# Patient Record
Sex: MALE | Race: WHITE | NOT HISPANIC OR LATINO | Employment: OTHER | URBAN - NONMETROPOLITAN AREA
[De-identification: names, ages, dates, MRNs, and addresses within clinical notes are randomized per-mention and may not be internally consistent; named-entity substitution may affect disease eponyms.]

---

## 2017-01-04 ENCOUNTER — APPOINTMENT (INPATIENT)
Dept: RADIOLOGY | Facility: HOSPITAL | Age: 72
DRG: 291 | End: 2017-01-04
Payer: COMMERCIAL

## 2017-01-04 PROCEDURE — 71010 HB CHEST X-RAY 1 VIEW FRONTAL (PORTABLE): CPT

## 2017-02-28 ENCOUNTER — TRANSCRIBE ORDERS (OUTPATIENT)
Dept: ADMINISTRATIVE | Facility: HOSPITAL | Age: 72
End: 2017-02-28

## 2017-02-28 ENCOUNTER — APPOINTMENT (OUTPATIENT)
Dept: LAB | Facility: HOSPITAL | Age: 72
End: 2017-02-28
Payer: COMMERCIAL

## 2017-02-28 ENCOUNTER — ALLSCRIPTS OFFICE VISIT (OUTPATIENT)
Dept: OTHER | Facility: OTHER | Age: 72
End: 2017-02-28

## 2017-02-28 DIAGNOSIS — D64.9 ANEMIA, UNSPECIFIED: ICD-10-CM

## 2017-02-28 DIAGNOSIS — N40.0 BENIGN PROSTATIC HYPERPLASIA, PRESENCE OF LOWER URINARY TRACT SYMPTOMS UNSPECIFIED, UNSPECIFIED MORPHOLOGY: ICD-10-CM

## 2017-02-28 DIAGNOSIS — D51.0 PERNICIOUS ANEMIA: ICD-10-CM

## 2017-02-28 DIAGNOSIS — E78.5 HYPERLIPIDEMIA, UNSPECIFIED HYPERLIPIDEMIA TYPE: ICD-10-CM

## 2017-02-28 DIAGNOSIS — E11.9 TYPE 2 DIABETES MELLITUS WITHOUT COMPLICATION, UNSPECIFIED LONG TERM INSULIN USE STATUS: ICD-10-CM

## 2017-02-28 DIAGNOSIS — E55.9 VITAMIN D DEFICIENCY: ICD-10-CM

## 2017-02-28 DIAGNOSIS — E03.9 UNSPECIFIED HYPOTHYROIDISM: ICD-10-CM

## 2017-02-28 DIAGNOSIS — E11.9 TYPE 2 DIABETES MELLITUS WITHOUT COMPLICATION, UNSPECIFIED LONG TERM INSULIN USE STATUS: Primary | ICD-10-CM

## 2017-02-28 LAB
25(OH)D3 SERPL-MCNC: 25.7 NG/ML (ref 30–100)
ALBUMIN SERPL BCP-MCNC: 3.5 G/DL (ref 3.5–5)
ALP SERPL-CCNC: 165 U/L (ref 46–116)
ALT SERPL W P-5'-P-CCNC: 46 U/L (ref 12–78)
ANION GAP SERPL CALCULATED.3IONS-SCNC: 7 MMOL/L (ref 4–13)
AST SERPL W P-5'-P-CCNC: 21 U/L (ref 5–45)
BASOPHILS # BLD AUTO: 0.02 THOUSANDS/ΜL (ref 0–0.1)
BASOPHILS NFR BLD AUTO: 0 % (ref 0–1)
BILIRUB SERPL-MCNC: 0.4 MG/DL (ref 0.2–1)
BUN SERPL-MCNC: 29 MG/DL (ref 5–25)
CALCIUM SERPL-MCNC: 9.2 MG/DL (ref 8.3–10.1)
CHLORIDE SERPL-SCNC: 103 MMOL/L (ref 100–108)
CHOLEST SERPL-MCNC: 128 MG/DL (ref 50–200)
CO2 SERPL-SCNC: 34 MMOL/L (ref 21–32)
CREAT SERPL-MCNC: 1.76 MG/DL (ref 0.6–1.3)
EOSINOPHIL # BLD AUTO: 0.33 THOUSAND/ΜL (ref 0–0.61)
EOSINOPHIL NFR BLD AUTO: 4 % (ref 0–6)
ERYTHROCYTE [DISTWIDTH] IN BLOOD BY AUTOMATED COUNT: 14.2 % (ref 11.6–15.1)
EST. AVERAGE GLUCOSE BLD GHB EST-MCNC: 249 MG/DL
GFR SERPL CREATININE-BSD FRML MDRD: 38.1 ML/MIN/1.73SQ M
GLUCOSE SERPL-MCNC: 325 MG/DL (ref 65–140)
HBA1C MFR BLD: 10.3 % (ref 4.2–6.3)
HCT VFR BLD AUTO: 43 % (ref 36.5–49.3)
HDLC SERPL-MCNC: 49 MG/DL (ref 40–60)
HGB BLD-MCNC: 13.7 G/DL (ref 12–17)
LDLC SERPL CALC-MCNC: 55 MG/DL (ref 0–100)
LYMPHOCYTES # BLD AUTO: 1.99 THOUSANDS/ΜL (ref 0.6–4.47)
LYMPHOCYTES NFR BLD AUTO: 26 % (ref 14–44)
MCH RBC QN AUTO: 28.4 PG (ref 26.8–34.3)
MCHC RBC AUTO-ENTMCNC: 31.9 G/DL (ref 31.4–37.4)
MCV RBC AUTO: 89 FL (ref 82–98)
MONOCYTES # BLD AUTO: 0.46 THOUSAND/ΜL (ref 0.17–1.22)
MONOCYTES NFR BLD AUTO: 6 % (ref 4–12)
NEUTROPHILS # BLD AUTO: 4.89 THOUSANDS/ΜL (ref 1.85–7.62)
NEUTS SEG NFR BLD AUTO: 64 % (ref 43–75)
PLATELET # BLD AUTO: 194 THOUSANDS/UL (ref 149–390)
PMV BLD AUTO: 11.2 FL (ref 8.9–12.7)
POTASSIUM SERPL-SCNC: 4.1 MMOL/L (ref 3.5–5.3)
PROT SERPL-MCNC: 7.3 G/DL (ref 6.4–8.2)
PSA SERPL-MCNC: 0.2 NG/ML (ref 0–4)
RBC # BLD AUTO: 4.82 MILLION/UL (ref 3.88–5.62)
SODIUM SERPL-SCNC: 144 MMOL/L (ref 136–145)
T4 FREE SERPL-MCNC: 0.87 NG/DL (ref 0.76–1.46)
TRIGL SERPL-MCNC: 120 MG/DL
TSH SERPL DL<=0.05 MIU/L-ACNC: 2.41 UIU/ML (ref 0.36–3.74)
VIT B12 SERPL-MCNC: 606 PG/ML (ref 100–900)
WBC # BLD AUTO: 7.69 THOUSAND/UL (ref 4.31–10.16)

## 2017-02-28 PROCEDURE — 80061 LIPID PANEL: CPT

## 2017-02-28 PROCEDURE — 85025 COMPLETE CBC W/AUTO DIFF WBC: CPT

## 2017-02-28 PROCEDURE — 82306 VITAMIN D 25 HYDROXY: CPT

## 2017-02-28 PROCEDURE — 84439 ASSAY OF FREE THYROXINE: CPT

## 2017-02-28 PROCEDURE — 80053 COMPREHEN METABOLIC PANEL: CPT

## 2017-02-28 PROCEDURE — 84443 ASSAY THYROID STIM HORMONE: CPT

## 2017-02-28 PROCEDURE — 83036 HEMOGLOBIN GLYCOSYLATED A1C: CPT

## 2017-02-28 PROCEDURE — G0103 PSA SCREENING: HCPCS

## 2017-02-28 PROCEDURE — 36415 COLL VENOUS BLD VENIPUNCTURE: CPT

## 2017-02-28 PROCEDURE — 82607 VITAMIN B-12: CPT

## 2017-05-11 ENCOUNTER — HOSPITAL ENCOUNTER (EMERGENCY)
Facility: HOSPITAL | Age: 72
Discharge: HOME | End: 2017-05-11
Attending: EMERGENCY MEDICINE | Admitting: EMERGENCY MEDICINE
Payer: COMMERCIAL

## 2017-05-11 VITALS
TEMPERATURE: 97 F | DIASTOLIC BLOOD PRESSURE: 60 MMHG | OXYGEN SATURATION: 95 % | BODY MASS INDEX: 50.21 KG/M2 | WEIGHT: 311.07 LBS | SYSTOLIC BLOOD PRESSURE: 118 MMHG | RESPIRATION RATE: 18 BRPM | HEART RATE: 69 BPM

## 2017-05-11 DIAGNOSIS — E11.65 DIABETES MELLITUS WITH HYPERGLYCEMIA (HCC): Primary | ICD-10-CM

## 2017-05-11 DIAGNOSIS — N39.0 ACUTE URINARY TRACT INFECTION: ICD-10-CM

## 2017-05-11 DIAGNOSIS — N18.30 CHRONIC KIDNEY DISEASE, STAGE III (MODERATE) (HCC): ICD-10-CM

## 2017-05-11 LAB
ACETONE SERPL-MCNC: NEGATIVE MG/DL
ALBUMIN SERPL BCP-MCNC: 3.7 G/DL (ref 3.5–5)
ALP SERPL-CCNC: 183 U/L (ref 46–116)
ALT SERPL W P-5'-P-CCNC: 28 U/L (ref 12–78)
ANION GAP SERPL CALCULATED.3IONS-SCNC: 8 MMOL/L (ref 4–13)
ANION GAP SERPL CALCULATED.3IONS-SCNC: 9 MMOL/L (ref 4–13)
AST SERPL W P-5'-P-CCNC: 17 U/L (ref 5–45)
BACTERIA UR QL AUTO: ABNORMAL /HPF
BASOPHILS # BLD AUTO: 0.03 THOUSANDS/ΜL (ref 0–0.1)
BASOPHILS NFR BLD AUTO: 0 % (ref 0–1)
BILIRUB SERPL-MCNC: 0.8 MG/DL (ref 0.2–1)
BILIRUB UR QL STRIP: NEGATIVE
BUN SERPL-MCNC: 32 MG/DL (ref 5–25)
BUN SERPL-MCNC: 32 MG/DL (ref 5–25)
CALCIUM SERPL-MCNC: 9 MG/DL (ref 8.3–10.1)
CALCIUM SERPL-MCNC: 9.4 MG/DL (ref 8.3–10.1)
CHLORIDE SERPL-SCNC: 97 MMOL/L (ref 100–108)
CHLORIDE SERPL-SCNC: 99 MMOL/L (ref 100–108)
CLARITY UR: ABNORMAL
CO2 SERPL-SCNC: 29 MMOL/L (ref 21–32)
CO2 SERPL-SCNC: 32 MMOL/L (ref 21–32)
COLOR UR: YELLOW
CREAT SERPL-MCNC: 1.52 MG/DL (ref 0.6–1.3)
CREAT SERPL-MCNC: 1.57 MG/DL (ref 0.6–1.3)
EOSINOPHIL # BLD AUTO: 0.24 THOUSAND/ΜL (ref 0–0.61)
EOSINOPHIL NFR BLD AUTO: 3 % (ref 0–6)
ERYTHROCYTE [DISTWIDTH] IN BLOOD BY AUTOMATED COUNT: 14.4 % (ref 11.6–15.1)
GFR SERPL CREATININE-BSD FRML MDRD: 43.4 ML/MIN/1.73SQ M
GFR SERPL CREATININE-BSD FRML MDRD: 45.1 ML/MIN/1.73SQ M
GLUCOSE SERPL-MCNC: 408 MG/DL (ref 65–140)
GLUCOSE SERPL-MCNC: 486 MG/DL (ref 65–140)
GLUCOSE SERPL-MCNC: 495 MG/DL (ref 65–140)
GLUCOSE UR STRIP-MCNC: ABNORMAL MG/DL
HCT VFR BLD AUTO: 42.5 % (ref 36.5–49.3)
HGB BLD-MCNC: 13.9 G/DL (ref 12–17)
HGB UR QL STRIP.AUTO: ABNORMAL
KETONES UR STRIP-MCNC: NEGATIVE MG/DL
LEUKOCYTE ESTERASE UR QL STRIP: ABNORMAL
LIPASE SERPL-CCNC: 515 U/L (ref 73–393)
LYMPHOCYTES # BLD AUTO: 1.88 THOUSANDS/ΜL (ref 0.6–4.47)
LYMPHOCYTES NFR BLD AUTO: 25 % (ref 14–44)
MCH RBC QN AUTO: 28.8 PG (ref 26.8–34.3)
MCHC RBC AUTO-ENTMCNC: 32.7 G/DL (ref 31.4–37.4)
MCV RBC AUTO: 88 FL (ref 82–98)
MONOCYTES # BLD AUTO: 0.47 THOUSAND/ΜL (ref 0.17–1.22)
MONOCYTES NFR BLD AUTO: 6 % (ref 4–12)
NEUTROPHILS # BLD AUTO: 5 THOUSANDS/ΜL (ref 1.85–7.62)
NEUTS SEG NFR BLD AUTO: 66 % (ref 43–75)
NITRITE UR QL STRIP: NEGATIVE
NON-SQ EPI CELLS URNS QL MICRO: ABNORMAL /HPF
PH UR STRIP.AUTO: 5.5 [PH] (ref 4.5–8)
PLATELET # BLD AUTO: 198 THOUSANDS/UL (ref 149–390)
PMV BLD AUTO: 11.3 FL (ref 8.9–12.7)
POTASSIUM SERPL-SCNC: 4.1 MMOL/L (ref 3.5–5.3)
POTASSIUM SERPL-SCNC: 4.4 MMOL/L (ref 3.5–5.3)
PROT SERPL-MCNC: 8 G/DL (ref 6.4–8.2)
PROT UR STRIP-MCNC: NEGATIVE MG/DL
RBC # BLD AUTO: 4.83 MILLION/UL (ref 3.88–5.62)
RBC #/AREA URNS AUTO: ABNORMAL /HPF
SODIUM SERPL-SCNC: 137 MMOL/L (ref 136–145)
SODIUM SERPL-SCNC: 137 MMOL/L (ref 136–145)
SP GR UR STRIP.AUTO: 1.01 (ref 1–1.03)
UROBILINOGEN UR QL STRIP.AUTO: 0.2 E.U./DL
WBC # BLD AUTO: 7.62 THOUSAND/UL (ref 4.31–10.16)
WBC #/AREA URNS AUTO: ABNORMAL /HPF

## 2017-05-11 PROCEDURE — 96360 HYDRATION IV INFUSION INIT: CPT

## 2017-05-11 PROCEDURE — 80053 COMPREHEN METABOLIC PANEL: CPT

## 2017-05-11 PROCEDURE — 80048 BASIC METABOLIC PNL TOTAL CA: CPT | Performed by: EMERGENCY MEDICINE

## 2017-05-11 PROCEDURE — 96372 THER/PROPH/DIAG INJ SC/IM: CPT

## 2017-05-11 PROCEDURE — 87147 CULTURE TYPE IMMUNOLOGIC: CPT | Performed by: EMERGENCY MEDICINE

## 2017-05-11 PROCEDURE — 82948 REAGENT STRIP/BLOOD GLUCOSE: CPT

## 2017-05-11 PROCEDURE — 36415 COLL VENOUS BLD VENIPUNCTURE: CPT

## 2017-05-11 PROCEDURE — 99283 EMERGENCY DEPT VISIT LOW MDM: CPT

## 2017-05-11 PROCEDURE — 87086 URINE CULTURE/COLONY COUNT: CPT | Performed by: EMERGENCY MEDICINE

## 2017-05-11 PROCEDURE — 85025 COMPLETE CBC W/AUTO DIFF WBC: CPT

## 2017-05-11 PROCEDURE — 81001 URINALYSIS AUTO W/SCOPE: CPT | Performed by: EMERGENCY MEDICINE

## 2017-05-11 PROCEDURE — 83690 ASSAY OF LIPASE: CPT

## 2017-05-11 PROCEDURE — 82009 KETONE BODYS QUAL: CPT | Performed by: EMERGENCY MEDICINE

## 2017-05-11 RX ORDER — CEPHALEXIN 250 MG/1
500 CAPSULE ORAL ONCE
Status: COMPLETED | OUTPATIENT
Start: 2017-05-11 | End: 2017-05-11

## 2017-05-11 RX ORDER — CEPHALEXIN 500 MG/1
500 CAPSULE ORAL 2 TIMES DAILY
Qty: 10 CAPSULE | Refills: 0 | Status: SHIPPED | OUTPATIENT
Start: 2017-05-11 | End: 2017-05-16

## 2017-05-11 RX ADMIN — SODIUM CHLORIDE 500 ML: 0.9 INJECTION, SOLUTION INTRAVENOUS at 12:33

## 2017-05-11 RX ADMIN — CEPHALEXIN 500 MG: 250 CAPSULE ORAL at 13:59

## 2017-05-11 RX ADMIN — INSULIN HUMAN 10 UNITS: 100 INJECTION, SOLUTION PARENTERAL at 12:33

## 2017-05-12 LAB — BACTERIA UR CULT: NORMAL

## 2017-05-24 ENCOUNTER — TRANSCRIBE ORDERS (OUTPATIENT)
Dept: ADMINISTRATIVE | Facility: HOSPITAL | Age: 72
End: 2017-05-24

## 2017-05-24 ENCOUNTER — APPOINTMENT (OUTPATIENT)
Dept: LAB | Facility: HOSPITAL | Age: 72
End: 2017-05-24
Payer: COMMERCIAL

## 2017-05-24 DIAGNOSIS — E13.8 DIABETES MELLITUS OF OTHER TYPE WITH COMPLICATION: ICD-10-CM

## 2017-05-24 DIAGNOSIS — E13.8 DIABETES MELLITUS OF OTHER TYPE WITH COMPLICATION: Primary | ICD-10-CM

## 2017-05-24 DIAGNOSIS — N40.0 BENIGN PROSTATIC HYPERPLASIA, PRESENCE OF LOWER URINARY TRACT SYMPTOMS UNSPECIFIED, UNSPECIFIED MORPHOLOGY: ICD-10-CM

## 2017-05-24 DIAGNOSIS — D64.9 ANEMIA, UNSPECIFIED: ICD-10-CM

## 2017-05-24 DIAGNOSIS — N39.0 URINARY TRACT INFECTION, SITE NOT SPECIFIED: ICD-10-CM

## 2017-05-24 DIAGNOSIS — E78.00 PURE HYPERCHOLESTEROLEMIA: ICD-10-CM

## 2017-05-24 LAB
ALBUMIN SERPL BCP-MCNC: 3.6 G/DL (ref 3.5–5)
ALP SERPL-CCNC: 168 U/L (ref 46–116)
ALT SERPL W P-5'-P-CCNC: 29 U/L (ref 12–78)
ANION GAP SERPL CALCULATED.3IONS-SCNC: 9 MMOL/L (ref 4–13)
AST SERPL W P-5'-P-CCNC: 19 U/L (ref 5–45)
BACTERIA UR QL AUTO: ABNORMAL /HPF
BASOPHILS # BLD AUTO: 0.03 THOUSANDS/ΜL (ref 0–0.1)
BASOPHILS NFR BLD AUTO: 0 % (ref 0–1)
BILIRUB SERPL-MCNC: 0.5 MG/DL (ref 0.2–1)
BILIRUB UR QL STRIP: NEGATIVE
BUN SERPL-MCNC: 22 MG/DL (ref 5–25)
CALCIUM SERPL-MCNC: 9 MG/DL (ref 8.3–10.1)
CHLORIDE SERPL-SCNC: 101 MMOL/L (ref 100–108)
CHOLEST SERPL-MCNC: 161 MG/DL (ref 50–200)
CLARITY UR: ABNORMAL
CO2 SERPL-SCNC: 29 MMOL/L (ref 21–32)
COLOR UR: YELLOW
CREAT SERPL-MCNC: 1.37 MG/DL (ref 0.6–1.3)
CREAT UR-MCNC: 153 MG/DL
EOSINOPHIL # BLD AUTO: 0.34 THOUSAND/ΜL (ref 0–0.61)
EOSINOPHIL NFR BLD AUTO: 5 % (ref 0–6)
ERYTHROCYTE [DISTWIDTH] IN BLOOD BY AUTOMATED COUNT: 14.2 % (ref 11.6–15.1)
EST. AVERAGE GLUCOSE BLD GHB EST-MCNC: 321 MG/DL
GFR SERPL CREATININE-BSD FRML MDRD: 50.8 ML/MIN/1.73SQ M
GLUCOSE P FAST SERPL-MCNC: 279 MG/DL (ref 65–99)
GLUCOSE UR STRIP-MCNC: ABNORMAL MG/DL
HBA1C MFR BLD: 12.8 % (ref 4.2–6.3)
HCT VFR BLD AUTO: 42.1 % (ref 36.5–49.3)
HDLC SERPL-MCNC: 45 MG/DL (ref 40–60)
HGB BLD-MCNC: 13.9 G/DL (ref 12–17)
HGB UR QL STRIP.AUTO: ABNORMAL
KETONES UR STRIP-MCNC: NEGATIVE MG/DL
LDLC SERPL CALC-MCNC: 77 MG/DL (ref 0–100)
LEUKOCYTE ESTERASE UR QL STRIP: ABNORMAL
LYMPHOCYTES # BLD AUTO: 2.41 THOUSANDS/ΜL (ref 0.6–4.47)
LYMPHOCYTES NFR BLD AUTO: 32 % (ref 14–44)
MCH RBC QN AUTO: 29.1 PG (ref 26.8–34.3)
MCHC RBC AUTO-ENTMCNC: 33 G/DL (ref 31.4–37.4)
MCV RBC AUTO: 88 FL (ref 82–98)
MICROALBUMIN UR-MCNC: 155 MG/L (ref 0–20)
MICROALBUMIN/CREAT 24H UR: 101 MG/G CREATININE (ref 0–30)
MONOCYTES # BLD AUTO: 0.55 THOUSAND/ΜL (ref 0.17–1.22)
MONOCYTES NFR BLD AUTO: 7 % (ref 4–12)
NEUTROPHILS # BLD AUTO: 4.31 THOUSANDS/ΜL (ref 1.85–7.62)
NEUTS SEG NFR BLD AUTO: 56 % (ref 43–75)
NITRITE UR QL STRIP: NEGATIVE
NON-SQ EPI CELLS URNS QL MICRO: ABNORMAL /HPF
PH UR STRIP.AUTO: 6 [PH] (ref 4.5–8)
PLATELET # BLD AUTO: 194 THOUSANDS/UL (ref 149–390)
PMV BLD AUTO: 11.5 FL (ref 8.9–12.7)
POTASSIUM SERPL-SCNC: 3.8 MMOL/L (ref 3.5–5.3)
PROT SERPL-MCNC: 7 G/DL (ref 6.4–8.2)
PROT UR STRIP-MCNC: ABNORMAL MG/DL
PSA SERPL-MCNC: 0.3 NG/ML (ref 0–4)
RBC # BLD AUTO: 4.77 MILLION/UL (ref 3.88–5.62)
RBC #/AREA URNS AUTO: ABNORMAL /HPF
SODIUM SERPL-SCNC: 139 MMOL/L (ref 136–145)
SP GR UR STRIP.AUTO: 1.02 (ref 1–1.03)
TRIGL SERPL-MCNC: 195 MG/DL
UROBILINOGEN UR QL STRIP.AUTO: 0.2 E.U./DL
WBC # BLD AUTO: 7.64 THOUSAND/UL (ref 4.31–10.16)
WBC #/AREA URNS AUTO: ABNORMAL /HPF

## 2017-05-24 PROCEDURE — 36415 COLL VENOUS BLD VENIPUNCTURE: CPT

## 2017-05-24 PROCEDURE — 82570 ASSAY OF URINE CREATININE: CPT | Performed by: DENTIST

## 2017-05-24 PROCEDURE — G0103 PSA SCREENING: HCPCS

## 2017-05-24 PROCEDURE — 80053 COMPREHEN METABOLIC PANEL: CPT

## 2017-05-24 PROCEDURE — 85025 COMPLETE CBC W/AUTO DIFF WBC: CPT

## 2017-05-24 PROCEDURE — 81001 URINALYSIS AUTO W/SCOPE: CPT | Performed by: DENTIST

## 2017-05-24 PROCEDURE — 80061 LIPID PANEL: CPT

## 2017-05-24 PROCEDURE — 82043 UR ALBUMIN QUANTITATIVE: CPT | Performed by: DENTIST

## 2017-05-24 PROCEDURE — 83036 HEMOGLOBIN GLYCOSYLATED A1C: CPT

## 2017-07-25 ENCOUNTER — HOSPITAL ENCOUNTER (EMERGENCY)
Facility: HOSPITAL | Age: 72
Discharge: HOME | End: 2017-07-25
Attending: EMERGENCY MEDICINE | Admitting: EMERGENCY MEDICINE
Payer: COMMERCIAL

## 2017-07-25 ENCOUNTER — APPOINTMENT (EMERGENCY)
Dept: RADIOLOGY | Facility: HOSPITAL | Age: 72
End: 2017-07-25
Payer: COMMERCIAL

## 2017-07-25 VITALS
DIASTOLIC BLOOD PRESSURE: 69 MMHG | OXYGEN SATURATION: 93 % | RESPIRATION RATE: 20 BRPM | TEMPERATURE: 98.4 F | BODY MASS INDEX: 51.2 KG/M2 | HEART RATE: 83 BPM | SYSTOLIC BLOOD PRESSURE: 138 MMHG | WEIGHT: 315 LBS

## 2017-07-25 DIAGNOSIS — I87.2 VENOUS STASIS DERMATITIS: ICD-10-CM

## 2017-07-25 DIAGNOSIS — L03.116 CELLULITIS OF LEFT LEG WITHOUT FOOT: Primary | ICD-10-CM

## 2017-07-25 LAB
ANION GAP SERPL CALCULATED.3IONS-SCNC: 7 MMOL/L (ref 4–13)
ATRIAL RATE: 84 BPM
BASOPHILS # BLD AUTO: 0.02 THOUSANDS/ΜL (ref 0–0.1)
BASOPHILS NFR BLD AUTO: 0 % (ref 0–1)
BUN SERPL-MCNC: 31 MG/DL (ref 5–25)
CALCIUM SERPL-MCNC: 9 MG/DL (ref 8.3–10.1)
CHLORIDE SERPL-SCNC: 98 MMOL/L (ref 100–108)
CO2 SERPL-SCNC: 31 MMOL/L (ref 21–32)
CREAT SERPL-MCNC: 1.69 MG/DL (ref 0.6–1.3)
EOSINOPHIL # BLD AUTO: 0.29 THOUSAND/ΜL (ref 0–0.61)
EOSINOPHIL NFR BLD AUTO: 4 % (ref 0–6)
ERYTHROCYTE [DISTWIDTH] IN BLOOD BY AUTOMATED COUNT: 13.7 % (ref 11.6–15.1)
GFR SERPL CREATININE-BSD FRML MDRD: 39 ML/MIN/1.73SQ M
GLUCOSE SERPL-MCNC: 489 MG/DL (ref 65–140)
HCT VFR BLD AUTO: 39.4 % (ref 36.5–49.3)
HGB BLD-MCNC: 13.2 G/DL (ref 12–17)
INR PPP: 1.02 (ref 0.86–1.16)
LACTATE SERPL-SCNC: 2 MMOL/L (ref 0.5–2)
LYMPHOCYTES # BLD AUTO: 2.3 THOUSANDS/ΜL (ref 0.6–4.47)
LYMPHOCYTES NFR BLD AUTO: 29 % (ref 14–44)
MAGNESIUM SERPL-MCNC: 1.9 MG/DL (ref 1.6–2.6)
MCH RBC QN AUTO: 29.1 PG (ref 26.8–34.3)
MCHC RBC AUTO-ENTMCNC: 33.5 G/DL (ref 31.4–37.4)
MCV RBC AUTO: 87 FL (ref 82–98)
MONOCYTES # BLD AUTO: 0.56 THOUSAND/ΜL (ref 0.17–1.22)
MONOCYTES NFR BLD AUTO: 7 % (ref 4–12)
NEUTROPHILS # BLD AUTO: 4.78 THOUSANDS/ΜL (ref 1.85–7.62)
NEUTS SEG NFR BLD AUTO: 60 % (ref 43–75)
NT-PROBNP SERPL-MCNC: 146 PG/ML
P AXIS: 49 DEGREES
PLATELET # BLD AUTO: 220 THOUSANDS/UL (ref 149–390)
PMV BLD AUTO: 11 FL (ref 8.9–12.7)
POTASSIUM SERPL-SCNC: 3.9 MMOL/L (ref 3.5–5.3)
PR INTERVAL: 208 MS
PROTHROMBIN TIME: 13.3 SECONDS (ref 12.1–14.4)
QRS AXIS: -86 DEGREES
QRSD INTERVAL: 156 MS
QT INTERVAL: 416 MS
QTC INTERVAL: 491 MS
RBC # BLD AUTO: 4.54 MILLION/UL (ref 3.88–5.62)
SODIUM SERPL-SCNC: 136 MMOL/L (ref 136–145)
T WAVE AXIS: 35 DEGREES
VENTRICULAR RATE: 84 BPM
WBC # BLD AUTO: 7.95 THOUSAND/UL (ref 4.31–10.16)

## 2017-07-25 PROCEDURE — 99284 EMERGENCY DEPT VISIT MOD MDM: CPT

## 2017-07-25 PROCEDURE — 80048 BASIC METABOLIC PNL TOTAL CA: CPT | Performed by: EMERGENCY MEDICINE

## 2017-07-25 PROCEDURE — 93005 ELECTROCARDIOGRAM TRACING: CPT | Performed by: EMERGENCY MEDICINE

## 2017-07-25 PROCEDURE — 85025 COMPLETE CBC W/AUTO DIFF WBC: CPT | Performed by: EMERGENCY MEDICINE

## 2017-07-25 PROCEDURE — 71020 HB CHEST X-RAY 2VW FRONTAL&LATL: CPT

## 2017-07-25 PROCEDURE — 85610 PROTHROMBIN TIME: CPT | Performed by: EMERGENCY MEDICINE

## 2017-07-25 PROCEDURE — 83735 ASSAY OF MAGNESIUM: CPT | Performed by: EMERGENCY MEDICINE

## 2017-07-25 PROCEDURE — 83880 ASSAY OF NATRIURETIC PEPTIDE: CPT | Performed by: EMERGENCY MEDICINE

## 2017-07-25 PROCEDURE — 73590 X-RAY EXAM OF LOWER LEG: CPT

## 2017-07-25 PROCEDURE — 83605 ASSAY OF LACTIC ACID: CPT | Performed by: EMERGENCY MEDICINE

## 2017-07-25 PROCEDURE — 36415 COLL VENOUS BLD VENIPUNCTURE: CPT | Performed by: EMERGENCY MEDICINE

## 2017-07-25 RX ORDER — CEPHALEXIN 250 MG/1
500 CAPSULE ORAL ONCE
Status: COMPLETED | OUTPATIENT
Start: 2017-07-25 | End: 2017-07-25

## 2017-07-25 RX ORDER — CEPHALEXIN 500 MG/1
500 CAPSULE ORAL 4 TIMES DAILY
Qty: 40 CAPSULE | Refills: 0 | Status: SHIPPED | OUTPATIENT
Start: 2017-07-25 | End: 2017-08-04

## 2017-07-25 RX ADMIN — CEPHALEXIN 500 MG: 250 CAPSULE ORAL at 05:18

## 2017-12-04 ENCOUNTER — ALLSCRIPTS OFFICE VISIT (OUTPATIENT)
Dept: FAMILY MEDICINE CLINIC | Facility: CLINIC | Age: 72
End: 2017-12-04
Payer: COMMERCIAL

## 2017-12-04 DIAGNOSIS — I50.32 CHRONIC DIASTOLIC HEART FAILURE (HCC): ICD-10-CM

## 2017-12-04 DIAGNOSIS — E11.9 TYPE 2 DIABETES MELLITUS WITHOUT COMPLICATIONS (HCC): ICD-10-CM

## 2017-12-04 PROCEDURE — 83036 HEMOGLOBIN GLYCOSYLATED A1C: CPT | Performed by: FAMILY MEDICINE

## 2017-12-04 PROCEDURE — 99213 OFFICE O/P EST LOW 20 MIN: CPT | Performed by: FAMILY MEDICINE

## 2017-12-27 NOTE — PROGRESS NOTES
Assessment   1  Type 2 diabetes mellitus (250 00) (E11 9)   2  Former smoker (V15 82) (Y47 081)   3  No alcohol use   4  No illicit drug use   5  Chronic diastolic congestive heart failure (428 32,428 0) (I50 32)   6  Obesity, morbid, BMI 50 or higher (278 01) (E66 01)   7  Neuropathy, peripheral (356 9) (G62 9)   8  CHF (congestive heart failure) (428 0) (I50 9)    Plan   CHF (congestive heart failure)    · *1 -  [facility] (CARDIOLOGY ) Co-Management  *  Status: Active     Requested for: 29AGG5228  Care Summary provided  : Yes   · Modify Home Oxygen; Status:Complete;   Done: 59WRA4101  Chronic diastolic congestive heart failure    · (1) TSH; Status:Active; Requested for:10Isz4429;    · * XR CHEST PA & LATERAL; Status:Active; Requested for:05Zah9150;   Type 2 diabetes mellitus    · (1) COMPREHENSIVE METABOLIC PANEL; Status:Active; Requested for:09Haj4012;    · (1) HEMOGLOBIN A1C; Status:Active; Requested for:20Ijo8300;    · (1) LIPID PANEL FASTING W DIRECT LDL REFLEX; Status:Active; Requested    for:02Qus5027;    · (1) MICROALBUMIN CREATININE RATIO, RANDOM URINE; Status:Active; Requested    for:13Kxd3856;    · ECG 12-LEAD; Status:Hold For - Scheduling; Requested for:15Ibr2296;    · *1 -  [facility] Co-Management  *  Status: Hold For - Scheduling  Requested    for: 65WFJ3344  Care Summary provided  : Yes   · Romina Schaeffer (OPTHAMOLOGY ) Co-Management  *  Status: Hold For -    Scheduling  Requested for: 63EWV5771  Care Summary provided  : Yes    Discussion/Summary   Discussion Summary:    Type 2 dm- comlete labs, check AIC  bring glucose readings to next office visit, keep appt with endocriniogy  eye exam and foot exam  Follow up in 10 days  records from prev pcp of chf- follow up with cardiology          Counseling Documentation With Imm: The patient was counseled regarding instructions for management,-- importance of compliance with treatment   total time of encounter was 20 minutes-- and-- 10 minutes was spent counseling  Medication SE Review and Pt Understands Tx: Possible side effects of new medications were reviewed with the patient/guardian today  The treatment plan was reviewed with the patient/guardian  The patient/guardian understands and agrees with the treatment plan      Chief Complaint   Chief Complaint Free Text Note Form: pt is here to establish care, He states he has water blisters on his legs  History of Present Illness   HPI: 74year old here today to establish care  Pt has known type 2 dm, CHF, obstructive sleep apnea, he is on multiple medications  He is new to the area, old PCP [place]  He has bilateral leg blisters, due ot fluid  Wearing george stocking, has had previous wound care without change  Has appt with podiatry in January  Review of Systems   Complete-Male:      Constitutional: No fever or chills, feels well, no tiredness, no recent weight gain or weight loss  Eyes: No complaints of eye pain, no red eyes, no discharge from eyes, no itchy eyes  ENT: no complaints of earache, no hearing loss, no nosebleeds, no nasal discharge, no sore throat, no hoarseness  Cardiovascular: No complaints of slow heart rate, no fast heart rate, no chest pain, no palpitations, no leg claudication, no lower extremity  Respiratory: No complaints of shortness of breath, no wheezing, no cough, no SOB on exertion, no orthopnea or PND  Gastrointestinal: No complaints of abdominal pain, no constipation, no nausea or vomiting, no diarrhea or bloody stools  Genitourinary: No complaints of dysuria, no incontinence, no hesitancy, no nocturia, no genital lesion, no testicular pain  Musculoskeletal: limb swelling  Integumentary: dry skin  Neurological: No compliants of headache, no confusion, no convulsions, no numbness or tingling, no dizziness or fainting, no limb weakness, no difficulty walking        Psychiatric: Is not suicidal, no sleep disturbances, no anxiety or depression, no change in personality, no emotional problems  Endocrine: No complaints of proptosis, no hot flashes, no muscle weakness, no erectile dysfunction, no deepening of the voice, no feelings of weakness  Hematologic/Lymphatic: No complaints of swollen glands, no swollen glands in the neck, does not bleed easily, no easy bruising  Active Problems   1  Acute on chronic diastolic congestive heart failure (428 33,428 0) (I50 33)   2  Benign essential hypertension (401 1) (I10)   3  CHF (congestive heart failure) (428 0) (I50 9)   4  Chronic diastolic congestive heart failure (428 32,428 0) (I50 32)   5  Chronic kidney disease, stage 3 (585 3) (N18 3)   6  Hypercholesterolemia (272 0) (E78 00)   7  Obstructive sleep apnea (327 23) (G47 33)   8  Shortness of breath (786 05) (R06 02)    Past Medical History   1  History of Cellulitis of left lower extremity (682 6) (L03 116)   2  History of cataract (V12 49) (Z86 69)   3  History of glaucoma (V12 49) (Z86 69)   4  History of stroke (V12 54) (Z86 73)  Active Problems And Past Medical History Reviewed: The active problems and past medical history were reviewed and updated today  Surgical History   1  History of Appendectomy   2  History of Neuroplasty Median Nerve At Carpal Tunnel  Surgical History Reviewed: The surgical history was reviewed and updated today  Family History   Family History Reviewed: The family history was reviewed and updated today  Social History    · Denied: History of Alcohol use   · Current every day smoker (305 1) (F17 200)   ·   Social History Reviewed: The social history was reviewed and updated today  The social history was reviewed and is unchanged  Current Meds    1  Aspirin 81 MG TABS; TAKE 1 TABLET DAILY; Therapy: 29BCA9516 to (Evaluate:72Ggl1521) Recorded   2  Carvedilol 12 5 MG Oral Tablet; TAKE 1 TABLET TWICE DAILY;      Therapy: 82Gdi7530 to (Evaluate:70See8470) Recorded   3  Finasteride 5 MG Oral Tablet; TAKE 1 TABLET DAILY; Therapy: (QEMKFTKF:64IRZ9818) to Recorded   4  Fluticasone Propionate 50 MCG/ACT Nasal Suspension; USE 1 SPRAY IN EACH     NOSTRIL ONCE DAILY; Therapy: 23SWF4639 to (Evaluate:22Jan2016) Recorded   5  Furosemide 40 MG Oral Tablet; TAKE 1 TABLET DAILY AS DIRECTED; Therapy: (XDTTDBPF:81AGF9794) to Recorded   6  Gabapentin 600 MG Oral Tablet; TAKE 1 TABLET TWICE DAILY; Therapy: (PPNCOTMY:87SRR4921) to Recorded   7  HumaLOG 100 UNIT/ML Subcutaneous Solution; inject 35 units bid with meals; Therapy: 58NTJ4593 to Recorded   8  Lyrica 50 MG Oral Capsule; One tab tid; Therapy: (HXIEGKVE:82ACE4999) to Recorded   9  Omeprazole 40 MG Oral Capsule Delayed Release; take 1 capsule daily; Therapy: 46Tvr7294 to (Evaluate:62Aak3511) Recorded   10  Pravastatin Sodium 40 MG Oral Tablet; TAKE 1 TABLET DAILY; Therapy: 53Gzr1982 to (Evaluate:16Apr2016) Recorded   11  Tamsulosin HCl - 0 4 MG Oral Capsule; one tab at bedtime; Therapy: (WKREHIMS:44YJK6998) to Recorded   12  Toujeo SoloStar 300 UNIT/ML Subcutaneous Solution Pen-injector; 80units bid with      meals; Therapy: (AWOQNDVR:37OXE2854) to Recorded    Allergies   1  No Known Drug Allergies    Vitals   Vital Signs    Recorded: 38HFU0046 10:54AM   Temperature 97 6 F   Heart Rate 105   Respiration 16   Systolic 280   Diastolic 76   Height 5 ft 6 in   Weight 312 lb    BMI Calculated 50 36   BSA Calculated 2 42   O2 Saturation 94     Physical Exam        Constitutional      General appearance: Abnormal   morbidly obese  Pulmonary      Respiratory effort: No increased work of breathing or signs of respiratory distress  Auscultation of lungs: Clear to auscultation, equal breath sounds bilaterally, no wheezes, no rales, no rhonci  Cardiovascular      Auscultation of heart: Normal rate and rhythm, normal S1 and S2, without murmurs         Abdomen Abdomen: Abnormal   The abdomen was obese  Lymphatic      Palpation of lymph nodes in neck: No lymphadenopathy  Musculoskeletal      Gait and station: Normal        Inspection/palpation of joints, bones, and muscles: Abnormal  -- (lower ext emy in color, dry, +pedal pulses)      Psychiatric      Orientation to person, place and time: Normal        Mood and affect: Normal           Future Appointments      Date/Time Provider Specialty Site   03/05/2018 10:30 AM Hussain Patricia [address] [place] [address]     Signatures    Electronically signed by : HEATH Coello;  Dec  4 2017  1:07PM EST                       (Author)     Electronically signed by : Seble Feliciano DO; Dec 26 2017  7:38PM EST

## 2018-01-14 VITALS
BODY MASS INDEX: 50.62 KG/M2 | WEIGHT: 315 LBS | HEART RATE: 73 BPM | DIASTOLIC BLOOD PRESSURE: 68 MMHG | HEIGHT: 66 IN | SYSTOLIC BLOOD PRESSURE: 149 MMHG

## 2018-01-22 VITALS
HEART RATE: 105 BPM | HEIGHT: 66 IN | TEMPERATURE: 97.6 F | SYSTOLIC BLOOD PRESSURE: 142 MMHG | OXYGEN SATURATION: 94 % | DIASTOLIC BLOOD PRESSURE: 76 MMHG | RESPIRATION RATE: 16 BRPM | BODY MASS INDEX: 50.14 KG/M2 | WEIGHT: 312 LBS

## 2018-03-05 RX ORDER — GABAPENTIN 600 MG/1
600 TABLET ORAL 3 TIMES DAILY
Status: ON HOLD | COMMUNITY
End: 2019-03-04 | Stop reason: SDUPTHER

## 2018-03-05 RX ORDER — PEN NEEDLE, DIABETIC 32GX 5/32"
NEEDLE, DISPOSABLE MISCELLANEOUS
Refills: 0 | COMMUNITY
Start: 2017-12-31

## 2018-03-05 RX ORDER — OMEPRAZOLE 40 MG/1
1 CAPSULE, DELAYED RELEASE ORAL DAILY
COMMUNITY
Start: 2015-04-16 | End: 2019-02-28 | Stop reason: ALTCHOICE

## 2018-04-25 ENCOUNTER — TRANSCRIBE ORDERS (OUTPATIENT)
Dept: ADMINISTRATIVE | Facility: HOSPITAL | Age: 73
End: 2018-04-25

## 2018-04-25 ENCOUNTER — APPOINTMENT (OUTPATIENT)
Dept: LAB | Facility: HOSPITAL | Age: 73
End: 2018-04-25
Payer: COMMERCIAL

## 2018-04-25 DIAGNOSIS — E55.9 VITAMIN D DEFICIENCY DISEASE: ICD-10-CM

## 2018-04-25 DIAGNOSIS — D64.9 ANEMIA, UNSPECIFIED TYPE: ICD-10-CM

## 2018-04-25 DIAGNOSIS — R53.83 FATIGUE, UNSPECIFIED TYPE: ICD-10-CM

## 2018-04-25 DIAGNOSIS — E78.00 PURE HYPERCHOLESTEROLEMIA: ICD-10-CM

## 2018-04-25 DIAGNOSIS — E11.9 DIABETES MELLITUS WITHOUT COMPLICATION (HCC): Primary | ICD-10-CM

## 2018-04-25 DIAGNOSIS — E11.9 DIABETES MELLITUS WITHOUT COMPLICATION (HCC): ICD-10-CM

## 2018-04-25 DIAGNOSIS — E03.9 ACQUIRED HYPOTHYROIDISM: ICD-10-CM

## 2018-04-25 DIAGNOSIS — N39.0 URINARY TRACT INFECTION WITHOUT HEMATURIA, SITE UNSPECIFIED: ICD-10-CM

## 2018-04-25 DIAGNOSIS — N40.0 BENIGN PROSTATIC HYPERPLASIA, UNSPECIFIED WHETHER LOWER URINARY TRACT SYMPTOMS PRESENT: ICD-10-CM

## 2018-04-25 LAB
25(OH)D3 SERPL-MCNC: 19.4 NG/ML (ref 30–100)
ALBUMIN SERPL BCP-MCNC: 3.6 G/DL (ref 3.5–5)
ALP SERPL-CCNC: 136 U/L (ref 46–116)
ALT SERPL W P-5'-P-CCNC: 30 U/L (ref 12–78)
ANION GAP SERPL CALCULATED.3IONS-SCNC: 8 MMOL/L (ref 4–13)
AST SERPL W P-5'-P-CCNC: 17 U/L (ref 5–45)
BACTERIA UR QL AUTO: ABNORMAL /HPF
BASOPHILS # BLD AUTO: 0.04 THOUSANDS/ΜL (ref 0–0.1)
BASOPHILS NFR BLD AUTO: 1 % (ref 0–1)
BILIRUB SERPL-MCNC: 0.4 MG/DL (ref 0.2–1)
BILIRUB UR QL STRIP: NEGATIVE
BUN SERPL-MCNC: 28 MG/DL (ref 5–25)
CALCIUM SERPL-MCNC: 9.2 MG/DL (ref 8.3–10.1)
CHLORIDE SERPL-SCNC: 103 MMOL/L (ref 100–108)
CHOLEST SERPL-MCNC: 137 MG/DL (ref 50–200)
CLARITY UR: ABNORMAL
CO2 SERPL-SCNC: 31 MMOL/L (ref 21–32)
COLOR UR: YELLOW
CREAT SERPL-MCNC: 1.33 MG/DL (ref 0.6–1.3)
CREAT UR-MCNC: 112 MG/DL
EOSINOPHIL # BLD AUTO: 0.35 THOUSAND/ΜL (ref 0–0.61)
EOSINOPHIL NFR BLD AUTO: 5 % (ref 0–6)
ERYTHROCYTE [DISTWIDTH] IN BLOOD BY AUTOMATED COUNT: 14.2 % (ref 11.6–15.1)
EST. AVERAGE GLUCOSE BLD GHB EST-MCNC: 194 MG/DL
GFR SERPL CREATININE-BSD FRML MDRD: 52 ML/MIN/1.73SQ M
GLUCOSE P FAST SERPL-MCNC: 164 MG/DL (ref 65–99)
GLUCOSE UR STRIP-MCNC: NEGATIVE MG/DL
HBA1C MFR BLD: 8.4 % (ref 4.2–6.3)
HCT VFR BLD AUTO: 42.5 % (ref 36.5–49.3)
HDLC SERPL-MCNC: 45 MG/DL (ref 40–60)
HGB BLD-MCNC: 13.9 G/DL (ref 12–17)
HGB UR QL STRIP.AUTO: ABNORMAL
KETONES UR STRIP-MCNC: NEGATIVE MG/DL
LDLC SERPL CALC-MCNC: 62 MG/DL (ref 0–100)
LEUKOCYTE ESTERASE UR QL STRIP: ABNORMAL
LYMPHOCYTES # BLD AUTO: 1.7 THOUSANDS/ΜL (ref 0.6–4.47)
LYMPHOCYTES NFR BLD AUTO: 22 % (ref 14–44)
MCH RBC QN AUTO: 28.1 PG (ref 26.8–34.3)
MCHC RBC AUTO-ENTMCNC: 32.7 G/DL (ref 31.4–37.4)
MCV RBC AUTO: 86 FL (ref 82–98)
MICROALBUMIN UR-MCNC: 32.7 MG/L (ref 0–20)
MICROALBUMIN/CREAT 24H UR: 29 MG/G CREATININE (ref 0–30)
MONOCYTES # BLD AUTO: 0.46 THOUSAND/ΜL (ref 0.17–1.22)
MONOCYTES NFR BLD AUTO: 6 % (ref 4–12)
NEUTROPHILS # BLD AUTO: 5.19 THOUSANDS/ΜL (ref 1.85–7.62)
NEUTS SEG NFR BLD AUTO: 66 % (ref 43–75)
NITRITE UR QL STRIP: POSITIVE
NON-SQ EPI CELLS URNS QL MICRO: ABNORMAL /HPF
NONHDLC SERPL-MCNC: 92 MG/DL
PH UR STRIP.AUTO: 6 [PH] (ref 4.5–8)
PLATELET # BLD AUTO: 259 THOUSANDS/UL (ref 149–390)
PMV BLD AUTO: 10.3 FL (ref 8.9–12.7)
POTASSIUM SERPL-SCNC: 4.3 MMOL/L (ref 3.5–5.3)
PROT SERPL-MCNC: 7.4 G/DL (ref 6.4–8.2)
PROT UR STRIP-MCNC: NEGATIVE MG/DL
PSA SERPL-MCNC: 0.4 NG/ML (ref 0–4)
RBC # BLD AUTO: 4.94 MILLION/UL (ref 3.88–5.62)
RBC #/AREA URNS AUTO: ABNORMAL /HPF
SODIUM SERPL-SCNC: 142 MMOL/L (ref 136–145)
SP GR UR STRIP.AUTO: 1.02 (ref 1–1.03)
T4 FREE SERPL-MCNC: 1 NG/DL (ref 0.76–1.46)
TRIGL SERPL-MCNC: 149 MG/DL
TSH SERPL DL<=0.05 MIU/L-ACNC: 2.09 UIU/ML (ref 0.36–3.74)
UROBILINOGEN UR QL STRIP.AUTO: 0.2 E.U./DL
VIT B12 SERPL-MCNC: 591 PG/ML (ref 100–900)
WBC # BLD AUTO: 7.74 THOUSAND/UL (ref 4.31–10.16)
WBC #/AREA URNS AUTO: ABNORMAL /HPF

## 2018-04-25 PROCEDURE — 85025 COMPLETE CBC W/AUTO DIFF WBC: CPT

## 2018-04-25 PROCEDURE — 36415 COLL VENOUS BLD VENIPUNCTURE: CPT

## 2018-04-25 PROCEDURE — 80053 COMPREHEN METABOLIC PANEL: CPT

## 2018-04-25 PROCEDURE — 83036 HEMOGLOBIN GLYCOSYLATED A1C: CPT

## 2018-04-25 PROCEDURE — 81001 URINALYSIS AUTO W/SCOPE: CPT | Performed by: FAMILY MEDICINE

## 2018-04-25 PROCEDURE — 82043 UR ALBUMIN QUANTITATIVE: CPT | Performed by: FAMILY MEDICINE

## 2018-04-25 PROCEDURE — 84443 ASSAY THYROID STIM HORMONE: CPT

## 2018-04-25 PROCEDURE — 82607 VITAMIN B-12: CPT

## 2018-04-25 PROCEDURE — 82306 VITAMIN D 25 HYDROXY: CPT

## 2018-04-25 PROCEDURE — G0103 PSA SCREENING: HCPCS

## 2018-04-25 PROCEDURE — 80061 LIPID PANEL: CPT

## 2018-04-25 PROCEDURE — 82570 ASSAY OF URINE CREATININE: CPT | Performed by: FAMILY MEDICINE

## 2018-04-25 PROCEDURE — 84439 ASSAY OF FREE THYROXINE: CPT

## 2018-10-03 ENCOUNTER — HOSPITAL ENCOUNTER (INPATIENT)
Facility: HOSPITAL | Age: 73
LOS: 3 days | Discharge: HOME HEALTH | DRG: 682 | End: 2018-10-06
Attending: EMERGENCY MEDICINE | Admitting: INTERNAL MEDICINE
Payer: COMMERCIAL

## 2018-10-03 ENCOUNTER — APPOINTMENT (EMERGENCY)
Dept: RADIOLOGY | Facility: HOSPITAL | Age: 73
DRG: 682 | End: 2018-10-03
Payer: COMMERCIAL

## 2018-10-03 DIAGNOSIS — N17.9 ACUTE KIDNEY INJURY (HCC): ICD-10-CM

## 2018-10-03 DIAGNOSIS — E87.2 LACTIC ACIDOSIS: ICD-10-CM

## 2018-10-03 DIAGNOSIS — N39.0 UTI (URINARY TRACT INFECTION): ICD-10-CM

## 2018-10-03 DIAGNOSIS — E11.65 UNCONTROLLED TYPE 2 DIABETES MELLITUS WITH HYPERGLYCEMIA, WITH LONG-TERM CURRENT USE OF INSULIN (HCC): ICD-10-CM

## 2018-10-03 DIAGNOSIS — E66.01 OBESITY, CLASS III, BMI 40-49.9 (MORBID OBESITY) (HCC): ICD-10-CM

## 2018-10-03 DIAGNOSIS — Z79.4 UNCONTROLLED TYPE 2 DIABETES MELLITUS WITH HYPERGLYCEMIA, WITH LONG-TERM CURRENT USE OF INSULIN (HCC): ICD-10-CM

## 2018-10-03 DIAGNOSIS — N39.0 COMPLICATED UTI (URINARY TRACT INFECTION): ICD-10-CM

## 2018-10-03 DIAGNOSIS — R73.9 HYPERGLYCEMIA: Primary | ICD-10-CM

## 2018-10-03 LAB
ACETONE SERPL-MCNC: NEGATIVE MG/DL
ALBUMIN SERPL BCP-MCNC: 3.1 G/DL (ref 3.5–5)
ALP SERPL-CCNC: 221 U/L (ref 46–116)
ALT SERPL W P-5'-P-CCNC: 32 U/L (ref 12–78)
ANION GAP SERPL CALCULATED.3IONS-SCNC: 9 MMOL/L (ref 4–13)
ANION GAP SERPL CALCULATED.3IONS-SCNC: 9 MMOL/L (ref 4–13)
AST SERPL W P-5'-P-CCNC: 18 U/L (ref 5–45)
ATRIAL RATE: 84 BPM
BACTERIA UR QL AUTO: ABNORMAL /HPF
BASE EX.OXY STD BLDV CALC-SCNC: 71.1 % (ref 60–80)
BASE EXCESS BLDV CALC-SCNC: 0 MMOL/L
BILIRUB SERPL-MCNC: 0.5 MG/DL (ref 0.2–1)
BILIRUB UR QL STRIP: NEGATIVE
BUN SERPL-MCNC: 33 MG/DL (ref 5–25)
BUN SERPL-MCNC: 39 MG/DL (ref 5–25)
CALCIUM SERPL-MCNC: 8.3 MG/DL (ref 8.3–10.1)
CALCIUM SERPL-MCNC: 8.8 MG/DL (ref 8.3–10.1)
CHLORIDE SERPL-SCNC: 88 MMOL/L (ref 100–108)
CHLORIDE SERPL-SCNC: 97 MMOL/L (ref 100–108)
CLARITY UR: ABNORMAL
CO2 SERPL-SCNC: 26 MMOL/L (ref 21–32)
CO2 SERPL-SCNC: 26 MMOL/L (ref 21–32)
COLOR UR: YELLOW
CREAT SERPL-MCNC: 1.75 MG/DL (ref 0.6–1.3)
CREAT SERPL-MCNC: 2.19 MG/DL (ref 0.6–1.3)
ERYTHROCYTE [DISTWIDTH] IN BLOOD BY AUTOMATED COUNT: 13.4 % (ref 11.6–15.1)
GFR SERPL CREATININE-BSD FRML MDRD: 28 ML/MIN/1.73SQ M
GFR SERPL CREATININE-BSD FRML MDRD: 37 ML/MIN/1.73SQ M
GLUCOSE SERPL-MCNC: 300 MG/DL (ref 65–140)
GLUCOSE SERPL-MCNC: 391 MG/DL (ref 65–140)
GLUCOSE SERPL-MCNC: 461 MG/DL (ref 65–140)
GLUCOSE SERPL-MCNC: 541 MG/DL (ref 65–140)
GLUCOSE SERPL-MCNC: 865 MG/DL (ref 65–140)
GLUCOSE SERPL-MCNC: >500 MG/DL (ref 65–140)
GLUCOSE SERPL-MCNC: >500 MG/DL (ref 65–140)
GLUCOSE UR STRIP-MCNC: ABNORMAL MG/DL
HCO3 BLDV-SCNC: 25.9 MMOL/L (ref 24–30)
HCT VFR BLD AUTO: 40 % (ref 36.5–49.3)
HGB BLD-MCNC: 13 G/DL (ref 12–17)
HGB UR QL STRIP.AUTO: ABNORMAL
KETONES UR STRIP-MCNC: NEGATIVE MG/DL
LACTATE SERPL-SCNC: 3.6 MMOL/L (ref 0.5–2)
LEUKOCYTE ESTERASE UR QL STRIP: ABNORMAL
LIPASE SERPL-CCNC: 213 U/L (ref 73–393)
MAGNESIUM SERPL-MCNC: 2 MG/DL (ref 1.6–2.6)
MAGNESIUM SERPL-MCNC: 2.1 MG/DL (ref 1.6–2.6)
MCH RBC QN AUTO: 29.2 PG (ref 26.8–34.3)
MCHC RBC AUTO-ENTMCNC: 32.5 G/DL (ref 31.4–37.4)
MCV RBC AUTO: 90 FL (ref 82–98)
NITRITE UR QL STRIP: POSITIVE
NON-SQ EPI CELLS URNS QL MICRO: ABNORMAL /HPF
O2 CT BLDV-SCNC: 13.9 ML/DL
P AXIS: 57 DEGREES
PCO2 BLDV: 47 MM HG (ref 42–50)
PH BLDV: 7.36 [PH] (ref 7.3–7.4)
PH UR STRIP.AUTO: 6 [PH] (ref 4.5–8)
PHOSPHATE SERPL-MCNC: 2.6 MG/DL (ref 2.3–4.1)
PHOSPHATE SERPL-MCNC: 3.4 MG/DL (ref 2.3–4.1)
PLATELET # BLD AUTO: 204 THOUSANDS/UL (ref 149–390)
PLATELET # BLD AUTO: 217 THOUSANDS/UL (ref 149–390)
PMV BLD AUTO: 10.9 FL (ref 8.9–12.7)
PMV BLD AUTO: 11.1 FL (ref 8.9–12.7)
PO2 BLDV: 38.8 MM HG (ref 35–45)
POTASSIUM SERPL-SCNC: 3.6 MMOL/L (ref 3.5–5.3)
POTASSIUM SERPL-SCNC: 4.2 MMOL/L (ref 3.5–5.3)
PR INTERVAL: 220 MS
PROT SERPL-MCNC: 7.6 G/DL (ref 6.4–8.2)
PROT UR STRIP-MCNC: NEGATIVE MG/DL
QRS AXIS: -83 DEGREES
QRSD INTERVAL: 162 MS
QT INTERVAL: 426 MS
QTC INTERVAL: 503 MS
RBC # BLD AUTO: 4.45 MILLION/UL (ref 3.88–5.62)
RBC #/AREA URNS AUTO: ABNORMAL /HPF
SODIUM SERPL-SCNC: 123 MMOL/L (ref 136–145)
SODIUM SERPL-SCNC: 132 MMOL/L (ref 136–145)
SP GR UR STRIP.AUTO: <=1.005 (ref 1–1.03)
T WAVE AXIS: 29 DEGREES
TROPONIN I SERPL-MCNC: <0.02 NG/ML
UROBILINOGEN UR QL STRIP.AUTO: 0.2 E.U./DL
VENTRICULAR RATE: 84 BPM
WBC # BLD AUTO: 7.23 THOUSAND/UL (ref 4.31–10.16)
WBC #/AREA URNS AUTO: ABNORMAL /HPF

## 2018-10-03 PROCEDURE — 83690 ASSAY OF LIPASE: CPT | Performed by: PHYSICIAN ASSISTANT

## 2018-10-03 PROCEDURE — 84484 ASSAY OF TROPONIN QUANT: CPT | Performed by: PHYSICIAN ASSISTANT

## 2018-10-03 PROCEDURE — 82948 REAGENT STRIP/BLOOD GLUCOSE: CPT

## 2018-10-03 PROCEDURE — 83605 ASSAY OF LACTIC ACID: CPT | Performed by: PHYSICIAN ASSISTANT

## 2018-10-03 PROCEDURE — 87077 CULTURE AEROBIC IDENTIFY: CPT | Performed by: PHYSICIAN ASSISTANT

## 2018-10-03 PROCEDURE — 96374 THER/PROPH/DIAG INJ IV PUSH: CPT

## 2018-10-03 PROCEDURE — 36415 COLL VENOUS BLD VENIPUNCTURE: CPT | Performed by: PHYSICIAN ASSISTANT

## 2018-10-03 PROCEDURE — 71045 X-RAY EXAM CHEST 1 VIEW: CPT

## 2018-10-03 PROCEDURE — 82805 BLOOD GASES W/O2 SATURATION: CPT | Performed by: PHYSICIAN ASSISTANT

## 2018-10-03 PROCEDURE — 84100 ASSAY OF PHOSPHORUS: CPT | Performed by: PHYSICIAN ASSISTANT

## 2018-10-03 PROCEDURE — 85027 COMPLETE CBC AUTOMATED: CPT | Performed by: PHYSICIAN ASSISTANT

## 2018-10-03 PROCEDURE — 96365 THER/PROPH/DIAG IV INF INIT: CPT

## 2018-10-03 PROCEDURE — 80053 COMPREHEN METABOLIC PANEL: CPT | Performed by: PHYSICIAN ASSISTANT

## 2018-10-03 PROCEDURE — 83735 ASSAY OF MAGNESIUM: CPT | Performed by: PHYSICIAN ASSISTANT

## 2018-10-03 PROCEDURE — 96361 HYDRATE IV INFUSION ADD-ON: CPT

## 2018-10-03 PROCEDURE — 80048 BASIC METABOLIC PNL TOTAL CA: CPT | Performed by: PHYSICIAN ASSISTANT

## 2018-10-03 PROCEDURE — 87040 BLOOD CULTURE FOR BACTERIA: CPT | Performed by: PHYSICIAN ASSISTANT

## 2018-10-03 PROCEDURE — 99285 EMERGENCY DEPT VISIT HI MDM: CPT

## 2018-10-03 PROCEDURE — 99222 1ST HOSP IP/OBS MODERATE 55: CPT | Performed by: PHYSICIAN ASSISTANT

## 2018-10-03 PROCEDURE — 82009 KETONE BODYS QUAL: CPT | Performed by: PHYSICIAN ASSISTANT

## 2018-10-03 PROCEDURE — 81001 URINALYSIS AUTO W/SCOPE: CPT | Performed by: PHYSICIAN ASSISTANT

## 2018-10-03 PROCEDURE — 93005 ELECTROCARDIOGRAM TRACING: CPT

## 2018-10-03 PROCEDURE — 93010 ELECTROCARDIOGRAM REPORT: CPT | Performed by: INTERNAL MEDICINE

## 2018-10-03 PROCEDURE — 85049 AUTOMATED PLATELET COUNT: CPT | Performed by: PHYSICIAN ASSISTANT

## 2018-10-03 RX ORDER — DEXTROSE AND SODIUM CHLORIDE 5; .9 G/100ML; G/100ML
250 INJECTION, SOLUTION INTRAVENOUS CONTINUOUS
Status: DISCONTINUED | OUTPATIENT
Start: 2018-10-03 | End: 2018-10-04

## 2018-10-03 RX ORDER — GABAPENTIN 300 MG/1
600 CAPSULE ORAL 2 TIMES DAILY
Status: DISCONTINUED | OUTPATIENT
Start: 2018-10-03 | End: 2018-10-06 | Stop reason: HOSPADM

## 2018-10-03 RX ORDER — PREGABALIN 50 MG/1
50 CAPSULE ORAL 3 TIMES DAILY
Status: DISCONTINUED | OUTPATIENT
Start: 2018-10-03 | End: 2018-10-06 | Stop reason: HOSPADM

## 2018-10-03 RX ORDER — SODIUM CHLORIDE 9 MG/ML
500 INJECTION, SOLUTION INTRAVENOUS CONTINUOUS
Status: DISPENSED | OUTPATIENT
Start: 2018-10-03 | End: 2018-10-03

## 2018-10-03 RX ORDER — CARVEDILOL 12.5 MG/1
12.5 TABLET ORAL 2 TIMES DAILY
Status: DISCONTINUED | OUTPATIENT
Start: 2018-10-03 | End: 2018-10-06 | Stop reason: HOSPADM

## 2018-10-03 RX ORDER — PRAVASTATIN SODIUM 40 MG
40 TABLET ORAL DAILY
Status: DISCONTINUED | OUTPATIENT
Start: 2018-10-04 | End: 2018-10-06 | Stop reason: HOSPADM

## 2018-10-03 RX ORDER — LEVOFLOXACIN 5 MG/ML
500 INJECTION, SOLUTION INTRAVENOUS EVERY 24 HOURS
Status: DISCONTINUED | OUTPATIENT
Start: 2018-10-04 | End: 2018-10-04

## 2018-10-03 RX ORDER — SODIUM CHLORIDE 9 MG/ML
2000 INJECTION, SOLUTION INTRAVENOUS CONTINUOUS
Status: ACTIVE | OUTPATIENT
Start: 2018-10-03 | End: 2018-10-03

## 2018-10-03 RX ORDER — OMEPRAZOLE 20 MG/1
40 CAPSULE, DELAYED RELEASE ORAL
Status: DISCONTINUED | OUTPATIENT
Start: 2018-10-04 | End: 2018-10-06 | Stop reason: HOSPADM

## 2018-10-03 RX ORDER — SODIUM CHLORIDE 9 MG/ML
250 INJECTION, SOLUTION INTRAVENOUS CONTINUOUS
Status: DISCONTINUED | OUTPATIENT
Start: 2018-10-03 | End: 2018-10-04

## 2018-10-03 RX ORDER — LEVOFLOXACIN 5 MG/ML
750 INJECTION, SOLUTION INTRAVENOUS ONCE
Status: COMPLETED | OUTPATIENT
Start: 2018-10-03 | End: 2018-10-03

## 2018-10-03 RX ORDER — HEPARIN SODIUM 5000 [USP'U]/ML
5000 INJECTION, SOLUTION INTRAVENOUS; SUBCUTANEOUS EVERY 8 HOURS SCHEDULED
Status: DISCONTINUED | OUTPATIENT
Start: 2018-10-03 | End: 2018-10-04

## 2018-10-03 RX ORDER — TAMSULOSIN HYDROCHLORIDE 0.4 MG/1
0.4 CAPSULE ORAL
Status: DISCONTINUED | OUTPATIENT
Start: 2018-10-03 | End: 2018-10-06 | Stop reason: HOSPADM

## 2018-10-03 RX ORDER — FUROSEMIDE 40 MG/1
40 TABLET ORAL DAILY
Status: DISCONTINUED | OUTPATIENT
Start: 2018-10-04 | End: 2018-10-04

## 2018-10-03 RX ORDER — ASPIRIN 81 MG/1
81 TABLET, CHEWABLE ORAL DAILY
Status: DISCONTINUED | OUTPATIENT
Start: 2018-10-04 | End: 2018-10-06 | Stop reason: HOSPADM

## 2018-10-03 RX ORDER — INSULIN GLARGINE 100 [IU]/ML
110 INJECTION, SOLUTION SUBCUTANEOUS 2 TIMES DAILY
COMMUNITY
End: 2018-10-06 | Stop reason: HOSPADM

## 2018-10-03 RX ORDER — 0.9 % SODIUM CHLORIDE 0.9 %
3 VIAL (ML) INJECTION AS NEEDED
Status: DISCONTINUED | OUTPATIENT
Start: 2018-10-03 | End: 2018-10-03

## 2018-10-03 RX ORDER — FINASTERIDE 5 MG/1
5 TABLET, FILM COATED ORAL DAILY
Status: DISCONTINUED | OUTPATIENT
Start: 2018-10-04 | End: 2018-10-06 | Stop reason: HOSPADM

## 2018-10-03 RX ADMIN — SODIUM CHLORIDE 2000 ML/HR: 0.9 INJECTION, SOLUTION INTRAVENOUS at 18:23

## 2018-10-03 RX ADMIN — Medication 12.8 UNITS/HR: at 19:30

## 2018-10-03 RX ADMIN — LEVOFLOXACIN 750 MG: 5 INJECTION, SOLUTION INTRAVENOUS at 19:49

## 2018-10-03 RX ADMIN — CARVEDILOL 12.5 MG: 12.5 TABLET, FILM COATED ORAL at 22:23

## 2018-10-03 RX ADMIN — GABAPENTIN 600 MG: 300 CAPSULE ORAL at 22:23

## 2018-10-03 RX ADMIN — PREGABALIN 50 MG: 50 CAPSULE ORAL at 22:23

## 2018-10-03 RX ADMIN — TAMSULOSIN HYDROCHLORIDE 0.4 MG: 0.4 CAPSULE ORAL at 22:23

## 2018-10-03 RX ADMIN — SODIUM CHLORIDE 500 ML/HR: 0.9 INJECTION, SOLUTION INTRAVENOUS at 19:50

## 2018-10-03 RX ADMIN — INSULIN HUMAN 10 UNITS: 100 INJECTION, SOLUTION PARENTERAL at 20:17

## 2018-10-03 RX ADMIN — HEPARIN SODIUM 5000 UNITS: 5000 INJECTION, SOLUTION INTRAVENOUS; SUBCUTANEOUS at 22:23

## 2018-10-03 RX ADMIN — SODIUM CHLORIDE 500 ML/HR: 0.9 INJECTION, SOLUTION INTRAVENOUS at 19:10

## 2018-10-03 NOTE — ED PROVIDER NOTES
History  Chief Complaint   Patient presents with    Hyperglycemia - no symptoms     Pt was sent by his PCP for hyperglycemia  Patient presents to the emergency department today for evaluation of elevated blood sugars  He was sent in by his primary care provider  Primary care provider states he is unable to get his blood sugars controlled  Patient states this has been ongoing for a few months  In the office today is sugar was greater than 600  According the primary care states he takes 105 units of Lantus twice a day however patient states he also takes NovoLog  Patient states he does not check his blood sugars regularly  Patient admits to urinary frequency however denies any other symptoms  He specifically denies headache, change in vision, nausea, vomiting, abdominal pain  Prior to Admission Medications   Prescriptions Last Dose Informant Patient Reported? Taking?    BD PEN NEEDLE JUNIOR U/F 32G X 4 MM MISC   Yes No   CARVEDILOL PO 10/3/2018 at Unknown time  Yes Yes   Sig: Take 12 5 mg by mouth 2 (two) times a day     Pregabalin (LYRICA PO) 10/2/2018 at Unknown time  Yes Yes   Sig: Take 50 mg by mouth 3 (three) times a day     aspirin 81 MG tablet 10/3/2018 at Unknown time  Yes Yes   Sig: Take 81 mg by mouth daily   finasteride (PROSCAR) 5 mg tablet 10/3/2018 at Unknown time  Yes Yes   Sig: Take 5 mg by mouth daily   furosemide (LASIX) 40 mg tablet   No No   Sig: Take 1 tablet by mouth daily for 30 days   gabapentin (NEURONTIN) 600 MG tablet 10/3/2018 at Unknown time  Yes Yes   Sig: Take 1 tablet by mouth 2 (two) times a day   insulin glargine (LANTUS) 100 units/mL subcutaneous injection 10/2/2018 at Unknown time  Yes Yes   Sig: Inject 110 Units under the skin 2 (two) times a day   insulin lispro (HumaLOG) 100 units/mL injection 10/2/2018 at Unknown time  Yes Yes   Sig: Inject 45 Units under the skin 2 (two) times a day with meals     omeprazole (PriLOSEC) 40 MG capsule 10/3/2018 at Unknown time  Yes Yes   Sig: Take 1 capsule by mouth daily   pravastatin (PRAVACHOL) 40 mg tablet 10/2/2018 at Unknown time  Yes Yes   Sig: Take 40 mg by mouth daily   tamsulosin (FLOMAX) 0 4 mg 10/2/2018 at Unknown time  Yes Yes   Sig: Take 0 4 mg by mouth daily at bedtime      Facility-Administered Medications: None       Past Medical History:   Diagnosis Date    Cataract     [facility]est[facility])     chronic diastolic CHF    Coronary artery disease     Diabetes mellitus ([place] [id number] )     Glaucoma     Hyperlipidemia     Hypertension     Neuropathy     Obesity     Renal disorder     chronic kidney disease stage 3     Sleep apnea     Stroke ([place] [address] )     TIA (transient ischemic attack)     Uncontrolled type 2 diabetes mellitus with hyperglycemia, with long-term current use of insulin ([place] [address] ) 10/4/2018       Past Surgical History:   Procedure Laterality Date    APPENDECTOMY      CARPAL TUNNEL RELEASE      EYE SURGERY      cataracts       Family History   Problem Relation Age of Onset    No Known Problems Mother     No Known Problems Father      I have reviewed and agree with the history as documented  Social History   Substance Use Topics    Smoking status: Former Smoker     Types: Cigars    Smokeless tobacco: Former User    Alcohol use No        Review of Systems   Constitutional: Negative  HENT: Negative  Eyes: Negative  Respiratory: Negative  Cardiovascular: Negative  Gastrointestinal: Negative  Endocrine: Negative  Genitourinary: Positive for frequency  Negative for decreased urine volume, difficulty urinating, discharge, dysuria, enuresis, flank pain, genital sores, hematuria, penile pain, penile swelling, scrotal swelling, testicular pain and urgency  Musculoskeletal: Negative  Skin: Negative  Allergic/Immunologic: Negative  Neurological: Negative  Hematological: Negative  Psychiatric/Behavioral: Negative      All other systems reviewed and are negative  Physical Exam  Physical Exam   Constitutional: He is oriented to person, place, and time  He appears well-developed and well-nourished  HENT:   Head: Normocephalic  Right Ear: External ear normal    Left Ear: External ear normal    Nose: Nose normal    Mouth/Throat: Oropharynx is clear and moist    Eyes: Pupils are equal, round, and reactive to light  EOM are normal    Neck: Normal range of motion  Cardiovascular: Normal rate, regular rhythm, normal heart sounds and intact distal pulses  Exam reveals no gallop and no friction rub  No murmur heard  Pulmonary/Chest: Effort normal and breath sounds normal  No respiratory distress  He has no wheezes  He has no rales  He exhibits no tenderness  Abdominal: Soft  Bowel sounds are normal  He exhibits no distension and no mass  There is no tenderness  There is no rebound and no guarding  No hernia  obese   Musculoskeletal: Normal range of motion  Neurological: He is alert and oriented to person, place, and time  Skin: Skin is warm  Capillary refill takes less than 2 seconds         Vital Signs  ED Triage Vitals [10/03/18 1804]   Temperature Pulse Respirations Blood Pressure SpO2   98 2 °F (36 8 °C) 93 17 139/72 94 %      Temp Source Heart Rate Source Patient Position - Orthostatic VS BP Location FiO2 (%)   Temporal Monitor Lying Left arm --      Pain Score       No Pain           Vitals:    10/04/18 0717 10/04/18 1550 10/04/18 2339 10/05/18 0715   BP: 133/74 155/80 135/77 153/78   Pulse: 77 71 69 73   Patient Position - Orthostatic VS: Sitting Sitting Lying Sitting       Visual Acuity      ED Medications  Medications   sodium chloride 0 9 % infusion (0 mL/hr Intravenous Stopped 10/3/18 1922)     Followed by   sodium chloride 0 9 % infusion (0 mL/hr Intravenous Stopped 10/3/18 2336)   aspirin chewable tablet 81 mg (81 mg Oral Given 10/5/18 0825)   carvedilol (COREG) tablet 12 5 mg (12 5 mg Oral Given 10/5/18 0825)   finasteride (PROSCAR) tablet 5 mg (5 mg Oral Given 10/5/18 0826)   gabapentin (NEURONTIN) capsule 600 mg (600 mg Oral Given 10/5/18 0826)   omeprazole (PriLOSEC) delayed release capsule 40 mg (40 mg Oral Given 10/5/18 0554)   pravastatin (PRAVACHOL) tablet 40 mg (40 mg Oral Given 10/5/18 0826)   pregabalin (LYRICA) capsule 50 mg (50 mg Oral Given 10/5/18 0826)   tamsulosin (FLOMAX) capsule 0 4 mg (0 4 mg Oral Given 10/4/18 2144)   enoxaparin (LOVENOX) subcutaneous injection 40 mg (40 mg Subcutaneous Given 10/5/18 0824)   cefTRIAXone (ROCEPHIN) IVPB (premix) 1,000 mg (1,000 mg Intravenous New Bag 10/5/18 0824)   insulin lispro (HumaLOG) 100 units/mL subcutaneous injection 2-12 Units (2 Units Subcutaneous Given 10/5/18 0826)   insulin lispro (HumaLOG) 100 units/mL subcutaneous injection 2-12 Units (12 Units Subcutaneous Given 10/4/18 2149)   metFORMIN (GLUCOPHAGE) tablet 500 mg (500 mg Oral Given 10/5/18 0825)   insulin glargine (LANTUS) subcutaneous injection 60 Units 0 6 mL (60 Units Subcutaneous Given 10/5/18 0825)   insulin lispro (HumaLOG) 100 units/mL subcutaneous injection 35 Units (35 Units Subcutaneous Given 10/5/18 0827)   levofloxacin (LEVAQUIN) IVPB (premix) 750 mg (0 mg Intravenous Stopped 10/3/18 2100)   insulin regular (HumuLIN R,NovoLIN R) injection 10 Units (10 Units Intravenous Given 10/3/18 2017)   potassium phosphate 21 mmol in sodium chloride 0 9 % 250 mL infusion (21 mmol Intravenous New Bag 10/4/18 1052)   insulin regular (HumuLIN R,NovoLIN R) injection 10 Units (10 Units Intravenous Given 10/4/18 1149)   lactated ringers bolus 1,000 mL (0 mL Intravenous Stopped 10/4/18 1930)       Diagnostic Studies  Results Reviewed     Procedure Component Value Units Date/Time    CBC and differential [[id number]]  (Abnormal) Collected:  10/05/18 0549    Lab Status:  Final result Specimen:  Blood from Arm, Right Updated:  10/05/18 0612     WBC 6 39 Thousand/uL      RBC 4 39 Million/uL      Hemoglobin 12 7 g/dL      Hematocrit 39 6 % MCV 90 fL      MCH 28 9 pg      MCHC 32 1 g/dL      RDW 13 5 %      MPV 10 5 fL      Platelets 017 Thousands/uL      nRBC 0 /100 WBCs      Neutrophils Relative 56 %      Immat GRANS % 1 %      Lymphocytes Relative 29 %      Monocytes Relative 6 %      Eosinophils Relative 7 (H) %      Basophils Relative 1 %      Neutrophils Absolute 3 62 Thousands/µL      Immature Grans Absolute 0 04 Thousand/uL      Lymphocytes Absolute 1 84 Thousands/µL      Monocytes Absolute 0 41 Thousand/µL      Eosinophils Absolute 0 45 Thousand/µL      Basophils Absolute 0 03 Thousands/µL     Blood culture - 1st Set [[id number]] Collected:  10/03/18 1828    Lab Status:  Preliminary result Specimen:  Blood from Arm, Left Updated:  10/05/18 0001     Blood Culture No Growth at 24 hrs  Blood culture - 2nd set [[id number]] Collected:  10/03/18 1828    Lab Status:  Preliminary result Specimen:  Blood from Arm, Left Updated:  10/05/18 0001     Blood Culture No Growth at 24 hrs  Basic metabolic panel [[id number]]  (Abnormal) Collected:  10/04/18 0600    Lab Status:  Final result Specimen:  Blood from Arm, Right Updated:  10/04/18 0631     Sodium 136 mmol/L      Potassium 3 1 (L) mmol/L      Chloride 105 mmol/L      CO2 25 mmol/L      ANION GAP 6 mmol/L      BUN 25 mg/dL      Creatinine 1 27 mg/dL      Glucose 231 (H) mg/dL      Calcium 8 0 (L) mg/dL      eGFR 55 ml/min/1 73sq m     Narrative:         National Kidney Disease Education Program recommendations are as follows:  GFR calculation is accurate only with a steady state creatinine  Chronic Kidney disease less than 60 ml/min/1 73 sq  meters  Kidney failure less than 15 ml/min/1 73 sq  meters      Magnesium [[id number]]  (Normal) Collected:  10/04/18 0600    Lab Status:  Final result Specimen:  Blood from Arm, Right Updated:  10/04/18 0629     Magnesium 1 9 mg/dL     Phosphorus [[id number]]  (Abnormal) Collected:  10/04/18 0600    Lab Status:  Final result Specimen:  Blood from Arm, Right Updated: 10/04/18 0629     Phosphorus 2 1 (L) mg/dL     CBC and differential [[id number]]  (Abnormal) Collected:  10/04/18 0600    Lab Status:  Final result Specimen:  Blood from Arm, Right Updated:  10/04/18 0617     WBC 6 58 Thousand/uL      RBC 3 82 (L) Million/uL      Hemoglobin 11 2 (L) g/dL      Hematocrit 33 4 (L) %      MCV 87 fL      MCH 29 3 pg      MCHC 33 5 g/dL      RDW 13 2 %      MPV 10 6 fL      Platelets 014 Thousands/uL      nRBC 0 /100 WBCs      Neutrophils Relative 58 %      Immat GRANS % 1 %      Lymphocytes Relative 27 %      Monocytes Relative 8 %      Eosinophils Relative 6 %      Basophils Relative 0 %      Neutrophils Absolute 3 83 Thousands/µL      Immature Grans Absolute 0 03 Thousand/uL      Lymphocytes Absolute 1 80 Thousands/µL      Monocytes Absolute 0 50 Thousand/µL      Eosinophils Absolute 0 40 Thousand/µL      Basophils Absolute 0 02 Thousands/µL     Basic metabolic panel [[id number]]  (Abnormal) Collected:  10/04/18 0405    Lab Status:  Final result Specimen:  Blood from Arm, Right Updated:  10/04/18 0425     Sodium 135 (L) mmol/L      Potassium 3 2 (L) mmol/L      Chloride 103 mmol/L      CO2 28 mmol/L      ANION GAP 4 mmol/L      BUN 27 (H) mg/dL      Creatinine 1 30 mg/dL      Glucose 265 (H) mg/dL      Calcium 7 9 (L) mg/dL      eGFR 53 ml/min/1 73sq m     Narrative:         National Kidney Disease Education Program recommendations are as follows:  GFR calculation is accurate only with a steady state creatinine  Chronic Kidney disease less than 60 ml/min/1 73 sq  meters  Kidney failure less than 15 ml/min/1 73 sq  meters      Magnesium [[id number]]  (Normal) Collected:  10/04/18 0405    Lab Status:  Final result Specimen:  Blood from Arm, Right Updated:  10/04/18 0425     Magnesium 1 8 mg/dL     Phosphorus [[id number]]  (Normal) Collected:  10/04/18 0405    Lab Status:  Final result Specimen:  Blood from Arm, Right Updated:  10/04/18 0425     Phosphorus 2 3 mg/dL     Basic metabolic panel [[id number]]  (Abnormal) Collected:  10/04/18 0205    Lab Status:  Final result Specimen:  Blood from Arm, Left Updated:  10/04/18 0225     Sodium 137 mmol/L      Potassium 3 3 (L) mmol/L      Chloride 102 mmol/L      CO2 26 mmol/L      ANION GAP 9 mmol/L      BUN 28 (H) mg/dL      Creatinine 1 30 mg/dL      Glucose 278 (H) mg/dL      Calcium 7 8 (L) mg/dL      eGFR 53 ml/min/1 73sq m     Narrative:         National Kidney Disease Education Program recommendations are as follows:  GFR calculation is accurate only with a steady state creatinine  Chronic Kidney disease less than 60 ml/min/1 73 sq  meters  Kidney failure less than 15 ml/min/1 73 sq  meters      Magnesium [[id number]]  (Normal) Collected:  10/04/18 0205    Lab Status:  Final result Specimen:  Blood from Arm, Left Updated:  10/04/18 0225     Magnesium 1 8 mg/dL     Phosphorus [[id number]]  (Normal) Collected:  10/04/18 0205    Lab Status:  Final result Specimen:  Blood from Arm, Left Updated:  10/04/18 0225     Phosphorus 2 3 mg/dL     Protime-INR [[id number]]  (Normal) Collected:  10/04/18 0205    Lab Status:  Final result Specimen:  Blood from Arm, Left Updated:  10/04/18 0222     Protime 13 2 seconds      INR 1 05    APTT [[id number]]  (Normal) Collected:  10/04/18 0205    Lab Status:  Final result Specimen:  Blood from Arm, Left Updated:  10/04/18 0222     PTT 28 seconds     Basic metabolic panel [[id number]]  (Abnormal) Collected:  10/03/18 2129    Lab Status:  Final result Specimen:  Blood from Arm, Left Updated:  10/03/18 2152     Sodium 132 (L) mmol/L      Potassium 3 6 mmol/L      Chloride 97 (L) mmol/L      CO2 26 mmol/L      ANION GAP 9 mmol/L      BUN 33 (H) mg/dL      Creatinine 1 75 (H) mg/dL      Glucose 541 (HH) mg/dL      Calcium 8 3 mg/dL      eGFR 37 ml/min/1 73sq m     Narrative:         National Kidney Disease Education Program recommendations are as follows:  GFR calculation is accurate only with a steady state creatinine  Chronic Kidney disease less than 60 ml/min/1 73 sq  meters  Kidney failure less than 15 ml/min/1 73 sq  meters      Magnesium [[id number]]  (Normal) Collected:  10/03/18 2129    Lab Status:  Final result Specimen:  Blood from Arm, Left Updated:  10/03/18 2150     Magnesium 2 0 mg/dL     Phosphorus [[id number]]  (Normal) Collected:  10/03/18 2129    Lab Status:  Final result Specimen:  Blood from Arm, Left Updated:  10/03/18 2150     Phosphorus 2 6 mg/dL     Platelet count [[id number]]  (Normal) Collected:  10/03/18 2129    Lab Status:  Final result Specimen:  Blood from Arm, Left Updated:  10/03/18 2136     Platelets 675 Thousands/uL      MPV 10 9 fL     Fingerstick Glucose (POCT) [[id number]]  (Abnormal) Collected:  10/03/18 2036    Lab Status:  Final result Updated:  10/03/18 2037     POC Glucose >500 (HH) mg/dl     Urine Microscopic [[id number]]  (Abnormal) Collected:  10/03/18 1910    Lab Status:  Final result Specimen:  Urine from Urine, Clean Catch Updated:  10/03/18 1921     RBC, UA 1-2 (A) /hpf      WBC, UA 4-10 (A) /hpf      Epithelial Cells Moderate (A) /hpf      Bacteria, UA Innumerable (A) /hpf     UA w Reflex to Microscopic w Reflex to Culture [[id number]]  (Abnormal) Collected:  10/03/18 1910    Lab Status:  Final result Specimen:  Urine from Urine, Clean Catch Updated:  10/03/18 1917     Color, UA Yellow     Clarity, UA Slightly Cloudy     Specific Gravity, UA <=1 005     pH, UA 6 0     Leukocytes, UA Small (A)     Nitrite, UA Positive (A)     Protein, UA Negative mg/dl      Glucose, UA >=1000 (1%) (A) mg/dl      Ketones, UA Negative mg/dl      Urobilinogen, UA 0 2 E U /dl      Bilirubin, UA Negative     Blood, UA Trace-Intact (A)    Comprehensive metabolic panel [[id number]]  (Abnormal) Collected:  10/03/18 1828    Lab Status:  Final result Specimen:  Blood from Arm, Left Updated:  10/03/18 1903     Sodium 123 (L) mmol/L      Potassium 4 2 mmol/L      Chloride 88 (L) mmol/L      CO2 26 mmol/L      ANION GAP 9 mmol/L      BUN 39 (H) mg/dL Creatinine 2 19 (H) mg/dL      Glucose 865 (HH) mg/dL      Calcium 8 8 mg/dL      AST 18 U/L      ALT 32 U/L      Alkaline Phosphatase 221 (H) U/L      Total Protein 7 6 g/dL      Albumin 3 1 (L) g/dL      Total Bilirubin 0 50 mg/dL      eGFR 28 ml/min/1 73sq m     Narrative:         National Kidney Disease Education Program recommendations are as follows:  GFR calculation is accurate only with a steady state creatinine  Chronic Kidney disease less than 60 ml/min/1 73 sq  meters  Kidney failure less than 15 ml/min/1 73 sq  meters  Lactic acid, plasma [[id number]]  (Abnormal) Collected:  10/03/18 1828    Lab Status:  Final result Specimen:  Blood from Arm, Left Updated:  10/03/18 1902     LACTIC ACID 3 6 (HH) mmol/L     Narrative:         Result may be elevated if tourniquet was used during collection      Troponin I [[id number]]  (Normal) Collected:  10/03/18 1828    Lab Status:  Final result Specimen:  Blood from Arm, Left Updated:  10/03/18 1902     Troponin I <0 02 ng/mL     Lipase [[id number]]  (Normal) Collected:  10/03/18 1828    Lab Status:  Final result Specimen:  Blood from Arm, Left Updated:  10/03/18 1901     Lipase 213 u/L     Magnesium [[id number]]  (Normal) Collected:  10/03/18 1828    Lab Status:  Final result Specimen:  Blood from Arm, Left Updated:  10/03/18 1901     Magnesium 2 1 mg/dL     Phosphorus [[id number]]  (Normal) Collected:  10/03/18 1828    Lab Status:  Final result Specimen:  Blood from Arm, Left Updated:  10/03/18 1901     Phosphorus 3 4 mg/dL     Acetone [[id number]]  (Normal) Collected:  10/03/18 1828    Lab Status:  Final result Specimen:  Blood from Arm, Left Updated:  10/03/18 1859     Acetone, Bld Negative    Blood gas, venous [[id number]] Collected:  10/03/18 1828    Lab Status:  Final result Specimen:  Blood from Arm, Left Updated:  10/03/18 1841     pH, Sam 7 359     pCO2, Sam 47 0 mm Hg      pO2, Sam 38 8 mm Hg      HCO3, Sam 25 9 mmol/L      Base Excess, Sam 0 0 mmol/L      O2 Content, Sam 13 9 ml/dL      O2 HGB, VENOUS 71 1 %     CBC [[id number]]  (Normal) Collected:  10/03/18 1828    Lab Status:  Final result Specimen:  Blood from Arm, Left Updated:  10/03/18 1840     WBC 7 23 Thousand/uL      RBC 4 45 Million/uL      Hemoglobin 13 0 g/dL      Hematocrit 40 0 %      MCV 90 fL      MCH 29 2 pg      MCHC 32 5 g/dL      RDW 13 4 %      Platelets 595 Thousands/uL      MPV 11 1 fL     Fingerstick Glucose (POCT) [[id number]]  (Abnormal) Collected:  10/03/18 1803    Lab Status:  Final result Updated:  10/03/18 1805     POC Glucose >500 (HH) mg/dl                  XR chest 1 view portable   Final Result by Mark Anglin MD (10/04 8252)      No acute cardiopulmonary disease              Workstation performed: [id number]                    Procedures  Procedures       Phone Contacts  ED Phone Contact    ED Course  ED Course as of Oct 05 0923   Wed Oct 03, 2018   1818 Blood Pressure: 139/72   1818 Temperature: 98 2 °F (36 8 °C)   1818 Pulse: 93   1818 Respirations: 17   1818 SpO2: 94 %   1846 WBC: 7 23   1846 Hemoglobin: 13 0   1846 Platelets: 873   3781 pH, Sam: 7 359   1846 pCO2, Sam: 47 0   1846 pO2, Sam: 38 8   1846 O2 HGB, VENOUS: 71 1   1901 Acetone, Bld: Negative   1946 SLIM paged          HEART Risk Score      Most Recent Value   History  0 Filed at: 10/03/2018 1849   ECG  0 Filed at: 10/03/2018 1849   Age  2 Filed at: 10/03/2018 1849   Risk Factors  2 Filed at: 10/03/2018 1849   Troponin  0 Filed at: 10/03/2018 1849   Heart Score Risk Calculator   History  0 Filed at: 10/03/2018 1849   ECG  0 Filed at: 10/03/2018 1849   Age  2 Filed at: 10/03/2018 1849   Risk Factors  2 Filed at: 10/03/2018 1849   Troponin  0 Filed at: 10/03/2018 1849   HEART Score  4 Filed at: 10/03/2018 1849   HEART Score  4 Filed at: 10/03/2018 1849                            [facility]  CritCare Time    Disposition  Final diagnoses:   Hyperglycemia   Lactic acidosis   Acute kidney injury ([address] )   UTI (urinary tract infection) Time reflects when diagnosis was documented in both MDM as applicable and the Disposition within this note     Time User Action Codes Description Comment    10/3/2018  7:05 PM Lovfamilia Reina Add [R73 9] Hyperglycemia     10/3/2018  7:05 PM Tung Guard D Add [E87 2] Lactic acidosis     10/3/2018  7:06 PM Tung Guard D Add [N17 9] Acute kidney injury ([address] )     10/3/2018  7:29 PM Tung Guard D Add [N39 0] UTI (urinary tract infection)       ED Disposition     ED Disposition Condition Comment    Admit  Case was discussed with Demi Kapoor PA-C and the patient's admission status was agreed to be Admission Status: inpatient status to the service of Dr Betzaida Delgado   Follow-up Information    None         Current Discharge Medication List      CONTINUE these medications which have NOT CHANGED    Details   aspirin 81 MG tablet Take 81 mg by mouth daily      CARVEDILOL PO Take 12 5 mg by mouth 2 (two) times a day        finasteride (PROSCAR) 5 mg tablet Take 5 mg by mouth daily      gabapentin (NEURONTIN) 600 MG tablet Take 1 tablet by mouth 2 (two) times a day      insulin glargine (LANTUS) 100 units/mL subcutaneous injection Inject 110 Units under the skin 2 (two) times a day      insulin lispro (HumaLOG) 100 units/mL injection Inject 45 Units under the skin 2 (two) times a day with meals        omeprazole (PriLOSEC) 40 MG capsule Take 1 capsule by mouth daily      pravastatin (PRAVACHOL) 40 mg tablet Take 40 mg by mouth daily      Pregabalin (LYRICA PO) Take 50 mg by mouth 3 (three) times a day        tamsulosin (FLOMAX) 0 4 mg Take 0 4 mg by mouth daily at bedtime      BD PEN NEEDLE JUNIOR U/F 32G X 4 MM MISC Refills: 0      furosemide (LASIX) 40 mg tablet Take 1 tablet by mouth daily for 30 days  Qty: 30 tablet, Refills: 0           No discharge procedures on file      ED Provider  Electronically Signed by           Rodolfo Altamirano PA-C  10/05/18 7945

## 2018-10-03 NOTE — ED PROCEDURE NOTE
Procedure  ECG 12 Lead Documentation  Date/Time: 10/3/2018 6:47 PM  Performed by: Jolie Coe by: Lea Marlow     Indications / Diagnosis:  Elevated blood glucose  ECG reviewed by me, the ED Provider: yes    Patient location:  ED  Previous ECG:     Previous ECG:  Compared to current    Comparison ECG info:  7/2017    Similarity:  No change    Comparison to cardiac monitor: Yes    Interpretation:     Interpretation: non-specific    Rate:     ECG rate:  84    ECG rate assessment: normal    Rhythm:     Rhythm: sinus rhythm    Ectopy:     Ectopy: none    QRS:     QRS axis:  Left  Conduction:     Conduction: abnormal      Abnormal conduction: complete RBBB    ST segments:     ST segments:  Normal  T waves:     T waves: normal                       Kurt Cobos PA-C  10/03/18 1848

## 2018-10-04 PROBLEM — E87.6 HYPOKALEMIA: Status: ACTIVE | Noted: 2018-10-04

## 2018-10-04 PROBLEM — E83.39 HYPOPHOSPHATEMIA: Status: ACTIVE | Noted: 2018-10-04

## 2018-10-04 PROBLEM — E86.0 DEHYDRATION WITH HYPONATREMIA: Status: ACTIVE | Noted: 2018-10-04

## 2018-10-04 PROBLEM — E87.1 DEHYDRATION WITH HYPONATREMIA: Status: ACTIVE | Noted: 2018-10-04

## 2018-10-04 PROBLEM — E87.0 HYPEROSMOLAR SYNDROME: Status: ACTIVE | Noted: 2018-10-04

## 2018-10-04 PROBLEM — Z79.4 UNCONTROLLED TYPE 2 DIABETES MELLITUS WITH HYPERGLYCEMIA, WITH LONG-TERM CURRENT USE OF INSULIN (HCC): Status: ACTIVE | Noted: 2018-10-04

## 2018-10-04 PROBLEM — E11.65 UNCONTROLLED TYPE 2 DIABETES MELLITUS WITH HYPERGLYCEMIA, WITH LONG-TERM CURRENT USE OF INSULIN (HCC): Status: ACTIVE | Noted: 2018-10-04

## 2018-10-04 PROBLEM — N17.9 AKI (ACUTE KIDNEY INJURY) (HCC): Status: ACTIVE | Noted: 2018-10-04

## 2018-10-04 LAB
ANION GAP SERPL CALCULATED.3IONS-SCNC: 4 MMOL/L (ref 4–13)
ANION GAP SERPL CALCULATED.3IONS-SCNC: 5 MMOL/L (ref 4–13)
ANION GAP SERPL CALCULATED.3IONS-SCNC: 6 MMOL/L (ref 4–13)
ANION GAP SERPL CALCULATED.3IONS-SCNC: 9 MMOL/L (ref 4–13)
ANION GAP SERPL CALCULATED.3IONS-SCNC: 9 MMOL/L (ref 4–13)
APTT PPP: 28 SECONDS (ref 24–36)
ATRIAL RATE: 79 BPM
BASOPHILS # BLD AUTO: 0.02 THOUSANDS/ΜL (ref 0–0.1)
BASOPHILS NFR BLD AUTO: 0 % (ref 0–1)
BUN SERPL-MCNC: 22 MG/DL (ref 5–25)
BUN SERPL-MCNC: 23 MG/DL (ref 5–25)
BUN SERPL-MCNC: 25 MG/DL (ref 5–25)
BUN SERPL-MCNC: 27 MG/DL (ref 5–25)
BUN SERPL-MCNC: 28 MG/DL (ref 5–25)
CALCIUM SERPL-MCNC: 7.8 MG/DL (ref 8.3–10.1)
CALCIUM SERPL-MCNC: 7.9 MG/DL (ref 8.3–10.1)
CALCIUM SERPL-MCNC: 8 MG/DL (ref 8.3–10.1)
CALCIUM SERPL-MCNC: 8.4 MG/DL (ref 8.3–10.1)
CALCIUM SERPL-MCNC: 8.5 MG/DL (ref 8.3–10.1)
CHLORIDE SERPL-SCNC: 102 MMOL/L (ref 100–108)
CHLORIDE SERPL-SCNC: 103 MMOL/L (ref 100–108)
CHLORIDE SERPL-SCNC: 105 MMOL/L (ref 100–108)
CHLORIDE SERPL-SCNC: 106 MMOL/L (ref 100–108)
CHLORIDE SERPL-SCNC: 106 MMOL/L (ref 100–108)
CO2 SERPL-SCNC: 24 MMOL/L (ref 21–32)
CO2 SERPL-SCNC: 25 MMOL/L (ref 21–32)
CO2 SERPL-SCNC: 26 MMOL/L (ref 21–32)
CO2 SERPL-SCNC: 28 MMOL/L (ref 21–32)
CO2 SERPL-SCNC: 28 MMOL/L (ref 21–32)
CREAT SERPL-MCNC: 1.27 MG/DL (ref 0.6–1.3)
CREAT SERPL-MCNC: 1.3 MG/DL (ref 0.6–1.3)
CREAT SERPL-MCNC: 1.3 MG/DL (ref 0.6–1.3)
CREAT SERPL-MCNC: 1.33 MG/DL (ref 0.6–1.3)
CREAT SERPL-MCNC: 1.44 MG/DL (ref 0.6–1.3)
EOSINOPHIL # BLD AUTO: 0.4 THOUSAND/ΜL (ref 0–0.61)
EOSINOPHIL NFR BLD AUTO: 6 % (ref 0–6)
ERYTHROCYTE [DISTWIDTH] IN BLOOD BY AUTOMATED COUNT: 13.2 % (ref 11.6–15.1)
EST. AVERAGE GLUCOSE BLD GHB EST-MCNC: 381 MG/DL
GFR SERPL CREATININE-BSD FRML MDRD: 47 ML/MIN/1.73SQ M
GFR SERPL CREATININE-BSD FRML MDRD: 52 ML/MIN/1.73SQ M
GFR SERPL CREATININE-BSD FRML MDRD: 53 ML/MIN/1.73SQ M
GFR SERPL CREATININE-BSD FRML MDRD: 53 ML/MIN/1.73SQ M
GFR SERPL CREATININE-BSD FRML MDRD: 55 ML/MIN/1.73SQ M
GLUCOSE SERPL-MCNC: 206 MG/DL (ref 65–140)
GLUCOSE SERPL-MCNC: 209 MG/DL (ref 65–140)
GLUCOSE SERPL-MCNC: 231 MG/DL (ref 65–140)
GLUCOSE SERPL-MCNC: 232 MG/DL (ref 65–140)
GLUCOSE SERPL-MCNC: 253 MG/DL (ref 65–140)
GLUCOSE SERPL-MCNC: 254 MG/DL (ref 65–140)
GLUCOSE SERPL-MCNC: 257 MG/DL (ref 65–140)
GLUCOSE SERPL-MCNC: 260 MG/DL (ref 65–140)
GLUCOSE SERPL-MCNC: 265 MG/DL (ref 65–140)
GLUCOSE SERPL-MCNC: 278 MG/DL (ref 65–140)
GLUCOSE SERPL-MCNC: 285 MG/DL (ref 65–140)
GLUCOSE SERPL-MCNC: 295 MG/DL (ref 65–140)
GLUCOSE SERPL-MCNC: 300 MG/DL (ref 65–140)
GLUCOSE SERPL-MCNC: 300 MG/DL (ref 65–140)
GLUCOSE SERPL-MCNC: 324 MG/DL (ref 65–140)
GLUCOSE SERPL-MCNC: 337 MG/DL (ref 65–140)
GLUCOSE SERPL-MCNC: 420 MG/DL (ref 65–140)
HBA1C MFR BLD: 14.9 % (ref 4.2–6.3)
HCT VFR BLD AUTO: 33.4 % (ref 36.5–49.3)
HGB BLD-MCNC: 11.2 G/DL (ref 12–17)
IMM GRANULOCYTES # BLD AUTO: 0.03 THOUSAND/UL (ref 0–0.2)
IMM GRANULOCYTES NFR BLD AUTO: 1 % (ref 0–2)
INR PPP: 1.05 (ref 0.86–1.17)
LACTATE SERPL-SCNC: 1.7 MMOL/L (ref 0.5–2)
LYMPHOCYTES # BLD AUTO: 1.8 THOUSANDS/ΜL (ref 0.6–4.47)
LYMPHOCYTES NFR BLD AUTO: 27 % (ref 14–44)
MAGNESIUM SERPL-MCNC: 1.8 MG/DL (ref 1.6–2.6)
MAGNESIUM SERPL-MCNC: 1.8 MG/DL (ref 1.6–2.6)
MAGNESIUM SERPL-MCNC: 1.9 MG/DL (ref 1.6–2.6)
MCH RBC QN AUTO: 29.3 PG (ref 26.8–34.3)
MCHC RBC AUTO-ENTMCNC: 33.5 G/DL (ref 31.4–37.4)
MCV RBC AUTO: 87 FL (ref 82–98)
MONOCYTES # BLD AUTO: 0.5 THOUSAND/ΜL (ref 0.17–1.22)
MONOCYTES NFR BLD AUTO: 8 % (ref 4–12)
NEUTROPHILS # BLD AUTO: 3.83 THOUSANDS/ΜL (ref 1.85–7.62)
NEUTS SEG NFR BLD AUTO: 58 % (ref 43–75)
NRBC BLD AUTO-RTO: 0 /100 WBCS
P AXIS: 36 DEGREES
PHOSPHATE SERPL-MCNC: 2.1 MG/DL (ref 2.3–4.1)
PHOSPHATE SERPL-MCNC: 2.3 MG/DL (ref 2.3–4.1)
PHOSPHATE SERPL-MCNC: 2.3 MG/DL (ref 2.3–4.1)
PLATELET # BLD AUTO: 181 THOUSANDS/UL (ref 149–390)
PMV BLD AUTO: 10.6 FL (ref 8.9–12.7)
POTASSIUM SERPL-SCNC: 3.1 MMOL/L (ref 3.5–5.3)
POTASSIUM SERPL-SCNC: 3.2 MMOL/L (ref 3.5–5.3)
POTASSIUM SERPL-SCNC: 3.3 MMOL/L (ref 3.5–5.3)
POTASSIUM SERPL-SCNC: 3.9 MMOL/L (ref 3.5–5.3)
POTASSIUM SERPL-SCNC: 4.2 MMOL/L (ref 3.5–5.3)
PR INTERVAL: 210 MS
PROTHROMBIN TIME: 13.2 SECONDS (ref 11.8–14.2)
QRS AXIS: -78 DEGREES
QRSD INTERVAL: 154 MS
QT INTERVAL: 432 MS
QTC INTERVAL: 495 MS
RBC # BLD AUTO: 3.82 MILLION/UL (ref 3.88–5.62)
SODIUM SERPL-SCNC: 135 MMOL/L (ref 136–145)
SODIUM SERPL-SCNC: 136 MMOL/L (ref 136–145)
SODIUM SERPL-SCNC: 137 MMOL/L (ref 136–145)
SODIUM SERPL-SCNC: 139 MMOL/L (ref 136–145)
SODIUM SERPL-SCNC: 139 MMOL/L (ref 136–145)
T WAVE AXIS: 25 DEGREES
TROPONIN I SERPL-MCNC: <0.02 NG/ML
TROPONIN I SERPL-MCNC: <0.02 NG/ML
TSH SERPL DL<=0.05 MIU/L-ACNC: 1.37 UIU/ML (ref 0.36–3.74)
VENTRICULAR RATE: 79 BPM
WBC # BLD AUTO: 6.58 THOUSAND/UL (ref 4.31–10.16)

## 2018-10-04 PROCEDURE — 85025 COMPLETE CBC W/AUTO DIFF WBC: CPT | Performed by: PHYSICIAN ASSISTANT

## 2018-10-04 PROCEDURE — 93010 ELECTROCARDIOGRAM REPORT: CPT | Performed by: INTERNAL MEDICINE

## 2018-10-04 PROCEDURE — 83036 HEMOGLOBIN GLYCOSYLATED A1C: CPT | Performed by: INTERNAL MEDICINE

## 2018-10-04 PROCEDURE — 99233 SBSQ HOSP IP/OBS HIGH 50: CPT | Performed by: INTERNAL MEDICINE

## 2018-10-04 PROCEDURE — 84100 ASSAY OF PHOSPHORUS: CPT | Performed by: PHYSICIAN ASSISTANT

## 2018-10-04 PROCEDURE — 82948 REAGENT STRIP/BLOOD GLUCOSE: CPT

## 2018-10-04 PROCEDURE — 85730 THROMBOPLASTIN TIME PARTIAL: CPT | Performed by: PHYSICIAN ASSISTANT

## 2018-10-04 PROCEDURE — 93005 ELECTROCARDIOGRAM TRACING: CPT

## 2018-10-04 PROCEDURE — 83605 ASSAY OF LACTIC ACID: CPT | Performed by: INTERNAL MEDICINE

## 2018-10-04 PROCEDURE — 80048 BASIC METABOLIC PNL TOTAL CA: CPT | Performed by: PHYSICIAN ASSISTANT

## 2018-10-04 PROCEDURE — 85610 PROTHROMBIN TIME: CPT | Performed by: PHYSICIAN ASSISTANT

## 2018-10-04 PROCEDURE — 84484 ASSAY OF TROPONIN QUANT: CPT | Performed by: INTERNAL MEDICINE

## 2018-10-04 PROCEDURE — 83735 ASSAY OF MAGNESIUM: CPT | Performed by: PHYSICIAN ASSISTANT

## 2018-10-04 PROCEDURE — 80048 BASIC METABOLIC PNL TOTAL CA: CPT | Performed by: INTERNAL MEDICINE

## 2018-10-04 PROCEDURE — 84443 ASSAY THYROID STIM HORMONE: CPT | Performed by: INTERNAL MEDICINE

## 2018-10-04 RX ORDER — INSULIN GLARGINE 100 [IU]/ML
60 INJECTION, SOLUTION SUBCUTANEOUS EVERY 12 HOURS SCHEDULED
Status: DISCONTINUED | OUTPATIENT
Start: 2018-10-04 | End: 2018-10-05

## 2018-10-04 RX ORDER — POTASSIUM CHLORIDE AND SODIUM CHLORIDE 900; 300 MG/100ML; MG/100ML
125 INJECTION, SOLUTION INTRAVENOUS CONTINUOUS
Status: DISCONTINUED | OUTPATIENT
Start: 2018-10-04 | End: 2018-10-05

## 2018-10-04 RX ORDER — INSULIN GLARGINE 100 [IU]/ML
50 INJECTION, SOLUTION SUBCUTANEOUS EVERY 12 HOURS SCHEDULED
Status: DISCONTINUED | OUTPATIENT
Start: 2018-10-04 | End: 2018-10-04

## 2018-10-04 RX ADMIN — SODIUM CHLORIDE, SODIUM LACTATE, POTASSIUM CHLORIDE, AND CALCIUM CHLORIDE 1000 ML: .6; .31; .03; .02 INJECTION, SOLUTION INTRAVENOUS at 17:28

## 2018-10-04 RX ADMIN — CARVEDILOL 12.5 MG: 12.5 TABLET, FILM COATED ORAL at 17:28

## 2018-10-04 RX ADMIN — INSULIN LISPRO 30 UNITS: 100 INJECTION, SOLUTION INTRAVENOUS; SUBCUTANEOUS at 16:03

## 2018-10-04 RX ADMIN — INSULIN LISPRO 12 UNITS: 100 INJECTION, SOLUTION INTRAVENOUS; SUBCUTANEOUS at 21:49

## 2018-10-04 RX ADMIN — DEXTROSE AND SODIUM CHLORIDE 250 ML/HR: 5; .9 INJECTION, SOLUTION INTRAVENOUS at 07:44

## 2018-10-04 RX ADMIN — CARVEDILOL 12.5 MG: 12.5 TABLET, FILM COATED ORAL at 09:22

## 2018-10-04 RX ADMIN — INSULIN HUMAN 10 UNITS: 100 INJECTION, SOLUTION PARENTERAL at 11:49

## 2018-10-04 RX ADMIN — INSULIN LISPRO 30 UNITS: 100 INJECTION, SOLUTION INTRAVENOUS; SUBCUTANEOUS at 09:21

## 2018-10-04 RX ADMIN — TAMSULOSIN HYDROCHLORIDE 0.4 MG: 0.4 CAPSULE ORAL at 21:44

## 2018-10-04 RX ADMIN — POTASSIUM CHLORIDE AND SODIUM CHLORIDE 125 ML/HR: 900; 300 INJECTION, SOLUTION INTRAVENOUS at 09:24

## 2018-10-04 RX ADMIN — CEFTRIAXONE 1000 MG: 1 INJECTION, SOLUTION INTRAVENOUS at 10:03

## 2018-10-04 RX ADMIN — PREGABALIN 50 MG: 50 CAPSULE ORAL at 16:02

## 2018-10-04 RX ADMIN — INSULIN GLARGINE 50 UNITS: 100 INJECTION, SOLUTION SUBCUTANEOUS at 09:20

## 2018-10-04 RX ADMIN — GABAPENTIN 600 MG: 300 CAPSULE ORAL at 09:22

## 2018-10-04 RX ADMIN — SODIUM CHLORIDE 250 ML/HR: 0.9 INJECTION, SOLUTION INTRAVENOUS at 00:09

## 2018-10-04 RX ADMIN — INSULIN LISPRO 30 UNITS: 100 INJECTION, SOLUTION INTRAVENOUS; SUBCUTANEOUS at 11:50

## 2018-10-04 RX ADMIN — FINASTERIDE 5 MG: 5 TABLET, FILM COATED ORAL at 09:22

## 2018-10-04 RX ADMIN — ENOXAPARIN SODIUM 40 MG: 40 INJECTION, SOLUTION INTRAVENOUS; SUBCUTANEOUS at 09:22

## 2018-10-04 RX ADMIN — INSULIN LISPRO 8 UNITS: 100 INJECTION, SOLUTION INTRAVENOUS; SUBCUTANEOUS at 11:51

## 2018-10-04 RX ADMIN — INSULIN LISPRO 4 UNITS: 100 INJECTION, SOLUTION INTRAVENOUS; SUBCUTANEOUS at 16:03

## 2018-10-04 RX ADMIN — PREGABALIN 50 MG: 50 CAPSULE ORAL at 09:22

## 2018-10-04 RX ADMIN — HEPARIN SODIUM 5000 UNITS: 5000 INJECTION, SOLUTION INTRAVENOUS; SUBCUTANEOUS at 05:04

## 2018-10-04 RX ADMIN — ASPIRIN 81 MG 81 MG: 81 TABLET ORAL at 09:22

## 2018-10-04 RX ADMIN — GABAPENTIN 600 MG: 300 CAPSULE ORAL at 17:28

## 2018-10-04 RX ADMIN — POTASSIUM PHOSPHATE, MONOBASIC AND POTASSIUM PHOSPHATE, DIBASIC 21 MMOL: 224; 236 INJECTION, SOLUTION INTRAVENOUS at 10:52

## 2018-10-04 RX ADMIN — SODIUM CHLORIDE 250 ML/HR: 0.9 INJECTION, SOLUTION INTRAVENOUS at 05:05

## 2018-10-04 RX ADMIN — INSULIN GLARGINE 60 UNITS: 100 INJECTION, SOLUTION SUBCUTANEOUS at 21:52

## 2018-10-04 RX ADMIN — PRAVASTATIN SODIUM 40 MG: 40 TABLET ORAL at 09:22

## 2018-10-04 RX ADMIN — PREGABALIN 50 MG: 50 CAPSULE ORAL at 21:44

## 2018-10-04 NOTE — ASSESSMENT & PLAN NOTE
Lab Results   Component Value Date    HGBA1C 8 4 (H) 04/25/2018       Recent Labs      10/03/18   1803  10/03/18   2036   POCGLU  >500*  >500*       Blood Sugar Average: Last 72 hrs:  (P) 0       - patient has been having chronically elevated blood glucose levels  - upon entry into emergency department blood glucose level noted to be greater than 800 mg/dL  - was given 2750 mL normal saline and started on insulin infusion  - patient be admitted to medical-surgical floor for continuation of care  - ordered insulin 10 units IV to be given in addition to insulin infusion  - monitor BMP q 2 hours, and point of care blood glucose hourly  - monitor and correct electrolytes after blood glucose is less than 250 mg/dL  - sodium level is falsely decreased

## 2018-10-04 NOTE — ASSESSMENT & PLAN NOTE
- resume home antihypertensives  - monitor blood pressure as per protocol  - maintain systolic blood pressure less than 140 mm of mercury

## 2018-10-04 NOTE — SOCIAL WORK
Met with pt to discuss role as  in helping pt to develop discharge plan and to help pt carry out their plan  Pt lives in a house with his wife  Pt has 10 [address] with a railing  Pt has first floor set up   Pt does have a walker , cane and CPAP at home  Pt uses the Walker to ambulate  Pt is independent with ADL's   Pt does the cooking and cleaning  Pt does not drive but his wife drives him to appts  Pt had [facility] care in the past  Pt's PCP is Indira Thomas in [place]  [place], [facility]  Pt uses the [address] in [place]  Pt with no Case Management needs will continue to follow

## 2018-10-04 NOTE — CASE MANAGEMENT
Initial Clinical Review  Admission: Date/Time/Statement: 10/3/18 @ 1953   Orders Placed This Encounter   Procedures    Inpatient Admission (expected length of stay for this patient is greater than two midnights)     Standing Status:   Standing     Number of Occurrences:   1     Order Specific Question:   Admitting Physician     Answer:   Tejal Venegas     Order Specific Question:   Level of Care     Answer:   Med Surg [16]     Order Specific Question:   Estimated length of stay     Answer:   More than 2 Midnights     Order Specific Question:   Certification     Answer:   I certify that inpatient services are medically necessary for this patient for a duration of greater than two midnights  See H&P and MD Progress Notes for additional information about the patient's course of treatment  ED: Date/Time/Mode of Arrival:   ED Arrival Information     Expected Arrival Acuity Means of Arrival Escorted By Service Admission Type    - 10/3/2018 17:42 Emergent Walk-[facility] Emergency    Arrival Complaint    high blood sugar      Chief Complaint:   Chief Complaint   Patient presents with    Hyperglycemia - no symptoms     Pt was sent by his PCP for hyperglycemia  History of Illness:   Patient presents to the emergency department today for evaluation of elevated blood sugars  He was sent in by his primary care provider  Primary care provider states he is unable to get his blood sugars controlled  In the office today is sugar was greater than 600  According the primary care E takes 105 units of Lantus twice a day however patient states he also takes NovoLog  Patient admits to urinary frequency however denies any other symptoms    ED Vital Signs:   ED Triage Vitals [10/03/18 1804]   Temperature Pulse Respirations Blood Pressure SpO2   98 2 °F (36 8 °C) 93 17 139/72 94 %      Temp Source Heart Rate Source Patient Position - Orthostatic VS BP Location FiO2 (%)   Temporal Monitor Lying Left arm -- Pain Score       No Pain        Wt Readings from Last 1 Encounters:   10/04/18 132 kg (290 lb 5 5 oz)   Vital Signs (abnormal):   T 96 9  Abnormal Labs/Diagnostic Test Results:    CL 88 BUN 39 GLUC 865 ALK PHOS 221 ALB 3 1 GFR 28 LACTIC ACID 3 6   U/A+LEUK, NITRITE, GLUC, BLOOD, BACTERIA  CXR=No acute cardiopulmonary disease  EKG=  Ventricular Rate BPM 84    Atrial Rate BPM 84    LA Interval ms 220    QRSD Interval ms 162    QT Interval ms 426    QTC Interval ms 503    P Axis degrees 57    QRS Axis degrees -83    T Wave Axis degrees 29      Sinus rhythm with 1st degree A-V block  Left axis deviation  Right bundle branch block      ED Treatment:   Medication Administration from 10/03/2018 1742 to 10/03/2018 2103       Date/Time Order Dose Route Action Action by Comments     10/03/2018 1922 sodium chloride 0 9 % infusion 0 mL/hr Intravenous Stopped Rylee Donice Roger, RN      10/03/2018 1823 sodium chloride 0 9 % infusion 2,000 mL/hr Intravenous New Bag Rylee Donice Roger, RN      10/03/2018 1950 sodium chloride 0 9 % infusion 500 mL/hr Intravenous New Bag Rylee Donice Roger, RN      10/03/2018 1948 sodium chloride 0 9 % infusion 0 mL/hr Intravenous Stopped Rylee Donice Roger, RN      10/03/2018 1910 sodium chloride 0 9 % infusion 500 mL/hr Intravenous New Bag Rylee Donice Roger, RN      10/03/2018 1950 sodium chloride 0 9 % infusion 250 mL/hr Intravenous Not Given Britton Form, RN Wrong entry of fluids       10/03/2018 1839 dextrose 5 % and sodium chloride 0 9 % infusion 0 mL/hr Intravenous Hold Rylee Donice Roger, RN      10/03/2018 1930 insulin regular (HumuLIN R,NovoLIN R) 1 Units/mL in sodium chloride 0 9 % 100 mL infusion 12 8 Units/hr Intravenous [id number] Erin Bustamante RN      10/03/2018 2100 levofloxacin (LEVAQUIN) IVPB (premix) 750 mg 0 mg Intravenous Stopped Tremayneeen JulyTHEODORE      10/03/2018 1949 levofloxacin (LEVAQUIN) IVPB (premix) 750 mg 750 mg Intravenous New Bag Rylee Donice Roger, RN      10/03/2018 2003 insulin regular (HumuLIN R,NovoLIN R) injection 10 Units   Subcutaneous Canceled Entry Rylee Mikel Burke, RN      10/03/2018 2017 insulin regular (HumuLIN R,NovoLIN R) injection 10 Units 10 Units Intravenous Given Matias Winter RN       Past Medical/Surgical History: Active Ambulatory Problems     Diagnosis Date Noted    Dyspnea on exertion 12/27/2016    Type 2 diabetes mellitus with chronic kidney disease, with long-term current use of insulin ([facility] ) 12/27/2016    Chronic kidney disease, stage III (moderate) ([facility]) 83/83/2821    Diastolic congestive heart failure ([facility] ) 12/27/2016    Obstructive sleep apnea syndrome 12/27/2016    Obesity, Class III, BMI 40-49 9 (morbid obesity) ([facility] ) 12/27/2016    Essential hypertension 12/27/2016    Type 2 diabetes mellitus with hyperglycemia, with long-term current use of insulin ([facility] ) 10/03/2018     Resolved Ambulatory Problems     Diagnosis Date Noted    Acute on chronic renal failure ([facility] ) 12/27/2016     Past Medical History:   Diagnosis Date    Cataract     CHF (congestive heart failure) ([facility])     Coronary artery disease     Diabetes mellitus ([address] )     Glaucoma     Hyperlipidemia     Hypertension     Neuropathy     Obesity     Renal disorder     Sleep apnea     Stroke ([facility] )     TIA (transient ischemic attack)     Uncontrolled type 2 diabetes mellitus with hyperglycemia, with long-term current use of insulin ([facility] ) 10/4/2018   Admitting Diagnosis: Lactic acidosis [E87 2]  UTI (urinary tract infection) [N39 0]  High blood sugar [R73 9]  Hyperglycemia [R73 9]  Acute kidney injury ([facility] ) [N17 9]  Age/Sex: 74 y o  male  Assessment/Plan:   73 YO MALE TO ER FROM PCP OFFICE FOR PERSISTENTLY ELEVATED BLOOD SUGARS  PRESENTS WITH BLOOD SUGAR>800  PRESENTS WITH C/O POLYURIA, U/A+  ADMITTED TO INPATIENT STATUS FOR HYPERGLYCEMIA WITH LACTIC ACIDOSIS & UTI, STARTED ON IVF, IVABT & IV INSULIN GTT     Admission Orders:  MED SURG  ACCUCHECKS WITH COVERAGE SCALE  VENODYNES  Scheduled Meds:   Current Facility-Administered Medications:  aspirin 81 mg Oral Daily Marcial Shows, PA-C    carvedilol 12 5 mg Oral BID Marcial Lyons PA-C    cefTRIAXone 1,000 mg Intravenous Q24H Rosanna Dove MD Last Rate: 1,000 mg (10/04/18 1003)   enoxaparin 40 mg Subcutaneous Q24H [facility] Rosanna Dove MD    finasteride 5 mg Oral Daily Marcial Lyons PA-C    gabapentin 600 mg Oral BID Marcial Lyons, PA-STANISLAV    insulin glargine 50 Units Subcutaneous Q12H [facility] Rosanna Dove MD    insulin lispro 2-12 Units Subcutaneous TID AC Rosanna Dove MD    insulin lispro 2-12 Units Subcutaneous HS Rosanna Dove MD    insulin lispro 30 Units Subcutaneous TID With Meals Rosanna Dove MD    omeprazole 40 mg Oral Early Morning Marcial Lyons PA-C    potassium phosphate 21 mmol Intravenous Once Rosanna Dove MD    pravastatin 40 mg Oral Daily INOCENCIA Nolasco-STANISLAV    pregabalin 50 mg Oral TID Marcial Lyons, PA-STANISLAV    tamsulosin 0 4 mg Oral HS Marcial Lyons, PA-C    Continuous Infusions:  IV INSULIN GTT   sodium chloride 0 9 % with KCl 40 mEq/L 125 mL/hr Last Rate: 125 mL/hr (10/04/18 0924)   PRN Meds: Thank you,  [facility]  Utilization Review Department  Phone: [phone]; Fax [phone]  ATTENTION: Please call with any questions or concerns to [phone]  and carefully follow the prompts so that you are directed to the right person  Send all requests for admission clinical reviews, approved or denied determinations and any other requests to fax [phone]   All voicemails are confidential

## 2018-10-04 NOTE — PLAN OF CARE
Problem: Potential for Falls  Goal: Patient will remain free of falls  INTERVENTIONS:  - Assess patient frequently for physical needs  -  Identify cognitive and physical deficits and behaviors that affect risk of falls    -  [place] fall precautions as indicated by assessment   - Educate patient/family on patient safety including physical limitations  - Instruct patient to call for assistance with activity based on assessment  - Modify environment to reduce risk of injury  - Consider OT/PT consult to assist with strengthening/mobility   Outcome: Progressing      Problem: Prexisting or High Potential for Compromised Skin Integrity  Goal: Skin integrity is maintained or improved  INTERVENTIONS:  - Identify patients at risk for skin breakdown  - Assess and monitor skin integrity  - Assess and monitor nutrition and hydration status  - Monitor labs (i e  albumin)  - Assess for incontinence   - Turn and reposition patient  - Assist with mobility/ambulation  - Relieve pressure over bony prominences  - Avoid friction and shearing  - Provide appropriate hygiene as needed including keeping skin clean and dry  - Evaluate need for skin moisturizer/barrier cream  - Collaborate with interdisciplinary team (i e  Nutrition, Rehabilitation, etc )   - Patient/family teaching   Outcome: Progressing      Problem: PAIN - ADULT  Goal: Verbalizes/displays adequate comfort level or baseline comfort level  Interventions:  - Encourage patient to monitor pain and request assistance  - Assess pain using appropriate pain scale  - Administer analgesics based on type and severity of pain and evaluate response  - Implement non-pharmacological measures as appropriate and evaluate response  - Consider cultural and social influences on pain and pain management  - Notify physician/advanced practitioner if interventions unsuccessful or patient reports new pain  Outcome: Progressing      Problem: INFECTION - ADULT  Goal: Absence or prevention of progression during hospitalization  INTERVENTIONS:  - Assess and monitor for signs and symptoms of infection  - Monitor lab/diagnostic results  - Monitor all insertion sites, i e  indwelling lines, tubes, and drains  - Monitor endotracheal (as able) and nasal secretions for changes in amount and color  - [place] appropriate cooling/warming therapies per order  - Administer medications as ordered  - Instruct and encourage patient and family to use good hand hygiene technique  - Identify and instruct in appropriate isolation precautions for identified infection/condition  Outcome: Progressing      Problem: SAFETY ADULT  Goal: Patient will remain free of falls  INTERVENTIONS:  - Assess patient frequently for physical needs  -  Identify cognitive and physical deficits and behaviors that affect risk of falls    -  [place] fall precautions as indicated by assessment   - Educate patient/family on patient safety including physical limitations  - Instruct patient to call for assistance with activity based on assessment  - Modify environment to reduce risk of injury  - Consider OT/PT consult to assist with strengthening/mobility   Outcome: Progressing    Goal: Maintain or return to baseline ADL function  INTERVENTIONS:  -  Assess patient's ability to carry out ADLs; assess patient's baseline for ADL function and identify physical deficits which impact ability to perform ADLs (bathing, care of mouth/teeth, toileting, grooming, dressing, etc )  - Assess/evaluate cause of self-care deficits   - Assess range of motion  - Assess patient's mobility; develop plan if impaired  - Assess patient's need for assistive devices and provide as appropriate  - Encourage maximum independence but intervene and supervise when necessary  ¯ Involve family in performance of ADLs  ¯ Assess for home care needs following discharge   ¯ Request OT consult to assist with ADL evaluation and planning for discharge  ¯ Provide patient education as appropriate  Outcome: Progressing    Goal: Maintain or return mobility status to optimal level  INTERVENTIONS:  - Assess patient's baseline mobility status (ambulation, transfers, stairs, etc )    - Identify cognitive and physical deficits and behaviors that affect mobility  - Identify mobility aids required to assist with transfers and/or ambulation (gait belt, sit-to-stand, lift, walker, cane, etc )  - [place] fall precautions as indicated by assessment  - Record patient progress and toleration of activity level on Mobility SBAR; progress patient to next Phase/Stage  - Instruct patient to call for assistance with activity based on assessment  - Request Rehabilitation consult to assist with strengthening/weightbearing, etc   Outcome: Progressing      Problem: DISCHARGE PLANNING  Goal: Discharge to home or other facility with appropriate resources  INTERVENTIONS:  - Identify barriers to discharge w/patient and caregiver  - Arrange for needed discharge resources and transportation as appropriate  - Identify discharge learning needs (meds, wound care, etc )  - Arrange for interpretive services to assist at discharge as needed  - Refer to Case Management Department for coordinating discharge planning if the patient needs post-hospital services based on physician/advanced practitioner order or complex needs related to functional status, cognitive ability, or social support system  Outcome: Progressing      Problem: Knowledge Deficit  Goal: Patient/family/caregiver demonstrates understanding of disease process, treatment plan, medications, and discharge instructions  Complete learning assessment and assess knowledge base    Interventions:  - Provide teaching at level of understanding  - Provide teaching via preferred learning methods  Outcome: Progressing      Problem: METABOLIC, FLUID AND ELECTROLYTES - ADULT  Goal: Electrolytes maintained within normal limits  INTERVENTIONS:  - Monitor labs and assess patient for signs and symptoms of electrolyte imbalances  - Administer electrolyte replacement as ordered  - Monitor response to electrolyte replacements, including repeat lab results as appropriate  - Instruct patient on fluid and nutrition as appropriate  Outcome: Progressing    Goal: Fluid balance maintained  INTERVENTIONS:  - Monitor labs and assess for signs and symptoms of volume excess or deficit  - Monitor I/O and WT  - Instruct patient on fluid and nutrition as appropriate  Outcome: Progressing    Goal: Glucose maintained within target range  INTERVENTIONS:  - Monitor Blood Glucose as ordered  - Assess for signs and symptoms of hyperglycemia and hypoglycemia  - Administer ordered medications to maintain glucose within target range  - Assess nutritional intake and initiate nutrition service referral as needed  Outcome: Progressing

## 2018-10-04 NOTE — PROGRESS NOTES
[facility] Internal Medicine Progress Note  Patient: Monique Sharp 74 y o  male   MRN: [id number]  PCP: Messi Og MD  Unit/Bed#: 642-28 Encounter: [id number]  Date Of Visit: 10/04/18    Assessment:    Principal Problem:    Hyperosmolar syndrome  Active Problems:    Chronic kidney disease, stage III (moderate) ([facility])    Obstructive sleep apnea syndrome    Obesity, Class III, BMI 40-49 9 (morbid obesity) ([facility] )    Essential hypertension    Complicated UTI (urinary tract infection)    Uncontrolled type 2 diabetes mellitus with hyperglycemia, with long-term current use of insulin ([facility])    KIRT (acute kidney injury) ([facility] )    Dehydration with hyponatremia    Hypokalemia    Hypophosphatemia      Plan:    · Uncontrolled type 2 diabetes mellitus:  Suspect medication noncompliance  Resume basal insulin b i d  & premeal Humalog t i d , and optimize accordingly  Consider adding metformin to his insulin regimen if renal function back to baseline  · Hyperglycemic hyperosmolar syndrome:  POA and due to the above, resolving  Acute MI excluded with serially negative troponin, no overt source of infection, remains afebrile with normal WBC count  Repeat UA and urine culture ordered  · Possible complicated UTI:  On IV Rocephin  · Acute kidney injury:  POA, resolving, background stage 3 chronic kidney disease, serum creatinine usually around 1 3  · Hyponatremia:  POA, due to hypovolemia and concomitant hyperglycemic crisis, resolving with fluid resuscitation  · Hypokalemia/hypophosphatemia:  Supplemented  · Hypertension:  Continue Coreg  · Morbid obesity:  I advised him to lose weight    -I anticipate home discharge in 24-48 hours if stable  -evaluation by PT/OT      VTE Pharmacologic Prophylaxis:   Pharmacologic: Enoxaparin (Lovenox)  Mechanical VTE Prophylaxis in Place: Yes    Patient Centered Rounds: I have performed bedside rounds with nursing staff today      Current Length of Stay: 1 day(s)    Current Patient Status: Inpatient     Code Status: Level 1 - Full Code      Subjective:   Patient seen and examined this evening  He was sitting at bedside having dinner  He feels much improved and has no complaints at present time  He specifically denies fever, malaise, dysuria, nausea, vomiting, diarrhea, abdominal pain, chest pain, dyspnea, palpitations or dizziness  Objective:     Vitals:   Temp (24hrs), Av 5 °F (36 4 °C), Min:96 9 °F (36 1 °C), Max:97 8 °F (36 6 °C)    HR:  [71-87] 71  Resp:  [13-21] 18  BP: (118-172)/(53-80) 155/80  SpO2:  [92 %-96 %] 95 %  Body mass index is 46 86 kg/m²  Input and Output Summary (last 24 hours): Intake/Output Summary (Last 24 hours) at 10/04/18 1838  Last data filed at 10/04/18 1826   Gross per 24 hour   Intake             4820 ml   Output                0 ml   Net             4820 ml       Physical Exam:   General:  Pleasant, morbidly obese, in no overt distress  HEENT: oral mucosa dry, not pale, not jaundiced  Chest: clear lungs, no wheeze  Heart: S1 S2 audible, regular  Abd: soft, nontender, bowel sounds present  Skin: chronic venous stasis dermatitis both legs  Psych: appropriately oriented in all spheres, good insight  Neuro: Awake, alert, moves all extremities equally, essentially nonfocal      Additional Data:     Labs:      Results from last 7 days  Lab Units 10/04/18  0600   WBC Thousand/uL 6 58   HEMOGLOBIN g/dL 11 2*   HEMATOCRIT % 33 4*   PLATELETS Thousands/uL 181   NEUTROS PCT % 58   LYMPHS PCT % 27   MONOS PCT % 8   EOS PCT % 6       Results from last 7 days  Lab Units 10/04/18  1449  10/03/18  1828   SODIUM mmol/L 139  < > 123*   POTASSIUM mmol/L 3 9  < > 4 2   CHLORIDE mmol/L 106  < > 88*   CO2 mmol/L 24  < > 26   BUN mg/dL 23  < > 39*   CREATININE mg/dL 1 44*  < > 2 19*   CALCIUM mg/dL 8 4  < > 8 8   ALK PHOS U/L  --   --  221*   ALT U/L  --   --  32   AST U/L  --   --  18   < > = values in this interval not displayed      Results from last 7 days  Lab Units 10/04/18  0205   INR  1 05     Invalid input(s): MORRO    * I Have Reviewed All Lab Data Listed Above  * Additional Pertinent Lab Tests Reviewed: [facility] Admission Reviewed    Imaging:    Imaging Reports Reviewed Today Include: CXR      Recent Cultures (last 7 days):           Last 24 Hours Medication List:     Current Facility-Administered Medications:  aspirin 81 mg Oral Daily Beth Solis PA-C    carvedilol 12 5 mg Oral BID Beth Solis PA-C    cefTRIAXone 1,000 mg Intravenous Q24H Sean Rosario MD Last Rate: 1,000 mg (10/04/18 1003)   enoxaparin 40 mg Subcutaneous Q24H [address] Sean Rosario MD    finasteride 5 mg Oral Daily Beth Solis PA-C    gabapentin 600 mg Oral BID Beth Solis PA-C    insulin glargine 60 Units Subcutaneous Q12H [address] Sean Rosario MD    insulin lispro 2-12 Units Subcutaneous TID AC Sean Rosario MD    insulin lispro 2-12 Units Subcutaneous HS MD Niharika Rainey ON 10/5/2018] insulin lispro 35 Units Subcutaneous TID With Meals Sean Rosario MD    lactated ringers 1,000 mL Intravenous Once Sean Rosario MD Last Rate: 1,000 mL (10/04/18 1728)   [START ON 10/5/2018] metFORMIN 500 mg Oral BID With Meals Sean Rosario MD    omeprazole 40 mg Oral Early Morning Beth Solis PA-C    pravastatin 40 mg Oral Daily Nasim Augustin PA-C    pregabalin 50 mg Oral TID Beth Solis PA-C    sodium chloride 0 9 % with KCl 40 mEq/L 125 mL/hr Intravenous Continuous Sean Rosario MD Last Rate: 125 mL/hr (10/04/18 0924)   tamsulosin 0 4 mg Oral HS Beth Solis PA-C         Today, Patient Was Seen By: Sean Rosario MD    ** Please Note: Dragon 360 Dictation voice to text software may have been used in the creation of this document   **

## 2018-10-04 NOTE — PLAN OF CARE
Problem: DISCHARGE PLANNING - CARE MANAGEMENT  Goal: Discharge to post-acute care or home with appropriate resources  INTERVENTIONS:  - Conduct assessment to determine patient/family and health care team treatment goals, and need for post-acute services based on payer coverage, community resources, and patient preferences, and barriers to discharge  - Address psychosocial, clinical, and financial barriers to discharge as identified in assessment in conjunction with the patient/family and health care team  - Arrange appropriate level of post-acute services according to patient's   needs and preference and payer coverage in collaboration with the physician and health care team  - Communicate with and update the patient/family, physician, and health care team regarding progress on the discharge plan  - Arrange appropriate transportation to post-acute venues  Pt's wife will give the pt a ride home     Outcome: Progressing

## 2018-10-04 NOTE — PLAN OF CARE
DISCHARGE PLANNING     Discharge to home or other facility with appropriate resources Progressing        INFECTION - ADULT     Absence or prevention of progression during hospitalization Progressing        Knowledge Deficit     Patient/family/caregiver demonstrates understanding of disease process, treatment plan, medications, and discharge instructions Progressing        METABOLIC, FLUID AND ELECTROLYTES - ADULT     Electrolytes maintained within normal limits Progressing     Fluid balance maintained Progressing     Glucose maintained within target range Progressing        PAIN - ADULT     Verbalizes/displays adequate comfort level or baseline comfort level Progressing        Potential for Falls     Patient will remain free of falls Progressing        Prexisting or High Potential for Compromised Skin Integrity     Skin integrity is maintained or improved Progressing        SAFETY ADULT     Maintain or return to baseline ADL function Progressing     Maintain or return mobility status to optimal level Progressing     Patient will remain free of falls Progressing

## 2018-10-04 NOTE — H&P
H&P- Emmy Keene 1945, 74 y o  male MRN: [id number]    Unit/Bed#: 200-18 Encounter: [id number]    Primary Care Provider: Alona Harrington MD   Date and time admitted to hospital: 10/3/2018  5:58 PM        * Type 2 diabetes mellitus with hyperglycemia, with long-term current use of insulin [facility])   Assessment & Plan    Lab Results   Component Value Date    HGBA1C 8 4 (H) 04/25/2018       Recent Labs      10/03/18   1803  10/03/18   2036   POCGLU  >500*  >500*       Blood Sugar Average: Last 72 hrs:  (P) 0       - patient has been having chronically elevated blood glucose levels  - upon entry into emergency department blood glucose level noted to be greater than 800 mg/dL  - was given 2750 mL normal saline and started on insulin infusion  - patient be admitted to medical-surgical floor for continuation of care  - ordered insulin 10 units IV to be given in addition to insulin infusion  - monitor BMP q 2 hours, and point of care blood glucose hourly  - monitor and correct electrolytes after blood glucose is less than 250 mg/dL  - sodium level is falsely decreased       UTI (urinary tract infection)   Assessment & Plan    - patient is nitrate positive on UA  - started on Levaquin IV piggyback Q 24 hours     Essential hypertension   Assessment & Plan    - resume home antihypertensives  - monitor blood pressure as per protocol  - maintain systolic blood pressure less than 140 mm of mercury     Chronic kidney disease, stage III (moderate) (HCC)   Assessment & Plan    - monitor I&O closely  - monitor and trend renal indices         History and Physical - [facility] Internal Medicine    Patient Information: Emmy Keene 74 y o  male MRN: [id number]  Unit/Bed#: 174-12 Encounter: [id number]  Admitting Physician: Abimbola Holman PA-C  PCP: Alona Harrington MD  Date of Admission:  10/03/18    Assessment/Plan:    Hospital Problem List:     Principal Problem:    Type 2 diabetes mellitus with hyperglycemia, with long-term current use of insulin (HCC)  Active Problems:    Chronic kidney disease, stage III (moderate) ([facility])    Essential hypertension    UTI (urinary tract infection)          VTE Prophylaxis: Heparin  / sequential compression device   Code Status:  Full  POLST: There is no POLST form on file for this patient (pre-hospital)    Anticipated Length of Stay:  Patient will be admitted on an Inpatient basis with an anticipated length of stay of  > 2 midnights  Justification for Hospital Stay:  Hyperglycemia    Total Time for Visit, including Counseling / Coordination of Care: 30 minutes  Greater than 50% of this total time spent on direct patient counseling and coordination of care  Chief Complaint:   Hyperglycemia    History of Present Illness:    Lemuel Tobin is a 74 y o  male with a past medical history of morbid obesity, type 2 insulin-dependent diabetes mellitus, essential hypertension, chronic crit kidney disease stage 3, pulse others as listed below who presented to the emergency department for evaluation treatment of elevated blood glucose  Patient was sent to the emergency department by his primary care provider after being seen in the office today with a blood glucose that was greater than 600 mg/dL  Patient does admit to polyuria, but denies polyphasia, polydipsia  Upon entry into the emergency department laboratory and radiologic studies were obtained, his blood glucose was noted to be 165 mg/dL, anion gap 9, lactic acid 3 6  Patient was given 2750 mL normal saline in the emergency department, and was also started on insulin infusion  This service was contacted to continue inpatient care, regular insulin 10 units IV push ordered at this time  Patient admitted to medical-surgical floor for continuation of care  Review of Systems:    Review of Systems   Constitutional: Positive for fatigue  HENT: Negative  Eyes: Negative  Respiratory: Negative  Cardiovascular: Negative  Gastrointestinal: Negative  Endocrine: Positive for polyuria  Negative for polydipsia and polyphagia  Genitourinary: Positive for frequency  Musculoskeletal: Negative  Skin: Negative  Allergic/Immunologic: Negative  Neurological: Negative  Hematological: Negative  Psychiatric/Behavioral: Negative  Past Medical and Surgical History:     Past Medical History:   Diagnosis Date    Cataract     CHF (congestive heart failure) (HCC)     chronic diastolic CHF    Diabetes mellitus ([address] )     Glaucoma     Hyperlipidemia     Hypertension     Obesity     Renal disorder     chronic kidney disease stage 3     Sleep apnea     Stroke [facility])        Past Surgical History:   Procedure Laterality Date    APPENDECTOMY      CARPAL TUNNEL RELEASE      EYE SURGERY      cataracts       Meds/Allergies:    Prior to Admission medications    Medication Sig Start Date End Date Taking?  Authorizing Provider   aspirin 81 MG tablet Take 81 mg by mouth daily    Historical Provider, MD   BD PEN NEEDLE JUNIOR U/F 32G X 4 MM MISC  12/31/17   Historical Provider, MD   CARVEDILOL PO Take 12 5 mg by mouth 2 (two) times a day      Historical Provider, MD   finasteride (PROSCAR) 5 mg tablet Take 5 mg by mouth daily    Historical Provider, MD   furosemide (LASIX) 40 mg tablet Take 1 tablet by mouth daily for 30 days 1/4/17 7/25/17  Stefanie العلي MD   gabapentin (NEURONTIN) 600 MG tablet Take 1 tablet by mouth 2 (two) times a day    Historical Provider, MD   Insulin Glargine (TOUJEO SOLOSTAR) 300 UNIT/ML SOPN Inject 100 Units under the skin 2 (two) times a day with meals Pharmacy listed [facility], pt med list reports Lantus     Historical Provider, MD   insulin lispro (HumaLOG) 100 units/mL injection Inject 35 Units under the skin 2 (two) times a day with meals      Historical Provider, MD   omeprazole (PriLOSEC) 40 MG capsule Take 1 capsule by mouth daily 4/16/15   Historical Provider, MD   pravastatin (PRAVACHOL) 40 mg tablet Take 40 mg by mouth daily    Historical Provider, MD   Pregabalin (LYRICA PO) Take 50 mg by mouth 3 (three) times a day      Historical Provider, MD   tamsulosin (FLOMAX) 0 4 mg Take 0 4 mg by mouth daily at bedtime    Historical Provider, MD     I have reviewed home medications with patient personally  Allergies: No Known Allergies    Social History:     Marital Status: /Civil Union   Occupation:  Retired  Patient Pre-hospital Living Situation:  Home  Patient Pre-hospital Level of Mobility:  Full  Patient Pre-hospital Diet Restrictions:  ADA diet  Substance Use History:   History   Alcohol Use No     History   Smoking Status    Former Smoker    Types: Cigars   Smokeless Tobacco    Never Used     History   Drug Use No       Family History:    non-contributory    Physical Exam:     Vitals:   Blood Pressure: 135/63 (10/03/18 2030)  Pulse: 86 (10/03/18 2030)  Temperature: 98 2 °F (36 8 °C) (10/03/18 1804)  Temp Source: Temporal (10/03/18 1804)  Respirations: 16 (10/03/18 2030)  Height: 5' 6" (167 6 cm) (10/03/18 1804)  Weight - Scale: 128 kg (283 lb 1 1 oz) (10/03/18 1804)  SpO2: 94 % (10/03/18 2030)    Physical Exam   Constitutional: He is oriented to person, place, and time  He appears well-developed and well-nourished  No distress  HENT:   Head: Normocephalic and atraumatic  Mouth/Throat: No oropharyngeal exudate  Eyes: Pupils are equal, round, and reactive to light  Conjunctivae and EOM are normal  No scleral icterus  Neck: Normal range of motion  Neck supple  No JVD present  No tracheal deviation present  No thyromegaly present  Cardiovascular: Normal rate, regular rhythm and normal heart sounds  Pulmonary/Chest: Effort normal and breath sounds normal  No respiratory distress  Abdominal: Soft  Bowel sounds are normal  He exhibits no distension and no mass  There is no tenderness  Musculoskeletal: Normal range of motion  He exhibits no edema, tenderness or deformity  Neurological: He is alert and oriented to person, place, and time  No cranial nerve deficit  Skin: Skin is warm and dry  He is not diaphoretic  Psychiatric: He has a normal mood and affect  His behavior is normal  Judgment and thought content normal            Additional Data:     Lab Results: I have personally reviewed pertinent reports  Results from last 7 days  Lab Units 10/03/18  1828   WBC Thousand/uL 7 23   HEMOGLOBIN g/dL 13 0   HEMATOCRIT % 40 0   PLATELETS Thousands/uL 217       Results from last 7 days  Lab Units 10/03/18  1828   SODIUM mmol/L 123*   POTASSIUM mmol/L 4 2   CHLORIDE mmol/L 88*   CO2 mmol/L 26   BUN mg/dL 39*   CREATININE mg/dL 2 19*   CALCIUM mg/dL 8 8   ALK PHOS U/L 221*   ALT U/L 32   AST U/L 18           Imaging: I have personally reviewed pertinent reports  No results found  EKG, Pathology, and Other Studies Reviewed on Admission:   · EKG:  Sinus rhythm    Allscripts / Epic Records Reviewed: Yes     ** Please Note: This note has been constructed using a voice recognition system   **

## 2018-10-05 LAB
ANION GAP SERPL CALCULATED.3IONS-SCNC: 8 MMOL/L (ref 4–13)
BACTERIA UR QL AUTO: ABNORMAL /HPF
BASOPHILS # BLD AUTO: 0.03 THOUSANDS/ΜL (ref 0–0.1)
BASOPHILS NFR BLD AUTO: 1 % (ref 0–1)
BILIRUB UR QL STRIP: NEGATIVE
BUN SERPL-MCNC: 16 MG/DL (ref 5–25)
CALCIUM SERPL-MCNC: 8.4 MG/DL (ref 8.3–10.1)
CHLORIDE SERPL-SCNC: 109 MMOL/L (ref 100–108)
CHOLEST SERPL-MCNC: 197 MG/DL (ref 50–200)
CLARITY UR: ABNORMAL
CO2 SERPL-SCNC: 24 MMOL/L (ref 21–32)
COLOR UR: YELLOW
CREAT SERPL-MCNC: 1.31 MG/DL (ref 0.6–1.3)
EOSINOPHIL # BLD AUTO: 0.45 THOUSAND/ΜL (ref 0–0.61)
EOSINOPHIL NFR BLD AUTO: 7 % (ref 0–6)
ERYTHROCYTE [DISTWIDTH] IN BLOOD BY AUTOMATED COUNT: 13.5 % (ref 11.6–15.1)
GFR SERPL CREATININE-BSD FRML MDRD: 53 ML/MIN/1.73SQ M
GLUCOSE SERPL-MCNC: 172 MG/DL (ref 65–140)
GLUCOSE SERPL-MCNC: 221 MG/DL (ref 65–140)
GLUCOSE SERPL-MCNC: 233 MG/DL (ref 65–140)
GLUCOSE SERPL-MCNC: 304 MG/DL (ref 65–140)
GLUCOSE SERPL-MCNC: 317 MG/DL (ref 65–140)
GLUCOSE UR STRIP-MCNC: ABNORMAL MG/DL
HCT VFR BLD AUTO: 39.6 % (ref 36.5–49.3)
HDLC SERPL-MCNC: 41 MG/DL (ref 40–60)
HGB BLD-MCNC: 12.7 G/DL (ref 12–17)
HGB UR QL STRIP.AUTO: ABNORMAL
IMM GRANULOCYTES # BLD AUTO: 0.04 THOUSAND/UL (ref 0–0.2)
IMM GRANULOCYTES NFR BLD AUTO: 1 % (ref 0–2)
KETONES UR STRIP-MCNC: NEGATIVE MG/DL
LACTATE SERPL-SCNC: 1.5 MMOL/L (ref 0.5–2)
LDLC SERPL CALC-MCNC: 109 MG/DL (ref 0–100)
LEUKOCYTE ESTERASE UR QL STRIP: ABNORMAL
LYMPHOCYTES # BLD AUTO: 1.84 THOUSANDS/ΜL (ref 0.6–4.47)
LYMPHOCYTES NFR BLD AUTO: 29 % (ref 14–44)
MAGNESIUM SERPL-MCNC: 1.9 MG/DL (ref 1.6–2.6)
MCH RBC QN AUTO: 28.9 PG (ref 26.8–34.3)
MCHC RBC AUTO-ENTMCNC: 32.1 G/DL (ref 31.4–37.4)
MCV RBC AUTO: 90 FL (ref 82–98)
MONOCYTES # BLD AUTO: 0.41 THOUSAND/ΜL (ref 0.17–1.22)
MONOCYTES NFR BLD AUTO: 6 % (ref 4–12)
NEUTROPHILS # BLD AUTO: 3.62 THOUSANDS/ΜL (ref 1.85–7.62)
NEUTS SEG NFR BLD AUTO: 56 % (ref 43–75)
NITRITE UR QL STRIP: NEGATIVE
NON-SQ EPI CELLS URNS QL MICRO: ABNORMAL /HPF
NONHDLC SERPL-MCNC: 156 MG/DL
NRBC BLD AUTO-RTO: 0 /100 WBCS
NT-PROBNP SERPL-MCNC: 684 PG/ML
PH UR STRIP.AUTO: 5.5 [PH] (ref 4.5–8)
PHOSPHATE SERPL-MCNC: 2.3 MG/DL (ref 2.3–4.1)
PLATELET # BLD AUTO: 223 THOUSANDS/UL (ref 149–390)
PMV BLD AUTO: 10.5 FL (ref 8.9–12.7)
POTASSIUM SERPL-SCNC: 3.6 MMOL/L (ref 3.5–5.3)
PROT UR STRIP-MCNC: NEGATIVE MG/DL
RBC # BLD AUTO: 4.39 MILLION/UL (ref 3.88–5.62)
RBC #/AREA URNS AUTO: ABNORMAL /HPF
SODIUM SERPL-SCNC: 141 MMOL/L (ref 136–145)
SP GR UR STRIP.AUTO: 1.01 (ref 1–1.03)
TRIGL SERPL-MCNC: 236 MG/DL
UROBILINOGEN UR QL STRIP.AUTO: 0.2 E.U./DL
WBC # BLD AUTO: 6.39 THOUSAND/UL (ref 4.31–10.16)
WBC #/AREA URNS AUTO: ABNORMAL /HPF

## 2018-10-05 PROCEDURE — 84100 ASSAY OF PHOSPHORUS: CPT | Performed by: INTERNAL MEDICINE

## 2018-10-05 PROCEDURE — 85025 COMPLETE CBC W/AUTO DIFF WBC: CPT | Performed by: PHYSICIAN ASSISTANT

## 2018-10-05 PROCEDURE — 97167 OT EVAL HIGH COMPLEX 60 MIN: CPT

## 2018-10-05 PROCEDURE — 97116 GAIT TRAINING THERAPY: CPT | Performed by: PHYSICAL THERAPIST

## 2018-10-05 PROCEDURE — 83735 ASSAY OF MAGNESIUM: CPT | Performed by: INTERNAL MEDICINE

## 2018-10-05 PROCEDURE — G8988 SELF CARE GOAL STATUS: HCPCS

## 2018-10-05 PROCEDURE — 83605 ASSAY OF LACTIC ACID: CPT | Performed by: INTERNAL MEDICINE

## 2018-10-05 PROCEDURE — 97163 PT EVAL HIGH COMPLEX 45 MIN: CPT | Performed by: PHYSICAL THERAPIST

## 2018-10-05 PROCEDURE — 97535 SELF CARE MNGMENT TRAINING: CPT

## 2018-10-05 PROCEDURE — 80048 BASIC METABOLIC PNL TOTAL CA: CPT | Performed by: INTERNAL MEDICINE

## 2018-10-05 PROCEDURE — 99232 SBSQ HOSP IP/OBS MODERATE 35: CPT | Performed by: INTERNAL MEDICINE

## 2018-10-05 PROCEDURE — G8978 MOBILITY CURRENT STATUS: HCPCS | Performed by: PHYSICAL THERAPIST

## 2018-10-05 PROCEDURE — 80061 LIPID PANEL: CPT | Performed by: INTERNAL MEDICINE

## 2018-10-05 PROCEDURE — 83880 ASSAY OF NATRIURETIC PEPTIDE: CPT | Performed by: INTERNAL MEDICINE

## 2018-10-05 PROCEDURE — G8979 MOBILITY GOAL STATUS: HCPCS | Performed by: PHYSICAL THERAPIST

## 2018-10-05 PROCEDURE — 82948 REAGENT STRIP/BLOOD GLUCOSE: CPT

## 2018-10-05 PROCEDURE — 81001 URINALYSIS AUTO W/SCOPE: CPT | Performed by: INTERNAL MEDICINE

## 2018-10-05 PROCEDURE — 87086 URINE CULTURE/COLONY COUNT: CPT | Performed by: INTERNAL MEDICINE

## 2018-10-05 PROCEDURE — G8987 SELF CARE CURRENT STATUS: HCPCS

## 2018-10-05 RX ORDER — LISINOPRIL 5 MG/1
5 TABLET ORAL DAILY
Status: DISCONTINUED | OUTPATIENT
Start: 2018-10-05 | End: 2018-10-06 | Stop reason: HOSPADM

## 2018-10-05 RX ORDER — ACETAMINOPHEN 325 MG/1
650 TABLET ORAL EVERY 6 HOURS PRN
Status: DISCONTINUED | OUTPATIENT
Start: 2018-10-05 | End: 2018-10-06 | Stop reason: HOSPADM

## 2018-10-05 RX ORDER — INSULIN GLARGINE 100 [IU]/ML
70 INJECTION, SOLUTION SUBCUTANEOUS EVERY 12 HOURS SCHEDULED
Status: DISCONTINUED | OUTPATIENT
Start: 2018-10-05 | End: 2018-10-06

## 2018-10-05 RX ADMIN — OMEPRAZOLE 40 MG: 20 CAPSULE, DELAYED RELEASE ORAL at 05:54

## 2018-10-05 RX ADMIN — FINASTERIDE 5 MG: 5 TABLET, FILM COATED ORAL at 08:26

## 2018-10-05 RX ADMIN — INSULIN LISPRO 35 UNITS: 100 INJECTION, SOLUTION INTRAVENOUS; SUBCUTANEOUS at 12:07

## 2018-10-05 RX ADMIN — CEFTRIAXONE 1000 MG: 1 INJECTION, SOLUTION INTRAVENOUS at 08:24

## 2018-10-05 RX ADMIN — LISINOPRIL 5 MG: 5 TABLET ORAL at 16:47

## 2018-10-05 RX ADMIN — METFORMIN HYDROCHLORIDE 500 MG: 500 TABLET, FILM COATED ORAL at 08:25

## 2018-10-05 RX ADMIN — INSULIN GLARGINE 70 UNITS: 100 INJECTION, SOLUTION SUBCUTANEOUS at 21:59

## 2018-10-05 RX ADMIN — INSULIN LISPRO 8 UNITS: 100 INJECTION, SOLUTION INTRAVENOUS; SUBCUTANEOUS at 12:07

## 2018-10-05 RX ADMIN — PREGABALIN 50 MG: 50 CAPSULE ORAL at 16:47

## 2018-10-05 RX ADMIN — PREGABALIN 50 MG: 50 CAPSULE ORAL at 22:00

## 2018-10-05 RX ADMIN — PREGABALIN 50 MG: 50 CAPSULE ORAL at 08:26

## 2018-10-05 RX ADMIN — INSULIN LISPRO 4 UNITS: 100 INJECTION, SOLUTION INTRAVENOUS; SUBCUTANEOUS at 22:00

## 2018-10-05 RX ADMIN — TAMSULOSIN HYDROCHLORIDE 0.4 MG: 0.4 CAPSULE ORAL at 22:00

## 2018-10-05 RX ADMIN — INSULIN LISPRO 2 UNITS: 100 INJECTION, SOLUTION INTRAVENOUS; SUBCUTANEOUS at 16:46

## 2018-10-05 RX ADMIN — CARVEDILOL 12.5 MG: 12.5 TABLET, FILM COATED ORAL at 08:25

## 2018-10-05 RX ADMIN — GABAPENTIN 600 MG: 300 CAPSULE ORAL at 19:18

## 2018-10-05 RX ADMIN — ENOXAPARIN SODIUM 40 MG: 40 INJECTION, SOLUTION INTRAVENOUS; SUBCUTANEOUS at 08:24

## 2018-10-05 RX ADMIN — ASPIRIN 81 MG 81 MG: 81 TABLET ORAL at 08:25

## 2018-10-05 RX ADMIN — CARVEDILOL 12.5 MG: 12.5 TABLET, FILM COATED ORAL at 19:18

## 2018-10-05 RX ADMIN — PRAVASTATIN SODIUM 40 MG: 40 TABLET ORAL at 08:26

## 2018-10-05 RX ADMIN — GABAPENTIN 600 MG: 300 CAPSULE ORAL at 08:26

## 2018-10-05 RX ADMIN — INSULIN LISPRO 2 UNITS: 100 INJECTION, SOLUTION INTRAVENOUS; SUBCUTANEOUS at 08:26

## 2018-10-05 RX ADMIN — METFORMIN HYDROCHLORIDE 500 MG: 500 TABLET, FILM COATED ORAL at 16:47

## 2018-10-05 RX ADMIN — INSULIN LISPRO 35 UNITS: 100 INJECTION, SOLUTION INTRAVENOUS; SUBCUTANEOUS at 08:27

## 2018-10-05 RX ADMIN — INSULIN GLARGINE 60 UNITS: 100 INJECTION, SOLUTION SUBCUTANEOUS at 08:25

## 2018-10-05 NOTE — MALNUTRITION/BMI
This medical record reflects one or more clinical indicators suggestive of malnutrition and/or morbid obesity  Malnutrition Findings:              BMI Findings:  BMI Classifications: Morbid Obesity 45-49 9     Body mass index is 46 9 kg/m²  See Nutrition note dated 10/05/2018 for additional details  Completed nutrition assessment is viewable in the nutrition documentation

## 2018-10-05 NOTE — PHYSICAL THERAPY NOTE
PT Evaluation        10/05/18 0837   Note Type   Note type Eval/Treat   Pain Assessment   Pain Assessment No/denies pain   Pain Score No Pain   Home Living   Type of Home House   Home Layout Two level; Able to live on main level with bedroom/bathroom; Performs ADLs on one level;1/2 bath on main level;Stairs to enter with rails  (10 [address]YLE with HR, son lives on 2nd floor)   Bathroom Shower/Tub Tub/shower unit  (tub/shower is on 2nd floor, pt sponge bathes)   Bathroom Toilet Standard   Bathroom Accessibility Accessible   Home Equipment Walker;Cane   Prior Function   Level of [place] Independent with ADLs and functional mobility  ((I) ambulation with RW)   Lives With Spouse   Receives Help From Family   ADL Assistance Needs assistance  (pt sponge bathes due to only having 1/2 bath, spouse assists)   IADLs (spouse performs IADL's)   Comments spouse drives   Restrictions/Precautions   Other Precautions Chair Alarm;Telemetry; Fall Risk;Bed Alarm   General   Family/Caregiver Present No   Cognition   Arousal/Participation Alert   Orientation Level Oriented X4   Following Commands Follows all commands and directions without difficulty   RLE Assessment   RLE Assessment WFL  (4 to 4+/5)   LLE Assessment   LLE Assessment WFL  (4 to 4+/5)   Coordination   Sensation WFL  (intact to light touch)   Light Touch   RLE Light Touch Grossly intact   LLE Light Touch Grossly intact   Bed Mobility   Additional Comments pt in bathroom when PT entered room  Returned to sitting in chair at bedside with alarm on and bell in reach  Pt on room air with SpO2 at rest 98% HR 82 and after ambulation 94-95% HR 87   Transfers   Sit to Stand 5  Supervision   Additional items Verbal cues  (with RW)   Stand to Sit 5  Supervision   Additional items Verbal cues  (with RW)   Stand pivot 5  Supervision   Additional items Verbal cues  (with RW)   Additional Comments Pt requiring verbal cues for hand placement for safety during transfers   Pt requiring increased time to complete task and required 2-3 standing rest breaks during ambulation due to fatigue and bilatera LE pain which pt notes is chronic from a previous accident in 95 while he was at work  Pt with forward flexed posture during ambulation but no LOB lightheadedness or dizziness  No LOB with direction changes and pt able to manage RW independently  Ambulation/Elevation   Gait pattern Forward Flexion; Short stride; Excessively slow  (decreased gait speed)   Gait Assistance 5  Supervision   Assistive Device Rolling walker   Distance 280ft with RW (S) requiring 2-3 standing rest breaks due to LE pain and fatigue but no drop in SpO2 below 94% on room air  No LOB with directional changes or straight plane ambulation on level surface  Balance   Static Sitting Good   Dynamic Sitting Good   Static Standing Fair +  (with RW)   Dynamic Standing Fair  (with RW)   Ambulatory Fair  (with RW)   Endurance Deficit   Endurance Deficit Yes   Endurance Deficit Description limited ambulation tolerance requiring standing rest break due to fatigue   Activity Tolerance   Activity Tolerance Patient limited by fatigue;Patient limited by pain; Patient tolerated treatment well   Assessment   Prognosis Good   Problem List Decreased strength;Decreased endurance; Impaired balance;Decreased mobility;Pain   Assessment Pt is a 74year old male presenting to [facility] with hyperglycemia with blood sugars greater than 600 and polyuria  Pt with complex PMH including HTN hyperlipidemia DM stroke CHF gluacoma neuropathy and carpal tunnel contributing to current condition  Pt presents with decreased LE strength decreased balance endurance and functional mobility performing all transfers and ambulation at a (S) level with RW  Pt with decreased endurance requiring 2-3 rest breaks in 280ft due to fatigue and chronic LE pain  Pt with no drop in spO2 below 94% with ambulation   Pt will be safe to return home on discharge but would benefit from continued activity in PT to improve strength balance pain endurance safety mobility transfers ambulation and stair negotiation to maximize current LOF and ensure safe return home  Goals   Patient Goals To return home   LTG Expiration Date 10/19/18   Long Term Goal #1 Increase bilateral LE strength by 1/2 muscle grade to improve bed mobility and transfers to (I)   Long Term Goal #2 Improve standing and ambulatory balance to fair+ to improve ambulation to 350ft with RW modified independent and to improve stair negotiation to 11 steps with HR (S)   Plan   Treatment/Interventions Functional transfer training;LE strengthening/ROM; Elevations; Therapeutic exercise; Endurance training;Patient/family training;Bed mobility;Gait training   PT Frequency (3-5x/wk)   Recommendation   Recommendation Home PT   PT - OK to Discharge Yes  (when medically stable for discharge)   No SCD's  Pt seated in chair at bedside with call bell in reach and chair alarm on

## 2018-10-05 NOTE — PROGRESS NOTES
[facility] Internal Medicine Progress Note  Patient: Lucio Davenport 74 y o  male   MRN: [id number]  PCP: Sania Madden MD  Unit/Bed#: 772-60 Encounter: [id number]  Date Of Visit: 10/05/18    Assessment:    Principal Problem:    Hyperosmolar syndrome  Active Problems:    Chronic kidney disease, stage III (moderate) ([facility])    Obstructive sleep apnea syndrome    Obesity, Class III, BMI 40-49 9 (morbid obesity) ([facility] )    Essential hypertension    Complicated UTI (urinary tract infection)    Uncontrolled type 2 diabetes mellitus with hyperglycemia, with long-term current use of insulin ([facility])    KIRT (acute kidney injury) ([facility] )    Dehydration with hyponatremia    Hypokalemia    Hypophosphatemia      Plan:    · Uncontrolled type 2 diabetes mellitus:  A1c 14 9,? medication noncompliance  Optimize further overnight lantus b i d  & premeal Humalog t i d  & add metformin  Patient reports he was recently seen by an endocrinologist at [place] via his PCP  Give baby aspirin and low-dose ACE inhibitor for CAD prophylaxis and renal protection respectively  · Hyperglycemic hyperosmolar syndrome:  resolved  · Possible complicated UTI:  await urine cx result, cont IV Rocephin  · Gram +ve cocci bacteremia: 1/2 +ve bld cx 10/3/18, suspect skin contaminant  · Acute kidney injury:  Resolved, renal fxn back to its baseline     Background stage 3 chronic kidney disease, serum creatinine usually around 1 3  · Hyponatremia: resolved with fluid resuscitation  · Hypokalemia/hypophosphatemia:  Supplemented  · Hypertension:  Continue Coreg, add lisinopril  · Morbid obesity:  I advised him to lose weight    -discharge tomorrow with home PT on a 5 day PO antibiotic course( UTI )if he remains afebrile and hemodynamically stable with improved glycemic control      VTE Pharmacologic Prophylaxis:   Pharmacologic: Enoxaparin (Lovenox)  Mechanical VTE Prophylaxis in Place: Yes    Patient Centered Rounds: I have performed bedside rounds with nursing staff today  Current Length of Stay: 2 day(s)    Current Patient Status: Inpatient     Code Status: Level 1 - Full Code      Subjective:   Patient seen and examined this afternoon  Wife in attendance at bedside  Patient has no complaints today and wishes to be discharged home  Fingerstick before lunch was 304  Objective:     Vitals:   Temp (24hrs), Av 6 °F (36 4 °C), Min:97 °F (36 1 °C), Max:97 9 °F (36 6 °C)    HR:  [69-73] 73  Resp:  [18] 18  BP: (135-155)/(77-80) 153/78  SpO2:  [94 %-100 %] 94 %  Body mass index is 46 9 kg/m²  Input and Output Summary (last 24 hours): Intake/Output Summary (Last 24 hours) at 10/05/18 1539  Last data filed at 10/05/18 0854   Gross per 24 hour   Intake             1830 ml   Output              500 ml   Net             1330 ml       Physical Exam:   General:  Morbidly obese, in no overt distress  HEENT: oral mucosa moist, not pale, not jaundiced  Chest: clear lungs, no wheeze  Heart: S1 S2 audible, regular  Abd: soft, nontender, bowel sounds present  Psych: appropriately oriented in all spheres  Neuro: Awake, alert, essentially nonfocal      Additional Data:     Labs:      Results from last 7 days  Lab Units 10/05/18  0549   WBC Thousand/uL 6 39   HEMOGLOBIN g/dL 12 7   HEMATOCRIT % 39 6   PLATELETS Thousands/uL 223   NEUTROS PCT % 56   LYMPHS PCT % 29   MONOS PCT % 6   EOS PCT % 7*       Results from last 7 days  Lab Units 10/05/18  1034  10/03/18  1828   SODIUM mmol/L 141  < > 123*   POTASSIUM mmol/L 3 6  < > 4 2   CHLORIDE mmol/L 109*  < > 88*   CO2 mmol/L 24  < > 26   BUN mg/dL 16  < > 39*   CREATININE mg/dL 1 31*  < > 2 19*   CALCIUM mg/dL 8 4  < > 8 8   ALK PHOS U/L  --   --  221*   ALT U/L  --   --  32   AST U/L  --   --  18   < > = values in this interval not displayed  Results from last 7 days  Lab Units 10/04/18  0205   INR  1 05     Invalid input(s): TROIP    * I Have Reviewed All Lab Data Listed Above    * Additional Pertinent Lab Tests Reviewed: All Labs Within Last 24 Hours Reviewed      Recent Cultures (last 7 days):       Results from last 7 days  Lab Units 10/03/18  1828   BLOOD CULTURE  No Growth at 24 hrs  GRAM STAIN RESULT  Gram positive cocci in clusters       Last 24 Hours Medication List:     Current Facility-Administered Medications:  acetaminophen 650 mg Oral Q6H PRN Teofilo Rodriguez MD    aspirin 81 mg Oral Daily Larance Roles, PA-C    carvedilol 12 5 mg Oral BID Larance Roles, PA-C    cefTRIAXone 1,000 mg Intravenous Q24H Teofilo Rodriguez MD Last Rate: Stopped (10/05/18 0854)   enoxaparin 40 mg Subcutaneous Q24H [address] Teofilo Rodriguez MD    finasteride 5 mg Oral Daily Larance Roles, PA-STANISLAV    gabapentin 600 mg Oral BID Larance Roles, PA-STANISLAV    insulin glargine 70 Units Subcutaneous Q12H [address] Teofilo Rodriguez MD    insulin lispro 2-12 Units Subcutaneous TID AC Teofilo Rodriguez MD    insulin lispro 2-12 Units Subcutaneous HS Teofilo Rodriguez MD    insulin lispro 45 Units Subcutaneous TID With Meals Teofilo Rodriguez MD    lisinopril 5 mg Oral Daily Teofilo Rodriguez MD    metFORMIN 500 mg Oral BID With Meals Teofilo Rodriguez MD    omeprazole 40 mg Oral Early Morning Larance Roles, PA-STANISLAV    pravastatin 40 mg Oral Daily Larance Roles, PA-C    pregabalin 50 mg Oral TID Larance Roles, PA-C    tamsulosin 0 4 mg Oral HS Larance Roles, PA-C         Today, Patient Was Seen By: Teofilo Rodriguez MD    ** Please Note: Dragon 360 Dictation voice to text software may have been used in the creation of this document   **

## 2018-10-05 NOTE — OCCUPATIONAL THERAPY NOTE
Occupational Therapy Evaluation     Patient Name: Emmy Keene  TCPKN'O Date: 10/5/2018  Problem List  Patient Active Problem List   Diagnosis    Dyspnea on exertion    Type 2 diabetes mellitus with chronic kidney disease, with long-term current use of insulin ([address] )    Chronic kidney disease, stage III (moderate) ([facility])    Diastolic congestive heart failure ([facility])    Obstructive sleep apnea syndrome    Obesity, Class III, BMI 40-49 9 (morbid obesity) ([address] )    Essential hypertension    Type 2 diabetes mellitus with hyperglycemia, with long-term current use of insulin ([facility])    Complicated UTI (urinary tract infection)    Hyperosmolar syndrome    Uncontrolled type 2 diabetes mellitus with hyperglycemia, with long-term current use of insulin ([address] )    [facility]) [facility]    Dehydration with hyponatremia    Hypokalemia    Hypophosphatemia     Past Medical History  Past Medical History:   Diagnosis Date    Cataract     CHF (congestive heart failure) ([facility])     chronic diastolic CHF    Coronary artery disease     Diabetes mellitus ([address] )     Glaucoma     Hyperlipidemia     Hypertension     Neuropathy     Obesity     Renal disorder     chronic kidney disease stage 3     Sleep apnea     Stroke [facility]     TIA (transient ischemic attack)     Uncontrolled type 2 diabetes mellitus with hyperglycemia, with long-term current use of insulin ([address] ) 10/4/2018     Past Surgical History  Past Surgical History:   Procedure Laterality Date    APPENDECTOMY      CARPAL TUNNEL RELEASE      EYE SURGERY      cataracts           10/05/18 0811   Note Type   Note type Eval/Treat   Restrictions/Precautions   Weight Bearing Precautions Per Order No   Other Precautions Chair Alarm;Telemetry;Multiple lines; Fall Risk   Pain Assessment   Pain Assessment No/denies pain   Home Living   Type of [address] Two level;Performs ADLs on one level; Able to live on main level with bedroom/bathroom;Stairs to enter with rails;1/2 bath on main level  (10 KYLE c HR)   Bathroom Shower/Tub Tub/shower unit   Bathroom Toilet Standard   Bathroom Accessibility Accessible   Home Equipment Walker;Grab bars   Additional Comments pt utilizes RW at baseline for functional mobility   Prior Function   Level of [place] Needs assistance with ADLs and functional mobility; Needs assistance with IADLs   Lives With Spouse   Receives Help From Family   ADL Assistance Needs assistance   IADLs Needs assistance   Falls in the last 6 months 0   Comments pt's wife drives   Psychosocial   Psychosocial (WDL) WDL   Subjective   Subjective "I can do these at home with my regular underwear"   ADL   Where Assessed Edge of bed  (and bathroom)   Grooming Assistance 5  Supervision/Setup   Grooming Deficit Supervision/safety; Increased time to complete;Wash/dry hands   LB Dressing Assistance 3  Moderate Assistance   LB Dressing Deficit Don/doff R sock; Don/doff L sock; Thread RLE into underwear; Thread LLE into underwear;Pull up over hips   Toileting Assistance  4  Minimal Assistance   Toileting Deficit Clothing management up;Clothing management down;Perineal hygiene; Increased time to complete   Additional Comments pt requires increased (A) during LB ADL tasks with verbal cues at times    Bed Mobility   Additional Comments pt in bathroom at start of sessin and in chair at end of session   Transfers   Sit to Stand 5  Supervision   Additional items Verbal cues  (RW)   Stand to Sit 5  Supervision   Additional items Verbal cues  (RW)   Additional Comments pt requires increased time for transfers    Functional Mobility   Functional Mobility 5  Supervision   Additional Comments pt performed functional mobility ~280ft with use of RW; pt progresses at slow pace    Additional items Rolling walker   Balance   Static Sitting Good   Dynamic Sitting Good   Static Standing Fair +   Dynamic Standing Fair   Ambulatory Fair   RUE Assessment   RUE Assessment WFL   LUE Assessment   LUE Assessment WFL   Hand Function   Gross Motor Coordination Functional   Fine Motor Coordination Functional   Sensation   Light Touch No apparent deficits   Sharp/Dull No apparent deficits   Cognition   Overall Cognitive Status WFL   Arousal/Participation Alert   Attention Within functional limits   Orientation Level Oriented X4   Memory Within functional limits   Following Commands Follows all commands and directions without difficulty   Assessment   Limitation Decreased ADL status; Decreased UE strength;Decreased Safe judgement during ADL;Decreased endurance;Decreased self-care trans;Decreased high-level ADLs   Assessment Pt is a 74 y o  male seen for OT evaluation s/p admit to [facility] on 10/3/2018 w/ Hyperosmolar syndrome  Comorbidities affecting pt's functional performance at time of assessment include: obesity  Personal factors affecting pt at time of IE include:steps to enter environment, difficulty performing ADLS, difficulty performing IADLS , decreased initiation and engagement , health management  and environment  Prior to admission, pt was (A) with ADLs/IADLs and use of RW during functional mobility  Upon evaluation: Pt requires (S)-mod (A) with transfers, LB ADLs, with use of RW during functional mobility 2* the following deficits impacting occupational performance: weakness, decreased strength, decreased balance, decreased tolerance, impaired initiation and increased pain  Pt to benefit from continued skilled OT tx while in the hospital to address deficits as defined above and maximize level of functional independence w ADL's and functional mobility  Occupational Performance areas to address include: grooming, bathing/shower, toilet hygiene, dressing, functional mobility, community mobility and clothing management  From OT standpoint, recommendation at time of d/c would be home health vs  STR pending progress      Goals   Patient Goals to go home    Short Term Goal  pt will perform UE strengthening exercises while seated EOB or in chair in order to maximize (I) with ADL performance   Long Term Goal #1 pt will demonstrate toilet transfers and toilet hygeine at (I) level    Long Term Goal #2 pt will increase independence with functional mobility to mod (I) level with RW and increased endurance    Long Term Goal pt will demonstrate UB bathing and grooming tasks while seated EOB or in chair at min (A) level with decreased time to complete   Plan   Treatment Interventions ADL retraining;Functional transfer training;UE strengthening/ROM; Endurance training;Patient/family training; Activityengagement   Goal Expiration Date 10/19/18   OT Frequency 3-5x/wk   Recommendation   OT Discharge Recommendation Other (Comment)  (STR vs  home health)   Barthel Index   Feeding 10   Bathing 0   Grooming Score 0   Dressing Score 5   Bladder Score 10   Bowels Score 10   Toilet Use Score 5   Transfers (Bed/Chair) Score 10   Mobility (Level Surface) Score 10   Stairs Score 0   Barthel Index Score 60     Pt will benefit from continued OT services in order to maximize (I) c ADL performance, FM c RW, and improve overall endurance/strength required to complete functional tasks in preparation for d/c  Pt left seated in chair at end of session; all needs within reach; all lines intact; scds connected and turned on

## 2018-10-05 NOTE — PLAN OF CARE
Problem: OCCUPATIONAL THERAPY ADULT  Goal: Performs self-care activities at highest level of function for planned discharge setting  See evaluation for individualized goals  Treatment Interventions: ADL retraining, Functional transfer training, UE strengthening/ROM, Endurance training, Patient/family training, Activityengagement          See flowsheet documentation for full assessment, interventions and recommendations  Limitation: Decreased ADL status, Decreased UE strength, Decreased Safe judgement during ADL, Decreased endurance, Decreased self-care trans, Decreased high-level ADLs     Assessment: Pt is a 74 y o  male seen for OT evaluation s/p admit to [facility] on 10/3/2018 w/ Hyperosmolar syndrome  Comorbidities affecting pt's functional performance at time of assessment include: obesity  Personal factors affecting pt at time of IE include:steps to enter environment, difficulty performing ADLS, difficulty performing IADLS , decreased initiation and engagement , health management  and environment  Prior to admission, pt was (A) with ADLs/IADLs and use of RW during functional mobility  Upon evaluation: Pt requires (S)-mod (A) with transfers, LB ADLs, with use of RW during functional mobility 2* the following deficits impacting occupational performance: weakness, decreased strength, decreased balance, decreased tolerance, impaired initiation and increased pain  Pt to benefit from continued skilled OT tx while in the hospital to address deficits as defined above and maximize level of functional independence w ADL's and functional mobility  Occupational Performance areas to address include: grooming, bathing/shower, toilet hygiene, dressing, functional mobility, community mobility and clothing management  From OT standpoint, recommendation at time of d/c would be home health vs  STR pending progress        OT Discharge Recommendation: Other (Comment) (STR vs  home health)

## 2018-10-05 NOTE — PLAN OF CARE
Problem: PHYSICAL THERAPY ADULT  Goal: Performs mobility at highest level of function for planned discharge setting  See evaluation for individualized goals  Outcome: Progressing  Prognosis: Good  Problem List: Decreased strength, Decreased endurance, Impaired balance, Decreased mobility, Pain  Assessment: Pt is a 74year old male [facility] with hyperglycemia with blood sugars greater than 600 and polyuria  Pt with complex PMH including HTN hyperlipidemia DM stroke CHF gluacoma neuropathy and carpal tunnel contributing to current condition  Pt presents with decreased LE strength decreased balance endurance and functional mobility performing all transfers and ambulation at a (S) level with RW  Pt with decreased endurance requiring 2-3 rest breaks in 280ft due to fatigue and chronic LE pain  Pt with no drop in spO2 below 94% with ambulation  Pt will be safe to return home on discharge but would benefit from continued activity in PT to improve strength balance pain endurance safety mobility transfers ambulation and stair negotiation to maximize current LOF and ensure safe return home  Recommendation: Home PT     PT - OK to Discharge: Yes (when medically stable for discharge)    See flowsheet documentation for full assessment

## 2018-10-06 VITALS
DIASTOLIC BLOOD PRESSURE: 62 MMHG | RESPIRATION RATE: 18 BRPM | TEMPERATURE: 97.8 F | HEART RATE: 64 BPM | HEIGHT: 66 IN | WEIGHT: 292.99 LBS | BODY MASS INDEX: 47.09 KG/M2 | SYSTOLIC BLOOD PRESSURE: 134 MMHG | OXYGEN SATURATION: 96 %

## 2018-10-06 PROBLEM — N17.9 AKI (ACUTE KIDNEY INJURY) (HCC): Status: RESOLVED | Noted: 2018-10-04 | Resolved: 2018-10-06

## 2018-10-06 PROBLEM — E83.39 HYPOPHOSPHATEMIA: Status: RESOLVED | Noted: 2018-10-04 | Resolved: 2018-10-06

## 2018-10-06 PROBLEM — E87.0 HYPEROSMOLAR SYNDROME: Status: RESOLVED | Noted: 2018-10-04 | Resolved: 2018-10-06

## 2018-10-06 PROBLEM — E87.6 HYPOKALEMIA: Status: RESOLVED | Noted: 2018-10-04 | Resolved: 2018-10-06

## 2018-10-06 LAB
ANION GAP SERPL CALCULATED.3IONS-SCNC: 6 MMOL/L (ref 4–13)
BACTERIA UR CULT: NORMAL
BASOPHILS # BLD AUTO: 0.03 THOUSANDS/ΜL (ref 0–0.1)
BASOPHILS NFR BLD AUTO: 1 % (ref 0–1)
BUN SERPL-MCNC: 12 MG/DL (ref 5–25)
CALCIUM SERPL-MCNC: 8.7 MG/DL (ref 8.3–10.1)
CHLORIDE SERPL-SCNC: 110 MMOL/L (ref 100–108)
CO2 SERPL-SCNC: 26 MMOL/L (ref 21–32)
CREAT SERPL-MCNC: 1.16 MG/DL (ref 0.6–1.3)
EOSINOPHIL # BLD AUTO: 0.37 THOUSAND/ΜL (ref 0–0.61)
EOSINOPHIL NFR BLD AUTO: 6 % (ref 0–6)
ERYTHROCYTE [DISTWIDTH] IN BLOOD BY AUTOMATED COUNT: 13.8 % (ref 11.6–15.1)
GFR SERPL CREATININE-BSD FRML MDRD: 61 ML/MIN/1.73SQ M
GLUCOSE SERPL-MCNC: 204 MG/DL (ref 65–140)
GLUCOSE SERPL-MCNC: 239 MG/DL (ref 65–140)
GLUCOSE SERPL-MCNC: 252 MG/DL (ref 65–140)
HCT VFR BLD AUTO: 35.9 % (ref 36.5–49.3)
HGB BLD-MCNC: 11.7 G/DL (ref 12–17)
IMM GRANULOCYTES # BLD AUTO: 0.04 THOUSAND/UL (ref 0–0.2)
IMM GRANULOCYTES NFR BLD AUTO: 1 % (ref 0–2)
LYMPHOCYTES # BLD AUTO: 1.8 THOUSANDS/ΜL (ref 0.6–4.47)
LYMPHOCYTES NFR BLD AUTO: 30 % (ref 14–44)
MCH RBC QN AUTO: 29.6 PG (ref 26.8–34.3)
MCHC RBC AUTO-ENTMCNC: 32.6 G/DL (ref 31.4–37.4)
MCV RBC AUTO: 91 FL (ref 82–98)
MONOCYTES # BLD AUTO: 0.35 THOUSAND/ΜL (ref 0.17–1.22)
MONOCYTES NFR BLD AUTO: 6 % (ref 4–12)
NEUTROPHILS # BLD AUTO: 3.49 THOUSANDS/ΜL (ref 1.85–7.62)
NEUTS SEG NFR BLD AUTO: 56 % (ref 43–75)
NRBC BLD AUTO-RTO: 0 /100 WBCS
PLATELET # BLD AUTO: 201 THOUSANDS/UL (ref 149–390)
PMV BLD AUTO: 10.6 FL (ref 8.9–12.7)
POTASSIUM SERPL-SCNC: 3.7 MMOL/L (ref 3.5–5.3)
RBC # BLD AUTO: 3.95 MILLION/UL (ref 3.88–5.62)
SODIUM SERPL-SCNC: 142 MMOL/L (ref 136–145)
WBC # BLD AUTO: 6.08 THOUSAND/UL (ref 4.31–10.16)

## 2018-10-06 PROCEDURE — 85025 COMPLETE CBC W/AUTO DIFF WBC: CPT | Performed by: PHYSICIAN ASSISTANT

## 2018-10-06 PROCEDURE — 99239 HOSP IP/OBS DSCHRG MGMT >30: CPT | Performed by: INTERNAL MEDICINE

## 2018-10-06 PROCEDURE — 80048 BASIC METABOLIC PNL TOTAL CA: CPT | Performed by: INTERNAL MEDICINE

## 2018-10-06 PROCEDURE — 82948 REAGENT STRIP/BLOOD GLUCOSE: CPT

## 2018-10-06 RX ORDER — CEPHALEXIN 250 MG/1
500 CAPSULE ORAL EVERY 12 HOURS SCHEDULED
Status: DISCONTINUED | OUTPATIENT
Start: 2018-10-06 | End: 2018-10-06 | Stop reason: HOSPADM

## 2018-10-06 RX ORDER — CEPHALEXIN 500 MG/1
500 CAPSULE ORAL EVERY 12 HOURS SCHEDULED
Qty: 9 CAPSULE | Refills: 0 | Status: SHIPPED | OUTPATIENT
Start: 2018-10-06 | End: 2018-10-11

## 2018-10-06 RX ORDER — INSULIN GLARGINE 100 [IU]/ML
80 INJECTION, SOLUTION SUBCUTANEOUS EVERY 12 HOURS SCHEDULED
Status: DISCONTINUED | OUTPATIENT
Start: 2018-10-06 | End: 2018-10-06 | Stop reason: HOSPADM

## 2018-10-06 RX ORDER — INSULIN GLARGINE 100 [IU]/ML
80 INJECTION, SOLUTION SUBCUTANEOUS EVERY 12 HOURS SCHEDULED
Qty: 10 ML | Refills: 0 | Status: SHIPPED | OUTPATIENT
Start: 2018-10-06 | End: 2019-03-04 | Stop reason: HOSPADM

## 2018-10-06 RX ORDER — ATORVASTATIN CALCIUM 20 MG/1
20 TABLET, FILM COATED ORAL DAILY
Status: ON HOLD | COMMUNITY
End: 2019-03-04 | Stop reason: SDUPTHER

## 2018-10-06 RX ORDER — LISINOPRIL 5 MG/1
5 TABLET ORAL DAILY
Qty: 30 TABLET | Refills: 0 | Status: SHIPPED | OUTPATIENT
Start: 2018-10-07 | End: 2021-03-12 | Stop reason: HOSPADM

## 2018-10-06 RX ADMIN — ENOXAPARIN SODIUM 40 MG: 40 INJECTION, SOLUTION INTRAVENOUS; SUBCUTANEOUS at 08:20

## 2018-10-06 RX ADMIN — ASPIRIN 81 MG 81 MG: 81 TABLET ORAL at 08:20

## 2018-10-06 RX ADMIN — INSULIN LISPRO 4 UNITS: 100 INJECTION, SOLUTION INTRAVENOUS; SUBCUTANEOUS at 11:38

## 2018-10-06 RX ADMIN — CARVEDILOL 12.5 MG: 12.5 TABLET, FILM COATED ORAL at 08:20

## 2018-10-06 RX ADMIN — LISINOPRIL 5 MG: 5 TABLET ORAL at 08:20

## 2018-10-06 RX ADMIN — FINASTERIDE 5 MG: 5 TABLET, FILM COATED ORAL at 08:20

## 2018-10-06 RX ADMIN — INSULIN GLARGINE 70 UNITS: 100 INJECTION, SOLUTION SUBCUTANEOUS at 08:21

## 2018-10-06 RX ADMIN — OMEPRAZOLE 40 MG: 20 CAPSULE, DELAYED RELEASE ORAL at 06:02

## 2018-10-06 RX ADMIN — PREGABALIN 50 MG: 50 CAPSULE ORAL at 08:21

## 2018-10-06 RX ADMIN — PRAVASTATIN SODIUM 40 MG: 40 TABLET ORAL at 08:20

## 2018-10-06 RX ADMIN — METFORMIN HYDROCHLORIDE 500 MG: 500 TABLET, FILM COATED ORAL at 08:20

## 2018-10-06 RX ADMIN — CEPHALEXIN 500 MG: 250 CAPSULE ORAL at 11:38

## 2018-10-06 RX ADMIN — GABAPENTIN 600 MG: 300 CAPSULE ORAL at 08:20

## 2018-10-06 RX ADMIN — INSULIN LISPRO 4 UNITS: 100 INJECTION, SOLUTION INTRAVENOUS; SUBCUTANEOUS at 08:22

## 2018-10-06 NOTE — DISCHARGE INSTRUCTIONS
Chronic Kidney Disease, Ambulatory Care   GENERAL INFORMATION:   Chronic kidney disease  is the gradual and permanent loss of kidney function  Normally, the kidneys turn fluid, chemicals, and waste from your blood into urine  When you have chronic kidney disease (CKD), your kidneys do not function properly  CKD may worsen over time and lead to kidney failure  Common symptoms include the following:   · Changes in how often you need to urinate    · Swelling in your arms, legs, or feet    · Shortness of breath    · Fatigue or weakness    · Bad or bitter taste in your mouth    · Nausea, vomiting, or loss of appetite  Seek immediate care for the following symptoms:   · Heart is beating faster than normal for you    · Confused and drowsiness    · Seizure    · Sudden chest pain or shortness of breath  Treatment for chronic kidney disease:  Medicines may be given to decrease blood pressure and get rid of extra fluid  You may also receive medicine to manage health conditions that may occur with CKD  Dialysis is a treatment to remove chemicals and waste from your blood when your kidneys can no longer do this  Surgery may be needed to create an arteriovenous fistula (AVF) in your arm or insert a catheter into your abdomen so that you can receive dialysis  A kidney transplant may be done if your CKD becomes severe  Manage chronic kidney disease:   · Maintain a healthy weight  Ask your healthcare provider how much you should weigh  Ask him to help you create a weight loss plan if you are overweight  · Exercise 30 to 60 minutes a day, 4 to 7 times a week, or as directed  Ask about the best exercise plan for you  Regular exercise can help you manage CKD, high blood pressure, and diabetes  · Follow your healthcare provider's advice about what to eat and drink  He may tell you to eat food low in sodium (salt), potassium, phosphorus, or protein  You may need to see a dietitian if you need help planning meals      · Limit alcohol  Ask how much alcohol is safe for you to drink  A drink of alcohol is 12 ounces of beer, 5 ounces of wine, or 1½ ounces of liquor  · Do not smoke  Smoking harms your kidneys  If you smoke, it is never too late to quit  Ask for information if you need help quitting  · Ask your healthcare provider if you need vaccines  Infections such as pneumonia, influenza, and hepatitis can be more harmful or more likely to occur when you have CKD  Vaccines reduce your risk of infection with these viruses  Follow up with your healthcare provider as directed:  Write down your questions so you remember to ask them during your visits  CARE AGREEMENT:   You have the right to help plan your care  Learn about your health condition and how it may be treated  Discuss treatment options with your caregivers to decide what care you want to receive  You always have the right to refuse treatment  The above information is an  only  It is not intended as medical advice for individual conditions or treatments  Talk to your doctor, nurse or pharmacist before following any medical regimen to see if it is safe and effective for you  © 2014 [address] is for End User's use only and may not be sold, redistributed or otherwise used for commercial purposes  All illustrations and images included in CareNotes® are the copyrighted property of [employer] , [employer]  or Dick Busby  Diabetes in the Older Adult   WHAT YOU NEED TO KNOW:   Older adults with diabetes are at risk for heart disease, stroke, kidney disease, blindness, and nerve damage   You may also be at risk for any of the following:  · Poor nutrition or low blood sugar levels    · Confusion or problems with memory, attention, or learning new things    · Trouble controlling urination or frequent urinary tract infections    · Trouble with coordination or balance    · Falls and injuries    · Pain    · Depression    · Open sores on your legs or feet  DISCHARGE INSTRUCTIONS:   Call 911 for any of the following:   · You have any of the following signs of a stroke:      ¨ Numbness or drooping on one side of your face     ¨ Weakness in an arm or leg    ¨ Confusion or difficulty speaking    ¨ Dizziness, a severe headache, or vision loss    · You have any of the following signs of a heart attack:      ¨ Squeezing, pressure, or pain in your chest that lasts longer than 5 minutes or returns    ¨ Discomfort or pain in your back, neck, jaw, stomach, or arm     ¨ Trouble breathing    ¨ Nausea or vomiting    ¨ Lightheadedness or a sudden cold sweat, especially with chest pain or trouble breathing  Seek care immediately if:   · You have severe abdominal pain, or the pain spreads to your back  You may also be vomiting  · You have trouble staying awake or focusing  · You are shaking or sweating  · You have blurred or double vision  · Your breath has a fruity, sweet smell  · Your breathing is deep and labored, or rapid and shallow  · Your heartbeat is fast and weak  · You fall and get hurt  Contact your healthcare provider if:   · You are vomiting or have diarrhea  · You have an upset stomach and cannot eat the foods on your meal plan  · You feel weak or more tired than usual      · You feel dizzy, have headaches, or are easily irritated  · Your skin is red, warm, dry, or swollen  · You have a wound that does not heal      · You have numbness in your arms or legs  · You have trouble coping with your illness, or you feel anxious or depressed  · You have problems with your memory  · You have changes in your vision  · You have questions or concerns about your condition or care  Medicines  may be given to decrease the amount of sugar in your blood  You may also need medicine to lower your blood pressure or cholesterol, or medicine to prevent blood clots     Manage the ABCs and prevent problems caused by diabetes:   · Check your blood sugar levels as directed  Your healthcare provider will tell you when and how often to check during the day  Your healthcare provider will also tell you what your blood sugar levels should be before and after a meal  You may need to check for ketones in your urine or blood if your level is higher than directed  Write down your results and show them to your healthcare provider  Your provider may use the results to make changes to your medicine, food, or exercise schedules  Ask your healthcare provider for more information about how to treat a high or low blood sugar level  · Follow your meal plan as directed  A dietitian will help you make a meal plan to keep your blood sugar level steady and make sure you get enough nutrition  Do not skip meals  Your blood sugar level may drop too low if you have taken diabetes medicine and do not eat  Ask your healthcare provider about programs in your community that can deliver the meals to your home  · Try to be active for 30 to 60 minutes most days of the week  Exercise can help keep your blood sugar level steady, decrease your risk of heart disease, and help you lose weight  It can also help improve your balance and decrease your risk for falls  Work with your healthcare provider to create an exercise plan  Ask a family member or friend to exercise with you  Start slow and exercise for 5 to 10 minutes at a time  Examples of activities include walking or swimming  Include muscle strengthening activities 2 to 3 days each week  Include balance training 2 to 3 times each week  Activities that help increase balance include yoga and carie chi      · Maintain a healthy weight  Ask your healthcare provider how much you should weigh  A healthy weight can help you control your diabetes and prevent heart disease  Ask your provider to help you create a weight loss plan if you are overweight   Together you can set manageable weight loss goals     · Do not smoke  Ask your healthcare provider for information if you currently smoke and need help to quit  Do not use e-cigarettes or smokeless tobacco in place of cigarettes or to help you quit  They still contain nicotine  · Manage stress  Stress may increase your blood sugar level  Deep breathing, muscle relaxation, and music may help you relax  Ask your healthcare provider for more information about these practices  Other ways to manage your diabetes:   · Check your feet every day for sores  Look at your whole foot, including the bottom, and between and under your toes  Check for wounds, corns, and calluses  Use a mirror to see the bottom of your feet  The skin on your feet may be shiny, tight, dry, or darker than normal  Your feet may also be cold and pale  Feel your feet by running your hands along the tops, bottoms, sides, and between your toes  Redness, swelling, and warmth are signs of blood flow problems that can lead to a foot ulcer  Do not try to remove corns or calluses yourself  · Wear medical alert identification  Wear medical alert jewelry or carry a card that says you have diabetes  Ask your healthcare provider where to get these items  · Ask about vaccines  You have a higher risk for serious illness if you get the flu, pneumonia, or hepatitis  Ask your healthcare provider if you should get a flu, pneumonia, shingles, or hepatitis B vaccine, and when to get the vaccine  · Keep all appointments  You may need to return to have your A1c checked every 3 months  You will need to return at least once each year to have your feet checked  You will need an eye exam once a year to check for retinopathy  You will also need urine tests every year to check for kidney problems  You may need tests to monitor for heart disease  Write down your questions so you remember to ask them during your visits  · Get help from family and friends    You may need help checking your blood sugar level, giving insulin injections, or preparing your meals  Ask your family and friends to help you with these tasks  Talk to your healthcare provider if you do not have someone at home to help you  A healthcare provider can come to your home to help you with these tasks  Follow up with your healthcare provider as directed: You may need to return to have your A1c checked every 3 months  You will need to return at least once each year to have your feet checked  You will need an eye exam once a year to check for retinopathy  You will also need urine tests every year to check for kidney problems  You may need tests to monitor for heart disease  Write down your questions so you remember to ask them during your visits  © 2017 [address]  Information is for End User's use only and may not be sold, redistributed or otherwise used for commercial purposes  All illustrations and images included in CareNotes® are the copyrighted property of [employer]  or Dick Busby  The above information is an  only  It is not intended as medical advice for individual conditions or treatments  Talk to your doctor, nurse or pharmacist before following any medical regimen to see if it is safe and effective for you  Urinary Tract Infection in Men   WHAT YOU NEED TO KNOW:   A urinary tract infection (UTI) is caused by bacteria that get inside your urinary tract  Most bacteria that enter your urinary tract come out when you urinate  If the bacteria stay in your urinary tract, you may get an infection  Your urinary tract includes your kidneys, ureters, bladder, and urethra  Urine is made in your kidneys, and it flows from the ureters to the bladder  Urine leaves the bladder through the urethra  A UTI is more common in your lower urinary tract, which includes your bladder and urethra          DISCHARGE INSTRUCTIONS:   Seek care immediately if:   · You are urinating very little or not at all  · You have a high fever with shaking chills  · You have side or back pain that gets worse  Contact your healthcare provider if:   · You have a mild fever  · You do not feel better after 2 days of taking antibiotics  · You are vomiting  · You have new symptoms, such as blood or pus in your urine  · You have questions or concerns about your condition or care  Medicines:   · Antibiotics  help fight a bacterial infection  · Medicines  may be given to decrease pain and burning when you urinate  They will also help decrease the feeling that you need to urinate often  These medicines will make your urine orange or red  · Take your medicine as directed  Contact your healthcare provider if you think your medicine is not helping or if you have side effects  Tell him or her if you are allergic to any medicine  Keep a list of the medicines, vitamins, and herbs you take  Include the amounts, and when and why you take them  Bring the list or the pill bottles to follow-up visits  Carry your medicine list with you in case of an emergency  Prevent another UTI:   · Empty your bladder often  Urinate and empty your bladder as soon as you feel the need  Do not hold your urine for long periods of time  · Drink liquids as directed  Ask how much liquid to drink each day and which liquids are best for you  You may need to drink more liquids than usual to help flush out the bacteria  Do not drink alcohol, caffeine, or citrus juices  These can irritate your bladder and increase your symptoms  Your healthcare provider may recommend cranberry juice to help prevent a UTI  · Urinate after you have sex  This can help flush out bacteria passed during sex  · Do pelvic muscle exercises often  Pelvic muscle exercises may help you start and stop urinating  Strong pelvic muscles may help you empty your bladder easier   Squeeze these muscles tightly for 5 seconds like you are trying to hold back urine  Then relax for 5 seconds  Gradually work up to squeezing for 10 seconds  Do 3 sets of 15 repetitions a day, or as directed  Follow up with your healthcare provider as directed:  Write down your questions so you remember to ask them during your visits  © 2017 [address] Information is for End User's use only and may not be sold, redistributed or otherwise used for commercial purposes  All illustrations and images included in CareNotes® are the copyrighted property of [employer] , [employer]  or Dick Busby  The above information is an  only  It is not intended as medical advice for individual conditions or treatments  Talk to your doctor, nurse or pharmacist before following any medical regimen to see if it is safe and effective for you

## 2018-10-06 NOTE — PROGRESS NOTES
Patient allowed me to get blood work but, refused to allow me to stick him with an IV needle  Patient stated, "I was told I am going home today " Education reinforced about IV abx that is due later in the day  Patient still refused

## 2018-10-06 NOTE — NURSING NOTE
AVS reviewed with pt  Questions encouraged and answered  Pt verbalizes understanding of same  Discharged in no acute distress

## 2018-10-06 NOTE — PROGRESS NOTES
Unable to obtain IV access  Patient stated that he does not want to be stuck anymore today    No IV  meds due until the AM

## 2018-10-06 NOTE — DISCHARGE SUMMARY
Discharge Summary - [address] [facility]    Patient Information: Melisa Li 74 y o  male MRN: [id number]  Unit/Bed#: 468-46 Encounter: [id number]    Discharging Physician / Practitioner: Bree Bee MD  PCP: Rasheeda Springer MD  Admission Date: 10/3/2018  Discharge Date: 10/06/18    Reason for Admission: hyperglycemia    Discharge Diagnoses:     Principal Problem (Resolved): Hyperosmolar syndrome  Active Problems:    Chronic kidney disease, stage III (moderate) ([facility])    Obstructive sleep apnea syndrome    Obesity, Class III, BMI 40-49 9 (morbid obesity) ([facility] )    Essential hypertension    Complicated UTI (urinary tract infection)    Uncontrolled type 2 diabetes mellitus with hyperglycemia, with long-term current use of insulin ([facility])    Dehydration with hyponatremia  Resolved Problems:    KIRT (acute kidney injury) ([facility] )    Hypokalemia    Hypophosphatemia      Consultations During Hospital Stay:  · none    Procedures Performed:     · none      Hospital Course:   Kindly refer to the admitting H+P as well as physician progress notes & consult notes for details  Melisa Li is a 74 y o  male patient who was admitted to telemetry on 10/3/2018 due to hyperglycemic hyperosmolar syndrome after being sent by his PCP to the ED due to hyperglycemia at the office per 600  He ruled out for acute MI with serially negative troponin  He received IV Rocephin to treat a complicated urinary tract infection  1/2 blood culture bottles from admission grew gram-positive cocci in clusters, deemed to be the result of skin contamination  Urine culture currently show no growth  He has been afebrile and hemodynamically stable since hospitalization  He was aggressively fluid resuscitated with IV crystalloid    He transiently required insulin IV infusion which has been transitioned to subcutaneous Lantus b i d  and pre meal Humalog t i d   Of note, he reports taking Humalog 45 units b i d  and Lantus 110 units b i d  prior to this admission, his compliance to this regimen being suspect at best   His a1c was 14 9 with a normal TSH  He is currently on Lantus 80 units b i d  And pre meal Humalog 50 units t i d , which he will be discharged home on, to be further titrated by the patient's PCP  His fingerstick before lunch today was 204  He feels much improved and is eager to be discharged home  His electrolyte deficits were replenished while acute kidney injury and hyponatremia resolved with fluids  Low-dose lisinopril and metformin were instituted for renal protection and optimal glycemic control respectively  Lifestyle modification was emphasized to the patient  His wife reports he eats indiscretely and does not adhere to a diabetic diet  He was subsequently discharged to home in stable condition today, to follow up with his PCP as well as referral made to Nephrology for outpatient CKD management  Condition at Discharge: good     Discharge Day Visit / Exam:     Subjective:  Patient seen and examined this afternoon  Wife present at bedside  Patient has no complaints whatsoever  Vitals: Blood Pressure: 134/62 (10/06/18 0734)  Pulse: 64 (10/06/18 0734)  Temperature: 97 8 °F (36 6 °C) (10/06/18 0734)  Temp Source: Tympanic (10/06/18 0734)  Respirations: 18 (10/06/18 0734)  Height: 5' 6" (167 6 cm) (10/03/18 2120)  Weight - Scale: 133 kg (292 lb 15 9 oz) (10/06/18 0535)  SpO2: 96 % (10/06/18 0734)  Exam:   General: oral mucosa moist, in no overt distress  HEENT; not pale, not jaundiced  Chest: clear lungs, no wheeze  Heart: S1 S2 audible, regular  Abd: soft, nontender, bowel sounds present  Psych: appropriately oriented in all spheres  Neuro: Awake, alert, essentially nonfocal      Discharge instructions/Information to patient and family:   See after visit summary for information provided to patient and family        Provisions for Follow-Up Care:  See after visit summary for information related to follow-up care and any pertinent home health orders  Disposition:     Home with [facility]NA Services (Reminder: Complete face to face encounter)       Discharge Statement:  I spent 35 minutes discharging the patient  This time was spent on the day of discharge  I had direct contact with the patient on the day of discharge  Greater than 50% of the total time was spent examining patient, answering all patient questions, arranging and discussing plan of care with patient as well as directly providing post-discharge instructions  Additional time then spent on discharge activities  Discharge Medications:  See after visit summary for reconciled discharge medications provided to patient and family  ** Please Note: Dragon 360 Dictation voice to text software may have been used in the creation of this document   **

## 2018-10-07 LAB
BACTERIA BLD CULT: ABNORMAL
GRAM STN SPEC: ABNORMAL

## 2018-10-07 NOTE — SOCIAL WORK
Cm spoke with the patient and they are for d/c to home today  Cm is taking into account that the patient has preferences while planning the d/c needs  Cm plan was discussed with the patient and the patient is agreeable to the plan the patient does understand the importance of follow up care and taking the medications as prescribed  The patient is aware of any symptoms that he should look for when d/c  The patient [facility] and he was agreeable to this  The imm was signed and he is aware of his right to appeal  The patient will need follow up appointments and they will be set up on Monday  He does not have a pcp that is [facility] I will call and [facility] appointments for him

## 2018-10-09 LAB — BACTERIA BLD CULT: NORMAL

## 2019-02-27 ENCOUNTER — HOSPITAL ENCOUNTER (INPATIENT)
Facility: HOSPITAL | Age: 74
LOS: 4 days | Discharge: NURSING, REHAB OR LONG-TERM CARE | DRG: 638 | End: 2019-03-04
Attending: EMERGENCY MEDICINE | Admitting: INTERNAL MEDICINE
Payer: COMMERCIAL

## 2019-02-27 DIAGNOSIS — I10 ESSENTIAL HYPERTENSION: ICD-10-CM

## 2019-02-27 DIAGNOSIS — E66.01 MORBID OBESITY WITH BMI OF 45.0-49.9, ADULT (HCC): ICD-10-CM

## 2019-02-27 DIAGNOSIS — N30.00 ACUTE CYSTITIS WITHOUT HEMATURIA: ICD-10-CM

## 2019-02-27 DIAGNOSIS — M79.604 ACUTE PAIN OF RIGHT LOWER EXTREMITY: ICD-10-CM

## 2019-02-27 DIAGNOSIS — E16.0 HYPOGLYCEMIA DUE TO INSULIN: Primary | ICD-10-CM

## 2019-02-27 DIAGNOSIS — T38.3X5A HYPOGLYCEMIA DUE TO INSULIN: Primary | ICD-10-CM

## 2019-02-27 DIAGNOSIS — R20.2 PARESTHESIA OF LEFT UPPER EXTREMITY: ICD-10-CM

## 2019-02-27 DIAGNOSIS — G47.33 OBSTRUCTIVE SLEEP APNEA SYNDROME: ICD-10-CM

## 2019-02-27 DIAGNOSIS — Z79.4 TYPE 2 DIABETES MELLITUS WITH HYPERGLYCEMIA, WITH LONG-TERM CURRENT USE OF INSULIN (HCC): ICD-10-CM

## 2019-02-27 DIAGNOSIS — E11.65 TYPE 2 DIABETES MELLITUS WITH HYPERGLYCEMIA, WITH LONG-TERM CURRENT USE OF INSULIN (HCC): ICD-10-CM

## 2019-02-27 DIAGNOSIS — M48.02 NEURAL FORAMINAL STENOSIS OF CERVICAL SPINE: ICD-10-CM

## 2019-02-27 LAB — GLUCOSE SERPL-MCNC: 54 MG/DL (ref 65–140)

## 2019-02-27 PROCEDURE — 96374 THER/PROPH/DIAG INJ IV PUSH: CPT

## 2019-02-27 PROCEDURE — 99285 EMERGENCY DEPT VISIT HI MDM: CPT

## 2019-02-27 PROCEDURE — 82948 REAGENT STRIP/BLOOD GLUCOSE: CPT

## 2019-02-27 RX ORDER — DEXTROSE 10 % IN WATER 10 %
250 INTRAVENOUS SOLUTION INTRAVENOUS ONCE
Status: COMPLETED | OUTPATIENT
Start: 2019-02-28 | End: 2019-02-27

## 2019-02-27 RX ORDER — DEXTROSE MONOHYDRATE 25 G/50ML
INJECTION, SOLUTION INTRAVENOUS
Status: COMPLETED
Start: 2019-02-27 | End: 2019-02-27

## 2019-02-27 RX ADMIN — DEXTROSE MONOHYDRATE 25 ML: 500 INJECTION PARENTERAL at 23:58

## 2019-02-28 ENCOUNTER — APPOINTMENT (EMERGENCY)
Dept: RADIOLOGY | Facility: HOSPITAL | Age: 74
DRG: 638 | End: 2019-02-28
Payer: COMMERCIAL

## 2019-02-28 PROBLEM — F11.20 OPIATE DEPENDENCE, CONTINUOUS (HCC): Status: ACTIVE | Noted: 2019-02-28

## 2019-02-28 PROBLEM — T38.3X5A HYPOGLYCEMIA DUE TO INSULIN: Status: ACTIVE | Noted: 2019-02-28

## 2019-02-28 PROBLEM — R82.81 PYURIA: Status: ACTIVE | Noted: 2019-02-28

## 2019-02-28 PROBLEM — E16.0 HYPOGLYCEMIA DUE TO INSULIN: Status: ACTIVE | Noted: 2019-02-28

## 2019-02-28 LAB
ALBUMIN SERPL BCP-MCNC: 3.2 G/DL (ref 3.5–5)
ALP SERPL-CCNC: 129 U/L (ref 46–116)
ALT SERPL W P-5'-P-CCNC: 21 U/L (ref 12–78)
ANION GAP SERPL CALCULATED.3IONS-SCNC: 6 MMOL/L (ref 4–13)
ANION GAP SERPL CALCULATED.3IONS-SCNC: 7 MMOL/L (ref 4–13)
AST SERPL W P-5'-P-CCNC: 19 U/L (ref 5–45)
ATRIAL RATE: 90 BPM
BACTERIA UR QL AUTO: ABNORMAL /HPF
BASOPHILS # BLD AUTO: 0.02 THOUSANDS/ΜL (ref 0–0.1)
BASOPHILS NFR BLD AUTO: 0 % (ref 0–1)
BILIRUB SERPL-MCNC: 0.3 MG/DL (ref 0.2–1)
BILIRUB UR QL STRIP: NEGATIVE
BUN SERPL-MCNC: 22 MG/DL (ref 5–25)
BUN SERPL-MCNC: 24 MG/DL (ref 5–25)
CALCIUM SERPL-MCNC: 8.8 MG/DL (ref 8.3–10.1)
CALCIUM SERPL-MCNC: 9 MG/DL (ref 8.3–10.1)
CHLORIDE SERPL-SCNC: 103 MMOL/L (ref 100–108)
CHLORIDE SERPL-SCNC: 103 MMOL/L (ref 100–108)
CLARITY UR: CLEAR
CO2 SERPL-SCNC: 27 MMOL/L (ref 21–32)
CO2 SERPL-SCNC: 31 MMOL/L (ref 21–32)
COLOR UR: YELLOW
CREAT SERPL-MCNC: 1.24 MG/DL (ref 0.6–1.3)
CREAT SERPL-MCNC: 1.31 MG/DL (ref 0.6–1.3)
EOSINOPHIL # BLD AUTO: 0.31 THOUSAND/ΜL (ref 0–0.61)
EOSINOPHIL NFR BLD AUTO: 3 % (ref 0–6)
ERYTHROCYTE [DISTWIDTH] IN BLOOD BY AUTOMATED COUNT: 14.5 % (ref 11.6–15.1)
ERYTHROCYTE [DISTWIDTH] IN BLOOD BY AUTOMATED COUNT: 14.5 % (ref 11.6–15.1)
EST. AVERAGE GLUCOSE BLD GHB EST-MCNC: 117 MG/DL
GFR SERPL CREATININE-BSD FRML MDRD: 53 ML/MIN/1.73SQ M
GFR SERPL CREATININE-BSD FRML MDRD: 57 ML/MIN/1.73SQ M
GLUCOSE SERPL-MCNC: 108 MG/DL (ref 65–140)
GLUCOSE SERPL-MCNC: 143 MG/DL (ref 65–140)
GLUCOSE SERPL-MCNC: 153 MG/DL (ref 65–140)
GLUCOSE SERPL-MCNC: 176 MG/DL (ref 65–140)
GLUCOSE SERPL-MCNC: 176 MG/DL (ref 65–140)
GLUCOSE SERPL-MCNC: 41 MG/DL (ref 65–140)
GLUCOSE SERPL-MCNC: 51 MG/DL (ref 65–140)
GLUCOSE SERPL-MCNC: 77 MG/DL (ref 65–140)
GLUCOSE SERPL-MCNC: 91 MG/DL (ref 65–140)
GLUCOSE SERPL-MCNC: 94 MG/DL (ref 65–140)
GLUCOSE UR STRIP-MCNC: NEGATIVE MG/DL
HBA1C MFR BLD: 5.7 % (ref 4.2–6.3)
HCT VFR BLD AUTO: 39.5 % (ref 36.5–49.3)
HCT VFR BLD AUTO: 40.5 % (ref 36.5–49.3)
HGB BLD-MCNC: 12.3 G/DL (ref 12–17)
HGB BLD-MCNC: 12.6 G/DL (ref 12–17)
HGB UR QL STRIP.AUTO: NEGATIVE
IMM GRANULOCYTES # BLD AUTO: 0.04 THOUSAND/UL (ref 0–0.2)
IMM GRANULOCYTES NFR BLD AUTO: 0 % (ref 0–2)
KETONES UR STRIP-MCNC: NEGATIVE MG/DL
LEUKOCYTE ESTERASE UR QL STRIP: NEGATIVE
LYMPHOCYTES # BLD AUTO: 1.4 THOUSANDS/ΜL (ref 0.6–4.47)
LYMPHOCYTES NFR BLD AUTO: 15 % (ref 14–44)
MAGNESIUM SERPL-MCNC: 1.6 MG/DL (ref 1.6–2.6)
MAGNESIUM SERPL-MCNC: 1.9 MG/DL (ref 1.6–2.6)
MCH RBC QN AUTO: 27.6 PG (ref 26.8–34.3)
MCH RBC QN AUTO: 27.8 PG (ref 26.8–34.3)
MCHC RBC AUTO-ENTMCNC: 31.1 G/DL (ref 31.4–37.4)
MCHC RBC AUTO-ENTMCNC: 31.1 G/DL (ref 31.4–37.4)
MCV RBC AUTO: 89 FL (ref 82–98)
MCV RBC AUTO: 89 FL (ref 82–98)
MONOCYTES # BLD AUTO: 0.66 THOUSAND/ΜL (ref 0.17–1.22)
MONOCYTES NFR BLD AUTO: 7 % (ref 4–12)
NEUTROPHILS # BLD AUTO: 6.69 THOUSANDS/ΜL (ref 1.85–7.62)
NEUTS SEG NFR BLD AUTO: 75 % (ref 43–75)
NITRITE UR QL STRIP: POSITIVE
NON-SQ EPI CELLS URNS QL MICRO: ABNORMAL /HPF
NRBC BLD AUTO-RTO: 0 /100 WBCS
P AXIS: 84 DEGREES
PH UR STRIP.AUTO: 5.5 [PH] (ref 4.5–8)
PHOSPHATE SERPL-MCNC: 2.4 MG/DL (ref 2.3–4.1)
PLATELET # BLD AUTO: 259 THOUSANDS/UL (ref 149–390)
PLATELET # BLD AUTO: 259 THOUSANDS/UL (ref 149–390)
PMV BLD AUTO: 10.6 FL (ref 8.9–12.7)
PMV BLD AUTO: 9.9 FL (ref 8.9–12.7)
POTASSIUM SERPL-SCNC: 3.7 MMOL/L (ref 3.5–5.3)
POTASSIUM SERPL-SCNC: 3.8 MMOL/L (ref 3.5–5.3)
PR INTERVAL: 224 MS
PROT SERPL-MCNC: 7.4 G/DL (ref 6.4–8.2)
PROT UR STRIP-MCNC: NEGATIVE MG/DL
QRS AXIS: -82 DEGREES
QRSD INTERVAL: 152 MS
QT INTERVAL: 408 MS
QTC INTERVAL: 499 MS
RBC # BLD AUTO: 4.42 MILLION/UL (ref 3.88–5.62)
RBC # BLD AUTO: 4.56 MILLION/UL (ref 3.88–5.62)
RBC #/AREA URNS AUTO: ABNORMAL /HPF
SODIUM SERPL-SCNC: 137 MMOL/L (ref 136–145)
SODIUM SERPL-SCNC: 140 MMOL/L (ref 136–145)
SP GR UR STRIP.AUTO: 1.01 (ref 1–1.03)
T WAVE AXIS: 74 DEGREES
TROPONIN I SERPL-MCNC: <0.02 NG/ML
UROBILINOGEN UR QL STRIP.AUTO: 0.2 E.U./DL
VENTRICULAR RATE: 90 BPM
WBC # BLD AUTO: 9.12 THOUSAND/UL (ref 4.31–10.16)
WBC # BLD AUTO: 9.47 THOUSAND/UL (ref 4.31–10.16)
WBC #/AREA URNS AUTO: ABNORMAL /HPF

## 2019-02-28 PROCEDURE — 85025 COMPLETE CBC W/AUTO DIFF WBC: CPT | Performed by: EMERGENCY MEDICINE

## 2019-02-28 PROCEDURE — 96376 TX/PRO/DX INJ SAME DRUG ADON: CPT

## 2019-02-28 PROCEDURE — 93005 ELECTROCARDIOGRAM TRACING: CPT

## 2019-02-28 PROCEDURE — 84100 ASSAY OF PHOSPHORUS: CPT | Performed by: NURSE PRACTITIONER

## 2019-02-28 PROCEDURE — 80048 BASIC METABOLIC PNL TOTAL CA: CPT | Performed by: NURSE PRACTITIONER

## 2019-02-28 PROCEDURE — 96361 HYDRATE IV INFUSION ADD-ON: CPT

## 2019-02-28 PROCEDURE — 82948 REAGENT STRIP/BLOOD GLUCOSE: CPT

## 2019-02-28 PROCEDURE — 83735 ASSAY OF MAGNESIUM: CPT | Performed by: EMERGENCY MEDICINE

## 2019-02-28 PROCEDURE — 99223 1ST HOSP IP/OBS HIGH 75: CPT | Performed by: INTERNAL MEDICINE

## 2019-02-28 PROCEDURE — 81001 URINALYSIS AUTO W/SCOPE: CPT | Performed by: NURSE PRACTITIONER

## 2019-02-28 PROCEDURE — 93010 ELECTROCARDIOGRAM REPORT: CPT | Performed by: INTERNAL MEDICINE

## 2019-02-28 PROCEDURE — 85027 COMPLETE CBC AUTOMATED: CPT | Performed by: NURSE PRACTITIONER

## 2019-02-28 PROCEDURE — 36415 COLL VENOUS BLD VENIPUNCTURE: CPT | Performed by: EMERGENCY MEDICINE

## 2019-02-28 PROCEDURE — 84484 ASSAY OF TROPONIN QUANT: CPT | Performed by: EMERGENCY MEDICINE

## 2019-02-28 PROCEDURE — 83735 ASSAY OF MAGNESIUM: CPT | Performed by: NURSE PRACTITIONER

## 2019-02-28 PROCEDURE — 71045 X-RAY EXAM CHEST 1 VIEW: CPT

## 2019-02-28 PROCEDURE — 83036 HEMOGLOBIN GLYCOSYLATED A1C: CPT | Performed by: NURSE PRACTITIONER

## 2019-02-28 PROCEDURE — 80053 COMPREHEN METABOLIC PANEL: CPT | Performed by: EMERGENCY MEDICINE

## 2019-02-28 RX ORDER — CARVEDILOL 3.12 MG/1
6.25 TABLET ORAL 2 TIMES DAILY
Status: DISCONTINUED | OUTPATIENT
Start: 2019-02-28 | End: 2019-03-04 | Stop reason: HOSPADM

## 2019-02-28 RX ORDER — ATORVASTATIN CALCIUM 10 MG/1
20 TABLET, FILM COATED ORAL DAILY
Status: DISCONTINUED | OUTPATIENT
Start: 2019-02-28 | End: 2019-03-04 | Stop reason: HOSPADM

## 2019-02-28 RX ORDER — DEXTROSE MONOHYDRATE 25 G/50ML
25 INJECTION, SOLUTION INTRAVENOUS ONCE
Status: COMPLETED | OUTPATIENT
Start: 2019-02-28 | End: 2019-02-28

## 2019-02-28 RX ORDER — OXYCODONE HCL 10 MG/1
10 TABLET, FILM COATED, EXTENDED RELEASE ORAL EVERY 12 HOURS SCHEDULED
Status: DISCONTINUED | OUTPATIENT
Start: 2019-02-28 | End: 2019-03-04 | Stop reason: HOSPADM

## 2019-02-28 RX ORDER — DEXTROSE MONOHYDRATE 100 MG/ML
100 INJECTION, SOLUTION INTRAVENOUS CONTINUOUS
Status: DISCONTINUED | OUTPATIENT
Start: 2019-02-28 | End: 2019-02-28

## 2019-02-28 RX ORDER — NYSTATIN 100000 [USP'U]/G
POWDER TOPICAL 2 TIMES DAILY
Status: DISCONTINUED | OUTPATIENT
Start: 2019-02-28 | End: 2019-03-04 | Stop reason: HOSPADM

## 2019-02-28 RX ORDER — DEXTROSE MONOHYDRATE 25 G/50ML
25 INJECTION, SOLUTION INTRAVENOUS AS NEEDED
Status: DISCONTINUED | OUTPATIENT
Start: 2019-02-28 | End: 2019-03-04 | Stop reason: HOSPADM

## 2019-02-28 RX ORDER — LISINOPRIL 5 MG/1
5 TABLET ORAL DAILY
Status: DISCONTINUED | OUTPATIENT
Start: 2019-02-28 | End: 2019-03-04 | Stop reason: HOSPADM

## 2019-02-28 RX ORDER — OXYCODONE HCL 10 MG/1
10 TABLET, FILM COATED, EXTENDED RELEASE ORAL EVERY 12 HOURS SCHEDULED
COMMUNITY
End: 2019-03-04 | Stop reason: HOSPADM

## 2019-02-28 RX ORDER — DEXTROSE AND SODIUM CHLORIDE 5; .9 G/100ML; G/100ML
75 INJECTION, SOLUTION INTRAVENOUS CONTINUOUS
Status: DISCONTINUED | OUTPATIENT
Start: 2019-02-28 | End: 2019-03-01

## 2019-02-28 RX ORDER — TAMSULOSIN HYDROCHLORIDE 0.4 MG/1
0.4 CAPSULE ORAL EVERY EVENING
Status: DISCONTINUED | OUTPATIENT
Start: 2019-02-28 | End: 2019-03-04 | Stop reason: HOSPADM

## 2019-02-28 RX ORDER — GABAPENTIN 300 MG/1
600 CAPSULE ORAL 3 TIMES DAILY
Status: DISCONTINUED | OUTPATIENT
Start: 2019-02-28 | End: 2019-03-04 | Stop reason: HOSPADM

## 2019-02-28 RX ORDER — FUROSEMIDE 40 MG/1
40 TABLET ORAL
COMMUNITY
Start: 2018-01-15 | End: 2019-03-04 | Stop reason: HOSPADM

## 2019-02-28 RX ORDER — DEXTROSE MONOHYDRATE 25 G/50ML
INJECTION, SOLUTION INTRAVENOUS
Status: DISPENSED
Start: 2019-02-28 | End: 2019-02-28

## 2019-02-28 RX ORDER — HEPARIN SODIUM 5000 [USP'U]/ML
5000 INJECTION, SOLUTION INTRAVENOUS; SUBCUTANEOUS EVERY 8 HOURS SCHEDULED
Status: DISCONTINUED | OUTPATIENT
Start: 2019-02-28 | End: 2019-02-28

## 2019-02-28 RX ORDER — ASPIRIN 81 MG/1
81 TABLET, CHEWABLE ORAL DAILY
Status: DISCONTINUED | OUTPATIENT
Start: 2019-02-28 | End: 2019-03-04 | Stop reason: HOSPADM

## 2019-02-28 RX ORDER — FUROSEMIDE 40 MG/1
40 TABLET ORAL ONCE
Status: COMPLETED | OUTPATIENT
Start: 2019-02-28 | End: 2019-02-28

## 2019-02-28 RX ORDER — HEPARIN SODIUM 5000 [USP'U]/ML
7500 INJECTION, SOLUTION INTRAVENOUS; SUBCUTANEOUS EVERY 8 HOURS SCHEDULED
Status: DISCONTINUED | OUTPATIENT
Start: 2019-02-28 | End: 2019-03-04 | Stop reason: HOSPADM

## 2019-02-28 RX ADMIN — HEPARIN SODIUM 7500 UNITS: 5000 INJECTION INTRAVENOUS; SUBCUTANEOUS at 21:25

## 2019-02-28 RX ADMIN — LISINOPRIL 5 MG: 5 TABLET ORAL at 09:22

## 2019-02-28 RX ADMIN — DEXTROSE AND SODIUM CHLORIDE 75 ML/HR: 5; .9 INJECTION, SOLUTION INTRAVENOUS at 12:03

## 2019-02-28 RX ADMIN — DEXTROSE 100 ML/HR: 10 SOLUTION INTRAVENOUS at 01:45

## 2019-02-28 RX ADMIN — GABAPENTIN 600 MG: 300 CAPSULE ORAL at 21:23

## 2019-02-28 RX ADMIN — ASPIRIN 81 MG 81 MG: 81 TABLET ORAL at 09:22

## 2019-02-28 RX ADMIN — CARVEDILOL 6.25 MG: 3.12 TABLET, FILM COATED ORAL at 17:21

## 2019-02-28 RX ADMIN — GABAPENTIN 600 MG: 300 CAPSULE ORAL at 09:22

## 2019-02-28 RX ADMIN — NYSTATIN: 100000 POWDER TOPICAL at 17:20

## 2019-02-28 RX ADMIN — FUROSEMIDE 40 MG: 40 TABLET ORAL at 05:15

## 2019-02-28 RX ADMIN — HEPARIN SODIUM 5000 UNITS: 5000 INJECTION INTRAVENOUS; SUBCUTANEOUS at 05:15

## 2019-02-28 RX ADMIN — GABAPENTIN 600 MG: 300 CAPSULE ORAL at 16:05

## 2019-02-28 RX ADMIN — OXYCODONE HYDROCHLORIDE 10 MG: 10 TABLET, FILM COATED, EXTENDED RELEASE ORAL at 21:22

## 2019-02-28 RX ADMIN — HEPARIN SODIUM 5000 UNITS: 5000 INJECTION INTRAVENOUS; SUBCUTANEOUS at 14:20

## 2019-02-28 RX ADMIN — ATORVASTATIN CALCIUM 20 MG: 10 TABLET, FILM COATED ORAL at 09:22

## 2019-02-28 RX ADMIN — DEXTROSE MONOHYDRATE 25 ML: 500 INJECTION PARENTERAL at 00:40

## 2019-02-28 RX ADMIN — TAMSULOSIN HYDROCHLORIDE 0.4 MG: 0.4 CAPSULE ORAL at 17:21

## 2019-02-28 RX ADMIN — NYSTATIN: 100000 POWDER TOPICAL at 09:22

## 2019-02-28 RX ADMIN — DEXTROSE MONOHYDRATE 25 ML: 25 INJECTION, SOLUTION INTRAVENOUS at 01:42

## 2019-02-28 RX ADMIN — OXYCODONE HYDROCHLORIDE 10 MG: 10 TABLET, FILM COATED, EXTENDED RELEASE ORAL at 09:22

## 2019-02-28 NOTE — ASSESSMENT & PLAN NOTE
BG 41 - Ems activated-  was given D10   Holding prior to admission Lantus 80 units q 12 hours  Accu-Cheks Q2 hours  Insulin sliding scale algorithm 1 for now  D 10 normal saline at 100 mL an hour   Admit to med surge  Hypoglycemic protocol in place

## 2019-02-28 NOTE — SOCIAL WORK
Met with pt to discuss role as  in helping pt to develop discharge plan and to help pt carry out their plan  Pt lives in a house with his wife  Pt has 10 [address] railing  Pt has everything set up on the first floor  Pt has a walker , cane and C Pap at home   Pt is independent with ADL"S  Pt uses the walker to ambulate  Pt does the cooking and cleaning  Pt does not drive , his wife drives   Pt has never had home care or any services in the home  Pt's PCP is Dr Manuela Ogden  [facility]

## 2019-02-28 NOTE — PLAN OF CARE
Problem: PAIN - ADULT  Goal: Verbalizes/displays adequate comfort level or baseline comfort level  Description  Interventions:  - Encourage patient to monitor pain and request assistance  - Assess pain using appropriate pain scale; 0-10 pain scale  - Administer analgesics based on type and severity of pain and evaluate response  - Implement non-pharmacological measures as appropriate and evaluate response  - Consider cultural and social influences on pain and pain management  - Notify physician/advanced practitioner if interventions unsuccessful or patient reports new pain   Outcome: Progressing     Problem: INFECTION - ADULT  Goal: Absence or prevention of progression during hospitalization  Description  INTERVENTIONS:  - Assess and monitor for signs and symptoms of infection  - Monitor lab/diagnostic results  - Monitor all insertion sites, i e  indwelling lines, tubes, and drains  - [place] appropriate cooling/warming therapies per order  - Administer medications as ordered  - Instruct and encourage patient and family to use good hand hygiene technique   Outcome: Progressing     Problem: SAFETY ADULT  Goal: Patient will remain free of falls  Description  INTERVENTIONS:  - Assess patient frequently for physical needs  -  Identify cognitive and physical deficits and behaviors that affect risk of falls  Ambulates with walker  -  [place] fall precautions as indicated by assessment   High fall risk   - Educate patient/family on patient safety including physical limitations  - Instruct patient to call for assistance with activity based on assessment  - Modify environment to reduce risk of injury  - Consider OT/PT consult to assist with strengthening/mobility   Outcome: Progressing  Goal: Maintain or return to baseline ADL function  Description  INTERVENTIONS:  -  Assess patient's ability to carry out ADLs; assess patient's baseline for ADL function and identify physical deficits which impact ability to perform ADLs  Needs assistance with ADLs  - Assess/evaluate cause of self-care deficits, body habitus, ambulates with walker  - Assess range of motion  - Assess patient's mobility; Contact guard assist with walker  - Assess patient's need for assistive devices and provide as appropriate; walker  - Encourage maximum independence but intervene and supervise when necessary  ¯ Involve family in performance of ADLs  ¯ Assess for home care needs following discharge   ¯ Request OT consult to assist with ADL evaluation and planning for discharge  ¯ Provide patient education as appropriate   Outcome: Progressing  Goal: Maintain or return mobility status to optimal level  Description  INTERVENTIONS:  - Assess patient's baseline mobility status Baseline ambulates with walker  - Identify cognitive and physical deficits and behaviors that affect mobility, body habitus and uses walker for mobility  - Identify mobility aids required to assist with transfers and/or ambulation  Walker and contact guard  - [place] fall precautions as indicated by assessment   High fall risk   - Record patient progress and toleration of activity level on Mobility SBAR; progress patient to next Phase/Stage  - Instruct patient to call for assistance with activity based on assessment  - Request Rehabilitation consult to assist with strengthening/weightbearing, etc    Outcome: Progressing     Problem: DISCHARGE PLANNING  Goal: Discharge to home or other facility with appropriate resources  Description  INTERVENTIONS:  - Identify barriers to discharge w/patient and caregiver  - Arrange for needed discharge resources and transportation as appropriate  - Identify discharge learning needs (meds, wound care, etc )  - Refer to Case Management Department for coordinating discharge planning if the patient needs post-hospital services based on physician/advanced practitioner order or complex needs related to functional status, cognitive ability, or social support system Outcome: Progressing     Problem: Knowledge Deficit  Goal: Patient/family/caregiver demonstrates understanding of disease process, treatment plan, medications, and discharge instructions  Description  Complete learning assessment and assess knowledge base  Interventions:  - Provide teaching at level of understanding  - Provide teaching via preferred learning methods  Outcome: Progressing     Problem: METABOLIC, FLUID AND ELECTROLYTES - ADULT  Goal: Electrolytes maintained within normal limits  Description  INTERVENTIONS:  - Monitor labs and assess patient for signs and symptoms of electrolyte imbalances  - Administer electrolyte replacement as ordered  - Monitor response to electrolyte replacements, including repeat lab results as appropriate  - Instruct patient on fluid and nutrition as appropriate  Outcome: Progressing  Goal: Fluid balance maintained  Description  INTERVENTIONS:  - Monitor labs and assess for signs and symptoms of volume excess or deficit  - Monitor I/O and WT  - Instruct patient on fluid and nutrition as appropriate  Outcome: Progressing  Goal: Glucose maintained within target range  Description  INTERVENTIONS:  - Monitor Blood Glucose as ordered  AC/HS and PRN  - Assess for signs and symptoms of hyperglycemia and hypoglycemia  - Administer ordered medications to maintain glucose within target range  - Assess nutritional intake and initiate nutrition service referral as needed  - Follow hypoglycemia protocol   Outcome: Progressing     Problem: Potential for Falls  Goal: Patient will remain free of falls  Description  INTERVENTIONS:  - Assess patient frequently for physical needs  -  Identify cognitive and physical deficits and behaviors that affect risk of falls  Ambulates with walker  -  [place] fall precautions as indicated by assessment   High fall risk   - Educate patient/family on patient safety including physical limitations  - Instruct patient to call for assistance with activity based on assessment  - Modify environment to reduce risk of injury  - Consider OT/PT consult to assist with strengthening/mobility   Outcome: Progressing

## 2019-02-28 NOTE — H&P
H&P- Mariaelena Mahmood 1945, 74 y o  male MRN: [id number]    Unit/Bed#: 414-01 Encounter: [id number]    Primary Care Provider: Zaria Woodard MD   Date and time admitted to hospital: 2/27/2019 11:39 PM        Type 2 diabetes mellitus with chronic kidney disease, with long-term current use of insulin [facility])  Assessment & Plan  Lab Results   Component Value Date    HGBA1C 14 9 (H) 10/04/2018       Recent Labs     02/27/19  2351 02/28/19  0014 02/28/19  0139 02/28/19  0346   POCGLU 54* 77 41* 94       Blood Sugar Average: Last 72 hrs:  (P) 66 5     Collect new A1c  Holding all prior to admission diabetic medications-see plan above for hypoglycemia    * Hypoglycemia due to insulin  Assessment & Plan  BG 41 - Ems activated-  was given D10   Holding prior to admission Lantus 80 units q 12 hours  Accu-Cheks Q2 hours  Insulin sliding scale algorithm 1 for now  D 10 normal saline at 100 mL an hour   Admit to med surge  Hypoglycemic protocol in place      Essential hypertension  Assessment & Plan  Blood pressure was slightly elevated in the emergency room  Monitor per protocol  Continue prior to admission medication  Admit to med surge    Diastolic congestive heart failure (HCC)  Assessment & Plan  Accurate I&Os  Daily weights  Cardiac diabetic diet  Admit to med surge  Continue prior to admission medications    Chronic kidney disease, stage III (moderate) (HCC)  Assessment & Plan  Current creatinine 1 31 baseline creatinine around 1 16  Trend BMP  Cautious use of nephrotoxic agents  Will continue prior to admission Lasix      Obesity, Class III, BMI 40-49 9 (morbid obesity) (HCC)  Assessment & Plan  Recommend weight loss a nutrition consult on outpatient basis                VTE Prophylaxis: Heparin  / sequential compression device   Code Status:  Full  POLST: POLST is not applicable to this patient    Anticipated Length of Stay:  Patient will be admitted on an Observation basis with an anticipated length of stay of  less than 2 midnights  Justification for Hospital Stay:  Hypoglycemia    Total Time for Visit, including Counseling / Coordination of Care: 45 minutes  Greater than 50% of this total time spent on direct patient counseling and coordination of care  Chief Complaint:   Offers no current complaints     History of Present Illness:    Grace Rodriguez is a 74 y o  male who presented to the emergency room for evaluation via EMS  Chart review indicates the patient's wife called EMS due to AMS  Low on the scene patient's blood sugar was 41 patient is a bolus of dextrose 10  Patient was brought to the emergency room was given an amp of dextrose  Patient and patient's wife isn't sure what caused the hypoglycemia event  Images and labs were collected by emergency room doctor-see below  Patient was started on and dextrose 10,  100 mL an hour and was admitted to [facility] with telemetry  Review of Systems:    Review of Systems   Constitutional: Positive for fatigue  Neurological: Positive for syncope and weakness  All other systems reviewed and are negative  Past Medical and Surgical History:     Past Medical History:   Diagnosis Date    Cataract     CHF (congestive heart failure) (HCC)     chronic diastolic CHF    Coronary artery disease     Diabetes mellitus ([facility] )     Glaucoma     Hyperlipidemia     Hypertension     Neuropathy     Obesity     Renal disorder     chronic kidney disease stage 3     Sleep apnea     Stroke ([facility] )     TIA (transient ischemic attack)     Uncontrolled type 2 diabetes mellitus with hyperglycemia, with long-term current use of insulin ([facility] ) 10/4/2018       Past Surgical History:   Procedure Laterality Date    APPENDECTOMY      CARPAL TUNNEL RELEASE      EYE SURGERY      cataracts       Meds/Allergies:    Prior to Admission medications    Medication Sig Start Date End Date Taking?  Authorizing Provider   aspirin 81 MG tablet Take 81 mg by mouth daily   Yes Historical Provider, MD   atorvastatin (LIPITOR) 20 mg tablet Take 20 mg by mouth daily   Yes Historical Provider, MD   BD PEN NEEDLE JUNIOR U/F 32G X 4 MM MISC  12/31/17  Yes Historical Provider, MD   Cholecalciferol (VITAMIN D-3) 5000 units TABS Take by mouth   Yes Historical Provider, MD   finasteride (PROSCAR) 5 mg tablet Take 5 mg by mouth daily   Yes Historical Provider, MD   furosemide (LASIX) 40 mg tablet Take 40 mg by mouth 1/15/18  Yes Historical Provider, MD   gabapentin (NEURONTIN) 600 MG tablet Take 600 mg by mouth 3 (three) times a day     Yes Historical Provider, MD   insulin glargine (LANTUS) 100 units/mL subcutaneous injection Inject 80 Units under the skin every 12 (twelve) hours 10/6/18  Yes David Angle MD   insulin lispro (HumaLOG) 100 units/mL injection Inject 50 Units under the skin 3 (three) times a day before meals 10/6/18  Yes David Angel MD   lisinopril (ZESTRIL) 5 mg tablet Take 1 tablet (5 mg total) by mouth daily 10/7/18  Yes David Angel MD   oxyCODONE (OxyCONTIN) 10 mg 12 hr tablet Take 10 mg by mouth every 12 (twelve) hours   Yes Historical Provider, MD   tamsulosin (FLOMAX) 0 4 mg Take 0 4 mg by mouth every evening     Yes Historical Provider, MD   CARVEDILOL PO Take 12 5 mg by mouth 2 (two) times a day      Historical Provider, MD   metFORMIN (GLUCOPHAGE) 500 mg tablet Take 1 tablet (500 mg total) by mouth 2 (two) times a day with meals  Patient not taking: Reported on 2/28/2019 10/6/18 2/28/19  David Angel MD   omeprazole (PriLOSEC) 40 MG capsule Take 1 capsule by mouth daily 4/16/15 2/28/19  Historical Provider, MD     I have reviewed home medications using allscripts      Allergies: No Known Allergies    Social History:     Marital Status: /Civil Union   Occupation:  Retired  Patient Pre-hospital Living Situation:  Independent with wife  Patient Pre-hospital Level of Mobility:  Independent  Patient Pre-hospital Diet Restrictions:  Diabetic/ cardiac  Substance Use History:   Social History     Substance and Sexual Activity   Alcohol Use No     Social History     Tobacco Use   Smoking Status Former Smoker    Types: Cigars   Smokeless Tobacco Former User     Social History     Substance and Sexual Activity   Drug Use No       Family History:    Family History   Problem Relation Age of Onset    No Known Problems Mother     No Known Problems Father        Physical Exam:     Vitals:   Blood Pressure: 159/80 (02/28/19 0300)  Pulse: 90 (02/28/19 0300)  Temperature: (!) 97 3 °F (36 3 °C) (02/27/19 2341)  Temp Source: Temporal (02/27/19 2341)  Respirations: 20 (02/28/19 0300)  Weight - Scale: 135 kg (298 lb) (02/27/19 2341)  SpO2: 92 % (02/28/19 0300)    Physical Exam   Constitutional: He is oriented to person, place, and time  He appears well-developed and well-nourished  No distress  HENT:   Head: Normocephalic and atraumatic  Eyes: Pupils are equal, round, and reactive to light  EOM are normal  Right eye exhibits no discharge  Left eye exhibits no discharge  No scleral icterus  Neck: Normal range of motion  Neck supple  No JVD present  No tracheal deviation present  No thyromegaly present  Cardiovascular: Normal rate, regular rhythm, normal heart sounds and intact distal pulses  Exam reveals no gallop and no friction rub  No murmur heard  Pulmonary/Chest: Effort normal and breath sounds normal  No stridor  No respiratory distress  He has no wheezes  Abdominal: Soft  Bowel sounds are normal  He exhibits no distension  There is no tenderness  There is no guarding  Musculoskeletal: He exhibits no edema, tenderness or deformity  Neurological: He is alert and oriented to person, place, and time  No cranial nerve deficit  Skin: Skin is warm and dry  Capillary refill takes more than 3 seconds  He is not diaphoretic  Psychiatric: He has a normal mood and affect   His behavior is normal  Judgment and thought content normal            Additional Data:     Lab Results: I have personally reviewed pertinent reports  Results from last 7 days   Lab Units 02/28/19  0034   WBC Thousand/uL 9 12   HEMOGLOBIN g/dL 12 3   HEMATOCRIT % 39 5   PLATELETS Thousands/uL 259   NEUTROS PCT % 75   LYMPHS PCT % 15   MONOS PCT % 7   EOS PCT % 3     Results from last 7 days   Lab Units 02/28/19  0034   POTASSIUM mmol/L 3 7   CHLORIDE mmol/L 103   CO2 mmol/L 31   BUN mg/dL 24   CREATININE mg/dL 1 31*   CALCIUM mg/dL 8 8   ALK PHOS U/L 129*   ALT U/L 21   AST U/L 19           Imaging: I have personally reviewed pertinent reports  Xr Chest 1 View Portable    Result Date: 2/28/2019  Narrative: CHEST INDICATION:   ams  COMPARISON:  10/3/2018 EXAM PERFORMED/VIEWS:  XR CHEST PORTABLE FINDINGS: Cardiomediastinal silhouette appears unremarkable  The lungs are clear  No pneumothorax or pleural effusion  Osseous structures appear within normal limits for patient age  Impression: No acute cardiopulmonary disease  Findings concur with the preliminary report by the referring clinician already in PACS and/or our electronic record EPIC  Workstation performed: [id number]       Allscripts / Epic Records Reviewed: Yes     ** Please Note: This note has been constructed using a voice recognition system   **

## 2019-02-28 NOTE — UTILIZATION REVIEW
Initial Clinical Review    Admission: Date/Time/Statement: 2/28/19 @ 1040   Start   Ordered   02/28/19 1041  Inpatient Admission Once     Transfer Service: General Medicine       Question Answer Comment   Admitting Physician Lazarus Bottcher    Level of Care Med Surg    Estimated length of stay More than 2 Midnights    Certification I certify that inpatient services are medically necessary for this patient for a duration of greater than two midnights  See H&P and MD Progress Notes for additional information about the patient's course of treatment  Start Status    02/28/19 1041 Completed Details       02/28/19 1040     Patient was observation on 2/28/2019 0234 and changed to inpatient on 2/28/2019  1041 due to the complexity of care and will be inpatient greater than 2mn  ED: Date/Time/Mode of Arrival:   ED Arrival Information     Expected Arrival Acuity Means of Arrival Escorted By Service Admission Type    - 2/27/2019 23:34 Urgent Ambulance [address] [facility] Ambulance General Medicine Urgent    Arrival Complaint    Low Blood Sugar        Chief Complaint:   Chief Complaint   Patient presents with    Hypoglycemia - Symptomatic     Patient was brought in by EMS after family called for AMS  Patient was found to have a BG of 41  Patient was given D10 and is nw A & O with a BG of 155  History of Illness: 74 yr old male comes to ed after wife called ems due to ams-- bs at 39 when came to ed    ED Vital Signs:   ED Triage Vitals [02/27/19 2341]   Temperature Pulse Respirations Blood Pressure SpO2   (!) 97 3 °F (36 3 °C) 93 20 161/71 93 %      Temp Source Heart Rate Source Patient Position - Orthostatic VS BP Location FiO2 (%)   Temporal Monitor Lying Left arm --      Pain Score       No Pain        Wt Readings from Last 1 Encounters:   02/28/19 131 kg (288 lb 12 8 oz)     Vital Signs (abnormal):   Pertinent Labs/Diagnostic Test Results:   ED Treatment:   Medication Administration from 02/27/2019 2334 to 02/28/2019 0689       Date/Time Order Dose Route Action Action by Comments     02/27/2019 2349 dextrose infusion 10 % bolus 0 mL Intravenous Given to EMS Dominik Becerril RN      02/28/2019 0040 dextrose 50 % IV solution 25 mL 25 mL Intravenous Given Kira Atkinson RN This dose was given at 2328 hours per last documentation  02/27/2019 2358 dextrose 50 % IV solution 25 mL 25 mL Intravenous Given Kira Atkinson RN Verbal orders from Dr Neal Livingston to give 25mL     02/28/2019 0142 dextrose 50 % IV solution 25 mL 25 mL Intravenous Given Benito Schaefer RN BG- 41     02/28/2019 0145 dextrose infusion 10 % 100 mL/hr Intravenous New Bag Benito Schaefer RN         Past Medical/Surgical History:    Active Ambulatory Problems     Diagnosis Date Noted    Dyspnea on exertion 12/27/2016    Type 2 diabetes mellitus with chronic kidney disease, with long-term current use of insulin ([facility] ) 12/27/2016    Chronic kidney disease, stage III (moderate) ([facility]) 36/07/0991    Diastolic congestive [facility] failure [facility] ) 12/27/2016    Obstructive sleep apnea syndrome 12/27/2016    Morbid obesity with BMI of 45 0-49 9, adult ([facility] ) 12/27/2016    Essential hypertension 12/27/2016    Type 2 diabetes mellitus with hyperglycemia, with long-term current use of insulin ([facility] ) 54/19/3097    Complicated UTI (urinary tract infection) 10/03/2018    Uncontrolled type 2 diabetes mellitus with hyperglycemia, with long-term current use of insulin ([facility] ) 10/04/2018    Dehydration with hyponatremia 10/04/2018     Resolved Ambulatory Problems     Diagnosis Date Noted    Acute on chronic renal failure ([facility] ) 12/27/2016    Hyperosmolar syndrome 10/04/2018    KIRT (acute kidney injury) ([facility] ) 10/04/2018    Hypokalemia 10/04/2018    Hypophosphatemia 10/04/2018     Past Medical History:   Diagnosis Date    Cataract     [facility]estive [facility] failure) ([facility])     Coronary artery disease     Diabetes mellitus ([facility]     Glaucoma     Hyperlipidemia     Hypertension  Neuropathy     Obesity     Renal disorder     Sleep apnea     [facility] )     TIA (transient ischemic attack)     Uncontrolled type 2 diabetes mellitus with hyperglycemia, with long-term current use of insulin ([facility] ) 10/4/2018     Admitting Diagnosis: Hypoglycemia [E16 2]  Hypoglycemia due to insulin [E16 0, T38 3X5A]  Age/Sex: 74 y o  male     Assessment/Plan: hypoglycemia 2nd to insult bg- 41- d10 at 100cc/hr    accuchecks q2  insulin scale algorithm   ckd- cr 1 31--base is 1 16--cautious us e of nephrotoxic agents    Admission Orders:  Scheduled Meds:   Current Facility-Administered Medications:  aspirin 81 mg Oral Daily Devorah Y Swank, CRNP    atorvastatin 20 mg Oral Daily Devorah Y Swank, CRNP    carvedilol 6 25 mg Oral BID Guillermo Mon,     dextrose 5 % and sodium chloride 0 9 % 75 mL/hr Intravenous Continuous Guillermo Mon DO Last Rate: 75 mL/hr (02/28/19 1203)   dextrose 25 mL Intravenous PRN Guillermo Mon DO    gabapentin 600 mg Oral TID Devorah Y Swank, CRNP    heparin (porcine) 7,500 Units Subcutaneous [facility] Guillermo Mon DO    lisinopril 5 mg Oral Daily Devorah Y Swank, CRNP    nystatin  Topical BID Guillermo Mon DO    oxyCODONE 10 mg Oral Q12H [facility] Henrietta Fernández, CRNP    tamsulosin 0 4 mg Oral QPM Devorah Y Swank, CRNP      Continuous Infusions:   dextrose 5 % and sodium chloride 0 9 % 75 mL/hr Last Rate: 75 mL/hr (02/28/19 1203)     PRN Meds: dextrose    Andrew cont diet    bs 153-143  Urine + nitrite  Mod bacteria

## 2019-02-28 NOTE — PLAN OF CARE
Problem: PAIN - ADULT  Goal: Verbalizes/displays adequate comfort level or baseline comfort level  Description  Interventions:  - Encourage patient to monitor pain and request assistance  - Assess pain using appropriate pain scale  - Administer analgesics based on type and severity of pain and evaluate response  - Implement non-pharmacological measures as appropriate and evaluate response  - Consider cultural and social influences on pain and pain management  - Notify physician/advanced practitioner if interventions unsuccessful or patient reports new pain  Outcome: Progressing     Problem: INFECTION - ADULT  Goal: Absence or prevention of progression during hospitalization  Description  INTERVENTIONS:  - Assess and monitor for signs and symptoms of infection  - Monitor lab/diagnostic results  - Monitor all insertion sites, i e  indwelling lines, tubes, and drains  - [place] appropriate cooling/warming therapies per order  - Administer medications as ordered  - Instruct and encourage patient and family to use good hand hygiene technique  - Identify and instruct in appropriate isolation precautions for identified infection/condition   Outcome: Progressing     Problem: SAFETY ADULT  Goal: Patient will remain free of falls  Description  INTERVENTIONS:  - Assess patient frequently for physical needs  -  Identify cognitive and physical deficits and behaviors that affect risk of falls    -  [place] fall precautions as indicated by assessment   - Educate patient/family on patient safety including physical limitations  - Instruct patient to call for assistance with activity based on assessment  - Modify environment to reduce risk of injury  - Consider OT/PT consult to assist with strengthening/mobility  Outcome: Progressing  Goal: Maintain or return to baseline ADL function  Description  INTERVENTIONS:  -  Assess patient's ability to carry out ADLs; assess patient's baseline for ADL function and identify physical deficits which impact ability to perform ADLs (bathing, care of mouth/teeth, toileting, grooming, dressing, etc )  - Assess/evaluate cause of self-care deficits   - Assess range of motion  - Assess patient's mobility; develop plan if impaired  - Assess patient's need for assistive devices and provide as appropriate  - Encourage maximum independence but intervene and supervise when necessary  ¯ Involve family in performance of ADLs  ¯ Assess for home care needs following discharge   ¯ Request OT consult to assist with ADL evaluation and planning for discharge  ¯ Provide patient education as appropriate  Outcome: Progressing  Goal: Maintain or return mobility status to optimal level  Description  INTERVENTIONS:  - Assess patient's baseline mobility status (ambulation, transfers, stairs, etc )    - Identify cognitive and physical deficits and behaviors that affect mobility  - Identify mobility aids required to assist with transfers and/or ambulation (gait belt, sit-to-stand, lift, walker, cane, etc )  - [place] fall precautions as indicated by assessment  - Record patient progress and toleration of activity level on Mobility SBAR; progress patient to next Phase/Stage  - Instruct patient to call for assistance with activity based on assessment  - Request Rehabilitation consult to assist with strengthening/weightbearing, etc   Outcome: Progressing     Problem: DISCHARGE PLANNING  Goal: Discharge to home or other facility with appropriate resources  Description  INTERVENTIONS:  - Identify barriers to discharge w/patient and caregiver  - Arrange for needed discharge resources and transportation as appropriate  - Identify discharge learning needs (meds, wound care, etc )  - Arrange for interpretive services to assist at discharge as needed  - Refer to Case Management Department for coordinating discharge planning if the patient needs post-hospital services based on physician/advanced practitioner order or complex needs related to functional status, cognitive ability, or social support system  Outcome: Progressing     Problem: Knowledge Deficit  Goal: Patient/family/caregiver demonstrates understanding of disease process, treatment plan, medications, and discharge instructions  Description  Complete learning assessment and assess knowledge base    Interventions:  - Provide teaching at level of understanding  - Provide teaching via preferred learning methods  Outcome: Progressing     Problem: METABOLIC, FLUID AND ELECTROLYTES - ADULT  Goal: Electrolytes maintained within normal limits  Description  INTERVENTIONS:  - Monitor labs and assess patient for signs and symptoms of electrolyte imbalances  - Administer electrolyte replacement as ordered  - Monitor response to electrolyte replacements, including repeat lab results as appropriate  - Instruct patient on fluid and nutrition as appropriate  Outcome: Progressing  Goal: Fluid balance maintained  Description  INTERVENTIONS:  - Monitor labs and assess for signs and symptoms of volume excess or deficit  - Monitor I/O and WT  - Instruct patient on fluid and nutrition as appropriate  Outcome: Progressing  Goal: Glucose maintained within target range  Description  INTERVENTIONS:  - Monitor Blood Glucose as ordered  - Assess for signs and symptoms of hyperglycemia and hypoglycemia  - Administer ordered medications to maintain glucose within target range  - Assess nutritional intake and initiate nutrition service referral as needed  Outcome: Progressing

## 2019-02-28 NOTE — ASSESSMENT & PLAN NOTE
Current creatinine 1 31 baseline creatinine around 1 16  Trend BMP  Cautious use of nephrotoxic agents  Will continue prior to admission Lasix

## 2019-02-28 NOTE — ED PROCEDURE NOTE
PROCEDURE  ECG 12 Lead Documentation  Date/Time: 2/28/2019 12:24 AM  Performed by: Armand Wilson MD  Authorized by: Armand Wilson MD     Indications / Diagnosis:  Sob  ECG reviewed by me, the ED Provider: yes    Patient location:  ED  Previous ECG:     Comparison to cardiac monitor: Yes    Interpretation:     Interpretation: abnormal    Rate:     ECG rate:  90    ECG rate assessment: normal    Rhythm:     Rhythm: sinus rhythm    Ectopy:     Ectopy: none    QRS:     QRS axis:  Left    QRS intervals:   Wide  Conduction:     Conduction: abnormal      Abnormal conduction: bifascicular block    ST segments:     ST segments:  Normal  T waves:     T waves: normal           Armand Wilson MD  02/28/19 2491

## 2019-02-28 NOTE — ASSESSMENT & PLAN NOTE
Lab Results   Component Value Date    HGBA1C 14 9 (H) 10/04/2018       Recent Labs     02/27/19  2351 02/28/19  0014 02/28/19  0139 02/28/19  0346   POCGLU 54* 77 41* 94       Blood Sugar Average: Last 72 hrs:  (P) 66 5     Collect new A1c  Holding all prior to admission diabetic medications-see plan above for hypoglycemia

## 2019-02-28 NOTE — ASSESSMENT & PLAN NOTE
Blood pressure was slightly elevated in the emergency room  Monitor per protocol  Continue prior to admission medication  Admit to med surge

## 2019-02-28 NOTE — ASSESSMENT & PLAN NOTE
Accurate I&Os  Daily weights  Cardiac diabetic diet  Admit to med surge  Continue prior to admission medications

## 2019-03-01 ENCOUNTER — APPOINTMENT (INPATIENT)
Dept: RADIOLOGY | Facility: HOSPITAL | Age: 74
DRG: 638 | End: 2019-03-01
Payer: COMMERCIAL

## 2019-03-01 ENCOUNTER — APPOINTMENT (INPATIENT)
Dept: ULTRASOUND IMAGING | Facility: HOSPITAL | Age: 74
DRG: 638 | End: 2019-03-01
Payer: COMMERCIAL

## 2019-03-01 ENCOUNTER — APPOINTMENT (INPATIENT)
Dept: MRI IMAGING | Facility: HOSPITAL | Age: 74
DRG: 638 | End: 2019-03-01
Payer: COMMERCIAL

## 2019-03-01 ENCOUNTER — APPOINTMENT (INPATIENT)
Dept: NON INVASIVE DIAGNOSTICS | Facility: HOSPITAL | Age: 74
DRG: 638 | End: 2019-03-01
Payer: COMMERCIAL

## 2019-03-01 ENCOUNTER — APPOINTMENT (INPATIENT)
Dept: CT IMAGING | Facility: HOSPITAL | Age: 74
DRG: 638 | End: 2019-03-01
Payer: COMMERCIAL

## 2019-03-01 PROBLEM — R20.2 PARESTHESIA OF LEFT UPPER EXTREMITY: Status: ACTIVE | Noted: 2019-03-01

## 2019-03-01 LAB
ALBUMIN SERPL BCP-MCNC: 2.9 G/DL (ref 3.5–5)
ALP SERPL-CCNC: 121 U/L (ref 46–116)
ALT SERPL W P-5'-P-CCNC: 23 U/L (ref 12–78)
ANION GAP SERPL CALCULATED.3IONS-SCNC: 6 MMOL/L (ref 4–13)
AST SERPL W P-5'-P-CCNC: 16 U/L (ref 5–45)
BASOPHILS # BLD AUTO: 0.04 THOUSANDS/ΜL (ref 0–0.1)
BASOPHILS NFR BLD AUTO: 1 % (ref 0–1)
BILIRUB SERPL-MCNC: 0.3 MG/DL (ref 0.2–1)
BUN SERPL-MCNC: 19 MG/DL (ref 5–25)
CALCIUM SERPL-MCNC: 8.7 MG/DL (ref 8.3–10.1)
CHLORIDE SERPL-SCNC: 107 MMOL/L (ref 100–108)
CO2 SERPL-SCNC: 29 MMOL/L (ref 21–32)
CREAT SERPL-MCNC: 1.22 MG/DL (ref 0.6–1.3)
EOSINOPHIL # BLD AUTO: 0.36 THOUSAND/ΜL (ref 0–0.61)
EOSINOPHIL NFR BLD AUTO: 4 % (ref 0–6)
ERYTHROCYTE [DISTWIDTH] IN BLOOD BY AUTOMATED COUNT: 14.4 % (ref 11.6–15.1)
EST. AVERAGE GLUCOSE BLD GHB EST-MCNC: 128 MG/DL
GFR SERPL CREATININE-BSD FRML MDRD: 58 ML/MIN/1.73SQ M
GLUCOSE SERPL-MCNC: 126 MG/DL (ref 65–140)
GLUCOSE SERPL-MCNC: 131 MG/DL (ref 65–140)
GLUCOSE SERPL-MCNC: 132 MG/DL (ref 65–140)
GLUCOSE SERPL-MCNC: 135 MG/DL (ref 65–140)
GLUCOSE SERPL-MCNC: 142 MG/DL (ref 65–140)
GLUCOSE SERPL-MCNC: 173 MG/DL (ref 65–140)
HBA1C MFR BLD: 6.1 % (ref 4.2–6.3)
HCT VFR BLD AUTO: 39.1 % (ref 36.5–49.3)
HGB BLD-MCNC: 12.2 G/DL (ref 12–17)
IMM GRANULOCYTES # BLD AUTO: 0.04 THOUSAND/UL (ref 0–0.2)
IMM GRANULOCYTES NFR BLD AUTO: 1 % (ref 0–2)
LACTATE SERPL-SCNC: 1.2 MMOL/L (ref 0.5–2)
LYMPHOCYTES # BLD AUTO: 2.21 THOUSANDS/ΜL (ref 0.6–4.47)
LYMPHOCYTES NFR BLD AUTO: 27 % (ref 14–44)
MAGNESIUM SERPL-MCNC: 1.6 MG/DL (ref 1.6–2.6)
MCH RBC QN AUTO: 27.7 PG (ref 26.8–34.3)
MCHC RBC AUTO-ENTMCNC: 31.2 G/DL (ref 31.4–37.4)
MCV RBC AUTO: 89 FL (ref 82–98)
MONOCYTES # BLD AUTO: 0.62 THOUSAND/ΜL (ref 0.17–1.22)
MONOCYTES NFR BLD AUTO: 8 % (ref 4–12)
NEUTROPHILS # BLD AUTO: 5.05 THOUSANDS/ΜL (ref 1.85–7.62)
NEUTS SEG NFR BLD AUTO: 59 % (ref 43–75)
NRBC BLD AUTO-RTO: 0 /100 WBCS
PHOSPHATE SERPL-MCNC: 2.6 MG/DL (ref 2.3–4.1)
PLATELET # BLD AUTO: 252 THOUSANDS/UL (ref 149–390)
PMV BLD AUTO: 9.7 FL (ref 8.9–12.7)
POTASSIUM SERPL-SCNC: 3.4 MMOL/L (ref 3.5–5.3)
PROCALCITONIN SERPL-MCNC: 0.06 NG/ML
PROT SERPL-MCNC: 7 G/DL (ref 6.4–8.2)
RBC # BLD AUTO: 4.4 MILLION/UL (ref 3.88–5.62)
SODIUM SERPL-SCNC: 142 MMOL/L (ref 136–145)
TSH SERPL DL<=0.05 MIU/L-ACNC: 2.47 UIU/ML (ref 0.36–3.74)
WBC # BLD AUTO: 8.32 THOUSAND/UL (ref 4.31–10.16)

## 2019-03-01 PROCEDURE — 85025 COMPLETE CBC W/AUTO DIFF WBC: CPT | Performed by: INTERNAL MEDICINE

## 2019-03-01 PROCEDURE — 93306 TTE W/DOPPLER COMPLETE: CPT | Performed by: INTERNAL MEDICINE

## 2019-03-01 PROCEDURE — 82948 REAGENT STRIP/BLOOD GLUCOSE: CPT

## 2019-03-01 PROCEDURE — 93880 EXTRACRANIAL BILAT STUDY: CPT

## 2019-03-01 PROCEDURE — 72040 X-RAY EXAM NECK SPINE 2-3 VW: CPT

## 2019-03-01 PROCEDURE — 93306 TTE W/DOPPLER COMPLETE: CPT

## 2019-03-01 PROCEDURE — 87077 CULTURE AEROBIC IDENTIFY: CPT | Performed by: INTERNAL MEDICINE

## 2019-03-01 PROCEDURE — 99232 SBSQ HOSP IP/OBS MODERATE 35: CPT | Performed by: INTERNAL MEDICINE

## 2019-03-01 PROCEDURE — 84145 PROCALCITONIN (PCT): CPT | Performed by: INTERNAL MEDICINE

## 2019-03-01 PROCEDURE — 83605 ASSAY OF LACTIC ACID: CPT | Performed by: INTERNAL MEDICINE

## 2019-03-01 PROCEDURE — 97535 SELF CARE MNGMENT TRAINING: CPT

## 2019-03-01 PROCEDURE — 80053 COMPREHEN METABOLIC PANEL: CPT | Performed by: INTERNAL MEDICINE

## 2019-03-01 PROCEDURE — G8988 SELF CARE GOAL STATUS: HCPCS

## 2019-03-01 PROCEDURE — 97163 PT EVAL HIGH COMPLEX 45 MIN: CPT | Performed by: PHYSICAL THERAPIST

## 2019-03-01 PROCEDURE — 72125 CT NECK SPINE W/O DYE: CPT

## 2019-03-01 PROCEDURE — 97116 GAIT TRAINING THERAPY: CPT | Performed by: PHYSICAL THERAPIST

## 2019-03-01 PROCEDURE — G8987 SELF CARE CURRENT STATUS: HCPCS

## 2019-03-01 PROCEDURE — 70450 CT HEAD/BRAIN W/O DYE: CPT

## 2019-03-01 PROCEDURE — G8979 MOBILITY GOAL STATUS: HCPCS | Performed by: PHYSICAL THERAPIST

## 2019-03-01 PROCEDURE — 84443 ASSAY THYROID STIM HORMONE: CPT | Performed by: STUDENT IN AN ORGANIZED HEALTH CARE EDUCATION/TRAINING PROGRAM

## 2019-03-01 PROCEDURE — 83735 ASSAY OF MAGNESIUM: CPT | Performed by: INTERNAL MEDICINE

## 2019-03-01 PROCEDURE — 87186 SC STD MICRODIL/AGAR DIL: CPT | Performed by: INTERNAL MEDICINE

## 2019-03-01 PROCEDURE — 84100 ASSAY OF PHOSPHORUS: CPT | Performed by: INTERNAL MEDICINE

## 2019-03-01 PROCEDURE — G8978 MOBILITY CURRENT STATUS: HCPCS | Performed by: PHYSICAL THERAPIST

## 2019-03-01 PROCEDURE — 83036 HEMOGLOBIN GLYCOSYLATED A1C: CPT | Performed by: INTERNAL MEDICINE

## 2019-03-01 PROCEDURE — 97167 OT EVAL HIGH COMPLEX 60 MIN: CPT

## 2019-03-01 PROCEDURE — 99232 SBSQ HOSP IP/OBS MODERATE 35: CPT | Performed by: ORTHOPAEDIC SURGERY

## 2019-03-01 PROCEDURE — 87086 URINE CULTURE/COLONY COUNT: CPT | Performed by: INTERNAL MEDICINE

## 2019-03-01 PROCEDURE — 73030 X-RAY EXAM OF SHOULDER: CPT

## 2019-03-01 RX ORDER — MAGNESIUM SULFATE HEPTAHYDRATE 40 MG/ML
2 INJECTION, SOLUTION INTRAVENOUS ONCE
Status: COMPLETED | OUTPATIENT
Start: 2019-03-01 | End: 2019-03-01

## 2019-03-01 RX ORDER — LORAZEPAM 2 MG/ML
0.5 INJECTION INTRAMUSCULAR ONCE
Status: COMPLETED | OUTPATIENT
Start: 2019-03-01 | End: 2019-03-01

## 2019-03-01 RX ORDER — POTASSIUM CHLORIDE 20 MEQ/1
40 TABLET, EXTENDED RELEASE ORAL 2 TIMES DAILY WITH MEALS
Status: COMPLETED | OUTPATIENT
Start: 2019-03-01 | End: 2019-03-01

## 2019-03-01 RX ADMIN — ASPIRIN 81 MG 81 MG: 81 TABLET ORAL at 08:23

## 2019-03-01 RX ADMIN — MAGNESIUM SULFATE HEPTAHYDRATE 2 G: 40 INJECTION, SOLUTION INTRAVENOUS at 10:25

## 2019-03-01 RX ADMIN — POTASSIUM CHLORIDE 40 MEQ: 1500 TABLET, EXTENDED RELEASE ORAL at 10:25

## 2019-03-01 RX ADMIN — LISINOPRIL 5 MG: 5 TABLET ORAL at 08:23

## 2019-03-01 RX ADMIN — OXYCODONE HYDROCHLORIDE 10 MG: 10 TABLET, FILM COATED, EXTENDED RELEASE ORAL at 08:23

## 2019-03-01 RX ADMIN — NYSTATIN 1 APPLICATION: 100000 POWDER TOPICAL at 20:02

## 2019-03-01 RX ADMIN — NYSTATIN: 100000 POWDER TOPICAL at 09:18

## 2019-03-01 RX ADMIN — TAMSULOSIN HYDROCHLORIDE 0.4 MG: 0.4 CAPSULE ORAL at 17:10

## 2019-03-01 RX ADMIN — GABAPENTIN 600 MG: 300 CAPSULE ORAL at 20:02

## 2019-03-01 RX ADMIN — OXYCODONE HYDROCHLORIDE 10 MG: 10 TABLET, FILM COATED, EXTENDED RELEASE ORAL at 20:02

## 2019-03-01 RX ADMIN — POTASSIUM CHLORIDE 40 MEQ: 1500 TABLET, EXTENDED RELEASE ORAL at 15:53

## 2019-03-01 RX ADMIN — HEPARIN SODIUM 7500 UNITS: 5000 INJECTION INTRAVENOUS; SUBCUTANEOUS at 05:05

## 2019-03-01 RX ADMIN — GABAPENTIN 600 MG: 300 CAPSULE ORAL at 15:53

## 2019-03-01 RX ADMIN — HEPARIN SODIUM 7500 UNITS: 5000 INJECTION INTRAVENOUS; SUBCUTANEOUS at 13:43

## 2019-03-01 RX ADMIN — CARVEDILOL 6.25 MG: 3.12 TABLET, FILM COATED ORAL at 17:10

## 2019-03-01 RX ADMIN — HEPARIN SODIUM 7500 UNITS: 5000 INJECTION INTRAVENOUS; SUBCUTANEOUS at 21:31

## 2019-03-01 RX ADMIN — GABAPENTIN 600 MG: 300 CAPSULE ORAL at 08:22

## 2019-03-01 RX ADMIN — LORAZEPAM 0.5 MG: 2 INJECTION, SOLUTION INTRAMUSCULAR; INTRAVENOUS at 11:22

## 2019-03-01 RX ADMIN — ATORVASTATIN CALCIUM 20 MG: 10 TABLET, FILM COATED ORAL at 08:22

## 2019-03-01 RX ADMIN — DEXTROSE AND SODIUM CHLORIDE 75 ML/HR: 5; .9 INJECTION, SOLUTION INTRAVENOUS at 01:05

## 2019-03-01 RX ADMIN — CARVEDILOL 6.25 MG: 3.12 TABLET, FILM COATED ORAL at 08:22

## 2019-03-01 NOTE — ASSESSMENT & PLAN NOTE
Patient c/o of left hand and forearm numbness and tingling sensation  - DDx: carpal tunnel syndrome, diabetic neuropathy, hypothyroidism, stroke?  - ordered TSH

## 2019-03-01 NOTE — PHYSICAL THERAPY NOTE
Physical Therapy Evaluation    Patient Name: Won Warren    Today's Date: 3/1/2019     Problem List  Patient Active Problem List   Diagnosis    Dyspnea on exertion    Type 2 diabetes mellitus with chronic kidney disease, with long-term [facility]    Chronic [facility] disease, stage III (moderate) ([facility]    Diastolic congest[facility] failure [facility]    Obstructive sleep apnea syndrome    Morbid obesity with BMI of 45 0-49 9, adult ([facility] )    Essential hypertension    Type 2 diabetes mellitus with hyperglycemia, with long-term current use of insulin ([facility])    Complicated UTI (urinary tract infection)    Uncontrolled type 2 diabetes mellitus with hyperglycemia, with long-term current use of insulin ([facility])    Dehydration with hyponatremia    Hypoglycemia due to insulin    Pyuria    Opiate dependence, continuous ([facility])    Paresthesia of left upper extremity        Past Medical History  Past Medical History:   Diagnosis Date    Cataract     CHF (congestive heart failure) ([facility])     chronic diastolic CHF    Coronary artery disease     Diabetes mellitus ([facility]     Glaucoma     Hyperlipidemia     Hypertension     Neuropathy     Obesity     Renal disorder     chronic kidney disease stage 3     Sleep apnea     Stroke [facility]     TIA (transient ischemic attack)     Uncontrolled type 2 diabetes mellitus with hyperglycemia, with long-term current use of insulin ([facility] ) 10/4/2018        Past Surgical History  Past Surgical History:   Procedure Laterality Date    APPENDECTOMY      CARPAL TUNNEL RELEASE      EYE SURGERY      cataracts           03/01/19 1342   Note Type   Note type Eval/Treat   Pain Assessment   Pain Score 9   Pain Type Chronic pain   Pain Location Leg   Pain Orientation Bilateral   Home Living   Type of Home House   Home Layout Two level;Bed/bath upstairs; Able to live on main level with bedroom/bathroom;1/2 bath on main level;Stairs to enter with rails  ([address] with HR, 1/2 1st floor)   Bathroom Shower/Tub   (shower is upstairs but pt stays on 1st)   [address] bars around toilet   Bathroom Accessibility Accessible   Home Equipment Walker;Grab bars; Reacher;Long-handled shoehorn   Prior Function   Level of [place] Needs assistance with ADLs and functional mobility  ((I) ambulation with RW)   Lives With Spouse;Son  (son lives upstairs and other son lives next door)   [place] [address] Help From Family   ADL Assistance Needs assistance  (spouse assists with lower body dressing, sponge bathing)   IADLs Needs assistance  (spouse or son's perform IADL's)   Comments spouse drives   Restrictions/Precautions   Weight Bearing Precautions Per Order No   Other Precautions Chair Alarm;Telemetry; Fall Risk;Pain   General   Family/Caregiver Present No   Cognition   Arousal/Participation Alert   Orientation Level Oriented X4   Following Commands Follows all commands and directions without difficulty   RLE Assessment   RLE Assessment WFL  (4 to 4+/5 throughout)   LLE Assessment   LLE Assessment WFL  (4 to 4+/5)   Coordination   Sensation X   Light Touch   RLE Light Touch Impaired   RLE Light Touch Comments decreased to light touch R foot   LLE Light Touch Grossly intact   Bed Mobility   Additional Comments pt seated in chair when PT entered  Pt returned to sitting in w/c for transport to CT scan   Transfers   Sit to Stand 5  Supervision   Additional items Armrests; Verbal cues; Increased time required  (with RW)   Stand to Sit 5  Supervision   Additional items Increased time required;Verbal cues  (with RW)   Stand pivot 5  Supervision   Additional items Verbal cues; Increased time required  (with RW)   Toilet transfer   (see OT note for toileting)   Additional Comments pt with fair to good stability during sit to stand transfers with RW   Pt with decreased gait speed and forward flexed posture with increased leg pain limiting ambulation  Pt requiring one standing rest break after 50ft ambulation due to fatigue and pain  Ambulation/Elevation   Gait pattern Forward Flexion;Decreased foot clearance; Short stride; Excessively slow   Gait Assistance 5  Supervision   Assistive Device Rolling walker   Distance 100ft with standing rest break after 50ft (S)  Pt limited by increased LE pain to 9/10 but no LOB   Balance   Static Sitting Good   Dynamic Sitting Good   Static Standing Fair +  (with RW)   Dynamic Standing Fair  (with RW)   Ambulatory Fair  (with RW)   Endurance Deficit   Endurance Deficit Yes   Endurance Deficit Description limited ambulation and activity tolerance due to pain and fatigue   Activity Tolerance   Activity Tolerance Patient limited by fatigue;Patient limited by pain   Assessment   Prognosis Good   Problem List Decreased strength;Decreased endurance; Impaired balance;Decreased mobility;Pain; Impaired sensation   Assessment Pt is a 74year old male with complex PMH as listed above contributing to current condition presenting to [facility] due to hypoglycemia due to insulin and now with increased UE paresthesia and numbness  Pt seen for high complexity PT evaluation to assess current mobility level and to assist with discharge planning  Pt presents with increased LE pain decreased LE strength balance endurance and mobility  Pt performing all transfers and ambulation at a (S) level with use of RW with fair to good safety awareness  Ambulation limited to 100ft with rest break after 50ft due to pain  Pt with forward flexed posture and decreased gait speed increasing risk for falls  Pt would benefit from continued activity in PT to improve pain strength balance endurance mobility transfers ambulation and stair negotiation with return to (I) LOF  Anticipate pt will be safe to return home with home care services     Goals   Patient Goals To return home   STG Expiration Date 03/08/19   Short Term Goal #1 Improve bilateral LE strength to 5/5 to allow pt to perform all bed mobility and transfers with RW independently   Short Term Goal #2 Improve standing and ambulatory balance to fair+ with RW to improve ambulation to 250ft with RW modified Independent no LOB and to improve stair negotiation to 11 steps with HR (S) no LOB   Plan   Treatment/Interventions Functional transfer training;LE strengthening/ROM; Therapeutic exercise; Endurance training;Patient/family training;Bed mobility;Gait training;Elevations   PT Frequency   (3-5x/wk)   Recommendation   Recommendation Home PT   PT - OK to Discharge Yes  (when medically stable for discharge)

## 2019-03-01 NOTE — PROGRESS NOTES
Progress Note - Vivi Goff 1945, 74 y o  male MRN: [id number]    Unit/Bed#: 414-01 Encounter: [id number]    Primary Care Provider: Alona Harrington MD   Date and time admitted to hospital: 2/27/2019 11:39 PM        * Hypoglycemia due to insulin  Assessment & Plan  BG 41 on admission - EMS was activated and patient was given D10   - Holding prior to admission Lantus 80 units q 12 hours  - Accu-Cheks Q2 hours  - D 10 normal saline at 100 mL an hour, transitioned to D5 NS @ 75 cc/hr, fluid discontinued this morning (3/1)  - Admit to med surge  - Hypoglycemic protocol in place            Type 2 diabetes mellitus with chronic kidney disease, with long-term current use of insulin [facility])  Assessment & Plan  Lab Results   Component Value Date    HGBA1C 5 7 02/28/2019       Recent Labs     02/28/19  1604 02/28/19  2119 03/01/19  0205 03/01/19  0507   POCGLU 176* 176* 131 126       Blood Sugar Average: Last 72 hrs:  (P) 116 [id number]     Repeat HbA1c of 5 7 on 2/28/19  Holding all prior to admission diabetic medications-see plan above for hypoglycemia    Diastolic congestive heart failure (HCC)  Assessment & Plan  - Accurate I&Os  - Daily weights  - Cardiac diabetic diet  - Admit to med surge  - Continue home medication Carvedilol 6 25 mg PO BID    Chronic kidney disease, stage III (moderate) ([facility])  Assessment & Plan  Creatinine 1 31 on admission   Baseline creatinine around 1 16  - Trend BMP  - Cautious use of nephrotoxic agents  - Continue home medication Lasix 40 mg PO daily  - Cr trending down, 1 22 today      Morbid obesity with BMI of 45 0-49 9, adult ([facility])  Assessment & Plan  Recommend weight loss a nutrition consult on outpatient basis    Essential hypertension  Assessment & Plan  Blood pressure was slightly elevated in the emergency room  Monitor per protocol  Continue prior to admission medication      Paresthesia of left upper extremity  Assessment & Plan  Patient c/o of left hand and forearm numbness and tingling sensation  - DDx: carpal tunnel syndrome, diabetic neuropathy, hypothyroidism, stroke?  - ordered TSH     Pyuria  Assessment & Plan  UA revealed positive nitrite and moderate bacteria on urine microscopy  Patient denied any symptoms of dysuria or increased urinary frequency  - Follow up urine culture    Opiate dependence, continuous ([address] )  Assessment & Plan  Patient uses oxycodone 10 mg Q12H  - follow up with outpatient pain management         Subjective:   Patient seen and examined at bedside  Patient has no acute event overnight  Patient denied any abdominal pain, N/V, dizziness  Patient reports left hand numbness and tingling sensation  D5 fluid was discontinued this morning  Patient remains afebrile and hemodynamically stable  Objective:     Vitals: Blood pressure 153/72, pulse 85, temperature 97 9 °F (36 6 °C), temperature source Temporal, resp  rate 18, height 5' 6" (1 676 m), weight 128 kg (282 lb 11 2 oz), SpO2 92 %  ,Body mass index is 45 63 kg/m²  Wt Readings from Last 3 Encounters:   03/01/19 128 kg (282 lb 11 2 oz)   10/06/18 133 kg (292 lb 15 9 oz)   12/04/17 (!) 142 kg (312 lb)       Intake/Output Summary (Last 24 hours) at 3/1/2019 1052  Last data filed at 3/1/2019 0105  Gross per 24 hour   Intake 1217 5 ml   Output --   Net 1217 5 ml       Physical Exam:     Physical Exam   Constitutional: He is oriented to person, place, and time  No distress  HENT:   Head: Normocephalic  Eyes: Pupils are equal, round, and reactive to light  Right eye exhibits no discharge  Left eye exhibits no discharge  Neck: Normal range of motion  Cardiovascular: Normal rate, regular rhythm and normal heart sounds  No murmur heard  Pulmonary/Chest: Effort normal and breath sounds normal  No respiratory distress  He has no wheezes  He exhibits no tenderness  Abdominal: Soft  Bowel sounds are normal  He exhibits no distension  There is no tenderness     Obese abdomen   Musculoskeletal: Normal range of motion  He exhibits tenderness  Venous stasis on bilateral lower extremities  Mild tenderness on palpation of right lower leg  Sensation intact in bilateral lower extremities  Neurological: He is alert and oriented to person, place, and time  Skin: Skin is warm  He is not diaphoretic  Psychiatric: He has a normal mood and affect         Recent Results (from the past 24 hour(s))   Fingerstick Glucose (POCT)    Collection Time: 02/28/19 11:35 AM   Result Value Ref Range    POC Glucose 153 (H) 65 - 140 mg/dl   Fingerstick Glucose (POCT)    Collection Time: 02/28/19  4:04 PM   Result Value Ref Range    POC Glucose 176 (H) 65 - 140 mg/dl   Fingerstick Glucose (POCT)    Collection Time: 02/28/19  9:19 PM   Result Value Ref Range    POC Glucose 176 (H) 65 - 140 mg/dl   Fingerstick Glucose (POCT)    Collection Time: 03/01/19  2:05 AM   Result Value Ref Range    POC Glucose 131 65 - 140 mg/dl   Fingerstick Glucose (POCT)    Collection Time: 03/01/19  5:07 AM   Result Value Ref Range    POC Glucose 126 65 - 140 mg/dl   CBC and differential    Collection Time: 03/01/19  5:23 AM   Result Value Ref Range    WBC 8 32 4 31 - 10 16 Thousand/uL    RBC 4 40 3 88 - 5 62 Million/uL    Hemoglobin 12 2 12 0 - 17 0 g/dL    Hematocrit 39 1 36 5 - 49 3 %    MCV 89 82 - 98 fL    MCH 27 7 26 8 - 34 3 pg    MCHC 31 2 (L) 31 4 - 37 4 g/dL    RDW 14 4 11 6 - 15 1 %    MPV 9 7 8 9 - 12 7 fL    Platelets 859 990 - 368 Thousands/uL    nRBC 0 /100 WBCs    Neutrophils Relative 59 43 - 75 %    Immat GRANS % 1 0 - 2 %    Lymphocytes Relative 27 14 - 44 %    Monocytes Relative 8 4 - 12 %    Eosinophils Relative 4 0 - 6 %    Basophils Relative 1 0 - 1 %    Neutrophils Absolute 5 05 1 85 - 7 62 Thousands/µL    Immature Grans Absolute 0 04 0 00 - 0 20 Thousand/uL    Lymphocytes Absolute 2 21 0 60 - 4 47 Thousands/µL    Monocytes Absolute 0 62 0 17 - 1 22 Thousand/µL    Eosinophils Absolute 0 36 0 00 - 0 61 Thousand/µL    Basophils Absolute 0 04 0 00 - 0 10 Thousands/µL   Comprehensive metabolic panel    Collection Time: 03/01/19  5:23 AM   Result Value Ref Range    Sodium 142 136 - 145 mmol/L    Potassium 3 4 (L) 3 5 - 5 3 mmol/L    Chloride 107 100 - 108 mmol/L    CO2 29 21 - 32 mmol/L    ANION GAP 6 4 - 13 mmol/L    BUN 19 5 - 25 mg/dL    Creatinine 1 22 0 60 - 1 30 mg/dL    Glucose 135 65 - 140 mg/dL    Calcium 8 7 8 3 - 10 1 mg/dL    AST 16 5 - 45 U/L    ALT 23 12 - 78 U/L    Alkaline Phosphatase 121 (H) 46 - 116 U/L    Total Protein 7 0 6 4 - 8 2 g/dL    Albumin 2 9 (L) 3 5 - 5 0 g/dL    Total Bilirubin 0 30 0 20 - 1 00 mg/dL    eGFR 58 ml/min/1 73sq m   Magnesium    Collection Time: 03/01/19  5:23 AM   Result Value Ref Range    Magnesium 1 6 1 6 - 2 6 mg/dL   Phosphorus    Collection Time: 03/01/19  5:23 AM   Result Value Ref Range    Phosphorus 2 6 2 3 - 4 1 mg/dL   Lactic acid, plasma    Collection Time: 03/01/19  5:23 AM   Result Value Ref Range    LACTIC ACID 1 2 0 5 - 2 0 mmol/L   Procalcitonin    Collection Time: 03/01/19  5:30 AM   Result Value Ref Range    Procalcitonin 0 06 <=0 25 ng/ml       Current Facility-Administered Medications   Medication Dose Route Frequency Provider Last Rate Last Dose    aspirin chewable tablet 81 mg  81 mg Oral Daily HEATH Ford   81 mg at 03/01/19 4899    atorvastatin (LIPITOR) tablet 20 mg  20 mg Oral Daily HEATH Fish   20 mg at 03/01/19 4421    carvedilol (COREG) tablet 6 25 mg  6 25 mg Oral BID Jayy Bentley DO   6 25 mg at 03/01/19 1663    dextrose 50 % IV solution 25 mL  25 mL Intravenous PRN Jayy Bentley DO        gabapentin (NEURONTIN) capsule 600 mg  600 mg Oral TID HEATH [place]   600 mg at 03/01/19 9598    heparin (porcine) subcutaneous injection 7,500 Units  7,500 Units Subcutaneous [facility] Jayy Bentley DO   7,500 Units at 03/01/19 0505    lisinopril (ZESTRIL) tablet 5 mg  5 mg Oral Daily HEATH [place]   5 mg at 03/01/19 7395  LORazepam (ATIVAN) 2 mg/mL injection 0 5 mg  0 5 mg Intravenous Once Rob Rosa, DO        magnesium sulfate 2 g/50 mL IVPB (premix) 2 g  2 g Intravenous Once Rob Rosa, DO 25 mL/hr at 03/01/19 1025 2 g at 03/01/19 1025    nystatin (MYCOSTATIN) powder   Topical BID Rob Rosa, DO        oxyCODONE (OxyCONTIN) 12 hr tablet 10 mg  10 mg Oral Q12H [facility] Devorah Rivas, AMANDANP   10 mg at 03/01/19 5203    pneumococcal 13-valent conjugate vaccine (PREVNAR-13) IM injection 0 5 mL  0 5 mL Intramuscular Prior to discharge Robmorales Thompsonn, DO        potassium chloride (K-DUR,KLOR-CON) CR tablet 40 mEq  40 mEq Oral BID With Meals Rob Rosa, DO   40 mEq at 03/01/19 1025    tamsulosin (FLOMAX) capsule 0 4 mg  0 4 mg Oral QPM Devorah Rivas CRNP   0 4 mg at 02/28/19 1721       Invasive Devices     Peripheral Intravenous Line            Peripheral IV 02/27/19 Right Forearm 1 day                Lab, Imaging and other studies: I have personally reviewed pertinent reports      VTE Pharmacologic Prophylaxis: Heparin  VTE Mechanical Prophylaxis: sequential compression device    Jeff Pennington MD

## 2019-03-01 NOTE — ASSESSMENT & PLAN NOTE
UA revealed positive nitrite and moderate bacteria on urine microscopy  Patient denied any symptoms of dysuria or increased urinary frequency    - Follow up urine culture

## 2019-03-01 NOTE — ASSESSMENT & PLAN NOTE
Blood pressure was slightly elevated in the emergency room  Monitor per protocol  Continue prior to admission medication

## 2019-03-01 NOTE — MALNUTRITION/BMI
This medical record reflects morbid obesity  Malnutrition Findings:              BMI Findings:  BMI Classifications: Morbid Obesity 45-49 9     Body mass index is 46 61 kg/m²  See Nutrition note dated 2/28/2019 for additional details  Completed nutrition assessment is viewable in the nutrition documentation

## 2019-03-01 NOTE — PLAN OF CARE
Problem: PHYSICAL THERAPY ADULT  Goal: Performs mobility at highest level of function for planned discharge setting  See evaluation for individualized goals  Outcome: Progressing  Note:   Prognosis: Good  Problem List: Decreased strength, Decreased endurance, Impaired balance, Decreased mobility, Pain, Impaired sensation  Assessment: Pt is a 74year old male with complex PMH as listed above contributing to current condition presenting to [facility] due to hypoglycemia due to insulin and now with increased UE paresthesia and numbness  Pt seen for high complexity PT evaluation to assess current mobility level and to assist with discharge planning  Pt presents with increased LE pain decreased LE strength balance endurance and mobility  Pt performing all transfers and ambulation at a (S) level with use of RW with fair to good safety awareness  Ambulation limited to 100ft with rest break after 50ft due to pain  Pt with forward flexed posture and decreased gait speed increasing risk for falls  Pt would benefit from continued activity in PT to improve pain strength balance endurance mobility transfers ambulation and stair negotiation with return to (I) LOF  Anticipate pt will be safe to return home with home care services  Recommendation: Home PT     PT - OK to Discharge: Yes(when medically stable for discharge)    See flowsheet documentation for full assessment

## 2019-03-01 NOTE — PLAN OF CARE
Problem: OCCUPATIONAL THERAPY ADULT  Goal: Performs self-care activities at highest level of function for planned discharge setting  See evaluation for individualized goals  Description  Treatment Interventions: ADL retraining, Functional transfer training, UE strengthening/ROM, Endurance training, Patient/family training, Activityengagement, Equipment evaluation/education          See flowsheet documentation for full assessment, interventions and recommendations  Note:   Limitation: Decreased ADL status, Decreased UE strength, Decreased Safe judgement during ADL, Decreased endurance, Decreased UE ROM, Decreased self-care trans, Decreased high-level ADLs     Assessment: Pt is a 74 y o  male seen for OT evaluation s/p admit to [facility] on 2/27/2019 w/ Hypoglycemia due to insulin  Comorbidities affecting pt's functional performance at time of assessment include: DM, HTN, obesity, CHF and CVA, TIA, neuropathy, renal disorder  Personal factors affecting pt at time of IE include:steps to enter environment, difficulty performing ADLS, difficulty performing IADLS , limited insight into deficits, compliance, decreased initiation and engagement  and health management   Prior to admission, pt was (A) with UB/LB bathing and grooming tasks with use of RW during functional mobility  Upon evaluation: Pt requires (S)-max (A) with use of RW during functional mobility 2* the following deficits impacting occupational performance: weakness, decreased strength, decreased tolerance, impaired problem solving, decreased safety awareness and increased pain  Pt to benefit from continued skilled OT tx while in the hospital to address deficits as defined above and maximize level of functional independence w ADL's and functional mobility  Occupational Performance areas to address include: grooming, bathing/shower, toilet hygiene, dressing, functional mobility, community mobility and clothing management   From OT standpoint, recommendation at time of d/c would be short term rehab if agreeable; home health with family (A)         OT Discharge Recommendation: Short Term Rehab

## 2019-03-01 NOTE — ASSESSMENT & PLAN NOTE
Lab Results   Component Value Date    HGBA1C 5 7 02/28/2019       Recent Labs     02/28/19  1604 02/28/19  2119 03/01/19  0205 03/01/19  0507   POCGLU 176* 176* 131 126       Blood Sugar Average: Last 72 hrs:  (P) 116 [id number]     Repeat HbA1c of 5 7 on 2/28/19  Holding all prior to admission diabetic medications-see plan above for hypoglycemia

## 2019-03-01 NOTE — ASSESSMENT & PLAN NOTE
- Accurate I&Os  - Daily weights  - Cardiac diabetic diet  - Admit to med surge  - Continue home medication Carvedilol 6 25 mg PO BID

## 2019-03-01 NOTE — ASSESSMENT & PLAN NOTE
Creatinine 1 31 on admission   Baseline creatinine around 1 16  - Trend BMP  - Cautious use of nephrotoxic agents  - Continue home medication Lasix 40 mg PO daily  - Cr trending down, 1 22 today

## 2019-03-01 NOTE — PHYSICAL THERAPY NOTE
PHYSICAL THERAPY TREATMENT NOTE    Time In: 13:41    Time Out: 13:51  Total Time:10 min  MRN: [id number]    S: Pt states he feels much better than when he came to the hospital     O: Therapeutic Activities:     Sit > stand with supervision with RW; stand > sit with supervision with RW  Ambulation with RW with supervision 100ft with standing rest break x 1minute; ambulates with forward flexed posture and decreased foot clearance  A: Pt with limited ambulation tolerance due to increased LE pain that is chronic and due to fatigue and weakness  Pt with forward flexed posture and decreased gait speed increasing risk for falls  No LOB or instability during ambulation despite altered pattern  Pt returned to sitting in w/c for transport for testing  Pt most likely will be safe to return home with home health care services  P: See initial evaluation for full POC  Patient seated in w/c for transport to testing      Keith Quach, PT, DPT

## 2019-03-01 NOTE — PROGRESS NOTES
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

## 2019-03-01 NOTE — NURSING NOTE
Patient has pre-hospital IV, clean, dry,intact and infusing, past 24hrs  Patient refusing to have site changed  Educated patient on policy of removing the iv and why it is important  Patient still refusing at this time  Will continue to monitor

## 2019-03-01 NOTE — OCCUPATIONAL THERAPY NOTE
Occupational Therapy Evaluation     Patient Name: Suzanne Santoyo  Today's Date: 3/1/2019  Problem List  Patient Active Problem List   Diagnosis    Dyspnea on exertion    Type 2 diabetes mellitus with chronic kidney disease, with long-term [facility]    [facility], stage III (moderate) [facility]    Di[facility]    Obstructive sleep apnea syndrome    Morbid obesity with BMI of 45 0-49 9, adult ([facility] )    Essential hypertension    Type 2 diabetes mellitus with hyperglycemia, with long-term current use of insulin ([facility])    Complicated UTI (urinary tract infection)    Uncontrolled type 2 diabetes mellitus with hyperglycemia, with long-term current use of insulin ([facility])    Dehydration with hyponatremia    Hypoglycemia due to insulin    Pyuria    Opiate dependence, continuous ([facility])    Paresthesia of left upper extremity     Past Medical History  Past Medical History:   Diagnosis Date    Cataract     CHF (congestive heart failure) ([facility])     chronic diastolic CHF    Coronary artery disease     Diabetes mellitus [facility]     Glaucoma     Hyperlipidemia     Hypertension     Neuropathy     Obesity     Renal disorder     chronic kidney disease stage 3     Sleep apnea     [facility]roke [facility]     TIA (transient ischemic attack)     Uncontrolled type 2 diabetes mellitus with hyperglycemia, with long-term current use of insulin ([facility] ) 10/4/2018     Past Surgical History  Past Surgical History:   Procedure Laterality Date    APPENDECTOMY      CARPAL TUNNEL RELEASE      EYE SURGERY      cataracts             03/01/19 1326   Note Type   Note type Eval/Treat   Restrictions/Precautions   Weight Bearing Precautions Per Order No   Other Precautions Chair Alarm;Telemetry; Fall Risk;Pain   Pain Assessment   Pain Assessment 0-10   Pain Score 9   Pain Type Chronic pain   Pain Location Leg   Home Living   Type of Home House   Home Layout Multi-level;Performs ADLs on one level;Able to live on main level with bedroom/bathroom;Stairs to enter with rails;1/2 bath on main level  (10 KYLE c HR)   Bathroom Shower/Tub None   Bathroom Toilet Standard   Bathroom Equipment Grab bars around toilet   [address] Walker;Grab bars; Reacher;Long-handled shoehorn   Additional Comments pt reports use of RW at baseline during functional mobility; pt reports spouse and him sponge bath and are not able to reach full bath upstairs   Prior Function   Level of [place] Needs assistance with ADLs and functional mobility   Lives With Spouse; Family   Receives Help From Family   ADL Assistance Needs assistance   IADLs Needs assistance   Falls in the last 6 months 1 to 4   Psychosocial   Psychosocial (WDL) WDL   Subjective   Subjective "I can't do any lower body stuff at home"   ADL   Where Assessed Other (Comment)  (bathroom)   Grooming Assistance 4  Minimal Assistance   Grooming Deficit Wash/dry hands   LB Dressing Assistance 2  Maximal Assistance   LB Dressing Deficit Pull up over hips; Thread LLE into underwear; Thread RLE into underwear;Don/doff L sock; Don/doff R sock   Toileting Assistance  3  Moderate Assistance   Toileting Deficit Clothing management down;Perineal hygiene; Increased time to complete;Supervison/safety;Verbal cueing   Additional Comments pt with significant difficulties donning/doffing pants and socks; difficulties with toileting with use of urinal this date   Bed Mobility   Additional Comments pt seated in chair at start of session and in w/c at end of session for testing   Transfers   Sit to Stand 5  Supervision   Additional items Verbal cues  (RW)   Stand to Sit 5  Supervision   Additional items Verbal cues  (RW)   Stand pivot 5  Supervision   Additional items Verbal cues  (RW)   Toilet transfer 5  Supervision   Additional items Standard toilet  (RW)   Additional Comments performs functional transfers with RW and (S) level with increased time required to complete   Functional Mobility   Functional Mobility 5  Supervision   Additional Comments pt performed functional mobilty with use of RW; pt limtied by R LE pain 9/10 during functional mobility   Additional items Rolling walker   Balance   Static Sitting Good   Dynamic Sitting Good   Static Standing Fair +   Dynamic Standing Fair -   Ambulatory Fair -   Activity Tolerance   Activity Tolerance Patient limited by fatigue;Patient limited by pain   RUE Assessment   RUE Assessment WFL   LUE Assessment   LUE Assessment X  (3+/5 grossly; WFL AROM)   Hand Function   Gross Motor Coordination Functional   Fine Motor Coordination Functional   Sensation   Light Touch No apparent deficits   Sharp/Dull No apparent deficits   Cognition   Overall Cognitive Status WFL   Arousal/Participation Alert   Attention Within functional limits   Orientation Level Oriented X4   Memory Within functional limits   Following Commands Follows all commands and directions without difficulty   Assessment   Limitation Decreased ADL status; Decreased UE strength;Decreased Safe judgement during ADL;Decreased endurance;Decreased UE ROM; Decreased self-care trans;Decreased high-level ADLs   Assessment Pt is a [address] y o  male seen for OT evaluation s/p admit to [facility] on 2/27/2019 w/ Hypoglycemia due to insulin  Comorbidities affecting pt's functional performance at time of assessment include: DM, HTN, obesity, CHF and CVA, TIA, neuropathy, renal disorder  Personal factors affecting pt at time of IE include:steps to enter environment, difficulty performing ADLS, difficulty performing IADLS , limited insight into deficits, compliance, decreased initiation and engagement  and health management   Prior to admission, pt was (A) with UB/LB bathing and grooming tasks with use of RW during functional mobility   Upon evaluation: Pt requires (S)-max (A) with use of RW during functional mobility 2* the following deficits impacting occupational performance: weakness, decreased strength, decreased tolerance, impaired problem solving, decreased safety awareness and increased pain  Pt to benefit from continued skilled OT tx while in the hospital to address deficits as defined above and maximize level of functional independence w ADL's and functional mobility  Occupational Performance areas to address include: grooming, bathing/shower, toilet hygiene, dressing, functional mobility, community mobility and clothing management  From OT standpoint, recommendation at time of d/c would be short term rehab if agreeable; home health with family (A)  Goals   Patient Goals to go home    Short Term Goal  pt will perform UE strengthening exercises to maximize (I) with ADL performance   Long Term Goal #1 pt will increase independence with LB dressing to min (A) with use of AE   Long Term Goal #2 pt will increase independence with functional mobility with RW to mod (I) level with increased endurance and decreased pain   Long Term Goal pt will increase independence with toilet transfers and tasks to (I) level    Plan   Treatment Interventions ADL retraining;Functional transfer training;UE strengthening/ROM; Endurance training;Patient/family training; Activityengagement;Equipment evaluation/education   Goal Expiration Date 03/15/19   OT Frequency 3-5x/wk   Recommendation   OT Discharge Recommendation Short Term Rehab   Barthel Index   Feeding 10   Bathing 0   Grooming Score 0   Dressing Score 5   Bladder Score 5   Bowels Score 5   Toilet Use Score 5   Transfers (Bed/Chair) Score 10   Mobility (Level Surface) Score 10   Stairs Score 0   Barthel Index Score 50     Pt will benefit from continued OT services in order to maximize (I) c ADL performance, FM c RW, and improve overall endurance/strength required to complete functional tasks in preparation for d/c

## 2019-03-01 NOTE — ASSESSMENT & PLAN NOTE
BG 41 on admission - EMS was activated and patient was given D10   - Holding prior to admission Lantus 80 units q 12 hours  - Accu-Cheks Q2 hours  - D 10 normal saline at 100 mL an hour, transitioned to D5 NS @ 75 cc/hr, fluid discontinued this morning (3/1)  - Admit to med surge  - Hypoglycemic protocol in place

## 2019-03-01 NOTE — CONSULTS
Consultation - Orthopedics   Bianca Hunt 74 y o  male MRN: [id number]  Unit/Bed#: 373-76 Encounter: [id number]        History of Present Illness   Physician Requesting Consult: Erick Loza DO  Reason for Consult / Principal Problem:  Left upper extremity pins and needles  HPI: Bianca Hunt is a 74y o  year old male who presents with left upper extremity pins and needles numbness 1 month  Affects all the fingers of the left hand  No history of trauma  Symptoms gradually getting worse  No history of any weakness in the limb  Patient had no treatment for this    Consults    Review of Systems    Historical Information   Past Medical History:   Diagnosis Date    Cataract     [facility]congestive [facility] failure) [facility])     chronic diastolic CHF    Coronary artery disease     [facility]     Glaucoma     Hyperlipidemia     Hypertension     Neuropathy     Obesity     Renal disorder     chronic kidney disease stage 3     Sleep apnea     [facility]     TIA (transient ischemic attack)     Uncontrolled type 2 diabetes mellitus with hyperglycemia, with long-term current use of insulin ([facility] ) 10/4/2018     Past Surgical History:   Procedure Laterality Date    APPENDECTOMY      CARPAL TUNNEL RELEASE      EYE SURGERY      cataracts     Social History   Social History     Substance and Sexual Activity   Alcohol Use Never    Frequency: Never    Drinks per session: Patient refused    Binge frequency: Never     Social History     Substance and Sexual Activity   Drug Use No     Social History     Tobacco Use   Smoking Status Former Smoker   Smokeless Tobacco Former User     Family History: non-contributory    Meds/Allergies   all current active meds have been reviewed  No Known Allergies    Objective   Vitals: Blood pressure 153/72, pulse 85, temperature 97 9 °F (36 6 °C), temperature source Temporal, resp  rate 18, height 5' 6" (1 676 m), weight 128 kg (282 lb 11 2 oz), SpO2 92 %  ,Body mass index is 45 63 kg/m²  Intake/Output Summary (Last 24 hours) at 3/1/2019 1407  Last data filed at 3/1/2019 0823  Gross per 24 hour   Intake 1637 5 ml   Output --   Net 1637 5 ml     I/O last 24 hours: In: 1877 5 [P O :900; I V :977 5]  Out: 375 [Urine:375]    Invasive Devices     Peripheral Intravenous Line            Peripheral IV 02/27/19 Right Forearm 1 day                Physical Exam     Patient awake alert oriented GCS 15      Ortho Exam  Cervical spine mild tenderness in the left paraspinal muscles  Left shoulder no motor deficits  Left elbow no motor deficits  Left hand moderate  strength with intact sensation Tinel sign negative    Lab Results: I have personally reviewed pertinent lab results  Imaging Studies: I have personally reviewed pertinent reports  EKG, Pathology, and Other Studies: I have personally reviewed pertinent reports  VTE Prophylaxis: Sequential compression device (Venodyne)     Code Status: Level 1 - Full Code  Advance Directive and Living Will:      Power of :    POLST:      Assessment/Plan     Assessment:  Left upper extremity pins and needles most probably from cervical problems  Plan:  Discussed treatment with the patient  Will await the results of cervical spine x-rays and CT scan  If any Pathology is  detected on CT scan patient will need referral to spine team, neurology and possibly pain management      Counseling / Coordination of Care  Total floor / unit time spent today 30 minutes  Greater than 50% of total time was spent with the patient and / or family counseling and / or coordination of care   A description of the counseling / coordination of care:  Patient and the medical team

## 2019-03-02 ENCOUNTER — APPOINTMENT (INPATIENT)
Dept: CT IMAGING | Facility: HOSPITAL | Age: 74
DRG: 638 | End: 2019-03-02
Payer: COMMERCIAL

## 2019-03-02 PROBLEM — M48.02 NEURAL FORAMINAL STENOSIS OF CERVICAL SPINE: Status: ACTIVE | Noted: 2019-03-02

## 2019-03-02 PROBLEM — M79.604 ACUTE PAIN OF RIGHT LOWER EXTREMITY: Status: ACTIVE | Noted: 2019-03-02

## 2019-03-02 LAB
ALBUMIN SERPL BCP-MCNC: 3 G/DL (ref 3.5–5)
ALP SERPL-CCNC: 136 U/L (ref 46–116)
ALT SERPL W P-5'-P-CCNC: 23 U/L (ref 12–78)
ANION GAP SERPL CALCULATED.3IONS-SCNC: 10 MMOL/L (ref 4–13)
AST SERPL W P-5'-P-CCNC: 15 U/L (ref 5–45)
BASOPHILS # BLD AUTO: 0.04 THOUSANDS/ΜL (ref 0–0.1)
BASOPHILS NFR BLD AUTO: 0 % (ref 0–1)
BILIRUB SERPL-MCNC: 0.4 MG/DL (ref 0.2–1)
BUN SERPL-MCNC: 21 MG/DL (ref 5–25)
CALCIUM SERPL-MCNC: 9.2 MG/DL (ref 8.3–10.1)
CHLORIDE SERPL-SCNC: 105 MMOL/L (ref 100–108)
CO2 SERPL-SCNC: 26 MMOL/L (ref 21–32)
CREAT SERPL-MCNC: 1.24 MG/DL (ref 0.6–1.3)
EOSINOPHIL # BLD AUTO: 0.3 THOUSAND/ΜL (ref 0–0.61)
EOSINOPHIL NFR BLD AUTO: 3 % (ref 0–6)
ERYTHROCYTE [DISTWIDTH] IN BLOOD BY AUTOMATED COUNT: 14.3 % (ref 11.6–15.1)
GFR SERPL CREATININE-BSD FRML MDRD: 57 ML/MIN/1.73SQ M
GLUCOSE SERPL-MCNC: 129 MG/DL (ref 65–140)
GLUCOSE SERPL-MCNC: 148 MG/DL (ref 65–140)
GLUCOSE SERPL-MCNC: 151 MG/DL (ref 65–140)
GLUCOSE SERPL-MCNC: 155 MG/DL (ref 65–140)
GLUCOSE SERPL-MCNC: 164 MG/DL (ref 65–140)
GLUCOSE SERPL-MCNC: 176 MG/DL (ref 65–140)
GLUCOSE SERPL-MCNC: 198 MG/DL (ref 65–140)
HCT VFR BLD AUTO: 38.7 % (ref 36.5–49.3)
HGB BLD-MCNC: 12.3 G/DL (ref 12–17)
IMM GRANULOCYTES # BLD AUTO: 0.06 THOUSAND/UL (ref 0–0.2)
IMM GRANULOCYTES NFR BLD AUTO: 1 % (ref 0–2)
LYMPHOCYTES # BLD AUTO: 2.42 THOUSANDS/ΜL (ref 0.6–4.47)
LYMPHOCYTES NFR BLD AUTO: 24 % (ref 14–44)
MAGNESIUM SERPL-MCNC: 1.7 MG/DL (ref 1.6–2.6)
MCH RBC QN AUTO: 27.9 PG (ref 26.8–34.3)
MCHC RBC AUTO-ENTMCNC: 31.8 G/DL (ref 31.4–37.4)
MCV RBC AUTO: 88 FL (ref 82–98)
MONOCYTES # BLD AUTO: 0.85 THOUSAND/ΜL (ref 0.17–1.22)
MONOCYTES NFR BLD AUTO: 8 % (ref 4–12)
NEUTROPHILS # BLD AUTO: 6.58 THOUSANDS/ΜL (ref 1.85–7.62)
NEUTS SEG NFR BLD AUTO: 64 % (ref 43–75)
NRBC BLD AUTO-RTO: 0 /100 WBCS
PHOSPHATE SERPL-MCNC: 2.3 MG/DL (ref 2.3–4.1)
PLATELET # BLD AUTO: 263 THOUSANDS/UL (ref 149–390)
PMV BLD AUTO: 10 FL (ref 8.9–12.7)
POTASSIUM SERPL-SCNC: 4.4 MMOL/L (ref 3.5–5.3)
PROCALCITONIN SERPL-MCNC: 0.05 NG/ML
PROT SERPL-MCNC: 7.3 G/DL (ref 6.4–8.2)
RBC # BLD AUTO: 4.41 MILLION/UL (ref 3.88–5.62)
SODIUM SERPL-SCNC: 141 MMOL/L (ref 136–145)
WBC # BLD AUTO: 10.25 THOUSAND/UL (ref 4.31–10.16)

## 2019-03-02 PROCEDURE — 84145 PROCALCITONIN (PCT): CPT | Performed by: INTERNAL MEDICINE

## 2019-03-02 PROCEDURE — 99232 SBSQ HOSP IP/OBS MODERATE 35: CPT | Performed by: INTERNAL MEDICINE

## 2019-03-02 PROCEDURE — 85025 COMPLETE CBC W/AUTO DIFF WBC: CPT | Performed by: STUDENT IN AN ORGANIZED HEALTH CARE EDUCATION/TRAINING PROGRAM

## 2019-03-02 PROCEDURE — 83735 ASSAY OF MAGNESIUM: CPT | Performed by: STUDENT IN AN ORGANIZED HEALTH CARE EDUCATION/TRAINING PROGRAM

## 2019-03-02 PROCEDURE — 84100 ASSAY OF PHOSPHORUS: CPT | Performed by: STUDENT IN AN ORGANIZED HEALTH CARE EDUCATION/TRAINING PROGRAM

## 2019-03-02 PROCEDURE — 73700 CT LOWER EXTREMITY W/O DYE: CPT

## 2019-03-02 PROCEDURE — 93880 EXTRACRANIAL BILAT STUDY: CPT | Performed by: SURGERY

## 2019-03-02 PROCEDURE — 80053 COMPREHEN METABOLIC PANEL: CPT | Performed by: INTERNAL MEDICINE

## 2019-03-02 PROCEDURE — 82948 REAGENT STRIP/BLOOD GLUCOSE: CPT

## 2019-03-02 RX ORDER — OXYCODONE HYDROCHLORIDE 10 MG/1
10 TABLET ORAL ONCE
Status: COMPLETED | OUTPATIENT
Start: 2019-03-02 | End: 2019-03-02

## 2019-03-02 RX ORDER — OXYCODONE HYDROCHLORIDE 5 MG/1
5 TABLET ORAL EVERY 4 HOURS PRN
Status: DISCONTINUED | OUTPATIENT
Start: 2019-03-02 | End: 2019-03-02

## 2019-03-02 RX ORDER — ONDANSETRON 2 MG/ML
4 INJECTION INTRAMUSCULAR; INTRAVENOUS EVERY 4 HOURS PRN
Status: DISCONTINUED | OUTPATIENT
Start: 2019-03-02 | End: 2019-03-04 | Stop reason: HOSPADM

## 2019-03-02 RX ORDER — OXYCODONE HYDROCHLORIDE 10 MG/1
10 TABLET ORAL EVERY 4 HOURS PRN
Status: DISCONTINUED | OUTPATIENT
Start: 2019-03-02 | End: 2019-03-04 | Stop reason: HOSPADM

## 2019-03-02 RX ORDER — ACETAMINOPHEN 325 MG/1
650 TABLET ORAL EVERY 6 HOURS PRN
Status: DISCONTINUED | OUTPATIENT
Start: 2019-03-02 | End: 2019-03-04 | Stop reason: HOSPADM

## 2019-03-02 RX ORDER — HYDROMORPHONE HCL/PF 1 MG/ML
0.5 SYRINGE (ML) INJECTION ONCE
Status: COMPLETED | OUTPATIENT
Start: 2019-03-02 | End: 2019-03-02

## 2019-03-02 RX ORDER — HYDROMORPHONE HCL/PF 1 MG/ML
0.5 SYRINGE (ML) INJECTION EVERY 4 HOURS PRN
Status: DISCONTINUED | OUTPATIENT
Start: 2019-03-02 | End: 2019-03-03

## 2019-03-02 RX ADMIN — ATORVASTATIN CALCIUM 20 MG: 10 TABLET, FILM COATED ORAL at 08:46

## 2019-03-02 RX ADMIN — ASPIRIN 81 MG 81 MG: 81 TABLET ORAL at 08:46

## 2019-03-02 RX ADMIN — NYSTATIN: 100000 POWDER TOPICAL at 08:49

## 2019-03-02 RX ADMIN — OXYCODONE HYDROCHLORIDE 10 MG: 10 TABLET ORAL at 06:50

## 2019-03-02 RX ADMIN — GABAPENTIN 600 MG: 300 CAPSULE ORAL at 15:34

## 2019-03-02 RX ADMIN — LISINOPRIL 5 MG: 5 TABLET ORAL at 08:46

## 2019-03-02 RX ADMIN — OXYCODONE HYDROCHLORIDE 10 MG: 10 TABLET ORAL at 00:29

## 2019-03-02 RX ADMIN — HYDROMORPHONE HYDROCHLORIDE 0.5 MG: 1 INJECTION, SOLUTION INTRAMUSCULAR; INTRAVENOUS; SUBCUTANEOUS at 23:43

## 2019-03-02 RX ADMIN — MAGNESIUM OXIDE TAB 400 MG (241.3 MG ELEMENTAL MG) 400 MG: 400 (241.3 MG) TAB at 11:44

## 2019-03-02 RX ADMIN — HEPARIN SODIUM 7500 UNITS: 5000 INJECTION INTRAVENOUS; SUBCUTANEOUS at 21:56

## 2019-03-02 RX ADMIN — HEPARIN SODIUM 7500 UNITS: 5000 INJECTION INTRAVENOUS; SUBCUTANEOUS at 05:07

## 2019-03-02 RX ADMIN — CARVEDILOL 6.25 MG: 3.12 TABLET, FILM COATED ORAL at 18:00

## 2019-03-02 RX ADMIN — GABAPENTIN 600 MG: 300 CAPSULE ORAL at 08:46

## 2019-03-02 RX ADMIN — MAGNESIUM OXIDE TAB 400 MG (241.3 MG ELEMENTAL MG) 400 MG: 400 (241.3 MG) TAB at 18:00

## 2019-03-02 RX ADMIN — GABAPENTIN 600 MG: 300 CAPSULE ORAL at 21:56

## 2019-03-02 RX ADMIN — NYSTATIN: 100000 POWDER TOPICAL at 21:41

## 2019-03-02 RX ADMIN — HYDROMORPHONE HYDROCHLORIDE 0.5 MG: 1 INJECTION, SOLUTION INTRAMUSCULAR; INTRAVENOUS; SUBCUTANEOUS at 11:44

## 2019-03-02 RX ADMIN — CARVEDILOL 6.25 MG: 3.12 TABLET, FILM COATED ORAL at 08:46

## 2019-03-02 RX ADMIN — TAMSULOSIN HYDROCHLORIDE 0.4 MG: 0.4 CAPSULE ORAL at 18:00

## 2019-03-02 RX ADMIN — HEPARIN SODIUM 7500 UNITS: 5000 INJECTION INTRAVENOUS; SUBCUTANEOUS at 13:48

## 2019-03-02 RX ADMIN — OXYCODONE HYDROCHLORIDE 10 MG: 10 TABLET, FILM COATED, EXTENDED RELEASE ORAL at 11:05

## 2019-03-02 NOTE — ASSESSMENT & PLAN NOTE
Patient c/o left forearm and hand paresthesia  - CT cervical spine w/o contrast on 3/1/19 revealed moderate multilevel spondylotic degenerative changes of the cervical spine with moderate to severe bilateral neural foraminal narrowing at C5/C6   - Recommend outpatient follow up with spine specialist and pain management

## 2019-03-02 NOTE — ASSESSMENT & PLAN NOTE
Lab Results   Component Value Date    HGBA1C 6 1 03/01/2019       Recent Labs     03/01/19  1558 03/01/19  2201 03/02/19  0432 03/02/19  0742   POCGLU 142* 173* 129 151*       Blood Sugar Average: Last 72 hrs:  (P) 125 375     Repeat HbA1c of 5 7 on 2/28/19  Holding all prior to admission diabetic medications-see plan above for hypoglycemia

## 2019-03-02 NOTE — ASSESSMENT & PLAN NOTE
Patient c/o of left hand and forearm numbness and tingling sensation  - DDx: carpal tunnel syndrome, diabetic neuropathy, hypothyroidism, stroke?  - TSH WNL  - stroke work up negative  - Orthopedics consult appreciated  Patient left arm paresthesia due to DJD of cervical spine with multilevel foraminal stenosis at C5/C6  Recommend outpatient follow up with pain management and spine

## 2019-03-02 NOTE — ASSESSMENT & PLAN NOTE
Creatinine 1 31 on admission   Baseline creatinine around 1 16  - Trend BMP  - Cautious use of nephrotoxic agents  - Continue home medication Lasix 40 mg PO daily  - Cr trending down, 1 24 today

## 2019-03-02 NOTE — ASSESSMENT & PLAN NOTE
BG 41 on admission - EMS was activated and patient was given D10   - Holding prior to admission Lantus 80 units q 12 hours  - Accu-Cheks Q2 hours  - D 10 normal saline at 100 mL an hour, transitioned to D5 NS @ 75 cc/hr, fluid discontinued this morning (3/1)  - Admit to med surge  - Hypoglycemic protocol in place  - Patient Glucose range continue to be in mid 100s without any insulin  Will continue monitoring glucose level

## 2019-03-02 NOTE — PROGRESS NOTES
Progress Note - Jone Brink 1945, 74 y o  male MRN: [id number]    Unit/Bed#: 414-01 Encounter: [id number]    Primary Care Provider: Shola Valdivia MD   Date and time admitted to hospital: 2/27/2019 11:39 PM        * Hypoglycemia due to insulin  Assessment & Plan  BG 41 on admission - EMS was activated and patient was given D10   - Holding prior to admission Lantus 80 units q 12 hours  - Accu-Cheks Q2 hours  - D 10 normal saline at 100 mL an hour, transitioned to D5 NS @ 75 cc/hr, fluid discontinued this morning (3/1)  - Admit to med surge  - Hypoglycemic protocol in place  - Patient Glucose range continue to be in mid 100s without any insulin  Will continue monitoring glucose level  Type 2 diabetes mellitus with chronic kidney disease, with long-term current use of insulin [facility])  Assessment & Plan  Lab Results   Component Value Date    HGBA1C 6 1 03/01/2019       Recent Labs     03/01/19  1558 03/01/19  2201 03/02/19  0432 03/02/19  0742   POCGLU 142* 173* 129 151*       Blood Sugar Average: Last 72 hrs:  (P) 125 375     Repeat HbA1c of 5 7 on 2/28/19  Holding all prior to admission diabetic medications-see plan above for hypoglycemia    Diastolic congestive heart failure (HCC)  Assessment & Plan  - Accurate I&Os  - Daily weights  - Cardiac diabetic diet  - Admit to med surge  - Continue home medication Carvedilol 6 25 mg PO BID    Chronic kidney disease, stage III (moderate) ([facility])  Assessment & Plan  Creatinine 1 31 on admission   Baseline creatinine around 1 16  - Trend BMP  - Cautious use of nephrotoxic agents  - Continue home medication Lasix 40 mg PO daily  - Cr trending down, 1 24 today      Morbid obesity with BMI of 45 0-49 9, adult ([facility])  Assessment & Plan  Recommend weight loss a nutrition consult on outpatient basis    Essential hypertension  Assessment & Plan  Blood pressure was slightly elevated in the emergency room  Monitor per protocol  Continue prior to admission medication      Paresthesia of left upper extremity  Assessment & Plan  Patient c/o of left hand and forearm numbness and tingling sensation  - DDx: carpal tunnel syndrome, diabetic neuropathy, hypothyroidism, stroke?  - TSH WNL  - stroke work up negative  - Orthopedics consult appreciated  Patient left arm paresthesia due to DJD of cervical spine with multilevel foraminal stenosis at C5/C6  Recommend outpatient follow up with pain management and spine  Pyuria  Assessment & Plan  UA revealed positive nitrite and moderate bacteria on urine microscopy  Patient denied any symptoms of dysuria or increased urinary frequency  - Follow up urine culture    Opiate dependence, continuous ([address] )  Assessment & Plan  Patient uses oxycodone 10 mg Q12H  - follow up with outpatient pain management     Neural foraminal stenosis of cervical spine  Assessment & Plan  Patient c/o left forearm and hand paresthesia  - CT cervical spine w/o contrast on 3/1/19 revealed moderate multilevel spondylotic degenerative changes of the cervical spine with moderate to severe bilateral neural foraminal narrowing at C5/C6   - Recommend outpatient follow up with spine specialist and pain management  Subjective:   Patient seen and examined at bedside  Patient states that he has pain in his right lower leg and he can barely stand and walk around today  He denied any dizziness, SOB, chest pain, fever, chills  Patient remains afebrile and hemodynamically stable  Objective:     Vitals: Blood pressure (!) 177/71, pulse 94, temperature 98 6 °F (37 °C), temperature source Temporal, resp  rate 19, height 5' 6" (1 676 m), weight 127 kg (279 lb 8 7 oz), SpO2 91 %  ,Body mass index is 45 12 kg/m²    Wt Readings from Last 3 Encounters:   03/02/19 127 kg (279 lb 8 7 oz)   03/01/19 128 kg (282 lb 3 oz)   10/06/18 133 kg (292 lb 15 9 oz)       Intake/Output Summary (Last 24 hours) at 3/2/2019 1132  Last data filed at 3/2/2019 1126  Gross per 24 hour Intake 640 ml   Output 500 ml   Net 140 ml       Physical Exam:     Physical Exam   Constitutional: He is oriented to person, place, and time  No distress  HENT:   Head: Normocephalic  Eyes: Pupils are equal, round, and reactive to light  Right eye exhibits no discharge  Left eye exhibits no discharge  Neck: Normal range of motion  Cardiovascular: Normal rate, regular rhythm and normal heart sounds  No murmur heard  Pulmonary/Chest: Effort normal and breath sounds normal  No respiratory distress  Abdominal: Soft  Bowel sounds are normal  There is no tenderness  Obese abdomen  Musculoskeletal: Normal range of motion  He exhibits tenderness  Neurological: He is alert and oriented to person, place, and time  Skin: Skin is warm  Venous stasis on bilateral lower extremities  Mild tenderness on palpation of right lower leg  Sensation intact in b/l LE  Psychiatric: He has a normal mood and affect         Recent Results (from the past 24 hour(s))   Fingerstick Glucose (POCT)    Collection Time: 03/01/19  1:02 PM   Result Value Ref Range    POC Glucose 132 65 - 140 mg/dl   TSH, 3rd generation with Free T4 reflex    Collection Time: 03/01/19  1:39 PM   Result Value Ref Range    TSH 3RD GENERATON 2 470 0 358 - 3 740 uIU/mL   Fingerstick Glucose (POCT)    Collection Time: 03/01/19  3:58 PM   Result Value Ref Range    POC Glucose 142 (H) 65 - 140 mg/dl   Fingerstick Glucose (POCT)    Collection Time: 03/01/19 10:01 PM   Result Value Ref Range    POC Glucose 173 (H) 65 - 140 mg/dl   Fingerstick Glucose (POCT)    Collection Time: 03/02/19  4:32 AM   Result Value Ref Range    POC Glucose 129 65 - 140 mg/dl   Magnesium    Collection Time: 03/02/19  4:45 AM   Result Value Ref Range    Magnesium 1 7 1 6 - 2 6 mg/dL   Phosphorus    Collection Time: 03/02/19  4:45 AM   Result Value Ref Range    Phosphorus 2 3 2 3 - 4 1 mg/dL   CBC and differential    Collection Time: 03/02/19  4:45 AM   Result Value Ref Range WBC 10 25 (H) 4 31 - 10 16 Thousand/uL    RBC 4 41 3 88 - 5 62 Million/uL    Hemoglobin 12 3 12 0 - 17 0 g/dL    Hematocrit 38 7 36 5 - 49 3 %    MCV 88 82 - 98 fL    MCH 27 9 26 8 - 34 3 pg    MCHC 31 8 31 4 - 37 4 g/dL    RDW 14 3 11 6 - 15 1 %    MPV 10 0 8 9 - 12 7 fL    Platelets 832 008 - 599 Thousands/uL    nRBC 0 /100 WBCs    Neutrophils Relative 64 43 - 75 %    Immat GRANS % 1 0 - 2 %    Lymphocytes Relative 24 14 - 44 %    Monocytes Relative 8 4 - 12 %    Eosinophils Relative 3 0 - 6 %    Basophils Relative 0 0 - 1 %    Neutrophils Absolute 6 58 1 85 - 7 62 Thousands/µL    Immature Grans Absolute 0 06 0 00 - 0 20 Thousand/uL    Lymphocytes Absolute 2 42 0 60 - 4 47 Thousands/µL    Monocytes Absolute 0 85 0 17 - 1 22 Thousand/µL    Eosinophils Absolute 0 30 0 00 - 0 61 Thousand/µL    Basophils Absolute 0 04 0 00 - 0 10 Thousands/µL   Comprehensive metabolic panel    Collection Time: 03/02/19  4:45 AM   Result Value Ref Range    Sodium 141 136 - 145 mmol/L    Potassium 4 4 3 5 - 5 3 mmol/L    Chloride 105 100 - 108 mmol/L    CO2 26 21 - 32 mmol/L    ANION GAP 10 4 - 13 mmol/L    BUN 21 5 - 25 mg/dL    Creatinine 1 24 0 60 - 1 30 mg/dL    Glucose 148 (H) 65 - 140 mg/dL    Calcium 9 2 8 3 - 10 1 mg/dL    AST 15 5 - 45 U/L    ALT 23 12 - 78 U/L    Alkaline Phosphatase 136 (H) 46 - 116 U/L    Total Protein 7 3 6 4 - 8 2 g/dL    Albumin 3 0 (L) 3 5 - 5 0 g/dL    Total Bilirubin 0 40 0 20 - 1 00 mg/dL    eGFR 57 ml/min/1 73sq m   Fingerstick Glucose (POCT)    Collection Time: 03/02/19  7:42 AM   Result Value Ref Range    POC Glucose 151 (H) 65 - 140 mg/dl       Current Facility-Administered Medications   Medication Dose Route Frequency Provider Last Rate Last Dose    acetaminophen (TYLENOL) tablet 650 mg  650 mg Oral Q6H PRN Roge Ray DO        aspirin chewable tablet 81 mg  81 mg Oral Daily HEATH Fish   81 mg at 03/02/19 0846    atorvastatin (LIPITOR) tablet 20 mg  20 mg Oral Daily AMANDA ChowdaryNP   20 mg at 03/02/19 0846    carvedilol (COREG) tablet 6 25 mg  6 25 mg Oral BID Vandanaice Barley, DO   6 25 mg at 03/02/19 0846    dextrose 50 % IV solution 25 mL  25 mL Intravenous PRN Polly Barley, DO        gabapentin (NEURONTIN) capsule 600 mg  600 mg Oral TID Yessenia Contreras CRNP   600 mg at 03/02/19 0846    heparin (porcine) subcutaneous injection 7,500 Units  7,500 Units Subcutaneous [facility] Polly Barley, DO   7,500 Units at 03/02/19 0507    HYDROmorphone (DILAUDID) injection 0 5 mg  0 5 mg Intravenous Q4H PRN Polly Barley, DO        HYDROmorphone (DILAUDID) injection 0 5 mg  0 5 mg Intravenous Once Vandanaice Barley, DO        lisinopril (ZESTRIL) tablet 5 mg  5 mg Oral Daily Devorah Y Evelyn, CRNP   5 mg at 03/02/19 0846    magnesium oxide (MAG-OX) tablet 400 mg  400 mg Oral BID Polly Barley, DO        nystatin (MYCOSTATIN) powder   Topical BID Polly Barley, DO        [facility]ron [facility] injection 4 mg  4 mg Intravenous Q4H PRN Polly Barley, DO        oxyCODONE (OxyCONTIN) 12 hr tablet 10 mg  10 mg Oral Q12H [address] AMANDA ChowdaryNP   10 mg at 03/02/19 1105    oxyCODONE (ROXICODONE) immediate release tablet 10 mg  10 mg Oral Q4H PRN Polly Christensen, DO        pneumococcal 13-valent conjugate vaccine (PREVNAR-13) IM injection 0 5 mL  0 5 mL Intramuscular Prior to discharge Polly Barley, DO        tamsulosin [facility]) capsule 0 4 mg  0 4 mg Oral QPM Devorah Y Evelyn, CRNP   0 4 mg at 03/01/19 1710       Invasive Devices     Peripheral Intravenous Line            Peripheral IV 02/27/19 Right Forearm 2 days                Lab, Imaging and other studies: I have personally reviewed pertinent reports      VTE Pharmacologic Prophylaxis: Heparin 7500 Unit SC Q8H  VTE Mechanical Prophylaxis: sequential compression device    Jeff Avila MD

## 2019-03-03 ENCOUNTER — APPOINTMENT (INPATIENT)
Dept: ULTRASOUND IMAGING | Facility: HOSPITAL | Age: 74
DRG: 638 | End: 2019-03-03
Payer: COMMERCIAL

## 2019-03-03 PROBLEM — N30.00 ACUTE CYSTITIS: Status: ACTIVE | Noted: 2019-02-28

## 2019-03-03 LAB
ALBUMIN SERPL BCP-MCNC: 2.8 G/DL (ref 3.5–5)
ALP SERPL-CCNC: 121 U/L (ref 46–116)
ALT SERPL W P-5'-P-CCNC: 21 U/L (ref 12–78)
ANION GAP SERPL CALCULATED.3IONS-SCNC: 7 MMOL/L (ref 4–13)
AST SERPL W P-5'-P-CCNC: 14 U/L (ref 5–45)
BACTERIA UR CULT: ABNORMAL
BASOPHILS # BLD AUTO: 0.03 THOUSANDS/ΜL (ref 0–0.1)
BASOPHILS NFR BLD AUTO: 0 % (ref 0–1)
BILIRUB SERPL-MCNC: 0.8 MG/DL (ref 0.2–1)
BUN SERPL-MCNC: 17 MG/DL (ref 5–25)
CALCIUM SERPL-MCNC: 9 MG/DL (ref 8.3–10.1)
CHLORIDE SERPL-SCNC: 101 MMOL/L (ref 100–108)
CO2 SERPL-SCNC: 29 MMOL/L (ref 21–32)
CREAT SERPL-MCNC: 1.3 MG/DL (ref 0.6–1.3)
EOSINOPHIL # BLD AUTO: 0.14 THOUSAND/ΜL (ref 0–0.61)
EOSINOPHIL NFR BLD AUTO: 1 % (ref 0–6)
ERYTHROCYTE [DISTWIDTH] IN BLOOD BY AUTOMATED COUNT: 14.4 % (ref 11.6–15.1)
GFR SERPL CREATININE-BSD FRML MDRD: 53 ML/MIN/1.73SQ M
GLUCOSE SERPL-MCNC: 168 MG/DL (ref 65–140)
GLUCOSE SERPL-MCNC: 180 MG/DL (ref 65–140)
GLUCOSE SERPL-MCNC: 183 MG/DL (ref 65–140)
GLUCOSE SERPL-MCNC: 225 MG/DL (ref 65–140)
GLUCOSE SERPL-MCNC: 227 MG/DL (ref 65–140)
HCT VFR BLD AUTO: 36.7 % (ref 36.5–49.3)
HGB BLD-MCNC: 11.9 G/DL (ref 12–17)
IMM GRANULOCYTES # BLD AUTO: 0.06 THOUSAND/UL (ref 0–0.2)
IMM GRANULOCYTES NFR BLD AUTO: 1 % (ref 0–2)
LYMPHOCYTES # BLD AUTO: 2.01 THOUSANDS/ΜL (ref 0.6–4.47)
LYMPHOCYTES NFR BLD AUTO: 19 % (ref 14–44)
MAGNESIUM SERPL-MCNC: 1.6 MG/DL (ref 1.6–2.6)
MCH RBC QN AUTO: 28.3 PG (ref 26.8–34.3)
MCHC RBC AUTO-ENTMCNC: 32.4 G/DL (ref 31.4–37.4)
MCV RBC AUTO: 87 FL (ref 82–98)
MONOCYTES # BLD AUTO: 1.07 THOUSAND/ΜL (ref 0.17–1.22)
MONOCYTES NFR BLD AUTO: 10 % (ref 4–12)
NEUTROPHILS # BLD AUTO: 7.07 THOUSANDS/ΜL (ref 1.85–7.62)
NEUTS SEG NFR BLD AUTO: 69 % (ref 43–75)
NRBC BLD AUTO-RTO: 0 /100 WBCS
PHOSPHATE SERPL-MCNC: 2.5 MG/DL (ref 2.3–4.1)
PLATELET # BLD AUTO: 238 THOUSANDS/UL (ref 149–390)
PMV BLD AUTO: 10.1 FL (ref 8.9–12.7)
POTASSIUM SERPL-SCNC: 4.2 MMOL/L (ref 3.5–5.3)
PROT SERPL-MCNC: 7.2 G/DL (ref 6.4–8.2)
RBC # BLD AUTO: 4.2 MILLION/UL (ref 3.88–5.62)
SODIUM SERPL-SCNC: 137 MMOL/L (ref 136–145)
WBC # BLD AUTO: 10.38 THOUSAND/UL (ref 4.31–10.16)

## 2019-03-03 PROCEDURE — 93970 EXTREMITY STUDY: CPT

## 2019-03-03 PROCEDURE — 99232 SBSQ HOSP IP/OBS MODERATE 35: CPT | Performed by: INTERNAL MEDICINE

## 2019-03-03 PROCEDURE — 80053 COMPREHEN METABOLIC PANEL: CPT | Performed by: STUDENT IN AN ORGANIZED HEALTH CARE EDUCATION/TRAINING PROGRAM

## 2019-03-03 PROCEDURE — 84100 ASSAY OF PHOSPHORUS: CPT | Performed by: STUDENT IN AN ORGANIZED HEALTH CARE EDUCATION/TRAINING PROGRAM

## 2019-03-03 PROCEDURE — 82948 REAGENT STRIP/BLOOD GLUCOSE: CPT

## 2019-03-03 PROCEDURE — 93970 EXTREMITY STUDY: CPT | Performed by: SURGERY

## 2019-03-03 PROCEDURE — 85025 COMPLETE CBC W/AUTO DIFF WBC: CPT | Performed by: STUDENT IN AN ORGANIZED HEALTH CARE EDUCATION/TRAINING PROGRAM

## 2019-03-03 PROCEDURE — 83735 ASSAY OF MAGNESIUM: CPT | Performed by: STUDENT IN AN ORGANIZED HEALTH CARE EDUCATION/TRAINING PROGRAM

## 2019-03-03 RX ORDER — HYDROMORPHONE HCL/PF 1 MG/ML
1 SYRINGE (ML) INJECTION
Status: DISCONTINUED | OUTPATIENT
Start: 2019-03-03 | End: 2019-03-04 | Stop reason: HOSPADM

## 2019-03-03 RX ORDER — INSULIN GLARGINE 100 [IU]/ML
10 INJECTION, SOLUTION SUBCUTANEOUS EVERY MORNING
Status: DISCONTINUED | OUTPATIENT
Start: 2019-03-03 | End: 2019-03-04

## 2019-03-03 RX ORDER — FENTANYL CITRATE 50 UG/ML
25 INJECTION, SOLUTION INTRAMUSCULAR; INTRAVENOUS ONCE
Status: COMPLETED | OUTPATIENT
Start: 2019-03-03 | End: 2019-03-03

## 2019-03-03 RX ORDER — CEFTRIAXONE 1 G/50ML
1000 INJECTION, SOLUTION INTRAVENOUS EVERY 24 HOURS
Status: DISCONTINUED | OUTPATIENT
Start: 2019-03-03 | End: 2019-03-04 | Stop reason: HOSPADM

## 2019-03-03 RX ORDER — MAGNESIUM SULFATE HEPTAHYDRATE 40 MG/ML
2 INJECTION, SOLUTION INTRAVENOUS ONCE
Status: COMPLETED | OUTPATIENT
Start: 2019-03-03 | End: 2019-03-03

## 2019-03-03 RX ADMIN — OXYCODONE HYDROCHLORIDE 10 MG: 10 TABLET, FILM COATED, EXTENDED RELEASE ORAL at 21:27

## 2019-03-03 RX ADMIN — GABAPENTIN 600 MG: 300 CAPSULE ORAL at 21:26

## 2019-03-03 RX ADMIN — GABAPENTIN 600 MG: 300 CAPSULE ORAL at 08:16

## 2019-03-03 RX ADMIN — MAGNESIUM SULFATE HEPTAHYDRATE 2 G: 40 INJECTION, SOLUTION INTRAVENOUS at 08:17

## 2019-03-03 RX ADMIN — INSULIN GLARGINE 10 UNITS: 100 INJECTION, SOLUTION SUBCUTANEOUS at 08:17

## 2019-03-03 RX ADMIN — LISINOPRIL 5 MG: 5 TABLET ORAL at 08:16

## 2019-03-03 RX ADMIN — HEPARIN SODIUM 7500 UNITS: 5000 INJECTION INTRAVENOUS; SUBCUTANEOUS at 15:07

## 2019-03-03 RX ADMIN — GABAPENTIN 600 MG: 300 CAPSULE ORAL at 15:05

## 2019-03-03 RX ADMIN — NYSTATIN: 100000 POWDER TOPICAL at 21:27

## 2019-03-03 RX ADMIN — HYDROMORPHONE HYDROCHLORIDE 0.5 MG: 1 INJECTION, SOLUTION INTRAMUSCULAR; INTRAVENOUS; SUBCUTANEOUS at 06:08

## 2019-03-03 RX ADMIN — OXYCODONE HYDROCHLORIDE 10 MG: 10 TABLET, FILM COATED, EXTENDED RELEASE ORAL at 08:15

## 2019-03-03 RX ADMIN — FENTANYL CITRATE 25 MCG: 50 INJECTION INTRAMUSCULAR; INTRAVENOUS at 00:51

## 2019-03-03 RX ADMIN — ASPIRIN 81 MG 81 MG: 81 TABLET ORAL at 08:16

## 2019-03-03 RX ADMIN — HEPARIN SODIUM 7500 UNITS: 5000 INJECTION INTRAVENOUS; SUBCUTANEOUS at 21:26

## 2019-03-03 RX ADMIN — HYDROMORPHONE HYDROCHLORIDE 1 MG: 1 INJECTION, SOLUTION INTRAMUSCULAR; INTRAVENOUS; SUBCUTANEOUS at 18:04

## 2019-03-03 RX ADMIN — NYSTATIN: 100000 POWDER TOPICAL at 08:21

## 2019-03-03 RX ADMIN — HYDROMORPHONE HYDROCHLORIDE 1 MG: 1 INJECTION, SOLUTION INTRAMUSCULAR; INTRAVENOUS; SUBCUTANEOUS at 10:32

## 2019-03-03 RX ADMIN — CEFTRIAXONE 1000 MG: 1 INJECTION, SOLUTION INTRAVENOUS at 10:26

## 2019-03-03 RX ADMIN — CARVEDILOL 6.25 MG: 3.12 TABLET, FILM COATED ORAL at 08:16

## 2019-03-03 RX ADMIN — INSULIN LISPRO 2 UNITS: 100 INJECTION, SOLUTION INTRAVENOUS; SUBCUTANEOUS at 21:26

## 2019-03-03 RX ADMIN — ATORVASTATIN CALCIUM 20 MG: 10 TABLET, FILM COATED ORAL at 08:16

## 2019-03-03 RX ADMIN — HEPARIN SODIUM 7500 UNITS: 5000 INJECTION INTRAVENOUS; SUBCUTANEOUS at 05:05

## 2019-03-03 RX ADMIN — TAMSULOSIN HYDROCHLORIDE 0.4 MG: 0.4 CAPSULE ORAL at 18:03

## 2019-03-03 RX ADMIN — OXYCODONE HYDROCHLORIDE 10 MG: 10 TABLET ORAL at 15:06

## 2019-03-03 RX ADMIN — CARVEDILOL 6.25 MG: 3.12 TABLET, FILM COATED ORAL at 18:03

## 2019-03-03 NOTE — NURSING NOTE
Pt  Given 25 mcg of fentanyl IV for a one time dose at this time per KELSEY Jeong for 10/10 pain in bilateral legs more specifically the R leg  Previously given PRN dilaudid 0 5mg IV at 2343 did not relieve the patient's pain  Will continue to monitor

## 2019-03-03 NOTE — ASSESSMENT & PLAN NOTE
Creatinine 1 31 on admission   Baseline creatinine around 1 16  - Trend BMP  - Cautious use of nephrotoxic agents  - Continue home medication Lasix 40 mg PO daily

## 2019-03-03 NOTE — ASSESSMENT & PLAN NOTE
PT/OT evaluation  - CT right tibia and fibula on 3/2/10 revealed no acute or healing fracture of right lower extremity  Small to moderate suprapatellar joint effusion   Diffuse sot tissue edema   - Patient states that he gets pain medication from his PCP and he has a follow up appointment with Dr Nohemi Joyner in April    - Patient to be follow up with pain management and request short term rehab upon discharge

## 2019-03-03 NOTE — ASSESSMENT & PLAN NOTE
Blood pressure was slightly elevated in the emergency room  Monitor per protocol  Continue prior to admission medication  BP persistently in range of 150-170/70-80 mmHg

## 2019-03-03 NOTE — ASSESSMENT & PLAN NOTE
UA revealed positive nitrite and moderate bacteria on urine microscopy  Patient denied any symptoms of dysuria or increased urinary frequency    - urine culture positive for Citrobacter freundii   - 3/3/19: started Ceftriaxone 1g Q24H x 3 days

## 2019-03-03 NOTE — PROGRESS NOTES
Patient noted to be more lethargic than earlier  Glucose checked, 176mg/dl  Patients oxygen saturation noted at 95% on 2L oxygen via nasal cannula  Marcelino Curry PA-C notified of patients status  Per verbal order, oxycontin 10mg being held  Will continue to monitor

## 2019-03-03 NOTE — PROGRESS NOTES
Progress Note - Dk Melgoza 1945, 74 y o  male MRN: [id number]    Unit/Bed#: 414-01 Encounter: [id number]    Primary Care Provider: Pradip Roman MD   Date and time admitted to hospital: 2/27/2019 11:39 PM        * Hypoglycemia due to insulin  Assessment & Plan  BG 41 on admission - EMS was activated and patient was given D10   - Holding prior to admission Lantus 80 units q 12 hours  - Accu-Cheks Q2 hours  - D 10 normal saline at 100 mL an hour, transitioned to D5 NS @ 75 cc/hr, fluid discontinued this morning (3/1)  - Admit to med surge  - Hypoglycemic protocol in place  - Patient Glucose range continue to be in mid 100s without any insulin  Will continue monitoring glucose level  - Patient's glucose increased to 180-200 on 3/2/19  Lantus 10 unit SC every morning started on 3/3/19  Type 2 diabetes mellitus with hypoglycemia without coma, with long-term current use of insulin [facility])  Assessment & Plan  Lab Results   Component Value Date    HGBA1C 6 1 03/01/2019       Recent Labs     03/02/19  1534 03/02/19  1751 03/02/19  2136 03/03/19  0511   POCGLU 155* 198* 176* 168*       Blood Sugar Average: Last 72 hrs:  (P) 140 65     Repeat HbA1c of 5 7 on 2/28/19, repeated 6 1 on 3/1  Restarted Lantus 10 unit every morning SC on 3/3  Continue Accucheck    Chronic diastolic CHF (congestive heart failure) ([facility])  Assessment & Plan  - Accurate I&Os  - Daily weights  - Cardiac diabetic diet  - Admit to med surge  - Continue home medication Carvedilol 6 25 mg PO BID    Chronic kidney disease, stage III (moderate) ([facility])  Assessment & Plan  Creatinine 1 31 on admission   Baseline creatinine around 1 16  - Trend BMP  - Cautious use of nephrotoxic agents  - Continue home medication Lasix 40 mg PO daily        Morbid obesity with BMI of 45 0-49 9, adult ([facility])  Assessment & Plan  Recommend weight loss a nutrition consult on outpatient basis    Essential hypertension  Assessment & Plan  Blood pressure was slightly elevated in the emergency room  Monitor per protocol  Continue prior to admission medication  BP persistently in range of 150-170/70-80 mmHg      Paresthesia of left upper extremity  Assessment & Plan  Patient c/o of left hand and forearm numbness and tingling sensation  - DDx: carpal tunnel syndrome, diabetic neuropathy, hypothyroidism, stroke?  - TSH WNL  - stroke work up negative  - Orthopedics consult appreciated  Patient left arm paresthesia due to DJD of cervical spine with multilevel foraminal stenosis at C5/C6  Recommend outpatient follow up with pain management and spine  Acute cystitis  Assessment & Plan  UA revealed positive nitrite and moderate bacteria on urine microscopy  Patient denied any symptoms of dysuria or increased urinary frequency  - urine culture positive for Citrobacter freundii   - 3/3/19: started Ceftriaxone 1g Q24H x 3 days    Opiate dependence, continuous (HCC)  Assessment & Plan  Patient uses oxycodone 10 mg Q12H  - follow up with outpatient pain management     Neural foraminal stenosis of cervical spine  Assessment & Plan  Patient c/o left forearm and hand paresthesia  - CT cervical spine w/o contrast on 3/1/19 revealed moderate multilevel spondylotic degenerative changes of the cervical spine with moderate to severe bilateral neural foraminal narrowing at C5/C6   - Recommend outpatient follow up with spine specialist and pain management  Acute pain of right lower extremity  Assessment & Plan  PT/OT evaluation  - CT right tibia and fibula on 3/2/10 revealed no acute or healing fracture of right lower extremity  Small to moderate suprapatellar joint effusion  Diffuse sot tissue edema   - Patient states that he gets pain medication from his PCP and he has a follow up appointment with Dr Edda Law in April    - Patient to be follow up with pain management and request short term rehab upon discharge      Subjective:   Patient seen and examined at bedside   Patient reports moderate pain in right lower extremities  Patient received fentanyl IV 25 mcg last night due to severe leg pain  Patient denied N/V, dizziness, abdominal pain, dysuria, chest pain  Patient remains afebrile and hemodynamically stable  Objective:     Vitals: Blood pressure 170/79, pulse 86, temperature 100 °F (37 8 °C), temperature source Temporal, resp  rate 18, height 5' 6" (1 676 m), weight 128 kg (281 lb 8 4 oz), SpO2 93 %  ,Body mass index is 45 44 kg/m²  Wt Readings from Last 3 Encounters:   03/03/19 128 kg (281 lb 8 4 oz)   03/01/19 128 kg (282 lb 3 oz)   10/06/18 133 kg (292 lb 15 9 oz)       Intake/Output Summary (Last 24 hours) at 3/3/2019 0955  Last data filed at 3/3/2019 8890  Gross per 24 hour   Intake 720 ml   Output 600 ml   Net 120 ml       Physical Exam:     Physical Exam   Constitutional: He is oriented to person, place, and time  No distress  HENT:   Head: Normocephalic  Eyes: Pupils are equal, round, and reactive to light  Right eye exhibits no discharge  Left eye exhibits no discharge  Neck: Normal range of motion  Cardiovascular: Normal rate, regular rhythm and normal heart sounds  No murmur heard  Pulmonary/Chest: Effort normal and breath sounds normal  No respiratory distress  Abdominal: Soft  Bowel sounds are normal  There is no tenderness  Obese abdomen   Musculoskeletal: He exhibits tenderness  Tenderness upon light palpation of right mid-shin and ankle  Tenderness upon flexion of right foot  2+ distal pulses bilaterally  Neurological: He is alert and oriented to person, place, and time  Skin: Skin is warm  He is not diaphoretic  Venous stasis in bilateral lower extremities  Psychiatric: He has a normal mood and affect         Recent Results (from the past 24 hour(s))   Fingerstick Glucose (POCT)    Collection Time: 03/02/19 11:48 AM   Result Value Ref Range    POC Glucose 164 (H) 65 - 140 mg/dl   Fingerstick Glucose (POCT)    Collection Time: 03/02/19 3:34 PM   Result Value Ref Range    POC Glucose 155 (H) 65 - 140 mg/dl   Fingerstick Glucose (POCT)    Collection Time: 03/02/19  5:51 PM   Result Value Ref Range    POC Glucose 198 (H) 65 - 140 mg/dl   Fingerstick Glucose (POCT)    Collection Time: 03/02/19  9:36 PM   Result Value Ref Range    POC Glucose 176 (H) 65 - 140 mg/dl   Fingerstick Glucose (POCT)    Collection Time: 03/03/19  5:11 AM   Result Value Ref Range    POC Glucose 168 (H) 65 - 140 mg/dl   CBC and differential    Collection Time: 03/03/19  5:59 AM   Result Value Ref Range    WBC 10 38 (H) 4 31 - 10 16 Thousand/uL    RBC 4 20 3 88 - 5 62 Million/uL    Hemoglobin 11 9 (L) 12 0 - 17 0 g/dL    Hematocrit 36 7 36 5 - 49 3 %    MCV 87 82 - 98 fL    MCH 28 3 26 8 - 34 3 pg    MCHC 32 4 31 4 - 37 4 g/dL    RDW 14 4 11 6 - 15 1 %    MPV 10 1 8 9 - 12 7 fL    Platelets 569 374 - 254 Thousands/uL    nRBC 0 /100 WBCs    Neutrophils Relative 69 43 - 75 %    Immat GRANS % 1 0 - 2 %    Lymphocytes Relative 19 14 - 44 %    Monocytes Relative 10 4 - 12 %    Eosinophils Relative 1 0 - 6 %    Basophils Relative 0 0 - 1 %    Neutrophils Absolute 7 07 1 85 - 7 62 Thousands/µL    Immature Grans Absolute 0 06 0 00 - 0 20 Thousand/uL    Lymphocytes Absolute 2 01 0 60 - 4 47 Thousands/µL    Monocytes Absolute 1 07 0 17 - 1 22 Thousand/µL    Eosinophils Absolute 0 14 0 00 - 0 61 Thousand/µL    Basophils Absolute 0 03 0 00 - 0 10 Thousands/µL   Comprehensive metabolic panel    Collection Time: 03/03/19  5:59 AM   Result Value Ref Range    Sodium 137 136 - 145 mmol/L    Potassium 4 2 3 5 - 5 3 mmol/L    Chloride 101 100 - 108 mmol/L    CO2 29 21 - 32 mmol/L    ANION GAP 7 4 - 13 mmol/L    BUN 17 5 - 25 mg/dL    Creatinine 1 30 0 60 - 1 30 mg/dL    Glucose 183 (H) 65 - 140 mg/dL    Calcium 9 0 8 3 - 10 1 mg/dL    AST 14 5 - 45 U/L    ALT 21 12 - 78 U/L    Alkaline Phosphatase 121 (H) 46 - 116 U/L    Total Protein 7 2 6 4 - 8 2 g/dL    Albumin 2 8 (L) 3 5 - 5 0 g/dL    Total Bilirubin 0 80 0 20 - 1 00 mg/dL    eGFR 53 ml/min/1 73sq m   Magnesium    Collection Time: 03/03/19  5:59 AM   Result Value Ref Range    Magnesium 1 6 1 6 - 2 6 mg/dL   Phosphorus    Collection Time: 03/03/19  5:59 AM   Result Value Ref Range    Phosphorus 2 5 2 3 - 4 1 mg/dL       Current Facility-Administered Medications   Medication Dose Route Frequency Provider Last Rate Last Dose    acetaminophen (TYLENOL) tablet 650 mg  650 mg Oral Q6H PRN Rudy Peek, DO        aspirin chewable tablet 81 mg  81 mg Oral Daily Devorah Y Swank, CRNP   81 mg at 03/03/19 0816    atorvastatin (LIPITOR) tablet 20 mg  20 mg Oral Daily Devorah Y Swank, CRNP   20 mg at 03/03/19 0816    carvedilol (COREG) tablet 6 25 mg  6 25 mg Oral BID Rudy Peek, DO   6 25 mg at 03/03/19 0816    cefTRIAXone (ROCEPHIN) IVPB (premix) 1,000 mg  1,000 mg Intravenous Q24H Rudy Peek, DO        dextrose 50 % IV solution 25 mL  25 mL Intravenous PRN Rudy Peek, DO        gabapentin (NEURONTIN) capsule 600 mg  600 mg Oral TID Dali Rowland CRNP   600 mg at 03/03/19 0816    heparin (porcine) subcutaneous injection 7,500 Units  7,500 Units Subcutaneous [facility] Rudy Peek, DO   7,500 Units at 03/03/19 0505    HYDROmorphone (DILAUDID) injection 1 mg  1 mg Intravenous Q3H PRN Rudy Peek, DO        insulin glargine (LANTUS) subcutaneous injection 10 Units 0 1 mL  10 Units Subcutaneous QAM Rudy Peek, DO   10 Units at 03/03/19 0817    lisinopril (ZESTRIL) tablet 5 mg  5 mg Oral Daily Saundra Y Swank, CRNP   5 mg at 03/03/19 0816    magnesium sulfate 2 g/50 mL IVPB (premix) 2 g  2 g Intravenous Once Rudy Peek, DO   2 g at 03/03/19 0636    nystatin (MYCOSTATIN) powder   Topical BID Rudy Peek, DO        ondansetron [facility]) injection 4 mg  4 mg Intravenous Q4H PRN Rudy Peek, DO        oxyCODONE (OxyCONTIN) 12 hr tablet 10 mg  10 mg Oral Q12H [address] HEATH Fish   10 mg at 03/03/19 0815    oxyCODONE (ROXICODONE) immediate release tablet 10 mg  10 mg Oral Q4H PRN Boom Schroeder DO        pneumococcal 13-valent conjugate vaccine (PREVNAR-13) IM injection 0 5 mL  0 5 mL Intramuscular Prior to discharge Boom Schroeder DO        tamsulosin [facility]) capsule 0 4 mg  0 4 mg Oral QPM Devorahjose Rivas, CRNP   0 4 mg at 03/02/19 1800       Invasive Devices     Peripheral Intravenous Line            Peripheral IV 03/02/19 Left Antecubital less than 1 day                Lab, Imaging and other studies: I have personally reviewed pertinent reports      VTE Pharmacologic Prophylaxis: Heparin 7500 Unit SC Q8H  VTE Mechanical Prophylaxis: sequential compression device    Jeff Wilson MD

## 2019-03-03 NOTE — ASSESSMENT & PLAN NOTE
Lab Results   Component Value Date    HGBA1C 6 1 03/01/2019       Recent Labs     03/02/19  1534 03/02/19  1751 03/02/19  2136 03/03/19  0511   POCGLU 155* 198* 176* 168*       Blood Sugar Average: Last 72 hrs:  (P) 140 65     Repeat HbA1c of 5 7 on 2/28/19, repeated 6 1 on 3/1  Restarted Lantus 10 unit every morning SC on 3/3  Continue Accucheck

## 2019-03-03 NOTE — SOCIAL WORK
At pt request referral was made to the 5th floor  CM will follow up in am regarding ?bed/and if pt is accepted  Will also need authorization with insurance for SNF/STR

## 2019-03-03 NOTE — PROGRESS NOTES
Patients oxygen saturation 88% on room air while sleeping  Patient placed on 2L oxygen via nasal cannula per respiratory  Oxygen saturation rechecked, 95%  Will continue to monitor

## 2019-03-03 NOTE — ASSESSMENT & PLAN NOTE
BG 41 on admission - EMS was activated and patient was given D10   - Holding prior to admission Lantus 80 units q 12 hours  - Accu-Cheks Q2 hours  - D 10 normal saline at 100 mL an hour, transitioned to D5 NS @ 75 cc/hr, fluid discontinued this morning (3/1)  - Admit to med surge  - Hypoglycemic protocol in place  - Patient Glucose range continue to be in mid 100s without any insulin  Will continue monitoring glucose level  - Patient's glucose increased to 180-200 on 3/2/19  Lantus 10 unit SC every morning started on 3/3/19

## 2019-03-04 ENCOUNTER — HOSPITAL ENCOUNTER (INPATIENT)
Facility: HOSPITAL | Age: 74
LOS: 21 days | Discharge: HOME | DRG: 948 | End: 2019-03-25
Attending: INTERNAL MEDICINE | Admitting: INTERNAL MEDICINE
Payer: COMMERCIAL

## 2019-03-04 VITALS
HEART RATE: 81 BPM | TEMPERATURE: 97.6 F | RESPIRATION RATE: 18 BRPM | DIASTOLIC BLOOD PRESSURE: 65 MMHG | WEIGHT: 280.65 LBS | HEIGHT: 66 IN | OXYGEN SATURATION: 93 % | BODY MASS INDEX: 45.1 KG/M2 | SYSTOLIC BLOOD PRESSURE: 131 MMHG

## 2019-03-04 DIAGNOSIS — N39.0 COMPLICATED UTI (URINARY TRACT INFECTION): ICD-10-CM

## 2019-03-04 DIAGNOSIS — I50.32 CHRONIC DIASTOLIC CHF (CONGESTIVE HEART FAILURE) (HCC): Primary | ICD-10-CM

## 2019-03-04 DIAGNOSIS — N30.00 ACUTE CYSTITIS WITHOUT HEMATURIA: ICD-10-CM

## 2019-03-04 DIAGNOSIS — N18.30 CHRONIC KIDNEY DISEASE, STAGE III (MODERATE) (HCC): ICD-10-CM

## 2019-03-04 LAB
ALBUMIN SERPL BCP-MCNC: 2.7 G/DL (ref 3.5–5)
ALP SERPL-CCNC: 126 U/L (ref 46–116)
ALT SERPL W P-5'-P-CCNC: 19 U/L (ref 12–78)
ANION GAP SERPL CALCULATED.3IONS-SCNC: 7 MMOL/L (ref 4–13)
AST SERPL W P-5'-P-CCNC: 19 U/L (ref 5–45)
BASOPHILS # BLD AUTO: 0.03 THOUSANDS/ΜL (ref 0–0.1)
BASOPHILS NFR BLD AUTO: 0 % (ref 0–1)
BILIRUB SERPL-MCNC: 0.5 MG/DL (ref 0.2–1)
BUN SERPL-MCNC: 26 MG/DL (ref 5–25)
CALCIUM SERPL-MCNC: 8.9 MG/DL (ref 8.3–10.1)
CHLORIDE SERPL-SCNC: 103 MMOL/L (ref 100–108)
CO2 SERPL-SCNC: 28 MMOL/L (ref 21–32)
CREAT SERPL-MCNC: 1.4 MG/DL (ref 0.6–1.3)
EOSINOPHIL # BLD AUTO: 0.16 THOUSAND/ΜL (ref 0–0.61)
EOSINOPHIL NFR BLD AUTO: 1 % (ref 0–6)
ERYTHROCYTE [DISTWIDTH] IN BLOOD BY AUTOMATED COUNT: 14.2 % (ref 11.6–15.1)
GFR SERPL CREATININE-BSD FRML MDRD: 49 ML/MIN/1.73SQ M
GLUCOSE SERPL-MCNC: 183 MG/DL (ref 65–140)
GLUCOSE SERPL-MCNC: 184 MG/DL (ref 65–140)
GLUCOSE SERPL-MCNC: 189 MG/DL (ref 65–140)
GLUCOSE SERPL-MCNC: 201 MG/DL (ref 65–140)
GLUCOSE SERPL-MCNC: 267 MG/DL (ref 65–140)
HCT VFR BLD AUTO: 35.8 % (ref 36.5–49.3)
HGB BLD-MCNC: 11.3 G/DL (ref 12–17)
IMM GRANULOCYTES # BLD AUTO: 0.07 THOUSAND/UL (ref 0–0.2)
IMM GRANULOCYTES NFR BLD AUTO: 1 % (ref 0–2)
LYMPHOCYTES # BLD AUTO: 2.04 THOUSANDS/ΜL (ref 0.6–4.47)
LYMPHOCYTES NFR BLD AUTO: 18 % (ref 14–44)
MAGNESIUM SERPL-MCNC: 2 MG/DL (ref 1.6–2.6)
MCH RBC QN AUTO: 27.8 PG (ref 26.8–34.3)
MCHC RBC AUTO-ENTMCNC: 31.6 G/DL (ref 31.4–37.4)
MCV RBC AUTO: 88 FL (ref 82–98)
MONOCYTES # BLD AUTO: 1.15 THOUSAND/ΜL (ref 0.17–1.22)
MONOCYTES NFR BLD AUTO: 10 % (ref 4–12)
NEUTROPHILS # BLD AUTO: 7.95 THOUSANDS/ΜL (ref 1.85–7.62)
NEUTS SEG NFR BLD AUTO: 70 % (ref 43–75)
NRBC BLD AUTO-RTO: 0 /100 WBCS
PHOSPHATE SERPL-MCNC: 3 MG/DL (ref 2.3–4.1)
PLATELET # BLD AUTO: 229 THOUSANDS/UL (ref 149–390)
PMV BLD AUTO: 10.3 FL (ref 8.9–12.7)
POTASSIUM SERPL-SCNC: 4.1 MMOL/L (ref 3.5–5.3)
PROT SERPL-MCNC: 7 G/DL (ref 6.4–8.2)
RBC # BLD AUTO: 4.06 MILLION/UL (ref 3.88–5.62)
SODIUM SERPL-SCNC: 138 MMOL/L (ref 136–145)
WBC # BLD AUTO: 11.4 THOUSAND/UL (ref 4.31–10.16)

## 2019-03-04 PROCEDURE — 82948 REAGENT STRIP/BLOOD GLUCOSE: CPT

## 2019-03-04 PROCEDURE — 99239 HOSP IP/OBS DSCHRG MGMT >30: CPT | Performed by: INTERNAL MEDICINE

## 2019-03-04 PROCEDURE — 83735 ASSAY OF MAGNESIUM: CPT | Performed by: STUDENT IN AN ORGANIZED HEALTH CARE EDUCATION/TRAINING PROGRAM

## 2019-03-04 PROCEDURE — 80053 COMPREHEN METABOLIC PANEL: CPT | Performed by: STUDENT IN AN ORGANIZED HEALTH CARE EDUCATION/TRAINING PROGRAM

## 2019-03-04 PROCEDURE — 97535 SELF CARE MNGMENT TRAINING: CPT

## 2019-03-04 PROCEDURE — 85025 COMPLETE CBC W/AUTO DIFF WBC: CPT | Performed by: STUDENT IN AN ORGANIZED HEALTH CARE EDUCATION/TRAINING PROGRAM

## 2019-03-04 PROCEDURE — 97530 THERAPEUTIC ACTIVITIES: CPT

## 2019-03-04 PROCEDURE — 84100 ASSAY OF PHOSPHORUS: CPT | Performed by: STUDENT IN AN ORGANIZED HEALTH CARE EDUCATION/TRAINING PROGRAM

## 2019-03-04 PROCEDURE — 97163 PT EVAL HIGH COMPLEX 45 MIN: CPT

## 2019-03-04 RX ORDER — GABAPENTIN 600 MG/1
600 TABLET ORAL 2 TIMES DAILY
Refills: 0
Start: 2019-03-04 | End: 2022-04-13 | Stop reason: HOSPADM

## 2019-03-04 RX ORDER — ACETAMINOPHEN 325 MG/1
650 TABLET ORAL EVERY 6 HOURS PRN
Refills: 0
Start: 2019-03-04 | End: 2020-02-05 | Stop reason: ALTCHOICE

## 2019-03-04 RX ORDER — CIPROFLOXACIN 500 MG/1
500 TABLET, FILM COATED ORAL EVERY 12 HOURS SCHEDULED
Refills: 0
Start: 2019-03-05 | End: 2019-03-10

## 2019-03-04 RX ORDER — OXYCODONE HYDROCHLORIDE 10 MG/1
10 TABLET ORAL EVERY 4 HOURS PRN
Qty: 10 TABLET | Refills: 0 | Status: SHIPPED | OUTPATIENT
Start: 2019-03-04 | End: 2022-04-13 | Stop reason: HOSPADM

## 2019-03-04 RX ORDER — HEPARIN SODIUM 5000 [USP'U]/ML
5000 INJECTION, SOLUTION INTRAVENOUS; SUBCUTANEOUS EVERY 8 HOURS SCHEDULED
Qty: 1 ML | Refills: 0
Start: 2019-03-04 | End: 2020-02-07 | Stop reason: HOSPADM

## 2019-03-04 RX ORDER — CARVEDILOL 12.5 MG/1
12.5 TABLET ORAL 2 TIMES DAILY
Start: 2019-03-04

## 2019-03-04 RX ORDER — INSULIN GLARGINE 100 [IU]/ML
15 INJECTION, SOLUTION SUBCUTANEOUS EVERY MORNING
Refills: 0
Start: 2019-03-05 | End: 2020-02-07 | Stop reason: HOSPADM

## 2019-03-04 RX ORDER — NYSTATIN 100000 [USP'U]/G
POWDER TOPICAL 2 TIMES DAILY
Qty: 15 G | Refills: 0
Start: 2019-03-04 | End: 2022-04-13 | Stop reason: HOSPADM

## 2019-03-04 RX ORDER — INSULIN GLARGINE 100 [IU]/ML
15 INJECTION, SOLUTION SUBCUTANEOUS EVERY MORNING
Status: DISCONTINUED | OUTPATIENT
Start: 2019-03-04 | End: 2019-03-04 | Stop reason: HOSPADM

## 2019-03-04 RX ORDER — ATORVASTATIN CALCIUM 40 MG/1
40 TABLET, FILM COATED ORAL
Start: 2019-03-05

## 2019-03-04 RX ADMIN — LISINOPRIL 5 MG: 5 TABLET ORAL at 09:54

## 2019-03-04 RX ADMIN — NYSTATIN: 100000 POWDER TOPICAL at 09:58

## 2019-03-04 RX ADMIN — INSULIN LISPRO 3 UNITS: 100 INJECTION, SOLUTION INTRAVENOUS; SUBCUTANEOUS at 08:00

## 2019-03-04 RX ADMIN — INSULIN GLARGINE 15 UNITS: 100 INJECTION, SOLUTION SUBCUTANEOUS at 09:54

## 2019-03-04 RX ADMIN — ASPIRIN 81 MG 81 MG: 81 TABLET ORAL at 09:54

## 2019-03-04 RX ADMIN — CARVEDILOL 6.25 MG: 3.12 TABLET, FILM COATED ORAL at 09:53

## 2019-03-04 RX ADMIN — ACETAMINOPHEN 650 MG: 325 TABLET, FILM COATED ORAL at 02:20

## 2019-03-04 RX ADMIN — INSULIN LISPRO 3 UNITS: 100 INJECTION, SOLUTION INTRAVENOUS; SUBCUTANEOUS at 11:25

## 2019-03-04 RX ADMIN — ATORVASTATIN CALCIUM 20 MG: 10 TABLET, FILM COATED ORAL at 09:54

## 2019-03-04 RX ADMIN — INSULIN LISPRO 3 UNITS: 100 INJECTION, SOLUTION INTRAVENOUS; SUBCUTANEOUS at 11:24

## 2019-03-04 RX ADMIN — HYDROMORPHONE HYDROCHLORIDE 1 MG: 1 INJECTION, SOLUTION INTRAMUSCULAR; INTRAVENOUS; SUBCUTANEOUS at 01:27

## 2019-03-04 RX ADMIN — CEFTRIAXONE 1000 MG: 1 INJECTION, SOLUTION INTRAVENOUS at 10:03

## 2019-03-04 RX ADMIN — OXYCODONE HYDROCHLORIDE 10 MG: 10 TABLET, FILM COATED, EXTENDED RELEASE ORAL at 09:56

## 2019-03-04 RX ADMIN — INSULIN LISPRO 1 UNITS: 100 INJECTION, SOLUTION INTRAVENOUS; SUBCUTANEOUS at 07:54

## 2019-03-04 RX ADMIN — GABAPENTIN 600 MG: 300 CAPSULE ORAL at 09:55

## 2019-03-04 RX ADMIN — HEPARIN SODIUM 7500 UNITS: 5000 INJECTION INTRAVENOUS; SUBCUTANEOUS at 05:57

## 2019-03-04 NOTE — NURSING NOTE
KELSEY Jeong notified that patient's HS FSBS was 227  Pt  Currently has no HS coverage ordered at this time  KELSEY deng

## 2019-03-04 NOTE — SOCIAL WORK
Pending reference number for pt to go to the 5th floor on dc is [id number]  Cm sent over clinical to [facility], awaiting a call back

## 2019-03-04 NOTE — ASSESSMENT & PLAN NOTE
UA revealed positive nitrite and moderate bacteria on urine microscopy  Patient denied any symptoms of dysuria or increased urinary frequency  - urine culture positive for Citrobacter freundii   - 3/3/19: started Ceftriaxone 1g Q24H x 3 days    Patient will be discharged with Ciprofloxacin 500mg PO Q12H x 5 days

## 2019-03-04 NOTE — PROGRESS NOTES
Patient has a temperature of 100 1 at this time  KELSEY Jeong notified and PRN order of tylenol 650mg given at this time  Will continue to monitor

## 2019-03-04 NOTE — OCCUPATIONAL THERAPY NOTE
OccupationalTherapy Progress Note     Patient Name: Vivi Goff  Today's Date: 3/4/2019  Problem List  Patient Active Problem List   Diagnosis    Dyspnea on exertion    Type 2 diabetes mellitus with hypoglycemia without coma, with long-term current use of insulin ([facility])    Chronic kidney disease, stage III (moderate) ([facility])    Chronic diastolic CHF (congestive heart failure) ([facility])    Obstructive sleep apnea syndrome    Morbid obesity with BMI of 45 0-49 9, adult ([facility] [address]    Essential hypertension    Type 2 diabetes mellitus with hyperglycemia, with long-term current use of insulin ([facility])    Complicated UTI (urinary tract infection)    Uncontrolled type 2 diabetes mellitus with hyperglycemia, with long-term current use of insulin ([facility])    Dehydration with hyponatremia    Hypoglycemia due to insulin    Acute cystitis    Opiate dependence, continuous ([facility])    Paresthesia of left upper extremity    Neural foraminal stenosis of cervical spine    Acute pain of right lower extremity               03/04/19 0919   Restrictions/Precautions   Weight Bearing Precautions Per Order No   Other Precautions Fall Risk;Pain   Pain Assessment   Pain Assessment 0-10   Pain Score 7   Pain Type Chronic pain   Pain Location Leg   Pain Orientation Bilateral   Hospital Pain Intervention(s) Distraction   ADL   Where Assessed Edge of bed   Grooming Assistance 5  Supervision/Setup   Grooming Deficit Setup; Wash/dry hands; Wash/dry face; Teeth care   UB Bathing Assistance 5  Supervision/Setup   UB Bathing Deficit Setup   UB Bathing Comments assist with back   LB Bathing Assistance 2  Maximal Assistance   LB Bathing Deficit Setup;Verbal cueing;Right lower leg including foot; Left lower leg including foot   LB Bathing Comments tender to touch/senstive LEs   UB Dressing Assistance 4  Minimal Assistance   UB Dressing Deficit Fasteners   LB Dressing Assistance 2  Maximal Assistance   LB Dressing Deficit Don/doff R sock; Don/doff L sock LB Dressing Comments pt declined lexus care/unable to stand  nursing aware   Transfers   Sit to Stand Unable to assess   Additional items   (pt declined  states too much pain)   Cognition   Overall Cognitive Status WFL   Arousal/Participation Alert   Attention Within functional limits   Orientation Level Oriented X4   Memory Within functional limits   Following Commands Follows all commands and directions without difficulty   Activity Tolerance   Activity Tolerance Patient limited by pain   Assessment   Assessment Pt presents at EOB is in agreement to completed ADL tx  Pt was set up at EOB  Bathes UE except back and LE to knees  Pt required max A below knee bathing with increased sensitivity to touch of legs/shins  Max A for LE dressing/socks  Pt was unable to stand to perform lexus hygiene needs or brief change due to increased pain of LEs  Nursing aware of same  Tolerated tx fair, though limited by mild decreased activity tolerance and pain  Recommend continued OT intervention to meet POC goals maximal (I) level  Recommend STR upon dc  Spoke with OTR in regards to treatment session and progress thus far in POC, agreed with disposition  Plan   Goal Expiration Date 03/15/19   Treatment Day 1   Recommendation   OT Discharge Recommendation Short Term Rehab   Pt will benefit from continued OT services in order to maximize (I) c ADL performance, FM c RW, and improve overall endurance/strength required to complete functional tasks in preparation for d/c  Pt left at EOB with all needs within reach

## 2019-03-04 NOTE — NURSING NOTE
Patient discharged to 5th Floor Acute Rehab Unit at [place] [id number] in stable condition  All discharge paperwork including Summary of Care and printed prescriptions sent with ERINN Nuñez who were transporting patient to the 5th floor via bed    Report called in to Gabi Harris by Curry Lorenzo RN

## 2019-03-04 NOTE — ASSESSMENT & PLAN NOTE
PT/OT evaluation  - CT right tibia and fibula on 3/2/19 revealed no acute or healing fracture of right lower extremity  Small to moderate suprapatellar joint effusion  Diffuse sot tissue edema  - Venous Doppler of bilateral lower extremities on 3/3/19 revealed no acute or chronic DVT or superficial thrombophlebitis  - Patient had injury on his right lower leg many years ago where an oil can fell on his leg  This pain can be secondary to reflex sympathetic dystrophy vs  Diabetic neuropathy    - Patient states that he gets pain medication from his PCP and he has a follow up appointment with Dr Lencho French in April

## 2019-03-04 NOTE — DISCHARGE SUMMARY
Discharge- Vivi Goff 1945, 74 y o  male MRN: [id number]    Unit/Bed#: 414-01 Encounter: [id number]    Primary Care Provider: Alona Harrington MD   Date and time admitted to hospital: 2/27/2019 11:39 PM        * Hypoglycemia due to insulin  Assessment & Plan  BG 41 on admission - EMS was activated and patient was given D10   - Holding prior to admission Lantus 80 units q 12 hours  - Accu-Cheks Q2 hours  - D 10 normal saline at 100 mL an hour, transitioned to D5 NS @ 75 cc/hr, fluid discontinued this morning (3/1)  - Admit to med surge  - Hypoglycemic protocol in place  - Patient Glucose range continue to be in mid 100s without any insulin  Will continue monitoring glucose level  - Patient's glucose increased to 180-200 on 3/2/19  Lantus 10 unit SC every morning started on 3/3/19  Patient will be discharged with Lantus 15 unit and Humalog 3 Units premeals  Patient will also be referred for outpatient endocrinology follow up for Type II DM control  Type 2 diabetes mellitus with hypoglycemia without coma, with long-term current use of insulin [facility])  Assessment & Plan  Lab Results   Component Value Date    HGBA1C 6 1 03/01/2019       Recent Labs     03/03/19  1810 03/03/19  2052 03/04/19  0600 03/04/19  0750   POCGLU 225* 227* 183* 184*       Blood Sugar Average: Last 72 hrs:  (P) 863 [id number]     Repeat HbA1c of 5 7 on 2/28/19, repeated 6 1 on 3/1  Restarted Lantus 10 unit every morning SC on 3/3  Continue Accucheck    Patient will be discharged with Lantus 15 unit and Humalog 3 Unit premeals  Patient will follow up outpatient Endocrinologist for further Type II DM management      Chronic diastolic CHF (congestive heart failure) ([facility])  Assessment & Plan  - Accurate I&Os  - Daily weights  - Cardiac diabetic diet  - Admit to med surge  - Continue home medication Carvedilol 6 25 mg PO BID    Chronic kidney disease, stage III (moderate) (HCC)  Assessment & Plan  Creatinine 1 31 on admission  Baseline creatinine around 1 16  - Trend BMP  - Cautious use of nephrotoxic agents  - Continue home medication Lasix 40 mg PO daily        Morbid obesity with BMI of 45 0-49 9, adult ([facility])  Assessment & Plan  Recommend weight loss a nutrition consult on outpatient basis    Essential hypertension  Assessment & Plan  Blood pressure was slightly elevated in the emergency room  Monitor per protocol  Continue prior to admission medication        Paresthesia of left upper extremity  Assessment & Plan  Patient c/o of left hand and forearm numbness and tingling sensation  - DDx: carpal tunnel syndrome, diabetic neuropathy, hypothyroidism, stroke?  - TSH WNL  - stroke work up negative  - Orthopedics consult appreciated  Patient left arm paresthesia due to DJD of cervical spine with multilevel foraminal stenosis at C5/C6  Recommend outpatient follow up with pain management and spine  Acute cystitis  Assessment & Plan  UA revealed positive nitrite and moderate bacteria on urine microscopy  Patient denied any symptoms of dysuria or increased urinary frequency  - urine culture positive for Citrobacter freundii   - 3/3/19: started Ceftriaxone 1g Q24H x 3 days    Patient will be discharged with Ciprofloxacin 500mg PO Q12H x 5 days  Opiate dependence, continuous ([address] )  Assessment & Plan  Patient uses oxycodone 10 mg Q12H  - follow up with outpatient pain management     Neural foraminal stenosis of cervical spine  Assessment & Plan  Patient c/o left forearm and hand paresthesia  - CT cervical spine w/o contrast on 3/1/19 revealed moderate multilevel spondylotic degenerative changes of the cervical spine with moderate to severe bilateral neural foraminal narrowing at C5/C6   - Recommend outpatient follow up with spine specialist and pain management      Acute pain of right lower extremity  Assessment & Plan  PT/OT evaluation  - CT right tibia and fibula on 3/2/19 revealed no acute or healing fracture of right lower extremity  Small to moderate suprapatellar joint effusion  Diffuse sot tissue edema  - Venous Doppler of bilateral lower extremities on 3/3/19 revealed no acute or chronic DVT or superficial thrombophlebitis  - Patient had injury on his right lower leg many years ago where an oil can fell on his leg  This pain can be secondary to reflex sympathetic dystrophy vs  Diabetic neuropathy    - Patient states that he gets pain medication from his PCP and he has a follow up appointment with Dr Mai Sauer in April  Obstructive sleep apnea syndrome  Assessment & Plan  Patient uses CPAP at night for obstructive sleep apnea  BRIEF OVERVIEW  Admitting Provider: Erick Loza DO  Discharge Provider: Erick Loza DO    Discharge To:  [address]  Facility: Nursing home    Outpatient Follow-Up: 1  PCP Chase Turpin MD 2  Orthopedic Surgery Jessica Cesar MD 3  Neurosurgery Adrianna Vargas MD 4  Endocrinology Anh Sanches MD  Things to address at first follow up visit: 1  Uncontrolled type II DM insulin regimens 2  Chronic right lower extremity pain 3  Left forearm paresthesia secondary to multilevel C5/C6 foraminal stenosis 4   Acute cystitis   Labs and results pending at discharge: none    Admission Date: 2/27/2019     Discharge Date: 3/4/19    Primary Discharge Diagnosis  Principal Problem:    Hypoglycemia due to insulin  Active Problems:    Type 2 diabetes mellitus with hypoglycemia without coma, with long-term current use of insulin ([facility])    Chronic diastolic CHF (congestive heart failure) ([facility])    Chronic kidney disease, stage III (moderate) ([facility])    Morbid obesity with BMI of 45 0-49 9, adult ([facility])    Essential hypertension    Paresthesia of left upper extremity    Acute cystitis    Opiate dependence, continuous ([facility])    Neural foraminal stenosis of cervical spine    Acute pain of right lower extremity    Obstructive sleep apnea syndrome  Resolved Problems:    * No resolved hospital problems  *      Secondary Discharge Diagnoses  none  Consulting Providers   Orthopedics Sidney Berman MD        [address] [place]    Procedures Performed/Pertinent Test results  VAS lower limb venous duplex bilateral on 3/3/19:  Impression:  RIGHT LOWER LIMB:  No gross evidence of acute or chronic deep vein thrombosis  No gross evidence of superficial thrombophlebitis noted  Doppler evaluation shows a normal response to augmentation maneuvers  Popliteal, posterior tibial and anterior tibial arterial Doppler waveforms are  triphasic  Limited visualization of calf veins  LEFT LOWER LIMB:  No gross evidence of acute or chronic deep vein thrombosis  No gross evidence of superficial thrombophlebitis noted  Doppler evaluation shows a normal response to augmentation maneuvers  Popliteal, posterior tibial and anterior tibial arterial Doppler waveforms are  triphasic  Limited visualization of calf veins  Technically difficult/limited study due to body habitus  CT right tibia fibula w/o contrast on 3/2/19:  IMPRESSION:   - No acute or healing fracture of the right lower extremity    - Small to moderate suprapatellar joint effusion    - Diffuse soft tissue edema  X-ray cervical spine on 3/1/19:  Impression: Stable degenerative changes  - No evidence of fracture  - Straightening of the normal cervical lordotic curvature  - Disc space narrowing diffusely throughout the cervical spine with associated uncinate process hypertrophy and facet hypertrophic changes  - The prevertebral soft tissues are within normal limits        Xray left shoulder on 3/1/19: IMPRESSION:  Moderate degenerative changes of the shoulder and acromioclavicular joint      No acute osseous abnormality  VAS Carotid complete study on 3/1/19:  Impression  RIGHT:  There is no evidence of significant disease throughout the extracranial carotid  arteries    Vertebral artery flow is antegrade  There is no significant subclavian artery disease  LEFT:  There is no evidence of significant disease throughout the extracranial carotid  arteries  Vertebral artery flow is antegrade  There is no significant subclavian artery disease  Compared with an exam of 1-, there is no significant change  Tech note: technically difficult due to the depths of the ICAs  CT spine cervical w/o contrast on 3/1/19: IMPRESSION:     No cervical spine fracture or traumatic malalignment      Moderate multilevel spondylotic degenerative changes of the cervical spine as described above with moderate to severe bilateral neural foraminal narrowing at C5/C6  No evidence of critical spinal canal stenosis  If symptoms persist, an MRI of the   cervical spine could be performed for further evaluation  CT head w/o contrast on 3/1/19: No acute intracranial abnormality      Chest X-ray on 2/28/19: No acute cardiopulmonary disease    Results Reviewed     Procedure Component Value Units Date/Time    UA w Reflex to Microscopic w Reflex to Culture [[id number]]  (Abnormal) Collected:  02/28/19 0700    Lab Status:  Final result Specimen:  Urine, Clean Catch Updated:  02/28/19 0745     Color, UA Yellow     Clarity, UA Clear     Specific Gravity, UA 1 010     pH, UA 5 5     Leukocytes, UA Negative     Nitrite, UA Positive     Protein, UA Negative mg/dl      Glucose, UA Negative mg/dl      Ketones, UA Negative mg/dl      Urobilinogen, UA 0 2 E U /dl      Bilirubin, UA Negative     Blood, UA Negative    Fingerstick Glucose (POCT) [[id number]]  (Normal) Collected:  02/28/19 0346    Lab Status:  Final result Updated:  02/28/19 0347     POC Glucose 94 mg/dl     Fingerstick Glucose (POCT) [[id number]]  (Abnormal) Collected:  02/28/19 0139    Lab Status:  Final result Updated:  02/28/19 0140     POC Glucose 41 mg/dl     Troponin I [[id number]]  (Normal) Collected:  02/28/19 0034    Lab Status:  Final result Specimen:  Blood from Line, Venous Updated:  02/28/19 0112     Troponin I <0 02 ng/mL     Comprehensive metabolic panel [[id number]]  (Abnormal) Collected:  02/28/19 0034    Lab Status:  Final result Specimen:  Blood from Line, Venous Updated:  02/28/19 0110     Sodium 140 mmol/L      Potassium 3 7 mmol/L      Chloride 103 mmol/L      CO2 31 mmol/L      ANION GAP 6 mmol/L      BUN 24 mg/dL      Creatinine 1 31 mg/dL      Glucose 51 mg/dL      Calcium 8 8 mg/dL      AST 19 U/L      ALT 21 U/L      Alkaline Phosphatase 129 U/L      Total Protein 7 4 g/dL      Albumin 3 2 g/dL      Total Bilirubin 0 30 mg/dL      eGFR 53 ml/min/1 73sq m     Narrative:       National Kidney Disease Education Program recommendations are as follows:  GFR calculation is accurate only with a steady state creatinine  Chronic Kidney disease less than 60 ml/min/1 73 sq  meters  Kidney failure less than 15 ml/min/1 73 sq  meters      Magnesium [[id number]]  (Normal) Collected:  02/28/19 0034    Lab Status:  Final result Specimen:  Blood from Line, Venous Updated:  02/28/19 0110     Magnesium 1 6 mg/dL     CBC and differential [[id number]]  (Abnormal) Collected:  02/28/19 0034    Lab Status:  Final result Specimen:  Blood from Line, Venous Updated:  02/28/19 0056     WBC 9 12 Thousand/uL      RBC 4 42 Million/uL      Hemoglobin 12 3 g/dL      Hematocrit 39 5 %      MCV 89 fL      MCH 27 8 pg      MCHC 31 1 g/dL      RDW 14 5 %      MPV 9 9 fL      Platelets 969 Thousands/uL      nRBC 0 /100 WBCs      Neutrophils Relative 75 %      Immat GRANS % 0 %      Lymphocytes Relative 15 %      Monocytes Relative 7 %      Eosinophils Relative 3 %      Basophils Relative 0 %      Neutrophils Absolute 6 69 Thousands/µL      Immature Grans Absolute 0 04 Thousand/uL      Lymphocytes Absolute 1 40 Thousands/µL      Monocytes Absolute 0 66 Thousand/µL      Eosinophils Absolute 0 31 Thousand/µL      Basophils Absolute 0 02 Thousands/µL     Fingerstick Glucose (POCT) [[id number]]  (Normal) Collected: 02/28/19 0014    Lab Status:  Final result Updated:  02/28/19 0016     POC Glucose 77 mg/dl     Fingerstick Glucose (POCT) [[id number]]  (Abnormal) Collected:  02/27/19 2351    Lab Status:  Final result Updated:  02/27/19 2353     POC Glucose 54 mg/dl               HPI    Lakeisha Arriaza is a 74 y o  male who presented to the emergency room for evaluation via EMS  Chart review indicates the patient's wife called EMS due to AMS  Low on the scene patient's blood sugar was 41 patient is a bolus of dextrose 10  Patient was brought to the emergency room was given an amp of dextrose  Patient and patient's wife isn't sure what caused the hypoglycemia event  Images and labs were collected by emergency room doctor-see below  Patient was started on and dextrose 10,  100 mL an hour and was admitted to [facility] with telemetry  Hospital Course    Patient was admitted to [address] [facility] on 2/28/19 secondary to hypoglycemia with blood sugar of 41 on admission  Patient was given D10 normal saline at 100 ml an hour  Insulin was held completely upon initial hospitalization  Hypoglycemia protocol was in place  On second day of hospitalization patient glucose range improved to low 100s  Fluid was transitioned to D5 Normal saline  D5 NS was discontinued on second day  Patient's glucose level slowly increased to high 100s range and Lantus 10 unit was started on 3/3/19 with insulin sliding scale coverage  Patient will be discharged with Lantus 15 unit basal coverage with Humalog 3 units premeals  Patient is advised to schedule appointment with Endocrinology within 1 week of discharge to further manage his insulin regimens  Patient became febrile on day 4 of hospitalization  Urine culture revealed positive for Citrobacter freundii  Patient was started on Ceftriaxone 1 g Q24H IVPB on 3/3/19  Patient's fever resolved and he remained afebrile on day of discharge  He will be discharged with Ciprofloxacin 500 mg PO Q12H x 5 days   Patient is advised to follow up with PCP to make sure resolution of acute cystitis  Patient has increasing right lower extremity pain during hospitalization  He had mechanical injury to his right lower extremity many years ago where an oil can fell on his right leg  Imaging including X-ray of right tibia and fibula did not reveal any current or hx of fracture  Doppler of bilateral LE showed no thrombus or superficial thrombophlebitis  This can be secondary to reflex sympathetic dystrophy vs  Diabetic neuropathy  Patient will follow up with outpatient orthopedics and neurosurgery for further management of chronic right lower leg pain  Patient also has left forearm paresthesia  CT cervical spine revealed multilevel foraminal stenosis at C5/C6  Patient will follow up with outpatient pain management and neurosurgery  Patient remains hemodynamically stable throughout hospitalization  Patient is stable for discharge  Please refer to above for A&P in detail  Physical Exam at Discharge    Physical Exam   Constitutional: No distress  HENT:   Head: Normocephalic  Eyes: Pupils are equal, round, and reactive to light  Right eye exhibits no discharge  Left eye exhibits no discharge  Neck: Normal range of motion  Cardiovascular: Normal rate, regular rhythm and normal heart sounds  No murmur heard  Pulmonary/Chest: Effort normal  No respiratory distress  He exhibits no tenderness  Mild expiratory wheezing in lower lung fields  Abdominal: Soft  Bowel sounds are normal  There is no tenderness  Obese abdomen  Musculoskeletal: Normal range of motion  He exhibits tenderness  Tenderness upon palpation of right mid-shin  Flexion of right foot elicit pain  2+ distal pedal pulses  Venous stasis dermatitis of bilateral midshin  Neurological: He is alert  Skin: Skin is warm  He is not diaphoretic  Psychiatric: He has a normal mood and affect         Medications       STOP taking these medications finasteride 5 mg tablet   Commonly known as:  PROSCAR     furosemide 40 mg tablet   Commonly known as:  LASIX     oxyCODONE 10 mg 12 hr tablet   Commonly known as:  OxyCONTIN   Replaced by:  oxyCODONE 10 MG Tabs          TAKE these medications      Morning Afternoon Evening Bedtime As Needed    acetaminophen 325 mg tablet   Commonly known as:  TYLENOL   Take 2 tablets (650 mg total) by mouth every 6 (six) hours as needed for mild pain, headaches or fever   Refills:  0   Doctor's comments:  Do not exceed a total of 3 grams of tylenol/acetaminophen in a 24-hour period  aspirin 81 MG tablet   Take 81 mg by mouth daily   Refills:  0     3/5/19         atorvastatin 40 mg tablet   Commonly known as:  LIPITOR   Start taking on:  3/5/2019   Take 1 tablet (40 mg total) by mouth daily after dinner   Refills:  0   What changed:     0 medication strength   0 how much to take   0 when to take this       3/5/19       carvedilol 12 5 mg tablet   Commonly known as:  COREG   Take 1 tablet (12 5 mg total) by mouth 2 (two) times a day   Refills:  0   Doctor's comments:  Hold for HR<60 or SBP<110   What changed:  medication strength        3/4/19 6pm       ciprofloxacin 500 mg tablet   Commonly known as:  CIPRO   Start taking on:  3/5/2019   Take 1 tablet (500 mg total) by mouth every 12 (twelve) hours for 5 days   Refills:  0  3/5/19         gabapentin 600 MG tablet   Commonly known as:  NEURONTIN   Take 1 tablet (600 mg total) by mouth 2 (two) times a day   Refills:  0   What changed:  when to take this        3/4/19 6pm       glucagon 1 MG injection   Commonly known as:  GLUCAGON EMERGENCY   Inject 1 mg under the skin once as needed (PRN blood glucose less than 70 if unconscious or uncorrectable by oral means) for up to 1 dose   Refills:  0           heparin (porcine) 5,000 units/mL   Inject 1 mL (5,000 Units total) under the skin every 8 (eight) hours   Refills:  0   Doctor's comments:   For DVT Prophylaxis       3/4/19 2pm         insulin glargine 100 units/mL subcutaneous injection   Commonly known as:  LANTUS   Start taking on:  3/5/2019   Inject 15 Units under the skin every morning   Refills:  0   What changed:     0 how much to take   0 when to take this     3/5/19         insulin lispro 100 units/mL injection   Commonly known as:  HumaLOG   Inject 3 Units under the skin 3 (three) times a day with meals   Refills:  0   What changed:     0 how much to take   0 when to take this             lisinopril 5 mg tablet   Commonly known as:  ZESTRIL   Take 1 tablet (5 mg total) by mouth daily   Refills:  0     3/5/19         nystatin powder   Commonly known as:  MYCOSTATIN   Apply topically 2 (two) times a day   Refills:  0        3/4/19 6pm       oxyCODONE 10 MG Tabs   Commonly known as:  ROXICODONE   Take 1 tablet (10 mg total) by mouth every 4 (four) hours as needed for moderate pain or severe painMax Daily Amount: 60 mg   Refills:  0   Replaces:  oxyCODONE 10 mg 12 hr tablet           tamsulosin 0 4 mg   Commonly known as:  FLOMAX   Take 0 4 mg by mouth every evening   Refills:  0       3/4/19 5pm       Cholecalciferol 2000 units Tabs   Take 1 tablet (2,000 Units total) by mouth daily   Refills:  0   What changed:     0 medication strength   0 how much to take   0 when to take this     3/5/19              OTHER medications on file      Morning Afternoon Evening Bedtime As Needed    BD PEN NEEDLE JUNIOR U/F 32G X 4 MM Misc   Generic drug:  Insulin Pen Needle   Refills:  0               Allergies  No Known Allergies    Diet restrictions: none  Activity restrictions: none  Code Status: Level 1 - Full Code  Advance Directive and Living Will: <no information>  Power of :    POLST:      Discharge Condition: stable      Discharge  Statement   I spent 30 minutes discharging the patient  This time was spent on the day of discharge  I had direct contact with the patient on the day of discharge   Additional documentation is required if more than 30 minutes were spent on discharge

## 2019-03-04 NOTE — SOCIAL WORK
Received a call from Timberon at [facility]([phone] [phone]) who stated pt was approved to go to the 5th floor today  Approved starting today and update due on 3/8  Updates go to Mikie Gutierrez at [phone] and fax is [phone]  Pt is being dc'd today to the 5th floor

## 2019-03-04 NOTE — PLAN OF CARE
Problem: OCCUPATIONAL THERAPY ADULT  Goal: Performs self-care activities at highest level of function for planned discharge setting  See evaluation for individualized goals  Description  Treatment Interventions: ADL retraining, Functional transfer training, UE strengthening/ROM, Endurance training, Patient/family training, Activityengagement, Equipment evaluation/education          See flowsheet documentation for full assessment, interventions and recommendations  3/4/2019 1136 by CECY Fuchs  Note:   Limitation: Decreased ADL status, Decreased UE strength, Decreased Safe judgement during ADL, Decreased endurance, Decreased UE ROM, Decreased self-care trans, Decreased high-level ADLs     Assessment: Pt presents at [facility] is in agreement to completed ADL tx  Pt was set up at EOB  Bathes UE except back and LE to knees  Pt required max A below knee bathing with increased sensitivity to touch of legs/shins  Max A for LE dressing/socks  Pt was unable to stand to perform lexus hygiene needs or brief change due to increased pain of LEs  Nursing aware of same  Tolerated tx fair, though limited by mild decreased activity tolerance and pain  Recommend continued OT intervention to meet POC goals maximal (I) level  Recommend STR upon dc  Spoke with OTR in regards to treatment session and progress thus far in POC, agreed with disposition        OT Discharge Recommendation: Short Term Rehab       3/4/2019 1136 by CECY Fuchs  Outcome: Progressing  3/4/2019 1136 by CECY Fuchs  Reactivated

## 2019-03-04 NOTE — ASSESSMENT & PLAN NOTE
Lab Results   Component Value Date    HGBA1C 6 1 03/01/2019       Recent Labs     03/03/19  1810 03/03/19  2052 03/04/19  0600 03/04/19  0750   POCGLU 225* 227* 183* 184*       Blood Sugar Average: Last 72 hrs:  (P) 490 [id number]     Repeat HbA1c of 5 7 on 2/28/19, repeated 6 1 on 3/1  Restarted Lantus 10 unit every morning SC on 3/3  Continue Accucheck    Patient will be discharged with Lantus 15 unit and Humalog 3 Unit premeals  Patient will follow up outpatient Endocrinologist for further Type II DM management

## 2019-03-04 NOTE — ASSESSMENT & PLAN NOTE
BG 41 on admission - EMS was activated and patient was given D10   - Holding prior to admission Lantus 80 units q 12 hours  - Accu-Cheks Q2 hours  - D 10 normal saline at 100 mL an hour, transitioned to D5 NS @ 75 cc/hr, fluid discontinued this morning (3/1)  - Admit to med surge  - Hypoglycemic protocol in place  - Patient Glucose range continue to be in mid 100s without any insulin  Will continue monitoring glucose level  - Patient's glucose increased to 180-200 on 3/2/19  Lantus 10 unit SC every morning started on 3/3/19  Patient will be discharged with Lantus 15 unit and Humalog 3 Units premeals  Patient will also be referred for outpatient endocrinology follow up for Type II DM control

## 2019-03-05 LAB
GLUCOSE SERPL-MCNC: 209 MG/DL (ref 65–140)
GLUCOSE SERPL-MCNC: 217 MG/DL (ref 65–140)

## 2019-03-05 PROCEDURE — 97535 SELF CARE MNGMENT TRAINING: CPT

## 2019-03-05 PROCEDURE — 97167 OT EVAL HIGH COMPLEX 60 MIN: CPT

## 2019-03-05 PROCEDURE — 97530 THERAPEUTIC ACTIVITIES: CPT

## 2019-03-05 PROCEDURE — 97116 GAIT TRAINING THERAPY: CPT

## 2019-03-05 PROCEDURE — 82948 REAGENT STRIP/BLOOD GLUCOSE: CPT

## 2019-03-05 PROCEDURE — 97110 THERAPEUTIC EXERCISES: CPT

## 2019-03-05 NOTE — UTILIZATION REVIEW
Notification of Discharge  This is a Notification of Discharge from our facility [address]  Please be advised that this patient has been discharge from our facility  Below you will find the admission and discharge date and time including the patients disposition  PRESENTATION DATE: 2/27/2019 11:39 PM  IP ADMISSION DATE: 2/28/19 1040  DISCHARGE DATE: 3/4/2019  1:24 PM  DISPOSITION: Released to SNF/TCU/SNU [facility] Utilization Review Department  Phone: [phone]; Fax [phone]  [email or web address]  ATTENTION: Please call with any questions or concerns to [phone]  and carefully listen to the prompts so that you are directed to the right person  Send all requests for admission clinical reviews, approved or denied determinations and any other requests to fax [phone]   All voicemails are confidential

## 2019-03-05 NOTE — ED PROVIDER NOTES
History  Chief Complaint   Patient presents with    Hypoglycemia - Symptomatic     Patient was brought in by EMS after family called for AMS  Patient was found to have a BG of 41  Patient was given D10 and is nw A & O with a BG of 155  Patient: Vivi Goff  74 y o /male  YOB: 1945  MRN: [id number]  PCP: Alona Harrington MD  Date of evaluation: 2/27/2019    (N B   Camila File may have been used in the preparation of this document  Occasional wrong word or "sound-alike" substitutions may have occurred due to the inherent limitations of voice recognition software  Interpretation should be guided by context )    Just prior to arrival, the patient had been feeling out of it, weak, and tired  His wife noted that his mental status was altered  They tried to give him something to eat, but he was even unable to take anything by mouth  His wife then called 911  This did remind them of prior hypoglycemic episodes  History provided by:  Patient and spouse  Hypoglycemia - Symptomatic   Initial blood sugar:  41  Blood sugar after intervention:  155  Severity:  Severe  Onset quality:  Unable to specify  Timing:  Unable to specify  Progression:  Improving  Diabetic status:  Controlled with insulin  Current diabetic therapy:  See home med list   Time since last antidiabetic medication: Took scheduled nighttime doses  Context: not decreased oral intake, not diet changes, not exercise, not ingestion, not new diabetes diagnosis, not recent illness and not treatment noncompliance    Relieved by:  Nothing  Ineffective treatments: Tried to eat some Jell-O, but he was too weak and out of it to to this  Associated symptoms: altered mental status, decreased responsiveness, sweats and weakness    Associated symptoms: no shortness of breath, no speech difficulty and no vomiting        Prior to Admission Medications   Prescriptions Last Dose Informant Patient Reported? Taking?    BD PEN NEEDLE JUNIOR U/F 32G X 4 MM MISC   Yes Yes   CARVEDILOL PO Not Taking at Unknown time  Yes No   Sig: Take 12 5 mg by mouth 2 (two) times a day     Cholecalciferol (VITAMIN D-3) 5000 units TABS 2/26/2019 at 2200  Yes Yes   Sig: Take by mouth   aspirin 81 MG tablet 2/27/2019 at 1000  Yes Yes   Sig: Take 81 mg by mouth daily   atorvastatin (LIPITOR) 20 mg tablet 2/27/2019 at 1000  Yes Yes   Sig: Take 20 mg by mouth daily   finasteride (PROSCAR) 5 mg tablet   Yes Yes   Sig: Take 5 mg by mouth daily   furosemide (LASIX) 40 mg tablet   Yes Yes   Sig: Take 40 mg by mouth   gabapentin (NEURONTIN) 600 MG tablet 2/27/2019 at 2200  Yes Yes   Sig: Take 600 mg by mouth 3 (three) times a day     insulin glargine (LANTUS) 100 units/mL subcutaneous injection 2/27/2019 at 2200  No Yes   Sig: Inject 80 Units under the skin every 12 (twelve) hours   insulin lispro (HumaLOG) 100 units/mL injection 2/27/2019 at 1700  No Yes   Sig: Inject 50 Units under the skin 3 (three) times a day before meals   lisinopril (ZESTRIL) 5 mg tablet 2/27/2019 at 1000  No Yes   Sig: Take 1 tablet (5 mg total) by mouth daily   oxyCODONE (OxyCONTIN) 10 mg 12 hr tablet 2/27/2019 at 2200  Yes Yes   Sig: Take 10 mg by mouth every 12 (twelve) hours   tamsulosin (FLOMAX) 0 4 mg   Yes Yes   Sig: Take 0 4 mg by mouth every evening        Facility-Administered Medications: None       Past Medical History:   Diagnosis Date    Cataract     CHF (congestive heart failure) (HCC)     chronic diastolic CHF    Coronary artery disease     Diabetes mellitus ([facility]     Glaucoma     Hyperlipidemia     Hypertension     Neuropathy     Obesity     Renal disorder     chronic kidney disease stage 3     Sleep apnea     Stroke ([facility]     TIA (transient ischemic attack)     Uncontrolled type 2 diabetes mellitus with hyperglycemia, with long-term current use of insulin ([facility] 10/4/2018       Past Surgical History:   Procedure Laterality Date    APPENDECTOMY      CARPAL TUNNEL RELEASE      EYE SURGERY      cataracts       Family History   Problem Relation Age of Onset    No Known Problems Mother     No Known Problems Father      I have reviewed and agree with the history as documented  Social History     Tobacco Use    Smoking status: Former Smoker    Smokeless tobacco: Former User   Substance Use Topics    Alcohol use: Never     Frequency: Never     Drinks per session: Patient refused     Binge frequency: Never    Drug use: No        Review of Systems   Constitutional: Positive for decreased responsiveness and diaphoresis  Negative for chills and fever  HENT: Negative  Negative for trouble swallowing and voice change  Eyes: Negative for photophobia and visual disturbance  Respiratory: Negative  Negative for cough and shortness of breath  Cardiovascular: Negative  Negative for chest pain and palpitations  Gastrointestinal: Negative  Negative for abdominal pain and vomiting  Endocrine: Negative  Negative for polydipsia and polyuria  Genitourinary: Negative  Negative for difficulty urinating and urgency  Musculoskeletal: Negative  Negative for back pain and neck pain  Skin: Negative  Negative for rash and wound  Allergic/Immunologic: Negative for immunocompromised state  Neurological: Positive for weakness  Negative for speech difficulty  Hematological: Negative  Negative for adenopathy  Does not bruise/bleed easily  All other systems reviewed and are negative  Physical Exam  Physical Exam   Constitutional: He is oriented to person, place, and time  He appears well-developed and well-nourished  HENT:   Mouth/Throat: Oropharynx is clear and moist and mucous membranes are normal    Voice normal   Eyes: Pupils are equal, round, and reactive to light  EOM are normal    Cardiovascular: Normal rate and regular rhythm  Pulmonary/Chest: Effort normal    Abdominal: Soft   Bowel sounds are normal    Neurological: He is alert and oriented to person, place, and time  GCS eye subscore is 4  GCS verbal subscore is 5  GCS motor subscore is 6  Skin: Skin is warm and dry  Psychiatric: He has a normal mood and affect  His speech is normal and behavior is normal    Nursing note and vitals reviewed        Vital Signs  ED Triage Vitals [02/27/19 2341]   Temperature Pulse Respirations Blood Pressure SpO2   (!) 97 3 °F (36 3 °C) 93 20 161/71 93 %      Temp Source Heart Rate Source Patient Position - Orthostatic VS BP Location FiO2 (%)   Temporal Monitor Lying Left arm --      Pain Score       No Pain           Vitals:    03/03/19 0710 03/03/19 1518 03/03/19 2318 03/04/19 0705   BP: 170/79 141/69 134/62 131/65   Pulse: 86 80 83 81   Patient Position - Orthostatic VS: Lying Lying Lying Lying       Visual Acuity  Visual Acuity      Most Recent Value   L Pupil Size (mm)  3   R Pupil Size (mm)  3          ED Medications  Medications   dextrose infusion 10 % bolus (0 mL Intravenous Given to EMS 2/27/19 2349)   dextrose 50 % IV solution 25 mL (25 mL Intravenous Given 2/28/19 0040)   dextrose 50 % IV solution 25 mL ( Intravenous Canceled Entry 2/28/19 0719)   furosemide (LASIX) tablet 40 mg (40 mg Oral Given 2/28/19 0515)   potassium chloride (K-DUR,KLOR-CON) CR tablet 40 mEq (40 mEq Oral Given 3/1/19 1553)   magnesium sulfate 2 g/50 mL IVPB (premix) 2 g (2 g Intravenous New Bag 3/1/19 1025)   LORazepam (ATIVAN) 2 mg/mL injection 0 5 mg (0 5 mg Intravenous Given 3/1/19 1122)   oxyCODONE (ROXICODONE) immediate release tablet 10 mg (10 mg Oral Given 3/2/19 0029)   oxyCODONE (ROXICODONE) immediate release tablet 10 mg (10 mg Oral Given 3/2/19 0650)   magnesium oxide (MAG-OX) tablet 400 mg (400 mg Oral Given 3/2/19 1800)   HYDROmorphone (DILAUDID) injection 0 5 mg (0 5 mg Intravenous Given 3/2/19 1144)   fentanyl citrate (PF) 100 MCG/2ML 25 mcg (25 mcg Intravenous Given 3/3/19 0051)   magnesium sulfate 2 g/50 mL IVPB (premix) 2 g (2 g Intravenous New Bag 3/3/19 0362) Diagnostic Studies  Results Reviewed     Procedure Component Value Units Date/Time    UA w Reflex to Microscopic w Reflex to Culture [[id number]]  (Abnormal) Collected:  02/28/19 0733    Lab Status:  Final result Specimen:  Urine, Clean Catch Updated:  02/28/19 0745     Color, UA Yellow     Clarity, UA Clear     Specific Gravity, UA 1 010     pH, UA 5 5     Leukocytes, UA Negative     Nitrite, UA Positive     Protein, UA Negative mg/dl      Glucose, UA Negative mg/dl      Ketones, UA Negative mg/dl      Urobilinogen, UA 0 2 E U /dl      Bilirubin, UA Negative     Blood, UA Negative    Fingerstick Glucose (POCT) [[id number]]  (Normal) Collected:  02/28/19 0346    Lab Status:  Final result Updated:  02/28/19 0347     POC Glucose 94 mg/dl     Fingerstick Glucose (POCT) [[id number]]  (Abnormal) Collected:  02/28/19 0139    Lab Status:  Final result Updated:  02/28/19 0140     POC Glucose 41 mg/dl     Troponin I [[id number]]  (Normal) Collected:  02/28/19 0034    Lab Status:  Final result Specimen:  Blood from Line, Venous Updated:  02/28/19 0112     Troponin I <0 02 ng/mL     Comprehensive metabolic panel [[id number]]  (Abnormal) Collected:  02/28/19 0034    Lab Status:  Final result Specimen:  Blood from Line, Venous Updated:  02/28/19 0110     Sodium 140 mmol/L      Potassium 3 7 mmol/L      Chloride 103 mmol/L      CO2 31 mmol/L      ANION GAP 6 mmol/L      BUN 24 mg/dL      Creatinine 1 31 mg/dL      Glucose 51 mg/dL      Calcium 8 8 mg/dL      AST 19 U/L      ALT 21 U/L      Alkaline Phosphatase 129 U/L      Total Protein 7 4 g/dL      Albumin 3 2 g/dL      Total Bilirubin 0 30 mg/dL      eGFR 53 ml/min/1 73sq m     Narrative:       National Kidney Disease Education Program recommendations are as follows:  GFR calculation is accurate only with a steady state creatinine  Chronic Kidney disease less than 60 ml/min/1 73 sq  meters  Kidney failure less than 15 ml/min/1 73 sq  meters      Magnesium [[id number]]  (Normal) Collected:  02/28/19 0034    Lab Status:  Final result Specimen:  Blood from Line, Venous Updated:  02/28/19 0110     Magnesium 1 6 mg/dL     CBC and differential [[id number]]  (Abnormal) Collected:  02/28/19 0034    Lab Status:  Final result Specimen:  Blood from Line, Venous Updated:  02/28/19 0056     WBC 9 12 Thousand/uL      RBC 4 42 Million/uL      Hemoglobin 12 3 g/dL      Hematocrit 39 5 %      MCV 89 fL      MCH 27 8 pg      MCHC 31 1 g/dL      RDW 14 5 %      MPV 9 9 fL      Platelets 136 Thousands/uL      nRBC 0 /100 WBCs      Neutrophils Relative 75 %      Immat GRANS % 0 %      Lymphocytes Relative 15 %      Monocytes Relative 7 %      Eosinophils Relative 3 %      Basophils Relative 0 %      Neutrophils Absolute 6 69 Thousands/µL      Immature Grans Absolute 0 04 Thousand/uL      Lymphocytes Absolute 1 40 Thousands/µL      Monocytes Absolute 0 66 Thousand/µL      Eosinophils Absolute 0 31 Thousand/µL      Basophils Absolute 0 02 Thousands/µL     Fingerstick Glucose (POCT) [[id number]]  (Normal) Collected:  02/28/19 0014    Lab Status:  Final result Updated:  02/28/19 0016     POC Glucose 77 mg/dl     Fingerstick Glucose (POCT) [[id number]]  (Abnormal) Collected:  02/27/19 2351    Lab Status:  Final result Updated:  02/27/19 2353     POC Glucose 54 mg/dl                  VAS lower limb venous duplex study, complete bilateral   Final Result by Amie Benítez DO (03/03 2112)      CT tibia fibula right wo contrast   Final Result by José Miguel Arias MD (03/02 1207)      No acute or healing fracture of the right lower extremity  Small to moderate suprapatellar joint effusion  Diffuse soft tissue edema  Workstation performed: [id number]         XR spine cervical 2 or 3 vw injury   Final Result by Cong Holm DO (03/02 0539)   Stable degenerative changes        Workstation performed: [id number]         XR shoulder 2+ vw left   Final Result by Cong Holm DO (03/02 0539)   Moderate degenerative changes of the shoulder and acromioclavicular joint  No acute osseous abnormality  Workstation performed: [id number]         VAS carotid complete study   Final Result by Angelique Guzman DO (03/02 1114)      CT spine cervical wo contrast   Final Result by Marty Abbott MD (03/01 3293)      No cervical spine fracture or traumatic malalignment  Moderate multilevel spondylotic degenerative changes of the cervical spine as described above with moderate to severe bilateral neural foraminal narrowing at C5/C6  No evidence of critical spinal canal stenosis  If symptoms persist, an MRI of the    cervical spine could be performed for further evaluation                Workstation performed: [id number]         CT head wo contrast   Final Result by Reyna Corea DO (03/01 1307)      No acute intracranial abnormality  Workstation performed: [id number]         XR chest 1 view portable   ED Interpretation by Curt Donaldson MD (02/28 0123)   No acute process      Final Result by Alexandra Abrams MD (02/28 0320)      No acute cardiopulmonary disease  Findings concur with the preliminary report by the referring clinician already in PACS and/or our electronic record EPIC        Workstation performed: [id number]                    Procedures  CriticalCare Time  Performed by: Curt Donaldson MD  Authorized by: Curt Donaldson MD     Critical care provider statement:     Critical care time (minutes):  35    Critical care was necessary to treat or prevent imminent or life-threatening deterioration of the following conditions:  Endocrine crisis    Critical care was time spent personally by me on the following activities:  Examination of patient, evaluation of patient's response to treatment, development of treatment plan with patient or surrogate, obtaining history from patient or surrogate, ordering and review of radiographic studies, ordering and review of laboratory studies and re-evaluation of patient's condition           Phone Contacts  ED Phone Contact    ED Course  ED Course as of Mar 05 0738   Thu Feb 28, 2019   0136 Patient states he feels well at present     He ate a lunch  We are rechecking his blood sugar  MDM  Number of Diagnoses or Management Options  Hypoglycemia due to insulin:      Amount and/or Complexity of Data Reviewed  Tests in the radiology section of CPT®: ordered and reviewed  Tests in the medicine section of CPT®: ordered and reviewed  Obtain history from someone other than the patient: yes  Independent visualization of images, tracings, or specimens: yes    Risk of Complications, Morbidity, and/or Mortality  Presenting problems: high  General comments: Patient did take long-acting insulin before coming in  Despite doses of intravenous dextrose and despite eating here in the ED, his blood sugar repeatedly dropped  He there for required admission with continuous dextrose infusion      Patient Progress  Patient progress: improved      Disposition  Final diagnoses:   Hypoglycemia due to insulin     Time reflects when diagnosis was documented in both MDM as applicable and the Disposition within this note     Time User Action Codes Description Comment    2/28/2019  2:01 AM Marie Kim Add [E16 0,  T38 3X5A] Hypoglycemia due to insulin     2/28/2019  2:22 PM Alysha Tristan Add [E66 01,  Z68 42] Morbid obesity with BMI of 45 0-49 9, adult ([facility] )     3/1/2019 10:28 AM Vaughn Montesinos Dryer Add [R20 2] Paresthesia of left upper extremity     3/4/2019  9:34 AM Vaughn Montesinos Dryer Add [I10] Essential hypertension     3/4/2019  9:34 AM Vaughn Montesinoser Add [N30 00] Acute cystitis without hematuria     3/4/2019  9:34 AM Alysha Tristan Add [M99 81] Neural foraminal stenosis of cervical spine     3/4/2019  9:34 AM Vaughn Montesinos Dryer Add [I63 396] Acute pain of right lower extremity     3/4/2019  9:34 AM Vaughn Montesinoser Add [E11 65, Z79 4] Type 2 diabetes mellitus with hyperglycemia, with long-term current use of insulin ([facility] [address] )     3/4/2019  9:38 AM NedrowJessica Add [G47 33] Obstructive sleep apnea syndrome       ED Disposition     ED Disposition Condition Date/Time Comment    Admit Stable Thu Feb 28, 2019  2:01 AM Case was discussed with HEATH Sheridan, for SLIM  and the patient's admission status was agreed to be Admission Status: observation status to the service of Dr Thomas Jones           Follow-up Information     Follow up With Specialties Details Why Contact Info    Alan Vincent MD [facility] Medicine Call in 1 week(s)  [facility] [address]  [address]       North Quach MD Orthopedic Surgery Call in 1 week(s)  [address] [place] [address]      Kenneth Caicedo MD Neurosurgery Call in 1 week(s) for your left upper extremity numbness/paresthesias [place] [address]  [phone]      Brina Recinos MD Endocrinology Call in 1 week(s)  [address] [place] [address]  [phone]            Discharge Medication List as of 3/4/2019  2:23 PM      START taking these medications    Details   acetaminophen (TYLENOL) 325 mg tablet Take 2 tablets (650 mg total) by mouth every 6 (six) hours as needed for mild pain, headaches or fever, Starting Mon 3/4/2019, No Print      ciprofloxacin (CIPRO) 500 mg tablet Take 1 tablet (500 mg total) by mouth every 12 (twelve) hours for 5 days, Starting Tue 3/5/2019, Until Sun 3/10/2019, No Print      glucagon (GLUCAGON EMERGENCY) 1 MG injection Inject 1 mg under the skin once as needed (PRN blood glucose less than 70 if unconscious or uncorrectable by oral means) for up to 1 dose, Starting Mon 3/4/2019, No Print      Heparin Sodium, Porcine, (HEPARIN, PORCINE,) 5,000 units/mL Inject 1 mL (5,000 Units total) under the skin every 8 (eight) hours, Starting Mon 3/4/2019, No Print      nystatin (MYCOSTATIN) powder Apply topically 2 (two) times a day, Starting Mon 3/4/2019, No Print      oxyCODONE (ROXICODONE) 10 MG TABS Take 1 tablet (10 mg total) by mouth every 4 (four) hours as needed for moderate pain or severe painMax Daily Amount: 60 mg, Starting Mon 3/4/2019, Print         CONTINUE these medications which have CHANGED    Details   atorvastatin (LIPITOR) 40 mg tablet Take 1 tablet (40 mg total) by mouth daily after dinner, Starting Tue 3/5/2019, No Print      carvedilol (COREG) 12 5 mg tablet Take 1 tablet (12 5 mg total) by mouth 2 (two) times a day, Starting Mon 3/4/2019, No Print      cholecalciferol 2000 units TABS Take 1 tablet (2,000 Units total) by mouth daily, Starting Mon 3/4/2019, No Print      gabapentin (NEURONTIN) 600 MG tablet Take 1 tablet (600 mg total) by mouth 2 (two) times a day, Starting Mon 3/4/2019, No Print      insulin glargine (LANTUS) 100 units/mL subcutaneous injection Inject 15 Units under the skin every morning, Starting Tue 3/5/2019, No Print      insulin lispro (HumaLOG) 100 units/mL injection Inject 3 Units under the skin 3 (three) times a day with meals, Starting Mon 3/4/2019, No Print         CONTINUE these medications which have NOT CHANGED    Details   aspirin 81 MG tablet Take 81 mg by mouth daily, Until Discontinued, Historical Med      BD PEN NEEDLE JUNIOR U/F 32G X 4 MM MISC Starting Sun 12/31/2017, Historical Med      lisinopril (ZESTRIL) 5 mg tablet Take 1 tablet (5 mg total) by mouth daily, Starting Sun 10/7/2018, Print      tamsulosin (FLOMAX) 0 4 mg Take 0 4 mg by mouth every evening  , Historical Med         STOP taking these medications       finasteride (PROSCAR) 5 mg tablet Comments:   Reason for Stopping:         furosemide (LASIX) 40 mg tablet Comments:   Reason for Stopping:         oxyCODONE (OxyCONTIN) 10 mg 12 hr tablet Comments:   Reason for Stopping:             Outpatient Discharge Orders   Cpap New DME     Ambulatory referral to Neurology   Standing Status: Future Standing Exp  Date: 09/01/19      Ambulatory referral to Endocrinology   Standing Status: Future Standing Exp  Date: 09/04/19      Ambulatory referral to Orthopedic Surgery   Standing Status: Future Standing Exp  Date: 09/04/19      Ambulatory referral to Neurosurgery   Standing Status: Future Standing Exp  Date: 09/04/19      Discharge Diet     Medications to avoid: phosphate or magnesium based laxatives, NSAIDs     Maintain SBP between 120-140 mm/Hg     Call Nursing Home MD for decreased oral intake     Daily Weights     Strict Intake and Output     Follow up bloodwork     Discharge Condition:  Improving     Patient Aware of Diagnosis: Yes     Free of Communicable Disease:    Yes     Activity:  Per Rehab Recommendations     Future Lab Orders at [facility]       ED Provider  Electronically Signed by           Nas Brady MD  03/05/19 7164

## 2019-03-06 LAB
ALBUMIN SERPL BCP-MCNC: 2.5 G/DL (ref 3.5–5)
ALP SERPL-CCNC: 149 U/L (ref 46–116)
ALT SERPL W P-5'-P-CCNC: 24 U/L (ref 12–78)
ANION GAP SERPL CALCULATED.3IONS-SCNC: 7 MMOL/L (ref 4–13)
AST SERPL W P-5'-P-CCNC: 15 U/L (ref 5–45)
BASOPHILS # BLD AUTO: 0.03 THOUSANDS/ΜL (ref 0–0.1)
BASOPHILS NFR BLD AUTO: 0 % (ref 0–1)
BILIRUB SERPL-MCNC: 0.4 MG/DL (ref 0.2–1)
BUN SERPL-MCNC: 35 MG/DL (ref 5–25)
CALCIUM SERPL-MCNC: 8.8 MG/DL (ref 8.3–10.1)
CHLORIDE SERPL-SCNC: 102 MMOL/L (ref 100–108)
CO2 SERPL-SCNC: 28 MMOL/L (ref 21–32)
CREAT SERPL-MCNC: 1.49 MG/DL (ref 0.6–1.3)
EOSINOPHIL # BLD AUTO: 0.34 THOUSAND/ΜL (ref 0–0.61)
EOSINOPHIL NFR BLD AUTO: 4 % (ref 0–6)
ERYTHROCYTE [DISTWIDTH] IN BLOOD BY AUTOMATED COUNT: 14.1 % (ref 11.6–15.1)
GFR SERPL CREATININE-BSD FRML MDRD: 45 ML/MIN/1.73SQ M
GLUCOSE SERPL-MCNC: 188 MG/DL (ref 65–140)
GLUCOSE SERPL-MCNC: 195 MG/DL (ref 65–140)
GLUCOSE SERPL-MCNC: 203 MG/DL (ref 65–140)
HCT VFR BLD AUTO: 35.5 % (ref 36.5–49.3)
HGB BLD-MCNC: 10.9 G/DL (ref 12–17)
IMM GRANULOCYTES # BLD AUTO: 0.06 THOUSAND/UL (ref 0–0.2)
IMM GRANULOCYTES NFR BLD AUTO: 1 % (ref 0–2)
LYMPHOCYTES # BLD AUTO: 2.1 THOUSANDS/ΜL (ref 0.6–4.47)
LYMPHOCYTES NFR BLD AUTO: 23 % (ref 14–44)
MAGNESIUM SERPL-MCNC: 2.1 MG/DL (ref 1.6–2.6)
MCH RBC QN AUTO: 27.5 PG (ref 26.8–34.3)
MCHC RBC AUTO-ENTMCNC: 30.7 G/DL (ref 31.4–37.4)
MCV RBC AUTO: 89 FL (ref 82–98)
MONOCYTES # BLD AUTO: 0.69 THOUSAND/ΜL (ref 0.17–1.22)
MONOCYTES NFR BLD AUTO: 8 % (ref 4–12)
NEUTROPHILS # BLD AUTO: 5.78 THOUSANDS/ΜL (ref 1.85–7.62)
NEUTS SEG NFR BLD AUTO: 64 % (ref 43–75)
NRBC BLD AUTO-RTO: 0 /100 WBCS
PHOSPHATE SERPL-MCNC: 3.4 MG/DL (ref 2.3–4.1)
PLATELET # BLD AUTO: 250 THOUSANDS/UL (ref 149–390)
PMV BLD AUTO: 10.5 FL (ref 8.9–12.7)
POTASSIUM SERPL-SCNC: 4.2 MMOL/L (ref 3.5–5.3)
PROCALCITONIN SERPL-MCNC: 0.09 NG/ML
PROT SERPL-MCNC: 7 G/DL (ref 6.4–8.2)
RBC # BLD AUTO: 3.97 MILLION/UL (ref 3.88–5.62)
SODIUM SERPL-SCNC: 137 MMOL/L (ref 136–145)
WBC # BLD AUTO: 9 THOUSAND/UL (ref 4.31–10.16)

## 2019-03-06 PROCEDURE — 97110 THERAPEUTIC EXERCISES: CPT

## 2019-03-06 PROCEDURE — 82948 REAGENT STRIP/BLOOD GLUCOSE: CPT

## 2019-03-06 PROCEDURE — 97530 THERAPEUTIC ACTIVITIES: CPT

## 2019-03-06 PROCEDURE — 83735 ASSAY OF MAGNESIUM: CPT | Performed by: INTERNAL MEDICINE

## 2019-03-06 PROCEDURE — 85025 COMPLETE CBC W/AUTO DIFF WBC: CPT | Performed by: INTERNAL MEDICINE

## 2019-03-06 PROCEDURE — 84100 ASSAY OF PHOSPHORUS: CPT | Performed by: INTERNAL MEDICINE

## 2019-03-06 PROCEDURE — 80053 COMPREHEN METABOLIC PANEL: CPT | Performed by: INTERNAL MEDICINE

## 2019-03-06 PROCEDURE — 84145 PROCALCITONIN (PCT): CPT | Performed by: INTERNAL MEDICINE

## 2019-03-07 LAB
GLUCOSE SERPL-MCNC: 170 MG/DL (ref 65–140)
GLUCOSE SERPL-MCNC: 214 MG/DL (ref 65–140)

## 2019-03-07 PROCEDURE — 97116 GAIT TRAINING THERAPY: CPT

## 2019-03-07 PROCEDURE — 82948 REAGENT STRIP/BLOOD GLUCOSE: CPT

## 2019-03-07 PROCEDURE — 97110 THERAPEUTIC EXERCISES: CPT

## 2019-03-07 PROCEDURE — 97530 THERAPEUTIC ACTIVITIES: CPT

## 2019-03-08 LAB — GLUCOSE SERPL-MCNC: 216 MG/DL (ref 65–140)

## 2019-03-08 PROCEDURE — 97110 THERAPEUTIC EXERCISES: CPT

## 2019-03-08 PROCEDURE — 97116 GAIT TRAINING THERAPY: CPT

## 2019-03-08 PROCEDURE — 97530 THERAPEUTIC ACTIVITIES: CPT

## 2019-03-08 PROCEDURE — 82948 REAGENT STRIP/BLOOD GLUCOSE: CPT

## 2019-03-09 LAB
GLUCOSE SERPL-MCNC: 188 MG/DL (ref 65–140)
GLUCOSE SERPL-MCNC: 237 MG/DL (ref 65–140)
GLUCOSE SERPL-MCNC: 306 MG/DL (ref 65–140)

## 2019-03-09 PROCEDURE — 82948 REAGENT STRIP/BLOOD GLUCOSE: CPT

## 2019-03-09 PROCEDURE — 97535 SELF CARE MNGMENT TRAINING: CPT

## 2019-03-09 PROCEDURE — 97110 THERAPEUTIC EXERCISES: CPT

## 2019-03-10 LAB
GLUCOSE SERPL-MCNC: 232 MG/DL (ref 65–140)
GLUCOSE SERPL-MCNC: 282 MG/DL (ref 65–140)

## 2019-03-10 PROCEDURE — 82948 REAGENT STRIP/BLOOD GLUCOSE: CPT

## 2019-03-11 LAB
ALBUMIN SERPL BCP-MCNC: 2.7 G/DL (ref 3.5–5)
ALP SERPL-CCNC: 152 U/L (ref 46–116)
ALT SERPL W P-5'-P-CCNC: 24 U/L (ref 12–78)
ANION GAP SERPL CALCULATED.3IONS-SCNC: 9 MMOL/L (ref 4–13)
AST SERPL W P-5'-P-CCNC: 16 U/L (ref 5–45)
BASOPHILS # BLD AUTO: 0.03 THOUSANDS/ΜL (ref 0–0.1)
BASOPHILS NFR BLD AUTO: 0 % (ref 0–1)
BILIRUB SERPL-MCNC: 0.3 MG/DL (ref 0.2–1)
BUN SERPL-MCNC: 20 MG/DL (ref 5–25)
CALCIUM SERPL-MCNC: 9.3 MG/DL (ref 8.3–10.1)
CHLORIDE SERPL-SCNC: 102 MMOL/L (ref 100–108)
CO2 SERPL-SCNC: 27 MMOL/L (ref 21–32)
CREAT SERPL-MCNC: 1.3 MG/DL (ref 0.6–1.3)
EOSINOPHIL # BLD AUTO: 0.23 THOUSAND/ΜL (ref 0–0.61)
EOSINOPHIL NFR BLD AUTO: 2 % (ref 0–6)
ERYTHROCYTE [DISTWIDTH] IN BLOOD BY AUTOMATED COUNT: 13.6 % (ref 11.6–15.1)
GFR SERPL CREATININE-BSD FRML MDRD: 53 ML/MIN/1.73SQ M
GLUCOSE SERPL-MCNC: 235 MG/DL (ref 65–140)
GLUCOSE SERPL-MCNC: 279 MG/DL (ref 65–140)
HCT VFR BLD AUTO: 38.3 % (ref 36.5–49.3)
HGB BLD-MCNC: 12 G/DL (ref 12–17)
IMM GRANULOCYTES # BLD AUTO: 0.04 THOUSAND/UL (ref 0–0.2)
IMM GRANULOCYTES NFR BLD AUTO: 0 % (ref 0–2)
LYMPHOCYTES # BLD AUTO: 2.03 THOUSANDS/ΜL (ref 0.6–4.47)
LYMPHOCYTES NFR BLD AUTO: 20 % (ref 14–44)
MAGNESIUM SERPL-MCNC: 1.7 MG/DL (ref 1.6–2.6)
MCH RBC QN AUTO: 27.4 PG (ref 26.8–34.3)
MCHC RBC AUTO-ENTMCNC: 31.3 G/DL (ref 31.4–37.4)
MCV RBC AUTO: 87 FL (ref 82–98)
MONOCYTES # BLD AUTO: 0.53 THOUSAND/ΜL (ref 0.17–1.22)
MONOCYTES NFR BLD AUTO: 5 % (ref 4–12)
NEUTROPHILS # BLD AUTO: 7.1 THOUSANDS/ΜL (ref 1.85–7.62)
NEUTS SEG NFR BLD AUTO: 73 % (ref 43–75)
NRBC BLD AUTO-RTO: 0 /100 WBCS
PHOSPHATE SERPL-MCNC: 3.4 MG/DL (ref 2.3–4.1)
PLATELET # BLD AUTO: 319 THOUSANDS/UL (ref 149–390)
PMV BLD AUTO: 9.9 FL (ref 8.9–12.7)
POTASSIUM SERPL-SCNC: 4.2 MMOL/L (ref 3.5–5.3)
PROCALCITONIN SERPL-MCNC: 0.05 NG/ML
PROT SERPL-MCNC: 7.1 G/DL (ref 6.4–8.2)
RBC # BLD AUTO: 4.38 MILLION/UL (ref 3.88–5.62)
SODIUM SERPL-SCNC: 138 MMOL/L (ref 136–145)
WBC # BLD AUTO: 9.96 THOUSAND/UL (ref 4.31–10.16)

## 2019-03-11 PROCEDURE — 84145 PROCALCITONIN (PCT): CPT | Performed by: INTERNAL MEDICINE

## 2019-03-11 PROCEDURE — 80053 COMPREHEN METABOLIC PANEL: CPT | Performed by: INTERNAL MEDICINE

## 2019-03-11 PROCEDURE — 83735 ASSAY OF MAGNESIUM: CPT | Performed by: INTERNAL MEDICINE

## 2019-03-11 PROCEDURE — 97110 THERAPEUTIC EXERCISES: CPT

## 2019-03-11 PROCEDURE — 97116 GAIT TRAINING THERAPY: CPT

## 2019-03-11 PROCEDURE — 84100 ASSAY OF PHOSPHORUS: CPT | Performed by: INTERNAL MEDICINE

## 2019-03-11 PROCEDURE — 97535 SELF CARE MNGMENT TRAINING: CPT

## 2019-03-11 PROCEDURE — 85025 COMPLETE CBC W/AUTO DIFF WBC: CPT | Performed by: INTERNAL MEDICINE

## 2019-03-11 PROCEDURE — 82948 REAGENT STRIP/BLOOD GLUCOSE: CPT

## 2019-03-11 PROCEDURE — 97530 THERAPEUTIC ACTIVITIES: CPT

## 2019-03-12 PROCEDURE — 97116 GAIT TRAINING THERAPY: CPT

## 2019-03-12 PROCEDURE — 97542 WHEELCHAIR MNGMENT TRAINING: CPT

## 2019-03-12 PROCEDURE — 97530 THERAPEUTIC ACTIVITIES: CPT

## 2019-03-12 PROCEDURE — 97110 THERAPEUTIC EXERCISES: CPT

## 2019-03-13 PROCEDURE — 97110 THERAPEUTIC EXERCISES: CPT

## 2019-03-13 PROCEDURE — 97530 THERAPEUTIC ACTIVITIES: CPT

## 2019-03-13 PROCEDURE — 97116 GAIT TRAINING THERAPY: CPT

## 2019-03-13 NOTE — WOUND OSTOMY CARE
Progress Note - Wound   Elio Ricketts 74 y o  male MRN: [id number]  Unit/Bed#: -01 Encounter: [id number]      Assessment:   Moisture associated skin damage with fungal rash bilateral groins and midline abdominal fold  Patient stated discomfort "left worse then right"    Plan:   1  Discontinue stomahesive powder and antifungal cream   2   New order:  Treatment 3 times per day  Wash abdominal folds and groins with pH balanced skin cleanser and water  PAT DRY  Apply Nystatin powder to skin folds of abdomen and groins  Gently rub powder into skin  Discontinue 7 days  3   Wicking fabric to skin folds to manage moisture  Samantha Coughlin RN   CWOCN  3/13/2019 11:04

## 2019-03-14 PROCEDURE — 97110 THERAPEUTIC EXERCISES: CPT

## 2019-03-14 PROCEDURE — 97530 THERAPEUTIC ACTIVITIES: CPT

## 2019-03-14 PROCEDURE — 97116 GAIT TRAINING THERAPY: CPT

## 2019-03-15 PROCEDURE — 97110 THERAPEUTIC EXERCISES: CPT

## 2019-03-15 PROCEDURE — 97535 SELF CARE MNGMENT TRAINING: CPT

## 2019-03-15 PROCEDURE — 97530 THERAPEUTIC ACTIVITIES: CPT

## 2019-03-15 PROCEDURE — 97116 GAIT TRAINING THERAPY: CPT

## 2019-03-16 LAB
GLUCOSE SERPL-MCNC: 314 MG/DL (ref 65–140)
GLUCOSE SERPL-MCNC: 343 MG/DL (ref 65–140)
GLUCOSE SERPL-MCNC: 352 MG/DL (ref 65–140)

## 2019-03-16 PROCEDURE — 97110 THERAPEUTIC EXERCISES: CPT

## 2019-03-16 PROCEDURE — 82948 REAGENT STRIP/BLOOD GLUCOSE: CPT

## 2019-03-16 PROCEDURE — 97535 SELF CARE MNGMENT TRAINING: CPT

## 2019-03-17 LAB — GLUCOSE SERPL-MCNC: 220 MG/DL (ref 65–140)

## 2019-03-17 PROCEDURE — 82948 REAGENT STRIP/BLOOD GLUCOSE: CPT

## 2019-03-18 LAB
GLUCOSE SERPL-MCNC: 255 MG/DL (ref 65–140)
GLUCOSE SERPL-MCNC: 266 MG/DL (ref 65–140)
GLUCOSE SERPL-MCNC: 277 MG/DL (ref 65–140)

## 2019-03-18 PROCEDURE — 97535 SELF CARE MNGMENT TRAINING: CPT

## 2019-03-18 PROCEDURE — 97530 THERAPEUTIC ACTIVITIES: CPT

## 2019-03-18 PROCEDURE — 97116 GAIT TRAINING THERAPY: CPT

## 2019-03-18 PROCEDURE — 97110 THERAPEUTIC EXERCISES: CPT

## 2019-03-18 PROCEDURE — 82948 REAGENT STRIP/BLOOD GLUCOSE: CPT

## 2019-03-19 LAB
GLUCOSE SERPL-MCNC: 257 MG/DL (ref 65–140)
GLUCOSE SERPL-MCNC: 305 MG/DL (ref 65–140)

## 2019-03-19 PROCEDURE — 97110 THERAPEUTIC EXERCISES: CPT

## 2019-03-19 PROCEDURE — 97535 SELF CARE MNGMENT TRAINING: CPT

## 2019-03-19 PROCEDURE — 97116 GAIT TRAINING THERAPY: CPT

## 2019-03-19 PROCEDURE — 82948 REAGENT STRIP/BLOOD GLUCOSE: CPT

## 2019-03-19 PROCEDURE — 97530 THERAPEUTIC ACTIVITIES: CPT

## 2019-03-20 LAB
GLUCOSE SERPL-MCNC: 232 MG/DL (ref 65–140)
GLUCOSE SERPL-MCNC: 260 MG/DL (ref 65–140)
GLUCOSE SERPL-MCNC: 274 MG/DL (ref 65–140)

## 2019-03-20 PROCEDURE — 97110 THERAPEUTIC EXERCISES: CPT

## 2019-03-20 PROCEDURE — 97116 GAIT TRAINING THERAPY: CPT

## 2019-03-20 PROCEDURE — 97530 THERAPEUTIC ACTIVITIES: CPT

## 2019-03-20 PROCEDURE — 82948 REAGENT STRIP/BLOOD GLUCOSE: CPT

## 2019-03-20 NOTE — WOUND OSTOMY CARE
Progress Note - Wound   Blayne Kait 74 y o  male MRN: [id number]  Unit/Bed#: -01 Encounter: [id number]      Assessment:   Improved fungal rash left groin  Educated patient and family member regarding skin care and proper applications of antifungal powder  Plan:   1  Continue NyStop x 7 more days  Van Leung RN   CWOCN  3/20/2019 11:55

## 2019-03-21 LAB
GLUCOSE SERPL-MCNC: 223 MG/DL (ref 65–140)
GLUCOSE SERPL-MCNC: 376 MG/DL (ref 65–140)

## 2019-03-21 PROCEDURE — 97535 SELF CARE MNGMENT TRAINING: CPT

## 2019-03-21 PROCEDURE — 82948 REAGENT STRIP/BLOOD GLUCOSE: CPT

## 2019-03-21 PROCEDURE — 97116 GAIT TRAINING THERAPY: CPT

## 2019-03-21 PROCEDURE — 97110 THERAPEUTIC EXERCISES: CPT

## 2019-03-22 LAB — GLUCOSE SERPL-MCNC: 343 MG/DL (ref 65–140)

## 2019-03-22 PROCEDURE — 82948 REAGENT STRIP/BLOOD GLUCOSE: CPT

## 2019-03-22 PROCEDURE — 97116 GAIT TRAINING THERAPY: CPT

## 2019-03-22 PROCEDURE — 97530 THERAPEUTIC ACTIVITIES: CPT

## 2019-03-22 PROCEDURE — 97110 THERAPEUTIC EXERCISES: CPT

## 2019-03-23 LAB
GLUCOSE SERPL-MCNC: 246 MG/DL (ref 65–140)
GLUCOSE SERPL-MCNC: 318 MG/DL (ref 65–140)
GLUCOSE SERPL-MCNC: 320 MG/DL (ref 65–140)

## 2019-03-23 PROCEDURE — 97535 SELF CARE MNGMENT TRAINING: CPT

## 2019-03-23 PROCEDURE — 97110 THERAPEUTIC EXERCISES: CPT

## 2019-03-23 PROCEDURE — 82948 REAGENT STRIP/BLOOD GLUCOSE: CPT

## 2019-03-24 LAB
GLUCOSE SERPL-MCNC: 311 MG/DL (ref 65–140)
GLUCOSE SERPL-MCNC: 373 MG/DL (ref 65–140)

## 2019-03-24 PROCEDURE — 82948 REAGENT STRIP/BLOOD GLUCOSE: CPT

## 2019-03-25 LAB
ANION GAP SERPL CALCULATED.3IONS-SCNC: 5 MMOL/L (ref 4–13)
BUN SERPL-MCNC: 23 MG/DL (ref 5–25)
CALCIUM SERPL-MCNC: 9.2 MG/DL (ref 8.3–10.1)
CHLORIDE SERPL-SCNC: 100 MMOL/L (ref 100–108)
CO2 SERPL-SCNC: 33 MMOL/L (ref 21–32)
CREAT SERPL-MCNC: 1.29 MG/DL (ref 0.6–1.3)
GFR SERPL CREATININE-BSD FRML MDRD: 53 ML/MIN/1.73SQ M
GLUCOSE SERPL-MCNC: 292 MG/DL (ref 65–140)
GLUCOSE SERPL-MCNC: 304 MG/DL (ref 65–140)
POTASSIUM SERPL-SCNC: 4.1 MMOL/L (ref 3.5–5.3)
SODIUM SERPL-SCNC: 138 MMOL/L (ref 136–145)

## 2019-03-25 PROCEDURE — 80048 BASIC METABOLIC PNL TOTAL CA: CPT | Performed by: INTERNAL MEDICINE

## 2019-03-25 PROCEDURE — 82948 REAGENT STRIP/BLOOD GLUCOSE: CPT

## 2019-03-26 ENCOUNTER — TELEPHONE (OUTPATIENT)
Dept: ENDOCRINOLOGY | Facility: CLINIC | Age: 74
End: 2019-03-26

## 2019-03-26 NOTE — TELEPHONE ENCOUNTER
Called patient to confirm appt for 3/27/19 and patient states "not coming too far" pt canceled appt and did not want to reschedule    Left message for PCP to inform them of above

## 2019-06-30 ENCOUNTER — HOSPITAL ENCOUNTER (INPATIENT)
Facility: HOSPITAL | Age: 74
LOS: 3 days | Discharge: NURSING, REHAB OR LONG-TERM CARE | DRG: 682 | End: 2019-07-03
Attending: EMERGENCY MEDICINE | Admitting: FAMILY MEDICINE
Payer: COMMERCIAL

## 2019-06-30 ENCOUNTER — APPOINTMENT (EMERGENCY)
Dept: RADIOLOGY | Facility: HOSPITAL | Age: 74
DRG: 682 | End: 2019-06-30
Payer: COMMERCIAL

## 2019-06-30 ENCOUNTER — APPOINTMENT (INPATIENT)
Dept: ULTRASOUND IMAGING | Facility: HOSPITAL | Age: 74
DRG: 682 | End: 2019-06-30
Payer: COMMERCIAL

## 2019-06-30 DIAGNOSIS — N17.9 ACUTE RENAL FAILURE (ARF) (HCC): Primary | ICD-10-CM

## 2019-06-30 DIAGNOSIS — N17.9 AKI (ACUTE KIDNEY INJURY) (HCC): ICD-10-CM

## 2019-06-30 DIAGNOSIS — R33.9 URINARY RETENTION: ICD-10-CM

## 2019-06-30 DIAGNOSIS — R53.1 WEAKNESS: ICD-10-CM

## 2019-06-30 DIAGNOSIS — N39.0 UTI (URINARY TRACT INFECTION): ICD-10-CM

## 2019-06-30 DIAGNOSIS — L89.319 PRESSURE INJURY OF SKIN OF RIGHT BUTTOCK, UNSPECIFIED INJURY STAGE: ICD-10-CM

## 2019-06-30 DIAGNOSIS — R26.2 AMBULATORY DYSFUNCTION: ICD-10-CM

## 2019-06-30 PROBLEM — R79.89 ELEVATED TSH: Status: ACTIVE | Noted: 2019-06-30

## 2019-06-30 PROBLEM — D72.829 LEUCOCYTOSIS: Status: ACTIVE | Noted: 2019-06-30

## 2019-06-30 LAB
ALBUMIN SERPL BCP-MCNC: 2.9 G/DL (ref 3.5–5)
ALBUMIN SERPL BCP-MCNC: 3.3 G/DL (ref 3.5–5)
ALP SERPL-CCNC: 137 U/L (ref 46–116)
ALP SERPL-CCNC: 152 U/L (ref 46–116)
ALT SERPL W P-5'-P-CCNC: 16 U/L (ref 12–78)
ALT SERPL W P-5'-P-CCNC: 16 U/L (ref 12–78)
ANION GAP SERPL CALCULATED.3IONS-SCNC: 10 MMOL/L (ref 4–13)
ANION GAP SERPL CALCULATED.3IONS-SCNC: 8 MMOL/L (ref 4–13)
APTT PPP: 31 SECONDS (ref 23–37)
AST SERPL W P-5'-P-CCNC: 14 U/L (ref 5–45)
AST SERPL W P-5'-P-CCNC: 18 U/L (ref 5–45)
BACTERIA UR QL AUTO: ABNORMAL /HPF
BASOPHILS # BLD AUTO: 0.06 THOUSANDS/ΜL (ref 0–0.1)
BASOPHILS NFR BLD AUTO: 0 % (ref 0–1)
BILIRUB SERPL-MCNC: 0.4 MG/DL (ref 0.2–1)
BILIRUB SERPL-MCNC: 0.4 MG/DL (ref 0.2–1)
BILIRUB UR QL STRIP: NEGATIVE
BUN SERPL-MCNC: 44 MG/DL (ref 5–25)
BUN SERPL-MCNC: 44 MG/DL (ref 5–25)
CALCIUM SERPL-MCNC: 9.2 MG/DL (ref 8.3–10.1)
CALCIUM SERPL-MCNC: 9.4 MG/DL (ref 8.3–10.1)
CHLORIDE SERPL-SCNC: 101 MMOL/L (ref 100–108)
CHLORIDE SERPL-SCNC: 103 MMOL/L (ref 100–108)
CK SERPL-CCNC: 74 U/L (ref 39–308)
CLARITY UR: ABNORMAL
CO2 SERPL-SCNC: 26 MMOL/L (ref 21–32)
CO2 SERPL-SCNC: 28 MMOL/L (ref 21–32)
COLOR UR: YELLOW
CREAT SERPL-MCNC: 2.76 MG/DL (ref 0.6–1.3)
CREAT SERPL-MCNC: 2.81 MG/DL (ref 0.6–1.3)
CREAT UR-MCNC: 155 MG/DL
EOSINOPHIL # BLD AUTO: 0.8 THOUSAND/ΜL (ref 0–0.61)
EOSINOPHIL NFR BLD AUTO: 6 % (ref 0–6)
ERYTHROCYTE [DISTWIDTH] IN BLOOD BY AUTOMATED COUNT: 14.1 % (ref 11.6–15.1)
ERYTHROCYTE [DISTWIDTH] IN BLOOD BY AUTOMATED COUNT: 14.5 % (ref 11.6–15.1)
EST. AVERAGE GLUCOSE BLD GHB EST-MCNC: 180 MG/DL
GFR SERPL CREATININE-BSD FRML MDRD: 21 ML/MIN/1.73SQ M
GFR SERPL CREATININE-BSD FRML MDRD: 21 ML/MIN/1.73SQ M
GLUCOSE SERPL-MCNC: 139 MG/DL (ref 65–140)
GLUCOSE SERPL-MCNC: 146 MG/DL (ref 65–140)
GLUCOSE SERPL-MCNC: 155 MG/DL (ref 65–140)
GLUCOSE SERPL-MCNC: 156 MG/DL (ref 65–140)
GLUCOSE SERPL-MCNC: 161 MG/DL (ref 65–140)
GLUCOSE SERPL-MCNC: 204 MG/DL (ref 65–140)
GLUCOSE UR STRIP-MCNC: NEGATIVE MG/DL
HBA1C MFR BLD: 7.9 % (ref 4.2–6.3)
HCT VFR BLD AUTO: 37.5 % (ref 36.5–49.3)
HCT VFR BLD AUTO: 38.6 % (ref 36.5–49.3)
HGB BLD-MCNC: 11.4 G/DL (ref 12–17)
HGB BLD-MCNC: 12 G/DL (ref 12–17)
HGB UR QL STRIP.AUTO: ABNORMAL
IMM GRANULOCYTES # BLD AUTO: 0.05 THOUSAND/UL (ref 0–0.2)
IMM GRANULOCYTES NFR BLD AUTO: 0 % (ref 0–2)
INR PPP: 1.07 (ref 0.84–1.19)
KETONES UR STRIP-MCNC: NEGATIVE MG/DL
LEUKOCYTE ESTERASE UR QL STRIP: ABNORMAL
LYMPHOCYTES # BLD AUTO: 2.8 THOUSANDS/ΜL (ref 0.6–4.47)
LYMPHOCYTES NFR BLD AUTO: 21 % (ref 14–44)
MAGNESIUM SERPL-MCNC: 1.8 MG/DL (ref 1.6–2.6)
MAGNESIUM SERPL-MCNC: 1.9 MG/DL (ref 1.6–2.6)
MCH RBC QN AUTO: 27.9 PG (ref 26.8–34.3)
MCH RBC QN AUTO: 28.6 PG (ref 26.8–34.3)
MCHC RBC AUTO-ENTMCNC: 30.4 G/DL (ref 31.4–37.4)
MCHC RBC AUTO-ENTMCNC: 31.1 G/DL (ref 31.4–37.4)
MCV RBC AUTO: 92 FL (ref 82–98)
MCV RBC AUTO: 92 FL (ref 82–98)
MICROALBUMIN UR-MCNC: 33.4 MG/L (ref 0–20)
MICROALBUMIN/CREAT 24H UR: 22 MG/G CREATININE (ref 0–30)
MONOCYTES # BLD AUTO: 0.95 THOUSAND/ΜL (ref 0.17–1.22)
MONOCYTES NFR BLD AUTO: 7 % (ref 4–12)
NEUTROPHILS # BLD AUTO: 8.93 THOUSANDS/ΜL (ref 1.85–7.62)
NEUTS SEG NFR BLD AUTO: 66 % (ref 43–75)
NITRITE UR QL STRIP: NEGATIVE
NON-SQ EPI CELLS URNS QL MICRO: ABNORMAL /HPF
NRBC BLD AUTO-RTO: 0 /100 WBCS
NT-PROBNP SERPL-MCNC: 232 PG/ML
PH UR STRIP.AUTO: 5.5 [PH]
PHOSPHATE SERPL-MCNC: 4.4 MG/DL (ref 2.3–4.1)
PHOSPHATE SERPL-MCNC: 4.9 MG/DL (ref 2.3–4.1)
PLATELET # BLD AUTO: 264 THOUSANDS/UL (ref 149–390)
PLATELET # BLD AUTO: 331 THOUSANDS/UL (ref 149–390)
PMV BLD AUTO: 10.1 FL (ref 8.9–12.7)
PMV BLD AUTO: 9.8 FL (ref 8.9–12.7)
POTASSIUM SERPL-SCNC: 4.2 MMOL/L (ref 3.5–5.3)
POTASSIUM SERPL-SCNC: 4.5 MMOL/L (ref 3.5–5.3)
PROT SERPL-MCNC: 7.1 G/DL (ref 6.4–8.2)
PROT SERPL-MCNC: 8 G/DL (ref 6.4–8.2)
PROT UR STRIP-MCNC: NEGATIVE MG/DL
PROT UR-MCNC: 32 MG/DL
PROT/CREAT UR: 0.21 MG/G{CREAT} (ref 0–0.1)
PROTHROMBIN TIME: 13.9 SECONDS (ref 11.6–14.5)
RBC # BLD AUTO: 4.09 MILLION/UL (ref 3.88–5.62)
RBC # BLD AUTO: 4.2 MILLION/UL (ref 3.88–5.62)
RBC #/AREA URNS AUTO: ABNORMAL /HPF
SODIUM 24H UR-SCNC: 36 MOL/L
SODIUM SERPL-SCNC: 137 MMOL/L (ref 136–145)
SODIUM SERPL-SCNC: 139 MMOL/L (ref 136–145)
SP GR UR STRIP.AUTO: 1.02 (ref 1–1.03)
T4 FREE SERPL-MCNC: 1.11 NG/DL (ref 0.76–1.46)
TROPONIN I SERPL-MCNC: <0.02 NG/ML
TSH SERPL DL<=0.05 MIU/L-ACNC: 4.58 UIU/ML (ref 0.36–3.74)
UROBILINOGEN UR QL STRIP.AUTO: 0.2 E.U./DL
UUN 24H UR-MCNC: 498 MG/DL
WBC # BLD AUTO: 10.61 THOUSAND/UL (ref 4.31–10.16)
WBC # BLD AUTO: 13.59 THOUSAND/UL (ref 4.31–10.16)
WBC #/AREA URNS AUTO: ABNORMAL /HPF

## 2019-06-30 PROCEDURE — 82948 REAGENT STRIP/BLOOD GLUCOSE: CPT

## 2019-06-30 PROCEDURE — G8982 BODY POS GOAL STATUS: HCPCS | Performed by: PHYSICAL THERAPIST

## 2019-06-30 PROCEDURE — G8981 BODY POS CURRENT STATUS: HCPCS | Performed by: PHYSICAL THERAPIST

## 2019-06-30 PROCEDURE — 84443 ASSAY THYROID STIM HORMONE: CPT | Performed by: EMERGENCY MEDICINE

## 2019-06-30 PROCEDURE — 80053 COMPREHEN METABOLIC PANEL: CPT | Performed by: PHYSICIAN ASSISTANT

## 2019-06-30 PROCEDURE — 84439 ASSAY OF FREE THYROXINE: CPT | Performed by: PHYSICIAN ASSISTANT

## 2019-06-30 PROCEDURE — 85025 COMPLETE CBC W/AUTO DIFF WBC: CPT | Performed by: EMERGENCY MEDICINE

## 2019-06-30 PROCEDURE — 84100 ASSAY OF PHOSPHORUS: CPT | Performed by: PHYSICIAN ASSISTANT

## 2019-06-30 PROCEDURE — 76770 US EXAM ABDO BACK WALL COMP: CPT

## 2019-06-30 PROCEDURE — 87147 CULTURE TYPE IMMUNOLOGIC: CPT | Performed by: INTERNAL MEDICINE

## 2019-06-30 PROCEDURE — 85610 PROTHROMBIN TIME: CPT | Performed by: PHYSICIAN ASSISTANT

## 2019-06-30 PROCEDURE — 83735 ASSAY OF MAGNESIUM: CPT | Performed by: PHYSICIAN ASSISTANT

## 2019-06-30 PROCEDURE — 84484 ASSAY OF TROPONIN QUANT: CPT | Performed by: EMERGENCY MEDICINE

## 2019-06-30 PROCEDURE — 87186 SC STD MICRODIL/AGAR DIL: CPT | Performed by: INTERNAL MEDICINE

## 2019-06-30 PROCEDURE — 99222 1ST HOSP IP/OBS MODERATE 55: CPT | Performed by: PHYSICIAN ASSISTANT

## 2019-06-30 PROCEDURE — 84156 ASSAY OF PROTEIN URINE: CPT | Performed by: INTERNAL MEDICINE

## 2019-06-30 PROCEDURE — 36415 COLL VENOUS BLD VENIPUNCTURE: CPT | Performed by: EMERGENCY MEDICINE

## 2019-06-30 PROCEDURE — 85730 THROMBOPLASTIN TIME PARTIAL: CPT | Performed by: PHYSICIAN ASSISTANT

## 2019-06-30 PROCEDURE — 85027 COMPLETE CBC AUTOMATED: CPT | Performed by: PHYSICIAN ASSISTANT

## 2019-06-30 PROCEDURE — 97161 PT EVAL LOW COMPLEX 20 MIN: CPT | Performed by: PHYSICAL THERAPIST

## 2019-06-30 PROCEDURE — 99285 EMERGENCY DEPT VISIT HI MDM: CPT

## 2019-06-30 PROCEDURE — 82570 ASSAY OF URINE CREATININE: CPT | Performed by: INTERNAL MEDICINE

## 2019-06-30 PROCEDURE — 82043 UR ALBUMIN QUANTITATIVE: CPT | Performed by: INTERNAL MEDICINE

## 2019-06-30 PROCEDURE — 87086 URINE CULTURE/COLONY COUNT: CPT | Performed by: INTERNAL MEDICINE

## 2019-06-30 PROCEDURE — 84100 ASSAY OF PHOSPHORUS: CPT | Performed by: EMERGENCY MEDICINE

## 2019-06-30 PROCEDURE — 83880 ASSAY OF NATRIURETIC PEPTIDE: CPT | Performed by: EMERGENCY MEDICINE

## 2019-06-30 PROCEDURE — 83036 HEMOGLOBIN GLYCOSYLATED A1C: CPT | Performed by: PHYSICIAN ASSISTANT

## 2019-06-30 PROCEDURE — 87077 CULTURE AEROBIC IDENTIFY: CPT | Performed by: INTERNAL MEDICINE

## 2019-06-30 PROCEDURE — 71045 X-RAY EXAM CHEST 1 VIEW: CPT

## 2019-06-30 PROCEDURE — 81001 URINALYSIS AUTO W/SCOPE: CPT | Performed by: PHYSICIAN ASSISTANT

## 2019-06-30 PROCEDURE — 84540 ASSAY OF URINE/UREA-N: CPT | Performed by: INTERNAL MEDICINE

## 2019-06-30 PROCEDURE — 80053 COMPREHEN METABOLIC PANEL: CPT | Performed by: EMERGENCY MEDICINE

## 2019-06-30 PROCEDURE — 84300 ASSAY OF URINE SODIUM: CPT | Performed by: INTERNAL MEDICINE

## 2019-06-30 PROCEDURE — 83735 ASSAY OF MAGNESIUM: CPT | Performed by: EMERGENCY MEDICINE

## 2019-06-30 PROCEDURE — 99285 EMERGENCY DEPT VISIT HI MDM: CPT | Performed by: EMERGENCY MEDICINE

## 2019-06-30 PROCEDURE — 93005 ELECTROCARDIOGRAM TRACING: CPT

## 2019-06-30 PROCEDURE — 82550 ASSAY OF CK (CPK): CPT | Performed by: INTERNAL MEDICINE

## 2019-06-30 RX ORDER — CIPROFLOXACIN 500 MG/1
500 TABLET, FILM COATED ORAL EVERY 24 HOURS
Status: COMPLETED | OUTPATIENT
Start: 2019-06-30 | End: 2019-07-02

## 2019-06-30 RX ORDER — OXYCODONE HYDROCHLORIDE 10 MG/1
20 TABLET ORAL EVERY 6 HOURS PRN
Status: DISCONTINUED | OUTPATIENT
Start: 2019-06-30 | End: 2019-07-03 | Stop reason: HOSPADM

## 2019-06-30 RX ORDER — MELATONIN
2000 DAILY
Status: DISCONTINUED | OUTPATIENT
Start: 2019-06-30 | End: 2019-07-03 | Stop reason: HOSPADM

## 2019-06-30 RX ORDER — GABAPENTIN 300 MG/1
600 CAPSULE ORAL 2 TIMES DAILY
Status: DISCONTINUED | OUTPATIENT
Start: 2019-06-30 | End: 2019-07-03 | Stop reason: HOSPADM

## 2019-06-30 RX ORDER — HEPARIN SODIUM 5000 [USP'U]/ML
5000 INJECTION, SOLUTION INTRAVENOUS; SUBCUTANEOUS EVERY 8 HOURS SCHEDULED
Status: DISCONTINUED | OUTPATIENT
Start: 2019-06-30 | End: 2019-07-03 | Stop reason: HOSPADM

## 2019-06-30 RX ORDER — POLYETHYLENE GLYCOL 3350 17 G/17G
17 POWDER, FOR SOLUTION ORAL ONCE
Status: COMPLETED | OUTPATIENT
Start: 2019-06-30 | End: 2019-06-30

## 2019-06-30 RX ORDER — ASPIRIN 81 MG/1
81 TABLET, CHEWABLE ORAL DAILY
Status: DISCONTINUED | OUTPATIENT
Start: 2019-06-30 | End: 2019-07-03 | Stop reason: HOSPADM

## 2019-06-30 RX ORDER — SODIUM CHLORIDE 9 MG/ML
75 INJECTION, SOLUTION INTRAVENOUS CONTINUOUS
Status: DISCONTINUED | OUTPATIENT
Start: 2019-06-30 | End: 2019-07-02

## 2019-06-30 RX ORDER — NYSTATIN 100000 [USP'U]/G
POWDER TOPICAL 2 TIMES DAILY
Status: DISCONTINUED | OUTPATIENT
Start: 2019-06-30 | End: 2019-07-03 | Stop reason: HOSPADM

## 2019-06-30 RX ORDER — ATORVASTATIN CALCIUM 40 MG/1
40 TABLET, FILM COATED ORAL
Status: DISCONTINUED | OUTPATIENT
Start: 2019-06-30 | End: 2019-07-03 | Stop reason: HOSPADM

## 2019-06-30 RX ORDER — ACETAMINOPHEN 325 MG/1
650 TABLET ORAL EVERY 6 HOURS PRN
Status: DISCONTINUED | OUTPATIENT
Start: 2019-06-30 | End: 2019-07-03 | Stop reason: HOSPADM

## 2019-06-30 RX ORDER — CARVEDILOL 12.5 MG/1
12.5 TABLET ORAL 2 TIMES DAILY
Status: DISCONTINUED | OUTPATIENT
Start: 2019-06-30 | End: 2019-07-03 | Stop reason: HOSPADM

## 2019-06-30 RX ORDER — ONDANSETRON 2 MG/ML
4 INJECTION INTRAMUSCULAR; INTRAVENOUS EVERY 6 HOURS PRN
Status: DISCONTINUED | OUTPATIENT
Start: 2019-06-30 | End: 2019-07-03 | Stop reason: HOSPADM

## 2019-06-30 RX ORDER — INSULIN GLARGINE 100 [IU]/ML
20 INJECTION, SOLUTION SUBCUTANEOUS EVERY MORNING
Status: DISCONTINUED | OUTPATIENT
Start: 2019-06-30 | End: 2019-07-01

## 2019-06-30 RX ADMIN — SODIUM CHLORIDE 250 ML: 0.9 INJECTION, SOLUTION INTRAVENOUS at 04:08

## 2019-06-30 RX ADMIN — HEPARIN SODIUM 5000 UNITS: 5000 INJECTION INTRAVENOUS; SUBCUTANEOUS at 05:52

## 2019-06-30 RX ADMIN — POLYETHYLENE GLYCOL 3350 17 G: 17 POWDER, FOR SOLUTION ORAL at 05:53

## 2019-06-30 RX ADMIN — VITAMIN D, TAB 1000IU (100/BT) 2000 UNITS: 25 TAB at 08:15

## 2019-06-30 RX ADMIN — ATORVASTATIN CALCIUM 40 MG: 40 TABLET, FILM COATED ORAL at 18:01

## 2019-06-30 RX ADMIN — NYSTATIN: 100000 POWDER TOPICAL at 08:15

## 2019-06-30 RX ADMIN — GABAPENTIN 600 MG: 300 CAPSULE ORAL at 18:01

## 2019-06-30 RX ADMIN — ONDANSETRON 4 MG: 2 INJECTION INTRAMUSCULAR; INTRAVENOUS at 16:49

## 2019-06-30 RX ADMIN — INSULIN LISPRO 3 UNITS: 100 INJECTION, SOLUTION INTRAVENOUS; SUBCUTANEOUS at 16:08

## 2019-06-30 RX ADMIN — INSULIN GLARGINE 20 UNITS: 100 INJECTION, SOLUTION SUBCUTANEOUS at 08:15

## 2019-06-30 RX ADMIN — OXYCODONE HYDROCHLORIDE 20 MG: 10 TABLET ORAL at 18:01

## 2019-06-30 RX ADMIN — HEPARIN SODIUM 5000 UNITS: 5000 INJECTION INTRAVENOUS; SUBCUTANEOUS at 14:08

## 2019-06-30 RX ADMIN — ASPIRIN 81 MG 81 MG: 81 TABLET ORAL at 08:15

## 2019-06-30 RX ADMIN — CARVEDILOL 12.5 MG: 12.5 TABLET, FILM COATED ORAL at 18:00

## 2019-06-30 RX ADMIN — GABAPENTIN 600 MG: 300 CAPSULE ORAL at 08:15

## 2019-06-30 RX ADMIN — NYSTATIN 1 APPLICATION: 100000 POWDER TOPICAL at 18:01

## 2019-06-30 RX ADMIN — HEPARIN SODIUM 5000 UNITS: 5000 INJECTION INTRAVENOUS; SUBCUTANEOUS at 21:50

## 2019-06-30 RX ADMIN — SODIUM CHLORIDE 75 ML/HR: 0.9 INJECTION, SOLUTION INTRAVENOUS at 16:10

## 2019-06-30 RX ADMIN — SODIUM CHLORIDE 75 ML/HR: 0.9 INJECTION, SOLUTION INTRAVENOUS at 06:21

## 2019-06-30 RX ADMIN — INSULIN LISPRO 3 UNITS: 100 INJECTION, SOLUTION INTRAVENOUS; SUBCUTANEOUS at 11:33

## 2019-06-30 RX ADMIN — INSULIN LISPRO 2 UNITS: 100 INJECTION, SOLUTION INTRAVENOUS; SUBCUTANEOUS at 21:50

## 2019-06-30 RX ADMIN — CIPROFLOXACIN 500 MG: 500 TABLET, FILM COATED ORAL at 16:30

## 2019-06-30 NOTE — ASSESSMENT & PLAN NOTE
WBC at 13 59  No fever or chills   No clear source of infection  UA pending  Chest x-ray completed official report pending  Will monitor CBC

## 2019-06-30 NOTE — PHYSICAL THERAPY NOTE
PHYSICAL THERAPY NOTE    Patient Name: Bryson Ayon  [id number] Date: 6/30/2019 06/30/19 1100   Note Type   Note type Eval/Treat   Pain Assessment   Pain Assessment 0-10   Pain Score Worst Possible Pain   Pain Type Chronic pain   Pain Location   ("Whole body")   Home Living   Type of Home   (Unclear)   Additional Comments Patient unclear of set up  Reports "my wife and daughter can't handle me anymore"  Reports he only gets OOB when wife and daughter "can do it"  Prior Function   Level of [place] Needs assistance with ADLs and functional mobility; Needs assistance with IADLs  (Unclear if patient OOB to chair  Min verbal answers )   Comments Patient would not answer many questions at eval   Yelling at therapist to stop touching him, he hurts all over  Minimal tolerance for touch  Poor tolerance to bed mobility attempts, repositioning, and ROM  General   Family/Caregiver Present No   Cognition   Orientation Level Oriented to person   Comments Woud not answer questions  RLE Assessment   RLE Assessment   (Poor tolerance to ROM, hip flex to 80, knee to 60 deg )   LLE Assessment   LLE Assessment   (Poor tolerance to ROM, hip flex to 40, knee to 40 deg )   Coordination   Movements are Fluid and Coordinated 0   Coordination and Movement Description Minimal active ROM performed, patient screaming at therapist for ROM  Bed Mobility   Rolling R 1  Dependent   Rolling L 1  Dependent   Supine to Sit Unable to assess   Endurance Deficit   Endurance Deficit Yes   Endurance Deficit Description Very [place] with minimal movement attempts     Activity Tolerance   Activity Tolerance Patient limited by pain;Treatment limited secondary to agitation;Treatment limited secondary to medical complications (Comment)   Nurse Made Aware Nsg made aware   Assessment   Prognosis Fair   Problem List Decreased strength;Decreased range of motion;Decreased endurance; Impaired balance;Decreased mobility; Decreased cognition; Impaired judgement;Decreased safety awareness;Decreased skin integrity;Obesity;Pain   Assessment Pt is 66 y o  male seen for PT evaluation on 6/30/19 s/p admit to [address] on 6/29/19 w/ [facility]  PT consulted to assess pt's functional mobility and d/c needs  Order placed for PT eval and tx  Performed at least 2 patient identifiers during session: Name and wristband  Comorbidities affecting pt's physical performance at time of assessment include: pain, CHF, HTN, DM, pressure sore, 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, left UE paresthesias, opiate dependence  LOF prior to admission was apparently dependent on his wife and daughter for care and transferring OOB  Patient reports getting OOB "sometimes"  Personal factors affecting pt at time of IE include: pain, LE weakness, paresthesias  Please find objective findings from PT assessment regarding body systems outlined above with impairments and limitations including weakness, impaired balance, decreased endurance, pain, decreased activity tolerance and fall risk, as well as mobility assessment  Pt's clinical presentation is currently unstable/unpredictable seen in pt's presentation of ongoing medical assessment  Given co-morbidities and presented presentation, mod Level eval was completed  Pt to benefit from continued PT tx to address deficits as defined above and maximize level of functional independent mobility and consistency  From PT/mobility standpoint, recommendation at time of d/c would be appropriate for short term rehab stay in order to promote return to PLOF and independence     Goals   Patient Goals To be left alone   STG Expiration Date 07/07/19   Short Term Goal #1 Bed mobility mod A  (Transfer mod assist of 2)   Short Term Goal #2 Tolerate OOB x 3 hours to allow for meals while OOB   Plan   Treatment/Interventions Functional transfer training;LE strengthening/ROM; Therapeutic exercise; Endurance training;Bed mobility;Gait training;Patient/family training   PT Frequency   (3-5 times per week)   Recommendation   Recommendation Short-term skilled PT   Equipment Recommended   (TBD)   PT - OK to Discharge Yes  (When medically stable to next level of care)     Patient remains in bed, HOB elevated due to SOB with HOB lower  Nsg aware  All needs in reach

## 2019-06-30 NOTE — ASSESSMENT & PLAN NOTE
Wt Readings from Last 3 Encounters:   06/30/19 129 kg (284 lb 6 3 oz)   03/04/19 127 kg (280 lb 10 3 oz)   03/01/19 128 kg (282 lb 3 oz)     No evidence of fluid overload  No associated chest pain, SOB or Lower extremity edema  Last ECHO on 3/01/2019 reviewed EF a 65% with grade 1 diastolic dysfunction  Continue home medications  Continue outpatient Cardiology follow up

## 2019-06-30 NOTE — ASSESSMENT & PLAN NOTE
Lab Results   Component Value Date    HGBA1C 6 1 03/01/2019     Glucose level at 156  Continue home insulin regimen  Sliding scale insulin  fingerstick glucose 4 times daily AC/HS  Check A1C  AM cMP

## 2019-06-30 NOTE — PLAN OF CARE
Problem: Potential for Falls  Goal: Patient will remain free of falls  Description  INTERVENTIONS:  - Assess patient frequently for physical needs  -  Identify cognitive and physical deficits and behaviors that affect risk of falls    -  [place] fall precautions as indicated by assessment   - Educate patient/family on patient safety including physical limitations  - Instruct patient to call for assistance with activity based on assessment  - Modify environment to reduce risk of injury  - Consider OT/PT consult to assist with strengthening/mobility  Outcome: Progressing     Problem: PAIN - ADULT  Goal: Verbalizes/displays adequate comfort level or baseline comfort level  Description  Interventions:  - Encourage patient to monitor pain and request assistance  - Assess pain using appropriate pain scale  - Administer analgesics based on type and severity of pain and evaluate response  - Implement non-pharmacological measures as appropriate and evaluate response  - Consider cultural and social influences on pain and pain management  - Notify physician/advanced practitioner if interventions unsuccessful or patient reports new pain  Outcome: Progressing     Problem: INFECTION - ADULT  Goal: Absence or prevention of progression during hospitalization  Description  INTERVENTIONS:  - Assess and monitor for signs and symptoms of infection  - Monitor lab/diagnostic results  - Monitor all insertion sites, i e  indwelling lines, tubes, and drains  - Monitor endotracheal (as able) and nasal secretions for changes in amount and color  - [place] appropriate cooling/warming therapies per order  - Administer medications as ordered  - Instruct and encourage patient and family to use good hand hygiene technique  - Identify and instruct in appropriate isolation precautions for identified infection/condition  Outcome: Progressing  Goal: Absence of fever/infection during neutropenic period  Description  INTERVENTIONS:  - Monitor WBC  - Implement neutropenic guidelines  Outcome: Progressing     Problem: SAFETY ADULT  Goal: Patient will remain free of falls  Description  INTERVENTIONS:  - Assess patient frequently for physical needs  -  Identify cognitive and physical deficits and behaviors that affect risk of falls    -  [place] fall precautions as indicated by assessment   - Educate patient/family on patient safety including physical limitations  - Instruct patient to call for assistance with activity based on assessment  - Modify environment to reduce risk of injury  - Consider OT/PT consult to assist with strengthening/mobility  Outcome: Progressing  Goal: Maintain or return to baseline ADL function  Description  INTERVENTIONS:  -  Assess patient's ability to carry out ADLs; assess patient's baseline for ADL function and identify physical deficits which impact ability to perform ADLs (bathing, care of mouth/teeth, toileting, grooming, dressing, etc )  - Assess/evaluate cause of self-care deficits   - Assess range of motion  - Assess patient's mobility; develop plan if impaired  - Assess patient's need for assistive devices and provide as appropriate  - Encourage maximum independence but intervene and supervise when necessary  ¯ Involve family in performance of ADLs  ¯ Assess for home care needs following discharge   ¯ Request OT consult to assist with ADL evaluation and planning for discharge  ¯ Provide patient education as appropriate  Outcome: Progressing  Goal: Maintain or return mobility status to optimal level  Description  INTERVENTIONS:  - Assess patient's baseline mobility status (ambulation, transfers, stairs, etc )    - Identify cognitive and physical deficits and behaviors that affect mobility  - Identify mobility aids required to assist with transfers and/or ambulation (gait belt, sit-to-stand, lift, walker, cane, etc )  - [place] fall precautions as indicated by assessment  - Record patient progress and toleration of activity level on Mobility SBAR; progress patient to next Phase/Stage  - Instruct patient to call for assistance with activity based on assessment  - Request Rehabilitation consult to assist with strengthening/weightbearing, etc   Outcome: Progressing     Problem: DISCHARGE PLANNING  Goal: Discharge to home or other facility with appropriate resources  Description  INTERVENTIONS:  - Identify barriers to discharge w/patient and caregiver  - Arrange for needed discharge resources and transportation as appropriate  - Identify discharge learning needs (meds, wound care, etc )  - Arrange for interpretive services to assist at discharge as needed  - Refer to Case Management Department for coordinating discharge planning if the patient needs post-hospital services based on physician/advanced practitioner order or complex needs related to functional status, cognitive ability, or social support system  Outcome: Progressing     Problem: Knowledge Deficit  Goal: Patient/family/caregiver demonstrates understanding of disease process, treatment plan, medications, and discharge instructions  Description  Complete learning assessment and assess knowledge base    Interventions:  - Provide teaching at level of understanding  - Provide teaching via preferred learning methods  Outcome: Progressing     Problem: MUSCULOSKELETAL - ADULT  Goal: Maintain or return mobility to safest level of function  Description  INTERVENTIONS:  - Assess patient's ability to carry out ADLs; assess patient's baseline for ADL function and identify physical deficits which impact ability to perform ADLs (bathing, care of mouth/teeth, toileting, grooming, dressing, etc )  - Assess/evaluate cause of self-care deficits   - Assess range of motion  - Assess patient's mobility; develop plan if impaired  - Assess patient's need for assistive devices and provide as appropriate  - Encourage maximum independence but intervene and supervise when necessary  - Involve family in performance of ADLs  - Assess for home care needs following discharge   - Request OT consult to assist with ADL evaluation and planning for discharge  - Provide patient education as appropriate  Outcome: Progressing  Goal: Maintain proper alignment of affected body part  Description  INTERVENTIONS:  - Support, maintain and protect limb and body alignment  - Provide pt/fam with appropriate education  Outcome: Progressing

## 2019-06-30 NOTE — ASSESSMENT & PLAN NOTE
Present on admission   He does have hx of CKD  Creatinine at 2 81  Baseline creatinine appears to be between 1 1 and 1 4  Avoid nephrotoxic agents   IV normal saline bolus received in the ER, will continue with gentle IV hydration pending nephrology recomendations  JAMES Barragan [address]  Nephrology consult  Supportive care

## 2019-06-30 NOTE — ASSESSMENT & PLAN NOTE
Present on admission  Unclear how long  he has this ulcer  Does not appear infected  He denies being evaluated by Wound care  Continue Daily wound care  Wound care consult

## 2019-06-30 NOTE — ED PROVIDER NOTES
History  Chief Complaint   Patient presents with    Weakness - Generalized     Patient is experiencing leg weakness for the past couple of weeks that got worse tonight  Patient is a 49-year-old male  He is morbidly obese  Presents to the emergency room because his legs feel like jelly and he is unable to walk  He has a history of ambulatory dysfunction  He spent some time in a short-stay unit for rehabilitation earlier this year  He walks with a walker  Things got worse since Father's Day, and even worse today  He denies any low back pain  No fever or chills  No new trauma  Symptoms are localized to the legs  He has no symptoms in the upper extremities  He does have chronic pain  He has had problems with chronic congestive heart failure and chronic kidney disease  He has had issues with hypoglycemia and urinary tract infection in the past   Symptoms are moderately severe  No relieving factors  Prior to Admission Medications   Prescriptions Last Dose Informant Patient Reported? Taking?    BD PEN NEEDLE JUNIOR U/F 32G X 4 MM MISC   Yes No   Heparin Sodium, Porcine, (HEPARIN, PORCINE,) 5,000 units/mL   No No   Sig: Inject 1 mL (5,000 Units total) under the skin every 8 (eight) hours   acetaminophen (TYLENOL) 325 mg tablet   No No   Sig: Take 2 tablets (650 mg total) by mouth every 6 (six) hours as needed for mild pain, headaches or fever   aspirin 81 MG tablet   Yes No   Sig: Take 81 mg by mouth daily   atorvastatin (LIPITOR) 40 mg tablet   No No   Sig: Take 1 tablet (40 mg total) by mouth daily after dinner   carvedilol (COREG) 12 5 mg tablet   No No   Sig: Take 1 tablet (12 5 mg total) by mouth 2 (two) times a day   cholecalciferol 2000 units TABS   No No   Sig: Take 1 tablet (2,000 Units total) by mouth daily   gabapentin (NEURONTIN) 600 MG tablet   No No   Sig: Take 1 tablet (600 mg total) by mouth 2 (two) times a day   glucagon (GLUCAGON EMERGENCY) 1 MG injection   No No   Sig: Inject 1 mg under the skin once as needed (PRN blood glucose less than 70 if unconscious or uncorrectable by oral means) for up to 1 dose   insulin glargine (LANTUS) 100 units/mL subcutaneous injection   No No   Sig: Inject 15 Units under the skin every morning   insulin lispro (HumaLOG) 100 units/mL injection   No No   Sig: Inject 3 Units under the skin 3 (three) times a day with meals   lisinopril (ZESTRIL) 5 mg tablet   No No   Sig: Take 1 tablet (5 mg total) by mouth daily   nystatin (MYCOSTATIN) powder   No No   Sig: Apply topically 2 (two) times a day   oxyCODONE (ROXICODONE) 10 MG TABS   No No   Sig: Take 1 tablet (10 mg total) by mouth every 4 (four) hours as needed for moderate pain or severe painMax Daily Amount: 60 mg   tamsulosin (FLOMAX) 0 4 mg   Yes No   Sig: Take 0 4 mg by mouth every evening        Facility-Administered Medications: None       Past Medical History:   Diagnosis Date    Cataract     [facility]est[facility])     chronic diastolic CHF    Coronary artery disease     Diabetes mellitus ([facility] )     Glaucoma     Hyperlipidemia     Hypertension     Neuropathy     Obesity     Renal disorder     chronic kidney disease stage 3     Sleep apnea     Stroke ([facility]     TIA (transient ischemic attack)     Uncontrolled type 2 diabetes mellitus with hyperglycemia, with long-term current use of insulin ([facility] ) 10/4/2018       Past Surgical History:   Procedure Laterality Date    APPENDECTOMY      CARPAL TUNNEL RELEASE      EYE SURGERY      cataracts       Family History   Problem Relation Age of Onset    No Known Problems Mother     No Known Problems Father      I have reviewed and agree with the history as documented      Social History     Tobacco Use    Smoking status: Former Smoker    Smokeless tobacco: Former User   Substance Use Topics    Alcohol use: Never     Frequency: Never     Drinks per session: Patient refused     Binge frequency: Never    Drug use: No        Review of Systems   Constitutional: Negative for chills and fever  HENT: Negative for rhinorrhea and sore throat  Eyes: Negative for pain, redness and visual disturbance  Respiratory: Negative for cough and shortness of breath  Cardiovascular: Negative for chest pain and leg swelling  Gastrointestinal: Positive for constipation  Negative for abdominal pain, diarrhea and vomiting  Endocrine: Negative for polydipsia and polyuria  Genitourinary: Negative for dysuria, frequency and hematuria  Musculoskeletal: Negative for back pain and neck pain  Skin: Negative for rash and wound  Allergic/Immunologic: Negative for immunocompromised state  Neurological: Positive for weakness  Negative for numbness and headaches  Psychiatric/Behavioral: Negative for hallucinations and suicidal ideas  All other systems reviewed and are negative  Physical Exam  Physical Exam   Constitutional: He is oriented to person, place, and time  He appears well-developed and well-nourished  No distress  Patient is morbidly obese  HENT:   Head: Normocephalic and atraumatic  Mouth/Throat: Oropharynx is clear and moist    Eyes: Conjunctivae are normal  Right eye exhibits no discharge  Left eye exhibits no discharge  No scleral icterus  Neck: Normal range of motion  Neck supple  Cardiovascular: Normal rate, regular rhythm, normal heart sounds and intact distal pulses  Exam reveals no gallop and no friction rub  No murmur heard  Pulmonary/Chest: Effort normal and breath sounds normal  No respiratory distress  He has no wheezes  He has no rales  Abdominal: Soft  Bowel sounds are normal  He exhibits no distension  There is no tenderness  There is no rebound and no guarding  Musculoskeletal: He exhibits no edema, tenderness or deformity  No calf pain  Neurological: He is alert and oriented to person, place, and time  He has normal strength  He displays abnormal reflex  No sensory deficit  GCS eye subscore is 4   GCS verbal subscore is 5  GCS motor subscore is 6  Diminished DTRs in both lower extremities bilaterally  Skin: Skin is warm and dry  No rash noted  He is not diaphoretic  No erythema  No pallor  Psychiatric: He has a normal mood and affect  His behavior is normal    Vitals reviewed  Vital Signs  ED Triage Vitals   Temperature Pulse Respirations Blood Pressure SpO2   06/30/19 0022 06/30/19 0022 06/30/19 0042 06/30/19 0022 06/30/19 0022   97 6 °F (36 4 °C) 83 18 97/56 97 %      Temp Source Heart Rate Source Patient Position - Orthostatic VS BP Location FiO2 (%)   06/30/19 0022 06/30/19 0022 06/30/19 0022 06/30/19 0022 --   Temporal Monitor Lying Right arm       Pain Score       06/30/19 0022       5           Vitals:    06/30/19 0215 06/30/19 0230 06/30/19 0300 06/30/19 0330   BP: 112/69 106/58 112/57 114/56   Pulse: 87 84 91 90   Patient Position - Orthostatic VS:             Visual Acuity      ED Medications  Medications   sodium chloride 0 9 % bolus 250 mL (has no administration in time range)       Diagnostic Studies  Results Reviewed     Procedure Component Value Units Date/Time    TSH [[id number]]  (Abnormal) Collected:  06/30/19 0207    Lab Status:  Final result Specimen:  Blood from Arm, Right Updated:  06/30/19 0239     TSH 3RD GENERATON 4 583 uIU/mL     Narrative:       Patients undergoing fluorescein dye angiography may retain small amounts of fluorescein in the body for 48-72 hours post procedure  Samples containing fluorescein can produce falsely depressed TSH values  If the patient had this procedure,a specimen should be resubmitted post fluorescein clearance        Phosphorus [[id number]]  (Abnormal) Collected:  06/30/19 0207    Lab Status:  Final result Specimen:  Blood from Arm, Right Updated:  06/30/19 0239     Phosphorus 4 4 mg/dL     Magnesium [[id number]]  (Normal) Collected:  06/30/19 0207    Lab Status:  Final result Specimen:  Blood from Arm, Right Updated:  06/30/19 0239     Magnesium 1 8 mg/dL     B-type natriuretic peptide [[id number]]  (Normal) Collected:  06/30/19 0207    Lab Status:  Final result Specimen:  Blood from Arm, Right Updated:  06/30/19 0239     NT-proBNP 232 pg/mL     Comprehensive metabolic panel [[id number]]  (Abnormal) Collected:  06/30/19 0207    Lab Status:  Final result Specimen:  Blood from Arm, Right Updated:  06/30/19 0231     Sodium 137 mmol/L      Potassium 4 5 mmol/L      Chloride 101 mmol/L      CO2 26 mmol/L      ANION GAP 10 mmol/L      BUN 44 mg/dL      Creatinine 2 81 mg/dL      Glucose 156 mg/dL      Calcium 9 4 mg/dL      AST 18 U/L      ALT 16 U/L      Alkaline Phosphatase 152 U/L      Total Protein 8 0 g/dL      Albumin 3 3 g/dL      Total Bilirubin 0 40 mg/dL      eGFR 21 ml/min/1 73sq m     Narrative:       National Kid[employer] Disease Foundation guidelines for Chronic Kidney Disease (CKD):     Stage 1 with normal or high GFR (GFR > 90 mL/min/1 73 square meters)    Stage 2 Mild CKD (GFR = 60-89 mL/min/1 73 square meters)    Stage 3A Moderate CKD (GFR = 45-59 mL/min/1 73 square meters)    Stage 3B Moderate CKD (GFR = 30-44 mL/min/1 73 square meters)    Stage 4 Severe CKD (GFR = 15-29 mL/min/1 73 square meters)    Stage 5 End Stage CKD (GFR <15 mL/min/1 73 square meters)  Note: GFR calculation is accurate only with a steady state creatinine    Troponin I [[id number]]  (Normal) Collected:  06/30/19 0207    Lab Status:  Final result Specimen:  Blood from Arm, Right Updated:  06/30/19 0229     Troponin I <0 02 ng/mL     CBC and differential [[id number]]  (Abnormal) Collected:  06/30/19 0207    Lab Status:  Final result Specimen:  Blood from Arm, Right Updated:  06/30/19 0212     WBC 13 59 Thousand/uL      RBC 4 20 Million/uL      Hemoglobin 12 0 g/dL      Hematocrit 38 6 %      MCV 92 fL      MCH 28 6 pg      MCHC 31 1 g/dL      RDW 14 5 %      MPV 9 8 fL      Platelets 483 Thousands/uL      nRBC 0 /100 WBCs      Neutrophils Relative 66 %      Immat GRANS % 0 % Lymphocytes Relative 21 %      Monocytes Relative 7 %      Eosinophils Relative 6 %      Basophils Relative 0 %      Neutrophils Absolute 8 93 Thousands/µL      Immature Grans Absolute 0 05 Thousand/uL      Lymphocytes Absolute 2 80 Thousands/µL      Monocytes Absolute 0 95 Thousand/µL      Eosinophils Absolute 0 80 Thousand/µL      Basophils Absolute 0 06 Thousands/µL     UA w Reflex to Microscopic w Reflex to Culture [[id number]]     Lab Status:  No result Specimen:  Urine                  XR chest 1 view portable   ED Interpretation by Ruth Ann Flores MD (06/30 2001)   Cardiomegaly  Increased vascular congestion  No infiltrates  Procedures  ECG 12 Lead Documentation Only  Date/Time: 6/30/2019 2:29 AM  Performed by: Ruth Ann Flores MD  Authorized by: Ruth Ann Flores MD     ECG reviewed by me, the ED Provider: yes    Patient location:  ED  Comments:      Normal sinus rhythm with first-degree AV block  Right bundle branch block  No acute ischemic ST or T-wave changes  Unchanged from old EKG  ED Course                               MDM  Number of Diagnoses or Management Options  Diagnosis management comments: Patient is ambulatory dysfunction  He will require admission for PT evaluation and possible placement in rehabilitation  He does not have any back pain to suggest cord compression  He does have acute renal failure  This might be pre renal   A fluid bolus was ordered  White blood cell count was mildly elevated, but patient is afebrile  Urinalysis is still pending  There was no pneumonia  Consulted with hospitalist for admission         Amount and/or Complexity of Data Reviewed  Clinical lab tests: ordered and reviewed  Tests in the radiology section of CPT®: ordered and reviewed  Review and summarize past medical records: yes  Discuss the patient with other providers: yes  Independent visualization of images, tracings, or specimens: yes        Disposition  Final diagnoses: Acute renal failure (ARF) (HCC)   Weakness   Ambulatory dysfunction     Time reflects when diagnosis was documented in both MDM as applicable and the Disposition within this note     Time User Action Codes Description Comment    6/30/2019  4:04 AM Guadlupe Graven Add [N17 9] Acute renal failure (ARF) ([address] )     6/30/2019  4:04 AM Guadlupe Graven Add [R53 1] Weakness     6/30/2019  4:04 AM Guadlupe Graven Add [R26 2] Ambulatory dysfunction       ED Disposition     ED Disposition Condition Date/Time Comment    Admit Stable Sun Jun 30, 2019  4:05 AM         Follow-up Information    None         Patient's Medications   Discharge Prescriptions    No medications on file     No discharge procedures on file      ED Provider  Electronically Signed by           Denisse Mike MD  06/30/19 [id number]       Denisse Mike MD  07/01/19 [id number]

## 2019-06-30 NOTE — PLAN OF CARE
Problem: Potential for Falls  Goal: Patient will remain free of falls  Description  INTERVENTIONS:  - Assess patient frequently for physical needs  -  Identify cognitive and physical deficits and behaviors that affect risk of falls    -  [place] fall precautions as indicated by assessment   - Educate patient/family on patient safety including physical limitations  - Instruct patient to call for assistance with activity based on assessment  - Modify environment to reduce risk of injury  - Consider OT/PT consult to assist with strengthening/mobility  Outcome: Progressing     Problem: Prexisting or High Potential for Compromised Skin Integrity  Goal: Skin integrity is maintained or improved  Description  INTERVENTIONS:  - Identify patients at risk for skin breakdown  - Assess and monitor skin integrity  - Assess and monitor nutrition and hydration status  - Monitor labs (i e  albumin)  - Assess for incontinence   - Turn and reposition patient  - Assist with mobility/ambulation  - Relieve pressure over bony prominences  - Avoid friction and shearing  - Provide appropriate hygiene as needed including keeping skin clean and dry  - Evaluate need for skin moisturizer/barrier cream  - Collaborate with interdisciplinary team (i e  Nutrition, Rehabilitation, etc )   - Patient/family teaching  Outcome: Progressing     Problem: PAIN - ADULT  Goal: Verbalizes/displays adequate comfort level or baseline comfort level  Description  Interventions:  - Encourage patient to monitor pain and request assistance  - Assess pain using appropriate pain scale  - Administer analgesics based on type and severity of pain and evaluate response  - Implement non-pharmacological measures as appropriate and evaluate response  - Consider cultural and social influences on pain and pain management  - Notify physician/advanced practitioner if interventions unsuccessful or patient reports new pain  Outcome: Progressing     Problem: INFECTION - ADULT  Goal: Absence or prevention of progression during hospitalization  Description  INTERVENTIONS:  - Assess and monitor for signs and symptoms of infection  - Monitor lab/diagnostic results  - Monitor all insertion sites, i e  indwelling lines, tubes, and drains  - Monitor endotracheal (as able) and nasal secretions for changes in amount and color  - [place] appropriate cooling/warming therapies per order  - Administer medications as ordered  - Instruct and encourage patient and family to use good hand hygiene technique  - Identify and instruct in appropriate isolation precautions for identified infection/condition  Outcome: Progressing  Goal: Absence of fever/infection during neutropenic period  Description  INTERVENTIONS:  - Monitor WBC  - Implement neutropenic guidelines  Outcome: Progressing     Problem: SAFETY ADULT  Goal: Patient will remain free of falls  Description  INTERVENTIONS:  - Assess patient frequently for physical needs  -  Identify cognitive and physical deficits and behaviors that affect risk of falls    -  [place] fall precautions as indicated by assessment   - Educate patient/family on patient safety including physical limitations  - Instruct patient to call for assistance with activity based on assessment  - Modify environment to reduce risk of injury  - Consider OT/PT consult to assist with strengthening/mobility  Outcome: Progressing  Goal: Maintain or return to baseline ADL function  Description  INTERVENTIONS:  -  Assess patient's ability to carry out ADLs; assess patient's baseline for ADL function and identify physical deficits which impact ability to perform ADLs (bathing, care of mouth/teeth, toileting, grooming, dressing, etc )  - Assess/evaluate cause of self-care deficits   - Assess range of motion  - Assess patient's mobility; develop plan if impaired  - Assess patient's need for assistive devices and provide as appropriate  - Encourage maximum independence but intervene and supervise when necessary  ¯ Involve family in performance of ADLs  ¯ Assess for home care needs following discharge   ¯ Request OT consult to assist with ADL evaluation and planning for discharge  ¯ Provide patient education as appropriate  Outcome: Progressing  Goal: Maintain or return mobility status to optimal level  Description  INTERVENTIONS:  - Assess patient's baseline mobility status (ambulation, transfers, stairs, etc )    - Identify cognitive and physical deficits and behaviors that affect mobility  - Identify mobility aids required to assist with transfers and/or ambulation (gait belt, sit-to-stand, lift, walker, cane, etc )  - [place] fall precautions as indicated by assessment  - Record patient progress and toleration of activity level on Mobility SBAR; progress patient to next Phase/Stage  - Instruct patient to call for assistance with activity based on assessment  - Request Rehabilitation consult to assist with strengthening/weightbearing, etc   Outcome: Progressing     Problem: DISCHARGE PLANNING  Goal: Discharge to home or other facility with appropriate resources  Description  INTERVENTIONS:  - Identify barriers to discharge w/patient and caregiver  - Arrange for needed discharge resources and transportation as appropriate  - Identify discharge learning needs (meds, wound care, etc )  - Arrange for interpretive services to assist at discharge as needed  - Refer to Case Management Department for coordinating discharge planning if the patient needs post-hospital services based on physician/advanced practitioner order or complex needs related to functional status, cognitive ability, or social support system  Outcome: Progressing     Problem: Knowledge Deficit  Goal: Patient/family/caregiver demonstrates understanding of disease process, treatment plan, medications, and discharge instructions  Description  Complete learning assessment and assess knowledge base    Interventions:  - Provide teaching at level of understanding  - Provide teaching via preferred learning methods  Outcome: Progressing     Problem: SKIN/TISSUE INTEGRITY - ADULT  Goal: Skin integrity remains intact  Description  INTERVENTIONS  - Identify patients at risk for skin breakdown  - Assess and monitor skin integrity  - Assess and monitor nutrition and hydration status  - Monitor labs (i e  albumin)  - Assess for incontinence   - Turn and reposition patient  - Assist with mobility/ambulation  - Relieve pressure over bony prominences  - Avoid friction and shearing  - Provide appropriate hygiene as needed including keeping skin clean and dry  - Evaluate need for skin moisturizer/barrier cream  - Collaborate with interdisciplinary team (i e  Nutrition, Rehabilitation, etc )   - Patient/family teaching  Outcome: Progressing  Goal: Incision(s), wounds(s) or drain site(s) healing without S/S of infection  Description  INTERVENTIONS  - Assess and document risk factors for skin impairment   - Assess and document dressing, incision, wound bed, drain sites and surrounding tissue  - Initiate Nutrition services consult and/or wound management as needed  Outcome: Progressing     Problem: MUSCULOSKELETAL - ADULT  Goal: Maintain or return mobility to safest level of function  Description  INTERVENTIONS:  - Assess patient's ability to carry out ADLs; assess patient's baseline for ADL function and identify physical deficits which impact ability to perform ADLs (bathing, care of mouth/teeth, toileting, grooming, dressing, etc )  - Assess/evaluate cause of self-care deficits   - Assess range of motion  - Assess patient's mobility; develop plan if impaired  - Assess patient's need for assistive devices and provide as appropriate  - Encourage maximum independence but intervene and supervise when necessary  - Involve family in performance of ADLs  - Assess for home care needs following discharge   - Request OT consult to assist with ADL evaluation and planning for discharge  - Provide patient education as appropriate  Outcome: Progressing  Goal: Maintain proper alignment of affected body part  Description  INTERVENTIONS:  - Support, maintain and protect limb and body alignment  - Provide pt/fam with appropriate education  Outcome: Progressing

## 2019-06-30 NOTE — CONSULTS
Consultation - Nephrology   Bryson Ayon 66 y o  male MRN: [id number]  Unit/Bed#: 409-01 Encounter: [id number]      A/P:  1  Acute kidney injury:  He was initially treated for volume depletion with a saline bolus although he says he has been eating and drinking well  He was on lisinopril which should be held until his function improves  Will check urine studies and a kidney ultrasound  Avoid nephrotoxic agents  Check a CPK due to obesity and use of statin drugs  2  Chronic kidney disease stage 3: he is diabetic and has vascular disease  3  Diabetes mellitus with long term use of insulin: would decrease insulin dose  A1c is too low at 6 1% - increased risk of hypoglycemia   4  Morbid obesity:   5  Leg weakness: will check a urine culture         Thank you for allowing us to participate in the care of your patient  Please feel free to contact us regarding the care of this patient, or any other questions/concerns that may be applicable      Patient Active Problem List   Diagnosis    Dyspnea on exertion    Type 2 diabetes mellitus with hypoglycemia without coma, with long-term current use of insulin ([facility])    Chronic kidney disease, stage III (moderate) ([facility])    Chronic diastolic CHF (congestive heart failure) ([facility])    Obstructive sleep apnea syndrome    Morbid obesity with BMI of 45 0-49 9, adult ([facility]    Essential hypertension    Type 2 diabetes mellitus with hyperglycemia, with long-term current use of insulin ([facility])    Complicated UTI (urinary tract infection)    Uncontrolled type 2 diabetes mellitus with hyperglycemia, with long-term current use of insulin ([facility])    KIRT (acute kidney injury) ([facility] )    Dehydration with hyponatremia    Hypoglycemia due to insulin    Acute cystitis    Opiate dependence, continuous ([facility])    Paresthesia of left upper extremity    Neural foraminal stenosis of cervical spine    Acute pain of right lower extremity    Elevated TSH    Pressure injury of skin of right buttock    Ambulatory dysfunction    Leucocytosis       History of Present Illness   Physician Requesting Consult: Ely Ram MD  Reason for Consult / Principal Problem: acute kidney injury  Hx and PE limited by:   HPI: Lili Quiroz is a 66y o  year old male who presents with worsening renal function from a previous level of a creatinine 1 1-1 4  The patient relates that he has been weak since father's Day  He says he cannot walk without severe pain in his legs and has been progressively weaker  He denies chest pain shortness of breath or abdominal pain or any GI symptomatology  He denies any dysuria and he says there is no change in the volume  He has reached had this issue previously and resolved with physical therapy  He says he is eating and drinking well and he does not know if he has any gain in weight  On admission his creatinine was 2 76  Was hospitalized earlier this year and had a Citrobacter freundii urinary tract infection  History obtained from chart review and the patient    Review of Systems - Negative except as mentioned above in HPI, more specifics as mentioned below    Review of Systems - General ROS: negative other than leg weakness  Ophthalmic ROS: negative  ENT ROS: negative  Respiratory ROS: no cough, shortness of breath, or wheezing  Cardiovascular ROS: no chest pain or dyspnea on exertion  Gastrointestinal ROS: no abdominal pain, change in bowel habits, or black or bloody stools  Genito-Urinary ROS: no dysuria, trouble voiding, or hematuria  Musculoskeletal ROS: positive for - muscle pain and muscular weakness  Neurological ROS: no TIA or stroke symptoms  Dermatological ROS: positive for rash    Historical Information   Past Medical History:   Diagnosis Date    Cataract     CHF (congestive heart failure) (HCC)     chronic diastolic CHF    Coronary artery disease     Diabetes mellitus (HCC)     Glaucoma     Hyperlipidemia     Hypertension     Neuropathy  Obesity     Renal disorder     chronic kidney disease stage 3     Sleep apnea     Stroke ([facility] )     TIA (transient ischemic attack)     Uncontrolled type 2 diabetes mellitus with hyperglycemia, with long-term current use of insulin ([facility] ) 10/4/2018     Past Surgical History:   Procedure Laterality Date    APPENDECTOMY      CARPAL TUNNEL RELEASE      EYE SURGERY      cataracts     Social History   Social History     Substance and Sexual Activity   Alcohol Use Never    Frequency: Never    Drinks per session: Patient refused    Binge frequency: Never     Social History     Substance and Sexual Activity   Drug Use No     Social History     Tobacco Use   Smoking Status Former Smoker   Smokeless Tobacco Former User     Family History   Problem Relation Age of Onset    No Known Problems Mother     No Known Problems Father        Meds/Allergies   all current active meds have been reviewed, current meds:   Current Facility-Administered Medications   Medication Dose Route Frequency    acetaminophen (TYLENOL) tablet 650 mg  650 mg Oral Q6H PRN    aspirin chewable tablet 81 mg  81 mg Oral Daily    atorvastatin (LIPITOR) tablet 40 mg  40 mg Oral After Dinner    carvedilol (COREG) tablet 12 5 mg  12 5 mg Oral BID    cholecalciferol (VITAMIN D3) tablet 2,000 Units  2,000 Units Oral Daily    gabapentin (NEURONTIN) capsule 600 mg  600 mg Oral BID    heparin (porcine) subcutaneous injection 5,000 Units  5,000 Units Subcutaneous Q8H [facility]    insulin glargine (LANTUS) subcutaneous injection 20 Units 0 2 mL  20 Units Subcutaneous QAM    insulin lispro (HumaLOG) 100 units/mL subcutaneous injection 2-12 Units  2-12 Units Subcutaneous HS    insulin lispro (HumaLOG) 100 units/mL subcutaneous injection 3 Units  3 Units Subcutaneous TID With Meals    nystatin (MYCOSTATIN) powder   Topical BID    ondansetron (ZOFRAN) injection 4 mg  4 mg Intravenous Q6H PRN    oxyCODONE (ROXICODONE) immediate release tablet 20 mg  20 mg Oral Q6H PRN    sodium chloride 0 9 % infusion  75 mL/hr Intravenous Continuous    and PTA meds:    Medications Prior to Admission   Medication    acetaminophen (TYLENOL) 325 mg tablet    aspirin 81 MG tablet    atorvastatin (LIPITOR) 40 mg tablet    BD PEN NEEDLE JUNIOR U/F 32G X 4 MM MISC    carvedilol (COREG) 12 5 mg tablet    cholecalciferol 2000 units TABS    gabapentin (NEURONTIN) 600 MG tablet    glucagon (GLUCAGON EMERGENCY) 1 MG injection    insulin glargine (LANTUS) 100 units/mL subcutaneous injection    insulin lispro (HumaLOG) 100 units/mL injection    lisinopril (ZESTRIL) 5 mg tablet    nystatin (MYCOSTATIN) powder    oxyCODONE (ROXICODONE) 10 MG TABS    tamsulosin (FLOMAX) 0 4 mg    Heparin Sodium, Porcine, (HEPARIN, PORCINE,) 5,000 units/mL         No Known Allergies    Objective   No intake or output data in the 24 hours ending 06/30/19 1057    Invasive Devices:        Physical Exam      No intake/output data recorded  Vitals:    06/30/19 0811   BP: 102/64   Pulse:    Resp:    Temp:    SpO2:        Gen: in NAD, Alert/Awake  HEENT: no sclerous icterus, MMM, neck supple  CV: +S1/S2, RRR  Lungs: CTA bilaterally but dec at bases  Abd: +BS, soft NT/ND obese  Ext: all four extremities are warm - has mild edema  Skin:chronic discoloration of the calves  Neuro: CN II-XII intact    Current Weight: Weight - Scale: 127 kg (279 lb 15 8 oz)  First Weight: Weight - Scale: 129 kg (284 lb 6 3 oz)    Lab Results:  I have personally reviewed pertinent labs      CBC:   Lab Results   Component Value Date    WBC 10 61 (H) 06/30/2019    HGB 11 4 (L) 06/30/2019    HCT 37 5 06/30/2019    MCV 92 06/30/2019     06/30/2019    MCH 27 9 06/30/2019    MCHC 30 4 (L) 06/30/2019    RDW 14 1 06/30/2019    MPV 10 1 06/30/2019    NRBC 0 06/30/2019     CMP:   Lab Results   Component Value Date    K 4 2 06/30/2019     06/30/2019    CO2 28 06/30/2019    BUN 44 (H) 06/30/2019    CREATININE 2 76 (H) 06/30/2019    CALCIUM 9 2 06/30/2019    AST 14 06/30/2019    ALT 16 06/30/2019    ALKPHOS 137 (H) 06/30/2019    EGFR 21 06/30/2019     Phosphorus:   Lab Results   Component Value Date    PHOS 4 9 (H) 06/30/2019     Magnesium:   Lab Results   Component Value Date    MG 1 9 06/30/2019     Urinalysis: No results found for: Nadara Cornet, SPECGRAV, PHUR, LEUKOCYTESUR, NITRITE, PROTEINUA, GLUCOSEU, KETONESU, BILIRUBINUR, BLOODU  Ionized Calcium: No results found for: CAION  Coagulation:   Lab Results   Component Value Date    INR 1 07 06/30/2019     Troponin:   Lab Results   Component Value Date    TROPONINI <0 02 06/30/2019     ABG: No results found for: PHART, RUC0LOF, PO2ART, STM6DFX, O0KOUHDB, BEART, SOURCE    Results from last 7 days   Lab Units 06/30/19  0603 06/30/19  0207   POTASSIUM mmol/L 4 2 4 5   CHLORIDE mmol/L 103 101   CO2 mmol/L 28 26   BUN mg/dL 44* 44*   CREATININE mg/dL 2 76* 2 81*   CALCIUM mg/dL 9 2 9 4   ALK PHOS U/L 137* 152*   ALT U/L 16 16   AST U/L 14 18       Radiology review:  Procedure: Xr Chest 1 View Portable    Result Date: 6/30/2019  Narrative: CHEST INDICATION:   Congestive heart failure  COMPARISON:  February 28, 2019 October 3, 2018 and January 4, 2017 EXAM PERFORMED/VIEWS:  XR CHEST PORTABLE FINDINGS: The heart mediastinum are stable given differences in technique  There is slight blunting of the left costophrenic angle which may suggest a small effusion  Right lung is clear  Mild pulmonary vascular congestion is noted  Osseous structures appear within normal limits for patient age  Impression: Mild pulmonary vascular congestion and small left pleural effusion  Workstation performed: [id number]         EKG, Pathology, and Other Studies: reviewed      Counseling / Coordination of Care  Total ADDITIONAL floor / unit time spent today 25 minutes  Greater than 50% of total time was spent with the patient and / or family counseling and / or coordination of care   A description of the counseling / coordination of care: follows    Abel Garcia MD

## 2019-06-30 NOTE — ASSESSMENT & PLAN NOTE
He reports that he is unable to ambulate  He currently uses walker  He completed Short therm rehab in March 2019 at [facility]  It is unclear if his worsening ambulatory dysfunction is due to worsening pain from his neuropathy    He denies recent falls or trauma  He may benefit from repeat short term rehab  Fall precaution  OT PT eval  Supportive care

## 2019-06-30 NOTE — H&P
[facility] Internal Medicine   H&P- Mickeal Art 1945, 66 y o  male MRN: [id number]    Unit/Bed#: 409-01 Encounter: [id number]    Primary Care Provider: Emily Smith MD   Date and time admitted to hospital: 6/30/2019 12:18 AM        * KIRT (acute kidney injury) [facility])  Assessment & Plan  Present on admission   He does have hx of CKD  Creatinine at 2 81  Baseline creatinine appears to be between 1 1 and 1 4  Avoid nephrotoxic agents   IV normal saline bolus received in the ER, will continue with gentle IV hydration pending nephrology recomendations  [facility]  Nephrology consult  Supportive care      Leucocytosis  Assessment & Plan  WBC at 13 59  No fever or chills  No clear source of infection  UA pending  Chest x-ray completed official report pending  Will monitor CBC      Ambulatory dysfunction  Assessment & Plan  He reports that he is unable to ambulate  He currently uses walker  He completed Short therm rehab in March 2019 at [facility]  It is unclear if his worsening ambulatory dysfunction is due to worsening pain from his neuropathy    He denies recent falls or trauma  He may benefit from repeat short term rehab  Fall precaution  OT PT eval  Supportive care      Pressure injury of skin of right buttock  Assessment & Plan  Present on admission  Unclear how long  he has this ulcer  Does not appear infected  He denies being evaluated by Wound care  Continue Daily wound care  Wound care consult    Elevated TSH  Assessment & Plan  TSH levels 4 583  Will check Free T4  Encourage PCP follow up    Type 2 diabetes mellitus with hyperglycemia, with long-term current use of insulin [facility])  Assessment & Plan  Lab Results   Component Value Date    HGBA1C 6 1 03/01/2019     Glucose level at 156  Continue home insulin regimen  Sliding scale insulin  fingerstick glucose 4 times daily AC/HS  Check A1C  AM cMP      Essential hypertension  Assessment & Plan  Blood pressure currently stable  Continue home medications  Monitor blood pressure closely    Chronic diastolic CHF (congestive heart failure) ([facility])  Assessment & Plan  Wt Readings from Last 3 Encounters:   06/30/19 129 kg (284 lb 6 3 oz)   03/04/19 127 kg (280 lb 10 3 oz)   03/01/19 128 kg (282 lb 3 oz)     No evidence of fluid overload  No associated chest pain, SOB or Lower extremity edema  Last ECHO on 3/01/2019 reviewed EF a 65% with grade 1 diastolic dysfunction  Continue home medications  Continue outpatient Cardiology follow up          VTE Prophylaxis: Heparin  / sequential compression device and reason for no mechanical VTE prophylaxis Pain bilateral Lower extremities   Code Status: Level 3- DNAR/DNI  POLST: POLST form is not discussed and not completed at this time  Discussion with family: Family at bedside    Anticipated Length of Stay:  Patient will be admitted on an Inpatient basis with an anticipated length of stay of  > 2 midnights  Justification for Hospital Stay: Acute Kidney Injury    Total Time for Visit, including Counseling / Coordination of Care: 30 minutes  Greater than 50% of this total time spent on direct patient counseling and coordination of care  Chief Complaint:   Generalized weakness    History of Present Illness:    Ana Paula Warren is a 66 y o  male who presents to the emergency room with complaints of generalized weakness over the past several weeks getting progressively worse  He also reports that his lower extremities is also increasingly weak and worsened by chronic neuropathic pain  Both him and his wife appears to be poor historians so it is unclear whether his reported  neuropathic pain or the  weakness that he described that he is having in his legs that is rendering him unable to walk  He denies any falls  He does ambulate with a walker has history of ambulatory dysfunction  He did complete rehabilitation in March of this year    His family states that apparently symptoms has been progressively worse since father's Day  He denies shortness of breath, chest pain, nausea, vomiting, diarrhea, dizziness, headaches, visual disturbances  Does report that he has not had bowel movement for the past several days  He also reports that he feels very dry but admits to eating well  He received normal saline bolus 250 cc in emergency room  At bedside he complains about pain to his lower extremity when touched or when he moves it  Review of Systems:    Review of Systems   Constitutional: Positive for activity change and fatigue  Negative for appetite change and chills  HENT: Negative  Eyes: Negative for photophobia, pain, redness and visual disturbance  Respiratory: Negative for cough, chest tightness, shortness of breath and wheezing  Cardiovascular: Positive for leg swelling  Negative for chest pain and palpitations  Gastrointestinal: Positive for constipation  Negative for abdominal pain, nausea and vomiting  Endocrine: Negative for polydipsia, polyphagia and polyuria  Genitourinary: Positive for decreased urine volume  Negative for difficulty urinating, dysuria, flank pain and frequency  Musculoskeletal: Positive for gait problem  Negative for arthralgias, back pain and joint swelling  Skin: Positive for wound  Negative for color change, pallor and rash  Neurological: Positive for weakness  Negative for dizziness, light-headedness and headaches  Psychiatric/Behavioral: Negative for agitation, confusion and sleep disturbance  The patient is not nervous/anxious          Past Medical and Surgical History:     Past Medical History:   Diagnosis Date    Cataract     CHF (congestive heart failure) (HCC)     chronic diastolic CHF    Coronary artery disease     Diabetes mellitus ([facility] )     Glaucoma     Hyperlipidemia     Hypertension     Neuropathy     Obesity     Renal disorder     chronic kidney disease stage 3     Sleep apnea     Stroke ([facility] )     TIA (transient ischemic attack)     Uncontrolled type 2 diabetes mellitus with hyperglycemia, with long-term current use of insulin ([facility] ) 10/4/2018       Past Surgical History:   Procedure Laterality Date    APPENDECTOMY      CARPAL TUNNEL RELEASE      EYE SURGERY      cataracts       Meds/Allergies:    Prior to Admission medications    Medication Sig Start Date End Date Taking?  Authorizing Provider   acetaminophen (TYLENOL) 325 mg tablet Take 2 tablets (650 mg total) by mouth every 6 (six) hours as needed for mild pain, headaches or fever 3/4/19  Yes Leydi Carmona DO   aspirin 81 MG tablet Take 81 mg by mouth daily   Yes Historical Provider, MD   atorvastatin (LIPITOR) 40 mg tablet Take 1 tablet (40 mg total) by mouth daily after dinner 3/5/19  Yes Leydi Carmona DO   BD PEN NEEDLE JUNIOR U/F 32G X 4 MM MISC  12/31/17  Yes Historical Provider, MD   carvedilol (COREG) 12 5 mg tablet Take 1 tablet (12 5 mg total) by mouth 2 (two) times a day 3/4/19  Yes Leydi Carmona DO   cholecalciferol 2000 units TABS Take 1 tablet (2,000 Units total) by mouth daily 3/4/19  Yes Leydi Carmona DO   gabapentin (NEURONTIN) 600 MG tablet Take 1 tablet (600 mg total) by mouth 2 (two) times a day 3/4/19  Yes Leydi Carmona DO   glucagon (GLUCAGON EMERGENCY) 1 MG injection Inject 1 mg under the skin once as needed (PRN blood glucose less than 70 if unconscious or uncorrectable by oral means) for up to 1 dose 3/4/19  Yes Leydi Carmona DO   insulin glargine (LANTUS) 100 units/mL subcutaneous injection Inject 15 Units under the skin every morning  Patient taking differently: Inject 20 Units under the skin every morning  3/5/19  Yes Leydi Carmona DO   insulin lispro (HumaLOG) 100 units/mL injection Inject 3 Units under the skin 3 (three) times a day with meals 3/4/19  Yes Leydi Carmona DO   lisinopril (ZESTRIL) 5 mg tablet Take 1 tablet (5 mg total) by mouth daily 10/7/18  Yes Trevor Clark MD   nystatin (MYCOSTATIN) powder Apply topically 2 (two) times a day 3/4/19  Yes Annette Wong DO   oxyCODONE (ROXICODONE) 10 MG TABS Take 1 tablet (10 mg total) by mouth every 4 (four) hours as needed for moderate pain or severe painMax Daily Amount: 60 mg  Patient taking differently: Take 20 mg by mouth every 6 (six) hours as needed for moderate pain or severe pain  3/4/19  Yes Annette Wong DO   tamsulosin (FLOMAX) 0 4 mg Take 0 4 mg by mouth every evening     Yes Historical Provider, MD   Heparin Sodium, Porcine, (HEPARIN, PORCINE,) 5,000 units/mL Inject 1 mL (5,000 Units total) under the skin every 8 (eight) hours  Patient not taking: Reported on 6/30/2019 3/4/19   Annette Wong DO     I have reviewed home medications with patient family member      Allergies: No Known Allergies    Social History:     Marital Status: /Civil Union   Occupation: retired  Patient Pre-hospital Living Situation: Lives with wife  Patient Pre-hospital Level of Mobility: Active uses walker to assist with ambulation  Patient Pre-hospital Diet Restrictions: None reported  Substance Use History:   Social History     Substance and Sexual Activity   Alcohol Use Never    Frequency: Never    Drinks per session: Patient refused    Binge frequency: Never     Social History     Tobacco Use   Smoking Status Former Smoker   Smokeless Tobacco Former User     Social History     Substance and Sexual Activity   Drug Use No       Family History:    Family History   Problem Relation Age of Onset    No Known Problems Mother     No Known Problems Father        Physical Exam:     Vitals:   Blood Pressure: 115/55 (06/30/19 0508)  Pulse: 98 (06/30/19 0508)  Temperature: 97 5 °F (36 4 °C) (06/30/19 0508)  Temp Source: Temporal (06/30/19 0508)  Respirations: 18 (06/30/19 0508)  Height: 5' 6" (167 6 cm) (06/30/19 0022)  Weight - Scale: 127 kg (279 lb 15 8 oz) (06/30/19 0549)  SpO2: 96 % (06/30/19 0508)    Physical Exam   Constitutional: He is oriented to person, place, and time  No distress  HENT:   Head: Normocephalic and atraumatic  Dry mucus membranes   Eyes: Pupils are equal, round, and reactive to light  EOM are normal  Right eye exhibits no discharge  Left eye exhibits no discharge  No scleral icterus  Neck: Normal range of motion  Neck supple  No JVD present  Cardiovascular: Normal rate, regular rhythm and intact distal pulses  Pulmonary/Chest: Effort normal and breath sounds normal  No stridor  No respiratory distress  He has no wheezes  Abdominal: Soft  He exhibits no distension  There is no tenderness  There is no guarding  Obese abdomen   Musculoskeletal: Normal range of motion  He exhibits edema and tenderness  He exhibits no deformity  Tenderness to light palpation of  bilateral LE   Lymphadenopathy:     He has no cervical adenopathy  Neurological: He is oriented to person, place, and time  Skin: Skin is warm and dry  Capillary refill takes less than 2 seconds  He is not diaphoretic  No erythema  Evidence of skin breakdown lower abdominal groin region   Psychiatric: He has a normal mood and affect  Vitals reviewed  Additional Data:     Lab Results: I have personally reviewed pertinent reports  Results from last 7 days   Lab Units 06/30/19  0603 06/30/19  0207   WBC Thousand/uL 10 61* 13 59*   HEMOGLOBIN g/dL 11 4* 12 0   HEMATOCRIT % 37 5 38 6   PLATELETS Thousands/uL 264 331   NEUTROS PCT %  --  66   LYMPHS PCT %  --  21   MONOS PCT %  --  7   EOS PCT %  --  6     Results from last 7 days   Lab Units 06/30/19  0603   SODIUM mmol/L 139   POTASSIUM mmol/L 4 2   CHLORIDE mmol/L 103   CO2 mmol/L 28   BUN mg/dL 44*   CREATININE mg/dL 2 76*   ANION GAP mmol/L 8   CALCIUM mg/dL 9 2   ALBUMIN g/dL 2 9*   TOTAL BILIRUBIN mg/dL 0 40   ALK PHOS U/L 137*   ALT U/L 16   AST U/L 14   GLUCOSE RANDOM mg/dL 146*     Results from last 7 days   Lab Units 06/30/19  0603   INR  1 07                   Imaging: I have personally reviewed pertinent reports  XR chest 1 view portable   ED Interpretation by Kuldeep Castañeda MD (06/30 4591)   Cardiomegaly  Increased vascular congestion  No infiltrates  EKG, Pathology, and Other Studies Reviewed on Admission:   · EKG:  Normal sinus rhythm with first-degree AV block at a rate of 85 beats per minute  Right bundle branch block    Allscripts / Epic Records Reviewed: Yes     ** Please Note: This note has been constructed using a voice recognition system   **

## 2019-07-01 LAB
ALBUMIN SERPL BCP-MCNC: 2.6 G/DL (ref 3.5–5)
ALP SERPL-CCNC: 140 U/L (ref 46–116)
ALT SERPL W P-5'-P-CCNC: 19 U/L (ref 12–78)
ANION GAP SERPL CALCULATED.3IONS-SCNC: 6 MMOL/L (ref 4–13)
AST SERPL W P-5'-P-CCNC: 15 U/L (ref 5–45)
ATRIAL RATE: 85 BPM
BASOPHILS # BLD AUTO: 0.03 THOUSANDS/ΜL (ref 0–0.1)
BASOPHILS NFR BLD AUTO: 0 % (ref 0–1)
BILIRUB SERPL-MCNC: 0.4 MG/DL (ref 0.2–1)
BUN SERPL-MCNC: 31 MG/DL (ref 5–25)
CALCIUM SERPL-MCNC: 9 MG/DL (ref 8.3–10.1)
CHLORIDE SERPL-SCNC: 108 MMOL/L (ref 100–108)
CO2 SERPL-SCNC: 28 MMOL/L (ref 21–32)
CREAT SERPL-MCNC: 1.63 MG/DL (ref 0.6–1.3)
EOSINOPHIL # BLD AUTO: 0.57 THOUSAND/ΜL (ref 0–0.61)
EOSINOPHIL NFR BLD AUTO: 7 % (ref 0–6)
ERYTHROCYTE [DISTWIDTH] IN BLOOD BY AUTOMATED COUNT: 14 % (ref 11.6–15.1)
GFR SERPL CREATININE-BSD FRML MDRD: 40 ML/MIN/1.73SQ M
GLUCOSE SERPL-MCNC: 149 MG/DL (ref 65–140)
GLUCOSE SERPL-MCNC: 152 MG/DL (ref 65–140)
GLUCOSE SERPL-MCNC: 199 MG/DL (ref 65–140)
GLUCOSE SERPL-MCNC: 222 MG/DL (ref 65–140)
GLUCOSE SERPL-MCNC: 223 MG/DL (ref 65–140)
HCT VFR BLD AUTO: 37.4 % (ref 36.5–49.3)
HGB BLD-MCNC: 11.5 G/DL (ref 12–17)
IMM GRANULOCYTES # BLD AUTO: 0.03 THOUSAND/UL (ref 0–0.2)
IMM GRANULOCYTES NFR BLD AUTO: 0 % (ref 0–2)
LYMPHOCYTES # BLD AUTO: 1.55 THOUSANDS/ΜL (ref 0.6–4.47)
LYMPHOCYTES NFR BLD AUTO: 19 % (ref 14–44)
MAGNESIUM SERPL-MCNC: 1.8 MG/DL (ref 1.6–2.6)
MCH RBC QN AUTO: 28.3 PG (ref 26.8–34.3)
MCHC RBC AUTO-ENTMCNC: 30.7 G/DL (ref 31.4–37.4)
MCV RBC AUTO: 92 FL (ref 82–98)
MONOCYTES # BLD AUTO: 0.49 THOUSAND/ΜL (ref 0.17–1.22)
MONOCYTES NFR BLD AUTO: 6 % (ref 4–12)
NEUTROPHILS # BLD AUTO: 5.57 THOUSANDS/ΜL (ref 1.85–7.62)
NEUTS SEG NFR BLD AUTO: 68 % (ref 43–75)
NRBC BLD AUTO-RTO: 0 /100 WBCS
P AXIS: 49 DEGREES
PHOSPHATE SERPL-MCNC: 2.7 MG/DL (ref 2.3–4.1)
PLATELET # BLD AUTO: 254 THOUSANDS/UL (ref 149–390)
PMV BLD AUTO: 10.2 FL (ref 8.9–12.7)
POTASSIUM SERPL-SCNC: 4.2 MMOL/L (ref 3.5–5.3)
PR INTERVAL: 210 MS
PROT SERPL-MCNC: 6.8 G/DL (ref 6.4–8.2)
QRS AXIS: 269 DEGREES
QRSD INTERVAL: 150 MS
QT INTERVAL: 416 MS
QTC INTERVAL: 495 MS
RBC # BLD AUTO: 4.07 MILLION/UL (ref 3.88–5.62)
SODIUM SERPL-SCNC: 142 MMOL/L (ref 136–145)
T WAVE AXIS: 47 DEGREES
VENTRICULAR RATE: 85 BPM
WBC # BLD AUTO: 8.24 THOUSAND/UL (ref 4.31–10.16)

## 2019-07-01 PROCEDURE — 82948 REAGENT STRIP/BLOOD GLUCOSE: CPT

## 2019-07-01 PROCEDURE — G8987 SELF CARE CURRENT STATUS: HCPCS

## 2019-07-01 PROCEDURE — 97167 OT EVAL HIGH COMPLEX 60 MIN: CPT

## 2019-07-01 PROCEDURE — 99232 SBSQ HOSP IP/OBS MODERATE 35: CPT | Performed by: STUDENT IN AN ORGANIZED HEALTH CARE EDUCATION/TRAINING PROGRAM

## 2019-07-01 PROCEDURE — 97116 GAIT TRAINING THERAPY: CPT

## 2019-07-01 PROCEDURE — G8988 SELF CARE GOAL STATUS: HCPCS

## 2019-07-01 PROCEDURE — 97530 THERAPEUTIC ACTIVITIES: CPT

## 2019-07-01 PROCEDURE — 83735 ASSAY OF MAGNESIUM: CPT | Performed by: FAMILY MEDICINE

## 2019-07-01 PROCEDURE — 84100 ASSAY OF PHOSPHORUS: CPT | Performed by: INTERNAL MEDICINE

## 2019-07-01 PROCEDURE — 93010 ELECTROCARDIOGRAM REPORT: CPT | Performed by: INTERNAL MEDICINE

## 2019-07-01 PROCEDURE — 85025 COMPLETE CBC W/AUTO DIFF WBC: CPT | Performed by: INTERNAL MEDICINE

## 2019-07-01 PROCEDURE — 80053 COMPREHEN METABOLIC PANEL: CPT | Performed by: INTERNAL MEDICINE

## 2019-07-01 RX ORDER — AMOXICILLIN 250 MG
2 CAPSULE ORAL 2 TIMES DAILY
Status: DISCONTINUED | OUTPATIENT
Start: 2019-07-01 | End: 2019-07-03 | Stop reason: HOSPADM

## 2019-07-01 RX ORDER — INSULIN GLARGINE 100 [IU]/ML
22 INJECTION, SOLUTION SUBCUTANEOUS EVERY MORNING
Status: DISCONTINUED | OUTPATIENT
Start: 2019-07-02 | End: 2019-07-03 | Stop reason: HOSPADM

## 2019-07-01 RX ORDER — MAGNESIUM SULFATE 1 G/100ML
1 INJECTION INTRAVENOUS ONCE
Status: COMPLETED | OUTPATIENT
Start: 2019-07-01 | End: 2019-07-01

## 2019-07-01 RX ORDER — POLYETHYLENE GLYCOL 3350 17 G/17G
17 POWDER, FOR SOLUTION ORAL DAILY
Status: DISCONTINUED | OUTPATIENT
Start: 2019-07-01 | End: 2019-07-03 | Stop reason: HOSPADM

## 2019-07-01 RX ADMIN — INSULIN LISPRO 3 UNITS: 100 INJECTION, SOLUTION INTRAVENOUS; SUBCUTANEOUS at 17:15

## 2019-07-01 RX ADMIN — NYSTATIN: 100000 POWDER TOPICAL at 17:23

## 2019-07-01 RX ADMIN — VITAMIN D, TAB 1000IU (100/BT) 2000 UNITS: 25 TAB at 08:17

## 2019-07-01 RX ADMIN — CARVEDILOL 12.5 MG: 12.5 TABLET, FILM COATED ORAL at 08:16

## 2019-07-01 RX ADMIN — ASPIRIN 81 MG 81 MG: 81 TABLET ORAL at 08:16

## 2019-07-01 RX ADMIN — INSULIN LISPRO 3 UNITS: 100 INJECTION, SOLUTION INTRAVENOUS; SUBCUTANEOUS at 11:16

## 2019-07-01 RX ADMIN — OXYCODONE HYDROCHLORIDE 20 MG: 10 TABLET ORAL at 09:42

## 2019-07-01 RX ADMIN — INSULIN GLARGINE 20 UNITS: 100 INJECTION, SOLUTION SUBCUTANEOUS at 08:17

## 2019-07-01 RX ADMIN — SENNOSIDES AND DOCUSATE SODIUM 2 TABLET: 8.6; 5 TABLET ORAL at 17:13

## 2019-07-01 RX ADMIN — CARVEDILOL 12.5 MG: 12.5 TABLET, FILM COATED ORAL at 17:13

## 2019-07-01 RX ADMIN — HEPARIN SODIUM 5000 UNITS: 5000 INJECTION INTRAVENOUS; SUBCUTANEOUS at 21:20

## 2019-07-01 RX ADMIN — SODIUM CHLORIDE 75 ML/HR: 0.9 INJECTION, SOLUTION INTRAVENOUS at 17:13

## 2019-07-01 RX ADMIN — SENNOSIDES AND DOCUSATE SODIUM 2 TABLET: 8.6; 5 TABLET ORAL at 08:16

## 2019-07-01 RX ADMIN — MAGNESIUM SULFATE HEPTAHYDRATE 1 G: 1 INJECTION, SOLUTION INTRAVENOUS at 09:41

## 2019-07-01 RX ADMIN — NYSTATIN: 100000 POWDER TOPICAL at 08:17

## 2019-07-01 RX ADMIN — ATORVASTATIN CALCIUM 40 MG: 40 TABLET, FILM COATED ORAL at 17:13

## 2019-07-01 RX ADMIN — INSULIN LISPRO 3 UNITS: 100 INJECTION, SOLUTION INTRAVENOUS; SUBCUTANEOUS at 07:05

## 2019-07-01 RX ADMIN — GABAPENTIN 600 MG: 300 CAPSULE ORAL at 08:16

## 2019-07-01 RX ADMIN — GABAPENTIN 600 MG: 300 CAPSULE ORAL at 17:13

## 2019-07-01 RX ADMIN — INSULIN LISPRO 2 UNITS: 100 INJECTION, SOLUTION INTRAVENOUS; SUBCUTANEOUS at 21:20

## 2019-07-01 RX ADMIN — HEPARIN SODIUM 5000 UNITS: 5000 INJECTION INTRAVENOUS; SUBCUTANEOUS at 13:52

## 2019-07-01 RX ADMIN — OXYCODONE HYDROCHLORIDE 20 MG: 10 TABLET ORAL at 15:42

## 2019-07-01 RX ADMIN — POLYETHYLENE GLYCOL 3350 17 G: 17 POWDER, FOR SOLUTION ORAL at 08:16

## 2019-07-01 RX ADMIN — HEPARIN SODIUM 5000 UNITS: 5000 INJECTION INTRAVENOUS; SUBCUTANEOUS at 06:14

## 2019-07-01 RX ADMIN — CIPROFLOXACIN 500 MG: 500 TABLET, FILM COATED ORAL at 17:13

## 2019-07-01 RX ADMIN — SODIUM CHLORIDE 75 ML/HR: 0.9 INJECTION, SOLUTION INTRAVENOUS at 04:54

## 2019-07-01 NOTE — ASSESSMENT & PLAN NOTE
Wt Readings from Last 3 Encounters:   07/01/19 129 kg (283 lb 4 7 oz)   03/04/19 127 kg (280 lb 10 3 oz)   03/01/19 128 kg (282 lb 3 oz)

## 2019-07-01 NOTE — ASSESSMENT & PLAN NOTE
-Was POA  -He has a hx of CKD with a baseline between 1 3 and 1 4  -Creatinine on admission was 2 81 and trended downwards today to 1 46  -Nephrotoxic agents were avoided during his stay  -He received IV NS bolus in the ER and continued with gentle IV hydration till 7/2 where it was dicontinue  -We continued to monitor his Cr through out his stay  -Nephrology was consulted and followed him throughout his stay  -Renal us showed:No hydronephrosis on 06/30  -Urine culture showed:>100,000 cfu/ml Enterococcus faecalis and sensitivities resulted and started her on Ampicillin 500mg po on day of discharge  -Pt had a voiding trial on 07/02 and had a residual of 400 cc and was then started on Flomax and Finasteride after which he was able to void 880 cc total  -As his KIRT has resolved, lisinopril was started on day of discharge

## 2019-07-01 NOTE — CONSULTS
Progress Note - Wound   Jensen Jolley 66 y o  male MRN: [id number]  Unit/Bed#: 409-01 Encounter: [id number]      Assessment:   Stage 3 pressure injury present on admission  Patient reports that is started a few days ago, and he had "one there before, about 4 years ago "  Patient reports that his "legs have been giving out" and he has been spending more time in bed  Periwound with scar tissue and induration  He is able to assist with turn and reposition  Wife at bedside  She reports that the last "bedsore there took a couple months to heal"  Plan:   1  Right buttock dressing change every 3 days  Cleanse with NSS  Apply skin prep to periwound  Apply Maxorb to base of wound  Cover with hydrocolloid (Exuderm)  Seal edges with skin prep  2   Turn and reposition q 2 hours  3   Accumax mattress with alternating air pump  Vitals: Blood pressure 149/80, pulse 78, temperature (!) 97 1 °F (36 2 °C), temperature source Temporal, resp  rate 20, height 5' 6" (1 676 m), weight 129 kg (283 lb 4 7 oz), SpO2 93 %  ,Body mass index is 45 72 kg/m²  Wound 06/30/19 Pressure Injury Buttocks Right (Active)   Wound Description Granulation tissue;Slough 7/1/2019  3:25 PM   Staging Stage III 7/1/2019  3:25 PM   Mary-wound Assessment Induration;Pink 7/1/2019  3:25 PM   Wound Length (cm) 2 cm 7/1/2019  3:25 PM   Wound Width (cm) 1 5 cm 7/1/2019  3:25 PM   Wound Depth (cm) 0 1 7/1/2019  3:25 PM   Calculated Wound Area (cm^2) 3 cm^2 7/1/2019  3:25 PM   Calculated Wound Volume (cm^3) 0 3 cm^3 7/1/2019  3:25 PM   Drainage Amount Small 7/1/2019  3:25 PM   Drainage Description Serosanguineous 7/1/2019  3:25 PM   Treatments Cleansed 7/1/2019  3:25 PM   Dressing Calcium Alginate;Hydrocolloid 7/1/2019  3:25 PM   Patient Tolerance Tolerated well 7/1/2019  3:25 PM   Dressing Status Clean;Dry; Intact 7/1/2019  7:49 AM     Antonia Nova RN CWOCN 7/1/2019 16:19

## 2019-07-01 NOTE — OCCUPATIONAL THERAPY NOTE
Occupational Therapy Evaluation     Patient Name: Cat Yanez  Today's Date: 7/1/2019  Problem List  Patient Active Problem List   Diagnosis    Dyspnea on exertion    Type 2 diabetes mellitus with hypoglycemia without coma, with long-term current use of insulin ([facility])    Chronic kidney disease, stage III (moderate) ([facility])    Chronic diastolic CHF (congestive heart failure) ([facility])    Obstructive sleep apnea syndrome    Morbid obesity with BMI of 45 0-49 9, adult ([facility] [address] )    Essential hypertension    Type 2 diabetes mellitus with hyperglycemia, with long-term current use of insulin ([facility])    Complicated UTI (urinary tract infection)    Uncontrolled type 2 diabetes mellitus with hyperglycemia, with long-term current use of insulin ([facility])    KIRT (acute kidney injury) ([facility] [facility] )    Dehydration with hyponatremia    Hypoglycemia due to insulin    Acute cystitis    Opiate dependence, continuous ([facility])    Paresthesia of left upper extremity    Neural foraminal stenosis of cervical spine    Acute pain of right lower extremity    Elevated TSH    Pressure injury of skin of right buttock    Ambulatory dysfunction    Leucocytosis     Past Medical History  Past Medical History:   Diagnosis Date    Cataract     CHF (congestive heart failure) ([facility])     chronic diastolic CHF    Coronary artery disease     Diabetes mellitus ([facility] )     Glaucoma     Hyperlipidemia     Hypertension     Neuropathy     Obesity     Renal disorder     chronic kidney disease stage 3     Sleep apnea     Stroke ([facility] )     TIA (transient ischemic attack)     Uncontrolled type 2 diabetes mellitus with hyperglycemia, with long-term current use of insulin ([facility] ) 10/4/2018     Past Surgical History  Past Surgical History:   Procedure Laterality Date    APPENDECTOMY      CARPAL TUNNEL RELEASE      EYE SURGERY      cataracts             07/01/19 0859   Note Type   Note type Eval/Treat   Restrictions/Precautions   Other Precautions Chair Alarm; Bed Alarm;Multiple lines;Telemetry; Fall Risk;Pain   Pain Assessment   Pain Assessment 0-10   Pain Score 8   Pain Location Leg   Pain Orientation Bilateral   Home Living   Additional Comments pt is poor historian; has electrical scooter he reports he utilizes    Prior Function   Level of [place] Needs assistance with ADLs and functional mobility; Needs assistance with IADLs   Lives With Family   Receives Help From Family   ADL Assistance Needs assistance   IADLs Needs assistance   Falls in the last 6 months 1 to 4   Comments pt's family drives   Psychosocial   Psychosocial (WDL) X   Patient Behaviors/Mood Irritable   Subjective   Subjective "listen, you are going to torture me?"   ADL   Where Assessed Edge of bed   Eating Assistance 5  Supervision/Setup   Grooming Assistance 4  Minimal Assistance   UB Bathing Assistance 3  Moderate Assistance   LB Bathing Assistance 2  Maximal Assistance   LB Dressing Assistance 2  Maximal [address]  2  Maximal Assistance   Additional Comments pt is unable to complete ADLs below the waist and with difficulties with UB ADL performance   Bed Mobility   Supine to Sit 3  Moderate assistance   Additional items Assist x 2; Increased time required;LE management;Verbal cues   Sit to Supine   (seated in chair at end of session)   Additional Comments pt on RA during session with no complaints of SOB during session   Transfers   Sit to Stand 3  Moderate assistance   Additional items Assist x 2   Stand to Sit 3  Moderate assistance   Additional items Assist x 2   Stand pivot 4  Minimal assistance   Additional items Assist x 2   Additional Comments pt requires min-mod (A) with physical and verbal cues for task completion   Functional Mobility   Additional Comments does not advance forward at this time due to safety and weak LEs; performs stand pivot to chair this date with increased (A)   Balance   Static Sitting Good   Dynamic Sitting Fair +   Static Standing Fair   Dynamic Standing [address], [address] -   Activity Tolerance   Activity Tolerance Patient limited by fatigue;Patient limited by pain   RUE Assessment   RUE Assessment X  (4-/5 grossly; WFL AROM)   LUE Assessment   LUE Assessment X  (4-/5 grossly; WFL AROM)   Hand Function   Gross Motor Coordination Functional   Fine Motor Coordination Functional   Sensation   Light Touch No apparent deficits   Sharp/Dull No apparent deficits   Cognition   Overall Cognitive Status WFL   Arousal/Participation Alert   Attention Within functional limits   Orientation Level Oriented X4   Memory Within functional limits   Following Commands Follows all commands and directions without difficulty   Assessment   Limitation Decreased ADL status; Decreased UE strength;Decreased Safe judgement during ADL;Decreased endurance;Decreased self-care trans;Decreased high-level ADLs   Assessment Pt is a 66 y o  male seen for OT evaluation s/p admit to [facility] on 6/30/2019 w/ KIRT (acute kidney injury) ([facility] )  Comorbidities affecting pt's functional performance at time of assessment include: DM, HTN, obesity, CHF and CVA, neuropathy, glaucoma  Personal factors affecting pt at time of IE include:steps to enter environment, limited home support, difficulty performing ADLS, difficulty performing IADLS , limited insight into deficits, flat affect, decreased initiation and engagement  and health management   Prior to admission, pt was (A) with ADL and IADL performance with use of RW during functional mobility  Upon evaluation: Pt requires (S)-max (A) x2 with use of RW during stand pivot transfer 2* the following deficits impacting occupational performance: weakness, decreased strength, decreased balance, decreased tolerance, impaired initiation, decreased safety awareness, increased pain and impaired interpersonal skills   Pt to benefit from continued skilled OT tx while in the hospital to address deficits as defined above and maximize level of functional independence w ADL's and functional mobility  Occupational Performance areas to address include: grooming, bathing/shower, toilet hygiene, dressing, functional mobility, community mobility and clothing management  From OT standpoint, recommendation at time of d/c would be short term rehab  Goals   Patient Goals to go home    Short Term Goal  pt will perform UE strengthening exercises while seated EOB or in chair to maximize (I) with ADL performance   Long Term Goal #1 pt will perform toilet transfers and hygiene at min (A) level    Long Term Goal #2 pt will demonstrate UB bathing and grooming tasks while seated in chair at min (A) level    Long Term Goal pt will increase independence with functional transfers and mobility to min (A) level with increased endurane and distance   Plan   Treatment Interventions ADL retraining;Functional transfer training;UE strengthening/ROM; Endurance training;Patient/family training; Activityengagement   Goal Expiration Date 07/15/19   OT Frequency 3-5x/wk   Recommendation   OT Discharge Recommendation Short Term Rehab   Barthel Index   Feeding 10   Bathing 0   Grooming Score 0   Dressing Score 5   Bladder Score 5   Bowels Score 5   Toilet Use Score 5   Transfers (Bed/Chair) Score 5   Mobility (Level Surface) Score 0   Stairs Score 0   Barthel Index Score 35     Pt will benefit from continued OT services in order to maximize (I) c ADL performance, FM c RW, and improve overall endurance/strength required to complete functional tasks in preparation for d/c  Pt left seated in chair at end of session; all needs within reach; all lines intact

## 2019-07-01 NOTE — ASSESSMENT & PLAN NOTE
-Blood pressures were stable throughout his stay  -His home medications were continued  -Throughout his stay his BP was monitored closely  -Restarted his Lisinopril on day of discharge

## 2019-07-01 NOTE — PROGRESS NOTES
[address] [facility] Practice Progress Note - Elizabeth Figueroa 66 y o  male MRN: [id number]    Unit/Bed#: 409-01 Encounter: [id number]      Assessment/Plan:     * KIRT (acute kidney injury) [facility])  Assessment & Plan  Present on admission   Has a hx of CKD with a baseline between 1 1 and 1 4  Creatinine on admission 2 81 trending downwards today to 1 63  Avoid nephrotoxic agents   IV normal saline bolus received in the ER, will continue with gentle IV hydration pending nephrology recomendations  Will continue to monitor  Nephrology consulted and recommended current treatment plan and urine culture  Urine culture pending  Supportive care      Leucocytosis  Assessment & Plan  WBC at 13 59  No fever or chills  No clear source of infection  UA pending  Chest x-ray completed official report pending  Will monitor CBC      Ambulatory dysfunction  Assessment & Plan  He reports that he is unable to ambulate  He currently uses walker  He completed Short therm rehab in March 2019 at [facility]  It is unclear if his worsening ambulatory dysfunction is due to worsening pain from his neuropathy    He denies recent falls or trauma  He may benefit from repeat short term rehab  Fall precaution  OT PT eval  Supportive care      Pressure injury of skin of right buttock  Assessment & Plan  Present on admission  Unclear how long  he has this ulcer  Does not appear infected  He denies being evaluated by Wound care  Continue Daily wound care  Wound care consult    Elevated TSH  Assessment & Plan  TSH levels 4 583  Will check Free T4  Encourage PCP follow up    Type 2 diabetes mellitus with hyperglycemia, with long-term current use of insulin (HCC)  Assessment & Plan  Lab Results   Component Value Date    HGBA1C 7 9 (H) 06/30/2019     Glucose level at 156  Continue home insulin regimen  Sliding scale insulin  fingerstick glucose 4 times daily AC/HS  (P) 162 2    Essential hypertension  Assessment & Plan  Blood pressure currently stable  Continue home medications  Monitor blood pressure closely    Chronic diastolic CHF (congestive heart failure) (HCC)  Assessment & Plan  Wt Readings from Last 3 Encounters:   07/01/19 129 kg (283 lb 4 7 oz)   03/04/19 127 kg (280 lb 10 3 oz)   03/01/19 128 kg (282 lb 3 oz)     No evidence of fluid overload  No associated chest pain, SOB or Lower extremity edema  Last ECHO on 3/01/2019 reviewed EF a 65% with grade 1 diastolic dysfunction  Continue home medications  Continue outpatient Cardiology follow up        Subjective:   Jia Herron is a a 65 y/o male that was seen and examined at bedside with his wife present  Upon entering the room he was awake, alert, and sitting upright in bed watching TV  He has no complaints today except for his leg pain that is chronic  He states that he has not had a bowel movement for the past week  He denies having any CP, SOB, Abd pain, n/v/d/ and urinary symptoms  He is being followed by nephrology, and DCP is for short term rehab  Objective:     Vitals: Blood pressure 113/55, pulse 88, temperature 98 7 °F (37 1 °C), temperature source Temporal, resp  rate 18, height 5' 6" (1 676 m), weight 129 kg (283 lb 4 7 oz), SpO2 93 %  ,Body mass index is 45 72 kg/m²    Wt Readings from Last 3 Encounters:   07/01/19 129 kg (283 lb 4 7 oz)   03/04/19 127 kg (280 lb 10 3 oz)   03/01/19 128 kg (282 lb 3 oz)       Intake/Output Summary (Last 24 hours) at 7/1/2019 1126  Last data filed at 7/1/2019 1042  Gross per 24 hour   Intake 1506 ml   Output 1500 ml   Net 6 ml       Physical Exam: General appearance: alert and oriented, in no acute distress, obese, has significant difficulty to get up and sit upright  Lungs: clear to auscultation bilaterally  Heart: regular rate and rhythm, S1, S2 normal, no murmur, click, rub or gallop  Abdomen: soft, non-tender; bowel sounds normal; no masses,  no organomegaly  Extremities: bilateral chronic skin changes, tenderness on palpation of the LE execpt on the heel     Recent Results (from the past 24 hour(s))   Fingerstick Glucose (POCT)    Collection Time: 06/30/19 11:32 AM   Result Value Ref Range    POC Glucose 155 (H) 65 - 140 mg/dl   CK (with reflex to MB)    Collection Time: 06/30/19 11:36 AM   Result Value Ref Range    Total CK 74 39 - 308 U/L   Fingerstick Glucose (POCT)    Collection Time: 06/30/19  4:06 PM   Result Value Ref Range    POC Glucose 204 (H) 65 - 140 mg/dl   Fingerstick Glucose (POCT)    Collection Time: 06/30/19  8:27 PM   Result Value Ref Range    POC Glucose 161 (H) 65 - 140 mg/dl   Fingerstick Glucose (POCT)    Collection Time: 07/01/19  7:04 AM   Result Value Ref Range    POC Glucose 152 (H) 65 - 140 mg/dl   Comprehensive metabolic panel    Collection Time: 07/01/19  8:26 AM   Result Value Ref Range    Sodium 142 136 - 145 mmol/L    Potassium 4 2 3 5 - 5 3 mmol/L    Chloride 108 100 - 108 mmol/L    CO2 28 21 - 32 mmol/L    ANION GAP 6 4 - 13 mmol/L    BUN 31 (H) 5 - 25 mg/dL    Creatinine 1 63 (H) 0 60 - 1 30 mg/dL    Glucose 149 (H) 65 - 140 mg/dL    Calcium 9 0 8 3 - 10 1 mg/dL    AST 15 5 - 45 U/L    ALT 19 12 - 78 U/L    Alkaline Phosphatase 140 (H) 46 - 116 U/L    Total Protein 6 8 6 4 - 8 2 g/dL    Albumin 2 6 (L) 3 5 - 5 0 g/dL    Total Bilirubin 0 40 0 20 - 1 00 mg/dL    eGFR 40 ml/min/1 73sq m   CBC and differential    Collection Time: 07/01/19  8:26 AM   Result Value Ref Range    WBC 8 24 4 31 - 10 16 Thousand/uL    RBC 4 07 3 88 - 5 62 Million/uL    Hemoglobin 11 5 (L) 12 0 - 17 0 g/dL    Hematocrit 37 4 36 5 - 49 3 %    MCV 92 82 - 98 fL    MCH 28 3 26 8 - 34 3 pg    MCHC 30 7 (L) 31 4 - 37 4 g/dL    RDW 14 0 11 6 - 15 1 %    MPV 10 2 8 9 - 12 7 fL    Platelets 390 754 - 599 Thousands/uL    nRBC 0 /100 WBCs    Neutrophils Relative 68 43 - 75 %    Immat GRANS % 0 0 - 2 %    Lymphocytes Relative 19 14 - 44 %    Monocytes Relative 6 4 - 12 %    Eosinophils Relative 7 (H) 0 - 6 %    Basophils Relative 0 0 - 1 %    Neutrophils Absolute 5 57 1 85 - 7 62 Thousands/µL    Immature Grans Absolute 0 03 0 00 - 0 20 Thousand/uL    Lymphocytes Absolute 1 55 0 60 - 4 47 Thousands/µL    Monocytes Absolute 0 49 0 17 - 1 22 Thousand/µL    Eosinophils Absolute 0 57 0 00 - 0 61 Thousand/µL    Basophils Absolute 0 03 0 00 - 0 10 Thousands/µL   Phosphorus    Collection Time: 07/01/19  8:26 AM   Result Value Ref Range    Phosphorus 2 7 2 3 - 4 1 mg/dL   Magnesium    Collection Time: 07/01/19  8:26 AM   Result Value Ref Range    Magnesium 1 8 1 6 - 2 6 mg/dL       Current Facility-Administered Medications   Medication Dose Route Frequency Provider Last Rate Last Dose    acetaminophen (TYLENOL) tablet 650 mg  650 mg Oral Q6H PRN Ricky Jeong PA-C        aspirin chewable tablet 81 mg  81 mg Oral Daily Ricky Jeong PA-C   81 mg at 07/01/19 0816    atorvastatin (LIPITOR) tablet 40 mg  40 mg Oral After Omnronn Jeong PA-C   40 mg at 06/30/19 1801    carvedilol (COREG) tablet 12 5 mg  12 5 mg Oral BID Ricky Jeong PA-C   12 5 mg at 07/01/19 0816    cholecalciferol (VITAMIN D3) tablet 2,000 Units  2,000 Units Oral Daily Ricky Jeong PA-C   2,000 Units at 07/01/19 0817    ciprofloxacin (CIPRO) tablet 500 mg  500 mg Oral Q24H Nelly Schaefer MD   500 mg at 06/30/19 1630    gabapentin (NEURONTIN) capsule 600 mg  600 mg Oral BID Ricky Jeong PA-C   600 mg at 07/01/19 0816    heparin (porcine) subcutaneous injection 5,000 Units  5,000 Units Subcutaneous Q8H [facility] Ricky Jeong PA-C   5,000 Units at 07/01/19 0614    [START ON 7/2/2019] insulin glargine (LANTUS) subcutaneous injection 22 Units 0 22 mL  22 Units Subcutaneous QAM Juan Miguel Fernandez MD        insulin lispro (HumaLOG) 100 units/mL subcutaneous injection 2-12 Units  2-12 Units Subcutaneous HS Ricky Jeong PA-C   2 Units at 06/30/19 2150    insulin lispro (HumaLOG) 100 units/mL subcutaneous injection 3 Units  3 Units Subcutaneous TID With Meals Ricky Jeong, PA-C   3 Units at 07/01/19 1116    nystatin (MYCOSTATIN) powder Topical BID Ricky Jeong PA-C        ondansetron (ZOFRAN) injection 4 mg  4 mg Intravenous Q6H PRN Ricky Jeong PA-C   4 mg at 06/30/19 1649    oxyCODONE (ROXICODONE) immediate release tablet 20 mg  20 mg Oral Q6H PRN Ricky Jeong PA-C   20 mg at 07/01/19 0942    polyethylene glycol (MIRALAX) packet 17 g  17 g Oral Daily Josy Ross MD   17 g at 07/01/19 0816    senna-docusate sodium (SENOKOT S) 8 6-50 mg per tablet 2 tablet  2 tablet Oral BID Josy Ross MD   2 tablet at 07/01/19 0816    sodium chloride 0 9 % infusion  75 mL/hr Intravenous Continuous Ricky Jeong PA-C 75 mL/hr at 07/01/19 0454 75 mL/hr at 07/01/19 0454       Invasive Devices     Peripheral Intravenous Line            Peripheral IV 06/30/19 Right Antecubital 1 day                Lab, Imaging and other studies: I have personally reviewed pertinent reports      VTE Pharmacologic Prophylaxis: Heparin  VTE Mechanical Prophylaxis: sequential compression device    Shamar De La Rosa MD

## 2019-07-01 NOTE — PLAN OF CARE
Problem: Potential for Falls  Goal: Patient will remain free of falls  Description  INTERVENTIONS:  - Assess patient frequently for physical needs  -  Identify cognitive and physical deficits and behaviors that affect risk of falls    -  [place] fall precautions as indicated by assessment   - Educate patient/family on patient safety including physical limitations  - Instruct patient to call for assistance with activity based on assessment  - Modify environment to reduce risk of injury  - Consider OT/PT consult to assist with strengthening/mobility  Outcome: Progressing     Problem: Prexisting or High Potential for Compromised Skin Integrity  Goal: Skin integrity is maintained or improved  Description  INTERVENTIONS:  - Identify patients at risk for skin breakdown  - Assess and monitor skin integrity  - Assess and monitor nutrition and hydration status  - Monitor labs (i e  albumin)  - Assess for incontinence   - Turn and reposition patient  - Assist with mobility/ambulation  - Relieve pressure over bony prominences  - Avoid friction and shearing  - Provide appropriate hygiene as needed including keeping skin clean and dry  - Evaluate need for skin moisturizer/barrier cream  - Collaborate with interdisciplinary team (i e  Nutrition, Rehabilitation, etc )   - Patient/family teaching  Outcome: Progressing     Problem: PAIN - ADULT  Goal: Verbalizes/displays adequate comfort level or baseline comfort level  Description  Interventions:  - Encourage patient to monitor pain and request assistance  - Assess pain using appropriate pain scale  - Administer analgesics based on type and severity of pain and evaluate response  - Implement non-pharmacological measures as appropriate and evaluate response  - Consider cultural and social influences on pain and pain management  - Notify physician/advanced practitioner if interventions unsuccessful or patient reports new pain  Outcome: Progressing     Problem: INFECTION - ADULT  Goal: Absence or prevention of progression during hospitalization  Description  INTERVENTIONS:  - Assess and monitor for signs and symptoms of infection  - Monitor lab/diagnostic results  - Monitor all insertion sites, i e  indwelling lines, tubes, and drains  - Monitor endotracheal (as able) and nasal secretions for changes in amount and color  - [place] appropriate cooling/warming therapies per order  - Administer medications as ordered  - Instruct and encourage patient and family to use good hand hygiene technique  - Identify and instruct in appropriate isolation precautions for identified infection/condition  Outcome: Progressing  Goal: Absence of fever/infection during neutropenic period  Description  INTERVENTIONS:  - Monitor WBC  - Implement neutropenic guidelines  Outcome: Progressing     Problem: SAFETY ADULT  Goal: Patient will remain free of falls  Description  INTERVENTIONS:  - Assess patient frequently for physical needs  -  Identify cognitive and physical deficits and behaviors that affect risk of falls    -  [place] fall precautions as indicated by assessment   - Educate patient/family on patient safety including physical limitations  - Instruct patient to call for assistance with activity based on assessment  - Modify environment to reduce risk of injury  - Consider OT/PT consult to assist with strengthening/mobility  Outcome: Progressing  Goal: Maintain or return to baseline ADL function  Description  INTERVENTIONS:  -  Assess patient's ability to carry out ADLs; assess patient's baseline for ADL function and identify physical deficits which impact ability to perform ADLs (bathing, care of mouth/teeth, toileting, grooming, dressing, etc )  - Assess/evaluate cause of self-care deficits   - Assess range of motion  - Assess patient's mobility; develop plan if impaired  - Assess patient's need for assistive devices and provide as appropriate  - Encourage maximum independence but intervene and supervise when necessary  ¯ Involve family in performance of ADLs  ¯ Assess for home care needs following discharge   ¯ Request OT consult to assist with ADL evaluation and planning for discharge  ¯ Provide patient education as appropriate  Outcome: Progressing  Goal: Maintain or return mobility status to optimal level  Description  INTERVENTIONS:  - Assess patient's baseline mobility status (ambulation, transfers, stairs, etc )    - Identify cognitive and physical deficits and behaviors that affect mobility  - Identify mobility aids required to assist with transfers and/or ambulation (gait belt, sit-to-stand, lift, walker, cane, etc )  - [place] fall precautions as indicated by assessment  - Record patient progress and toleration of activity level on Mobility SBAR; progress patient to next Phase/Stage  - Instruct patient to call for assistance with activity based on assessment  - Request Rehabilitation consult to assist with strengthening/weightbearing, etc   Outcome: Progressing     Problem: DISCHARGE PLANNING  Goal: Discharge to home or other facility with appropriate resources  Description  INTERVENTIONS:  - Identify barriers to discharge w/patient and caregiver  - Arrange for needed discharge resources and transportation as appropriate  - Identify discharge learning needs (meds, wound care, etc )  - Arrange for interpretive services to assist at discharge as needed  - Refer to Case Management Department for coordinating discharge planning if the patient needs post-hospital services based on physician/advanced practitioner order or complex needs related to functional status, cognitive ability, or social support system  Outcome: Progressing     Problem: Knowledge Deficit  Goal: Patient/family/caregiver demonstrates understanding of disease process, treatment plan, medications, and discharge instructions  Description  Complete learning assessment and assess knowledge base    Interventions:  - Provide teaching at level of understanding  - Provide teaching via preferred learning methods  Outcome: Progressing     Problem: SKIN/TISSUE INTEGRITY - ADULT  Goal: Skin integrity remains intact  Description  INTERVENTIONS  - Identify patients at risk for skin breakdown  - Assess and monitor skin integrity  - Assess and monitor nutrition and hydration status  - Monitor labs (i e  albumin)  - Assess for incontinence   - Turn and reposition patient  - Assist with mobility/ambulation  - Relieve pressure over bony prominences  - Avoid friction and shearing  - Provide appropriate hygiene as needed including keeping skin clean and dry  - Evaluate need for skin moisturizer/barrier cream  - Collaborate with interdisciplinary team (i e  Nutrition, Rehabilitation, etc )   - Patient/family teaching  Outcome: Progressing  Goal: Incision(s), wounds(s) or drain site(s) healing without S/S of infection  Description  INTERVENTIONS  - Assess and document risk factors for skin impairment   - Assess and document dressing, incision, wound bed, drain sites and surrounding tissue  - Initiate Nutrition services consult and/or wound management as needed  Outcome: Progressing     Problem: MUSCULOSKELETAL - ADULT  Goal: Maintain or return mobility to safest level of function  Description  INTERVENTIONS:  - Assess patient's ability to carry out ADLs; assess patient's baseline for ADL function and identify physical deficits which impact ability to perform ADLs (bathing, care of mouth/teeth, toileting, grooming, dressing, etc )  - Assess/evaluate cause of self-care deficits   - Assess range of motion  - Assess patient's mobility; develop plan if impaired  - Assess patient's need for assistive devices and provide as appropriate  - Encourage maximum independence but intervene and supervise when necessary  - Involve family in performance of ADLs  - Assess for home care needs following discharge   - Request OT consult to assist with ADL evaluation and planning for discharge  - Provide patient education as appropriate  Outcome: Progressing  Goal: Maintain proper alignment of affected body part  Description  INTERVENTIONS:  - Support, maintain and protect limb and body alignment  - Provide pt/fam with appropriate education  Outcome: Progressing

## 2019-07-01 NOTE — PROGRESS NOTES
Progress Note - Nephrology   Yony Drilling 66 y o  male MRN: [id number]  Unit/Bed#: 409-01 Encounter: [id number]    A/P:  1  Acute kidney injury atop chronic kidney disease   Improved status post volume expansion  Can continue to give IV fluids if indicated, however this point patient appears to be approaching euvolemia would simply follow along as he continues to improve  2  Chronic kidney disease stage 3 with baseline creatinine between 1 3-1 4 mg/dL  3  Diabetic nephropathy -mild  4  Hypertension the setting of chronic kidney disease   Ace inhibitor can be restarted once the patient has achieved baseline creatinine  5  Morbid obesity  6   Right buttock pressure ulcer       Follow up reason for today's visit:  Acute kidney injury/chronic kidney disease    KIRT (acute kidney injury) [facility])    Patient Active Problem List   Diagnosis    Dyspnea on exertion    Type 2 diabetes mellitus with hypoglycemia without coma, with long-term current use of insulin ([facility])    Chronic kidney disease, stage III (moderate) ([facility])    Chronic diastolic CHF (congestive heart failure) ([facility])    Obstructive sleep apnea syndrome    Morbid obesity with BMI of 45 0-49 9, adult ([facility] [address]    Essential hypertension    Type 2 diabetes mellitus with hyperglycemia, with long-term current use of insulin ([facility])    Complicated UTI (urinary tract infection)    Uncontrolled type 2 diabetes mellitus with hyperglycemia, with long-term current use of insulin ([facility])    KIRT (acute kidney injury) ([facility] [address] [facility]    Dehydration with hyponatremia    Hypoglycemia due to insulin    Acute cystitis    Opiate dependence, continuous ([facility])    Paresthesia of left upper extremity    Neural foraminal stenosis of cervical spine    Acute pain of right lower extremity    Elevated TSH    Pressure injury of skin of right buttock    Ambulatory dysfunction    Leucocytosis         Subjective:   Patient had an episode of regurgitation this morning, appears to been brought on by ingestion of orange juice  Objective:     Vitals: Blood pressure 113/55, pulse 88, temperature 98 7 °F (37 1 °C), temperature source Temporal, resp  rate 18, height 5' 6" (1 676 m), weight 129 kg (283 lb 4 7 oz), SpO2 93 %  ,Body mass index is 45 72 kg/m²  Weight (last 2 days)     Date/Time   Weight    07/01/19 0600   129 (283 29)    06/30/19 0549   127 (279 98)    06/30/19 0537   127 (279 98)    06/30/19 0456   127 (279 98)    06/30/19 0022   129 (284 39)                Intake/Output Summary (Last 24 hours) at 7/1/2019 0916  Last data filed at 7/1/2019 0454  Gross per 24 hour   Intake 1206 ml   Output 1000 ml   Net 206 ml     I/O last 3 completed shifts: In: 7552 [P O :240;  I V :966]  Out: 1000 [Urine:1000]         Physical Exam: /55 (BP Location: Left arm)   Pulse 88   Temp 98 7 °F (37 1 °C) (Temporal)   Resp 18   Ht 5' 6" (1 676 m)   Wt 129 kg (283 lb 4 7 oz)   SpO2 93%   BMI 45 72 kg/m²     General Appearance:    Alert, cooperative, no distress, appears stated age   Head:    Normocephalic, without obvious abnormality, atraumatic   Eyes:    Conjunctiva/corneas clear   Ears:    Normal external ears   Nose:   Nares normal, septum midline, mucosa normal, no drainage    or sinus tenderness   Throat:   Lips, mucosa, and tongue normal; teeth and gums normal   Neck:   Supple   Back:     Symmetric, no curvature, ROM normal, no CVA tenderness   Lungs:     Clear to auscultation bilaterally, respirations unlabored   Chest wall:    No tenderness or deformity   Heart:    Regular rate and rhythm, S1 and S2 normal, no murmur, rub   or gallop   Abdomen:     Soft, non-tender, bowel sounds active   Extremities:   Extremities normal, atraumatic, no cyanosis, trace bilateral lower extremity edema   Skin:   Skin color, texture, turgor normal, no rashes or lesions   Lymph nodes:   Cervical normal   Neurologic:   CNII-XII intact            Lab, Imaging and other studies: I have personally reviewed pertinent labs   CBC:   Lab Results   Component Value Date    WBC 8 24 07/01/2019    HGB 11 5 (L) 07/01/2019    HCT 37 4 07/01/2019    MCV 92 07/01/2019     07/01/2019    MCH 28 3 07/01/2019    MCHC 30 7 (L) 07/01/2019    RDW 14 0 07/01/2019    MPV 10 2 07/01/2019    NRBC 0 07/01/2019     CMP:   Lab Results   Component Value Date    K 4 2 07/01/2019     07/01/2019    CO2 28 07/01/2019    BUN 31 (H) 07/01/2019    CREATININE 1 63 (H) 07/01/2019    CALCIUM 9 0 07/01/2019    AST 15 07/01/2019    ALT 19 07/01/2019    ALKPHOS 140 (H) 07/01/2019    EGFR 40 07/01/2019         Results from last 7 days   Lab Units 07/01/19  0826 06/30/19  0603 06/30/19  0207   POTASSIUM mmol/L 4 2 4 2 4 5   CHLORIDE mmol/L 108 103 101   CO2 mmol/L 28 28 26   BUN mg/dL 31* 44* 44*   CREATININE mg/dL 1 63* 2 76* 2 81*   CALCIUM mg/dL 9 0 9 2 9 4   ALK PHOS U/L 140* 137* 152*   ALT U/L 19 16 16   AST U/L 15 14 18         Phosphorus: No results found for: PHOS  Magnesium: No results found for: MG  Urinalysis:   Lab Results   Component Value Date    COLORU Yellow 06/30/2019    CLARITYU Cloudy 06/30/2019    SPECGRAV 1 020 06/30/2019    PHUR 5 5 06/30/2019    LEUKOCYTESUR Large (A) 06/30/2019    NITRITE Negative 06/30/2019    GLUCOSEU Negative 06/30/2019    KETONESU Negative 06/30/2019    BILIRUBINUR Negative 06/30/2019    BLOODU Small (A) 06/30/2019     Ionized Calcium: No results found for: CAION  Coagulation: No results found for: PT, INR, APTT  Troponin: No results found for: TROPONINI  ABG: No results found for: PHART, GKK7ESG, PO2ART, XQE9ANA, Q0ALOMVJ, BEART, SOURCE  Radiology review:     IMAGING  Procedure: Xr Chest 1 View Portable    Result Date: 6/30/2019  Narrative: CHEST INDICATION:   Congestive heart failure  COMPARISON:  February 28, 2019 October 3, 2018 and January 4, 2017 EXAM PERFORMED/VIEWS:  XR CHEST PORTABLE FINDINGS: The heart mediastinum are stable given differences in technique   There is slight blunting of the left costophrenic angle which may suggest a small effusion  Right lung is clear  Mild pulmonary vascular congestion is noted  Osseous structures appear within normal limits for patient age  Impression: Mild pulmonary vascular congestion and small left pleural effusion  Workstation performed: [id number]     Procedure: Us Kidney And Bladder    Result Date: 6/30/2019  Narrative: RENAL ULTRASOUND INDICATION:   acute renal failure  COMPARISON: 12/1/2015, 8/30/2014 TECHNIQUE:   Ultrasound of the retroperitoneum was performed with a curvilinear transducer utilizing volumetric sweeps and still imaging techniques  FINDINGS: KIDNEYS: Symmetric and normal size  Right kidney:  9 2 x 4 9 cm  Left kidney:  10 4 x 5 9 cm  Right kidney Normal echogenicity  No suspicious masses detected  There is an echogenic lesion in the right kidney measuring 0 7 x 1 3 x 1 0 cm  This was present in 2015 where it measured 0 7 x 0 8 x 1 0 cm and corresponds to an area of cortical scarring on the prior CT from 2014  There is a 1 cm simple cyst  No hydronephrosis  No shadowing calculi  No perinephric fluid collections  Left kidney There is cortical scarring  No suspicious masses detected  No hydronephrosis  No shadowing calculi  No perinephric fluid collections  URETERS: Nonvisualized  BLADDER: Normally distended  No focal thickening or mass lesions  Bilateral ureteral jets detected  There is a posterior bladder diverticulum  Impression: No hydronephrosis   Workstation performed: [id number]       Current Facility-Administered Medications   Medication Dose Route Frequency    acetaminophen (TYLENOL) tablet 650 mg  650 mg Oral Q6H PRN    aspirin chewable tablet 81 mg  81 mg Oral Daily    atorvastatin (LIPITOR) tablet 40 mg  40 mg Oral After Dinner    carvedilol (COREG) tablet 12 5 mg  12 5 mg Oral BID    cholecalciferol (VITAMIN D3) tablet 2,000 Units  2,000 Units Oral Daily    ciprofloxacin (CIPRO) tablet 500 mg  500 mg Oral Q24H    gabapentin (NEURONTIN) capsule 600 mg  600 mg Oral BID    heparin (porcine) subcutaneous injection 5,000 Units  5,000 Units Subcutaneous Q8H [facility]    insulin glargine (LANTUS) subcutaneous injection 20 Units 0 2 mL  20 Units Subcutaneous QAM    insulin lispro (HumaLOG) 100 units/mL subcutaneous injection 2-12 Units  2-12 Units Subcutaneous HS    insulin lispro (HumaLOG) 100 units/mL subcutaneous injection 3 Units  3 Units Subcutaneous TID With Meals    magnesium sulfate IVPB (premix) SOLN 1 g  1 g Intravenous Once    nystatin (MYCOSTATIN) powder   Topical BID    ondansetron (ZOFRAN) injection 4 mg  4 mg Intravenous Q6H PRN    oxyCODONE (ROXICODONE) immediate release tablet 20 mg  20 mg Oral Q6H PRN    polyethylene glycol (MIRALAX) packet 17 g  17 g Oral Daily    senna-docusate sodium (SENOKOT S) 8 6-50 mg per tablet 2 tablet  2 tablet Oral BID    sodium chloride 0 9 % infusion  75 mL/hr Intravenous Continuous     There are no discontinued medications      Jj Webster DO

## 2019-07-01 NOTE — SOCIAL WORK
Chart reviewed by case management, assessment completed at the bedside wit the pt and wife present, pt lives with his wife in a 2 story home, no steps outside, 12-14 steps inside, pt stays on the 1st floor and pt has a hospital bed, pt does the steps to shower when he can or a bed bath, pt is able to dress and bath himself, pt's wife drives, cooks, cleans, shops,  and does his medication, A post acute care recommendation was made by your care team for STR  Discussed Freedom of Choice with both patient and caregiver  Choice is to make a new referral pt and his wife would like 5th floor as their 1st choice, if they do not have a bed they requested [facility] as their 2nd choice, pt and family are aware we need an accepting bed and an authorization from the insurance company,pt has a glucometer, bp cuff ,commode,skooter, walker, cpap,cane, shower chair & hospital bed, pt [facility] a rx plan at [facility] in [place], cm will continue to follow, Patient/caregiver received discharge checklist   Content reviewed  Patient/caregiver encouraged to participate in discharge plan of care prior to discharge home  CM reviewed d/c planning process including the following: identifying help at home, patient preference for d/c planning needs, availability of treatment team to discuss questions or concerns patient and/or family may have regarding understanding medications and recognizing signs and symptoms once discharged  CM also encouraged patient to follow up with all recommended appointments after discharge  Patient advised of importance for patient and family to participate in managing patients medical well being

## 2019-07-01 NOTE — ASSESSMENT & PLAN NOTE
-TSH level was found to be 4 583 with a Free T4: 1 11  -This was most likely reactive due to acute illness  -He should follow up with his PCP

## 2019-07-01 NOTE — ASSESSMENT & PLAN NOTE
-PT had leukocytosis on admission with a WBC of 13 59 that trended down to 7  14  He denied fever and chills    -UTI was suspected and a UA with reflex to culture was done  -UA showed: cloudy, small blood, large leukocytes, nitrite negative RBC 0-1 WBC innumerable Bacteria moderate and was started on Cipro 500 mg po qd on 7/1  -Urine culture resulted today showing >100,000 cfu/ml Enterococcus faecalis with a sensitivity to Ampicillin  -He is discharged on Ampicillin 500 mg po q6h and today will be day 3 of abx

## 2019-07-01 NOTE — PHYSICAL THERAPY NOTE
PT treatment Note      07/01/19 5954   Restrictions/Precautions   Other Precautions   (Chair Alarm; Bed Alarm;Multiple lines;Telemetry; Fall Risk;Ashish)   General   Family/Caregiver Present No   Subjective   Subjective States his legs hurt and are weak  Bed Mobility   Supine to Sit 3  Moderate assistance   Additional items Assist x 2; Increased time required;LE management;Verbal cues   Transfers   Sit to Stand 3  Moderate assistance   Additional items Assist x 2;Bedrails; Increased time required;Verbal cues   Stand to Sit 3  Moderate assistance   Additional items Assist x 2;Armrests; Increased time required;Verbal cues   Stand pivot 4  Minimal assistance   Additional items Assist x 2;Verbal cues   Ambulation/Elevation   Gait pattern Decreased foot clearance; Short stride   Gait Assistance 4  Minimal assist   Additional items Assist x 2;Verbal cues   Assistive Device Rolling walker   Distance 2'   Balance   Ambulatory Fair -   Endurance Deficit   Endurance Deficit Yes   Activity Tolerance   Activity Tolerance Patient limited by fatigue;Patient limited by pain   Assessment   Prognosis Good   Problem List Decreased strength;Decreased endurance; Impaired balance;Decreased mobility; Decreased safety awareness;Pain   Assessment In comparison to previous session, Pt  With improvements in activity tolerance  Pt  Able to tolerate tx OOB to chair  Increased time to complete all tasks due to pain, weakness and fatigue  Pt  Currently performing bed mobility, transfers and ambulation at (mod-min  )  2level of function utilizing ( RW ) with decreased balance and stability  Transfer training to increase functional task performance and  to promote safe sit/stand and stand/sit mobility  Pt  education  for hand postion and safety and technique with transfer training  Pt is in need of continued activity in PT to improve strength balance endurance mobility transfers and ambulation with return to maximize LOF   From PT/mobility standpoint, recommendation at time of d/c would be STR  in order to promote return to PLOF and independence  Goals   STG Expiration Date 07/07/19   Plan   Treatment/Interventions Functional transfer training;LE strengthening/ROM; Therapeutic exercise; Endurance training;Bed mobility;Gait training   Progress Slow progress, decreased activity tolerance   Recommendation   Recommendation Short-term skilled PT   Pt  OOB in chair  with call bell within reach, scd's connected and turned on and alarm on at end of PT session  Discussed with Marko Blanchard, PT today's treatment and patient's current level of function for care coordination

## 2019-07-01 NOTE — PLAN OF CARE
Problem: PHYSICAL THERAPY ADULT  Goal: Performs mobility at highest level of function for planned discharge setting  See evaluation for individualized goals  Description  Treatment/Interventions: Functional transfer training, LE strengthening/ROM, Therapeutic exercise, Endurance training, Bed mobility, Gait training, Patient/family training  Equipment Recommended: (TBD)       See flowsheet documentation for full assessment, interventions and recommendations  Outcome: Progressing  Note:   Prognosis: Good  Problem List: Decreased strength, Decreased endurance, Impaired balance, Decreased mobility, Decreased safety awareness, Pain  Assessment: In comparison to previous session, Pt  With improvements in activity tolerance  Pt  Able to tolerate tx OOB to chair  Increased time to complete all tasks due to pain, weakness and fatigue  Pt  Currently performing bed mobility, transfers and ambulation at (mod-min  )  2level of function utilizing ( RW ) with decreased balance and stability  Transfer training to increase functional task performance and  to promote safe sit/stand and stand/sit mobility  Pt  education  for hand postion and safety and technique with transfer training  Pt is in need of continued activity in PT to improve strength balance endurance mobility transfers and ambulation with return to maximize LOF  From PT/mobility standpoint, recommendation at time of d/c would be STR  in order to promote return to PLOF and independence  Recommendation: Short-term skilled PT         See flowsheet documentation for full assessment

## 2019-07-01 NOTE — PLAN OF CARE
Problem: OCCUPATIONAL THERAPY ADULT  Goal: Performs self-care activities at highest level of function for planned discharge setting  See evaluation for individualized goals  Description  Treatment Interventions: ADL retraining, Functional transfer training, UE strengthening/ROM, Endurance training, Patient/family training, Activityengagement          See flowsheet documentation for full assessment, interventions and recommendations  Note:   Limitation: Decreased ADL status, Decreased UE strength, Decreased Safe judgement during ADL, Decreased endurance, Decreased self-care trans, Decreased high-level ADLs     Assessment: Pt is a 66 y o  male seen for OT evaluation s/p admit to [facility] on 6/30/2019 w/ KIRT (acute kidney injury) ([facility] [address] )  Comorbidities affecting pt's functional performance at time of assessment include: DM, HTN, obesity, CHF and CVA, neuropathy, glaucoma  Personal factors affecting pt at time of IE include:steps to enter environment, limited home support, difficulty performing ADLS, difficulty performing IADLS , limited insight into deficits, flat affect, decreased initiation and engagement  and health management   Prior to admission, pt was (A) with ADL and IADL performance with use of RW during functional mobility  Upon evaluation: Pt requires (S)-max (A) x2 with use of RW during stand pivot transfer 2* the following deficits impacting occupational performance: weakness, decreased strength, decreased balance, decreased tolerance, impaired initiation, decreased safety awareness, increased pain and impaired interpersonal skills  Pt to benefit from continued skilled OT tx while in the hospital to address deficits as defined above and maximize level of functional independence w ADL's and functional mobility  Occupational Performance areas to address include: grooming, bathing/shower, toilet hygiene, dressing, functional mobility, community mobility and clothing management   From OT standpoint, recommendation at time of d/c would be short term rehab         OT Discharge Recommendation: Short Term Rehab

## 2019-07-01 NOTE — ASSESSMENT & PLAN NOTE
Lab Results   Component Value Date    HGBA1C 7 9 (H) 06/30/2019     -We continued his home insulin regimen and ISS with  fingerstick glucose 4 times daily AC/HS    (P) 162 2

## 2019-07-01 NOTE — ASSESSMENT & PLAN NOTE
-He reports that he is unable to ambulate and currently uses a walker  -He completed Short therm rehab in March 2019 at [facility]  -It is unclear if his worsening ambulatory dysfunction is due to worsening pain from his neuropathy and denied any recent falls or trauma  -Fall precautions were placed during his stay  -He was evaluated and treated by PT and OT daily throughout his stay  -He is discharged to the [facility] floor SNF tomorrow for short term rehab

## 2019-07-01 NOTE — UTILIZATION REVIEW
Notification of Inpatient Admission/Inpatient Authorization Request  This is a Notification of Inpatient Admission/Request for Inpatient Authorization for our facility [facility] [address]  Be advised that this patient was admitted to our facility under Inpatient Status  Please contact the Utilization Review Department where the patient is receiving care services for additional admission information  Place of Service Code: 24   Place of Service Name: [facility]  Presentation Date & Time: 6/30/2019 12:18 AM  Inpatient Admission Date & Time: 6/30/19 0405  Discharge Date & Time: No discharge date for patient encounter  Discharge Disposition (if discharged): Home/Self Care  Attending Physician: Sean Tavera Md [[id number]]     Admission Orders (From admission, onward)    Ordered        06/30/19 0405  Inpatient Admission (expected length of stay for this patient Order details is greater than two midnights)  Once             Facility: [address] [place] [address] Utilization Review Department  Phone: [phone]; Fax [phone]  [email or web address]  ATTENTION: Please call with any questions or concerns to [phone]  and carefully listen to the prompts so that you are directed to the right person  Send all requests for admission clinical reviews, approved or denied determinations and any other requests to fax [phone]   All voicemails are confidential

## 2019-07-01 NOTE — PLAN OF CARE
Problem: DISCHARGE PLANNING - CARE MANAGEMENT  Goal: Discharge to post-acute care or home with appropriate resources  Description  INTERVENTIONS:  - Conduct assessment to determine patient/family and health care team treatment goals, and need for post-acute services based on payer coverage, community resources, and patient preferences, and barriers to discharge  - Address psychosocial, clinical, and financial barriers to discharge as identified in assessment in conjunction with the patient/family and health care team  - Arrange appropriate level of post-acute services according to patient's   needs and preference and payer coverage in collaboration with the physician and health care team  - Communicate with and update the patient/family, physician, and health care team regarding progress on the discharge plan  - Arrange appropriate transportation to post-acute venues    Pt's goal is to return home after rehab, authorization will be needed     Outcome: Progressing

## 2019-07-01 NOTE — ASSESSMENT & PLAN NOTE
-Was POA however was unclear of how long he has had this ulcer  -It did not appear infected and denied being evaluated by Wound care  -wound care was consulted and was addressed daily

## 2019-07-01 NOTE — MALNUTRITION/BMI
This medical record reflects one or more clinical indicators suggestive of malnutrition and/or morbid obesity  Malnutrition Findings:              BMI Findings:  BMI Classifications: Morbid Obesity 45-49 9     Body mass index is 45 72 kg/m²  See Nutrition note dated 07/01/2019 for additional details  Completed nutrition assessment is viewable in the nutrition documentation

## 2019-07-01 NOTE — ASSESSMENT & PLAN NOTE
Wt Readings from Last 3 Encounters:   07/01/19 129 kg (283 lb 4 7 oz)   03/04/19 127 kg (280 lb 10 3 oz)   03/01/19 128 kg (282 lb 3 oz)     -On admission there was no evidence of fluid overload with no associated chest pain, SOB or LE edema  -Last ECHO on 3/01/2019 reviewed EF a 65% with grade 1 diastolic dysfunction  -We continued his home medications  -He should continue with his outpatient cardiology follow up

## 2019-07-01 NOTE — UTILIZATION REVIEW
Initial Clinical Review    Admission: Date/Time/Statement: 6/30/19 @ 0405     Orders Placed This Encounter   Procedures    Inpatient Admission (expected length of stay for this patient Order details is greater than two midnights)     Standing Status:   Standing     Number of Occurrences:   1     Order Specific Question:   Admitting Physician     Answer:   Roland Serrano [[id number]]     Order Specific Question:   Level of Care     Answer:   Med Surg [[id number]]     Order Specific Question:   Estimated length of stay     Answer:   More than 2 Midnights     Order Specific Question:   Certification     Answer:   I certify that inpatient services are medically necessary for this patient for a duration of greater than two midnights  See H&P and MD Progress Notes for additional information about the patient's course of treatment  ED Arrival Information     Expected Arrival Acuity Means of Arrival Escorted By Service Admission Type    - 6/30/2019 00:15 Urgent [facility]  Ambulance General Medicine Urgent    Arrival Complaint    -        Chief Complaint   Patient presents with    Weakness - Generalized     Patient is experiencing leg weakness for the past couple of weeks that got worse tonight  Assessment/Plan: 67 y/o male who presents to ED with c/o generalized weakness over the past several weeks getting progressively worse  He also reports that is lower extremities have increasing weakness and worsened chronic neuropathic pain  Ambulates with a walker d/c ambulatory dysfunction  In Ed WBC 13 59, Cr 2 81 (bseline 1 1-1 4), UA (+) for bacteremia  Dx:  KIRT / Leukocytosis / Ambulatory dysfunction  Plan:  IVF, avoid nephrotoxic agents, consult nephrology, PT/OT evals, monitor fingerstick glucose checks ac & hs with sliding scale ins    Nephrology consult  6/30 -- 1    Acute kidney injury:  He was initially treated for volume depletion with a saline bolus although he says he has been eating and drinking well  He was on lisinopril which should be held until his function improves  Will check urine studies and a kidney ultrasound  Avoid nephrotoxic agents  Check a CPK due to obesity and use of statin drugs  2  Chronic kidney disease stage 3: he is diabetic and has vascular disease  3  Diabetes mellitus with long term use of insulin: would decrease insulin dose    A1c is too low at 6 1% - increased risk of hypoglycemia       ED Triage Vitals   Temperature Pulse Respirations Blood Pressure SpO2   06/30/19 0022 06/30/19 0022 06/30/19 0042 06/30/19 0022 06/30/19 0022   97 6 °F (36 4 °C) 83 18 97/56 97 %      Temp Source Heart Rate Source Patient Position - Orthostatic VS BP Location FiO2 (%)   06/30/19 0022 06/30/19 0022 06/30/19 0022 06/30/19 0022 --   Temporal Monitor Lying Right arm       Pain Score       06/30/19 0022       5        Wt Readings from Last 1 Encounters:   07/01/19 129 kg (283 lb 4 7 oz)     Additional Vital Signs:   Date/Time  Temp Pulse  Resp  BP  SpO2  O2 Device   07/01/19 0742  98 7 °F (37 1 °C) 88  18  113/55  93 %  None (Room air)   06/30/19 2332  97 9 °F (36 6 °C) 97  18  156/86  96 %  None (Room air)   06/30/19 1757   107    158/89       06/30/19 1607  97 5 °F (36 4 °C) 98  20  118/81  96 %  None (Room air)   06/30/19 0811       102/64       06/30/19 0745  97 9 °F (36 6 °C) 80  18  107/57  95 %     06/30/19 0508  97 5 °F (36 4 °C) 98  18  115/55  96 %  Nasal cannula   06/30/19 0330   90  19  114/56  94 %  Nasal cannula   06/30/19 0300   91  17  112/57  98 %     06/30/19 0230   84  13  106/58  99 %     06/30/19 0215   87  15  112/69  99 %     06/30/19 0100   90  16  108/51  98 %     06/30/19 0042     18         06/30/19 0022  97 6 °F (36 4 °C) 83    97/56  97 %  Nasal cannula       Pertinent Labs/Diagnostic Test Results:   Results from last 7 days   Lab Units 07/01/19  0826 06/30/19  0603 06/30/19  0207   WBC Thousand/uL 8 24 10 61* 13 59*   HEMOGLOBIN g/dL 11 5* 11 4* 12 0   HEMATOCRIT % 37 4 37 5 38 6   PLATELETS Thousands/uL 254 264 331   NEUTROS ABS Thousands/µL 5 57  --  8 93*     Results from last 7 days   Lab Units 07/01/19  0826 06/30/19  0603 06/30/19  0207   SODIUM mmol/L 142 139 137   POTASSIUM mmol/L 4 2 4 2 4 5   CHLORIDE mmol/L 108 103 101   CO2 mmol/L 28 28 26   ANION GAP mmol/L 6 8 10   BUN mg/dL 31* 44* 44*   CREATININE mg/dL 1 63* 2 76* 2 81*   EGFR ml/min/1 73sq m 40 21 21   CALCIUM mg/dL 9 0 9 2 9 4   MAGNESIUM mg/dL 1 8 1 9 1 8   PHOSPHORUS mg/dL 2 7 4 9* 4 4*     Results from last 7 days   Lab Units 07/01/19  0826 06/30/19  0603 06/30/19  0207   AST U/L 15 14 18   ALT U/L 19 16 16   ALK PHOS U/L 140* 137* 152*   TOTAL PROTEIN g/dL 6 8 7 1 8 0   ALBUMIN g/dL 2 6* 2 9* 3 3*   TOTAL BILIRUBIN mg/dL 0 40 0 40 0 40     Results from last 7 days   Lab Units 07/01/19  0704 06/30/19  2027 06/30/19  1606 06/30/19  1132 06/30/19  0723   POC GLUCOSE mg/dl 152* 161* 204* 155* 139     Results from last 7 days   Lab Units 07/01/19  0826 06/30/19  0603 06/30/19  0207   GLUCOSE RANDOM mg/dL 149* 146* 156*     Results from last 7 days   Lab Units 06/30/19  0603   HEMOGLOBIN A1C % 7 9*   EAG mg/dl 180     Results from last 7 days   Lab Units 06/30/19  1136   CK TOTAL U/L 74     Results from last 7 days   Lab Units 06/30/19  0207   TROPONIN I ng/mL <0 02     Results from last 7 days   Lab Units 06/30/19  0603   PROTIME seconds 13 9   INR  1 07   PTT seconds 31     Results from last 7 days   Lab Units 06/30/19  0207   NT-PRO BNP pg/mL 232     Results from last 7 days   Lab Units 06/30/19  0930   CLARITY UA  Cloudy   COLOR UA  Yellow   SPEC GRAV UA  1 020   PH UA  5 5   GLUCOSE UA mg/dl Negative   KETONES UA mg/dl Negative   BLOOD UA  Small*   PROTEIN UA mg/dl Negative   NITRITE UA  Negative   BILIRUBIN UA  Negative   UROBILINOGEN UA E U /dl 0 2   LEUKOCYTES UA  Large*   WBC UA /hpf Innumerable*   RBC UA /hpf 0-1*   BACTERIA UA /hpf Moderate*   EPITHELIAL CELLS WET PREP /hpf Occasional     CXR 6/30 -- Mild pulmonary vascular congestion and small left pleural effusion  US kidney & bladder 6/30 -- No hydronephrosis  EKG:  Normal sinus rhythm with first-degree AV block at a rate of 85 beats per minute    Right bundle branch block    ED Treatment:   Medication Administration from 06/30/2019 0012 to 06/30/2019 0440       Date/Time Order Dose Route Action     06/30/2019 0408 sodium chloride 0 9 % bolus 250 mL 250 mL Intravenous New Bag     Past Medical History:   Diagnosis Date    Cataract     CHF (congestive heart failure) ([facility])     chronic diastolic CHF    Coronary artery disease     Diabetes mellitus ([place] [address] )     Glaucoma     Hyperlipidemia     Hypertension     Neuropathy     Obesity     Renal disorder     chronic kidney disease stage 3     Sleep apnea     Stroke ([place] [address] )     TIA (transient ischemic attack)     Uncontrolled type 2 diabetes mellitus with hyperglycemia, with long-term current use of insulin ([place] [address] ) 10/4/2018     Present on Admission:   Essential hypertension   Chronic diastolic CHF (congestive heart failure) ([facility])      Admitting Diagnosis: Weakness [R53 1]  Acute renal failure (ARF) ([facility]) [N17 9]  Ambulatory dysfunction [R26 2]  Age/Sex: 66 y o  male  Admission Orders:  Tele  O2 2 lpm nc  Cons carb diet    Current Facility-Administered Medications:  acetaminophen 650 mg Oral Q6H PRN   aspirin 81 mg Oral Daily   atorvastatin 40 mg Oral After Dinner   carvedilol 12 5 mg Oral BID   cholecalciferol 2,000 Units Oral Daily   ciprofloxacin 500 mg Oral Q24H   gabapentin 600 mg Oral BID   heparin (porcine) 5,000 Units Subcutaneous Q8H [address]   insulin glargine 20 Units Subcutaneous QAM   insulin lispro 2-12 Units Subcutaneous HS   insulin lispro 3 Units Subcutaneous TID With Meals   magnesium sulfate 1 g Intravenous Once   nystatin  Topical BID   ondansetron 4 mg Intravenous Q6H PRN   oxyCODONE 20 mg Oral Q6H PRN   polyethylene glycol 17 g Oral Daily   senna-docusate sodium 2 tablet Oral BID   sodium chloride 75 mL/hr Intravenous Continuous       IP CONSULT TO CASE MANAGEMENT  IP CONSULT TO NEPHROLOGY  IP CONSULT TO Donald Ambrosio Utilization Review Department  Phone: [phone]; Fax [phone]  [email or web address]  ATTENTION: Please call with any questions or concerns to [phone]  and carefully listen to the prompts so that you are directed to the right person  Send all requests for admission clinical reviews, approved or denied determinations and any other requests to fax [phone]   All voicemails are confidential

## 2019-07-02 PROBLEM — N39.0 UTI (URINARY TRACT INFECTION): Status: ACTIVE | Noted: 2019-06-30

## 2019-07-02 LAB
ALBUMIN SERPL BCP-MCNC: 2.6 G/DL (ref 3.5–5)
ALP SERPL-CCNC: 144 U/L (ref 46–116)
ALT SERPL W P-5'-P-CCNC: 15 U/L (ref 12–78)
ANION GAP SERPL CALCULATED.3IONS-SCNC: 4 MMOL/L (ref 4–13)
AST SERPL W P-5'-P-CCNC: 21 U/L (ref 5–45)
BACTERIA UR CULT: ABNORMAL
BACTERIA UR CULT: ABNORMAL
BASOPHILS # BLD AUTO: 0.03 THOUSANDS/ΜL (ref 0–0.1)
BASOPHILS NFR BLD AUTO: 0 % (ref 0–1)
BILIRUB SERPL-MCNC: 0.3 MG/DL (ref 0.2–1)
BUN SERPL-MCNC: 24 MG/DL (ref 5–25)
CALCIUM SERPL-MCNC: 8.9 MG/DL (ref 8.3–10.1)
CHLORIDE SERPL-SCNC: 108 MMOL/L (ref 100–108)
CO2 SERPL-SCNC: 28 MMOL/L (ref 21–32)
CREAT SERPL-MCNC: 1.46 MG/DL (ref 0.6–1.3)
EOSINOPHIL # BLD AUTO: 0.55 THOUSAND/ΜL (ref 0–0.61)
EOSINOPHIL NFR BLD AUTO: 8 % (ref 0–6)
ERYTHROCYTE [DISTWIDTH] IN BLOOD BY AUTOMATED COUNT: 13.9 % (ref 11.6–15.1)
GFR SERPL CREATININE-BSD FRML MDRD: 46 ML/MIN/1.73SQ M
GLUCOSE SERPL-MCNC: 170 MG/DL (ref 65–140)
GLUCOSE SERPL-MCNC: 195 MG/DL (ref 65–140)
GLUCOSE SERPL-MCNC: 201 MG/DL (ref 65–140)
GLUCOSE SERPL-MCNC: 202 MG/DL (ref 65–140)
GLUCOSE SERPL-MCNC: 226 MG/DL (ref 65–140)
HCT VFR BLD AUTO: 34.1 % (ref 36.5–49.3)
HGB BLD-MCNC: 10.4 G/DL (ref 12–17)
IMM GRANULOCYTES # BLD AUTO: 0.04 THOUSAND/UL (ref 0–0.2)
IMM GRANULOCYTES NFR BLD AUTO: 1 % (ref 0–2)
LYMPHOCYTES # BLD AUTO: 1.55 THOUSANDS/ΜL (ref 0.6–4.47)
LYMPHOCYTES NFR BLD AUTO: 22 % (ref 14–44)
MAGNESIUM SERPL-MCNC: 2 MG/DL (ref 1.6–2.6)
MCH RBC QN AUTO: 28.1 PG (ref 26.8–34.3)
MCHC RBC AUTO-ENTMCNC: 30.5 G/DL (ref 31.4–37.4)
MCV RBC AUTO: 92 FL (ref 82–98)
MONOCYTES # BLD AUTO: 0.54 THOUSAND/ΜL (ref 0.17–1.22)
MONOCYTES NFR BLD AUTO: 8 % (ref 4–12)
NEUTROPHILS # BLD AUTO: 4.43 THOUSANDS/ΜL (ref 1.85–7.62)
NEUTS SEG NFR BLD AUTO: 61 % (ref 43–75)
NRBC BLD AUTO-RTO: 0 /100 WBCS
PHOSPHATE SERPL-MCNC: 2.6 MG/DL (ref 2.3–4.1)
PLATELET # BLD AUTO: 241 THOUSANDS/UL (ref 149–390)
PMV BLD AUTO: 10.4 FL (ref 8.9–12.7)
POTASSIUM SERPL-SCNC: 4.5 MMOL/L (ref 3.5–5.3)
PROT SERPL-MCNC: 6.5 G/DL (ref 6.4–8.2)
RBC # BLD AUTO: 3.7 MILLION/UL (ref 3.88–5.62)
SODIUM SERPL-SCNC: 140 MMOL/L (ref 136–145)
WBC # BLD AUTO: 7.14 THOUSAND/UL (ref 4.31–10.16)

## 2019-07-02 PROCEDURE — 82948 REAGENT STRIP/BLOOD GLUCOSE: CPT

## 2019-07-02 PROCEDURE — 80053 COMPREHEN METABOLIC PANEL: CPT | Performed by: FAMILY MEDICINE

## 2019-07-02 PROCEDURE — 99232 SBSQ HOSP IP/OBS MODERATE 35: CPT | Performed by: FAMILY MEDICINE

## 2019-07-02 PROCEDURE — 84100 ASSAY OF PHOSPHORUS: CPT | Performed by: FAMILY MEDICINE

## 2019-07-02 PROCEDURE — 97530 THERAPEUTIC ACTIVITIES: CPT

## 2019-07-02 PROCEDURE — 85025 COMPLETE CBC W/AUTO DIFF WBC: CPT | Performed by: FAMILY MEDICINE

## 2019-07-02 PROCEDURE — 97110 THERAPEUTIC EXERCISES: CPT

## 2019-07-02 PROCEDURE — 83735 ASSAY OF MAGNESIUM: CPT | Performed by: FAMILY MEDICINE

## 2019-07-02 RX ORDER — FINASTERIDE 5 MG/1
5 TABLET, FILM COATED ORAL DAILY
Status: DISCONTINUED | OUTPATIENT
Start: 2019-07-02 | End: 2019-07-03 | Stop reason: HOSPADM

## 2019-07-02 RX ORDER — TAMSULOSIN HYDROCHLORIDE 0.4 MG/1
0.4 CAPSULE ORAL
Status: DISCONTINUED | OUTPATIENT
Start: 2019-07-02 | End: 2019-07-03 | Stop reason: HOSPADM

## 2019-07-02 RX ADMIN — NYSTATIN: 100000 POWDER TOPICAL at 08:17

## 2019-07-02 RX ADMIN — SENNOSIDES AND DOCUSATE SODIUM 2 TABLET: 8.6; 5 TABLET ORAL at 08:17

## 2019-07-02 RX ADMIN — CARVEDILOL 12.5 MG: 12.5 TABLET, FILM COATED ORAL at 17:14

## 2019-07-02 RX ADMIN — POLYETHYLENE GLYCOL 3350 17 G: 17 POWDER, FOR SOLUTION ORAL at 08:17

## 2019-07-02 RX ADMIN — HEPARIN SODIUM 5000 UNITS: 5000 INJECTION INTRAVENOUS; SUBCUTANEOUS at 14:11

## 2019-07-02 RX ADMIN — ACETAMINOPHEN 650 MG: 325 TABLET, FILM COATED ORAL at 03:58

## 2019-07-02 RX ADMIN — OXYCODONE HYDROCHLORIDE 20 MG: 10 TABLET ORAL at 16:29

## 2019-07-02 RX ADMIN — NYSTATIN 1 APPLICATION: 100000 POWDER TOPICAL at 17:15

## 2019-07-02 RX ADMIN — CARVEDILOL 12.5 MG: 12.5 TABLET, FILM COATED ORAL at 08:17

## 2019-07-02 RX ADMIN — INSULIN LISPRO 3 UNITS: 100 INJECTION, SOLUTION INTRAVENOUS; SUBCUTANEOUS at 11:47

## 2019-07-02 RX ADMIN — FINASTERIDE 5 MG: 5 TABLET, FILM COATED ORAL at 08:17

## 2019-07-02 RX ADMIN — HEPARIN SODIUM 5000 UNITS: 5000 INJECTION INTRAVENOUS; SUBCUTANEOUS at 05:17

## 2019-07-02 RX ADMIN — GABAPENTIN 600 MG: 300 CAPSULE ORAL at 08:17

## 2019-07-02 RX ADMIN — ASPIRIN 81 MG 81 MG: 81 TABLET ORAL at 08:17

## 2019-07-02 RX ADMIN — ATORVASTATIN CALCIUM 40 MG: 40 TABLET, FILM COATED ORAL at 17:13

## 2019-07-02 RX ADMIN — INSULIN GLARGINE 22 UNITS: 100 INJECTION, SOLUTION SUBCUTANEOUS at 08:17

## 2019-07-02 RX ADMIN — GABAPENTIN 600 MG: 300 CAPSULE ORAL at 17:14

## 2019-07-02 RX ADMIN — OXYCODONE HYDROCHLORIDE 20 MG: 10 TABLET ORAL at 09:52

## 2019-07-02 RX ADMIN — INSULIN LISPRO 4 UNITS: 100 INJECTION, SOLUTION INTRAVENOUS; SUBCUTANEOUS at 21:23

## 2019-07-02 RX ADMIN — VITAMIN D, TAB 1000IU (100/BT) 2000 UNITS: 25 TAB at 08:17

## 2019-07-02 RX ADMIN — INSULIN LISPRO 3 UNITS: 100 INJECTION, SOLUTION INTRAVENOUS; SUBCUTANEOUS at 16:25

## 2019-07-02 RX ADMIN — SENNOSIDES AND DOCUSATE SODIUM 2 TABLET: 8.6; 5 TABLET ORAL at 17:14

## 2019-07-02 RX ADMIN — CIPROFLOXACIN 500 MG: 500 TABLET, FILM COATED ORAL at 17:14

## 2019-07-02 RX ADMIN — INSULIN LISPRO 3 UNITS: 100 INJECTION, SOLUTION INTRAVENOUS; SUBCUTANEOUS at 07:21

## 2019-07-02 RX ADMIN — TAMSULOSIN HYDROCHLORIDE 0.4 MG: 0.4 CAPSULE ORAL at 17:14

## 2019-07-02 RX ADMIN — HEPARIN SODIUM 5000 UNITS: 5000 INJECTION INTRAVENOUS; SUBCUTANEOUS at 21:20

## 2019-07-02 RX ADMIN — OXYCODONE HYDROCHLORIDE 20 MG: 10 TABLET ORAL at 00:20

## 2019-07-02 RX ADMIN — SODIUM CHLORIDE 75 ML/HR: 0.9 INJECTION, SOLUTION INTRAVENOUS at 06:19

## 2019-07-02 NOTE — PLAN OF CARE
Problem: Potential for Falls  Goal: Patient will remain free of falls  Description  INTERVENTIONS:  - Assess patient frequently for physical needs  -  Identify cognitive and physical deficits and behaviors that affect risk of falls    -  [place] fall precautions as indicated by assessment   - Educate patient/family on patient safety including physical limitations  - Instruct patient to call for assistance with activity based on assessment  - Modify environment to reduce risk of injury  - Consider OT/PT consult to assist with strengthening/mobility  Outcome: Progressing     Problem: Prexisting or High Potential for Compromised Skin Integrity  Goal: Skin integrity is maintained or improved  Description  INTERVENTIONS:  - Identify patients at risk for skin breakdown  - Assess and monitor skin integrity  - Assess and monitor nutrition and hydration status  - Monitor labs (i e  albumin)  - Assess for incontinence   - Turn and reposition patient  - Assist with mobility/ambulation  - Relieve pressure over bony prominences  - Avoid friction and shearing  - Provide appropriate hygiene as needed including keeping skin clean and dry  - Evaluate need for skin moisturizer/barrier cream  - Collaborate with interdisciplinary team (i e  Nutrition, Rehabilitation, etc )   - Patient/family teaching  Outcome: Progressing     Problem: PAIN - ADULT  Goal: Verbalizes/displays adequate comfort level or baseline comfort level  Description  Interventions:  - Encourage patient to monitor pain and request assistance  - Assess pain using appropriate pain scale  - Administer analgesics based on type and severity of pain and evaluate response  - Implement non-pharmacological measures as appropriate and evaluate response  - Consider cultural and social influences on pain and pain management  - Notify physician/advanced practitioner if interventions unsuccessful or patient reports new pain  Outcome: Progressing     Problem: INFECTION - ADULT  Goal: Absence or prevention of progression during hospitalization  Description  INTERVENTIONS:  - Assess and monitor for signs and symptoms of infection  - Monitor lab/diagnostic results  - Monitor all insertion sites, i e  indwelling lines, tubes, and drains  - Monitor endotracheal (as able) and nasal secretions for changes in amount and color  - [place] appropriate cooling/warming therapies per order  - Administer medications as ordered  - Instruct and encourage patient and family to use good hand hygiene technique  - Identify and instruct in appropriate isolation precautions for identified infection/condition  Outcome: Progressing  Goal: Absence of fever/infection during neutropenic period  Description  INTERVENTIONS:  - Monitor WBC  - Implement neutropenic guidelines  Outcome: Progressing     Problem: SAFETY ADULT  Goal: Patient will remain free of falls  Description  INTERVENTIONS:  - Assess patient frequently for physical needs  -  Identify cognitive and physical deficits and behaviors that affect risk of falls    -  [place] fall precautions as indicated by assessment   - Educate patient/family on patient safety including physical limitations  - Instruct patient to call for assistance with activity based on assessment  - Modify environment to reduce risk of injury  - Consider OT/PT consult to assist with strengthening/mobility  Outcome: Progressing  Goal: Maintain or return to baseline ADL function  Description  INTERVENTIONS:  -  Assess patient's ability to carry out ADLs; assess patient's baseline for ADL function and identify physical deficits which impact ability to perform ADLs (bathing, care of mouth/teeth, toileting, grooming, dressing, etc )  - Assess/evaluate cause of self-care deficits   - Assess range of motion  - Assess patient's mobility; develop plan if impaired  - Assess patient's need for assistive devices and provide as appropriate  - Encourage maximum independence but intervene and supervise when necessary  ¯ Involve family in performance of ADLs  ¯ Assess for home care needs following discharge   ¯ Request OT consult to assist with ADL evaluation and planning for discharge  ¯ Provide patient education as appropriate  Outcome: Progressing  Goal: Maintain or return mobility status to optimal level  Description  INTERVENTIONS:  - Assess patient's baseline mobility status (ambulation, transfers, stairs, etc )    - Identify cognitive and physical deficits and behaviors that affect mobility  - Identify mobility aids required to assist with transfers and/or ambulation (gait belt, sit-to-stand, lift, walker, cane, etc )  - [place] fall precautions as indicated by assessment  - Record patient progress and toleration of activity level on Mobility SBAR; progress patient to next Phase/Stage  - Instruct patient to call for assistance with activity based on assessment  - Request Rehabilitation consult to assist with strengthening/weightbearing, etc   Outcome: Progressing     Problem: DISCHARGE PLANNING  Goal: Discharge to home or other facility with appropriate resources  Description  INTERVENTIONS:  - Identify barriers to discharge w/patient and caregiver  - Arrange for needed discharge resources and transportation as appropriate  - Identify discharge learning needs (meds, wound care, etc )  - Arrange for interpretive services to assist at discharge as needed  - Refer to Case Management Department for coordinating discharge planning if the patient needs post-hospital services based on physician/advanced practitioner order or complex needs related to functional status, cognitive ability, or social support system  Outcome: Progressing     Problem: Knowledge Deficit  Goal: Patient/family/caregiver demonstrates understanding of disease process, treatment plan, medications, and discharge instructions  Description  Complete learning assessment and assess knowledge base    Interventions:  - Provide teaching at level of understanding  - Provide teaching via preferred learning methods  Outcome: Progressing     Problem: SKIN/TISSUE INTEGRITY - ADULT  Goal: Skin integrity remains intact  Description  INTERVENTIONS  - Identify patients at risk for skin breakdown  - Assess and monitor skin integrity  - Assess and monitor nutrition and hydration status  - Monitor labs (i e  albumin)  - Assess for incontinence   - Turn and reposition patient  - Assist with mobility/ambulation  - Relieve pressure over bony prominences  - Avoid friction and shearing  - Provide appropriate hygiene as needed including keeping skin clean and dry  - Evaluate need for skin moisturizer/barrier cream  - Collaborate with interdisciplinary team (i e  Nutrition, Rehabilitation, etc )   - Patient/family teaching  Outcome: Progressing  Goal: Incision(s), wounds(s) or drain site(s) healing without S/S of infection  Description  INTERVENTIONS  - Assess and document risk factors for skin impairment   - Assess and document dressing, incision, wound bed, drain sites and surrounding tissue  - Initiate Nutrition services consult and/or wound management as needed  Outcome: Progressing     Problem: MUSCULOSKELETAL - ADULT  Goal: Maintain or return mobility to safest level of function  Description  INTERVENTIONS:  - Assess patient's ability to carry out ADLs; assess patient's baseline for ADL function and identify physical deficits which impact ability to perform ADLs (bathing, care of mouth/teeth, toileting, grooming, dressing, etc )  - Assess/evaluate cause of self-care deficits   - Assess range of motion  - Assess patient's mobility; develop plan if impaired  - Assess patient's need for assistive devices and provide as appropriate  - Encourage maximum independence but intervene and supervise when necessary  - Involve family in performance of ADLs  - Assess for home care needs following discharge   - Request OT consult to assist with ADL evaluation and planning for discharge  - Provide patient education as appropriate  Outcome: Progressing  Goal: Maintain proper alignment of affected body part  Description  INTERVENTIONS:  - Support, maintain and protect limb and body alignment  - Provide pt/fam with appropriate education  Outcome: Progressing     Problem: DISCHARGE PLANNING - CARE MANAGEMENT  Goal: Discharge to post-acute care or home with appropriate resources  Description  INTERVENTIONS:  - Conduct assessment to determine patient/family and health care team treatment goals, and need for post-acute services based on payer coverage, community resources, and patient preferences, and barriers to discharge  - Address psychosocial, clinical, and financial barriers to discharge as identified in assessment in conjunction with the patient/family and health care team  - Arrange appropriate level of post-acute services according to patient's   needs and preference and payer coverage in collaboration with the physician and health care team  - Communicate with and update the patient/family, physician, and health care team regarding progress on the discharge plan  - Arrange appropriate transportation to post-acute venues    Pt's goal is to return home after rehab, authorization will be needed  Outcome: Progressing     Problem: Nutrition/Hydration-ADULT  Goal: Nutrient/Hydration intake appropriate for improving, restoring or maintaining nutritional needs  Description  Monitor and assess patient's nutrition/hydration status for malnutrition (ex- brittle hair, bruises, dry skin, pale skin and conjunctiva, muscle wasting, smooth red tongue, and disorientation)  Collaborate with interdisciplinary team and initiate plan and interventions as ordered  Monitor patient's weight and dietary intake as ordered or per policy  Utilize nutrition screening tool and intervene per policy  Determine patient's food preferences and provide high-protein, high-caloric foods as appropriate  INTERVENTIONS:  - Monitor oral intake, urinary output, labs, and treatment plans  - Assess nutrition and hydration status and recommend course of action  - Evaluate amount of meals eaten  - Assist patient with eating if necessary   - Allow adequate time for meals  - Recommend/ encourage appropriate diets, oral nutritional supplements, and vitamin/mineral supplements  - Order, calculate, and assess calorie counts as needed  - Recommend, monitor, and adjust tube feedings and TPN/PPN based on assessed needs  - Assess need for intravenous fluids  - Provide specific nutrition/hydration education as appropriate  - Include patient/family/caregiver in decisions related to nutrition  Outcome: Progressing

## 2019-07-02 NOTE — SOCIAL WORK
Pt was accepted on the 5th floor pending insurance approval which CM is working on obtaining with [place]

## 2019-07-02 NOTE — SOCIAL WORK
Pending auth for pt to go to the 5th floor is [id number]  Clinicals faxed over, awaiting determination

## 2019-07-02 NOTE — PROGRESS NOTES
Progress Note - Nephrology   Wilmer Michael 66 y o  male MRN: [id number]  Unit/Bed#: 409-01 Encounter: [id number]    A/P:  1  Acute kidney injury atop chronic kidney disease   Creatinine nearly a baseline this morning  Discontinue fluids at this time  Patient can have his ACE-inhibitor restarted for tomorrow, July 3rd   2  Chronic kidney disease stage 3 with baseline creatinine between 1 3 - 1 4 mg/dL  3  Diabetic nephropathy -mild  4  Hypertension the setting of chronic kidney disease   Ace inhibitor can be restarted once the patient has achieved baseline creatinine  5  Urinary retention   Patient had about 400 mL postvoid residual, this was discussed with the hospitalist and will start the patient on an alpha blocker, most likely tamsulosin 0 4 mg once daily  Patient should follow up with Urology in the outpatient setting  6  Morbid obesity  7  Right buttock pressure ulcer   8   Ambulatory dysfunction      Follow up reason for today's visit:  Acute kidney injury/chronic kidney disease    KIRT (acute kidney injury) [facility])    Patient Active Problem List   Diagnosis    Dyspnea on exertion    Type 2 diabetes mellitus with hypoglycemia without coma, with long-term current use of insulin ([facility])    Chronic kidney disease, stage III (moderate) ([facility])    Chronic diastolic CHF (congestive heart failure) ([facility])    Obstructive sleep apnea syndrome    Morbid obesity with BMI of 45 0-49 9, adult ([facility] [address] )    Essential hypertension    Type 2 diabetes mellitus with hyperglycemia, with long-term current use of insulin ([facility])    Complicated UTI (urinary tract infection)    Uncontrolled type 2 diabetes mellitus with hyperglycemia, with long-term current use of insulin ([facility])    KIRT (acute kidney injury) ([facility] [address] )    Dehydration with hyponatremia    Hypoglycemia due to insulin    Acute cystitis    Opiate dependence, continuous ([facility])    Paresthesia of left upper extremity    Neural foraminal stenosis of cervical spine    Acute pain of right lower extremity    Elevated TSH    Pressure injury of skin of right buttock    Ambulatory dysfunction    Leucocytosis         Subjective:   Patient without acute events overnight  Objective:     Vitals: Blood pressure 143/73, pulse 77, temperature 98 2 °F (36 8 °C), temperature source Temporal, resp  rate 18, height 5' 6" (1 676 m), weight 131 kg (288 lb 9 3 oz), SpO2 93 %  ,Body mass index is 46 58 kg/m²  Weight (last 2 days)     Date/Time   Weight    07/02/19 0600   131 (288 58)    07/01/19 0600   129 (283 29)    06/30/19 0549   127 (279 98)    06/30/19 0537   127 (279 98)    06/30/19 0456   127 (279 98)    06/30/19 0022   129 (284 39)                Intake/Output Summary (Last 24 hours) at 7/2/2019 0804  Last data filed at 7/2/2019 0619  Gross per 24 hour   Intake 2940 ml   Output 1450 ml   Net 1490 ml     I/O last 3 completed shifts:   In: 3906 [P O :940; I V :2966]  Out: 2050 [Urine:2000; Emesis/NG output:50]         Physical Exam: /73 (BP Location: Left arm)   Pulse 77   Temp 98 2 °F (36 8 °C) (Temporal)   Resp 18   Ht 5' 6" (1 676 m)   Wt 131 kg (288 lb 9 3 oz)   SpO2 93%   BMI 46 58 kg/m²     General Appearance:    Alert, cooperative, no distress, appears stated age   Head:    Normocephalic, without obvious abnormality, atraumatic   Eyes:    Conjunctiva/corneas clear   Ears:    Normal external ears   Nose:   Nares normal, septum midline, mucosa normal, no drainage    or sinus tenderness   Throat:   Lips, mucosa, and tongue normal; teeth and gums normal   Neck:   Supple   Back:     Symmetric, no curvature, ROM normal, no CVA tenderness   Lungs:     Clear to auscultation bilaterally, respirations unlabored   Chest wall:    No tenderness or deformity   Heart:    Regular rate and rhythm, S1 and S2 normal, no murmur, rub   or gallop   Abdomen:     Soft, non-tender, bowel sounds active   Extremities:   Extremities normal, atraumatic, no cyanosis, trace bilateral lower extremity edema   Skin:   Skin color, texture, turgor normal, no rashes or lesions   Lymph nodes:   Cervical normal   Neurologic:   CNII-XII intact            Lab, Imaging and other studies: I have personally reviewed pertinent labs  CBC:   Lab Results   Component Value Date    WBC 7 14 07/02/2019    HGB 10 4 (L) 07/02/2019    HCT 34 1 (L) 07/02/2019    MCV 92 07/02/2019     07/02/2019    MCH 28 1 07/02/2019    MCHC 30 5 (L) 07/02/2019    RDW 13 9 07/02/2019    MPV 10 4 07/02/2019    NRBC 0 07/02/2019     CMP:   Lab Results   Component Value Date    K 4 5 07/02/2019     07/02/2019    CO2 28 07/02/2019    BUN 24 07/02/2019    CREATININE 1 46 (H) 07/02/2019    CALCIUM 8 9 07/02/2019    AST 21 07/02/2019    ALT 15 07/02/2019    ALKPHOS 144 (H) 07/02/2019    EGFR 46 07/02/2019         Results from last 7 days   Lab Units 07/02/19  0516 07/01/19  0826 06/30/19  0603   POTASSIUM mmol/L 4 5 4 2 4 2   CHLORIDE mmol/L 108 108 103   CO2 mmol/L 28 28 28   BUN mg/dL 24 31* 44*   CREATININE mg/dL 1 46* 1 63* 2 76*   CALCIUM mg/dL 8 9 9 0 9 2   ALK PHOS U/L 144* 140* 137*   ALT U/L 15 19 16   AST U/L 21 15 14         Phosphorus:   Lab Results   Component Value Date    PHOS 2 6 07/02/2019     Magnesium:   Lab Results   Component Value Date    MG 2 0 07/02/2019     Urinalysis:   No results found for: Enrique Clinton, SPECGRAV, PHUR, LEUKOCYTESUR, NITRITE, PROTEINUA, GLUCOSEU, KETONESU, BILIRUBINUR, BLOODU  Ionized Calcium: No results found for: CAION  Coagulation: No results found for: PT, INR, APTT  Troponin: No results found for: TROPONINI  ABG: No results found for: PHART, YRN2KPY, PO2ART, SVP9WQE, A1TRZSEX, BEART, SOURCE  Radiology review:     IMAGING  Procedure: Xr Chest 1 View Portable    Result Date: 6/30/2019  Narrative: CHEST INDICATION:   Congestive heart failure   COMPARISON:  February 28, 2019 October 3, 2018 and January 4, 2017 EXAM PERFORMED/VIEWS:  XR CHEST PORTABLE FINDINGS: The heart mediastinum are stable given differences in technique  There is slight blunting of the left costophrenic angle which may suggest a small effusion  Right lung is clear  Mild pulmonary vascular congestion is noted  Osseous structures appear within normal limits for patient age  Impression: Mild pulmonary vascular congestion and small left pleural effusion  Workstation performed: [id number]     Procedure: Us Kidney And Bladder    Result Date: 6/30/2019  Narrative: RENAL ULTRASOUND INDICATION:   acute renal failure  COMPARISON: 12/1/2015, 8/30/2014 TECHNIQUE:   Ultrasound of the retroperitoneum was performed with a curvilinear transducer utilizing volumetric sweeps and still imaging techniques  FINDINGS: KIDNEYS: Symmetric and normal size  Right kidney:  9 2 x 4 9 cm  Left kidney:  10 4 x 5 9 cm  Right kidney Normal echogenicity  No suspicious masses detected  There is an echogenic lesion in the right kidney measuring 0 7 x 1 3 x 1 0 cm  This was present in 2015 where it measured 0 7 x 0 8 x 1 0 cm and corresponds to an area of cortical scarring on the prior CT from 2014  There is a 1 cm simple cyst  No hydronephrosis  No shadowing calculi  No perinephric fluid collections  Left kidney There is cortical scarring  No suspicious masses detected  No hydronephrosis  No shadowing calculi  No perinephric fluid collections  URETERS: Nonvisualized  BLADDER: Normally distended  No focal thickening or mass lesions  Bilateral ureteral jets detected  There is a posterior bladder diverticulum  Impression: No hydronephrosis   Workstation performed: [id number]       Current Facility-Administered Medications   Medication Dose Route Frequency    acetaminophen (TYLENOL) tablet 650 mg  650 mg Oral Q6H PRN    aspirin chewable tablet 81 mg  81 mg Oral Daily    atorvastatin (LIPITOR) tablet 40 mg  40 mg Oral After Dinner    carvedilol (COREG) tablet 12 5 mg  12 5 mg Oral BID    cholecalciferol (VITAMIN D3) tablet 2,000 Units  2,000 Units Oral Daily  ciprofloxacin (CIPRO) tablet 500 mg  500 mg Oral Q24H    finasteride (PROSCAR) tablet 5 mg  5 mg Oral Daily    gabapentin (NEURONTIN) capsule 600 mg  600 mg Oral BID    heparin (porcine) subcutaneous injection 5,000 Units  5,000 Units Subcutaneous Q8H [facility]    insulin glargine (LANTUS) subcutaneous injection 22 Units 0 22 mL  22 Units Subcutaneous QAM    insulin lispro (HumaLOG) 100 units/mL subcutaneous injection 2-12 Units  2-12 Units Subcutaneous HS    insulin lispro (HumaLOG) 100 units/mL subcutaneous injection 3 Units  3 Units Subcutaneous TID With Meals    nystatin (MYCOSTATIN) powder   Topical BID    ondansetron (ZOFRAN) injection 4 mg  4 mg Intravenous Q6H PRN    oxyCODONE (ROXICODONE) immediate release tablet 20 mg  20 mg Oral Q6H PRN    polyethylene glycol (MIRALAX) packet 17 g  17 g Oral Daily    senna-docusate sodium (SENOKOT S) 8 6-50 mg per tablet 2 tablet  2 tablet Oral BID    tamsulosin (FLOMAX) capsule 0 4 mg  0 4 mg Oral Daily With Dinner     Medications Discontinued During This Encounter   Medication Reason    insulin glargine (LANTUS) subcutaneous injection 20 Units 0 2 mL     sodium chloride 0 9 % infusion        Santa [place] DO

## 2019-07-02 NOTE — OCCUPATIONAL THERAPY NOTE
OT Note     07/02/19 1424   Restrictions/Precautions   Other Precautions Fall Risk   Therapeutic Excerise-Strength   UE Strength Yes   Right Upper Extremity- Strength   R Shoulder Flexion; Extension   R Elbow Elbow flexion;Elbow extension   R Wrist Wrist flexion;Wrist extension   R Hand   (Forearm pro/supination)   R Weight/Reps/Sets 3# thera p bar, 3# free wt 3 sets/10   Left Upper Extremity-Strength   L Shoulder Flexion; Extension   L Elbow Elbow flexion;Elbow extension   L Wrist Wrist flexion;Wrist extension   L Hand   (Forearm pro/supination)   L Weights/Reps/Sets 3# thera p bar, 3# free wt 3 sets/10   Activity Tolerance   Activity Tolerance Patient tolerated treatment well   Assessment   Assessment Presents seated recliner at bedside  Agreeable to participate  Completes BUE therex as per above without apparent signs or symptoms of discomfort  Rest between sets  Spoke with OTR who is in agreement to continue active OT services in attempt to increase overall LOF and facilitate return to prior living status  Plan   Goal Expiration Date 07/15/19   Treatment Day 1   Recommendation   OT Discharge Recommendation Short Term Rehab   Remains seated recliner at bedside  All needs in reach

## 2019-07-02 NOTE — NURSING NOTE
Pt was complaining about feeling as though he has to void but can't  Koby Glass He was bladder scanned and found 445 mL  He is refusing to be straight cath'd  I informed the Hospitalist and also passed it on to next shift

## 2019-07-02 NOTE — PHYSICAL THERAPY NOTE
PT treatment Note      07/02/19 1400   Restrictions/Precautions   Other Precautions   (Chair Alarm; Bed Alarm;Multiple lines;Telemetry; Fall Risk;Ashish)   General   Family/Caregiver Present Yes   Subjective   Subjective c/o weakness and pain B LE's  Agreeable to therapy   Transfers   Sit to Stand 4  Minimal assistance   Additional items Assist x 1; Armrests; Increased time required;Verbal cues   Stand to Sit 4  Minimal assistance   Additional items Assist x 1; Armrests; Increased time required;Verbal cues   Additional Comments stood x 1 min  Pt c/o LE weakness  Pt  deferred ambulation  Balance   Static Standing Fair  (with RW)   Endurance Deficit   Endurance Deficit Yes   Activity Tolerance   Activity Tolerance Patient limited by fatigue;Patient limited by pain   Exercises   Hip Flexion Sitting;20 reps   Hip Abduction Sitting;20 reps   Hip Adduction Sitting;20 reps   Knee AROM Long Arc Quad Sitting;20 reps   Ankle Pumps Sitting;20 reps   Assessment   Prognosis Good   Problem List Decreased strength;Decreased endurance; Impaired balance;Decreased mobility; Decreased safety awareness;Pain   Assessment Pt  OOB in chair during PT session  at ( ) x level of function  Performed sit<>stand transfers min x 1 with increased time to complete  Transfer training to increase functional task performance and  to promote safe sit/stand and stand/sit mobility  Pt  education  for hand postion and safety and technique with transfer training  Performed therapeutic exercises as above with frequent rest breaks due to weakness and fatigue  Pt is in need of continued activity in PT to improve strength balance endurance mobility transfers and ambulation with return to maximize LOF  From PT/mobility standpoint, recommendation at time of d/c would be STR in order to promote return to PLOF and independence     Goals   STG Expiration Date 07/07/19   Treatment Day 2   Plan   Treatment/Interventions Functional transfer training;RYANNE strengthening/ROM; Therapeutic exercise; Endurance training;Bed mobility;Gait training   Progress Slow progress, decreased activity tolerance   Recommendation   Recommendation Short-term skilled PT   Pt  OOB in chair   with call bell within reach  at end of PT session  Discussed with Alexsandra Porras PT today's treatment and patient's current level of function for care coordination

## 2019-07-02 NOTE — PLAN OF CARE
Problem: OCCUPATIONAL THERAPY ADULT  Goal: Performs self-care activities at highest level of function for planned discharge setting  See evaluation for individualized goals  Description  Treatment Interventions: ADL retraining, Functional transfer training, UE strengthening/ROM, Endurance training, Patient/family training, Activityengagement          See flowsheet documentation for full assessment, interventions and recommendations  Outcome: Progressing  Note:   Limitation: Decreased ADL status, Decreased UE strength, Decreased Safe judgement during ADL, Decreased endurance, Decreased self-care trans, Decreased high-level ADLs     Assessment: Presents seated recliner at bedside  Agreeable to participate  Completes BUE therex as per above without apparent signs or symptoms of discomfort  Rest between sets  Spoke with OTR who is in agreement to continue active OT services in attempt to increase overall LOF and facilitate return to prior living status       OT Discharge Recommendation: Short Term Rehab

## 2019-07-02 NOTE — PROGRESS NOTES
[address] [facility] Practice Progress Note - Versa Grate 66 y o  male MRN: [id number]    Unit/Bed#: 409-01 Encounter: [id number]      Assessment/Plan:  * KIRT (acute kidney injury) [facility])  Assessment & Plan  -Present on admission   -Has a hx of CKD with a baseline between 1 3 and 1 4  -Creatinine on admission 2 81 trending downwards today to 1 46  -Avoid nephrotoxic agents   -IV normal saline bolus received in the ER and continued with gentle IV hydration till 7/2 where it was dicontinue  -Will continue to monitor  -Nephrology consulted and recommended current treatment plan and urine culture  -Renal us showed 6/30:No hydronephrosis  -Urine culture pending  -Supportive care  -Pt had a voiding trial today and had a residual of 400 cc and started on Flomax and Finasteride after which he was able to void 880 cc total  -Per nephrology recommendation will restart his lisinopril tomorrow      UTI (urinary tract infection)  Assessment & Plan  WBC at 13 59 trended down to 7 14  No fever or chills  Possible UTI  UA shows: cloudy, small blood, large leukocytes, nitrite negative RBC 0-1 WBC innumerable Bacteria moderate  Urine culture pending  Cipro 500 mg Po Qd started on 7/1 Day#2        Ambulatory dysfunction  Assessment & Plan  He reports that he is unable to ambulate  He currently uses walker  He completed Short therm rehab in March 2019 at [facility] floor SNF  It is unclear if his worsening ambulatory dysfunction is due to worsening pain from his neuropathy    He denies recent falls or trauma  He may benefit from repeat short term rehab  Fall precaution  OT PT eval  DCP: will be admitted to the 5th floor [facility] tomorrow for short term rehab      Pressure injury of skin of right buttock  Assessment & Plan  Present on admission  Unclear how long  he has this ulcer  Does not appear infected  He denies being evaluated by Wound care  Continue Daily wound care  Wound care consult    Elevated TSH  Assessment & Plan  TSH levels 4 583  Free T4: 1 11  Most likely reactive due to acute illness  Encourage follow up with PCP    Type 2 diabetes mellitus with hyperglycemia, with long-term current use of insulin ([facility])  Assessment & Plan  Lab Results   Component Value Date    HGBA1C 7 9 (H) 06/30/2019     Glucose level at 156  Continue home insulin regimen  Sliding scale insulin  fingerstick glucose 4 times daily AC/HS  Will restart lisinopril tomorrow  (P) 162 2    Essential hypertension  Assessment & Plan  Blood pressure currently stable  Continue home medications  Monitor blood pressure closely  Will restart Lisinopril tomorrow    Chronic diastolic CHF (congestive heart failure) ([facility])  Assessment & Plan  Wt Readings from Last 3 Encounters:   07/01/19 129 kg (283 lb 4 7 oz)   03/04/19 127 kg (280 lb 10 3 oz)   03/01/19 128 kg (282 lb 3 oz)     No evidence of fluid overload  No associated chest pain, SOB or Lower extremity edema  Last ECHO on 3/01/2019 reviewed EF a 65% with grade 1 diastolic dysfunction  Continue home medications  Continue outpatient Cardiology follow up        Subjective:   Perry Barker was seen and examined at bedside  Upon entering the room he was awake, alert, sitting in the chair watching TV  He has no complaint at this time except for bilateral LE pain  He denies any CP, SOB, HA, n/v/d/c and urinary sx  I told him that we are waiting for him to be accepted to 5th floor for rehab  Objective:     Vitals: Blood pressure 143/73, pulse 77, temperature 98 2 °F (36 8 °C), temperature source Temporal, resp  rate 18, height 5' 6" (1 676 m), weight 131 kg (288 lb 9 3 oz), SpO2 93 %  ,Body mass index is 46 58 kg/m²    Wt Readings from Last 3 Encounters:   07/02/19 131 kg (288 lb 9 3 oz)   03/04/19 127 kg (280 lb 10 3 oz)   03/01/19 128 kg (282 lb 3 oz)       Intake/Output Summary (Last 24 hours) at 7/2/2019 1446  Last data filed at 7/2/2019 1153  Gross per 24 hour   Intake 2480 ml   Output 1430 ml   Net 1050 ml       Physical Exam: General appearance: alert and oriented, in no acute distress  Lungs: clear to auscultation bilaterally  Heart: regular rate and rhythm, S1, S2 normal, no murmur, click, rub or gallop  Abdomen: soft, non-tender; bowel sounds normal; no masses,  no organomegaly     Recent Results (from the past 24 hour(s))   Fingerstick Glucose (POCT)    Collection Time: 07/01/19  3:42 PM   Result Value Ref Range    POC Glucose 223 (H) 65 - 140 mg/dl   Fingerstick Glucose (POCT)    Collection Time: 07/01/19  9:19 PM   Result Value Ref Range    POC Glucose 199 (H) 65 - 140 mg/dl   CBC and differential    Collection Time: 07/02/19  5:16 AM   Result Value Ref Range    WBC 7 14 4 31 - 10 16 Thousand/uL    RBC 3 70 (L) 3 88 - 5 62 Million/uL    Hemoglobin 10 4 (L) 12 0 - 17 0 g/dL    Hematocrit 34 1 (L) 36 5 - 49 3 %    MCV 92 82 - 98 fL    MCH 28 1 26 8 - 34 3 pg    MCHC 30 5 (L) 31 4 - 37 4 g/dL    RDW 13 9 11 6 - 15 1 %    MPV 10 4 8 9 - 12 7 fL    Platelets 863 764 - 543 Thousands/uL    nRBC 0 /100 WBCs    Neutrophils Relative 61 43 - 75 %    Immat GRANS % 1 0 - 2 %    Lymphocytes Relative 22 14 - 44 %    Monocytes Relative 8 4 - 12 %    Eosinophils Relative 8 (H) 0 - 6 %    Basophils Relative 0 0 - 1 %    Neutrophils Absolute 4 43 1 85 - 7 62 Thousands/µL    Immature Grans Absolute 0 04 0 00 - 0 20 Thousand/uL    Lymphocytes Absolute 1 55 0 60 - 4 47 Thousands/µL    Monocytes Absolute 0 54 0 17 - 1 22 Thousand/µL    Eosinophils Absolute 0 55 0 00 - 0 61 Thousand/µL    Basophils Absolute 0 03 0 00 - 0 10 Thousands/µL   Comprehensive metabolic panel    Collection Time: 07/02/19  5:16 AM   Result Value Ref Range    Sodium 140 136 - 145 mmol/L    Potassium 4 5 3 5 - 5 3 mmol/L    Chloride 108 100 - 108 mmol/L    CO2 28 21 - 32 mmol/L    ANION GAP 4 4 - 13 mmol/L    BUN 24 5 - 25 mg/dL    Creatinine 1 46 (H) 0 60 - 1 30 mg/dL    Glucose 170 (H) 65 - 140 mg/dL    Calcium 8 9 8 3 - 10 1 mg/dL    AST 21 5 - 45 U/L    ALT 15 12 - 78 U/L    Alkaline Phosphatase 144 (H) 46 - 116 U/L    Total Protein 6 5 6 4 - 8 2 g/dL    Albumin 2 6 (L) 3 5 - 5 0 g/dL    Total Bilirubin 0 30 0 20 - 1 00 mg/dL    eGFR 46 ml/min/1 73sq m   Magnesium    Collection Time: 07/02/19  5:16 AM   Result Value Ref Range    Magnesium 2 0 1 6 - 2 6 mg/dL   Phosphorus    Collection Time: 07/02/19  5:16 AM   Result Value Ref Range    Phosphorus 2 6 2 3 - 4 1 mg/dL   Fingerstick Glucose (POCT)    Collection Time: 07/02/19  7:20 AM   Result Value Ref Range    POC Glucose 195 (H) 65 - 140 mg/dl   Fingerstick Glucose (POCT)    Collection Time: 07/02/19 11:46 AM   Result Value Ref Range    POC Glucose 226 (H) 65 - 140 mg/dl       Current Facility-Administered Medications   Medication Dose Route Frequency Provider Last Rate Last Dose    acetaminophen (TYLENOL) tablet 650 mg  650 mg Oral Q6H PRN Ricky Jeong PA-C   650 mg at 07/02/19 0358    aspirin chewable tablet 81 mg  81 mg Oral Daily Ricky Jeong PA-C   81 mg at 07/02/19 0817    atorvastatin (LIPITOR) tablet 40 mg  40 mg Oral After Dinner Ricky Jeong PA-C   40 mg at 07/01/19 1713    carvedilol (COREG) tablet 12 5 mg  12 5 mg Oral BID Ricky Jeong PA-C   12 5 mg at 07/02/19 0817    cholecalciferol (VITAMIN D3) tablet 2,000 Units  2,000 Units Oral Daily Ricky Jeong PA-C   2,000 Units at 07/02/19 0817    ciprofloxacin (CIPRO) tablet 500 mg  500 mg Oral Q24H Denton Haney MD   500 mg at 07/01/19 1713    finasteride (PROSCAR) tablet 5 mg  5 mg Oral Daily Lindsay Moon MD   5 mg at 07/02/19 0817    gabapentin (NEURONTIN) capsule 600 mg  600 mg Oral BID Ricky Jeong PA-C   600 mg at 07/02/19 0817    heparin (porcine) subcutaneous injection 5,000 Units  5,000 Units Subcutaneous Q8H [address] Ricky Jeong PA-C   5,000 Units at 07/02/19 1411    insulin glargine (LANTUS) subcutaneous injection 22 Units 0 22 mL  22 Units Subcutaneous M Mason Nageotte, MD   22 Units at 07/02/19 0817    insulin lispro (HumaLOG) 100 units/mL subcutaneous injection 2-12 Units  2-12 Units Subcutaneous HS Ricky Jeong PA-C   2 Units at 07/01/19 2120    insulin lispro (HumaLOG) 100 units/mL subcutaneous injection 3 Units  3 Units Subcutaneous TID With Meals Ricky Jeong PA-C   3 Units at 07/02/19 1147    nystatin (MYCOSTATIN) powder   Topical BID Ricky Jeong PA-C        ondansetron (ZOFRAN) injection 4 mg  4 mg Intravenous Q6H PRN Ricky Jeong PA-C   4 mg at 06/30/19 1649    oxyCODONE (ROXICODONE) immediate release tablet 20 mg  20 mg Oral Q6H PRN Ricky Jeong PA-C   20 mg at 07/02/19 0952    polyethylene glycol (MIRALAX) packet 17 g  17 g Oral Daily Jacky Hernandez MD   17 g at 07/02/19 0817    senna-docusate sodium (SENOKOT S) 8 6-50 mg per tablet 2 tablet  2 tablet Oral BID Jacky Hernandez MD   2 tablet at 07/02/19 0817    tamsulosin (FLOMAX) capsule 0 4 mg  0 4 mg Oral Daily With Megan Corrales MD           Invasive Devices     Peripheral Intravenous Line            Peripheral IV 06/30/19 Right Antecubital 2 days                Lab, Imaging and other studies: I have personally reviewed pertinent reports      VTE Pharmacologic Prophylaxis: Heparin  VTE Mechanical Prophylaxis: sequential compression device    Wang Gentile MD

## 2019-07-03 ENCOUNTER — HOSPITAL ENCOUNTER (INPATIENT)
Facility: HOSPITAL | Age: 74
LOS: 22 days | Discharge: HOME HEALTH | DRG: 074 | End: 2019-07-25
Attending: INTERNAL MEDICINE | Admitting: INTERNAL MEDICINE
Payer: COMMERCIAL

## 2019-07-03 VITALS
BODY MASS INDEX: 46.52 KG/M2 | WEIGHT: 289.46 LBS | SYSTOLIC BLOOD PRESSURE: 161 MMHG | OXYGEN SATURATION: 95 % | HEIGHT: 66 IN | RESPIRATION RATE: 18 BRPM | HEART RATE: 68 BPM | DIASTOLIC BLOOD PRESSURE: 76 MMHG | TEMPERATURE: 97 F

## 2019-07-03 DIAGNOSIS — N30.00 ACUTE CYSTITIS WITHOUT HEMATURIA: ICD-10-CM

## 2019-07-03 DIAGNOSIS — Z79.4 UNCONTROLLED TYPE 2 DIABETES MELLITUS WITH HYPERGLYCEMIA, WITH LONG-TERM CURRENT USE OF INSULIN (HCC): Primary | ICD-10-CM

## 2019-07-03 DIAGNOSIS — E11.649 TYPE 2 DIABETES MELLITUS WITH HYPOGLYCEMIA WITHOUT COMA, WITH LONG-TERM CURRENT USE OF INSULIN (HCC): ICD-10-CM

## 2019-07-03 DIAGNOSIS — Z79.4 TYPE 2 DIABETES MELLITUS WITH HYPERGLYCEMIA, WITH LONG-TERM CURRENT USE OF INSULIN (HCC): ICD-10-CM

## 2019-07-03 DIAGNOSIS — E11.65 UNCONTROLLED TYPE 2 DIABETES MELLITUS WITH HYPERGLYCEMIA, WITH LONG-TERM CURRENT USE OF INSULIN (HCC): Primary | ICD-10-CM

## 2019-07-03 DIAGNOSIS — Z79.4 TYPE 2 DIABETES MELLITUS WITH HYPOGLYCEMIA WITHOUT COMA, WITH LONG-TERM CURRENT USE OF INSULIN (HCC): ICD-10-CM

## 2019-07-03 DIAGNOSIS — E11.65 TYPE 2 DIABETES MELLITUS WITH HYPERGLYCEMIA, WITH LONG-TERM CURRENT USE OF INSULIN (HCC): ICD-10-CM

## 2019-07-03 DIAGNOSIS — N18.30 CHRONIC KIDNEY DISEASE, STAGE III (MODERATE) (HCC): ICD-10-CM

## 2019-07-03 PROBLEM — N17.9 AKI (ACUTE KIDNEY INJURY) (HCC): Status: RESOLVED | Noted: 2018-10-04 | Resolved: 2019-07-03

## 2019-07-03 LAB
ALBUMIN SERPL BCP-MCNC: 2.6 G/DL (ref 3.5–5)
ALP SERPL-CCNC: 146 U/L (ref 46–116)
ALT SERPL W P-5'-P-CCNC: 16 U/L (ref 12–78)
ANION GAP SERPL CALCULATED.3IONS-SCNC: 3 MMOL/L (ref 4–13)
AST SERPL W P-5'-P-CCNC: 13 U/L (ref 5–45)
BASOPHILS # BLD AUTO: 0.03 THOUSANDS/ΜL (ref 0–0.1)
BASOPHILS NFR BLD AUTO: 0 % (ref 0–1)
BILIRUB SERPL-MCNC: 0.3 MG/DL (ref 0.2–1)
BUN SERPL-MCNC: 21 MG/DL (ref 5–25)
CALCIUM SERPL-MCNC: 9 MG/DL (ref 8.3–10.1)
CHLORIDE SERPL-SCNC: 106 MMOL/L (ref 100–108)
CO2 SERPL-SCNC: 28 MMOL/L (ref 21–32)
CREAT SERPL-MCNC: 1.35 MG/DL (ref 0.6–1.3)
EOSINOPHIL # BLD AUTO: 0.58 THOUSAND/ΜL (ref 0–0.61)
EOSINOPHIL NFR BLD AUTO: 7 % (ref 0–6)
ERYTHROCYTE [DISTWIDTH] IN BLOOD BY AUTOMATED COUNT: 13.8 % (ref 11.6–15.1)
GFR SERPL CREATININE-BSD FRML MDRD: 51 ML/MIN/1.73SQ M
GLUCOSE SERPL-MCNC: 166 MG/DL (ref 65–140)
GLUCOSE SERPL-MCNC: 178 MG/DL (ref 65–140)
GLUCOSE SERPL-MCNC: 205 MG/DL (ref 65–140)
GLUCOSE SERPL-MCNC: 255 MG/DL (ref 65–140)
HCT VFR BLD AUTO: 34.9 % (ref 36.5–49.3)
HGB BLD-MCNC: 11 G/DL (ref 12–17)
IMM GRANULOCYTES # BLD AUTO: 0.02 THOUSAND/UL (ref 0–0.2)
IMM GRANULOCYTES NFR BLD AUTO: 0 % (ref 0–2)
LYMPHOCYTES # BLD AUTO: 1.76 THOUSANDS/ΜL (ref 0.6–4.47)
LYMPHOCYTES NFR BLD AUTO: 22 % (ref 14–44)
MAGNESIUM SERPL-MCNC: 1.6 MG/DL (ref 1.6–2.6)
MCH RBC QN AUTO: 28.7 PG (ref 26.8–34.3)
MCHC RBC AUTO-ENTMCNC: 31.5 G/DL (ref 31.4–37.4)
MCV RBC AUTO: 91 FL (ref 82–98)
MONOCYTES # BLD AUTO: 0.64 THOUSAND/ΜL (ref 0.17–1.22)
MONOCYTES NFR BLD AUTO: 8 % (ref 4–12)
NEUTROPHILS # BLD AUTO: 5.09 THOUSANDS/ΜL (ref 1.85–7.62)
NEUTS SEG NFR BLD AUTO: 63 % (ref 43–75)
NRBC BLD AUTO-RTO: 0 /100 WBCS
PHOSPHATE SERPL-MCNC: 2.8 MG/DL (ref 2.3–4.1)
PLATELET # BLD AUTO: 230 THOUSANDS/UL (ref 149–390)
PMV BLD AUTO: 10.4 FL (ref 8.9–12.7)
POTASSIUM SERPL-SCNC: 4.6 MMOL/L (ref 3.5–5.3)
PROT SERPL-MCNC: 6.7 G/DL (ref 6.4–8.2)
RBC # BLD AUTO: 3.83 MILLION/UL (ref 3.88–5.62)
SODIUM SERPL-SCNC: 137 MMOL/L (ref 136–145)
WBC # BLD AUTO: 8.12 THOUSAND/UL (ref 4.31–10.16)

## 2019-07-03 PROCEDURE — 97163 PT EVAL HIGH COMPLEX 45 MIN: CPT

## 2019-07-03 PROCEDURE — 97110 THERAPEUTIC EXERCISES: CPT

## 2019-07-03 PROCEDURE — 80053 COMPREHEN METABOLIC PANEL: CPT | Performed by: FAMILY MEDICINE

## 2019-07-03 PROCEDURE — 84100 ASSAY OF PHOSPHORUS: CPT | Performed by: FAMILY MEDICINE

## 2019-07-03 PROCEDURE — 82948 REAGENT STRIP/BLOOD GLUCOSE: CPT

## 2019-07-03 PROCEDURE — 85025 COMPLETE CBC W/AUTO DIFF WBC: CPT | Performed by: FAMILY MEDICINE

## 2019-07-03 PROCEDURE — 97530 THERAPEUTIC ACTIVITIES: CPT

## 2019-07-03 PROCEDURE — 83735 ASSAY OF MAGNESIUM: CPT | Performed by: FAMILY MEDICINE

## 2019-07-03 PROCEDURE — 99239 HOSP IP/OBS DSCHRG MGMT >30: CPT | Performed by: FAMILY MEDICINE

## 2019-07-03 RX ORDER — AMPICILLIN 500 MG/1
500 CAPSULE ORAL 4 TIMES DAILY
Qty: 20 CAPSULE | Refills: 0 | Status: SHIPPED | OUTPATIENT
Start: 2019-07-03 | End: 2019-07-08

## 2019-07-03 RX ORDER — LISINOPRIL 5 MG/1
5 TABLET ORAL DAILY
Status: DISCONTINUED | OUTPATIENT
Start: 2019-07-03 | End: 2019-07-03 | Stop reason: HOSPADM

## 2019-07-03 RX ORDER — AMPICILLIN 500 MG/1
500 CAPSULE ORAL ONCE
Qty: 1 CAPSULE | Refills: 0 | Status: SHIPPED | OUTPATIENT
Start: 2019-07-03 | End: 2019-07-03

## 2019-07-03 RX ORDER — AMPICILLIN 500 MG/1
500 CAPSULE ORAL 4 TIMES DAILY
Status: DISCONTINUED | OUTPATIENT
Start: 2019-07-03 | End: 2019-07-04

## 2019-07-03 RX ORDER — BISACODYL 10 MG
10 SUPPOSITORY, RECTAL RECTAL ONCE
Status: DISCONTINUED | OUTPATIENT
Start: 2019-07-03 | End: 2019-07-03 | Stop reason: HOSPADM

## 2019-07-03 RX ORDER — FINASTERIDE 5 MG/1
5 TABLET, FILM COATED ORAL DAILY
Qty: 30 TABLET | Refills: 0 | Status: ON HOLD | OUTPATIENT
Start: 2019-07-03 | End: 2021-03-12

## 2019-07-03 RX ADMIN — GABAPENTIN 600 MG: 300 CAPSULE ORAL at 08:03

## 2019-07-03 RX ADMIN — ASPIRIN 81 MG 81 MG: 81 TABLET ORAL at 08:04

## 2019-07-03 RX ADMIN — LISINOPRIL 5 MG: 5 TABLET ORAL at 08:03

## 2019-07-03 RX ADMIN — SENNOSIDES AND DOCUSATE SODIUM 2 TABLET: 8.6; 5 TABLET ORAL at 08:04

## 2019-07-03 RX ADMIN — VITAMIN D, TAB 1000IU (100/BT) 2000 UNITS: 25 TAB at 08:03

## 2019-07-03 RX ADMIN — OXYCODONE HYDROCHLORIDE 20 MG: 10 TABLET ORAL at 00:52

## 2019-07-03 RX ADMIN — FINASTERIDE 5 MG: 5 TABLET, FILM COATED ORAL at 08:04

## 2019-07-03 RX ADMIN — HEPARIN SODIUM 5000 UNITS: 5000 INJECTION INTRAVENOUS; SUBCUTANEOUS at 05:00

## 2019-07-03 RX ADMIN — OXYCODONE HYDROCHLORIDE 20 MG: 10 TABLET ORAL at 08:17

## 2019-07-03 RX ADMIN — POLYETHYLENE GLYCOL 3350 17 G: 17 POWDER, FOR SOLUTION ORAL at 08:03

## 2019-07-03 RX ADMIN — NYSTATIN: 100000 POWDER TOPICAL at 08:14

## 2019-07-03 RX ADMIN — CARVEDILOL 12.5 MG: 12.5 TABLET, FILM COATED ORAL at 08:04

## 2019-07-03 RX ADMIN — INSULIN LISPRO 3 UNITS: 100 INJECTION, SOLUTION INTRAVENOUS; SUBCUTANEOUS at 07:55

## 2019-07-03 RX ADMIN — INSULIN LISPRO 3 UNITS: 100 INJECTION, SOLUTION INTRAVENOUS; SUBCUTANEOUS at 12:02

## 2019-07-03 RX ADMIN — INSULIN GLARGINE 22 UNITS: 100 INJECTION, SOLUTION SUBCUTANEOUS at 08:03

## 2019-07-03 NOTE — SOCIAL WORK
Message left for pt's wife Jayden Currie to notify her that her  will be going up to the 5th floor today

## 2019-07-03 NOTE — SOCIAL WORK
Received auth from Clark Brito at [facility] [employer] for pt to go to the 5th floor today  Auth # is [id number]  Update is due on 7/8 to Delta Quinones phone is [phone] and fax is [phone]

## 2019-07-03 NOTE — DISCHARGE SUMMARY
[facility] Discharge Summary - Medical Lili Quiroz 66 y o  male MRN: [id number]    [address] [place] [address] [facility] Room / Bed: Basil Umanzor 87 [id number] Encounter: [id number]    BRIEF OVERVIEW  Admitting Provider: Ely Ram MD  Discharge Provider: Hubert Mckeon MD    Discharge To:  [facility] on the 5th floor  [facility]    Outpatient Follow-Up:   PCP within 1 week  Urology within 1 week    Things to address at first follow up visit:   -DM  -Nutrition  -Rehab  -TSH levels (repeat as they were elevated during stay)  -wound care of right buttock  -constipation  -urinary retention    Labs and results pending at discharge: none    Admission Date: 6/30/2019     Discharge Date: No discharge date for patient encounter  Primary Discharge Diagnosis  Principal Problem:    KIRT (acute kidney injury) ([facility] [address] )  Active Problems:    Chronic diastolic [facility] (congestive [facility] failure) ([facility] )    Essential hypertension    Type 2 diabetes mellitus with hyperglycemia, with long-term current use of insulin (HCC)    Elevated TSH    Pressure injury of skin of right buttock    Ambulatory dysfunction    UTI (urinary tract infection)  Resolved Problems:    * No resolved hospital problems  *      Secondary Discharge Diagnoses  UTI    Consulting Providers   Nephrology: Dr Miriam Bautista    Procedures Performed/Pertinent Test results  US kidney and bladder   Final Result      No hydronephrosis  Workstation performed: [id number]         XR chest 1 view portable   ED Interpretation   Cardiomegaly  Increased vascular congestion  No infiltrates  Final Result      Mild pulmonary vascular congestion and small left pleural effusion  Workstation performed: [id number]         Results for Lee Ann Sutton (MRN [id number]) as of 7/3/2019 06:56   Ref   Range 7/3/2019 06:36   WBC Latest Ref Range: 4 31 - 10 16 Thousand/uL 8 12   Red Blood Cell Count Latest Ref Range: 3 88 - 5 62 Million/uL 3 83 (L)   Hemoglobin Latest Ref Range: 12 0 - 17 0 g/dL 11 0 (L)   HCT Latest Ref Range: 36 5 - 49 3 % 34 9 (L)   MCV Latest Ref Range: 82 - 98 fL 91   MCH Latest Ref Range: 26 8 - 34 3 pg 28 7   MCHC Latest Ref Range: 31 4 - 37 4 g/dL 31 5   RDW Latest Ref Range: 11 6 - 15 1 % 13 8   Platelet Count Latest Ref Range: 149 - 390 Thousands/uL 230   MPV Latest Ref Range: 8 9 - 12 7 fL 10 4   nRBC Latest Units: /100 WBCs 0   Neutrophils % Latest Ref Range: 43 - 75 % 63   Immat GRANS % Latest Ref Range: 0 - 2 % 0   Lymphocytes Relative Latest Ref Range: 14 - 44 % 22   Monocytes Relative Latest Ref Range: 4 - 12 % 8   Eosinophils Latest Ref Range: 0 - 6 % 7 (H)   Basophils Relative Latest Ref Range: 0 - 1 % 0   Immature Grans Absolute Latest Ref Range: 0 00 - 0 20 Thousand/uL 0 02   Absolute Neutrophils Latest Ref Range: 1 85 - 7 62 Thousands/µL 5 09   Lymphocytes Absolute Latest Ref Range: 0 60 - 4 47 Thousands/µL 1 76   Absolute Monocytes Latest Ref Range: 0 17 - 1 22 Thousand/µL 0 64   Absolute Eosinophils Latest Ref Range: 0 00 - 0 61 Thousand/µL 0 58   Basophils Absolute Latest Ref Range: 0 00 - 0 10 Thousands/µL 0 03     Results for Casimiro Hannah (MRN [id number]) as of 7/3/2019 06:56   Ref   Range 7/3/2019 06:36   Sodium Latest Ref Range: 136 - 145 mmol/L 137   Potassium Latest Ref Range: 3 5 - 5 3 mmol/L 4 6   Chloride Latest Ref Range: 100 - 108 mmol/L 106   CO2 Latest Ref Range: 21 - 32 mmol/L 28   Anion Gap Latest Ref Range: 4 - 13 mmol/L 3 (L)   BUN Latest Ref Range: 5 - 25 mg/dL 21   Creatinine Latest Ref Range: 0 60 - 1 30 mg/dL 1 35 (H)   Glucose, Random Latest Ref Range: 65 - 140 mg/dL 178 (H)   Calcium Latest Ref Range: 8 3 - 10 1 mg/dL 9 0   AST Latest Ref Range: 5 - 45 U/L 13   ALT Latest Ref Range: 12 - 78 U/L 16   Alkaline Phosphatase Latest Ref Range: 46 - 116 U/L 146 (H)   Total Protein Latest Ref Range: 6 4 - 8 2 g/dL 6 7   Albumin Latest Ref Range: 3 5 - 5 0 g/dL 2 6 (L)   TOTAL BILIRUBIN Latest Ref Range: 0 20 - 1 00 mg/dL 0 30   eGFR Latest Units: ml/min/1 73sq m 51   Phosphorus Latest Ref Range: 2 3 - 4 1 mg/dL 2 8   Magnesium Latest Ref Range: 1 6 - 2 6 mg/dL 1 6       HPI  As per admitting physician:  Alda Hernandez is a 66 y o  male who presents to the emergency room with complaints of generalized weakness over the past several weeks getting progressively worse  He also reports that his lower extremities is also increasingly weak and worsened by chronic neuropathic pain  Both him and his wife appears to be poor historians so it is unclear whether his reported  neuropathic pain or the  weakness that he described that he is having in his legs that is rendering him unable to walk  He denies any falls  He does ambulate with a walker has history of ambulatory dysfunction  He did complete rehabilitation in March of this year  His family states that apparently symptoms has been progressively worse since father's Day  He denies shortness of breath, chest pain, nausea, vomiting, diarrhea, dizziness, headaches, visual disturbances  Does report that he has not had bowel movement for the past several days  He also reports that he feels very dry but admits to eating well  He received normal saline bolus 250 cc in emergency room  At bedside he complains about pain to his lower extremity when touched or when he moves it  Hospital Course  * KIRT (acute kidney injury) (HCC)resolved as of 7/3/2019  Assessment & Plan  -Was POA  -He has a hx of CKD with a baseline between 1 3 and 1 4  -Creatinine on admission was 2 81 and trended downwards today to 1 46  -Nephrotoxic agents were avoided during his stay  -He received IV NS bolus in the ER and continued with gentle IV hydration till 7/2 where it was dicontinue  -We continued to monitor his Cr through out his stay  -Nephrology was consulted and followed him throughout his stay  -Renal us showed: No hydronephrosis on 06/30  -Urine culture showed:>100,000 cfu/ml Enterococcus faecalis and sensitivities resulted and started her on Ampicillin 500mg po on day of discharge  -Pt had a voiding trial on 07/02 and had a residual of 400 cc and was then started on Flomax and Finasteride after which he was able to void 880 cc total  -As his KIRT has resolved, lisinopril was started on day of discharge      UTI (urinary tract infection)  Assessment & Plan  -PT had leukocytosis on admission with a WBC of 13 59 that trended down to 7  14  He denied fever and chills    -UTI was suspected and a UA with reflex to culture was done  -UA showed: cloudy, small blood, large leukocytes, nitrite negative RBC 0-1 WBC innumerable Bacteria moderate and was started on Cipro 500 mg po qd on 7/1  -Urine culture resulted today showing >100,000 cfu/ml Enterococcus faecalis with a sensitivity to Ampicillin  -He is discharged on Ampicillin 500 mg po q6h and today will be day 3 of abx        Ambulatory dysfunction  Assessment & Plan  -He reports that he is unable to ambulate and currently uses a walker  -He completed Short therm rehab in March 2019 at 5th floor [facility]  -It is unclear if his worsening ambulatory dysfunction is due to worsening pain from his neuropathy and denied any recent falls or trauma  -Fall precautions were placed during his stay  -He was evaluated and treated by PT and OT daily throughout his stay  -He is discharged to the 5th floor [facility] tomorrow for short term rehab      Pressure injury of skin of right buttock  Assessment & Plan  -Was POA however was unclear of how long he has had this ulcer  -It did not appear infected and denied being evaluated by Wound care  -wound care was consulted and was addressed daily    Elevated TSH  Assessment & Plan  -TSH level was found to be 4 583 with a Free T4: 1 11  -This was most likely reactive due to acute illness  -He should follow up with his PCP    Type 2 diabetes mellitus with hyperglycemia, with long-term current use of insulin [facility])  Assessment & Plan  Lab Results   Component Value Date    HGBA1C 7 9 (H) 06/30/2019     -We continued his home insulin regimen and ISS with  fingerstick glucose 4 times daily AC/HS    (P) 162 2    Essential hypertension  Assessment & Plan  -Blood pressures were stable throughout his stay  -His home medications were continued  -Throughout his stay his BP was monitored closely  -Restarted his Lisinopril on day of discharge    Chronic diastolic CHF (congestive heart failure) ([facility] [address] )  Assessment & Plan  Wt Readings from Last 3 Encounters:   07/01/19 129 kg (283 lb 4 7 oz)   03/04/19 127 kg (280 lb 10 3 oz)   03/01/19 128 kg (282 lb 3 oz)     -On admission there was no evidence of fluid overload with no associated chest pain, SOB or LE edema  -Last ECHO on 3/01/2019 reviewed EF a 65% with grade 1 diastolic dysfunction  -We continued his home medications  -He should continue with his outpatient cardiology follow up        Physical Exam at Discharge  General appearance: alert and oriented, in no acute distress  Lungs: clear to auscultation bilaterally  Heart: regular rate and rhythm, S1, S2 normal, no murmur, click, rub or gallop  Abdomen: soft, non-tender; bowel sounds normal; no masses,  no organomegaly  Extremities: extremities normal, warm and well-perfused; no cyanosis, clubbing, or edema    Medications   TAKE these medications      Morning Afternoon Evening Bedtime As Needed    acetaminophen 325 mg tablet   Commonly known as:  TYLENOL   Take 2 tablets (650 mg total) by mouth every 6 (six) hours as needed for mild pain, headaches or fever   Refills:  0   Doctor's comments:  Do not exceed a total of 3 grams of tylenol/acetaminophen in a 24-hour period            ampicillin 500 mg capsule   Commonly known as:  PRINCIPEN   Take 1 capsule (500 mg total) by mouth 4 (four) times a day for 5 days   Refills:  0          aspirin 81 MG tablet   Take 81 mg by mouth daily Refills:  0          atorvastatin 40 mg tablet   Commonly known as:  LIPITOR   Take 1 tablet (40 mg total) by mouth daily after dinner   Refills:  0          carvedilol 12 5 mg tablet   Commonly known as:  COREG   Take 1 tablet (12 5 mg total) by mouth 2 (two) times a day   Refills:  0   Doctor's comments:  Hold for HR<60 or SBP<110          Cholecalciferol 2000 units Tabs   Take 1 tablet (2,000 Units total) by mouth daily   Refills:  0          finasteride 5 mg tablet   Commonly known as:  PROSCAR   Take 1 tablet (5 mg total) by mouth daily for 30 days   Refills:  0          gabapentin 600 MG tablet   Commonly known as:  NEURONTIN   Take 1 tablet (600 mg total) by mouth 2 (two) times a day   Refills:  0          glucagon 1 MG injection   Inject 1 mg under the skin once as needed (PRN blood glucose less than 70 if unconscious or uncorrectable by oral means) for up to 1 dose   Refills:  0          heparin (porcine) 5,000 units/mL   Inject 1 mL (5,000 Units total) under the skin every 8 (eight) hours   Refills:  0   Doctor's comments:   For DVT Prophylaxis          insulin glargine 100 units/mL subcutaneous injection   Commonly known as:  LANTUS   Inject 15 Units under the skin every morning   Refills:  0   What changed:  how much to take          insulin lispro 100 units/mL injection   Commonly known as:  HumaLOG   Inject 3 Units under the skin 3 (three) times a day with meals   Refills:  0          lisinopril 5 mg tablet   Commonly known as:  ZESTRIL   Take 1 tablet (5 mg total) by mouth daily   Refills:  0          nystatin powder   Commonly known as:  MYCOSTATIN   Apply topically 2 (two) times a day   Refills:  0          oxyCODONE 10 MG Tabs   Commonly known as:  ROXICODONE   Take 1 tablet (10 mg total) by mouth every 4 (four) hours as needed for moderate pain or severe painMax Daily Amount: 60 mg   Refills:  0   What changed:     0 how much to take   0 when to take this          tamsulosin 0 4 mg   Commonly known as:  FLOMAX   Take 0 4 mg by mouth every evening   Refills:  0               Allergies  No Known Allergies    Diet restrictions: diabetic  Activity restrictions: as tolerated  Code Status: Level 3 - DNAR and DNI  Advance Directive and Living Will: Received  Power of :    POLST:      Discharge Condition: stable      Discharge  Statement   I spent 30 minutes discharging the patient  This time was spent on the day of discharge  I had direct contact with the patient on the day of discharge  Additional documentation is required if more than 30 minutes were spent on discharge

## 2019-07-03 NOTE — NURSING NOTE
Patient discharged to skilled rehab on the 5th floor  Escorted upstairs by staff  Report given to receiving nurse

## 2019-07-03 NOTE — PHYSICAL THERAPY NOTE
PT treatment Note      07/03/19 0748   Restrictions/Precautions   Other Precautions Chair Alarm; Bed Alarm; Fall Risk   Subjective   Subjective Agreeable to therapy   Exercises   Quad Sets Supine;20 reps   Heelslides Supine;20 reps;AAROM   Hip Flexion Sitting;20 reps   Hip Abduction Sitting;20 reps   Hip Adduction Sitting;20 reps   Knee AROM Long Arc Quad Sitting;20 reps   Ankle Pumps Sitting;20 reps   Assessment   Prognosis Good   Problem List Decreased strength;Decreased endurance; Impaired balance;Decreased mobility   Assessment Pt  completed therapeutic exercises asabove  rest breaks as needed due to weakness and fatigue  Pt is in need of continued activity in PT to improve strength balance endurance mobility transfers and ambulation with return to maximize LOF  From PT/mobility standpoint, recommendation at time of d/c would be STR in order to promote return to PLOF and independence  Goals   STG Expiration Date 07/07/19   Treatment Day 3   Plan   Treatment/Interventions Functional transfer training;LE strengthening/ROM; Therapeutic exercise; Endurance training;Bed mobility;Gait training   Progress Slow progress, decreased activity tolerance   Recommendation   Recommendation Short-term skilled PT   Pt  OOB in chair with call bell within reach  and alarm on at end of PT session  Discussed with Melisa Starks, PT today's treatment and patient's current level of function for care coordination

## 2019-07-03 NOTE — PROGRESS NOTES
Progress Note - Nephrology   Dea Love 66 y o  male MRN: [id number]  Unit/Bed#: 409-01 Encounter: [id number]    A/P:  1  Acute kidney injury atop chronic kidney disease   Creatinine at baseline this morning  Continue supportive care  2  Chronic kidney disease stage 3 with baseline creatinine between 1 3 - 1 4 mg/dL  3  Diabetic nephropathy -mild  4  Hypertension the setting of chronic kidney disease   Will restart lisinopril 5 mg p o  Daily  5  Urinary retention   Patient follow-up postvoid residuals later this morning prior to discharge, as mentioned yesterday, patient refuses straight catheterization  Hopefully, with managing constipation appropriately this may also help postvoid residuals  Continue with tamsulosin, patient for Urology evaluation the outpatient setting  6  Morbid obesity  7  Right buttock pressure ulcer   8  Ambulatory dysfunction  9  Constipation   Will discuss with hospitalist team so patient is able to achieve a significant bowel movement prior to discharge given issue with urinary retention        Follow up reason for today's visit:  Acute kidney injury/chronic kidney disease    KIRT (acute kidney injury) [facility])    Patient Active Problem List   Diagnosis    Dyspnea on exertion    Type 2 diabetes mellitus with hypoglycemia without coma, with long-term current use of insulin ([facility])    Chronic kidney disease, stage III (moderate) ([facility])    Chronic diastolic CHF (congestive heart failure) ([facility])    Obstructive sleep apnea syndrome    Morbid obesity with BMI of 45 0-49 9, adult ([facility] )    Essential hypertension    Type 2 diabetes mellitus with hyperglycemia, with long-term current use of insulin ([facility])    Complicated UTI (urinary tract infection)    Uncontrolled type 2 diabetes mellitus with hyperglycemia, with long-term current use of insulin ([facility])    Dehydration with hyponatremia    Hypoglycemia due to insulin    Acute cystitis    Opiate dependence, continuous ([facility])    Paresthesia of left upper extremity    Neural foraminal stenosis of cervical spine    Acute pain of right lower extremity    Elevated TSH    Pressure injury of skin of right buttock    Ambulatory dysfunction    UTI (urinary tract infection)         Subjective:   Patient without acute events overnight  Objective:     Vitals: Blood pressure 161/76, pulse 68, temperature (!) 97 °F (36 1 °C), temperature source Temporal, resp  rate 18, height 5' 6" (1 676 m), weight 131 kg (289 lb 7 4 oz), SpO2 95 %  ,Body mass index is 46 72 kg/m²  Weight (last 2 days)     Date/Time   Weight    07/03/19 0600   131 (289 46)    07/02/19 0600   131 (288 58)    07/01/19 0600   129 (283 29)                Intake/Output Summary (Last 24 hours) at 7/3/2019 0746  Last data filed at 7/3/2019 0052  Gross per 24 hour   Intake 660 ml   Output 1905 ml   Net -1245 ml     I/O last 3 completed shifts:   In: 200 [P O :660; I V :1000]  Out: 1905 [Urine:1905]         Physical Exam: /76 (BP Location: Right arm)   Pulse 68   Temp (!) 97 °F (36 1 °C) (Temporal)   Resp 18   Ht 5' 6" (1 676 m)   Wt 131 kg (289 lb 7 4 oz)   SpO2 95%   BMI 46 72 kg/m²     General Appearance:    Alert, cooperative, no distress, appears stated age   Head:    Normocephalic, without obvious abnormality, atraumatic   Eyes:    Conjunctiva/corneas clear   Ears:    Normal external ears   Nose:   Nares normal, septum midline, mucosa normal, no drainage    or sinus tenderness   Throat:   Lips, mucosa, and tongue normal; teeth and gums normal   Neck:   Supple   Back:     Symmetric, no curvature, ROM normal, no CVA tenderness   Lungs:     Clear to auscultation bilaterally, respirations unlabored   Chest wall:    No tenderness or deformity   Heart:    Regular rate and rhythm, S1 and S2 normal, no murmur, rub   or gallop   Abdomen:     Soft, non-tender, bowel sounds active   Extremities:   Extremities normal, atraumatic, no cyanosis, trace bilateral lower extremity edema   Skin: Skin color, texture, turgor normal, no rashes or lesions   Lymph nodes:   Cervical normal   Neurologic:   CNII-XII intact            Lab, Imaging and other studies: I have personally reviewed pertinent labs  CBC:   Lab Results   Component Value Date    WBC 8 12 07/03/2019    HGB 11 0 (L) 07/03/2019    HCT 34 9 (L) 07/03/2019    MCV 91 07/03/2019     07/03/2019    MCH 28 7 07/03/2019    MCHC 31 5 07/03/2019    RDW 13 8 07/03/2019    MPV 10 4 07/03/2019    NRBC 0 07/03/2019     CMP:   Lab Results   Component Value Date    K 4 6 07/03/2019     07/03/2019    CO2 28 07/03/2019    BUN 21 07/03/2019    CREATININE 1 35 (H) 07/03/2019    CALCIUM 9 0 07/03/2019    AST 13 07/03/2019    ALT 16 07/03/2019    ALKPHOS 146 (H) 07/03/2019    EGFR 51 07/03/2019         Results from last 7 days   Lab Units 07/03/19  0636 07/02/19  0516 07/01/19  0826   POTASSIUM mmol/L 4 6 4 5 4 2   CHLORIDE mmol/L 106 108 108   CO2 mmol/L 28 28 28   BUN mg/dL 21 24 31*   CREATININE mg/dL 1 35* 1 46* 1 63*   CALCIUM mg/dL 9 0 8 9 9 0   ALK PHOS U/L 146* 144* 140*   ALT U/L 16 15 19   AST U/L 13 21 15         Phosphorus:   Lab Results   Component Value Date    PHOS 2 8 07/03/2019     Magnesium:   Lab Results   Component Value Date    MG 1 6 07/03/2019     Urinalysis:   No results found for: Emily Penning, SPECGRAV, PHUR, LEUKOCYTESUR, NITRITE, PROTEINUA, GLUCOSEU, KETONESU, BILIRUBINUR, BLOODU  Ionized Calcium: No results found for: CAION  Coagulation: No results found for: PT, INR, APTT  Troponin: No results found for: TROPONINI  ABG: No results found for: PHART, ZYH4YTK, PO2ART, PXS5UIH, M8JXIFHD, BEART, SOURCE  Radiology review:     IMAGING  Procedure: Xr Chest 1 View Portable    Result Date: 6/30/2019  Narrative: CHEST INDICATION:   Congestive heart failure   COMPARISON:  February 28, 2019 October 3, 2018 and January 4, 2017 EXAM PERFORMED/VIEWS:  XR CHEST PORTABLE FINDINGS: The heart mediastinum are stable given differences in technique  There is slight blunting of the left costophrenic angle which may suggest a small effusion  Right lung is clear  Mild pulmonary vascular congestion is noted  Osseous structures appear within normal limits for patient age  Impression: Mild pulmonary vascular congestion and small left pleural effusion  Workstation performed: [id number]     Procedure: Us Kidney And Bladder    Result Date: 6/30/2019  Narrative: RENAL ULTRASOUND INDICATION:   acute renal failure  COMPARISON: 12/1/2015, 8/30/2014 TECHNIQUE:   Ultrasound of the retroperitoneum was performed with a curvilinear transducer utilizing volumetric sweeps and still imaging techniques  FINDINGS: KIDNEYS: Symmetric and normal size  Right kidney:  9 2 x 4 9 cm  Left kidney:  10 4 x 5 9 cm  Right kidney Normal echogenicity  No suspicious masses detected  There is an echogenic lesion in the right kidney measuring 0 7 x 1 3 x 1 0 cm  This was present in 2015 where it measured 0 7 x 0 8 x 1 0 cm and corresponds to an area of cortical scarring on the prior CT from 2014  There is a 1 cm simple cyst  No hydronephrosis  No shadowing calculi  No perinephric fluid collections  Left kidney There is cortical scarring  No suspicious masses detected  No hydronephrosis  No shadowing calculi  No perinephric fluid collections  URETERS: Nonvisualized  BLADDER: Normally distended  No focal thickening or mass lesions  Bilateral ureteral jets detected  There is a posterior bladder diverticulum  Impression: No hydronephrosis   Workstation performed: [id number]       Current Facility-Administered Medications   Medication Dose Route Frequency    acetaminophen (TYLENOL) tablet 650 mg  650 mg Oral Q6H PRN    aspirin chewable tablet 81 mg  81 mg Oral Daily    atorvastatin (LIPITOR) tablet 40 mg  40 mg Oral After Dinner    carvedilol (COREG) tablet 12 5 mg  12 5 mg Oral BID    cholecalciferol (VITAMIN D3) tablet 2,000 Units  2,000 Units Oral Daily    finasteride (PROSCAR) tablet 5 mg  5 mg Oral Daily    gabapentin (NEURONTIN) capsule 600 mg  600 mg Oral BID    heparin (porcine) subcutaneous injection 5,000 Units  5,000 Units Subcutaneous Q8H [address]    insulin glargine (LANTUS) subcutaneous injection 22 Units 0 22 mL  22 Units Subcutaneous QAM    insulin lispro (HumaLOG) 100 units/mL subcutaneous injection 2-12 Units  2-12 Units Subcutaneous HS    insulin lispro (HumaLOG) 100 units/mL subcutaneous injection 3 Units  3 Units Subcutaneous TID With Meals    nystatin (MYCOSTATIN) powder   Topical BID    ondansetron (ZOFRAN) injection 4 mg  4 mg Intravenous Q6H PRN    oxyCODONE (ROXICODONE) immediate release tablet 20 mg  20 mg Oral Q6H PRN    polyethylene glycol (MIRALAX) packet 17 g  17 g Oral Daily    senna-docusate sodium (SENOKOT S) 8 6-50 mg per tablet 2 tablet  2 tablet Oral BID    tamsulosin (FLOMAX) capsule 0 4 mg  0 4 mg Oral Daily With Dinner     Medications Discontinued During This Encounter   Medication Reason    insulin glargine (LANTUS) subcutaneous injection 20 Units 0 2 mL     sodium chloride 0 9 % infusion        Mayurirtony Schmitt DO

## 2019-07-04 LAB
GLUCOSE SERPL-MCNC: 209 MG/DL (ref 65–140)
GLUCOSE SERPL-MCNC: 221 MG/DL (ref 65–140)
GLUCOSE SERPL-MCNC: 269 MG/DL (ref 65–140)

## 2019-07-04 PROCEDURE — 82948 REAGENT STRIP/BLOOD GLUCOSE: CPT

## 2019-07-04 PROCEDURE — 97110 THERAPEUTIC EXERCISES: CPT

## 2019-07-04 PROCEDURE — 97530 THERAPEUTIC ACTIVITIES: CPT

## 2019-07-05 ENCOUNTER — TELEPHONE (OUTPATIENT)
Dept: UROLOGY | Facility: MEDICAL CENTER | Age: 74
End: 2019-07-05

## 2019-07-05 LAB
GLUCOSE SERPL-MCNC: 178 MG/DL (ref 65–140)
GLUCOSE SERPL-MCNC: 220 MG/DL (ref 65–140)
GLUCOSE SERPL-MCNC: 225 MG/DL (ref 65–140)
GLUCOSE SERPL-MCNC: 254 MG/DL (ref 65–140)

## 2019-07-05 PROCEDURE — 97535 SELF CARE MNGMENT TRAINING: CPT

## 2019-07-05 PROCEDURE — 97110 THERAPEUTIC EXERCISES: CPT

## 2019-07-05 PROCEDURE — 97166 OT EVAL MOD COMPLEX 45 MIN: CPT

## 2019-07-05 PROCEDURE — 82948 REAGENT STRIP/BLOOD GLUCOSE: CPT

## 2019-07-05 NOTE — TELEPHONE ENCOUNTER
Spoke with Glenys Francis to schedule hospital follow up for UTI and retention    Patient scheduled July 26 at [phone] with Howard Gongora at the [facility] office,

## 2019-07-05 NOTE — CONSULTS
Progress Note - Wound   Rosario Nielsen 66 y o  male MRN: [id number]  Unit/Bed#: -01 Encounter: [id number]      Assessment:   Wound care consult for patient who was admitted to the SNF from Med Surg with POA stage 3 pressure injusry to the right buttock  Patient seen by [facility] on 7/01/19  Patient wife is at the bedside for the assessment  Patient history includes CHF, morbid obesity, DDM2 and CKD  Patient was in the wheelchair and transferred to the bed with minimal assist   Patient has scarring to the periwound and induration to the area which wife states a previous pressure injury  Spoke with patient and wife of importance of repositioning in bed as well as in the wheelchair to offload buttocks and enhance healing of pressure injury  Pressure injury has decreased in size since assessment on 7/01/2019     1  Right buttock with scarring and induration to the proximal area of the pressure injury  Wound bed is beefy red with pale area surrounding wound bed  2  Bilateral legs are painful to touch, addressed with primary RN  3  Bilateral heels intact, skin to bilateral feet intact and dry  4  Bilateral lower extremities edematous and skin scaly and dry    Plan:   1  Right buttock dressing change every 3 days  Cleanse with NSS  Apply skin prep to periwound  Apply Maxorb to base of wound  Cover with hydrocolloid (Exuderm)  Seal edges with skin prep  2   Turn and reposition q 2 hours  3  Skin nourishing cream daily after bathing  4  Hydraguard to bilateral heels and buttocks  5  Elevate heels to offload while in bed  6  Lac-Hydrin lotion to bilateral lower extremities    Wound 06/30/19 Pressure Injury Buttocks Right (Active)   Wound Description Beefy red;Fragile;Clean;Pale 7/5/2019  1:00 PM   Staging Stage III 7/5/2019  1:00 PM   Mary-wound Assessment Induration;Clean;Dry; Other (Comment) 7/5/2019  1:00 PM   Wound Length (cm) 2 cm 7/5/2019  1:00 PM   Wound Width (cm) 1 2 cm 7/5/2019  1:00 PM   Wound Depth (cm) 0 1 7/5/2019  1:00 PM   Calculated Wound Area (cm^2) 2 4 cm^2 7/5/2019  1:00 PM   Calculated Wound Volume (cm^3) 0 24 cm^3 7/5/2019  1:00 PM   Change in Wound Size % 20 7/5/2019  1:00 PM   Drainage Amount Scant 7/5/2019  1:00 PM   Drainage Description Serosanguineous 7/5/2019  1:00 PM   Treatments Cleansed 7/5/2019  1:00 PM   Dressing Calcium Alginate;Hydrocolloid 7/5/2019  1:00 PM   Patient Tolerance Tolerated well 7/5/2019  1:00 PM   Dressing Status Clean;Dry; Intact 7/3/2019  8:58 AM     Reviewed plan of care with primary RN Kavitha Stone  Recommendations written as orders  Wound care to follow weekly  Questions or concerns contact [phone] BSN, RN

## 2019-07-05 NOTE — TELEPHONE ENCOUNTER
Blake Antonio from [facility] calling to set patient up with appointment  Blake Antonio states she needs appointment as soon as possible but does not know why patient needs appointment  Please advise

## 2019-07-05 NOTE — DISCHARGE INSTR - OTHER ORDERS
Plan:   1   Right buttock dressing change every 3 days   Cleanse with NSS   Apply skin prep to periwound   Apply Maxorb to base of wound   Cover with hydrocolloid (Exuderm)   Seal edges with skin prep  2   Turn and reposition q 2 hours  3   Accumax mattress with alternating air pump

## 2019-07-06 LAB
GLUCOSE SERPL-MCNC: 157 MG/DL (ref 65–140)
GLUCOSE SERPL-MCNC: 225 MG/DL (ref 65–140)
GLUCOSE SERPL-MCNC: 305 MG/DL (ref 65–140)

## 2019-07-06 PROCEDURE — 82948 REAGENT STRIP/BLOOD GLUCOSE: CPT

## 2019-07-07 LAB
GLUCOSE SERPL-MCNC: 206 MG/DL (ref 65–140)
GLUCOSE SERPL-MCNC: 240 MG/DL (ref 65–140)
GLUCOSE SERPL-MCNC: 292 MG/DL (ref 65–140)
GLUCOSE SERPL-MCNC: 324 MG/DL (ref 65–140)
GLUCOSE SERPL-MCNC: 404 MG/DL (ref 65–140)

## 2019-07-07 PROCEDURE — 97530 THERAPEUTIC ACTIVITIES: CPT

## 2019-07-07 PROCEDURE — 97110 THERAPEUTIC EXERCISES: CPT

## 2019-07-07 PROCEDURE — 82948 REAGENT STRIP/BLOOD GLUCOSE: CPT

## 2019-07-08 LAB
GLUCOSE SERPL-MCNC: 209 MG/DL (ref 65–140)
GLUCOSE SERPL-MCNC: 244 MG/DL (ref 65–140)
GLUCOSE SERPL-MCNC: 253 MG/DL (ref 65–140)
GLUCOSE SERPL-MCNC: 315 MG/DL (ref 65–140)

## 2019-07-08 PROCEDURE — 97535 SELF CARE MNGMENT TRAINING: CPT

## 2019-07-08 PROCEDURE — 97110 THERAPEUTIC EXERCISES: CPT

## 2019-07-08 PROCEDURE — 97542 WHEELCHAIR MNGMENT TRAINING: CPT

## 2019-07-08 PROCEDURE — 97116 GAIT TRAINING THERAPY: CPT

## 2019-07-08 PROCEDURE — 82948 REAGENT STRIP/BLOOD GLUCOSE: CPT

## 2019-07-08 PROCEDURE — 97530 THERAPEUTIC ACTIVITIES: CPT

## 2019-07-09 LAB
GLUCOSE SERPL-MCNC: 205 MG/DL (ref 65–140)
GLUCOSE SERPL-MCNC: 268 MG/DL (ref 65–140)
GLUCOSE SERPL-MCNC: 277 MG/DL (ref 65–140)
GLUCOSE SERPL-MCNC: 292 MG/DL (ref 65–140)

## 2019-07-09 PROCEDURE — 82948 REAGENT STRIP/BLOOD GLUCOSE: CPT

## 2019-07-09 PROCEDURE — 97535 SELF CARE MNGMENT TRAINING: CPT

## 2019-07-09 PROCEDURE — 97530 THERAPEUTIC ACTIVITIES: CPT

## 2019-07-09 PROCEDURE — 97110 THERAPEUTIC EXERCISES: CPT

## 2019-07-09 PROCEDURE — 97116 GAIT TRAINING THERAPY: CPT

## 2019-07-10 LAB
GLUCOSE SERPL-MCNC: 194 MG/DL (ref 65–140)
GLUCOSE SERPL-MCNC: 230 MG/DL (ref 65–140)
GLUCOSE SERPL-MCNC: 280 MG/DL (ref 65–140)

## 2019-07-10 PROCEDURE — 97110 THERAPEUTIC EXERCISES: CPT

## 2019-07-10 PROCEDURE — 82948 REAGENT STRIP/BLOOD GLUCOSE: CPT

## 2019-07-10 PROCEDURE — 97116 GAIT TRAINING THERAPY: CPT

## 2019-07-10 PROCEDURE — 97530 THERAPEUTIC ACTIVITIES: CPT

## 2019-07-10 PROCEDURE — 97535 SELF CARE MNGMENT TRAINING: CPT

## 2019-07-10 NOTE — WOUND OSTOMY CARE
Progress Note - Wound   Cat Yanez 66 y o  male MRN: [id number]  Unit/Bed#: -01 Encounter: [id number]      Assessment:   Right buttock stage 3 pressure injury decrease size  Epithelial migration present  Plan:   1  Continue Right buttock dressing change every 3 days  Cleanse with NSS  Apply skin prep to periwound  Apply Maxorb to base of wound  Cover with hydrocolloid (Exuderm)  Seal edges with skin prep  2   Turn and reposition q 2 hours  3   Accumax mattress with alternating air pump  Wound 06/30/19 Pressure Injury Buttocks Right (Active)   Wound Description Epithelialization;Granulation tissue;Slough 7/10/2019  1:00 PM   Staging Stage III 7/10/2019  1:00 PM   Mary-wound Assessment Induration 7/10/2019  1:00 PM   Wound Length (cm) 1 2 cm 7/10/2019  1:00 PM   Wound Width (cm) 0 7 cm 7/10/2019  1:00 PM   Wound Depth (cm) 0 1 7/10/2019  1:00 PM   Calculated Wound Area (cm^2) 0 84 cm^2 7/10/2019  1:00 PM   Calculated Wound Volume (cm^3) 0 08 cm^3 7/10/2019  1:00 PM   Change in Wound Size % 73 33 7/10/2019  1:00 PM   Drainage Amount Scant 7/10/2019  1:00 PM   Drainage Description Serosanguineous 7/10/2019  1:00 PM   Treatments Cleansed 7/10/2019  1:00 PM   Dressing Calcium Alginate;Hydrocolloid 7/10/2019  1:00 PM   Patient Tolerance Tolerated well 7/10/2019  1:00 PM   Dressing Status Clean;Dry; Intact 7/3/2019  8:58 AM     Marianela Robertson RN   CWOCN  7/10/2019 13:30

## 2019-07-11 LAB
GLUCOSE SERPL-MCNC: 224 MG/DL (ref 65–140)
GLUCOSE SERPL-MCNC: 243 MG/DL (ref 65–140)
GLUCOSE SERPL-MCNC: 260 MG/DL (ref 65–140)
GLUCOSE SERPL-MCNC: 281 MG/DL (ref 65–140)
GLUCOSE SERPL-MCNC: 332 MG/DL (ref 65–140)

## 2019-07-11 PROCEDURE — 97530 THERAPEUTIC ACTIVITIES: CPT

## 2019-07-11 PROCEDURE — 97116 GAIT TRAINING THERAPY: CPT

## 2019-07-11 PROCEDURE — 82948 REAGENT STRIP/BLOOD GLUCOSE: CPT

## 2019-07-11 PROCEDURE — 97110 THERAPEUTIC EXERCISES: CPT

## 2019-07-12 LAB
GLUCOSE SERPL-MCNC: 256 MG/DL (ref 65–140)
GLUCOSE SERPL-MCNC: 259 MG/DL (ref 65–140)
GLUCOSE SERPL-MCNC: 268 MG/DL (ref 65–140)
GLUCOSE SERPL-MCNC: 277 MG/DL (ref 65–140)

## 2019-07-12 PROCEDURE — 97110 THERAPEUTIC EXERCISES: CPT

## 2019-07-12 PROCEDURE — 97530 THERAPEUTIC ACTIVITIES: CPT

## 2019-07-12 PROCEDURE — 82948 REAGENT STRIP/BLOOD GLUCOSE: CPT

## 2019-07-13 LAB
GLUCOSE SERPL-MCNC: 242 MG/DL (ref 65–140)
GLUCOSE SERPL-MCNC: 291 MG/DL (ref 65–140)
GLUCOSE SERPL-MCNC: 311 MG/DL (ref 65–140)
GLUCOSE SERPL-MCNC: 318 MG/DL (ref 65–140)

## 2019-07-13 PROCEDURE — 97530 THERAPEUTIC ACTIVITIES: CPT

## 2019-07-13 PROCEDURE — 97110 THERAPEUTIC EXERCISES: CPT

## 2019-07-13 PROCEDURE — 82948 REAGENT STRIP/BLOOD GLUCOSE: CPT

## 2019-07-14 LAB
GLUCOSE SERPL-MCNC: 250 MG/DL (ref 65–140)
GLUCOSE SERPL-MCNC: 283 MG/DL (ref 65–140)
GLUCOSE SERPL-MCNC: 326 MG/DL (ref 65–140)

## 2019-07-14 PROCEDURE — 82948 REAGENT STRIP/BLOOD GLUCOSE: CPT

## 2019-07-15 LAB
GLUCOSE SERPL-MCNC: 287 MG/DL (ref 65–140)
GLUCOSE SERPL-MCNC: 301 MG/DL (ref 65–140)
GLUCOSE SERPL-MCNC: 330 MG/DL (ref 65–140)
GLUCOSE SERPL-MCNC: 335 MG/DL (ref 65–140)
GLUCOSE SERPL-MCNC: 347 MG/DL (ref 65–140)

## 2019-07-15 PROCEDURE — 97116 GAIT TRAINING THERAPY: CPT

## 2019-07-15 PROCEDURE — 97530 THERAPEUTIC ACTIVITIES: CPT

## 2019-07-15 PROCEDURE — 97110 THERAPEUTIC EXERCISES: CPT

## 2019-07-15 PROCEDURE — 82948 REAGENT STRIP/BLOOD GLUCOSE: CPT

## 2019-07-16 LAB
GLUCOSE SERPL-MCNC: 262 MG/DL (ref 65–140)
GLUCOSE SERPL-MCNC: 338 MG/DL (ref 65–140)
GLUCOSE SERPL-MCNC: 367 MG/DL (ref 65–140)

## 2019-07-16 PROCEDURE — 97530 THERAPEUTIC ACTIVITIES: CPT

## 2019-07-16 PROCEDURE — 97110 THERAPEUTIC EXERCISES: CPT

## 2019-07-16 PROCEDURE — 97116 GAIT TRAINING THERAPY: CPT

## 2019-07-16 PROCEDURE — 82948 REAGENT STRIP/BLOOD GLUCOSE: CPT

## 2019-07-17 LAB
GLUCOSE SERPL-MCNC: 289 MG/DL (ref 65–140)
GLUCOSE SERPL-MCNC: 374 MG/DL (ref 65–140)
GLUCOSE SERPL-MCNC: 383 MG/DL (ref 65–140)
GLUCOSE SERPL-MCNC: 399 MG/DL (ref 65–140)
GLUCOSE SERPL-MCNC: 417 MG/DL (ref 65–140)
GLUCOSE SERPL-MCNC: 458 MG/DL (ref 65–140)

## 2019-07-17 PROCEDURE — 97116 GAIT TRAINING THERAPY: CPT

## 2019-07-17 PROCEDURE — 97110 THERAPEUTIC EXERCISES: CPT

## 2019-07-17 PROCEDURE — 97535 SELF CARE MNGMENT TRAINING: CPT

## 2019-07-17 PROCEDURE — 82948 REAGENT STRIP/BLOOD GLUCOSE: CPT

## 2019-07-17 NOTE — WOUND OSTOMY CARE
Progress Note - Wound   Wilmer Michael 66 y o  male MRN: [id number]  Unit/Bed#: -01 Encounter: [id number]      Assessment:   Left buttock stage 3 pressure injury decreased size, decreased drainage  Left lower extremity partial thickness ulcer  Plan:   1  Discontinue Right buttock dressing change every 3 days   Cleanse with NSS   Apply skin prep to periwound   Apply Maxorb to base of wound   Cover with hydrocolloid (Exuderm)   Seal edges with skin prep  2  New Order Right buttock dressing change every 3 days   Cleanse with NSS   Apply skin prep to periwound   Cover with hydrocolloid (Exuderm)   Seal edges with skin prep  3  Left lower extremity dressing change daily  Cleanse with NSS  Cover with adaptic (vaseline gauze), followed by gauze 4x4  Secure with karla  4  Elevate legs to assist with edema control  Wound 06/30/19 Pressure Injury Buttocks Right (Active)   Wound Description Granulation tissue;Slough 7/17/2019 11:00 AM   Staging Stage III 7/17/2019 11:00 AM   Mary-wound Assessment Induration 7/17/2019 11:00 AM   Wound Length (cm) 0 5 cm 7/17/2019 11:00 AM   Wound Width (cm) 0 3 cm 7/17/2019 11:00 AM   Wound Depth (cm) 0 1 7/17/2019 11:00 AM   Calculated Wound Area (cm^2) 0 15 cm^2 7/17/2019 11:00 AM   Calculated Wound Volume (cm^3) 0 02 cm^3 7/17/2019 11:00 AM   Change in Wound Size % 93 33 7/17/2019 11:00 AM   Drainage Amount Scant 7/17/2019 11:00 AM   Drainage Description Serosanguineous 7/17/2019 11:00 AM   Treatments Cleansed 7/17/2019 11:00 AM   Dressing Hydrocolloid 7/17/2019 11:00 AM   Patient Tolerance Tolerated well 7/17/2019 11:00 AM   Dressing Status Clean;Dry; Intact 7/3/2019  8:58 AM       Wound 07/17/19 Venous stasis ulcer Leg Left; Anterior (Active)   Wound Description Pink;Yellow; Other (fibrin) 7/17/2019 11:00 AM   Mary-wound Assessment Edema 7/17/2019 11:00 AM   Wound Length (cm) 2 5 cm 7/17/2019 11:00 AM   Wound Width (cm) 5 5 cm 7/17/2019 11:00 AM   Wound Depth (cm) 0 1 7/17/2019 11:00 AM   Calculated Wound Area (cm^2) 13 75 cm^2 7/17/2019 11:00 AM   Calculated Wound Volume (cm^3) 1 38 cm^3 7/17/2019 11:00 AM   Drainage Amount Moderate 7/17/2019 11:00 AM   Drainage Description Serosanguineous 7/17/2019 11:00 AM   Non-staged Wound Description Partial thickness 7/17/2019 11:00 AM   Treatments Cleansed 7/17/2019 11:00 AM   Dressing Non adherent 7/17/2019 11:00 AM   Dressing Changed New 7/17/2019 11:00 AM   Patient Tolerance Tolerated well 7/17/2019 11:00 AM     Enrique Deluna RN   CWOCN  7/17/2019 12:44

## 2019-07-18 LAB
GLUCOSE SERPL-MCNC: 296 MG/DL (ref 65–140)
GLUCOSE SERPL-MCNC: 358 MG/DL (ref 65–140)
GLUCOSE SERPL-MCNC: 379 MG/DL (ref 65–140)

## 2019-07-18 PROCEDURE — 97110 THERAPEUTIC EXERCISES: CPT

## 2019-07-18 PROCEDURE — 82948 REAGENT STRIP/BLOOD GLUCOSE: CPT

## 2019-07-18 PROCEDURE — 97535 SELF CARE MNGMENT TRAINING: CPT

## 2019-07-18 PROCEDURE — 97116 GAIT TRAINING THERAPY: CPT

## 2019-07-18 PROCEDURE — 97530 THERAPEUTIC ACTIVITIES: CPT

## 2019-07-19 LAB
GLUCOSE SERPL-MCNC: 267 MG/DL (ref 65–140)
GLUCOSE SERPL-MCNC: 377 MG/DL (ref 65–140)
GLUCOSE SERPL-MCNC: 384 MG/DL (ref 65–140)
GLUCOSE SERPL-MCNC: 448 MG/DL (ref 65–140)
GLUCOSE SERPL-MCNC: 471 MG/DL (ref 65–140)
GLUCOSE SERPL-MCNC: 473 MG/DL (ref 65–140)

## 2019-07-19 PROCEDURE — 97110 THERAPEUTIC EXERCISES: CPT

## 2019-07-19 PROCEDURE — 82948 REAGENT STRIP/BLOOD GLUCOSE: CPT

## 2019-07-19 PROCEDURE — 97116 GAIT TRAINING THERAPY: CPT

## 2019-07-19 PROCEDURE — 97530 THERAPEUTIC ACTIVITIES: CPT

## 2019-07-20 LAB
GLUCOSE SERPL-MCNC: 318 MG/DL (ref 65–140)
GLUCOSE SERPL-MCNC: 376 MG/DL (ref 65–140)
GLUCOSE SERPL-MCNC: 377 MG/DL (ref 65–140)
GLUCOSE SERPL-MCNC: 482 MG/DL (ref 65–140)

## 2019-07-20 PROCEDURE — 82948 REAGENT STRIP/BLOOD GLUCOSE: CPT

## 2019-07-20 PROCEDURE — 82947 ASSAY GLUCOSE BLOOD QUANT: CPT | Performed by: INTERNAL MEDICINE

## 2019-07-21 LAB
GLUCOSE SERPL-MCNC: 350 MG/DL (ref 65–140)
GLUCOSE SERPL-MCNC: 440 MG/DL (ref 65–140)
GLUCOSE SERPL-MCNC: 476 MG/DL (ref 65–140)
GLUCOSE SERPL-MCNC: 520 MG/DL (ref 65–140)
GLUCOSE SERPL-MCNC: >500 MG/DL (ref 65–140)

## 2019-07-21 PROCEDURE — 82947 ASSAY GLUCOSE BLOOD QUANT: CPT | Performed by: INTERNAL MEDICINE

## 2019-07-21 PROCEDURE — 82948 REAGENT STRIP/BLOOD GLUCOSE: CPT

## 2019-07-21 PROCEDURE — 97535 SELF CARE MNGMENT TRAINING: CPT

## 2019-07-21 PROCEDURE — 97110 THERAPEUTIC EXERCISES: CPT

## 2019-07-22 LAB
ANION GAP SERPL CALCULATED.3IONS-SCNC: 8 MMOL/L (ref 4–13)
BUN SERPL-MCNC: 35 MG/DL (ref 5–25)
CALCIUM SERPL-MCNC: 9.4 MG/DL (ref 8.3–10.1)
CHLORIDE SERPL-SCNC: 99 MMOL/L (ref 100–108)
CO2 SERPL-SCNC: 32 MMOL/L (ref 21–32)
CREAT SERPL-MCNC: 1.3 MG/DL (ref 0.6–1.3)
GFR SERPL CREATININE-BSD FRML MDRD: 53 ML/MIN/1.73SQ M
GLUCOSE SERPL-MCNC: 306 MG/DL (ref 65–140)
GLUCOSE SERPL-MCNC: 330 MG/DL (ref 65–140)
GLUCOSE SERPL-MCNC: 343 MG/DL (ref 65–140)
GLUCOSE SERPL-MCNC: 411 MG/DL (ref 65–140)
GLUCOSE SERPL-MCNC: 414 MG/DL (ref 65–140)
GLUCOSE SERPL-MCNC: 520 MG/DL (ref 65–140)
GLUCOSE SERPL-MCNC: >500 MG/DL (ref 65–140)
GLUCOSE SERPL-MCNC: >500 MG/DL (ref 65–140)
POTASSIUM SERPL-SCNC: 3.6 MMOL/L (ref 3.5–5.3)
SODIUM SERPL-SCNC: 139 MMOL/L (ref 136–145)

## 2019-07-22 PROCEDURE — 80048 BASIC METABOLIC PNL TOTAL CA: CPT | Performed by: NURSE PRACTITIONER

## 2019-07-22 PROCEDURE — 97116 GAIT TRAINING THERAPY: CPT

## 2019-07-22 PROCEDURE — 82948 REAGENT STRIP/BLOOD GLUCOSE: CPT

## 2019-07-22 PROCEDURE — 97530 THERAPEUTIC ACTIVITIES: CPT

## 2019-07-22 PROCEDURE — 82947 ASSAY GLUCOSE BLOOD QUANT: CPT | Performed by: INTERNAL MEDICINE

## 2019-07-22 PROCEDURE — 97542 WHEELCHAIR MNGMENT TRAINING: CPT

## 2019-07-22 PROCEDURE — 97110 THERAPEUTIC EXERCISES: CPT

## 2019-07-23 LAB
GLUCOSE SERPL-MCNC: 291 MG/DL (ref 65–140)
GLUCOSE SERPL-MCNC: 347 MG/DL (ref 65–140)
GLUCOSE SERPL-MCNC: 433 MG/DL (ref 65–140)
GLUCOSE SERPL-MCNC: 447 MG/DL (ref 65–140)
GLUCOSE SERPL-MCNC: 462 MG/DL (ref 65–140)

## 2019-07-23 PROCEDURE — 97530 THERAPEUTIC ACTIVITIES: CPT

## 2019-07-23 PROCEDURE — 97110 THERAPEUTIC EXERCISES: CPT

## 2019-07-23 PROCEDURE — 82948 REAGENT STRIP/BLOOD GLUCOSE: CPT

## 2019-07-23 PROCEDURE — 97542 WHEELCHAIR MNGMENT TRAINING: CPT

## 2019-07-23 PROCEDURE — 97116 GAIT TRAINING THERAPY: CPT

## 2019-07-24 LAB
GLUCOSE SERPL-MCNC: 275 MG/DL (ref 65–140)
GLUCOSE SERPL-MCNC: 339 MG/DL (ref 65–140)
GLUCOSE SERPL-MCNC: 347 MG/DL (ref 65–140)
GLUCOSE SERPL-MCNC: 394 MG/DL (ref 65–140)

## 2019-07-24 PROCEDURE — 82948 REAGENT STRIP/BLOOD GLUCOSE: CPT

## 2019-07-24 PROCEDURE — 97535 SELF CARE MNGMENT TRAINING: CPT

## 2019-07-24 PROCEDURE — 97530 THERAPEUTIC ACTIVITIES: CPT

## 2019-07-24 PROCEDURE — 97110 THERAPEUTIC EXERCISES: CPT

## 2019-07-24 NOTE — WOUND OSTOMY CARE
Progress Note - Wound   Elizabeth Figueroa 66 y o  male MRN: [id number]  Unit/Bed#: -01 Encounter: [id number]      Assessment:   Left buttock stage 3 pressure injury decreased size, decreased drainage  Left lower extremity partial thickness ulcer  Decreased size  Plan:   1  Discontinue hydrocolloid to right buttock  2   New order:  Calazime to right buttock  Apply daily  3   Continue Left lower extremity dressing change daily  Cleanse with NSS  Cover with adaptic (vaseline gauze), followed by gauze 4x4  Secure with karla  4  Elevate legs to assist with edema control  Wound 06/30/19 Pressure Injury Buttocks Right (Active)   Wound Description Granulation tissue 7/24/2019 11:00 AM   Staging Stage III 7/24/2019 11:00 AM   Mary-wound Assessment Induration 7/24/2019 11:00 AM   Wound Length (cm) 0 2 cm 7/24/2019 11:00 AM   Wound Width (cm) 0 2 cm 7/24/2019 11:00 AM   Wound Depth (cm) 0 1 7/24/2019 11:00 AM   Calculated Wound Area (cm^2) 0 04 cm^2 7/24/2019 11:00 AM   Calculated Wound Volume (cm^3) 0 cm^3 7/24/2019 11:00 AM   Change in Wound Size % 100 7/24/2019 11:00 AM   Drainage Amount Scant 7/24/2019 11:00 AM   Drainage Description Serosanguineous 7/24/2019 11:00 AM   Treatments Cleansed 7/24/2019 11:00 AM   Dressing Moisture barrier 7/24/2019 11:00 AM   Patient Tolerance Tolerated well 7/17/2019 11:00 AM   Dressing Status Clean;Dry; Intact 7/3/2019  8:58 AM   Number of days: 24       Wound 07/17/19 Venous stasis ulcer Leg Left; Anterior (Active)   Wound Description Epithelialization;Fragile;Pink 7/24/2019 11:00 AM   Mary-wound Assessment Edema 7/24/2019 11:00 AM   Wound Length (cm) 1 1 cm 7/24/2019 11:00 AM   Wound Width (cm) 1 cm 7/24/2019 11:00 AM   Wound Depth (cm) 0 1 7/24/2019 11:00 AM   Calculated Wound Area (cm^2) 1 1 cm^2 7/24/2019 11:00 AM   Calculated Wound Volume (cm^3) 0 11 cm^3 7/24/2019 11:00 AM   Change in Wound Size % 92 03 7/24/2019 11:00 AM   Drainage Amount Scant 7/24/2019 11:00 AM Drainage Description Serosanguineous 7/24/2019 11:00 AM   Non-staged Wound Description Partial thickness 7/24/2019 11:00 AM   Treatments Cleansed 7/24/2019 11:00 AM   Dressing Non adherent 7/24/2019 11:00 AM   Dressing Changed New 7/24/2019 11:00 AM   Patient Tolerance Tolerated well 7/24/2019 11:00 AM   Number of days: 7     Nola Bynum RN   CWOCN  7/24/2019 12:03

## 2019-07-25 ENCOUNTER — TELEPHONE (OUTPATIENT)
Dept: UROLOGY | Facility: CLINIC | Age: 74
End: 2019-07-25

## 2019-07-25 LAB
GLUCOSE SERPL-MCNC: 229 MG/DL (ref 65–140)
GLUCOSE SERPL-MCNC: 278 MG/DL (ref 65–140)
GLUCOSE SERPL-MCNC: 316 MG/DL (ref 65–140)

## 2019-07-25 PROCEDURE — 82948 REAGENT STRIP/BLOOD GLUCOSE: CPT

## 2019-07-25 NOTE — TELEPHONE ENCOUNTER
Spoke with Hawk Barfield from SNF at [facility] she informed me that the patient canceled his transportation for his appointment today and is not willing to come in  I offered that patient a later appointment today after he is discharged but he refuses to come into the office  Informed Anna to stress importance to family about coming in for the appointment  Patient is a hospital follow up with retention   Hawk Barfield stated that he will have to go home with the cath in  Will call tomorrow when patient is home to try and schedule appointment

## 2019-07-26 NOTE — TELEPHONE ENCOUNTER
Called patient and left a message requesting a call back so we can make a follow up appointment    To please call [phone]

## 2020-02-05 ENCOUNTER — HOSPITAL ENCOUNTER (OUTPATIENT)
Facility: HOSPITAL | Age: 75
Setting detail: OBSERVATION
Discharge: HOME | End: 2020-02-07
Attending: EMERGENCY MEDICINE | Admitting: INTERNAL MEDICINE
Payer: COMMERCIAL

## 2020-02-05 DIAGNOSIS — E16.2 HYPOGLYCEMIA: Primary | ICD-10-CM

## 2020-02-05 DIAGNOSIS — E11.649 TYPE 2 DIABETES MELLITUS WITH HYPOGLYCEMIA WITHOUT COMA, WITH LONG-TERM CURRENT USE OF INSULIN (HCC): ICD-10-CM

## 2020-02-05 DIAGNOSIS — Z79.4 TYPE 2 DIABETES MELLITUS WITH HYPOGLYCEMIA WITHOUT COMA, WITH LONG-TERM CURRENT USE OF INSULIN (HCC): ICD-10-CM

## 2020-02-05 LAB
ALBUMIN SERPL BCP-MCNC: 3.2 G/DL (ref 3.5–5)
ALP SERPL-CCNC: 138 U/L (ref 46–116)
ALT SERPL W P-5'-P-CCNC: 22 U/L (ref 12–78)
ANION GAP SERPL CALCULATED.3IONS-SCNC: 5 MMOL/L (ref 4–13)
AST SERPL W P-5'-P-CCNC: 19 U/L (ref 5–45)
BASOPHILS # BLD AUTO: 0.03 THOUSANDS/ΜL (ref 0–0.1)
BASOPHILS NFR BLD AUTO: 0 % (ref 0–1)
BILIRUB SERPL-MCNC: 0.4 MG/DL (ref 0.2–1)
BUN SERPL-MCNC: 23 MG/DL (ref 5–25)
CALCIUM SERPL-MCNC: 8.7 MG/DL (ref 8.3–10.1)
CHLORIDE SERPL-SCNC: 104 MMOL/L (ref 100–108)
CO2 SERPL-SCNC: 30 MMOL/L (ref 21–32)
CREAT SERPL-MCNC: 1.17 MG/DL (ref 0.6–1.3)
EOSINOPHIL # BLD AUTO: 0.48 THOUSAND/ΜL (ref 0–0.61)
EOSINOPHIL NFR BLD AUTO: 5 % (ref 0–6)
ERYTHROCYTE [DISTWIDTH] IN BLOOD BY AUTOMATED COUNT: 14.7 % (ref 11.6–15.1)
GFR SERPL CREATININE-BSD FRML MDRD: 60 ML/MIN/1.73SQ M
GLUCOSE SERPL-MCNC: 113 MG/DL (ref 65–140)
GLUCOSE SERPL-MCNC: 114 MG/DL (ref 65–140)
GLUCOSE SERPL-MCNC: 124 MG/DL (ref 65–140)
GLUCOSE SERPL-MCNC: 185 MG/DL (ref 65–140)
GLUCOSE SERPL-MCNC: 49 MG/DL (ref 65–140)
GLUCOSE SERPL-MCNC: 55 MG/DL (ref 65–140)
HCT VFR BLD AUTO: 42.4 % (ref 36.5–49.3)
HGB BLD-MCNC: 13.4 G/DL (ref 12–17)
HOLD SPECIMEN: NORMAL
IMM GRANULOCYTES # BLD AUTO: 0.04 THOUSAND/UL (ref 0–0.2)
IMM GRANULOCYTES NFR BLD AUTO: 0 % (ref 0–2)
LYMPHOCYTES # BLD AUTO: 1.47 THOUSANDS/ΜL (ref 0.6–4.47)
LYMPHOCYTES NFR BLD AUTO: 14 % (ref 14–44)
MCH RBC QN AUTO: 28.9 PG (ref 26.8–34.3)
MCHC RBC AUTO-ENTMCNC: 31.6 G/DL (ref 31.4–37.4)
MCV RBC AUTO: 92 FL (ref 82–98)
MONOCYTES # BLD AUTO: 0.78 THOUSAND/ΜL (ref 0.17–1.22)
MONOCYTES NFR BLD AUTO: 7 % (ref 4–12)
NEUTROPHILS # BLD AUTO: 7.81 THOUSANDS/ΜL (ref 1.85–7.62)
NEUTS SEG NFR BLD AUTO: 74 % (ref 43–75)
NRBC BLD AUTO-RTO: 0 /100 WBCS
PLATELET # BLD AUTO: 214 THOUSANDS/UL (ref 149–390)
PLATELET # BLD AUTO: 232 THOUSANDS/UL (ref 149–390)
PMV BLD AUTO: 9.5 FL (ref 8.9–12.7)
PMV BLD AUTO: 9.7 FL (ref 8.9–12.7)
POTASSIUM SERPL-SCNC: 4 MMOL/L (ref 3.5–5.3)
PROT SERPL-MCNC: 7.2 G/DL (ref 6.4–8.2)
RBC # BLD AUTO: 4.63 MILLION/UL (ref 3.88–5.62)
SODIUM SERPL-SCNC: 139 MMOL/L (ref 136–145)
TROPONIN I SERPL-MCNC: <0.02 NG/ML
WBC # BLD AUTO: 10.61 THOUSAND/UL (ref 4.31–10.16)

## 2020-02-05 PROCEDURE — 85025 COMPLETE CBC W/AUTO DIFF WBC: CPT

## 2020-02-05 PROCEDURE — 83036 HEMOGLOBIN GLYCOSYLATED A1C: CPT | Performed by: INTERNAL MEDICINE

## 2020-02-05 PROCEDURE — 82948 REAGENT STRIP/BLOOD GLUCOSE: CPT

## 2020-02-05 PROCEDURE — 99220 PR INITIAL OBSERVATION CARE/DAY 70 MINUTES: CPT | Performed by: INTERNAL MEDICINE

## 2020-02-05 PROCEDURE — 93005 ELECTROCARDIOGRAM TRACING: CPT

## 2020-02-05 PROCEDURE — 80053 COMPREHEN METABOLIC PANEL: CPT

## 2020-02-05 PROCEDURE — 84484 ASSAY OF TROPONIN QUANT: CPT | Performed by: EMERGENCY MEDICINE

## 2020-02-05 PROCEDURE — 85049 AUTOMATED PLATELET COUNT: CPT | Performed by: INTERNAL MEDICINE

## 2020-02-05 PROCEDURE — 96374 THER/PROPH/DIAG INJ IV PUSH: CPT

## 2020-02-05 PROCEDURE — 36415 COLL VENOUS BLD VENIPUNCTURE: CPT

## 2020-02-05 PROCEDURE — 99285 EMERGENCY DEPT VISIT HI MDM: CPT | Performed by: EMERGENCY MEDICINE

## 2020-02-05 PROCEDURE — 99285 EMERGENCY DEPT VISIT HI MDM: CPT

## 2020-02-05 RX ORDER — CARVEDILOL 12.5 MG/1
12.5 TABLET ORAL 2 TIMES DAILY
Status: DISCONTINUED | OUTPATIENT
Start: 2020-02-05 | End: 2020-02-07 | Stop reason: HOSPADM

## 2020-02-05 RX ORDER — DEXTROSE MONOHYDRATE 25 G/50ML
50 INJECTION, SOLUTION INTRAVENOUS ONCE
Status: COMPLETED | OUTPATIENT
Start: 2020-02-05 | End: 2020-02-05

## 2020-02-05 RX ORDER — MAGNESIUM HYDROXIDE/ALUMINUM HYDROXICE/SIMETHICONE 120; 1200; 1200 MG/30ML; MG/30ML; MG/30ML
30 SUSPENSION ORAL EVERY 6 HOURS PRN
Status: DISCONTINUED | OUTPATIENT
Start: 2020-02-05 | End: 2020-02-07 | Stop reason: HOSPADM

## 2020-02-05 RX ORDER — ASPIRIN 81 MG/1
81 TABLET, CHEWABLE ORAL DAILY
Status: DISCONTINUED | OUTPATIENT
Start: 2020-02-06 | End: 2020-02-07 | Stop reason: HOSPADM

## 2020-02-05 RX ORDER — GABAPENTIN 300 MG/1
600 CAPSULE ORAL 2 TIMES DAILY
Status: DISCONTINUED | OUTPATIENT
Start: 2020-02-05 | End: 2020-02-07 | Stop reason: HOSPADM

## 2020-02-05 RX ORDER — ONDANSETRON 2 MG/ML
4 INJECTION INTRAMUSCULAR; INTRAVENOUS EVERY 6 HOURS PRN
Status: DISCONTINUED | OUTPATIENT
Start: 2020-02-05 | End: 2020-02-07 | Stop reason: HOSPADM

## 2020-02-05 RX ORDER — MAGNESIUM HYDROXIDE/ALUMINUM HYDROXICE/SIMETHICONE 120; 1200; 1200 MG/30ML; MG/30ML; MG/30ML
30 SUSPENSION ORAL EVERY 6 HOURS PRN
Status: DISCONTINUED | OUTPATIENT
Start: 2020-02-05 | End: 2020-02-05

## 2020-02-05 RX ORDER — ACETAMINOPHEN 325 MG/1
650 TABLET ORAL EVERY 6 HOURS PRN
Status: DISCONTINUED | OUTPATIENT
Start: 2020-02-05 | End: 2020-02-07 | Stop reason: HOSPADM

## 2020-02-05 RX ORDER — DEXTROSE AND SODIUM CHLORIDE 5; .9 G/100ML; G/100ML
100 INJECTION, SOLUTION INTRAVENOUS CONTINUOUS
Status: DISCONTINUED | OUTPATIENT
Start: 2020-02-05 | End: 2020-02-05

## 2020-02-05 RX ORDER — TAMSULOSIN HYDROCHLORIDE 0.4 MG/1
0.4 CAPSULE ORAL EVERY EVENING
Status: DISCONTINUED | OUTPATIENT
Start: 2020-02-05 | End: 2020-02-07 | Stop reason: HOSPADM

## 2020-02-05 RX ORDER — POLYETHYLENE GLYCOL 3350 17 G/17G
17 POWDER, FOR SOLUTION ORAL DAILY PRN
Status: DISCONTINUED | OUTPATIENT
Start: 2020-02-05 | End: 2020-02-07 | Stop reason: HOSPADM

## 2020-02-05 RX ORDER — OXYCODONE HYDROCHLORIDE 10 MG/1
10 TABLET ORAL EVERY 4 HOURS PRN
Status: DISCONTINUED | OUTPATIENT
Start: 2020-02-05 | End: 2020-02-07 | Stop reason: HOSPADM

## 2020-02-05 RX ORDER — FINASTERIDE 5 MG/1
5 TABLET, FILM COATED ORAL DAILY
Status: DISCONTINUED | OUTPATIENT
Start: 2020-02-06 | End: 2020-02-07 | Stop reason: HOSPADM

## 2020-02-05 RX ORDER — NYSTATIN 100000 [USP'U]/G
POWDER TOPICAL 2 TIMES DAILY
Status: DISCONTINUED | OUTPATIENT
Start: 2020-02-05 | End: 2020-02-07 | Stop reason: HOSPADM

## 2020-02-05 RX ORDER — LISINOPRIL 5 MG/1
5 TABLET ORAL DAILY
Status: DISCONTINUED | OUTPATIENT
Start: 2020-02-06 | End: 2020-02-07 | Stop reason: HOSPADM

## 2020-02-05 RX ORDER — SIMETHICONE 80 MG
80 TABLET,CHEWABLE ORAL 4 TIMES DAILY PRN
Status: DISCONTINUED | OUTPATIENT
Start: 2020-02-05 | End: 2020-02-07 | Stop reason: HOSPADM

## 2020-02-05 RX ORDER — DEXTROSE AND SODIUM CHLORIDE 5; .9 G/100ML; G/100ML
50 INJECTION, SOLUTION INTRAVENOUS CONTINUOUS
Status: DISCONTINUED | OUTPATIENT
Start: 2020-02-05 | End: 2020-02-06

## 2020-02-05 RX ORDER — CALCIUM CARBONATE 200(500)MG
1000 TABLET,CHEWABLE ORAL DAILY PRN
Status: DISCONTINUED | OUTPATIENT
Start: 2020-02-05 | End: 2020-02-07 | Stop reason: HOSPADM

## 2020-02-05 RX ORDER — MELATONIN
2000 DAILY
Status: DISCONTINUED | OUTPATIENT
Start: 2020-02-06 | End: 2020-02-07 | Stop reason: HOSPADM

## 2020-02-05 RX ORDER — HEPARIN SODIUM 5000 [USP'U]/ML
5000 INJECTION, SOLUTION INTRAVENOUS; SUBCUTANEOUS EVERY 8 HOURS SCHEDULED
Status: DISCONTINUED | OUTPATIENT
Start: 2020-02-05 | End: 2020-02-07 | Stop reason: HOSPADM

## 2020-02-05 RX ORDER — DEXTROSE MONOHYDRATE 25 G/50ML
INJECTION, SOLUTION INTRAVENOUS
Status: COMPLETED
Start: 2020-02-05 | End: 2020-02-05

## 2020-02-05 RX ORDER — ATORVASTATIN CALCIUM 40 MG/1
40 TABLET, FILM COATED ORAL
Status: DISCONTINUED | OUTPATIENT
Start: 2020-02-05 | End: 2020-02-07 | Stop reason: HOSPADM

## 2020-02-05 RX ADMIN — DEXTROSE MONOHYDRATE 50 ML: 500 INJECTION PARENTERAL at 14:20

## 2020-02-05 RX ADMIN — GABAPENTIN 600 MG: 300 CAPSULE ORAL at 19:23

## 2020-02-05 RX ADMIN — HEPARIN SODIUM 5000 UNITS: 5000 INJECTION INTRAVENOUS; SUBCUTANEOUS at 21:20

## 2020-02-05 RX ADMIN — TAMSULOSIN HYDROCHLORIDE 0.4 MG: 0.4 CAPSULE ORAL at 19:23

## 2020-02-05 RX ADMIN — DEXTROSE 50 % IN WATER (D50W) INTRAVENOUS SYRINGE 50 ML: at 12:28

## 2020-02-05 RX ADMIN — ATORVASTATIN CALCIUM 40 MG: 40 TABLET, FILM COATED ORAL at 19:23

## 2020-02-05 RX ADMIN — CARVEDILOL 12.5 MG: 12.5 TABLET, FILM COATED ORAL at 19:23

## 2020-02-05 RX ADMIN — NYSTATIN: 100000 POWDER TOPICAL at 19:22

## 2020-02-05 RX ADMIN — DEXTROSE AND SODIUM CHLORIDE 50 ML/HR: 5; .9 INJECTION, SOLUTION INTRAVENOUS at 18:11

## 2020-02-05 RX ADMIN — DEXTROSE MONOHYDRATE 50 ML: 25 INJECTION, SOLUTION INTRAVENOUS at 14:20

## 2020-02-05 RX ADMIN — DEXTROSE MONOHYDRATE 50 ML: 25 INJECTION, SOLUTION INTRAVENOUS at 12:28

## 2020-02-05 NOTE — ED NOTES
Pipe Mejía from the lab notified of add-on troponin     Gadiel Bangladeshi, [facility]  02/05/20 [id number]

## 2020-02-05 NOTE — PLAN OF CARE
Problem: Potential for Falls  Goal: Patient will remain free of falls  Description  INTERVENTIONS:  - Assess patient frequently for physical needs  -  Identify cognitive and physical deficits and behaviors that affect risk of falls    -  [place] fall precautions as indicated by assessment   - Educate patient/family on patient safety including physical limitations  - Instruct patient to call for assistance with activity based on assessment  - Modify environment to reduce risk of injury  - Consider OT/PT consult to assist with strengthening/mobility  Outcome: Progressing     Problem: Prexisting or High Potential for Compromised Skin Integrity  Goal: Skin integrity is maintained or improved  Description  INTERVENTIONS:  - Identify patients at risk for skin breakdown  - Assess and monitor skin integrity  - Assess and monitor nutrition and hydration status  - Monitor labs   - Assess for incontinence   - Turn and reposition patient  - Assist with mobility/ambulation  - Relieve pressure over bony prominences  - Avoid friction and shearing  - Provide appropriate hygiene as needed including keeping skin clean and dry  - Evaluate need for skin moisturizer/barrier cream  - Collaborate with interdisciplinary team   - Patient/family teaching  - Consider wound care consult   Outcome: Progressing     Problem: METABOLIC, FLUID AND ELECTROLYTES - ADULT  Goal: Electrolytes maintained within normal limits  Description  INTERVENTIONS:  - Monitor labs and assess patient for signs and symptoms of electrolyte imbalances  - Administer electrolyte replacement as ordered  - Monitor response to electrolyte replacements, including repeat lab results as appropriate  - Instruct patient on fluid and nutrition as appropriate  Outcome: Progressing  Goal: Fluid balance maintained  Description  INTERVENTIONS:  - Monitor labs   - Monitor I/O and WT  - Instruct patient on fluid and nutrition as appropriate  - Assess for signs & symptoms of volume excess or deficit  Outcome: Progressing  Goal: Glucose maintained within target range  Description  INTERVENTIONS:  - Monitor Blood Glucose as ordered  - Assess for signs and symptoms of hypoglycemia  - Administer ordered medications to maintain glucose within target range  - Assess nutritional intake and initiate nutrition service referral as needed   Outcome: Progressing     Problem: SKIN/TISSUE INTEGRITY - ADULT  Goal: Skin integrity remains intact  Description  INTERVENTIONS  - Identify patients at risk for skin breakdown  - Assess and monitor skin integrity  - Assess and monitor nutrition and hydration status  - Monitor labs (i e  albumin)  - Assess for incontinence   - Turn and reposition patient  - Assist with mobility/ambulation  - Relieve pressure over bony prominences  - Avoid friction and shearing  - Provide appropriate hygiene as needed including keeping skin clean and dry  - Evaluate need for skin moisturizer/barrier cream  - Collaborate with interdisciplinary team (i e  Nutrition, Rehabilitation, etc )   - Patient/family teaching  Outcome: Progressing  Goal: Incision(s), wounds(s) or drain site(s) healing without S/S of infection  Description  INTERVENTIONS  - Assess and document risk factors for skin impairment   - Assess and document dressing, incision, wound bed, drain sites and surrounding tissue  - Consider nutrition services referral as needed  - Oral mucous membranes remain intact  - Provide patient/ family education  Outcome: Progressing  Goal: Oral mucous membranes remain intact  Description  INTERVENTIONS  - Assess oral mucosa and hygiene practices  - Implement preventative oral hygiene regimen  - Implement oral medicated treatments as ordered  - Initiate Nutrition services referral as needed  Outcome: Progressing     Problem: INFECTION - ADULT  Goal: Absence or prevention of progression during hospitalization  Description  INTERVENTIONS:  - Assess and monitor for signs and symptoms of infection  - Monitor lab/diagnostic results  - Monitor all insertion sites, i e  indwelling lines, tubes, and drains  - Monitor endotracheal if appropriate and nasal secretions for changes in amount and color  - [place] appropriate cooling/warming therapies per order  - Administer medications as ordered  - Instruct and encourage patient and family to use good hand hygiene technique  - Identify and instruct in appropriate isolation precautions for identified infection/condition  Outcome: Progressing     Problem: SAFETY ADULT  Goal: Maintain or return to baseline ADL function  Description  INTERVENTIONS:  -  Assess patient's ability to carry out ADLs; assess patient's baseline for ADL function and identify physical deficits which impact ability to perform ADLs (bathing, care of mouth/teeth, toileting, grooming, dressing, etc )  - Assess/evaluate cause of self-care deficits   - Assess range of motion  - Assess patient's mobility; develop plan if impaired  - Assess patient's need for assistive devices and provide as appropriate  - Encourage maximum independence but intervene and supervise when necessary  - Involve family in performance of ADLs  - Assess for home care needs following discharge   - Consider OT consult to assist with ADL evaluation and planning for discharge  - Provide patient education as appropriate  Outcome: Progressing  Goal: Maintain or return mobility status to optimal level  Description  INTERVENTIONS:  - Assess patient's baseline mobility status (ambulation, transfers, stairs, etc )    - Identify cognitive and physical deficits and behaviors that affect mobility  - Identify mobility aids required to assist with transfers and/or ambulation (gait belt, sit-to-stand, lift, walker, cane, etc )  - [place] fall precautions as indicated by assessment  - Record patient progress and toleration of activity level on Mobility SBAR; progress patient to next Phase/Stage  - Instruct patient to call for assistance with activity based on assessment  - Consider rehabilitation consult to assist with strengthening/weightbearing, etc   Outcome: Progressing     Problem: DISCHARGE PLANNING  Goal: Discharge to home or other facility with appropriate resources  Description  INTERVENTIONS:  - Identify barriers to discharge w/patient and caregiver  - Arrange for needed discharge resources and transportation as appropriate  - Identify discharge learning needs (meds, wound care, etc )  - Arrange for interpretive services to assist at discharge as needed  - Refer to Case Management Department for coordinating discharge planning if the patient needs post-hospital services based on physician/advanced practitioner order or complex needs related to functional status, cognitive ability, or social support system  Outcome: Progressing     Problem: Knowledge Deficit  Goal: Patient/family/caregiver demonstrates understanding of disease process, treatment plan, medications, and discharge instructions  Description  Complete learning assessment and assess knowledge base    Interventions:  - Provide teaching at level of understanding  - Provide teaching via preferred learning methods  Outcome: Progressing

## 2020-02-05 NOTE — H&P
H&P- Blas Núñez 1945, 66 y o  male MRN: [id number]    Unit/Bed#: 423-01 Encounter: [id number]    Primary Care Provider: Celeste Gant MD   Date and time admitted to hospital: 2/5/2020 12:13 PM        Hypoglycemia  Assessment & Plan  Presentation with evidence of hypoglycemia that according to the patient has been ongoing for at least 1 week, but also intermittently occurring over the past year  Review of his insulin regimen with evidence of relative low-dose once daily prescribed basal insulin, with Lantus at 20 units once a day  However Mr Franck Merino reports taking 90 units of glargine twice daily at home in addition to his short-acting insulin     - Hold all insulin, including short and long-acting basal insulin for the time being   - Continue D5 NS at 50 mL/HR   - Encourage oral intake and order a regular diet for tonight  - Monitor blood sugars closely with frequent Accu-Cheks, order the hypoglycemia protocol   - Review insulin regimen in the morning once patient's spouse is back in the hospital, and attempt to ascertain patient's true dosing via PCPs office and pharmacy in the morning  VTE Prophylaxis: Heparin  / sequential compression device   Code Status: DNR/DNI  POLST: POLST form is not discussed and not completed at this time  Discussion with family: Family not present at time of admission    Anticipated Length of Stay:  Patient will be admitted on an Observation basis with an anticipated length of stay of  < 2 midnights  Justification for Hospital Stay: Treatment of persistent Hypoglycemia  Total Time for Visit, including Counseling / Coordination of Care: 45 minutes  Greater than 50% of this total time spent on direct patient counseling and coordination of care      Chief Complaint:   Hypoglycemia    History of Present Illness:    68-year-old man with past medical history significant for IDDM at, being admitted from [facility] ED on 02/05 with a diagnosis of hypoglycemia  Mr Giulia Cook has past medical history significant for insulin-dependent diabetes, prior TIA/CVA, BHARGAVI, HTN, and dyslipidemia  He also has mention of CAD, as well as evidence of grade 1 diastolic dysfunction by prior echo in 2019  He has been previously admitted and treated for CHF exacerbation, as well as KIRT secondary to urinary retention and prerenal azotemia  The patient presents to the ED with complaints of low blood sugars as measured at home, manifested by lightheadedness/dizziness, as well as diaphoresis as reported by his spouse  He states that this has been occurring on and off over the past week, and when further questioned admits to intermittent hypoglycemic episodes occurring as far back as a year ago  He denies skipping meals, decreased oral intake, or any recent adjustment to his home insulin regimen  However upon further questioning it appears that he takes approximately 90 units of insulin glargine twice daily at home, but with his current hospital medication list showing a dosing of 20 units once daily  Review of prior discharge from summer of 2019 confirms this dosing as well  Patient however is adamant about his insulin dosing  While in the emergency department Mr Anson Loera was noted to be hypoglycemic, despite administration of amps of D50 he continued to have hypoglycemic events with blood sugars recorded at 49 in 55  He is being admitted for further evaluation and treatment of his hypoglycemia  Review of Systems:    Review of Systems   Neurological: Positive for dizziness, tremors and light-headedness  All other systems reviewed and are negative        Past Medical and Surgical History:     Past Medical History:   Diagnosis Date    Cataract     CHF (congestive heart failure) (HCC)     chronic diastolic CHF    Coronary artery disease     [facility]     Glaucoma     Hyperlipidemia     Hypertension     Neuropathy     Obesity     Renal disorder chronic kidney disease stage 3     Sleep apnea     [facility] [facility]     TIA (transient ischemic attack)     Uncontrolled type 2 diabetes mellitus with hyperglycemia, with long-term current use of insulin ([facility] ) 10/4/2018       Past Surgical History:   Procedure Laterality Date    APPENDECTOMY      CARPAL TUNNEL RELEASE      EYE SURGERY      cataracts       Meds/Allergies:    Prior to Admission medications    Medication Sig Start Date End Date Taking?  Authorizing Provider   aspirin 81 MG tablet Take 81 mg by mouth daily   Yes Historical Provider, MD   atorvastatin (LIPITOR) 40 mg tablet Take 1 tablet (40 mg total) by mouth daily after dinner 3/5/19  Yes Mars Rossi DO   BD PEN NEEDLE JUNIOR U/F 32G X 4 MM MISC  12/31/17  Yes Historical Provider, MD   cholecalciferol 2000 units TABS Take 1 tablet (2,000 Units total) by mouth daily 3/4/19  Yes Mars Rossi DO   gabapentin (NEURONTIN) 600 MG tablet Take 1 tablet (600 mg total) by mouth 2 (two) times a day 3/4/19  Yes Mars Rossi DO   glucagon (GLUCAGON EMERGENCY) 1 MG injection Inject 1 mg under the skin once as needed (PRN blood glucose less than 70 if unconscious or uncorrectable by oral means) for up to 1 dose 3/4/19  Yes Mars Rossi DO   insulin glargine (LANTUS) 100 units/mL subcutaneous injection Inject 15 Units under the skin every morning  Patient taking differently: Inject 20 Units under the skin every morning  3/5/19  Yes Mars Rossi DO   insulin lispro (HumaLOG) 100 units/mL injection Inject 3 Units under the skin 3 (three) times a day with meals 3/4/19  Yes Mars Rossi DO   lisinopril (ZESTRIL) 5 mg tablet Take 1 tablet (5 mg total) by mouth daily 10/7/18  Yes Antoine Palacios MD   nystatin (MYCOSTATIN) powder Apply topically 2 (two) times a day 3/4/19  Yes Mars Rossi DO   oxyCODONE (ROXICODONE) 10 MG TABS Take 1 tablet (10 mg total) by mouth every 4 (four) hours as needed for moderate pain or severe painMax Daily Amount: 60 mg  Patient taking differently: Take 20 mg by mouth every 6 (six) hours as needed for moderate pain or severe pain  3/4/19  Yes [facility], DO   tamsulosin (FLOMAX) 0 4 mg Take 0 4 mg by mouth every evening     Yes Historical Provider, MD   acetaminophen (TYLENOL) 325 mg tablet Take 2 tablets (650 mg total) by mouth every 6 (six) hours as needed for mild pain, headaches or fever 3/4/19 2/5/20 Yes [facility], DO   carvedilol (COREG) 12 5 mg tablet Take 1 tablet (12 5 mg total) by mouth 2 (two) times a day 3/4/19   [facility], DO   finasteride (PROSCAR) 5 mg tablet Take 1 tablet (5 mg total) by mouth daily for 30 days 7/3/19 2/5/20  Joy Blanc MD   Heparin Sodium, Porcine, (HEPARIN, PORCINE,) 5,000 units/mL Inject 1 mL (5,000 Units total) under the skin every 8 (eight) hours  Patient not taking: Reported on 6/30/2019 3/4/19   [facility], DO     I have reviewed home medications with patient personally      Allergies: No Known Allergies    Social History:     Marital Status: /Civil Union   Occupation: Retired  Patient Pre-hospital Living Situation: Lives at home with wife  Patient Pre-hospital Level of Mobility: Independent  Patient Pre-hospital Diet Restrictions: Diabetic Diet  Substance Use History:   Social History     Substance and Sexual Activity   Alcohol Use Never    Frequency: Never    Drinks per session: Patient refused    Binge frequency: Never     Social History     Tobacco Use   Smoking Status Current Some Day Smoker    Types: Cigars   Smokeless Tobacco Former User     Social History     Substance and Sexual Activity   Drug Use No       Family History:    non-contributory    Physical Exam:     Vitals:   Blood Pressure: (!) 155/116 (02/05/20 1727)  Pulse: 83 (02/05/20 1727)  Temperature: 97 9 °F (36 6 °C) (02/05/20 1727)  Temp Source: Oral (02/05/20 1727)  Respirations: 18 (02/05/20 1727)  Height: 5' 6" (167 6 cm) (02/05/20 1727)  Weight - Scale: (!) 138 kg (304 lb 3 8 oz) (02/05/20 1727)  SpO2: 97 % (02/05/20 1727)    Physical Exam   Constitutional: He is oriented to person, place, and time  He appears well-developed and well-nourished  HENT:   Head: Normocephalic and atraumatic  Eyes: EOM are normal    Neck: Normal range of motion  Cardiovascular: Normal rate, regular rhythm and normal heart sounds  Exam reveals no gallop and no friction rub  No murmur heard  Pulmonary/Chest: Effort normal and breath sounds normal  No respiratory distress  He has no wheezes  He has no rales  Abdominal: Soft  Bowel sounds are normal  He exhibits no distension  There is no tenderness  There is no rebound and no guarding  Obese in contour   Musculoskeletal: He exhibits edema  2-3 + b/l LE Edema - chronic in appearance  Surrounding chronic hyperpigmentation noted as well  Neurological: He is alert and oriented to person, place, and time  No cranial nerve deficit  Skin: Skin is warm and dry  Psychiatric: He has a normal mood and affect  His behavior is normal    Nursing note and vitals reviewed  Additional Data:     Lab Results: I have personally reviewed pertinent reports        Results from last 7 days   Lab Units 02/05/20  1233   WBC Thousand/uL 10 61*   HEMOGLOBIN g/dL 13 4   HEMATOCRIT % 42 4   PLATELETS Thousands/uL 214   NEUTROS PCT % 74   LYMPHS PCT % 14   MONOS PCT % 7   EOS PCT % 5     Results from last 7 days   Lab Units 02/05/20  1233   SODIUM mmol/L 139   POTASSIUM mmol/L 4 0   CHLORIDE mmol/L 104   CO2 mmol/L 30   BUN mg/dL 23   CREATININE mg/dL 1 17   ANION GAP mmol/L 5   CALCIUM mg/dL 8 7   ALBUMIN g/dL 3 2*   TOTAL BILIRUBIN mg/dL 0 40   ALK PHOS U/L 138*   ALT U/L 22   AST U/L 19   GLUCOSE RANDOM mg/dL 185*         Results from last 7 days   Lab Units 02/05/20  1736 02/05/20  1411 02/05/20  1408 02/05/20  1251   POC GLUCOSE mg/dl 124 55* 49* 114               Allscripts / Epic Records Reviewed: Yes     ** Please Note: This note has been constructed using a voice recognition system   **

## 2020-02-05 NOTE — ASSESSMENT & PLAN NOTE
Presentation with evidence of hypoglycemia that according to the patient has been ongoing for at least 1 week, but also intermittently occurring over the past year  Review of his insulin regimen with evidence of relative low-dose once daily prescribed basal insulin, with Lantus at 20 units once a day  However Mr Shayy Nava reports taking 90 units of glargine twice daily at home in addition to his short-acting insulin     - Hold all insulin, including short and long-acting basal insulin for the time being   - Continue D5 NS at 50 mL/HR   - Encourage oral intake and order a regular diet for tonight  - Monitor blood sugars closely with frequent Accu-Cheks, order the hypoglycemia protocol   - Review insulin regimen in the morning once patient's spouse is back in the hospital, and attempt to ascertain patient's true dosing via PCPs office and pharmacy in the morning

## 2020-02-05 NOTE — CASE MANAGEMENT
I self referred the patient to discuss resources that might be available to him  He denied needing any help at this time  The patient lives with his wife in a three story house  There is no KYLE and two flights of 13 steps each inside the home  He has a walker and CPAP  He needs some assistance with his ADL's  He does not drive his wife drives him to where he needs to go he uses [address] in [place]  He has no trouble getting or paying for his medications  He has [employer]  He did not call his PCP prior to coming to the ED today

## 2020-02-06 LAB
ANION GAP SERPL CALCULATED.3IONS-SCNC: 9 MMOL/L (ref 4–13)
ATRIAL RATE: 69 BPM
BUN SERPL-MCNC: 27 MG/DL (ref 5–25)
CALCIUM SERPL-MCNC: 8.8 MG/DL (ref 8.3–10.1)
CHLORIDE SERPL-SCNC: 107 MMOL/L (ref 100–108)
CO2 SERPL-SCNC: 26 MMOL/L (ref 21–32)
CREAT SERPL-MCNC: 1.42 MG/DL (ref 0.6–1.3)
EST. AVERAGE GLUCOSE BLD GHB EST-MCNC: 143 MG/DL
GFR SERPL CREATININE-BSD FRML MDRD: 48 ML/MIN/1.73SQ M
GLUCOSE SERPL-MCNC: 107 MG/DL (ref 65–140)
GLUCOSE SERPL-MCNC: 148 MG/DL (ref 65–140)
GLUCOSE SERPL-MCNC: 166 MG/DL (ref 65–140)
GLUCOSE SERPL-MCNC: 171 MG/DL (ref 65–140)
GLUCOSE SERPL-MCNC: 175 MG/DL (ref 65–140)
GLUCOSE SERPL-MCNC: 203 MG/DL (ref 65–140)
GLUCOSE SERPL-MCNC: 223 MG/DL (ref 65–140)
HBA1C MFR BLD: 6.6 % (ref 4.2–6.3)
POTASSIUM SERPL-SCNC: 4.2 MMOL/L (ref 3.5–5.3)
PR INTERVAL: 232 MS
QRS AXIS: -82 DEGREES
QRSD INTERVAL: 150 MS
QT INTERVAL: 448 MS
QTC INTERVAL: 480 MS
SODIUM SERPL-SCNC: 142 MMOL/L (ref 136–145)
T WAVE AXIS: 23 DEGREES
VENTRICULAR RATE: 69 BPM

## 2020-02-06 PROCEDURE — 93010 ELECTROCARDIOGRAM REPORT: CPT | Performed by: INTERNAL MEDICINE

## 2020-02-06 PROCEDURE — 80048 BASIC METABOLIC PNL TOTAL CA: CPT | Performed by: INTERNAL MEDICINE

## 2020-02-06 PROCEDURE — 99225 PR SBSQ OBSERVATION CARE/DAY 25 MINUTES: CPT | Performed by: INTERNAL MEDICINE

## 2020-02-06 PROCEDURE — 82948 REAGENT STRIP/BLOOD GLUCOSE: CPT

## 2020-02-06 RX ADMIN — ATORVASTATIN CALCIUM 40 MG: 40 TABLET, FILM COATED ORAL at 17:35

## 2020-02-06 RX ADMIN — NYSTATIN: 100000 POWDER TOPICAL at 09:07

## 2020-02-06 RX ADMIN — HEPARIN SODIUM 5000 UNITS: 5000 INJECTION INTRAVENOUS; SUBCUTANEOUS at 15:12

## 2020-02-06 RX ADMIN — NYSTATIN: 100000 POWDER TOPICAL at 17:36

## 2020-02-06 RX ADMIN — GABAPENTIN 600 MG: 300 CAPSULE ORAL at 17:35

## 2020-02-06 RX ADMIN — FINASTERIDE 5 MG: 5 TABLET, FILM COATED ORAL at 09:07

## 2020-02-06 RX ADMIN — TAMSULOSIN HYDROCHLORIDE 0.4 MG: 0.4 CAPSULE ORAL at 17:35

## 2020-02-06 RX ADMIN — GABAPENTIN 600 MG: 300 CAPSULE ORAL at 09:07

## 2020-02-06 RX ADMIN — VITAMIN D, TAB 1000IU (100/BT) 2000 UNITS: 25 TAB at 09:07

## 2020-02-06 RX ADMIN — LISINOPRIL 5 MG: 5 TABLET ORAL at 09:07

## 2020-02-06 RX ADMIN — ASPIRIN 81 MG 81 MG: 81 TABLET ORAL at 09:07

## 2020-02-06 RX ADMIN — CARVEDILOL 12.5 MG: 12.5 TABLET, FILM COATED ORAL at 09:07

## 2020-02-06 RX ADMIN — CARVEDILOL 12.5 MG: 12.5 TABLET, FILM COATED ORAL at 17:35

## 2020-02-06 RX ADMIN — HEPARIN SODIUM 5000 UNITS: 5000 INJECTION INTRAVENOUS; SUBCUTANEOUS at 05:06

## 2020-02-06 RX ADMIN — HEPARIN SODIUM 5000 UNITS: 5000 INJECTION INTRAVENOUS; SUBCUTANEOUS at 21:41

## 2020-02-06 NOTE — SOCIAL WORK
Cm met with the patient to evaluate the patients prior function and living situation and any barriers to d/c and form a safe d/c plan  Cm also evaluated the patient for any services in the home or needs for services  Pt resides at home with his wife in a 3 story house  0 KYLE then 12-14 inside to his 2nd floor  Pt needs some assistance with his adls and is independent with ambulation  No services  Re: DME has a walker, cane and cpap  Hx of STR twice in 2019 on the 5th floor (in March and in July)  Pt's wife does the driving  PCP is Sandra Comer and pharmacy is [facility] in [place]  Plans at this time are home on dc with outpatient follow up  CM will follow and assist in dc planning

## 2020-02-06 NOTE — PHYSICIAN ADVISOR
Current patient class: Observation  The patient is currently on Hospital Day: 2 at [address]        The patient was admitted to the hospital  on N/A at N/A for the following diagnosis:  Hypoglycemia [E16 2]  Low blood sugar [E16 2]     After review of the relevant documentation, labs, vital signs and test results, the patient is most appropriate for OBSERVATION STATUS (see below)       Rationale is as follows: The patient has been hospitalized since yesterday after presenting with hypoglycemia  It was ongoing for at least one week but worsened  The patient was taking more insulin glargine then recommended  Dextrose infusion has been discontinued  Glucoses today are 175, 148, 223  The providers plan is to restart Lantus tonight and observe him for any additional episodes of hypoglycemia  UR discussed with the provider  In this case based on the plan for observation tonight, I feel that observation class is most appropriate  However if the patient has additional episodes of hypoglycemia or is not stable for discharge tomorrow because he requires more insulin adjustments, then I recommend change to inpatient class        The patients vitals on arrival were ED Triage Vitals [02/05/20 1223]   Temperature Pulse Respirations Blood Pressure SpO2   (!) 96 7 °F (35 9 °C) 76 18 (!) 174/87 90 %      Temp Source Heart Rate Source Patient Position - Orthostatic VS BP Location FiO2 (%)   Temporal Monitor Lying Right arm --      Pain Score       No Pain           Past Medical History:   Diagnosis Date    Cataract     CHF (congestive heart failure) (HCC)     chronic diastolic CHF    Coronary artery disease     [facility] mellitus ([facility] )     Glaucoma     Hyperlipidemia     Hypertension     Neuropathy     Obesity     Renal disorder     chronic kidney disease stage 3     Sleep apnea     Stroke ([facility] )     TIA (transient ischemic attack)     Uncontrolled type 2 diabetes mellitus with hyperglycemia, with long-term current use of insulin ([facility] ) 10/4/2018     Past Surgical History:   Procedure Laterality Date    APPENDECTOMY      CARPAL TUNNEL RELEASE      EYE SURGERY      cataracts           Consults have been placed to:   IP CONSULT TO NUTRITION SERVICES    Vitals:    02/05/20 2019 02/05/20 2328 02/06/20 0600 02/06/20 0656   BP: 168/86 148/82  144/66   BP Location: Right arm      Pulse:  101  77   Resp:  18  18   Temp:  99 8 °F (37 7 °C)  98 7 °F (37 1 °C)   TempSrc:       SpO2:  90%  92%   Weight:   (!) 137 kg (302 lb 0 5 oz)    Height:           Most recent labs:    Recent Labs     02/05/20  1233 02/05/20  1305 02/05/20  1919 02/06/20  0520   WBC 10 61*  --   --   --    HGB 13 4  --   --   --    HCT 42 4  --   --   --      --  232  --    K 4 0  --   --  4 2   CALCIUM 8 7  --   --  8 8   BUN 23  --   --  27*   CREATININE 1 17  --   --  1 42*   TROPONINI  --  <0 02  --   --    AST 19  --   --   --    ALT 22  --   --   --    ALKPHOS 138*  --   --   --        Scheduled Meds:  Current Facility-Administered Medications:  acetaminophen 650 mg Oral Q6H PRN Sg Stewart MD   aluminum-magnesium hydroxide-simethicone 30 mL Oral Q6H PRN Koko Everett MD   aspirin 81 mg Oral Daily Sg Stewart MD   atorvastatin 40 mg Oral After Alleen Meigs, MD   calcium carbonate 1,000 mg Oral Daily PRN Sg Stewart MD   carvedilol 12 5 mg Oral BID Sg Stewart MD   cholecalciferol 2,000 Units Oral Daily Sg Stewart MD   finasteride 5 mg Oral Daily Sg Stewart MD   gabapentin 600 mg Oral BID Sg Stewart MD   heparin (porcine) 5,000 Units Subcutaneous Q8H Tyrell Tomas MD   lisinopril 5 mg Oral Daily Sg Stewart MD   nystatin  Topical BID Sg Stewart MD   ondansetron 4 mg Intravenous Q6H PRN Sg Stewart MD   oxyCODONE 10 mg Oral Q4H PRN Sg Stewrat MD   pneumococcal 23-valent polysaccharide vaccine 0 5 mL Subcutaneous Prior to discharge Millie Kassie Ackerman MD   polyethylene glycol 17 g Oral Daily PRN Placido Greco MD   simethicone 80 mg Oral 4x Daily PRN Placido Greco MD   tamsulosin 0 4 mg Oral QPM Placido Greco MD     Continuous Infusions:   PRN Meds:   acetaminophen    aluminum-magnesium hydroxide-simethicone    calcium carbonate    ondansetron    oxyCODONE    pneumococcal 23-valent polysaccharide vaccine    polyethylene glycol    simethicone

## 2020-02-06 NOTE — PROGRESS NOTES
Progress Note Vanessa Noble 1945, 66 y o  male MRN: [id number]    Unit/Bed#: 423-01 Encounter: [id number]    Primary Care Provider: Mario Carrasquillo MD   Date and time admitted to hospital: 2/5/2020 12:13 PM        * Hypoglycemia  Assessment & Plan  Presentation with evidence of hypoglycemia that according to the patient had been ongoing for at least 1 week, but also intermittently occurring over the past year  Review of Insulin dosing with PCPs office with Lantus prescribed at 60U BID - however Mr Eze Calderon reports taking 90 units of glargine twice daily at home in addition to his short-acting insulin  - Insulin has remained on hold - greater than 24 hours since last dose  Plan on restarting Lantus at 50U tonight    - Discontinue D5 NS and monitor blood sugars closely  - Continue to encourage oral intake - continue regular diet for now given initial low blood sugar   - Monitor blood sugars closely with frequent Accu-Cheks, continue hypoglycemia protocol  VTE Pharmacologic Prophylaxis:   Pharmacologic: Heparin  Mechanical VTE Prophylaxis in Place: Yes    Patient Centered Rounds: I have performed bedside rounds with nursing staff today  Discussions with Specialists or Other Care Team Provider: No    Education and Discussions with Family / Patient:  Wife present at time of visit and discussion/education    Time Spent for Care: 20 minutes  More than 50% of total time spent on counseling and coordination of care as described above  Current Length of Stay: 0 day(s)    Current Patient Status: Observation   Certification Statement: The patient will continue to require additional inpatient hospital stay due to Need for further observation, re-initiation of basal insulin  Discharge Plan:  Plan on discharge early on 2/7    Code Status: Level 3 - DNAR and DNI      Subjective:   Mr Ivy Ruiz reports feeling well this morning    Has had no further hypoglycemic episodes, with blood sugars in the 100-200's  Remained on D5 normal saline at a very low rate overnight, and off of insulin  No overnight events were reported  Remains afebrile  Objective:     Vitals:   Temp (24hrs), Av 8 °F (37 1 °C), Min:97 9 °F (36 6 °C), Max:99 8 °F (37 7 °C)    Temp:  [97 9 °F (36 6 °C)-99 8 °F (37 7 °C)] 98 7 °F (37 1 °C)  HR:  [] 77  Resp:  [15-20] 18  BP: (144-193)/() 144/66  SpO2:  [90 %-98 %] 92 %  Body mass index is 48 75 kg/m²  Input and Output Summary (last 24 hours):     No intake or output data in the 24 hours ending 20 1317    Physical Exam:   Constitutional: He is oriented to person, place, and time  He appears well-developed and well-nourished  Cardiovascular: Normal rate, regular rhythm and normal heart sounds  Exam reveals no gallop and no friction rub  No murmur heard  Pulmonary/Chest: Effort normal and breath sounds normal  No respiratory distress  He has no wheezes  He has no rales  Abdominal: Soft  Bowel sounds are normal  He exhibits no distension  There is no tenderness  There is no rebound and no guarding  Obese in contour   Musculoskeletal: He exhibits edema  2-3 + b/l LE Edema - chronic in appearance  Surrounding chronic hyperpigmentation noted as well  Neurological: He is alert and oriented to person, place, and time  No cranial nerve deficit  Skin: Skin is warm and dry  Psychiatric: He has a normal mood and affect  His behavior is normal    Nursing note and vitals reviewed    Additional Data:     Labs:    Results from last 7 days   Lab Units 20  1919 20  1233   WBC Thousand/uL  --  10 61*   HEMOGLOBIN g/dL  --  13 4   HEMATOCRIT %  --  42 4   PLATELETS Thousands/uL 232 214   NEUTROS PCT %  --  74   LYMPHS PCT %  --  14   MONOS PCT %  --  7   EOS PCT %  --  5     Results from last 7 days   Lab Units 20  0520 20  1233   SODIUM mmol/L 142 139   POTASSIUM mmol/L 4 2 4 0   CHLORIDE mmol/L 107 104   CO2 mmol/L 26 30   BUN mg/dL 27* 23 CREATININE mg/dL 1 42* 1 17   ANION GAP mmol/L 9 5   CALCIUM mg/dL 8 8 8 7   ALBUMIN g/dL  --  3 2*   TOTAL BILIRUBIN mg/dL  --  0 40   ALK PHOS U/L  --  138*   ALT U/L  --  22   AST U/L  --  19   GLUCOSE RANDOM mg/dL 171* 185*         Results from last 7 days   Lab Units 02/06/20  1211 02/06/20  0807 02/06/20  0216 02/06/20  0023 02/05/20  1954 02/05/20  1736 02/05/20  1411 02/05/20  1408 02/05/20  1251   POC GLUCOSE mg/dl 175* 148* 223* 107 113 124 55* 49* 114     Results from last 7 days   Lab Units 02/05/20  1919   HEMOGLOBIN A1C % 6 6*               * I Have Reviewed All Lab Data Listed Above  * Additional Pertinent Lab Tests Reviewed:  All Labs Within Last 24 Hours Reviewed    Recent Cultures (last 7 days):           Last 24 Hours Medication List:     Current Facility-Administered Medications:  acetaminophen 650 mg Oral Q6H PRN Toshia Valles MD   aluminum-magnesium hydroxide-simethicone 30 mL Oral Q6H PRN Romina Albert MD   aspirin 81 mg Oral Daily Toshia Valles MD   atorvastatin 40 mg Oral After Tyrone Germain MD   calcium carbonate 1,000 mg Oral Daily PRN Toshia Valles MD   carvedilol 12 5 mg Oral BID Toshia Valles MD   cholecalciferol 2,000 Units Oral Daily Toshia Valles MD   finasteride 5 mg Oral Daily Toshia Valles MD   gabapentin 600 mg Oral BID Toshia Valles MD   heparin (porcine) 5,000 Units Subcutaneous Q8H Elmer Meyer MD   lisinopril 5 mg Oral Daily Toshia Valles MD   nystatin  Topical BID Toshia Valles MD   ondansetron 4 mg Intravenous Q6H PRN Toshia Valles MD   oxyCODONE 10 mg Oral Q4H PRN Toshia Valles MD   pneumococcal 23-valent polysaccharide vaccine 0 5 mL Subcutaneous Prior to discharge Romina Albert MD   polyethylene glycol 17 g Oral Daily PRN Toshia Valles MD   simethicone 80 mg Oral 4x Daily PRN Toshia Valles MD   tamsulosin 0 4 mg Oral QPM Toshia Valles MD        Today, Patient Was Seen By: Toshia Valles MD    ** Please Note: Dictation voice to text software may have been used in the creation of this document   **

## 2020-02-06 NOTE — PLAN OF CARE
Problem: Potential for Falls  Goal: Patient will remain free of falls  Description  INTERVENTIONS:  - Assess patient frequently for physical needs  -  Identify cognitive and physical deficits and behaviors that affect risk of falls    -  [place] fall precautions as indicated by assessment   - Educate patient/family on patient safety including physical limitations  - Instruct patient to call for assistance with activity based on assessment  - Modify environment to reduce risk of injury  - Consider OT/PT consult to assist with strengthening/mobility  Outcome: Progressing     Problem: Prexisting or High Potential for Compromised Skin Integrity  Goal: Skin integrity is maintained or improved  Description  INTERVENTIONS:  - Identify patients at risk for skin breakdown  - Assess and monitor skin integrity  - Assess and monitor nutrition and hydration status  - Monitor labs   - Assess for incontinence   - Turn and reposition patient  - Assist with mobility/ambulation  - Relieve pressure over bony prominences  - Avoid friction and shearing  - Provide appropriate hygiene as needed including keeping skin clean and dry  - Evaluate need for skin moisturizer/barrier cream  - Collaborate with interdisciplinary team   - Patient/family teaching  - Consider wound care consult   Outcome: Progressing     Problem: METABOLIC, FLUID AND ELECTROLYTES - ADULT  Goal: Electrolytes maintained within normal limits  Description  INTERVENTIONS:  - Monitor labs and assess patient for signs and symptoms of electrolyte imbalances  - Administer electrolyte replacement as ordered  - Monitor response to electrolyte replacements, including repeat lab results as appropriate  - Instruct patient on fluid and nutrition as appropriate  Outcome: Progressing  Goal: Fluid balance maintained  Description  INTERVENTIONS:  - Monitor labs   - Monitor I/O and WT  - Instruct patient on fluid and nutrition as appropriate  - Assess for signs & symptoms of volume excess or deficit  Outcome: Progressing  Goal: Glucose maintained within target range  Description  INTERVENTIONS:  - Monitor Blood Glucose as ordered  - Assess for signs and symptoms of hypoglycemia  - Administer ordered medications to maintain glucose within target range  - Assess nutritional intake and initiate nutrition service referral as needed   Outcome: Progressing     Problem: SKIN/TISSUE INTEGRITY - ADULT  Goal: Skin integrity remains intact  Description  INTERVENTIONS  - Identify patients at risk for skin breakdown  - Assess and monitor skin integrity  - Assess and monitor nutrition and hydration status  - Monitor labs (i e  albumin)  - Assess for incontinence   - Turn and reposition patient  - Assist with mobility/ambulation  - Relieve pressure over bony prominences  - Avoid friction and shearing  - Provide appropriate hygiene as needed including keeping skin clean and dry  - Evaluate need for skin moisturizer/barrier cream  - Collaborate with interdisciplinary team (i e  Nutrition, Rehabilitation, etc )   - Patient/family teaching  Outcome: Progressing  Goal: Incision(s), wounds(s) or drain site(s) healing without S/S of infection  Description  INTERVENTIONS  - Assess and document risk factors for skin impairment   - Assess and document dressing, incision, wound bed, drain sites and surrounding tissue  - Consider nutrition services referral as needed  - Oral mucous membranes remain intact  - Provide patient/ family education  Outcome: Progressing  Goal: Oral mucous membranes remain intact  Description  INTERVENTIONS  - Assess oral mucosa and hygiene practices  - Implement preventative oral hygiene regimen  - Implement oral medicated treatments as ordered  - Initiate Nutrition services referral as needed  Outcome: Progressing     Problem: INFECTION - ADULT  Goal: Absence or prevention of progression during hospitalization  Description  INTERVENTIONS:  - Assess and monitor for signs and symptoms of infection  - Monitor lab/diagnostic results  - Monitor all insertion sites, i e  indwelling lines, tubes, and drains  - Monitor endotracheal if appropriate and nasal secretions for changes in amount and color  - [place] appropriate cooling/warming therapies per order  - Administer medications as ordered  - Instruct and encourage patient and family to use good hand hygiene technique  - Identify and instruct in appropriate isolation precautions for identified infection/condition  Outcome: Progressing     Problem: SAFETY ADULT  Goal: Maintain or return to baseline ADL function  Description  INTERVENTIONS:  -  Assess patient's ability to carry out ADLs; assess patient's baseline for ADL function and identify physical deficits which impact ability to perform ADLs (bathing, care of mouth/teeth, toileting, grooming, dressing, etc )  - Assess/evaluate cause of self-care deficits   - Assess range of motion  - Assess patient's mobility; develop plan if impaired  - Assess patient's need for assistive devices and provide as appropriate  - Encourage maximum independence but intervene and supervise when necessary  - Involve family in performance of ADLs  - Assess for home care needs following discharge   - Consider OT consult to assist with ADL evaluation and planning for discharge  - Provide patient education as appropriate  Outcome: Progressing  Goal: Maintain or return mobility status to optimal level  Description  INTERVENTIONS:  - Assess patient's baseline mobility status (ambulation, transfers, stairs, etc )    - Identify cognitive and physical deficits and behaviors that affect mobility  - Identify mobility aids required to assist with transfers and/or ambulation (gait belt, sit-to-stand, lift, walker, cane, etc )  - [place] fall precautions as indicated by assessment  - Record patient progress and toleration of activity level on Mobility SBAR; progress patient to next Phase/Stage  - Instruct patient to call for assistance with activity based on assessment  - Consider rehabilitation consult to assist with strengthening/weightbearing, etc   Outcome: Progressing     Problem: DISCHARGE PLANNING  Goal: Discharge to home or other facility with appropriate resources  Description  INTERVENTIONS:  - Identify barriers to discharge w/patient and caregiver  - Arrange for needed discharge resources and transportation as appropriate  - Identify discharge learning needs (meds, wound care, etc )  - Arrange for interpretive services to assist at discharge as needed  - Refer to Case Management Department for coordinating discharge planning if the patient needs post-hospital services based on physician/advanced practitioner order or complex needs related to functional status, cognitive ability, or social support system  Outcome: Progressing     Problem: Knowledge Deficit  Goal: Patient/family/caregiver demonstrates understanding of disease process, treatment plan, medications, and discharge instructions  Description  Complete learning assessment and assess knowledge base    Interventions:  - Provide teaching at level of understanding  - Provide teaching via preferred learning methods  Outcome: Progressing

## 2020-02-06 NOTE — NURSING NOTE
Patient's FSBS was taken as per orders and was 223  [place] JOE made aware  No further orders at this time

## 2020-02-06 NOTE — ASSESSMENT & PLAN NOTE
Presentation with evidence of hypoglycemia that according to the patient had been ongoing for at least 1 week, but also intermittently occurring over the past year  Review of Insulin dosing with PCPs office with Lantus prescribed at 60U BID - however Mr  Lucrecia Parker reports taking 90 units of glargine twice daily at home in addition to his short-acting insulin  - Insulin has remained on hold - greater than 24 hours since last dose  Plan on restarting Lantus at 50U tonight    - Discontinue D5 NS and monitor blood sugars closely  - Continue to encourage oral intake - continue regular diet for now given initial low blood sugar   - Monitor blood sugars closely with frequent Accu-Cheks, continue hypoglycemia protocol

## 2020-02-06 NOTE — UTILIZATION REVIEW
Initial Clinical Review    Admission: Date/Time/Statement: Admission Orders (From admission, onward)     Ordered        02/05/20 1532  Place in Observation  Once                   Orders Placed This Encounter   Procedures    Place in Observation     Standing Status:   Standing     Number of Occurrences:   1     Order Specific Question:   Admitting Physician     Answer:   Kalin Moody [[id number]]     Order Specific Question:   Level of Care     Answer:   Med Surg [16]     ED Arrival Information     Expected Arrival Acuity Means of Arrival Escorted By Service Admission Type    - 2/5/2020 12:13 Emergent Ambulance [facility] Emergency    Arrival Complaint    low sugar        Chief Complaint   Patient presents with    Hypoglycemia - Symptomatic     pt comes in EMS states wife had trouble waking him this morning  pt was given glucose tablets  Assessment/Plan:   65 yo male presents to the ED with complaints of low blood sugars as measured at home, manifested by lightheadedness/dizziness, as well as diaphoresis as reported by his spouse  He states that this has been occurring on and off over the past week, and when further questioned admits to intermittent hypoglycemic episodes occurring as far back as a year ago  He denies skipping meals, decreased oral intake, or any recent adjustment to his home insulin regimen  However upon further questioning it appears that he takes approximately 90 units of insulin glargine twice daily at home, but with his current hospital medication list showing a dosing of 20 units once daily  Review of prior discharge from summer of 2019 confirms this dosing as well  Patient however is adamant about his insulin dosing  While in the emergency department Mr Nieves Angulo was noted to be hypoglycemic, despite administration of amps of D50 he continued to have hypoglycemic events with blood sugars recorded at 49 in 55    Hypoglycemia  Assessment & Plan  Presentation with evidence of hypoglycemia that according to the patient has been ongoing for at least 1 week, but also intermittently occurring over the past year  Review of his insulin regimen with evidence of relative low-dose once daily prescribed basal insulin, with Lantus at 20 units once a day  However Mr  Dagoberto Hughes reports taking 90 units of glargine twice daily at home in addition to his short-acting insulin      - Hold all insulin, including short and long-acting basal insulin for the time being   - Continue D5 NS at 50 mL/HR   - Encourage oral intake and order a regular diet for tonight  - Monitor blood sugars closely with frequent Accu-Cheks, order the hypoglycemia protocol   - Review insulin regimen in the morning once patient's spouse is back in the hospital, and attempt to ascertain patient's true dosing via PCPs office and pharmacy in the morning      ED Triage Vitals [02/05/20 1223]   Temperature Pulse Respirations Blood Pressure SpO2   (!) 96 7 °F (35 9 °C) 76 18 (!) 174/87 90 %      Temp Source Heart Rate Source Patient Position - Orthostatic VS BP Location FiO2 (%)   Temporal Monitor Lying Right arm --      Pain Score       No Pain        Wt Readings from Last 1 Encounters:   02/06/20 (!) 137 kg (302 lb 0 5 oz)     Additional Vital Signs:   Date/Time  Temp  Pulse  Resp  BP  MAP (mmHg)  SpO2  O2 Device  Patient Position - Orthostatic VS   02/06/20 06:56:43  98 7 °F (37 1 °C)  77  18  144/66  92  92 %  --  --   02/05/20 23:28:55  99 8 °F (37 7 °C)  101  18  148/82  104  90 %  --  --   02/05/20 2125  --  --  --  --  --  --  None (Room air)  --   02/05/20 2019  --  --  --  168/86  --  --  --  Lying   02/05/20 19:23:07  --  93  --  193/158Abnormal   170  91 %  --  --   Pertinent Labs/Diagnostic Test Results:   Results from last 7 days   Lab Units 02/05/20  1919 02/05/20  1233   WBC Thousand/uL  --  10 61*   HEMOGLOBIN g/dL  --  13 4   HEMATOCRIT %  --  42 4   PLATELETS Thousands/uL 232 214   NEUTROS ABS Thousands/µL --  7 81*     Results from last 7 days   Lab Units 02/06/20  0520 02/05/20  1233   SODIUM mmol/L 142 139   POTASSIUM mmol/L 4 2 4 0   CHLORIDE mmol/L 107 104   CO2 mmol/L 26 30   ANION GAP mmol/L 9 5   BUN mg/dL 27* 23   CREATININE mg/dL 1 42* 1 17   EGFR ml/min/1 73sq m 48 60   CALCIUM mg/dL 8 8 8 7     Results from last 7 days   Lab Units 02/05/20  1233   AST U/L 19   ALT U/L 22   ALK PHOS U/L 138*   TOTAL PROTEIN g/dL 7 2   ALBUMIN g/dL 3 2*   TOTAL BILIRUBIN mg/dL 0 40     Results from last 7 days   Lab Units 02/06/20  0807 02/06/20  0216 02/06/20  0023 02/05/20  1954 02/05/20  1736 02/05/20  1411 02/05/20  1408 02/05/20  1251   POC GLUCOSE mg/dl 148* 223* 107 113 124 55* 49* 114     Results from last 7 days   Lab Units 02/06/20  0520 02/05/20  1233   GLUCOSE RANDOM mg/dL 171* 185*     Results from last 7 days   Lab Units 02/05/20  1919   HEMOGLOBIN A1C % 6 6*   EAG mg/dl 143     Results from last 7 days   Lab Units 02/05/20  1305   TROPONIN I ng/mL <0 02 2/5  ekg=Sinus rhythm with 1st degree A-V block  Left anterior fascicular block  Right bundle branch block    ED Treatment:   Medication Administration from 02/05/2020 1213 to 02/05/2020 1714       Date/Time Order Dose Route     02/05/2020 1228 dextrose 50 % IV solution 50 mL 50 mL Intravenous     02/05/2020 1420 dextrose 50 % IV solution 50 mL 50 mL Intravenous        Past Medical History:   Diagnosis Date    Cataract     CHF (congestive heart failure) (HCC)     chronic diastolic CHF    Coronary artery disease     Diabetes mellitus ([place] [address] )     Glaucoma     Hyperlipidemia     Hypertension     Neuropathy     Obesity     Renal disorder     chronic kidney disease stage 3     Sleep apnea     Stroke ([facility] )     TIA (transient ischemic attack)     Uncontrolled type 2 diabetes mellitus with hyperglycemia, with long-term current use of insulin ([facility] ) 10/4/2018     Admitting Diagnosis: Hypoglycemia [E16 2]  Low blood sugar [E16 2]  Age/Sex: 66 y o  male  Admission Orders:  Med surg  o2 to keep sats>92%  Consult nutrition  Scd/foot pumps  accuchecks with coverage scale  Scheduled Medications:  aspirin 81 mg Oral Daily   atorvastatin 40 mg Oral After Dinner   carvedilol 12 5 mg Oral BID   cholecalciferol 2,000 Units Oral Daily   finasteride 5 mg Oral Daily   gabapentin 600 mg Oral BID   heparin (porcine) 5,000 Units Subcutaneous Q8H [address]   lisinopril 5 mg Oral Daily   nystatin  Topical BID   tamsulosin 0 4 mg Oral QPM     Continuous IV Infusions:  dextrose 5 % and sodium chloride 0 9 % 50 mL/hr Intravenous Continuous     PRN Meds:  acetaminophen 650 mg Oral Q6H PRN   aluminum-magnesium hydroxide-simethicone 30 mL Oral Q6H PRN   calcium carbonate 1,000 mg Oral Daily PRN   ondansetron 4 mg Intravenous Q6H PRN   oxyCODONE 10 mg Oral Q4H PRN   pneumococcal 23-valent polysaccharide vaccine 0 5 mL Subcutaneous Prior to discharge   polyethylene glycol 17 g Oral Daily PRN   simethicone 80 mg Oral 4x Daily PRN       Network Utilization Review Department  [email or web address]  ATTENTION: Please call with any questions or concerns to [phone] and carefully listen to the prompts so that you are directed to the right person  All voicemails are confidential   [employer] all requests for admission clinical reviews, approved or denied determinations and any other requests to dedicated fax number below belonging to the campus where the patient is receiving treatment   List of dedicated fax numbers for the Facilities:  FACILITY NAME UR FAX NUMBER   ADMISSION DENIALS (Administrative/Medical Necessity) [phone]   PARENT CHILD HEALTH (Maternity/NICU/Pediatrics) [phone]   Denys Garg [phone]   [facility] [address] [facility] [address] [place] [phone]   [facility] [phone]   [address] [phone]

## 2020-02-07 VITALS
TEMPERATURE: 98.6 F | OXYGEN SATURATION: 93 % | HEART RATE: 72 BPM | RESPIRATION RATE: 18 BRPM | BODY MASS INDEX: 47.09 KG/M2 | DIASTOLIC BLOOD PRESSURE: 78 MMHG | HEIGHT: 66 IN | WEIGHT: 292.99 LBS | SYSTOLIC BLOOD PRESSURE: 160 MMHG

## 2020-02-07 LAB
ANION GAP SERPL CALCULATED.3IONS-SCNC: 7 MMOL/L (ref 4–13)
BUN SERPL-MCNC: 23 MG/DL (ref 5–25)
CALCIUM SERPL-MCNC: 8.6 MG/DL (ref 8.3–10.1)
CHLORIDE SERPL-SCNC: 107 MMOL/L (ref 100–108)
CO2 SERPL-SCNC: 27 MMOL/L (ref 21–32)
CREAT SERPL-MCNC: 1.09 MG/DL (ref 0.6–1.3)
GFR SERPL CREATININE-BSD FRML MDRD: 66 ML/MIN/1.73SQ M
GLUCOSE SERPL-MCNC: 151 MG/DL (ref 65–140)
GLUCOSE SERPL-MCNC: 161 MG/DL (ref 65–140)
GLUCOSE SERPL-MCNC: 195 MG/DL (ref 65–140)
GLUCOSE SERPL-MCNC: 68 MG/DL (ref 65–140)
POTASSIUM SERPL-SCNC: 3.9 MMOL/L (ref 3.5–5.3)
SODIUM SERPL-SCNC: 141 MMOL/L (ref 136–145)

## 2020-02-07 PROCEDURE — 80048 BASIC METABOLIC PNL TOTAL CA: CPT | Performed by: INTERNAL MEDICINE

## 2020-02-07 PROCEDURE — 82948 REAGENT STRIP/BLOOD GLUCOSE: CPT

## 2020-02-07 PROCEDURE — 99217 PR OBSERVATION CARE DISCHARGE MANAGEMENT: CPT | Performed by: INTERNAL MEDICINE

## 2020-02-07 RX ORDER — INSULIN GLARGINE 100 [IU]/ML
20 INJECTION, SOLUTION SUBCUTANEOUS 2 TIMES DAILY
Qty: 560 UNITS | Refills: 0
Start: 2020-02-07 | End: 2022-04-13 | Stop reason: HOSPADM

## 2020-02-07 RX ADMIN — ASPIRIN 81 MG 81 MG: 81 TABLET ORAL at 08:27

## 2020-02-07 RX ADMIN — FINASTERIDE 5 MG: 5 TABLET, FILM COATED ORAL at 08:27

## 2020-02-07 RX ADMIN — GABAPENTIN 600 MG: 300 CAPSULE ORAL at 08:27

## 2020-02-07 RX ADMIN — LISINOPRIL 5 MG: 5 TABLET ORAL at 08:27

## 2020-02-07 RX ADMIN — CARVEDILOL 12.5 MG: 12.5 TABLET, FILM COATED ORAL at 08:27

## 2020-02-07 RX ADMIN — VITAMIN D, TAB 1000IU (100/BT) 2000 UNITS: 25 TAB at 08:27

## 2020-02-07 RX ADMIN — HEPARIN SODIUM 5000 UNITS: 5000 INJECTION INTRAVENOUS; SUBCUTANEOUS at 13:34

## 2020-02-07 RX ADMIN — NYSTATIN: 100000 POWDER TOPICAL at 08:48

## 2020-02-07 RX ADMIN — HEPARIN SODIUM 5000 UNITS: 5000 INJECTION INTRAVENOUS; SUBCUTANEOUS at 05:28

## 2020-02-07 NOTE — DISCHARGE SUMMARY
Discharge- Ondina Benitez 1945, 66 y o  male MRN: [id number]    Unit/Bed#: 423-01 Encounter: [id number]    Primary Care Provider: Suyapa Early MD   Date and time admitted to hospital: 2/5/2020 12:13 PM        * Hypoglycemia  Assessment & Plan  Presentation with evidence of hypoglycemia that according to the patient had been ongoing near daily for at least 1 week, intermittently over one month, but also sporadically occurring over the past year  Review of Insulin dosing with PCPs office with Lantus prescribed at 60U BID - however Mr Sami Bernal reports taking 90 units of glargine twice daily at home in addition to his short-acting insulin and weekly injection of Semaglutide  - Insulin was held, with plans on restarting Lantus at a lower dose last evening  However the patient's blood sugar maintained in the low 100s and this insulin was held last night  He was observed for 1 additional night to ensure that his blood sugar was adequate for re-initiation of some form of insulin prior to discharge, and at this time he is regularly running in the mid to high 100s as well as lower 200s  Will discharge, with recommendations for restarting Lantus, but at a very modest 20 units b i d , with close monitoring of blood sugars at home (at least 3 times daily)  Strongly suspect that he may run hyperglycemic, and will need this regimen titrated  However, he should be safe and without any further episodes of hypoglycemia  Also advised that he call his PCPs office if blood sugars routinely remained above 400 or are less than 70         Discharging Physician / Practitioner: Traci Fan MD  PCP: Suyapa Early MD  Admission Date:   Admission Orders (From admission, onward)     Ordered        02/05/20 1532  Place in Observation  Once                   Discharge Date: 02/07/20    Resolved Problems  Date Reviewed: 2/7/2020    None          Consultations During Hospital Stay:  · None    Procedures Performed:   · None    Significant Findings / Test Results:   None    Incidental Findings:   · None     Test Results Pending at Discharge (will require follow up): · None     Outpatient Tests Requested:  · None    Complications:  None    Reason for Admission: Persistent Hypoglycemia    Hospital Course:   25-year-old man with past medical history significant for IDDM, admitted from [facility] ED on 02/05 with a diagnosis of persistent hypoglycemia      Mr Margaux Hirsch has past medical history significant for insulin-dependent diabetes, prior TIA/CVA, BHARGAVI, HTN, and dyslipidemia  He also has mention of CAD, as well as evidence of grade 1 diastolic dysfunction by prior ECHO in 2019  He has been previously admitted and treated for CHF exacerbation, as well as KIRT secondary to urinary retention and prerenal azotemia  The patient presented to the ED with complaints of low blood sugars as measured at home, manifested by lightheadedness/dizziness, as well as diaphoresis as reported by his spouse  He states that this has been occurring on and off over the past week but on a near daily basis, and when further questioned admits to intermittent hypoglycemic episodes over the prior month and occurring as far back as a year ago  He denied skipping meals, decreased oral intake, or any recent adjustment to his home insulin regimen  However upon further questioning it appears that he takes approximately 90 units of insulin glargine twice daily at home, but with his actual daily does being 60 units twice daily  He is also on once weekly Ozempic  While in the emergency department Mr Vesna Sal was noted to be hypoglycemic, despite administration of amps of D50 he continued to have hypoglycemic events with blood sugars recorded at 49 in 55  He is being admitted for further evaluation and treatment of his hypoglycemia      Following admission the patient was maintained on IV fluids with dextrose, and is insulin regimen was held  His blood sugars slowly recovered and over the course of the next 2 days there routinely in the high 100s and low to mid 200s prior to discharge  A good deal of Education was provided for the patient prior to his discharge  He is being tentatively sent home with recommended dosing of 20 units of Lantus b i d , with very careful monitoring of his blood sugar and close follow-up with his primary in short course fashion  Please see above list of diagnoses and related plan for additional information  Condition at Discharge: stable     Discharge Day Visit / Exam:     Subjective:  Patient has no complaints this morning, and feels well  No overnight events were reported  Blood sugars are appropriate and in the 100-200 range  Remains afebrile  Vitals: Blood Pressure: 160/78 (02/07/20 0718)  Pulse: 72 (02/07/20 0718)  Temperature: 98 6 °F (37 °C) (02/07/20 0718)  Temp Source: Oral (02/05/20 1727)  Respirations: 18 (02/07/20 0718)  Height: 5' 6" (167 6 cm) (02/05/20 1727)  Weight - Scale: 133 kg (292 lb 15 9 oz) (02/07/20 0535)  SpO2: 93 % (02/07/20 0718)  Exam:   Constitutional: He is oriented to person, place, and time  He appears well-developed and well-nourished  HENT:   Head: Normocephalic and atraumatic  Eyes: EOM are normal    Neck: Normal range of motion  Cardiovascular: Normal rate, regular rhythm and normal heart sounds  Exam reveals no gallop and no friction rub  No murmur heard  Pulmonary/Chest: Effort normal and breath sounds normal  No respiratory distress  He has no wheezes  He has no rales  Abdominal: Soft  Bowel sounds are normal  He exhibits no distension  There is no tenderness  There is no rebound and no guarding  Obese in contour   Musculoskeletal: He exhibits edema  2-3 + b/l LE Edema - chronic in appearance  Surrounding chronic hyperpigmentation noted as well  Neurological: He is alert and oriented to person, place, and time  No cranial nerve deficit     Skin: Skin is warm and dry  Psychiatric: He has a normal mood and affect  His behavior is normal    Nursing note and vitals reviewed  Discussion with Family: Yes    Discharge instructions/Information to patient and family:   See after visit summary for information provided to patient and family  Provisions for Follow-Up Care:  See after visit summary for information related to follow-up care and any pertinent home health orders  Disposition:     Home    For Discharges to [facility]:   · Not Applicable to this Patient - Not Applicable to this Patient    Planned Readmission: No     Discharge Statement:  I spent 40 minutes discharging the patient  This time was spent on the day of discharge  I had direct contact with the patient on the day of discharge  Greater than 50% of the total time was spent examining patient, answering all patient questions, arranging and discussing plan of care with patient as well as directly providing post-discharge instructions  Additional time then spent on discharge activities  Discharge Medications:  See after visit summary for reconciled discharge medications provided to patient and family        ** Please Note: This note has been constructed using a voice recognition system **

## 2020-02-07 NOTE — SOCIAL WORK
Discussed with patient preferences on discharge;understanding how to manage health at home; purpose of taking medications; importance of follow up care/appointments; and symptoms to watch out for once discharged home  Pt is being dc'd home on this date with outpatient follow up with his PCP:Douglas Shoenenberger

## 2020-02-07 NOTE — PLAN OF CARE
Problem: Potential for Falls  Goal: Patient will remain free of falls  Description  INTERVENTIONS:  - Assess patient frequently for physical needs  -  Identify cognitive and physical deficits and behaviors that affect risk of falls    -  [place] fall precautions as indicated by assessment   - Educate patient/family on patient safety including physical limitations  - Instruct patient to call for assistance with activity based on assessment  - Modify environment to reduce risk of injury  - Consider OT/PT consult to assist with strengthening/mobility  Outcome: Progressing     Problem: Prexisting or High Potential for Compromised Skin Integrity  Goal: Skin integrity is maintained or improved  Description  INTERVENTIONS:  - Identify patients at risk for skin breakdown  - Assess and monitor skin integrity  - Assess and monitor nutrition and hydration status  - Monitor labs   - Assess for incontinence   - Turn and reposition patient  - Assist with mobility/ambulation  - Relieve pressure over bony prominences  - Avoid friction and shearing  - Provide appropriate hygiene as needed including keeping skin clean and dry  - Evaluate need for skin moisturizer/barrier cream  - Collaborate with interdisciplinary team   - Patient/family teaching  - Consider wound care consult   Outcome: Progressing     Problem: METABOLIC, FLUID AND ELECTROLYTES - ADULT  Goal: Electrolytes maintained within normal limits  Description  INTERVENTIONS:  - Monitor labs and assess patient for signs and symptoms of electrolyte imbalances  - Administer electrolyte replacement as ordered  - Monitor response to electrolyte replacements, including repeat lab results as appropriate  - Instruct patient on fluid and nutrition as appropriate  Outcome: Progressing  Goal: Fluid balance maintained  Description  INTERVENTIONS:  - Monitor labs   - Monitor I/O and WT  - Instruct patient on fluid and nutrition as appropriate  - Assess for signs & symptoms of volume excess or deficit  Outcome: Progressing  Goal: Glucose maintained within target range  Description  INTERVENTIONS:  - Monitor Blood Glucose as ordered  - Assess for signs and symptoms of hypoglycemia  - Administer ordered medications to maintain glucose within target range  - Assess nutritional intake and initiate nutrition service referral as needed   Outcome: Progressing     Problem: SKIN/TISSUE INTEGRITY - ADULT  Goal: Skin integrity remains intact  Description  INTERVENTIONS  - Identify patients at risk for skin breakdown  - Assess and monitor skin integrity  - Assess and monitor nutrition and hydration status  - Monitor labs (i e  albumin)  - Assess for incontinence   - Turn and reposition patient  - Assist with mobility/ambulation  - Relieve pressure over bony prominences  - Avoid friction and shearing  - Provide appropriate hygiene as needed including keeping skin clean and dry  - Evaluate need for skin moisturizer/barrier cream  - Collaborate with interdisciplinary team (i e  Nutrition, Rehabilitation, etc )   - Patient/family teaching  Outcome: Progressing  Goal: Incision(s), wounds(s) or drain site(s) healing without S/S of infection  Description  INTERVENTIONS  - Assess and document risk factors for skin impairment   - Assess and document dressing, incision, wound bed, drain sites and surrounding tissue  - Consider nutrition services referral as needed  - Oral mucous membranes remain intact  - Provide patient/ family education  Outcome: Progressing  Goal: Oral mucous membranes remain intact  Description  INTERVENTIONS  - Assess oral mucosa and hygiene practices  - Implement preventative oral hygiene regimen  - Implement oral medicated treatments as ordered  - Initiate Nutrition services referral as needed  Outcome: Progressing     Problem: INFECTION - ADULT  Goal: Absence or prevention of progression during hospitalization  Description  INTERVENTIONS:  - Assess and monitor for signs and symptoms of infection  - Monitor lab/diagnostic results  - Monitor all insertion sites, i e  indwelling lines, tubes, and drains  - Monitor endotracheal if appropriate and nasal secretions for changes in amount and color  - [place] appropriate cooling/warming therapies per order  - Administer medications as ordered  - Instruct and encourage patient and family to use good hand hygiene technique  - Identify and instruct in appropriate isolation precautions for identified infection/condition  Outcome: Progressing     Problem: SAFETY ADULT  Goal: Maintain or return to baseline ADL function  Description  INTERVENTIONS:  -  Assess patient's ability to carry out ADLs; assess patient's baseline for ADL function and identify physical deficits which impact ability to perform ADLs (bathing, care of mouth/teeth, toileting, grooming, dressing, etc )  - Assess/evaluate cause of self-care deficits   - Assess range of motion  - Assess patient's mobility; develop plan if impaired  - Assess patient's need for assistive devices and provide as appropriate  - Encourage maximum independence but intervene and supervise when necessary  - Involve family in performance of ADLs  - Assess for home care needs following discharge   - Consider OT consult to assist with ADL evaluation and planning for discharge  - Provide patient education as appropriate  Outcome: Progressing  Goal: Maintain or return mobility status to optimal level  Description  INTERVENTIONS:  - Assess patient's baseline mobility status (ambulation, transfers, stairs, etc )    - Identify cognitive and physical deficits and behaviors that affect mobility  - Identify mobility aids required to assist with transfers and/or ambulation (gait belt, sit-to-stand, lift, walker, cane, etc )  - [place] fall precautions as indicated by assessment  - Record patient progress and toleration of activity level on Mobility SBAR; progress patient to next Phase/Stage  - Instruct patient to call for assistance with activity based on assessment  - Consider rehabilitation consult to assist with strengthening/weightbearing, etc   Outcome: Progressing     Problem: DISCHARGE PLANNING  Goal: Discharge to home or other facility with appropriate resources  Description  INTERVENTIONS:  - Identify barriers to discharge w/patient and caregiver  - Arrange for needed discharge resources and transportation as appropriate  - Identify discharge learning needs (meds, wound care, etc )  - Arrange for interpretive services to assist at discharge as needed  - Refer to Case Management Department for coordinating discharge planning if the patient needs post-hospital services based on physician/advanced practitioner order or complex needs related to functional status, cognitive ability, or social support system  Outcome: Progressing     Problem: Knowledge Deficit  Goal: Patient/family/caregiver demonstrates understanding of disease process, treatment plan, medications, and discharge instructions  Description  Complete learning assessment and assess knowledge base    Interventions:  - Provide teaching at level of understanding  - Provide teaching via preferred learning methods  Outcome: Progressing     Problem: DISCHARGE PLANNING - CARE MANAGEMENT  Goal: Discharge to post-acute care or home with appropriate resources  Description  INTERVENTIONS:  - Conduct assessment to determine patient/family and health care team treatment goals, and need for post-acute services based on payer coverage, community resources, and patient preferences, and barriers to discharge  - Address psychosocial, clinical, and financial barriers to discharge as identified in assessment in conjunction with the patient/family and health care team  - Arrange appropriate level of post-acute services according to patient's   needs and preference and payer coverage in collaboration with the physician and health care team  - Communicate with and update the patient/family, physician, and health care team regarding progress on the discharge plan  - Arrange appropriate transportation to post-acute venues  -home on dc with outpatient follow up   Outcome: Progressing     Problem: Nutrition/Hydration-ADULT  Goal: Nutrient/Hydration intake appropriate for improving, restoring or maintaining nutritional needs  Description  Monitor and assess patient's nutrition/hydration status for malnutrition  Collaborate with interdisciplinary team and initiate plan and interventions as ordered  Monitor patient's weight and dietary intake as ordered or per policy  Utilize nutrition screening tool and intervene as necessary  Determine patient's food preferences and provide high-protein, high-caloric foods as appropriate       INTERVENTIONS:  - Monitor oral intake, urinary output, labs, and treatment plans  - Assess nutrition and hydration status and recommend course of action  - Evaluate amount of meals eaten  - Assist patient with eating if necessary   - Allow adequate time for meals  - Recommend/ encourage appropriate diets, oral nutritional supplements, and vitamin/mineral supplements  - Order, calculate, and assess calorie counts as needed  - Recommend, monitor, and adjust tube feedings and TPN/PPN based on assessed needs  - Assess need for intravenous fluids  - Provide specific nutrition/hydration education as appropriate  - Include patient/family/caregiver in decisions related to nutrition  Outcome: Progressing

## 2020-02-07 NOTE — ASSESSMENT & PLAN NOTE
Presentation with evidence of hypoglycemia that according to the patient had been ongoing near daily for at least 1 week, intermittently over one month, but also sporadically occurring over the past year  Review of Insulin dosing with PCPs office with Lantus prescribed at 60U BID - however Mr  Ryan Dao reports taking 90 units of glargine twice daily at home in addition to his short-acting insulin and weekly injection of Semaglutide  - Insulin was held, with plans on restarting Lantus at a lower dose last evening  However the patient's blood sugar maintained in the low 100s and this insulin was held last night  He was observed for 1 additional night to ensure that his blood sugar was adequate for re-initiation of some form of insulin prior to discharge, and at this time he is regularly running in the mid to high 100s as well as lower 200s  Will discharge, with recommendations for restarting Lantus, but at a very modest 20 units b i d , with close monitoring of blood sugars at home (at least 3 times daily)  Strongly suspect that he may run hyperglycemic, and will need this regimen titrated  However, he should be safe and without any further episodes of hypoglycemia  Also advised that he call his PCPs office if blood sugars routinely remained above 400 or are less than 70

## 2020-02-07 NOTE — DISCHARGE INSTRUCTIONS
Hypoglycemia in a Person with Diabetes   WHAT YOU NEED TO KNOW:   Hypoglycemia is a serious condition that happens when your blood glucose (sugar) level drops too low  The blood sugar level is usually too high in a person with diabetes, but the level can also drop too low  It is important to follow your diabetes management plan to keep your blood sugar level steady  DISCHARGE INSTRUCTIONS:   Call 911 for any of the following:   · You feel you are going to pass out  · You have a seizure or pass out  · You have trouble thinking clearly  Seek care immediately if:  · Your blood sugar is less than 50 mg/dL and does not respond to treatment  Contact your healthcare provider if:   · You have had symptoms of low blood sugar several times  · You have questions about the amount of insulin or diabetes medicine you are taking  · You have questions or concerns about your condition or care  Medicines:   · Insulin or diabetes medicine  help to keep your blood sugar under control  · Glucagon  may be needed if you have severe hypoglycemia  · Take your medicine as directed  Contact your healthcare provider if you think your medicine is not helping or if you have side effects  Tell him or her if you are allergic to any medicine  Keep a list of the medicines, vitamins, and herbs you take  Include the amounts, and when and why you take them  Bring the list or the pill bottles to follow-up visits  Carry your medicine list with you in case of an emergency  Follow up with your healthcare provider or specialist as directed: You may need dose changes to your insulin or oral diabetes medicine if you have hypoglycemia  Write down your questions so you remember to ask them during your visits  Manage hypoglycemia:   · Check your blood sugar level right away if you have symptoms of hypoglycemia  Hypoglycemia is usually 70 mg/dL or below   Ask your healthcare provider what blood sugar level is too low for you     · If your blood sugar level is too low, eat or drink 15 grams of fast-acting carbohydrate  Examples of this amount of fast-acting carbohydrate are 4 ounces (½ cup) of fruit juice or 4 ounces of regular soda  Other examples are 2 tablespoons of raisins or 3 to 4 glucose tablets  Check your blood sugar level 15 minutes later  If the level is still low (less than 100 mg/dL), have another 15 grams of carbohydrate  When the level returns to 100 mg/dL, eat a snack or meal that contains carbohydrates  This will help prevent another drop in blood sugar  Always carefully follow your healthcare provider's instructions on how to treat low blood sugar levels  · Always carry a source of fast-acting carbohydrate  If you have symptoms of hypoglycemia and you do not have a blood glucose meter, have a source of fast-acting carbohydrate anyway  Avoid carbohydrate foods that are high in fat  The fat content may make it take longer to increase your blood sugar level  Ask your healthcare provider if you should carry a glucagon kit  Glucagon is a medicine that is injected when you develop severe hypoglycemia and become unconscious  Check the expiration date every month and replace it before it expires  · Teach others how to help you if you have symptoms of hypoglycemia  Tell them about the symptoms of hypoglycemia  Ask them to give you a source of fast-acting carbohydrate if you cannot get it yourself  Ask them to give you a glucagon injection if you have symptoms of hypoglycemia and you become unconscious or have a seizure  Ask them to call 911   This is an emergency  Tell them never to try to make you swallow anything if you faint or have a seizure  · Wear medical alert jewelry  or carry a card that says you have diabetes  Ask where to get these items  Prevent hypoglycemia:   · Take diabetes medicine as directed  Take your medicine at the right time and in the right amount   Your healthcare provider may change your blood sugar goals if you get hypoglycemia often  · Eat regular meals and snacks  Talk to your dietitian or healthcare provider about a meal plan that is right for you  Do not skip meals  · Check your blood sugar level as directed  Ask your healthcare provider what your blood sugar levels should be before and after you eat  Ask when and how often to check your blood sugar level  You may need to check at least 3 times each day  Record your blood sugar level results and take the record with you when you see your healthcare provider  Your provider may use the record to make changes to your medicine, food, or exercise schedules  · Check your blood sugar level before you exercise  Exercise can decrease your blood sugar level  If your blood sugar level is less than 100 mg/dL, have a carbohydrate snack  Examples are 4 to 6 crackers, ½ banana, 8 ounces (1 cup) of nonfat or 1% milk, or 4 ounces (½ cup) of juice  If you will exercise for more than 1 hour, you may need to check your blood sugar level every 30 minutes  Your healthcare provider may also recommend that you check your blood sugar level after exercise  · Be aware of how alcohol affects your blood sugar level  Alcohol can cause your blood sugar level to drop for up to 12 hours after drinking  Ask your healthcare provider if alcohol is safe for you  If you drink alcohol, always have a snack or meal at the same time  Women should limit alcohol to 1 drink a day  Men should limit alcohol to 2 drinks a day  A drink of alcohol is 12 ounces of beer, 5 ounces of wine, or 1½ ounces of liquor  © 2017 [employer]  Information is for End User's use only and may not be sold, redistributed or otherwise used for commercial purposes  All illustrations and images included in CareNotes® are the copyrighted property of [employer]  or [employer]by  The above information is an  only   It is not intended as medical advice for individual conditions or treatments  Talk to your doctor, nurse or pharmacist before following any medical regimen to see if it is safe and effective for you  Hypoglycemia in a Person with Diabetes   WHAT YOU NEED TO KNOW:   Hypoglycemia is a serious condition that happens when your blood glucose (sugar) level drops too low  The blood sugar level is usually too high in a person with diabetes, but the level can also drop too low  It is important to follow your diabetes management plan to keep your blood sugar level steady  DISCHARGE INSTRUCTIONS:   Call 911 for any of the following:   · You feel you are going to pass out  · You have a seizure or pass out  · You have trouble thinking clearly  Seek care immediately if:  · Your blood sugar is less than 50 mg/dL and does not respond to treatment  Contact your healthcare provider if:   · You have had symptoms of low blood sugar several times  · You have questions about the amount of insulin or diabetes medicine you are taking  · You have questions or concerns about your condition or care  Medicines:   · Insulin or diabetes medicine  help to keep your blood sugar under control  · Glucagon  may be needed if you have severe hypoglycemia  · Take your medicine as directed  Contact your healthcare provider if you think your medicine is not helping or if you have side effects  Tell him or her if you are allergic to any medicine  Keep a list of the medicines, vitamins, and herbs you take  Include the amounts, and when and why you take them  Bring the list or the pill bottles to follow-up visits  Carry your medicine list with you in case of an emergency  Follow up with your healthcare provider or specialist as directed: You may need dose changes to your insulin or oral diabetes medicine if you have hypoglycemia  Write down your questions so you remember to ask them during your visits     Manage hypoglycemia:   · Check your blood sugar level right away if you have symptoms of hypoglycemia  Hypoglycemia is usually 70 mg/dL or below  Ask your healthcare provider what blood sugar level is too low for you  · If your blood sugar level is too low, eat or drink 15 grams of fast-acting carbohydrate  Examples of this amount of fast-acting carbohydrate are 4 ounces (½ cup) of fruit juice or 4 ounces of regular soda  Other examples are 2 tablespoons of raisins or 3 to 4 glucose tablets  Check your blood sugar level 15 minutes later  If the level is still low (less than 100 mg/dL), have another 15 grams of carbohydrate  When the level returns to 100 mg/dL, eat a snack or meal that contains carbohydrates  This will help prevent another drop in blood sugar  Always carefully follow your healthcare provider's instructions on how to treat low blood sugar levels  · Always carry a source of fast-acting carbohydrate  If you have symptoms of hypoglycemia and you do not have a blood glucose meter, have a source of fast-acting carbohydrate anyway  Avoid carbohydrate foods that are high in fat  The fat content may make it take longer to increase your blood sugar level  Ask your healthcare provider if you should carry a glucagon kit  Glucagon is a medicine that is injected when you develop severe hypoglycemia and become unconscious  Check the expiration date every month and replace it before it expires  · Teach others how to help you if you have symptoms of hypoglycemia  Tell them about the symptoms of hypoglycemia  Ask them to give you a source of fast-acting carbohydrate if you cannot get it yourself  Ask them to give you a glucagon injection if you have symptoms of hypoglycemia and you become unconscious or have a seizure  Ask them to call 911   This is an emergency  Tell them never to try to make you swallow anything if you faint or have a seizure  · Wear medical alert jewelry  or carry a card that says you have diabetes   Ask where to get these items  Prevent hypoglycemia:   · Take diabetes medicine as directed  Take your medicine at the right time and in the right amount  Your healthcare provider may change your blood sugar goals if you get hypoglycemia often  · Eat regular meals and snacks  Talk to your dietitian or healthcare provider about a meal plan that is right for you  Do not skip meals  · Check your blood sugar level as directed  Ask your healthcare provider what your blood sugar levels should be before and after you eat  Ask when and how often to check your blood sugar level  You may need to check at least 3 times each day  Record your blood sugar level results and take the record with you when you see your healthcare provider  Your provider may use the record to make changes to your medicine, food, or exercise schedules  · Check your blood sugar level before you exercise  Exercise can decrease your blood sugar level  If your blood sugar level is less than 100 mg/dL, have a carbohydrate snack  Examples are 4 to 6 crackers, ½ banana, 8 ounces (1 cup) of nonfat or 1% milk, or 4 ounces (½ cup) of juice  If you will exercise for more than 1 hour, you may need to check your blood sugar level every 30 minutes  Your healthcare provider may also recommend that you check your blood sugar level after exercise  · Be aware of how alcohol affects your blood sugar level  Alcohol can cause your blood sugar level to drop for up to 12 hours after drinking  Ask your healthcare provider if alcohol is safe for you  If you drink alcohol, always have a snack or meal at the same time  Women should limit alcohol to 1 drink a day  Men should limit alcohol to 2 drinks a day  A drink of alcohol is 12 ounces of beer, 5 ounces of wine, or 1½ ounces of liquor  © 2017 [employer] Information is for End User's use only and may not be sold, redistributed or otherwise used for commercial purposes   All illustrations and images included in CareNotes® are the copyrighted property of [employer]  or [employer]by  The above information is an  only  It is not intended as medical advice for individual conditions or treatments  Talk to your doctor, nurse or pharmacist before following any medical regimen to see if it is safe and effective for you

## 2020-02-08 NOTE — ED PROVIDER NOTES
History  Chief Complaint   Patient presents with    Hypoglycemia - Symptomatic     pt comes in EMS states wife had trouble waking him this morning  pt was given glucose tablets  HPI  This is a 78-year-old male who presents for evaluation of hypoglycemia  Patient was at home, and patient was having a hard time waking this morning, they found that his sugar was low gave him glucose tablets and called EMS  When EMS arrived, patient was woken up and initial glucose was in the high 100s  Enroute to the hospital though he dropped his sugar again to the 50s and patient was given D10  On arrival to the emergency department, patient's blood glucose was in the 40s, patient was given 1 full ampules of dextrose 50  Patient's blood sugars have been running low last couple days  Patient is taking significantly more insulin than what is in his chart  Patient states that his endocrinologist was the 1 who told him to take more insulin  On arrival in the emergency department, patient denies any headache neck pain chest pain abdominal pain nausea vomiting  Prior to Admission Medications   Prescriptions Last Dose Informant Patient Reported? Taking?    BD PEN NEEDLE JUNIOR U/F 32G X 4 MM MISC 2/5/2020 at Unknown time  Yes Yes   Heparin Sodium, Porcine, (HEPARIN, PORCINE,) 5,000 units/mL Not Taking at Unknown time  No No   Sig: Inject 1 mL (5,000 Units total) under the skin every 8 (eight) hours   Patient not taking: Reported on 6/30/2019   Semaglutide (OZEMPIC, 0 25 OR 0 5 MG/DOSE, SC)   Yes Yes   Sig: Inject 0 5 mg under the skin once a week   aspirin 81 MG tablet 2/5/2020 at Unknown time  Yes Yes   Sig: Take 81 mg by mouth daily   atorvastatin (LIPITOR) 40 mg tablet 2/5/2020 at Unknown time  No Yes   Sig: Take 1 tablet (40 mg total) by mouth daily after dinner   carvedilol (COREG) 12 5 mg tablet Unknown at Unknown time  No No   Sig: Take 1 tablet (12 5 mg total) by mouth 2 (two) times a day   cholecalciferol 2000 units TABS 2/4/2020 at Unknown time  No Yes   Sig: Take 1 tablet (2,000 Units total) by mouth daily   finasteride (PROSCAR) 5 mg tablet Unknown at Unknown time  No No   Sig: Take 1 tablet (5 mg total) by mouth daily for 30 days   gabapentin (NEURONTIN) 600 MG tablet Unknown at Unknown time  No No   Sig: Take 1 tablet (600 mg total) by mouth 2 (two) times a day   glucagon (GLUCAGON EMERGENCY) 1 MG injection Unknown at Unknown time  No No   Sig: Inject 1 mg under the skin once as needed (PRN blood glucose less than 70 if unconscious or uncorrectable by oral means) for up to 1 dose   insulin glargine (LANTUS) 100 units/mL subcutaneous injection 2/5/2020 at 1000  No Yes   Sig: Inject 15 Units under the skin every morning   Patient taking differently: Inject 20 Units under the skin every morning    insulin lispro (HumaLOG) 100 units/mL injection 2/5/2020 at 1000  No Yes   Sig: Inject 3 Units under the skin 3 (three) times a day with meals   lisinopril (ZESTRIL) 5 mg tablet 2/5/2020 at Unknown time  No Yes   Sig: Take 1 tablet (5 mg total) by mouth daily   nystatin (MYCOSTATIN) powder Past Week at Unknown time  No Yes   Sig: Apply topically 2 (two) times a day   oxyCODONE (ROXICODONE) 10 MG TABS 2/4/2020 at Unknown time  No Yes   Sig: Take 1 tablet (10 mg total) by mouth every 4 (four) hours as needed for moderate pain or severe painMax Daily Amount: 60 mg   Patient taking differently: Take 20 mg by mouth every 6 (six) hours as needed for moderate pain or severe pain    tamsulosin (FLOMAX) 0 4 mg 2/4/2020 at Unknown time  Yes Yes   Sig: Take 0 4 mg by mouth every evening        Facility-Administered Medications: None       Past Medical History:   Diagnosis Date    Cataract     [facility]congest[facility] failure[facility] ([facility])     chronic diastolic CHF    Coronary artery disease     Diabetes mellitus ([facility] )     Glaucoma     Hyperlipidemia     Hypertension     Neuropathy     Obesity     Renal disorder     chronic kidney disease stage 3     Sleep apnea     [facility]     TIA (transient ischemic attack)     Uncontrolled type 2 diabetes mellitus with hyperglycemia, with long-term current use of insulin ([facility] ) 10/4/2018       Past Surgical History:   Procedure Laterality Date    APPENDECTOMY      CARPAL TUNNEL RELEASE      EYE SURGERY      cataracts       Family History   Problem Relation Age of Onset    No Known Problems Mother     No Known Problems Father      I have reviewed and agree with the history as documented  Social History     Tobacco Use    Smoking status: Current Some Day Smoker     Types: Cigars    Smokeless tobacco: Former User   Substance Use Topics    Alcohol use: Never     Frequency: Never     Drinks per session: Patient refused     Binge frequency: Never    Drug use: No        Review of Systems   Constitutional: Negative  Negative for chills and fever  HENT: Negative  Negative for ear pain and sore throat  Eyes: Negative  Negative for pain and discharge  Respiratory: Negative  Negative for chest tightness and shortness of breath  Cardiovascular: Negative  Negative for chest pain and palpitations  Gastrointestinal: Negative  Negative for abdominal pain, nausea and vomiting  Endocrine: Negative  Negative for polyphagia and polyuria  Genitourinary: Negative  Negative for dysuria and flank pain  Musculoskeletal: Negative  Negative for arthralgias and back pain  Skin: Negative  Negative for color change and wound  Allergic/Immunologic: Negative  Negative for food allergies and immunocompromised state  Neurological: Negative  Negative for weakness and headaches  Hematological: Negative  Negative for adenopathy  Does not bruise/bleed easily  Psychiatric/Behavioral: Negative  Negative for suicidal ideas  The patient is not nervous/anxious  Physical Exam  Physical Exam   Constitutional: He is oriented to person, place, and time  He appears well-developed and well-nourished  No distress  HENT:   Head: Normocephalic and atraumatic  Right Ear: External ear normal    Left Ear: External ear normal    Mouth/Throat: Oropharynx is clear and moist    Eyes: Pupils are equal, round, and reactive to light  Conjunctivae and EOM are normal  Right eye exhibits no discharge  Left eye exhibits no discharge  No scleral icterus  Neck: Normal range of motion  Neck supple  No tracheal deviation present  No thyromegaly present  Cardiovascular: Normal rate, regular rhythm and intact distal pulses  Exam reveals no gallop and no friction rub  No murmur heard  Pulmonary/Chest: Effort normal and breath sounds normal  No stridor  No respiratory distress  He has no wheezes  He has no rales  Abdominal: Soft  Bowel sounds are normal  He exhibits no distension  There is no tenderness  There is no rebound and no guarding  Musculoskeletal: Normal range of motion  He exhibits no edema or deformity  Neurological: He is alert and oriented to person, place, and time  No cranial nerve deficit  Skin: Skin is warm and dry  No rash noted  He is not diaphoretic  No erythema  Psychiatric: He has a normal mood and affect  His behavior is normal  Thought content normal    Nursing note and vitals reviewed        Vital Signs  ED Triage Vitals [02/05/20 1223]   Temperature Pulse Respirations Blood Pressure SpO2   (!) 96 7 °F (35 9 °C) 76 18 (!) 174/87 90 %      Temp Source Heart Rate Source Patient Position - Orthostatic VS BP Location FiO2 (%)   Temporal Monitor Lying Right arm --      Pain Score       No Pain           Vitals:    02/06/20 1540 02/06/20 1731 02/06/20 2122 02/07/20 0718   BP: 160/75 167/94 169/90 160/78   Pulse: 72 71 77 72   Patient Position - Orthostatic VS:             Visual Acuity      ED Medications  Medications   dextrose 50 % IV solution 50 mL (50 mL Intravenous Given 2/5/20 1228)   dextrose 50 % IV solution 50 mL (50 mL Intravenous Given 2/5/20 1420)       Diagnostic Studies  Results Reviewed     Procedure Component Value Units Date/Time    Fingerstick Glucose (POCT) [[id number]]  (Normal) Collected:  02/05/20 1217    Lab Status:  Final result Updated:  02/07/20 1548     POC Glucose 68 mg/dl     Fingerstick Glucose (POCT) [[id number]]  (Abnormal) Collected:  02/05/20 1411    Lab Status:  Final result Updated:  02/05/20 1417     POC Glucose 55 mg/dl     Fingerstick Glucose (POCT) [[id number]]  (Abnormal) Collected:  02/05/20 1408    Lab Status:  Final result Updated:  02/05/20 1417     POC Glucose 49 mg/dl     Troponin I [[id number]]  (Normal) Collected:  02/05/20 1305    Lab Status:  Final result Specimen:  Blood Updated:  02/05/20 1325     Troponin I <0 02 ng/mL     Comprehensive metabolic panel [[id number]]  (Abnormal) Collected:  02/05/20 1233    Lab Status:  Final result Specimen:  Blood from Arm, Left Updated:  02/05/20 1305     Sodium 139 mmol/L      Potassium 4 0 mmol/L      Chloride 104 mmol/L      CO2 30 mmol/L      ANION GAP 5 mmol/L      BUN 23 mg/dL      Creatinine 1 17 mg/dL      Glucose 185 mg/dL      Calcium 8 7 mg/dL      AST 19 U/L      ALT 22 U/L      Alkaline Phosphatase 138 U/L      Total Protein 7 2 g/dL      Albumin 3 2 g/dL      Total Bilirubin 0 40 mg/dL      eGFR 60 ml/min/1 73sq m     Narrative:       National Kid[employer] Disease Foundation guidelines for Chronic Kidney Disease (CKD):     Stage 1 with normal or high GFR (GFR > 90 mL/min/1 73 square meters)    Stage 2 Mild CKD (GFR = 60-89 mL/min/1 73 square meters)    Stage 3A Moderate CKD (GFR = 45-59 mL/min/1 73 square meters)    Stage 3B Moderate CKD (GFR = 30-44 mL/min/1 73 square meters)    Stage 4 Severe CKD (GFR = 15-29 mL/min/1 73 square meters)    Stage 5 End Stage CKD (GFR <15 mL/min/1 73 square meters)  Note: GFR calculation is accurate only with a steady state creatinine    Fingerstick Glucose (POCT) [[id number]]  (Normal) Collected:  02/05/20 1251    Lab Status:  Final result Updated:  02/05/20 1251     POC Glucose 114 mg/dl     CBC and differential [[id number]]  (Abnormal) Collected:  02/05/20 1233    Lab Status:  Final result Specimen:  Blood from Arm, Left Updated:  02/05/20 1240     WBC 10 61 Thousand/uL      RBC 4 63 Million/uL      Hemoglobin 13 4 g/dL      Hematocrit 42 4 %      MCV 92 fL      MCH 28 9 pg      MCHC 31 6 g/dL      RDW 14 7 %      MPV 9 7 fL      Platelets 159 Thousands/uL      nRBC 0 /100 WBCs      Neutrophils Relative 74 %      Immat GRANS % 0 %      Lymphocytes Relative 14 %      Monocytes Relative 7 %      Eosinophils Relative 5 %      Basophils Relative 0 %      Neutrophils Absolute 7 81 Thousands/µL      Immature Grans Absolute 0 04 Thousand/uL      Lymphocytes Absolute 1 47 Thousands/µL      Monocytes Absolute 0 78 Thousand/µL      Eosinophils Absolute 0 48 Thousand/µL      Basophils Absolute 0 03 Thousands/µL                  No orders to display              Procedures  Procedures         ED Course      after 2nd low blood glucose reading, another amp of D50 was given  The patient was given a meal, and he was admitted to the hospital for blood glucose monitoring  MDM  Number of Diagnoses or Management Options  Hypoglycemia:   Diagnosis management comments: 24-year-old male presents with hyperglycemia secondary to the amount insulin that he is taking  Will evaluate with CBC, CMP, troponin EKG in the setting altered mental status  Will look for a KI as possible etiology of his hypoglycemia to see if he is clearing his insulin    Patient will be admitted to the hospital         Disposition  Final diagnoses:   Hypoglycemia     Time reflects when diagnosis was documented in both MDM as applicable and the Disposition within this note     Time User Action Codes Description Comment    2/5/2020  3:32 PM Geo Rivera [E16 2] Hypoglycemia     2/5/2020  5:48 PM Aleta Stevens Add [E11 969,  Z79 4] Type 2 diabetes mellitus with hypoglycemia without coma, with long-term current use of insulin [facility])       ED Disposition     ED Disposition Condition Date/Time Comment    Admit Stable Wed Feb 5, 2020  3:32 PM SLIM obs        Follow-up Information     Follow up With Specialties Details Why Contact Info    Jaswinder Vaughn MD Family Medicine Follow up in 1 week(s)  [facility]  [address]   [phone]            Discharge Medication List as of 2/7/2020  3:05 PM      CONTINUE these medications which have CHANGED    Details   insulin glargine (LANTUS) 100 units/mL subcutaneous injection Inject 20 Units under the skin 2 (two) times a day for 14 days, Starting Fri 2/7/2020, Until Fri 2/21/2020, No Print         CONTINUE these medications which have NOT CHANGED    Details   aspirin 81 MG tablet Take 81 mg by mouth daily, Until Discontinued, Historical Med      atorvastatin (LIPITOR) 40 mg tablet Take 1 tablet (40 mg total) by mouth daily after dinner, Starting Tue 3/5/2019, No Print      BD PEN NEEDLE JUNIOR U/F 32G X 4 MM MISC Starting Sun 12/31/2017, Historical Med      cholecalciferol 2000 units TABS Take 1 tablet (2,000 Units total) by mouth daily, Starting Mon 3/4/2019, No Print      lisinopril (ZESTRIL) 5 mg tablet Take 1 tablet (5 mg total) by mouth daily, Starting Sun 10/7/2018, Print      nystatin (MYCOSTATIN) powder Apply topically 2 (two) times a day, Starting Mon 3/4/2019, No Print      oxyCODONE (ROXICODONE) 10 MG TABS Take 1 tablet (10 mg total) by mouth every 4 (four) hours as needed for moderate pain or severe painMax Daily Amount: 60 mg, Starting Mon 3/4/2019, Print      Semaglutide (OZEMPIC, 0 25 OR 0 5 MG/DOSE, SC) Inject 0 5 mg under the skin once a week, Historical Med      tamsulosin (FLOMAX) 0 4 mg Take 0 4 mg by mouth every evening  , Historical Med      carvedilol (COREG) 12 5 mg tablet Take 1 tablet (12 5 mg total) by mouth 2 (two) times a day, Starting Mon 3/4/2019, No Print      finasteride (PROSCAR) 5 mg tablet Take 1 tablet (5 mg total) by mouth daily for 30 days, Starting Wed 7/3/2019, Until Wed 2/5/2020, Normal      gabapentin (NEURONTIN) 600 MG tablet Take 1 tablet (600 mg total) by mouth 2 (two) times a day, Starting Mon 3/4/2019, No Print      glucagon (GLUCAGON EMERGENCY) 1 MG injection Inject 1 mg under the skin once as needed (PRN blood glucose less than 70 if unconscious or uncorrectable by oral means) for up to 1 dose, Starting Mon 3/4/2019, No Print         STOP taking these medications       insulin lispro (HumaLOG) 100 units/mL injection Comments:   Reason for Stopping:         Heparin Sodium, Porcine, (HEPARIN, PORCINE,) 5,000 units/mL Comments:   Reason for Stopping:             Outpatient Discharge Orders   Discharge Diet     No strenuous exercise     Call provider for:  persistent nausea or vomiting     Call provider for:       ED Provider  Electronically Signed by           Cole Ugarte MD  02/08/20 1230

## 2020-02-12 LAB — GLUCOSE SERPL-MCNC: >500 MG/DL (ref 65–140)

## 2020-07-09 ENCOUNTER — TRANSCRIBE ORDERS (OUTPATIENT)
Dept: ADMINISTRATIVE | Facility: HOSPITAL | Age: 75
End: 2020-07-09

## 2020-07-09 ENCOUNTER — APPOINTMENT (OUTPATIENT)
Dept: LAB | Facility: HOSPITAL | Age: 75
End: 2020-07-09
Payer: COMMERCIAL

## 2020-07-09 DIAGNOSIS — E78.5 HYPERLIPIDEMIA, UNSPECIFIED HYPERLIPIDEMIA TYPE: ICD-10-CM

## 2020-07-09 DIAGNOSIS — D51.0 PERNICIOUS ANEMIA: ICD-10-CM

## 2020-07-09 DIAGNOSIS — R97.20 ELEVATED PROSTATE SPECIFIC ANTIGEN (PSA): ICD-10-CM

## 2020-07-09 DIAGNOSIS — R73.01 IMPAIRED FASTING GLUCOSE: Primary | ICD-10-CM

## 2020-07-09 DIAGNOSIS — R73.01 IMPAIRED FASTING GLUCOSE: ICD-10-CM

## 2020-07-09 DIAGNOSIS — E55.9 VITAMIN D DEFICIENCY DISEASE: ICD-10-CM

## 2020-07-09 DIAGNOSIS — E03.9 ACQUIRED HYPOTHYROIDISM: ICD-10-CM

## 2020-07-09 DIAGNOSIS — N39.0 URINARY TRACT INFECTION WITHOUT HEMATURIA, SITE UNSPECIFIED: ICD-10-CM

## 2020-07-09 DIAGNOSIS — D64.9 ANEMIA, UNSPECIFIED TYPE: ICD-10-CM

## 2020-07-09 LAB
25(OH)D3 SERPL-MCNC: 46 NG/ML (ref 30–100)
ALBUMIN SERPL BCP-MCNC: 3.5 G/DL (ref 3.5–5)
ALP SERPL-CCNC: 160 U/L (ref 46–116)
ALT SERPL W P-5'-P-CCNC: 23 U/L (ref 12–78)
ANION GAP SERPL CALCULATED.3IONS-SCNC: 7 MMOL/L (ref 4–13)
AST SERPL W P-5'-P-CCNC: 18 U/L (ref 5–45)
BACTERIA UR QL AUTO: ABNORMAL /HPF
BASOPHILS # BLD AUTO: 0.05 THOUSANDS/ΜL (ref 0–0.1)
BASOPHILS NFR BLD AUTO: 1 % (ref 0–1)
BILIRUB SERPL-MCNC: 0.8 MG/DL (ref 0.2–1)
BILIRUB UR QL STRIP: NEGATIVE
BUN SERPL-MCNC: 20 MG/DL (ref 5–25)
CALCIUM SERPL-MCNC: 9.2 MG/DL (ref 8.3–10.1)
CHLORIDE SERPL-SCNC: 106 MMOL/L (ref 100–108)
CHOLEST SERPL-MCNC: 134 MG/DL (ref 50–200)
CLARITY UR: ABNORMAL
CO2 SERPL-SCNC: 28 MMOL/L (ref 21–32)
COLOR UR: YELLOW
CREAT SERPL-MCNC: 1.12 MG/DL (ref 0.6–1.3)
CREAT UR-MCNC: 80.7 MG/DL
EOSINOPHIL # BLD AUTO: 0.49 THOUSAND/ΜL (ref 0–0.61)
EOSINOPHIL NFR BLD AUTO: 5 % (ref 0–6)
ERYTHROCYTE [DISTWIDTH] IN BLOOD BY AUTOMATED COUNT: 14.5 % (ref 11.6–15.1)
GFR SERPL CREATININE-BSD FRML MDRD: 63 ML/MIN/1.73SQ M
GLUCOSE P FAST SERPL-MCNC: 145 MG/DL (ref 65–99)
GLUCOSE UR STRIP-MCNC: NEGATIVE MG/DL
HCT VFR BLD AUTO: 43 % (ref 36.5–49.3)
HDLC SERPL-MCNC: 51 MG/DL
HGB BLD-MCNC: 13.7 G/DL (ref 12–17)
HGB UR QL STRIP.AUTO: ABNORMAL
IMM GRANULOCYTES # BLD AUTO: 0.05 THOUSAND/UL (ref 0–0.2)
IMM GRANULOCYTES NFR BLD AUTO: 1 % (ref 0–2)
KETONES UR STRIP-MCNC: NEGATIVE MG/DL
LDLC SERPL CALC-MCNC: 67 MG/DL (ref 0–100)
LEUKOCYTE ESTERASE UR QL STRIP: ABNORMAL
LYMPHOCYTES # BLD AUTO: 1.87 THOUSANDS/ΜL (ref 0.6–4.47)
LYMPHOCYTES NFR BLD AUTO: 19 % (ref 14–44)
MCH RBC QN AUTO: 28.3 PG (ref 26.8–34.3)
MCHC RBC AUTO-ENTMCNC: 31.9 G/DL (ref 31.4–37.4)
MCV RBC AUTO: 89 FL (ref 82–98)
MICROALBUMIN UR-MCNC: 788 MG/L (ref 0–20)
MICROALBUMIN/CREAT 24H UR: 976 MG/G CREATININE (ref 0–30)
MONOCYTES # BLD AUTO: 0.64 THOUSAND/ΜL (ref 0.17–1.22)
MONOCYTES NFR BLD AUTO: 6 % (ref 4–12)
NEUTROPHILS # BLD AUTO: 6.9 THOUSANDS/ΜL (ref 1.85–7.62)
NEUTS SEG NFR BLD AUTO: 68 % (ref 43–75)
NITRITE UR QL STRIP: NEGATIVE
NON-SQ EPI CELLS URNS QL MICRO: ABNORMAL /HPF
NONHDLC SERPL-MCNC: 83 MG/DL
NRBC BLD AUTO-RTO: 0 /100 WBCS
PH UR STRIP.AUTO: 7 [PH]
PLATELET # BLD AUTO: 232 THOUSANDS/UL (ref 149–390)
PMV BLD AUTO: 10 FL (ref 8.9–12.7)
POTASSIUM SERPL-SCNC: 3.7 MMOL/L (ref 3.5–5.3)
PROT SERPL-MCNC: 7.6 G/DL (ref 6.4–8.2)
PROT UR STRIP-MCNC: ABNORMAL MG/DL
PSA SERPL-MCNC: 1 NG/ML (ref 0–4)
RBC # BLD AUTO: 4.84 MILLION/UL (ref 3.88–5.62)
RBC #/AREA URNS AUTO: ABNORMAL /HPF
SODIUM SERPL-SCNC: 141 MMOL/L (ref 136–145)
SP GR UR STRIP.AUTO: 1.02 (ref 1–1.03)
TRIGL SERPL-MCNC: 80 MG/DL
TSH SERPL DL<=0.05 MIU/L-ACNC: 1.16 UIU/ML (ref 0.36–3.74)
UROBILINOGEN UR QL STRIP.AUTO: 1 E.U./DL
VIT B12 SERPL-MCNC: 477 PG/ML (ref 100–900)
WBC # BLD AUTO: 10 THOUSAND/UL (ref 4.31–10.16)
WBC #/AREA URNS AUTO: ABNORMAL /HPF

## 2020-07-09 PROCEDURE — 82043 UR ALBUMIN QUANTITATIVE: CPT | Performed by: FAMILY MEDICINE

## 2020-07-09 PROCEDURE — 80053 COMPREHEN METABOLIC PANEL: CPT

## 2020-07-09 PROCEDURE — 82607 VITAMIN B-12: CPT

## 2020-07-09 PROCEDURE — 85025 COMPLETE CBC W/AUTO DIFF WBC: CPT

## 2020-07-09 PROCEDURE — 80061 LIPID PANEL: CPT

## 2020-07-09 PROCEDURE — 82570 ASSAY OF URINE CREATININE: CPT | Performed by: FAMILY MEDICINE

## 2020-07-09 PROCEDURE — G0103 PSA SCREENING: HCPCS

## 2020-07-09 PROCEDURE — 36415 COLL VENOUS BLD VENIPUNCTURE: CPT

## 2020-07-09 PROCEDURE — 81001 URINALYSIS AUTO W/SCOPE: CPT | Performed by: FAMILY MEDICINE

## 2020-07-09 PROCEDURE — 82306 VITAMIN D 25 HYDROXY: CPT

## 2020-07-09 PROCEDURE — 84443 ASSAY THYROID STIM HORMONE: CPT

## 2020-11-04 ENCOUNTER — TRANSCRIBE ORDERS (OUTPATIENT)
Dept: ADMINISTRATIVE | Facility: HOSPITAL | Age: 75
End: 2020-11-04

## 2020-11-04 ENCOUNTER — APPOINTMENT (OUTPATIENT)
Dept: LAB | Facility: HOSPITAL | Age: 75
End: 2020-11-04
Payer: COMMERCIAL

## 2020-11-04 DIAGNOSIS — E11.9 DIABETES MELLITUS WITHOUT COMPLICATION (HCC): ICD-10-CM

## 2020-11-04 DIAGNOSIS — E03.9 MYXEDEMA HEART DISEASE: ICD-10-CM

## 2020-11-04 DIAGNOSIS — E78.5 HYPERLIPIDEMIA, UNSPECIFIED HYPERLIPIDEMIA TYPE: ICD-10-CM

## 2020-11-04 DIAGNOSIS — N39.0 URINARY TRACT INFECTION WITHOUT HEMATURIA, SITE UNSPECIFIED: ICD-10-CM

## 2020-11-04 DIAGNOSIS — D64.9 ANEMIA, UNSPECIFIED TYPE: ICD-10-CM

## 2020-11-04 DIAGNOSIS — I10 ESSENTIAL HYPERTENSION, MALIGNANT: ICD-10-CM

## 2020-11-04 DIAGNOSIS — I51.9 MYXEDEMA HEART DISEASE: ICD-10-CM

## 2020-11-04 DIAGNOSIS — I10 ESSENTIAL HYPERTENSION, MALIGNANT: Primary | ICD-10-CM

## 2020-11-04 LAB
ALBUMIN SERPL BCP-MCNC: 3.3 G/DL (ref 3.5–5)
ALP SERPL-CCNC: 188 U/L (ref 46–116)
ALT SERPL W P-5'-P-CCNC: 36 U/L (ref 12–78)
ANION GAP SERPL CALCULATED.3IONS-SCNC: 5 MMOL/L (ref 4–13)
AST SERPL W P-5'-P-CCNC: 21 U/L (ref 5–45)
BASOPHILS # BLD AUTO: 0.04 THOUSANDS/ΜL (ref 0–0.1)
BASOPHILS NFR BLD AUTO: 0 % (ref 0–1)
BILIRUB SERPL-MCNC: 0.5 MG/DL (ref 0.2–1)
BUN SERPL-MCNC: 20 MG/DL (ref 5–25)
CALCIUM ALBUM COR SERPL-MCNC: 9.9 MG/DL (ref 8.3–10.1)
CALCIUM SERPL-MCNC: 9.3 MG/DL (ref 8.3–10.1)
CHLORIDE SERPL-SCNC: 107 MMOL/L (ref 100–108)
CHOLEST SERPL-MCNC: 142 MG/DL (ref 50–200)
CO2 SERPL-SCNC: 29 MMOL/L (ref 21–32)
CREAT SERPL-MCNC: 1.19 MG/DL (ref 0.6–1.3)
EOSINOPHIL # BLD AUTO: 0.87 THOUSAND/ΜL (ref 0–0.61)
EOSINOPHIL NFR BLD AUTO: 8 % (ref 0–6)
ERYTHROCYTE [DISTWIDTH] IN BLOOD BY AUTOMATED COUNT: 14.3 % (ref 11.6–15.1)
GFR SERPL CREATININE-BSD FRML MDRD: 59 ML/MIN/1.73SQ M
GLUCOSE P FAST SERPL-MCNC: 153 MG/DL (ref 65–99)
HCT VFR BLD AUTO: 42.8 % (ref 36.5–49.3)
HDLC SERPL-MCNC: 59 MG/DL
HGB BLD-MCNC: 13.5 G/DL (ref 12–17)
IMM GRANULOCYTES # BLD AUTO: 0.11 THOUSAND/UL (ref 0–0.2)
IMM GRANULOCYTES NFR BLD AUTO: 1 % (ref 0–2)
LDLC SERPL CALC-MCNC: 51 MG/DL (ref 0–100)
LYMPHOCYTES # BLD AUTO: 2.17 THOUSANDS/ΜL (ref 0.6–4.47)
LYMPHOCYTES NFR BLD AUTO: 20 % (ref 14–44)
MCH RBC QN AUTO: 27.9 PG (ref 26.8–34.3)
MCHC RBC AUTO-ENTMCNC: 31.5 G/DL (ref 31.4–37.4)
MCV RBC AUTO: 88 FL (ref 82–98)
MONOCYTES # BLD AUTO: 0.73 THOUSAND/ΜL (ref 0.17–1.22)
MONOCYTES NFR BLD AUTO: 7 % (ref 4–12)
NEUTROPHILS # BLD AUTO: 6.88 THOUSANDS/ΜL (ref 1.85–7.62)
NEUTS SEG NFR BLD AUTO: 64 % (ref 43–75)
NONHDLC SERPL-MCNC: 83 MG/DL
NRBC BLD AUTO-RTO: 0 /100 WBCS
PLATELET # BLD AUTO: 220 THOUSANDS/UL (ref 149–390)
PMV BLD AUTO: 9.5 FL (ref 8.9–12.7)
POTASSIUM SERPL-SCNC: 4.1 MMOL/L (ref 3.5–5.3)
PROT SERPL-MCNC: 7.6 G/DL (ref 6.4–8.2)
RBC # BLD AUTO: 4.84 MILLION/UL (ref 3.88–5.62)
SODIUM SERPL-SCNC: 141 MMOL/L (ref 136–145)
T4 FREE SERPL-MCNC: 0.84 NG/DL (ref 0.76–1.46)
TRIGL SERPL-MCNC: 162 MG/DL
TSH SERPL DL<=0.05 MIU/L-ACNC: 3.08 UIU/ML (ref 0.36–3.74)
WBC # BLD AUTO: 10.8 THOUSAND/UL (ref 4.31–10.16)

## 2020-11-04 PROCEDURE — 85025 COMPLETE CBC W/AUTO DIFF WBC: CPT

## 2020-11-04 PROCEDURE — 84443 ASSAY THYROID STIM HORMONE: CPT

## 2020-11-04 PROCEDURE — 36415 COLL VENOUS BLD VENIPUNCTURE: CPT

## 2020-11-04 PROCEDURE — 84439 ASSAY OF FREE THYROXINE: CPT

## 2020-11-04 PROCEDURE — 80053 COMPREHEN METABOLIC PANEL: CPT

## 2020-11-04 PROCEDURE — 80061 LIPID PANEL: CPT

## 2020-12-22 ENCOUNTER — HOSPITAL ENCOUNTER (EMERGENCY)
Facility: HOSPITAL | Age: 75
Discharge: HOME | End: 2020-12-22
Attending: EMERGENCY MEDICINE | Admitting: EMERGENCY MEDICINE
Payer: COMMERCIAL

## 2020-12-22 ENCOUNTER — APPOINTMENT (EMERGENCY)
Dept: RADIOLOGY | Facility: HOSPITAL | Age: 75
End: 2020-12-22
Payer: COMMERCIAL

## 2020-12-22 ENCOUNTER — APPOINTMENT (EMERGENCY)
Dept: CT IMAGING | Facility: HOSPITAL | Age: 75
End: 2020-12-22
Payer: COMMERCIAL

## 2020-12-22 VITALS
OXYGEN SATURATION: 94 % | DIASTOLIC BLOOD PRESSURE: 81 MMHG | SYSTOLIC BLOOD PRESSURE: 184 MMHG | RESPIRATION RATE: 21 BRPM | TEMPERATURE: 98 F | WEIGHT: 298.06 LBS | BODY MASS INDEX: 47.9 KG/M2 | HEIGHT: 66 IN | HEART RATE: 77 BPM

## 2020-12-22 DIAGNOSIS — R51.9 HEADACHE: Primary | ICD-10-CM

## 2020-12-22 DIAGNOSIS — I10 HYPERTENSION: ICD-10-CM

## 2020-12-22 LAB
ALBUMIN SERPL BCP-MCNC: 3.2 G/DL (ref 3.5–5)
ALP SERPL-CCNC: 183 U/L (ref 46–116)
ALT SERPL W P-5'-P-CCNC: 27 U/L (ref 12–78)
ANION GAP SERPL CALCULATED.3IONS-SCNC: 7 MMOL/L (ref 4–13)
AST SERPL W P-5'-P-CCNC: 18 U/L (ref 5–45)
BASOPHILS # BLD AUTO: 0.04 THOUSANDS/ΜL (ref 0–0.1)
BASOPHILS NFR BLD AUTO: 0 % (ref 0–1)
BILIRUB SERPL-MCNC: 0.5 MG/DL (ref 0.2–1)
BUN SERPL-MCNC: 20 MG/DL (ref 5–25)
CALCIUM ALBUM COR SERPL-MCNC: 9.9 MG/DL (ref 8.3–10.1)
CALCIUM SERPL-MCNC: 9.3 MG/DL (ref 8.3–10.1)
CHLORIDE SERPL-SCNC: 102 MMOL/L (ref 100–108)
CO2 SERPL-SCNC: 32 MMOL/L (ref 21–32)
CREAT SERPL-MCNC: 1.3 MG/DL (ref 0.6–1.3)
EOSINOPHIL # BLD AUTO: 0.5 THOUSAND/ΜL (ref 0–0.61)
EOSINOPHIL NFR BLD AUTO: 6 % (ref 0–6)
ERYTHROCYTE [DISTWIDTH] IN BLOOD BY AUTOMATED COUNT: 13.8 % (ref 11.6–15.1)
GFR SERPL CREATININE-BSD FRML MDRD: 53 ML/MIN/1.73SQ M
GLUCOSE SERPL-MCNC: 224 MG/DL (ref 65–140)
GLUCOSE SERPL-MCNC: 225 MG/DL (ref 65–140)
HCT VFR BLD AUTO: 40.5 % (ref 36.5–49.3)
HGB BLD-MCNC: 13.1 G/DL (ref 12–17)
IMM GRANULOCYTES # BLD AUTO: 0.03 THOUSAND/UL (ref 0–0.2)
IMM GRANULOCYTES NFR BLD AUTO: 0 % (ref 0–2)
LYMPHOCYTES # BLD AUTO: 1.7 THOUSANDS/ΜL (ref 0.6–4.47)
LYMPHOCYTES NFR BLD AUTO: 19 % (ref 14–44)
MCH RBC QN AUTO: 27.8 PG (ref 26.8–34.3)
MCHC RBC AUTO-ENTMCNC: 32.3 G/DL (ref 31.4–37.4)
MCV RBC AUTO: 86 FL (ref 82–98)
MONOCYTES # BLD AUTO: 0.6 THOUSAND/ΜL (ref 0.17–1.22)
MONOCYTES NFR BLD AUTO: 7 % (ref 4–12)
NEUTROPHILS # BLD AUTO: 6.06 THOUSANDS/ΜL (ref 1.85–7.62)
NEUTS SEG NFR BLD AUTO: 68 % (ref 43–75)
NRBC BLD AUTO-RTO: 0 /100 WBCS
PLATELET # BLD AUTO: 229 THOUSANDS/UL (ref 149–390)
PMV BLD AUTO: 9.7 FL (ref 8.9–12.7)
POTASSIUM SERPL-SCNC: 4 MMOL/L (ref 3.5–5.3)
PROT SERPL-MCNC: 7.3 G/DL (ref 6.4–8.2)
RBC # BLD AUTO: 4.72 MILLION/UL (ref 3.88–5.62)
SODIUM SERPL-SCNC: 141 MMOL/L (ref 136–145)
WBC # BLD AUTO: 8.93 THOUSAND/UL (ref 4.31–10.16)

## 2020-12-22 PROCEDURE — 99285 EMERGENCY DEPT VISIT HI MDM: CPT | Performed by: PHYSICIAN ASSISTANT

## 2020-12-22 PROCEDURE — 99284 EMERGENCY DEPT VISIT MOD MDM: CPT

## 2020-12-22 PROCEDURE — 70450 CT HEAD/BRAIN W/O DYE: CPT

## 2020-12-22 PROCEDURE — 85025 COMPLETE CBC W/AUTO DIFF WBC: CPT | Performed by: PHYSICIAN ASSISTANT

## 2020-12-22 PROCEDURE — 96365 THER/PROPH/DIAG IV INF INIT: CPT

## 2020-12-22 PROCEDURE — 36415 COLL VENOUS BLD VENIPUNCTURE: CPT | Performed by: PHYSICIAN ASSISTANT

## 2020-12-22 PROCEDURE — 80053 COMPREHEN METABOLIC PANEL: CPT | Performed by: PHYSICIAN ASSISTANT

## 2020-12-22 PROCEDURE — 96361 HYDRATE IV INFUSION ADD-ON: CPT

## 2020-12-22 PROCEDURE — 71045 X-RAY EXAM CHEST 1 VIEW: CPT

## 2020-12-22 PROCEDURE — 96375 TX/PRO/DX INJ NEW DRUG ADDON: CPT

## 2020-12-22 PROCEDURE — 82948 REAGENT STRIP/BLOOD GLUCOSE: CPT

## 2020-12-22 PROCEDURE — G1004 CDSM NDSC: HCPCS

## 2020-12-22 RX ORDER — MAGNESIUM SULFATE HEPTAHYDRATE 40 MG/ML
2 INJECTION, SOLUTION INTRAVENOUS ONCE
Status: COMPLETED | OUTPATIENT
Start: 2020-12-22 | End: 2020-12-22

## 2020-12-22 RX ORDER — LISINOPRIL 5 MG/1
5 TABLET ORAL ONCE
Status: COMPLETED | OUTPATIENT
Start: 2020-12-22 | End: 2020-12-22

## 2020-12-22 RX ORDER — METOCLOPRAMIDE HYDROCHLORIDE 5 MG/ML
10 INJECTION INTRAMUSCULAR; INTRAVENOUS ONCE
Status: COMPLETED | OUTPATIENT
Start: 2020-12-22 | End: 2020-12-22

## 2020-12-22 RX ORDER — DIPHENHYDRAMINE HYDROCHLORIDE 50 MG/ML
12.5 INJECTION INTRAMUSCULAR; INTRAVENOUS ONCE
Status: COMPLETED | OUTPATIENT
Start: 2020-12-22 | End: 2020-12-22

## 2020-12-22 RX ADMIN — METOCLOPRAMIDE HYDROCHLORIDE 10 MG: 5 INJECTION INTRAMUSCULAR; INTRAVENOUS at 14:59

## 2020-12-22 RX ADMIN — MAGNESIUM SULFATE IN WATER 2 G: 40 INJECTION, SOLUTION INTRAVENOUS at 15:02

## 2020-12-22 RX ADMIN — SODIUM CHLORIDE 1000 ML: 0.9 INJECTION, SOLUTION INTRAVENOUS at 14:57

## 2020-12-22 RX ADMIN — LISINOPRIL 5 MG: 5 TABLET ORAL at 14:58

## 2020-12-22 RX ADMIN — DIPHENHYDRAMINE HYDROCHLORIDE 12.5 MG: 50 INJECTION, SOLUTION INTRAMUSCULAR; INTRAVENOUS at 15:01

## 2021-03-08 ENCOUNTER — HOSPITAL ENCOUNTER (INPATIENT)
Facility: HOSPITAL | Age: 76
LOS: 4 days | Discharge: HOME HEALTH | DRG: 603 | End: 2021-03-12
Attending: EMERGENCY MEDICINE | Admitting: INTERNAL MEDICINE
Payer: COMMERCIAL

## 2021-03-08 ENCOUNTER — APPOINTMENT (INPATIENT)
Dept: RADIOLOGY | Facility: HOSPITAL | Age: 76
DRG: 603 | End: 2021-03-08
Payer: COMMERCIAL

## 2021-03-08 ENCOUNTER — APPOINTMENT (EMERGENCY)
Dept: RADIOLOGY | Facility: HOSPITAL | Age: 76
DRG: 603 | End: 2021-03-08
Payer: COMMERCIAL

## 2021-03-08 DIAGNOSIS — G47.33 OBSTRUCTIVE SLEEP APNEA SYNDROME: ICD-10-CM

## 2021-03-08 DIAGNOSIS — E11.65 TYPE 2 DIABETES MELLITUS WITH HYPERGLYCEMIA, WITH LONG-TERM CURRENT USE OF INSULIN (HCC): ICD-10-CM

## 2021-03-08 DIAGNOSIS — R06.00 DYSPNEA ON EXERTION: ICD-10-CM

## 2021-03-08 DIAGNOSIS — R91.8 MULTIPLE PULMONARY NODULES: ICD-10-CM

## 2021-03-08 DIAGNOSIS — Z79.4 TYPE 2 DIABETES MELLITUS WITH HYPERGLYCEMIA, WITH LONG-TERM CURRENT USE OF INSULIN (HCC): ICD-10-CM

## 2021-03-08 DIAGNOSIS — E34.9 ELEVATED PARATHYROID HORMONE: ICD-10-CM

## 2021-03-08 DIAGNOSIS — I10 ACCELERATED HYPERTENSION: ICD-10-CM

## 2021-03-08 DIAGNOSIS — E83.52 HYPERCALCEMIA: ICD-10-CM

## 2021-03-08 DIAGNOSIS — L03.115 CELLULITIS OF RIGHT LOWER EXTREMITY: ICD-10-CM

## 2021-03-08 DIAGNOSIS — S81.801A OPEN WOUND OF RIGHT LOWER EXTREMITY, INITIAL ENCOUNTER: ICD-10-CM

## 2021-03-08 DIAGNOSIS — I50.32 CHRONIC DIASTOLIC CHF (CONGESTIVE HEART FAILURE) (HCC): ICD-10-CM

## 2021-03-08 DIAGNOSIS — R31.29 MICROSCOPIC HEMATURIA: ICD-10-CM

## 2021-03-08 DIAGNOSIS — L03.90 CELLULITIS: Primary | ICD-10-CM

## 2021-03-08 DIAGNOSIS — R33.9 URINARY RETENTION: ICD-10-CM

## 2021-03-08 DIAGNOSIS — S81.801D OPEN WOUND OF RIGHT LOWER EXTREMITY, SUBSEQUENT ENCOUNTER: ICD-10-CM

## 2021-03-08 DIAGNOSIS — R94.31 ABNORMAL EKG: ICD-10-CM

## 2021-03-08 PROBLEM — R74.8 ELEVATED ALKALINE PHOSPHATASE LEVEL: Status: ACTIVE | Noted: 2021-03-08

## 2021-03-08 LAB
ALBUMIN SERPL BCP-MCNC: 2.8 G/DL (ref 3.5–5)
ALP SERPL-CCNC: 145 U/L (ref 46–116)
ALT SERPL W P-5'-P-CCNC: 30 U/L (ref 12–78)
ANION GAP SERPL CALCULATED.3IONS-SCNC: 10 MMOL/L (ref 4–13)
ANION GAP SERPL CALCULATED.3IONS-SCNC: 8 MMOL/L (ref 4–13)
APTT PPP: 33 SECONDS (ref 23–37)
AST SERPL W P-5'-P-CCNC: 45 U/L (ref 5–45)
BASOPHILS # BLD AUTO: 0.04 THOUSANDS/ΜL (ref 0–0.1)
BASOPHILS NFR BLD AUTO: 0 % (ref 0–1)
BILIRUB SERPL-MCNC: 0.6 MG/DL (ref 0.2–1)
BUN SERPL-MCNC: 32 MG/DL (ref 5–25)
BUN SERPL-MCNC: 32 MG/DL (ref 5–25)
CALCIUM ALBUM COR SERPL-MCNC: 10.3 MG/DL (ref 8.3–10.1)
CALCIUM SERPL-MCNC: 9.3 MG/DL (ref 8.3–10.1)
CALCIUM SERPL-MCNC: 9.5 MG/DL (ref 8.3–10.1)
CHLORIDE SERPL-SCNC: 103 MMOL/L (ref 100–108)
CHLORIDE SERPL-SCNC: 103 MMOL/L (ref 100–108)
CO2 SERPL-SCNC: 27 MMOL/L (ref 21–32)
CO2 SERPL-SCNC: 27 MMOL/L (ref 21–32)
CREAT SERPL-MCNC: 1.38 MG/DL (ref 0.6–1.3)
CREAT SERPL-MCNC: 1.46 MG/DL (ref 0.6–1.3)
D DIMER PPP FEU-MCNC: 1.1 UG/ML FEU
EOSINOPHIL # BLD AUTO: 0.53 THOUSAND/ΜL (ref 0–0.61)
EOSINOPHIL NFR BLD AUTO: 5 % (ref 0–6)
ERYTHROCYTE [DISTWIDTH] IN BLOOD BY AUTOMATED COUNT: 14.6 % (ref 11.6–15.1)
FLUAV RNA RESP QL NAA+PROBE: NEGATIVE
FLUBV RNA RESP QL NAA+PROBE: NEGATIVE
GFR SERPL CREATININE-BSD FRML MDRD: 46 ML/MIN/1.73SQ M
GFR SERPL CREATININE-BSD FRML MDRD: 49 ML/MIN/1.73SQ M
GLUCOSE SERPL-MCNC: 139 MG/DL (ref 65–140)
GLUCOSE SERPL-MCNC: 217 MG/DL (ref 65–140)
GLUCOSE SERPL-MCNC: 233 MG/DL (ref 65–140)
HCT VFR BLD AUTO: 36.9 % (ref 36.5–49.3)
HGB BLD-MCNC: 11.3 G/DL (ref 12–17)
IMM GRANULOCYTES # BLD AUTO: 0.05 THOUSAND/UL (ref 0–0.2)
IMM GRANULOCYTES NFR BLD AUTO: 1 % (ref 0–2)
INR PPP: 1.08 (ref 0.84–1.19)
LACTATE SERPL-SCNC: 1.7 MMOL/L (ref 0.5–2)
LYMPHOCYTES # BLD AUTO: 1.7 THOUSANDS/ΜL (ref 0.6–4.47)
LYMPHOCYTES NFR BLD AUTO: 16 % (ref 14–44)
MCH RBC QN AUTO: 27.2 PG (ref 26.8–34.3)
MCHC RBC AUTO-ENTMCNC: 30.6 G/DL (ref 31.4–37.4)
MCV RBC AUTO: 89 FL (ref 82–98)
MONOCYTES # BLD AUTO: 0.89 THOUSAND/ΜL (ref 0.17–1.22)
MONOCYTES NFR BLD AUTO: 9 % (ref 4–12)
NEUTROPHILS # BLD AUTO: 7.3 THOUSANDS/ΜL (ref 1.85–7.62)
NEUTS SEG NFR BLD AUTO: 69 % (ref 43–75)
NRBC BLD AUTO-RTO: 0 /100 WBCS
NT-PROBNP SERPL-MCNC: 533 PG/ML
PLATELET # BLD AUTO: 213 THOUSANDS/UL (ref 149–390)
PMV BLD AUTO: 9.8 FL (ref 8.9–12.7)
POTASSIUM SERPL-SCNC: 4.1 MMOL/L (ref 3.5–5.3)
POTASSIUM SERPL-SCNC: 5.6 MMOL/L (ref 3.5–5.3)
PROCALCITONIN SERPL-MCNC: <0.05 NG/ML
PROT SERPL-MCNC: 7.6 G/DL (ref 6.4–8.2)
PROTHROMBIN TIME: 13.8 SECONDS (ref 11.6–14.5)
RBC # BLD AUTO: 4.15 MILLION/UL (ref 3.88–5.62)
RSV RNA RESP QL NAA+PROBE: NEGATIVE
SARS-COV-2 RNA RESP QL NAA+PROBE: NEGATIVE
SODIUM SERPL-SCNC: 138 MMOL/L (ref 136–145)
SODIUM SERPL-SCNC: 140 MMOL/L (ref 136–145)
TROPONIN I SERPL-MCNC: 0.02 NG/ML
WBC # BLD AUTO: 10.51 THOUSAND/UL (ref 4.31–10.16)

## 2021-03-08 PROCEDURE — 82948 REAGENT STRIP/BLOOD GLUCOSE: CPT

## 2021-03-08 PROCEDURE — 80053 COMPREHEN METABOLIC PANEL: CPT | Performed by: EMERGENCY MEDICINE

## 2021-03-08 PROCEDURE — 80048 BASIC METABOLIC PNL TOTAL CA: CPT | Performed by: EMERGENCY MEDICINE

## 2021-03-08 PROCEDURE — 83880 ASSAY OF NATRIURETIC PEPTIDE: CPT | Performed by: INTERNAL MEDICINE

## 2021-03-08 PROCEDURE — 93005 ELECTROCARDIOGRAM TRACING: CPT

## 2021-03-08 PROCEDURE — 71045 X-RAY EXAM CHEST 1 VIEW: CPT

## 2021-03-08 PROCEDURE — 0241U HB NFCT DS VIR RESP RNA 4 TRGT: CPT | Performed by: INTERNAL MEDICINE

## 2021-03-08 PROCEDURE — 85610 PROTHROMBIN TIME: CPT | Performed by: EMERGENCY MEDICINE

## 2021-03-08 PROCEDURE — 99285 EMERGENCY DEPT VISIT HI MDM: CPT

## 2021-03-08 PROCEDURE — 36415 COLL VENOUS BLD VENIPUNCTURE: CPT | Performed by: EMERGENCY MEDICINE

## 2021-03-08 PROCEDURE — 85379 FIBRIN DEGRADATION QUANT: CPT | Performed by: INTERNAL MEDICINE

## 2021-03-08 PROCEDURE — 73590 X-RAY EXAM OF LOWER LEG: CPT

## 2021-03-08 PROCEDURE — 85730 THROMBOPLASTIN TIME PARTIAL: CPT | Performed by: EMERGENCY MEDICINE

## 2021-03-08 PROCEDURE — 84145 PROCALCITONIN (PCT): CPT | Performed by: EMERGENCY MEDICINE

## 2021-03-08 PROCEDURE — 87081 CULTURE SCREEN ONLY: CPT | Performed by: INTERNAL MEDICINE

## 2021-03-08 PROCEDURE — 99285 EMERGENCY DEPT VISIT HI MDM: CPT | Performed by: EMERGENCY MEDICINE

## 2021-03-08 PROCEDURE — 85025 COMPLETE CBC W/AUTO DIFF WBC: CPT | Performed by: EMERGENCY MEDICINE

## 2021-03-08 PROCEDURE — 87040 BLOOD CULTURE FOR BACTERIA: CPT | Performed by: EMERGENCY MEDICINE

## 2021-03-08 PROCEDURE — 83605 ASSAY OF LACTIC ACID: CPT | Performed by: EMERGENCY MEDICINE

## 2021-03-08 PROCEDURE — 84145 PROCALCITONIN (PCT): CPT | Performed by: INTERNAL MEDICINE

## 2021-03-08 PROCEDURE — 99223 1ST HOSP IP/OBS HIGH 75: CPT | Performed by: INTERNAL MEDICINE

## 2021-03-08 PROCEDURE — 84484 ASSAY OF TROPONIN QUANT: CPT | Performed by: INTERNAL MEDICINE

## 2021-03-08 RX ORDER — OXYCODONE HYDROCHLORIDE 10 MG/1
10 TABLET ORAL EVERY 4 HOURS PRN
Status: DISCONTINUED | OUTPATIENT
Start: 2021-03-08 | End: 2021-03-12 | Stop reason: HOSPADM

## 2021-03-08 RX ORDER — LABETALOL 20 MG/4 ML (5 MG/ML) INTRAVENOUS SYRINGE
10 EVERY 4 HOURS PRN
Status: DISCONTINUED | OUTPATIENT
Start: 2021-03-08 | End: 2021-03-12 | Stop reason: HOSPADM

## 2021-03-08 RX ORDER — MELATONIN
2000 DAILY
Status: DISCONTINUED | OUTPATIENT
Start: 2021-03-09 | End: 2021-03-12 | Stop reason: HOSPADM

## 2021-03-08 RX ORDER — DEXTROSE MONOHYDRATE 25 G/50ML
25 INJECTION, SOLUTION INTRAVENOUS DAILY PRN
Status: DISCONTINUED | OUTPATIENT
Start: 2021-03-08 | End: 2021-03-12 | Stop reason: HOSPADM

## 2021-03-08 RX ORDER — CARVEDILOL 12.5 MG/1
12.5 TABLET ORAL 2 TIMES DAILY
Status: DISCONTINUED | OUTPATIENT
Start: 2021-03-08 | End: 2021-03-12 | Stop reason: HOSPADM

## 2021-03-08 RX ORDER — ASPIRIN 81 MG/1
81 TABLET, CHEWABLE ORAL DAILY
Status: DISCONTINUED | OUTPATIENT
Start: 2021-03-09 | End: 2021-03-12 | Stop reason: HOSPADM

## 2021-03-08 RX ORDER — CEFEPIME HYDROCHLORIDE 2 G/50ML
2000 INJECTION, SOLUTION INTRAVENOUS EVERY 12 HOURS
Status: DISCONTINUED | OUTPATIENT
Start: 2021-03-09 | End: 2021-03-12 | Stop reason: HOSPADM

## 2021-03-08 RX ORDER — CEFTRIAXONE 1 G/50ML
1000 INJECTION, SOLUTION INTRAVENOUS ONCE
Status: COMPLETED | OUTPATIENT
Start: 2021-03-08 | End: 2021-03-08

## 2021-03-08 RX ORDER — INSULIN GLARGINE 100 [IU]/ML
10 INJECTION, SOLUTION SUBCUTANEOUS
Status: DISCONTINUED | OUTPATIENT
Start: 2021-03-08 | End: 2021-03-11

## 2021-03-08 RX ORDER — LISINOPRIL 5 MG/1
5 TABLET ORAL DAILY
Status: DISCONTINUED | OUTPATIENT
Start: 2021-03-09 | End: 2021-03-09

## 2021-03-08 RX ORDER — TAMSULOSIN HYDROCHLORIDE 0.4 MG/1
0.4 CAPSULE ORAL EVERY EVENING
Status: DISCONTINUED | OUTPATIENT
Start: 2021-03-08 | End: 2021-03-12 | Stop reason: HOSPADM

## 2021-03-08 RX ORDER — OXYCODONE HYDROCHLORIDE 5 MG/1
5 TABLET ORAL EVERY 4 HOURS PRN
Status: DISCONTINUED | OUTPATIENT
Start: 2021-03-08 | End: 2021-03-12 | Stop reason: HOSPADM

## 2021-03-08 RX ORDER — ATORVASTATIN CALCIUM 40 MG/1
40 TABLET, FILM COATED ORAL
Status: DISCONTINUED | OUTPATIENT
Start: 2021-03-08 | End: 2021-03-12 | Stop reason: HOSPADM

## 2021-03-08 RX ORDER — ACETAMINOPHEN 325 MG/1
650 TABLET ORAL EVERY 6 HOURS PRN
Status: DISCONTINUED | OUTPATIENT
Start: 2021-03-08 | End: 2021-03-12 | Stop reason: HOSPADM

## 2021-03-08 RX ORDER — GABAPENTIN 300 MG/1
300 CAPSULE ORAL 2 TIMES DAILY
Status: DISCONTINUED | OUTPATIENT
Start: 2021-03-08 | End: 2021-03-12 | Stop reason: HOSPADM

## 2021-03-08 RX ADMIN — ENOXAPARIN SODIUM 40 MG: 40 INJECTION SUBCUTANEOUS at 22:17

## 2021-03-08 RX ADMIN — ATORVASTATIN CALCIUM 40 MG: 40 TABLET, FILM COATED ORAL at 22:21

## 2021-03-08 RX ADMIN — TAMSULOSIN HYDROCHLORIDE 0.4 MG: 0.4 CAPSULE ORAL at 22:21

## 2021-03-08 RX ADMIN — GABAPENTIN 300 MG: 300 CAPSULE ORAL at 22:22

## 2021-03-08 RX ADMIN — CARVEDILOL 12.5 MG: 12.5 TABLET, FILM COATED ORAL at 22:23

## 2021-03-08 RX ADMIN — INSULIN GLARGINE 10 UNITS: 100 INJECTION, SOLUTION SUBCUTANEOUS at 22:19

## 2021-03-08 RX ADMIN — VANCOMYCIN HYDROCHLORIDE 1250 MG: 5 INJECTION, POWDER, LYOPHILIZED, FOR SOLUTION INTRAVENOUS at 22:27

## 2021-03-08 RX ADMIN — CEFTRIAXONE 1000 MG: 1 INJECTION, SOLUTION INTRAVENOUS at 17:06

## 2021-03-08 NOTE — LETTER
March 12, 2021     [facility] care    Patient: Zenaida Che   YOB: 1945   Date of Visit: 3/8/2021     Thank you for referring Zenaida Che to me for evaluation  Below are the relevant portions of my H&P  If you have questions, please do not hesitate to call me  I look forward to following Antwan Thakkar along with you           Sincerely,          CC: No Recipients  Mars Rossi DO  3/8/2021  8:07 PM  Signed      H&P- Zenaida Che 1945, 66 y o  male MRN: [id number]    Unit/Bed#: RM16 Encounter: [id number]    Primary Care Provider: Sandra Comer MD   Date and time admitted to hospital: 3/8/2021  2:30 PM        * Cellulitis of right lower extremity  Assessment & Plan  · Admit to med/surg level of care  · Associated with an open wound of the right lower extremity  · Treat with broad-spectrum antibiotics including IV cefepime and IV vancomycin  · Check blood cultures x 2 sets  · Check a MRSA nasal screen  · Consult Podiatry  · May require an MRI of the right lower extremity to rule out osteomyelitis    Open wound of right lower extremity  Assessment & Plan  · Associated with cellulitis of the right lower extremity  · Treat with broad-spectrum antibiotics including IV cefepime and IV vancomycin  · Check blood cultures x 2 sets  · Check a MRSA nasal screen  · Consult Podiatry  · May require an MRI of the right lower extremity to rule out osteomyelitis    Accelerated hypertension  Assessment & Plan  · Continue coreg 12 5 mg PO BID  · Continue lisinopril 5 mg PO Qdaily  · Utilize PRN IV labetalol  · Will likely require an adjustment to his home hypertension medication regimen  · Follow the blood pressure trend    Elevated alkaline phosphatase level  Assessment & Plan  · Secondary to unclear etiology  · Possibly secondary to immobility  · Follow the total alkaline phosphatase level    Hypercalcemia  Assessment & Plan  · Check an ionized calcium level and intact-PTH level    Type 2 diabetes mellitus with hyperglycemia, with long-term current use of insulin ([facility])  Assessment & Plan  Lab Results   Component Value Date    HGBA1C 6 6 (H) 02/05/2020       No results for input(s): POCGLU in the last 72 hours  Blood Sugar Average: Last 72 hrs:     · Utilize lantus 10 Units SQ QHS  · Continue lisinopril for renal protection  · Hypoglycemia protocol  · Insulin sliding scale with blood glucose monitoring ACHS    Morbid obesity with BMI of 45 0-49 9, adult ([facility])  Assessment & Plan  · Lifestyle modifications are recommended including weight loss, improving his diet, and increasing his amount of activity  Obstructive sleep apnea syndrome  Assessment & Plan  · Utilize CPAP QHS and anytime while sleeping    Dyspnea on exertion  Assessment & Plan  · Needs outpatient pulmonary function tests completed by his outpatient Pulmonologist  · Check a pro-BNP level  · Check a D-dimer level  · Check a portable chest xray      VTE Prophylaxis: Enoxaparin (Lovenox) 40 mg SQ every 12 hours (dose based on his BMI)/ sequential compression device on the left lower extremity only  No SCD on the right lower extremity with an open wound of the right lower extremity  Code Status: Level 1-Full Code    Anticipated Length of Stay:  The patient will be admitted on an Inpatient basis with an anticipated length of stay of greater than 2 midnights  Justification for Hospital Stay:  The patient requires IV antibiotic treatment, work-up for the shortness of breath, treatment of the accelerated hypertension, and a Podiatry consultation  Chief Complaint:  Open wound of the right lower extremity    History of Present Illness:    Hyun Bradley is a 66 y o  male who presents to the emergency department with the complaint of an open wound involving the right lower extremity  The patient has been experiencing worsening edema of the bilateral lower extremities developed a blister of the right anterior shin region    The blister then opened up over the last 48-72 hours with drainage of a clear fluid leaving an open wound of the right lower extremity  The patient then developed erythema surrounding the open wound of the right lower extremity as well as severe pain involving the right anterior shin region  The symptoms were constant in nature and severe in intensity  Associated symptoms include shortness of breath with exertion and difficulty ambulating  Nothing seemed to improve his symptoms  Review of Systems:    Review of Systems:  Per HPI, all other systems have been reviewed and were negative  Past Medical and Surgical History:     Past Medical History:   Diagnosis Date    Cataract     CHF (congestive heart failure) (HCC)     chronic diastolic CHF    Coronary artery disease     Diabetes mellitus ([facility] )     Glaucoma     Hyperlipidemia     Hypertension     Neuropathy     Obesity     Renal disorder     chronic kidney disease stage 3     Sleep apnea     Stroke ([place] [id number] )     TIA (transient ischemic attack)     Uncontrolled type 2 diabetes mellitus with hyperglycemia, with long-term current use of insulin ([place] [id number] ) 10/4/2018       Past Surgical History:   Procedure Laterality Date    APPENDECTOMY      CARPAL TUNNEL RELEASE      EYE SURGERY      cataracts       Meds/Allergies:    Prior to Admission medications    Medication Sig Start Date End Date Taking?  Authorizing Provider   aspirin 81 MG tablet Take 81 mg by mouth daily    Historical Provider, MD   atorvastatin (LIPITOR) 40 mg tablet Take 1 tablet (40 mg total) by mouth daily after dinner 3/5/19   Efren Barrios DO   BD PEN NEEDLE JUNIOR U/F 32G X 4 MM MISC  12/31/17   Historical Provider, MD   carvedilol (COREG) 12 5 mg tablet Take 1 tablet (12 5 mg total) by mouth 2 (two) times a day 3/4/19   Efren Barrios DO   cholecalciferol 2000 units TABS Take 1 tablet (2,000 Units total) by mouth daily 3/4/19   Efren Barrios DO   finasteride (PROSCAR) 5 mg tablet Take 1 tablet (5 mg total) by mouth daily for 30 days 7/3/19 2/5/20  Connor Cox MD   gabapentin (NEURONTIN) 600 MG tablet Take 1 tablet (600 mg total) by mouth 2 (two) times a day 3/4/19   [employer] DO   glucagon (GLUCAGON EMERGENCY) 1 MG injection Inject 1 mg under the skin once as needed (PRN blood glucose less than 70 if unconscious or uncorrectable by oral means) for up to 1 dose 3/4/19   Erick Loza, DO   insulin glargine (LANTUS) 100 units/mL subcutaneous injection Inject 20 Units under the skin 2 (two) times a day for 14 days 2/7/20 2/21/20  Dina Day MD   lisinopril (ZESTRIL) 5 mg tablet Take 1 tablet (5 mg total) by mouth daily 10/7/18   Tracey Carrington MD   nystatin (MYCOSTATIN) powder Apply topically 2 (two) times a day 3/4/19   Erick Loza, DO   oxyCODONE (ROXICODONE) 10 MG TABS Take 1 tablet (10 mg total) by mouth every 4 (four) hours as needed for moderate pain or severe painMax Daily Amount: 60 mg  Patient taking differently: Take 20 mg by mouth every 6 (six) hours as needed for moderate pain or severe pain  3/4/19   Erick Loza, DO   Semaglutide (OZEMPIC, 0 25 OR 0 5 MG/DOSE, SC) Inject 0 5 mg under the skin once a week    Historical Provider, MD   tamsulosin (FLOMAX) 0 4 mg Take 0 4 mg by mouth every evening      Historical Provider, MD     I have reviewed home medications with patient personally      Allergies: No Known Allergies    Social History:     Marital Status: /Civil Union     Substance Use History:   Social History     Substance and Sexual Activity   Alcohol Use Never    Frequency: Never    Drinks per session: Patient refused    Binge frequency: Never     Social History     Tobacco Use   Smoking Status Former Smoker    Types: Cigars   Smokeless Tobacco Former User     Social History     Substance and Sexual Activity   Drug Use No       Family History:    non-contributory    Physical Exam:     Vitals:   Blood Pressure: (!) 215/106 (03/08/21 1915)  Pulse: 77 (03/08/21 1915)  Temperature: 97 6 °F (36 4 °C) (03/08/21 1503)  Temp Source: Temporal (03/08/21 1503)  Respirations: 20 (03/08/21 1915)  Height: 5' 6" (167 6 cm) (03/08/21 1503)  Weight - Scale: (!) 136 kg (300 lb 0 7 oz) (03/08/21 1503)  SpO2: 98 % (03/08/21 1915)    Physical Exam  General:  NAD, follows commands  HEENT:  NC/AT, mucous membranes moist  Neck:  Supple, No JVP elevation  CV:  + S1, + S2, RRR  Pulm:  Lung fields are CTA bilaterally  Abd:  Soft, Non-tender, Non-distended  Ext:  No clubbing/cyanosis, Edema of the bilateral lower extremities  Skin:  Open wound of the right anterior shin region with surrounding erythema  Neuro:  Awake, alert, oriented  Psych:  Normal mood and affect      Additional Data:     Lab Results: I have personally reviewed pertinent reports  Results from last 7 days   Lab Units 03/08/21  1547   WBC Thousand/uL 10 51*   HEMOGLOBIN g/dL 11 3*   HEMATOCRIT % 36 9   PLATELETS Thousands/uL 213   NEUTROS PCT % 69   LYMPHS PCT % 16   MONOS PCT % 9   EOS PCT % 5     Results from last 7 days   Lab Units 03/08/21  1658 03/08/21  1547   SODIUM mmol/L 140 138   POTASSIUM mmol/L 4 1 5 6*   CHLORIDE mmol/L 103 103   CO2 mmol/L 27 27   BUN mg/dL 32* 32*   CREATININE mg/dL 1 46* 1 38*   ANION GAP mmol/L 10 8   CALCIUM mg/dL 9 5 9 3   ALBUMIN g/dL  --  2 8*   TOTAL BILIRUBIN mg/dL  --  0 60   ALK PHOS U/L  --  145*   ALT U/L  --  30   AST U/L  --  45   GLUCOSE RANDOM mg/dL 217* 233*     Results from last 7 days   Lab Units 03/08/21  1547   INR  1 08             Results from last 7 days   Lab Units 03/08/21  1547   LACTIC ACID mmol/L 1 7   PROCALCITONIN ng/ml <0 05       Imaging: I have personally reviewed pertinent reports  XR tibia fibula 2 views RIGHT   ED Interpretation by Ramon Lazcano MD (03/08 1635)   No fracture or foreign body in the tissue      Final Result by Nicholas Bowling DO (03/08 0031)      No acute osseous abnormality              Workstation performed: [id number]         XR chest portable    (Results Pending)       Allscripts / Epic Records Reviewed: Yes     ** Please Note: This note has been constructed using a voice recognition system   **

## 2021-03-08 NOTE — ED PROVIDER NOTES
History  Chief Complaint   Patient presents with    Wound Check     pt has open wound on right lower leg, was sent by his pcp to be evaluated for possible admission for iv abx     HPI  47-year-old male comes in with open wound to his right lower leg, sent in from his PCP for evaluation likely admission hospital   Patient has history of diabetes  States that it started with a small blister that progressively worsened over last 3 days  He denies any fevers or chills but does endorse increased fatigue  He does have pain around site as well as pain in his right lower leg  He denies any other symptoms notably he has no headache neck pain chest pain or abdominal pain  Prior to Admission Medications   Prescriptions Last Dose Informant Patient Reported? Taking?    BD PEN NEEDLE JUNIOR U/F 32G X 4 MM MISC   Yes No   Semaglutide (OZEMPIC, 0 25 OR 0 5 MG/DOSE, SC)   Yes No   Sig: Inject 0 5 mg under the skin once a week   aspirin 81 MG tablet   Yes No   Sig: Take 81 mg by mouth daily   atorvastatin (LIPITOR) 40 mg tablet   No No   Sig: Take 1 tablet (40 mg total) by mouth daily after dinner   carvedilol (COREG) 12 5 mg tablet   No No   Sig: Take 1 tablet (12 5 mg total) by mouth 2 (two) times a day   cholecalciferol 2000 units TABS   No No   Sig: Take 1 tablet (2,000 Units total) by mouth daily   finasteride (PROSCAR) 5 mg tablet   No No   Sig: Take 1 tablet (5 mg total) by mouth daily for 30 days   gabapentin (NEURONTIN) 600 MG tablet   No No   Sig: Take 1 tablet (600 mg total) by mouth 2 (two) times a day   glucagon (GLUCAGON EMERGENCY) 1 MG injection   No No   Sig: Inject 1 mg under the skin once as needed (PRN blood glucose less than 70 if unconscious or uncorrectable by oral means) for up to 1 dose   insulin glargine (LANTUS) 100 units/mL subcutaneous injection   No No   Sig: Inject 20 Units under the skin 2 (two) times a day for 14 days   lisinopril (ZESTRIL) 5 mg tablet   No No   Sig: Take 1 tablet (5 mg total) by mouth daily   nystatin (MYCOSTATIN) powder   No No   Sig: Apply topically 2 (two) times a day   oxyCODONE (ROXICODONE) 10 MG TABS   No No   Sig: Take 1 tablet (10 mg total) by mouth every 4 (four) hours as needed for moderate pain or severe painMax Daily Amount: 60 mg   Patient taking differently: Take 20 mg by mouth every 6 (six) hours as needed for moderate pain or severe pain    tamsulosin (FLOMAX) 0 4 mg   Yes No   Sig: Take 0 4 mg by mouth every evening        Facility-Administered Medications: None       Past Medical History:   Diagnosis Date    Cataract     [facility]congestive heart failure) ([facility])     chronic diastolic CHF    Coronary artery disease     Diabetes mellitus ([email or web address] 75 )     Glaucoma     Hyperlipidemia     Hypertension     Neuropathy     Obesity     Renal disorder     chronic kidney disease stage 3     Sleep apnea     Stroke ([email or web address] 75 )     TIA (transient ischemic attack)     Uncontrolled type 2 diabetes mellitus with hyperglycemia, with long-term current use of insulin ([email or web address] 75 ) 10/4/2018       Past Surgical History:   Procedure Laterality Date    APPENDECTOMY      CARPAL TUNNEL RELEASE      EYE SURGERY      cataracts       Family History   Problem Relation Age of Onset    No Known Problems Mother     No Known Problems Father      I have reviewed and agree with the history as documented  E-Cigarette/Vaping    E-Cigarette Use Never User      E-Cigarette/Vaping Substances     Social History     Tobacco Use    Smoking status: Former Smoker     Types: Cigars    Smokeless tobacco: Former User   Substance Use Topics    Alcohol use: Never     Frequency: Never     Drinks per session: Patient refused     Binge frequency: Never    Drug use: No       Review of Systems   Constitutional: Negative  Negative for chills and fever  HENT: Negative  Negative for ear pain and sore throat  Eyes: Negative  Negative for pain and discharge  Respiratory: Negative    Negative for chest tightness and shortness of breath  Cardiovascular: Negative  Negative for chest pain and palpitations  Gastrointestinal: Negative  Negative for abdominal pain, nausea and vomiting  Endocrine: Negative  Negative for polyphagia and polyuria  Genitourinary: Negative  Negative for dysuria and flank pain  Musculoskeletal: Negative  Negative for arthralgias and back pain  Skin: Positive for wound  Negative for color change  Allergic/Immunologic: Negative  Negative for food allergies and immunocompromised state  Neurological: Negative  Negative for weakness and headaches  Hematological: Negative  Negative for adenopathy  Does not bruise/bleed easily  Psychiatric/Behavioral: Negative  Negative for suicidal ideas  The patient is not nervous/anxious  Physical Exam  Physical Exam  Vitals signs and nursing note reviewed  Constitutional:       General: He is not in acute distress  Appearance: Normal appearance  He is not diaphoretic  HENT:      Head: Normocephalic and atraumatic  Right Ear: External ear normal       Left Ear: External ear normal       Nose: Nose normal  No congestion or rhinorrhea  Mouth/Throat:      Mouth: Mucous membranes are moist    Eyes:      General: No scleral icterus  Right eye: No discharge  Left eye: No discharge  Conjunctiva/sclera: Conjunctivae normal       Pupils: Pupils are equal, round, and reactive to light  Neck:      Musculoskeletal: Normal range of motion and neck supple  No neck rigidity  Cardiovascular:      Rate and Rhythm: Regular rhythm  Pulses: Normal pulses  Heart sounds: Normal heart sounds  Pulmonary:      Effort: Pulmonary effort is normal  No respiratory distress  Breath sounds: Normal breath sounds  No stridor  No wheezing, rhonchi or rales  Abdominal:      General: Abdomen is flat  There is no distension  Palpations: Abdomen is soft  Tenderness: There is no abdominal tenderness   There is no guarding  Musculoskeletal: Normal range of motion  General: No swelling, tenderness or deformity  Skin:     General: Skin is warm and dry  Capillary Refill: Capillary refill takes less than 2 seconds  Findings: No lesion or rash  Comments: Patient does have tender open blistered wound to his right lower leg, there is surrounding erythema and edema  It is tender to touch  Neurological:      General: No focal deficit present  Mental Status: He is alert and oriented to person, place, and time  Cranial Nerves: No cranial nerve deficit  Sensory: No sensory deficit  Psychiatric:         Mood and Affect: Mood normal          Behavior: Behavior normal          Thought Content:  Thought content normal              Vital Signs  ED Triage Vitals [03/08/21 1503]   Temperature Pulse Respirations Blood Pressure SpO2   97 6 °F (36 4 °C) 88 22 (!) 185/77 93 %      Temp Source Heart Rate Source Patient Position - Orthostatic VS BP Location FiO2 (%)   Temporal Monitor Sitting Left arm --      Pain Score       8           Vitals:    03/08/21 1645 03/08/21 1915 03/08/21 2000 03/08/21 2030   BP: (!) 198/88 (!) 215/106 (!) 195/86 (!) 193/90   Pulse: 91 77 78 77   Patient Position - Orthostatic VS: Lying Lying Sitting Lying         Visual Acuity      ED Medications  Medications   cefepime (MAXIPIME) IVPB (premix in dextrose) 2,000 mg 50 mL (has no administration in time range)   vancomycin (VANCOCIN) 1,250 mg in sodium chloride 0 9 % 250 mL IVPB (0 mg Intravenous Hold 3/8/21 2042)   acetaminophen (TYLENOL) tablet 650 mg (has no administration in time range)   enoxaparin (LOVENOX) subcutaneous injection 40 mg (40 mg Subcutaneous Given 3/8/21 2217)   insulin lispro (HumaLOG) 100 units/mL subcutaneous injection 2-12 Units (has no administration in time range)   insulin lispro (HumaLOG) 100 units/mL subcutaneous injection 1-6 Units (0 Units Subcutaneous Not Given 3/8/21 2221)   dextrose 50 % IV solution 25 mL (has no administration in time range)   Labetalol HCl (NORMODYNE) injection 10 mg (has no administration in time range)   aspirin chewable tablet 81 mg (has no administration in time range)   atorvastatin (LIPITOR) tablet 40 mg (40 mg Oral Given 3/8/21 2221)   carvedilol (COREG) tablet 12 5 mg (has no administration in time range)   cholecalciferol (VITAMIN D3) tablet 2,000 Units (has no administration in time range)   gabapentin (NEURONTIN) capsule 300 mg (300 mg Oral Given 3/8/21 2222)   lisinopril (ZESTRIL) tablet 5 mg (has no administration in time range)   insulin glargine (LANTUS) subcutaneous injection 10 Units 0 1 mL (10 Units Subcutaneous Given 3/8/21 2219)   tamsulosin (FLOMAX) capsule 0 4 mg (0 4 mg Oral Given 3/8/21 2221)   oxyCODONE (ROXICODONE) IR tablet 5 mg (has no administration in time range)     Or   oxyCODONE (ROXICODONE) immediate release tablet 10 mg (has no administration in time range)   cefTRIAXone (ROCEPHIN) IVPB (premix in dextrose) 1,000 mg 50 mL (0 mg Intravenous Stopped 3/8/21 1736)       Diagnostic Studies  Results Reviewed     Procedure Component Value Units Date/Time    D-dimer, quantitative [[id number]] Collected: 03/08/21 2216    Lab Status: No result Specimen: Blood from Arm, Right     Procalcitonin [[id number]] Collected: 03/08/21 2216    Lab Status: No result Specimen: Blood from Arm, Right     NT-BNP PRO [[id number]] Collected: 03/08/21 2216    Lab Status: No result Specimen: Blood from Arm, Right     Procalcitonin Reflex [[id number]] Collected: 03/08/21 2216    Lab Status: No result Specimen: Blood from Arm, Right     Troponin I [[id number]] Collected: 03/08/21 2216    Lab Status: No result Specimen: Blood from Arm, Right     Procalcitonin with AM Reflex [[id number]]  (Normal) Collected: 03/08/21 7387    Lab Status: Final result Specimen: Blood from Arm, Left Updated: 03/08/21 1951     Procalcitonin <0 05 ng/ml     Troponin I [[id number]]     Lab Status: No result Specimen: Blood     Urinalysis with microscopic [[id number]]     Lab Status: No result Specimen: Urine     Urine culture [[id number]]     Lab Status: No result Specimen: Urine, Clean Catch     COVID19, Influenza A/B, RSV PCR, [facility] [[id number]]  (Normal) Collected: 03/08/21 1802    Lab Status: Final result Specimen: Nasopharyngeal Swab Updated: 03/08/21 1903     SARS-CoV-2 Negative     INFLUENZA A PCR Negative     INFLUENZA B PCR Negative     RSV PCR Negative    Narrative: This test has been authorized by FDA under an EUA (Emergency Use Assay) for use by authorized laboratories  Clinical caution and judgement should be used with the interpretation of these results with consideration of the clinical impression and other laboratory testing  Testing reported as "Positive" or "Negative" has been proven to be accurate according to standard laboratory validation requirements  All testing is performed with control materials showing appropriate reactivity at standard intervals      MRSA culture [[id number]] Collected: 03/08/21 1801    Lab Status: No result Specimen: Nares from Nose     Basic metabolic panel [[id number]]  (Abnormal) Collected: 03/08/21 1658    Lab Status: Final result Specimen: Blood from Arm, Left Updated: 03/08/21 1728     Sodium 140 mmol/L      Potassium 4 1 mmol/L      Chloride 103 mmol/L      CO2 27 mmol/L      ANION GAP 10 mmol/L      BUN 32 mg/dL      Creatinine 1 46 mg/dL      Glucose 217 mg/dL      Calcium 9 5 mg/dL      eGFR 46 ml/min/1 73sq m     Narrative:      [place] guidelines for Chronic Kidney Disease (CKD):     Stage 1 with normal or high GFR (GFR > 90 mL/min/1 73 square meters)    Stage 2 Mild CKD (GFR = 60-89 mL/min/1 73 square meters)    Stage 3A Moderate CKD (GFR = 45-59 mL/min/1 73 square meters)    Stage 3B Moderate CKD (GFR = 30-44 mL/min/1 73 square meters)    Stage 4 Severe CKD (GFR = 15-29 mL/min/1 73 square meters)    Stage 5 End Stage CKD (GFR <15 mL/min/1 73 square meters)  Note: GFR calculation is accurate only with a steady state creatinine    Blood culture #2 [[id number]] Collected: 03/08/21 1658    Lab Status: In process Specimen: Blood from Arm, Left Updated: 03/08/21 1707    Comprehensive metabolic panel [[id number]]  (Abnormal) Collected: 03/08/21 1547    Lab Status: Final result Specimen: Blood from Arm, Left Updated: 03/08/21 1642     Sodium 138 mmol/L      Potassium 5 6 mmol/L      Chloride 103 mmol/L      CO2 27 mmol/L      ANION GAP 8 mmol/L      BUN 32 mg/dL      Creatinine 1 38 mg/dL      Glucose 233 mg/dL      Calcium 9 3 mg/dL      Corrected Calcium 10 3 mg/dL      AST 45 U/L      ALT 30 U/L      Alkaline Phosphatase 145 U/L      Total Protein 7 6 g/dL      Albumin 2 8 g/dL      Total Bilirubin 0 60 mg/dL      eGFR 49 ml/min/1 73sq m     Narrative:      [place] guidelines for Chronic Kidney Disease (CKD):     Stage 1 with normal or high GFR (GFR > 90 mL/min/1 73 square meters)    Stage 2 Mild CKD (GFR = 60-89 mL/min/1 73 square meters)    Stage 3A Moderate CKD (GFR = 45-59 mL/min/1 73 square meters)    Stage 3B Moderate CKD (GFR = 30-44 mL/min/1 73 square meters)    Stage 4 Severe CKD (GFR = 15-29 mL/min/1 73 square meters)    Stage 5 End Stage CKD (GFR <15 mL/min/1 73 square meters)  Note: GFR calculation is accurate only with a steady state creatinine    Lactic acid [[id number]]  (Normal) Collected: 03/08/21 1547    Lab Status: Final result Specimen: Blood from Arm, Left Updated: 03/08/21 1635     LACTIC ACID 1 7 mmol/L     Narrative:      Result may be elevated if tourniquet was used during collection      Aglrosa Tyson [[id number]]  (Normal) Collected: 03/08/21 1547    Lab Status: Final result Specimen: Blood from Arm, Left Updated: 03/08/21 1627     Protime 13 8 seconds      INR 1 08    APTT [[id number]]  (Normal) Collected: 03/08/21 1547    Lab Status: Final result Specimen: Blood from Arm, Left Updated: 03/08/21 1627     PTT 33 seconds     CBC and differential [[id number]]  (Abnormal) Collected: 03/08/21 1547    Lab Status: Final result Specimen: Blood from Arm, Left Updated: 03/08/21 1618     WBC 10 51 Thousand/uL      RBC 4 15 Million/uL      Hemoglobin 11 3 g/dL      Hematocrit 36 9 %      MCV 89 fL      MCH 27 2 pg      MCHC 30 6 g/dL      RDW 14 6 %      MPV 9 8 fL      Platelets 884 Thousands/uL      nRBC 0 /100 WBCs      Neutrophils Relative 69 %      Immat GRANS % 1 %      Lymphocytes Relative 16 %      Monocytes Relative 9 %      Eosinophils Relative 5 %      Basophils Relative 0 %      Neutrophils Absolute 7 30 Thousands/µL      Immature Grans Absolute 0 05 Thousand/uL      Lymphocytes Absolute 1 70 Thousands/µL      Monocytes Absolute 0 89 Thousand/µL      Eosinophils Absolute 0 53 Thousand/µL      Basophils Absolute 0 04 Thousands/µL     Blood culture #1 [[id number]] Collected: 03/08/21 1547    Lab Status: In process Specimen: Blood from Arm, Left Updated: 03/08/21 1615    UA w Reflex to Microscopic w Reflex to Culture [[id number]]     Lab Status: No result Specimen: Urine                  XR tibia fibula 2 views RIGHT   ED Interpretation by Ima Dancer, MD (03/08 1635)   No fracture or foreign body in the tissue      Final Result by Nicholas Bowling DO (03/08 1641)      No acute osseous abnormality  Workstation performed: [id number]         XR chest portable    (Results Pending)              Procedures  Procedures         ED Course  ED Course as of Mar 08 2225   Mon Mar 08, 2021   2146 EKG shows sinus rhythm, QRS duration of 146, left axis deviation with a right bundle branch block, unchanged from previous EKG  MDM  Number of Diagnoses or Management Options  Cellulitis:   Diagnosis management comments: This is a 64-year-old male with open wound his right lower leg  Will evaluate with septic evaluation  Will give antibiotics    Patient will be admitted to the hospital       Disposition  Final diagnoses:   Cellulitis     Time reflects when diagnosis was documented in both MDM as applicable and the Disposition within this note     Time User Action Codes Description Comment    3/8/2021  5:03 PM Jeff Malone [L03 [id number]] Cellulitis     3/8/2021  7:46 PM Jayy Montesinos [[id number]] Cellulitis of right lower extremity     3/8/2021  7:46 PM Jayy Montesinos [V84 [id number]] Open wound of right lower extremity, initial encounter       ED Disposition     ED Disposition Condition Date/Time Comment    Admit Stable Mon Mar 8, 2021  5:03 PM [facility] admission        Follow-up Information    None         Patient's Medications   Discharge Prescriptions    No medications on file     No discharge procedures on file      PDMP Review     None          ED Provider  Electronically Signed by           Julián Still MD  03/08/21 [address], [place]  03/08/21 6937

## 2021-03-09 ENCOUNTER — APPOINTMENT (INPATIENT)
Dept: NON INVASIVE DIAGNOSTICS | Facility: HOSPITAL | Age: 76
DRG: 603 | End: 2021-03-09
Payer: COMMERCIAL

## 2021-03-09 ENCOUNTER — APPOINTMENT (INPATIENT)
Dept: CT IMAGING | Facility: HOSPITAL | Age: 76
DRG: 603 | End: 2021-03-09
Payer: COMMERCIAL

## 2021-03-09 PROBLEM — E34.9 ELEVATED PARATHYROID HORMONE: Status: ACTIVE | Noted: 2021-03-09

## 2021-03-09 PROBLEM — R79.89 ELEVATED D-DIMER: Status: ACTIVE | Noted: 2021-03-09

## 2021-03-09 LAB
ALBUMIN SERPL BCP-MCNC: 3.1 G/DL (ref 3.5–5)
ALP SERPL-CCNC: 144 U/L (ref 46–116)
ALT SERPL W P-5'-P-CCNC: 27 U/L (ref 12–78)
ANION GAP SERPL CALCULATED.3IONS-SCNC: 7 MMOL/L (ref 4–13)
AST SERPL W P-5'-P-CCNC: 15 U/L (ref 5–45)
ATRIAL RATE: 91 BPM
BASOPHILS # BLD AUTO: 0.03 THOUSANDS/ΜL (ref 0–0.1)
BASOPHILS NFR BLD AUTO: 0 % (ref 0–1)
BILIRUB SERPL-MCNC: 0.6 MG/DL (ref 0.2–1)
BUN SERPL-MCNC: 29 MG/DL (ref 5–25)
CA-I BLD-SCNC: 1.23 MMOL/L (ref 1.12–1.32)
CALCIUM ALBUM COR SERPL-MCNC: 10.2 MG/DL (ref 8.3–10.1)
CALCIUM SERPL-MCNC: 9.5 MG/DL (ref 8.3–10.1)
CHLORIDE SERPL-SCNC: 105 MMOL/L (ref 100–108)
CK SERPL-CCNC: 91 U/L (ref 39–308)
CO2 SERPL-SCNC: 29 MMOL/L (ref 21–32)
CREAT SERPL-MCNC: 1.28 MG/DL (ref 0.6–1.3)
EOSINOPHIL # BLD AUTO: 0.44 THOUSAND/ΜL (ref 0–0.61)
EOSINOPHIL NFR BLD AUTO: 4 % (ref 0–6)
ERYTHROCYTE [DISTWIDTH] IN BLOOD BY AUTOMATED COUNT: 14.6 % (ref 11.6–15.1)
EST. AVERAGE GLUCOSE BLD GHB EST-MCNC: 154 MG/DL
GFR SERPL CREATININE-BSD FRML MDRD: 54 ML/MIN/1.73SQ M
GLUCOSE SERPL-MCNC: 146 MG/DL (ref 65–140)
GLUCOSE SERPL-MCNC: 160 MG/DL (ref 65–140)
GLUCOSE SERPL-MCNC: 171 MG/DL (ref 65–140)
GLUCOSE SERPL-MCNC: 177 MG/DL (ref 65–140)
GLUCOSE SERPL-MCNC: 191 MG/DL (ref 65–140)
HBA1C MFR BLD: 7 %
HCT VFR BLD AUTO: 39.5 % (ref 36.5–49.3)
HGB BLD-MCNC: 12.1 G/DL (ref 12–17)
IMM GRANULOCYTES # BLD AUTO: 0.04 THOUSAND/UL (ref 0–0.2)
IMM GRANULOCYTES NFR BLD AUTO: 0 % (ref 0–2)
LACTATE SERPL-SCNC: 1.1 MMOL/L (ref 0.5–2)
LYMPHOCYTES # BLD AUTO: 1.42 THOUSANDS/ΜL (ref 0.6–4.47)
LYMPHOCYTES NFR BLD AUTO: 14 % (ref 14–44)
MAGNESIUM SERPL-MCNC: 1.9 MG/DL (ref 1.6–2.6)
MCH RBC QN AUTO: 27 PG (ref 26.8–34.3)
MCHC RBC AUTO-ENTMCNC: 30.6 G/DL (ref 31.4–37.4)
MCV RBC AUTO: 88 FL (ref 82–98)
MONOCYTES # BLD AUTO: 0.89 THOUSAND/ΜL (ref 0.17–1.22)
MONOCYTES NFR BLD AUTO: 9 % (ref 4–12)
NEUTROPHILS # BLD AUTO: 7.65 THOUSANDS/ΜL (ref 1.85–7.62)
NEUTS SEG NFR BLD AUTO: 73 % (ref 43–75)
NRBC BLD AUTO-RTO: 0 /100 WBCS
PHOSPHATE SERPL-MCNC: 2.9 MG/DL (ref 2.3–4.1)
PLATELET # BLD AUTO: 218 THOUSANDS/UL (ref 149–390)
PMV BLD AUTO: 9.8 FL (ref 8.9–12.7)
POTASSIUM SERPL-SCNC: 4.3 MMOL/L (ref 3.5–5.3)
PROCALCITONIN SERPL-MCNC: 0.05 NG/ML
PROCALCITONIN SERPL-MCNC: 0.06 NG/ML
PROT SERPL-MCNC: 7.8 G/DL (ref 6.4–8.2)
PTH-INTACT SERPL-MCNC: 130.4 PG/ML (ref 18.4–80.1)
QRS AXIS: -88 DEGREES
QRSD INTERVAL: 146 MS
QT INTERVAL: 400 MS
QTC INTERVAL: 484 MS
RBC # BLD AUTO: 4.48 MILLION/UL (ref 3.88–5.62)
SODIUM SERPL-SCNC: 141 MMOL/L (ref 136–145)
T WAVE AXIS: 50 DEGREES
TROPONIN I SERPL-MCNC: 0.02 NG/ML
VENTRICULAR RATE: 88 BPM
WBC # BLD AUTO: 10.47 THOUSAND/UL (ref 4.31–10.16)

## 2021-03-09 PROCEDURE — 80053 COMPREHEN METABOLIC PANEL: CPT | Performed by: INTERNAL MEDICINE

## 2021-03-09 PROCEDURE — 97163 PT EVAL HIGH COMPLEX 45 MIN: CPT | Performed by: PHYSICAL THERAPIST

## 2021-03-09 PROCEDURE — 84100 ASSAY OF PHOSPHORUS: CPT | Performed by: INTERNAL MEDICINE

## 2021-03-09 PROCEDURE — 99223 1ST HOSP IP/OBS HIGH 75: CPT | Performed by: PHYSICIAN ASSISTANT

## 2021-03-09 PROCEDURE — 83970 ASSAY OF PARATHORMONE: CPT | Performed by: INTERNAL MEDICINE

## 2021-03-09 PROCEDURE — 82948 REAGENT STRIP/BLOOD GLUCOSE: CPT

## 2021-03-09 PROCEDURE — 93010 ELECTROCARDIOGRAM REPORT: CPT | Performed by: INTERNAL MEDICINE

## 2021-03-09 PROCEDURE — 82550 ASSAY OF CK (CPK): CPT | Performed by: INTERNAL MEDICINE

## 2021-03-09 PROCEDURE — 93306 TTE W/DOPPLER COMPLETE: CPT

## 2021-03-09 PROCEDURE — 82397 CHEMILUMINESCENT ASSAY: CPT | Performed by: INTERNAL MEDICINE

## 2021-03-09 PROCEDURE — G1004 CDSM NDSC: HCPCS

## 2021-03-09 PROCEDURE — 83036 HEMOGLOBIN GLYCOSYLATED A1C: CPT | Performed by: INTERNAL MEDICINE

## 2021-03-09 PROCEDURE — 71275 CT ANGIOGRAPHY CHEST: CPT

## 2021-03-09 PROCEDURE — 82330 ASSAY OF CALCIUM: CPT | Performed by: INTERNAL MEDICINE

## 2021-03-09 PROCEDURE — 93306 TTE W/DOPPLER COMPLETE: CPT | Performed by: INTERNAL MEDICINE

## 2021-03-09 PROCEDURE — 36415 COLL VENOUS BLD VENIPUNCTURE: CPT | Performed by: INTERNAL MEDICINE

## 2021-03-09 PROCEDURE — 99232 SBSQ HOSP IP/OBS MODERATE 35: CPT | Performed by: INTERNAL MEDICINE

## 2021-03-09 PROCEDURE — 84484 ASSAY OF TROPONIN QUANT: CPT | Performed by: INTERNAL MEDICINE

## 2021-03-09 PROCEDURE — 83605 ASSAY OF LACTIC ACID: CPT | Performed by: INTERNAL MEDICINE

## 2021-03-09 PROCEDURE — 83735 ASSAY OF MAGNESIUM: CPT | Performed by: INTERNAL MEDICINE

## 2021-03-09 PROCEDURE — 85025 COMPLETE CBC W/AUTO DIFF WBC: CPT | Performed by: INTERNAL MEDICINE

## 2021-03-09 PROCEDURE — 0JDN3ZZ EXTRACTION OF RIGHT LOWER LEG SUBCUTANEOUS TISSUE AND FASCIA, PERCUTANEOUS APPROACH: ICD-10-PCS | Performed by: PODIATRIST

## 2021-03-09 RX ORDER — LISINOPRIL 10 MG/1
10 TABLET ORAL DAILY
Status: DISCONTINUED | OUTPATIENT
Start: 2021-03-10 | End: 2021-03-10

## 2021-03-09 RX ADMIN — CEFEPIME HYDROCHLORIDE 2000 MG: 2 INJECTION, SOLUTION INTRAVENOUS at 17:47

## 2021-03-09 RX ADMIN — ENOXAPARIN SODIUM 40 MG: 40 INJECTION SUBCUTANEOUS at 08:31

## 2021-03-09 RX ADMIN — GABAPENTIN 300 MG: 300 CAPSULE ORAL at 08:30

## 2021-03-09 RX ADMIN — OXYCODONE HYDROCHLORIDE 10 MG: 10 TABLET ORAL at 17:46

## 2021-03-09 RX ADMIN — ATORVASTATIN CALCIUM 40 MG: 40 TABLET, FILM COATED ORAL at 17:47

## 2021-03-09 RX ADMIN — CEFEPIME HYDROCHLORIDE 2000 MG: 2 INJECTION, SOLUTION INTRAVENOUS at 06:02

## 2021-03-09 RX ADMIN — LISINOPRIL 5 MG: 5 TABLET ORAL at 08:30

## 2021-03-09 RX ADMIN — ENOXAPARIN SODIUM 40 MG: 40 INJECTION SUBCUTANEOUS at 21:42

## 2021-03-09 RX ADMIN — MAGNESIUM OXIDE TAB 400 MG (241.3 MG ELEMENTAL MG) 400 MG: 400 (241.3 MG) TAB at 08:30

## 2021-03-09 RX ADMIN — ASPIRIN 81 MG 81 MG: 81 TABLET ORAL at 08:30

## 2021-03-09 RX ADMIN — IOHEXOL 100 ML: 350 INJECTION, SOLUTION INTRAVENOUS at 08:53

## 2021-03-09 RX ADMIN — INSULIN LISPRO 2 UNITS: 100 INJECTION, SOLUTION INTRAVENOUS; SUBCUTANEOUS at 21:43

## 2021-03-09 RX ADMIN — CARVEDILOL 12.5 MG: 12.5 TABLET, FILM COATED ORAL at 08:32

## 2021-03-09 RX ADMIN — CARVEDILOL 12.5 MG: 12.5 TABLET, FILM COATED ORAL at 17:47

## 2021-03-09 RX ADMIN — OXYCODONE HYDROCHLORIDE 10 MG: 10 TABLET ORAL at 05:41

## 2021-03-09 RX ADMIN — VANCOMYCIN HYDROCHLORIDE 1250 MG: 5 INJECTION, POWDER, LYOPHILIZED, FOR SOLUTION INTRAVENOUS at 20:49

## 2021-03-09 RX ADMIN — GABAPENTIN 300 MG: 300 CAPSULE ORAL at 17:47

## 2021-03-09 RX ADMIN — OXYCODONE HYDROCHLORIDE 10 MG: 10 TABLET ORAL at 12:14

## 2021-03-09 RX ADMIN — Medication 2000 UNITS: at 08:30

## 2021-03-09 RX ADMIN — INSULIN LISPRO 2 UNITS: 100 INJECTION, SOLUTION INTRAVENOUS; SUBCUTANEOUS at 16:54

## 2021-03-09 RX ADMIN — INSULIN LISPRO 2 UNITS: 100 INJECTION, SOLUTION INTRAVENOUS; SUBCUTANEOUS at 12:09

## 2021-03-09 RX ADMIN — TAMSULOSIN HYDROCHLORIDE 0.4 MG: 0.4 CAPSULE ORAL at 17:47

## 2021-03-09 RX ADMIN — INSULIN GLARGINE 10 UNITS: 100 INJECTION, SOLUTION SUBCUTANEOUS at 21:42

## 2021-03-09 RX ADMIN — VANCOMYCIN HYDROCHLORIDE 1250 MG: 5 INJECTION, POWDER, LYOPHILIZED, FOR SOLUTION INTRAVENOUS at 08:31

## 2021-03-09 NOTE — PROGRESS NOTES
Progress Note - Betha Goodell 1945, 66 y o  male MRN: [id number]    Unit/Bed#: [facility] Encounter: [id number]    Primary Care Provider: Omar Springer MD   Date and time admitted to hospital: 3/8/2021  2:30 PM        Elevated alkaline phosphatase level  Assessment & Plan  · Secondary to unclear etiology  · Possibly secondary to immobility  · Follow the total alkaline phosphatase level    Open wound of right lower extremity  Assessment & Plan  · Associated with cellulitis of the right lower extremity  · Treat with broad-spectrum antibiotics including IV cefepime and IV vancomycin  · Check blood cultures x 2 sets  · Check a MRSA nasal screen  · Consult Podiatry  · May require an MRI of the right lower extremity to rule out osteomyelitis    Hypercalcemia  Assessment & Plan  · Check an ionized calcium level and intact-PTH level    Accelerated hypertension  Assessment & Plan  · Continue coreg 12 5 mg PO BID  · Continue lisinopril 5 mg PO Qdaily  · Utilize PRN IV labetalol  · Will likely require an adjustment to his home hypertension medication regimen  · Follow the blood pressure trend    Type 2 diabetes mellitus with hyperglycemia, with long-term current use of insulin ([facility])  Assessment & Plan  Lab Results   Component Value Date    HGBA1C 6 6 (H) 02/05/2020       Recent Labs     03/08/21  2215   POCGLU 139       Blood Sugar Average: Last 72 hrs:  (P) 139   · Utilize lantus 10 Units SQ QHS  · Continue lisinopril for renal protection  · Hypoglycemia protocol  · Insulin sliding scale with blood glucose monitoring ACHS    Morbid obesity with BMI of 45 0-49 9, adult ([facility])  Assessment & Plan  · Lifestyle modifications are recommended including weight loss, improving his diet, and increasing his amount of activity      Obstructive sleep apnea syndrome  Assessment & Plan  · Utilize CPAP QHS and anytime while sleeping    Dyspnea on exertion  Assessment & Plan  · Needs outpatient pulmonary function tests completed by his outpatient Pulmonologist  · Check a pro-BNP level  · Check a D-dimer level  · Check a portable chest xray  · Check a transthoracic echocardiogram    * Cellulitis of right lower extremity  Assessment & Plan  · Admit to med/surg level of care  · Associated with an open wound of the right lower extremity  · Treat with broad-spectrum antibiotics including IV cefepime and IV vancomycin day#2  · Check blood cultures x 2 sets  · Check a MRSA nasal screen  · Consult Podiatry  · May require an MRI of the right lower extremity to rule out osteomyelitis        Subjective:     Patient seen examined at bedside, resting comfortably no acute distress  No acute overnight events as reported by both patient and staff  No new issues or concerns at time examination      Objective:     Vitals: Blood pressure 149/84, pulse 82, temperature 97 6 °F (36 4 °C), temperature source Temporal, resp  rate 18, height 5' 6" (1 676 m), weight (!) 136 kg (300 lb 0 7 oz), SpO2 93 %  ,Body mass index is 48 43 kg/m²    Wt Readings from Last 3 Encounters:   03/08/21 (!) 136 kg (300 lb 0 7 oz)   12/22/20 135 kg (298 lb 1 oz)   02/07/20 133 kg (292 lb 15 9 oz)       Intake/Output Summary (Last 24 hours) at 3/9/2021 0815  Last data filed at 3/9/2021 2250  Gross per 24 hour   Intake 350 ml   Output --   Net 350 ml       Physical Exam: Physical Exam     Physical Exam  General:  NAD, follows commands  HEENT:  NC/AT, mucous membranes moist  Neck:  Supple, No JVP elevation  CV:  + S1, + S2, RRR  Pulm:  Lung fields are CTA bilaterally  Abd:  Soft, Non-tender, Non-distended  Ext:  No clubbing/cyanosis, Edema of the bilateral lower extremities  Skin:  Open wound of the right anterior shin region with surrounding erythema  Neuro:  Awake, alert, oriented  Psych:  Normal mood and affect    Recent Results (from the past 24 hour(s))   CBC and differential    Collection Time: 03/08/21  3:47 PM   Result Value Ref Range    WBC 10 51 (H) 4 31 - 10 16 Thousand/uL    RBC 4  15 3 88 - 5 62 Million/uL    Hemoglobin 11 3 (L) 12 0 - 17 0 g/dL    Hematocrit 36 9 36 5 - 49 3 %    MCV 89 82 - 98 fL    MCH 27 2 26 8 - 34 3 pg    MCHC 30 6 (L) 31 4 - 37 4 g/dL    RDW 14 6 11 6 - 15 1 %    MPV 9 8 8 9 - 12 7 fL    Platelets 278 497 - 578 Thousands/uL    nRBC 0 /100 WBCs    Neutrophils Relative 69 43 - 75 %    Immat GRANS % 1 0 - 2 %    Lymphocytes Relative 16 14 - 44 %    Monocytes Relative 9 4 - 12 %    Eosinophils Relative 5 0 - 6 %    Basophils Relative 0 0 - 1 %    Neutrophils Absolute 7 30 1 85 - 7 62 Thousands/µL    Immature Grans Absolute 0 05 0 00 - 0 20 Thousand/uL    Lymphocytes Absolute 1 70 0 60 - 4 47 Thousands/µL    Monocytes Absolute 0 89 0 17 - 1 22 Thousand/µL    Eosinophils Absolute 0 53 0 00 - 0 61 Thousand/µL    Basophils Absolute 0 04 0 00 - 0 10 Thousands/µL   Comprehensive metabolic panel    Collection Time: 03/08/21  3:47 PM   Result Value Ref Range    Sodium 138 136 - 145 mmol/L    Potassium 5 6 (H) 3 5 - 5 3 mmol/L    Chloride 103 100 - 108 mmol/L    CO2 27 21 - 32 mmol/L    ANION GAP 8 4 - 13 mmol/L    BUN 32 (H) 5 - 25 mg/dL    Creatinine 1 38 (H) 0 60 - 1 30 mg/dL    Glucose 233 (H) 65 - 140 mg/dL    Calcium 9 3 8 3 - 10 1 mg/dL    Corrected Calcium 10 3 (H) 8 3 - 10 1 mg/dL    AST 45 5 - 45 U/L    ALT 30 12 - 78 U/L    Alkaline Phosphatase 145 (H) 46 - 116 U/L    Total Protein 7 6 6 4 - 8 2 g/dL    Albumin 2 8 (L) 3 5 - 5 0 g/dL    Total Bilirubin 0 60 0 20 - 1 00 mg/dL    eGFR 49 ml/min/1 73sq m   Lactic acid    Collection Time: 03/08/21  3:47 PM   Result Value Ref Range    LACTIC ACID 1 7 0 5 - 2 0 mmol/L   Procalcitonin with AM Reflex    Collection Time: 03/08/21  3:47 PM   Result Value Ref Range    Procalcitonin <0 05 <=0 25 ng/ml   Protime-INR    Collection Time: 03/08/21  3:47 PM   Result Value Ref Range    Protime 13 8 11 6 - 14 5 seconds    INR 1 08 0 84 - 1 19   APTT    Collection Time: 03/08/21  3:47 PM   Result Value Ref Range    PTT 33 23 - 37 seconds Blood culture #1    Collection Time: 03/08/21  3:47 PM    Specimen: Arm, Left; Blood   Result Value Ref Range    Blood Culture Received in Microbiology Lab  Culture in Progress  Blood culture #2    Collection Time: 03/08/21  4:58 PM    Specimen: Arm, Left; Blood   Result Value Ref Range    Blood Culture Received in Microbiology Lab  Culture in Progress      Basic metabolic panel    Collection Time: 03/08/21  4:58 PM   Result Value Ref Range    Sodium 140 136 - 145 mmol/L    Potassium 4 1 3 5 - 5 3 mmol/L    Chloride 103 100 - 108 mmol/L    CO2 27 21 - 32 mmol/L    ANION GAP 10 4 - 13 mmol/L    BUN 32 (H) 5 - 25 mg/dL    Creatinine 1 46 (H) 0 60 - 1 30 mg/dL    Glucose 217 (H) 65 - 140 mg/dL    Calcium 9 5 8 3 - 10 1 mg/dL    eGFR 46 ml/min/1 73sq m   COVID19, Influenza A/B, RSV PCR, [facility]    Collection Time: 03/08/21  6:02 PM    Specimen: Nasopharyngeal Swab   Result Value Ref Range    SARS-CoV-2 Negative Negative    INFLUENZA A PCR Negative Negative    INFLUENZA B PCR Negative Negative    RSV PCR Negative Negative   Fingerstick Glucose (POCT)    Collection Time: 03/08/21 10:15 PM   Result Value Ref Range    POC Glucose 139 65 - 140 mg/dl   D-dimer, quantitative    Collection Time: 03/08/21 10:16 PM   Result Value Ref Range    D-Dimer, Quant 1 10 (H) <0 50 ug/ml FEU   NT-BNP PRO    Collection Time: 03/08/21 10:16 PM   Result Value Ref Range    NT-proBNP 533 (H) <450 pg/mL   Troponin I    Collection Time: 03/08/21 10:16 PM   Result Value Ref Range    Troponin I 0 02 <=0 04 ng/mL   Troponin I    Collection Time: 03/09/21  2:40 AM   Result Value Ref Range    Troponin I 0 02 <=0 04 ng/mL   CBC and differential    Collection Time: 03/09/21  6:01 AM   Result Value Ref Range    WBC 10 47 (H) 4 31 - 10 16 Thousand/uL    RBC 4 48 3 88 - 5 62 Million/uL    Hemoglobin 12 1 12 0 - 17 0 g/dL    Hematocrit 39 5 36 5 - 49 3 %    MCV 88 82 - 98 fL    MCH 27 0 26 8 - 34 3 pg    MCHC 30 6 (L) 31 4 - 37 4 g/dL    RDW 14 6 11 6 - 15 1 %    MPV 9 8 8 9 - 12 7 fL    Platelets 616 412 - 673 Thousands/uL    nRBC 0 /100 WBCs    Neutrophils Relative 73 43 - 75 %    Immat GRANS % 0 0 - 2 %    Lymphocytes Relative 14 14 - 44 %    Monocytes Relative 9 4 - 12 %    Eosinophils Relative 4 0 - 6 %    Basophils Relative 0 0 - 1 %    Neutrophils Absolute 7 65 (H) 1 85 - 7 62 Thousands/µL    Immature Grans Absolute 0 04 0 00 - 0 20 Thousand/uL    Lymphocytes Absolute 1 42 0 60 - 4 47 Thousands/µL    Monocytes Absolute 0 89 0 17 - 1 22 Thousand/µL    Eosinophils Absolute 0 44 0 00 - 0 61 Thousand/µL    Basophils Absolute 0 03 0 00 - 0 10 Thousands/µL   CK    Collection Time: 03/09/21  6:01 AM   Result Value Ref Range    Total CK 91 39 - 308 U/L   Comprehensive metabolic panel    Collection Time: 03/09/21  6:01 AM   Result Value Ref Range    Sodium 141 136 - 145 mmol/L    Potassium 4 3 3 5 - 5 3 mmol/L    Chloride 105 100 - 108 mmol/L    CO2 29 21 - 32 mmol/L    ANION GAP 7 4 - 13 mmol/L    BUN 29 (H) 5 - 25 mg/dL    Creatinine 1 28 0 60 - 1 30 mg/dL    Glucose 171 (H) 65 - 140 mg/dL    Calcium 9 5 8 3 - 10 1 mg/dL    Corrected Calcium 10 2 (H) 8 3 - 10 1 mg/dL    AST 15 5 - 45 U/L    ALT 27 12 - 78 U/L    Alkaline Phosphatase 144 (H) 46 - 116 U/L    Total Protein 7 8 6 4 - 8 2 g/dL    Albumin 3 1 (L) 3 5 - 5 0 g/dL    Total Bilirubin 0 60 0 20 - 1 00 mg/dL    eGFR 54 ml/min/1 73sq m   Magnesium    Collection Time: 03/09/21  6:01 AM   Result Value Ref Range    Magnesium 1 9 1 6 - 2 6 mg/dL   Phosphorus    Collection Time: 03/09/21  6:01 AM   Result Value Ref Range    Phosphorus 2 9 2 3 - 4 1 mg/dL   Lactic acid, plasma    Collection Time: 03/09/21  6:01 AM   Result Value Ref Range    LACTIC ACID 1 1 0 5 - 2 0 mmol/L       Current Facility-Administered Medications   Medication Dose Route Frequency Provider Last Rate Last Admin    acetaminophen (TYLENOL) tablet 650 mg  650 mg Oral Q6H PRN Agustin Miners, DO        aspirin chewable tablet 81 mg  81 mg Oral Daily Alexander Kuster, DO        atorvastatin (LIPITOR) tablet 40 mg  40 mg Oral After Cisco Bae, DO   40 mg at 03/08/21 2221    carvedilol (COREG) tablet 12 5 mg  12 5 mg Oral BID Alexander Kuster, DO   12 5 mg at 03/08/21 2223    cefepime (MAXIPIME) IVPB (premix in dextrose) 2,000 mg 50 mL  2,000 mg Intravenous Q12H Alexander Gomezer, DO   Stopped at 03/09/21 1021    cholecalciferol (VITAMIN D3) tablet 2,000 Units  2,000 Units Oral Daily Alexander Kuster, DO        dextrose 50 % IV solution 25 mL  25 mL Intravenous Daily PRN Alexander Gomezer, DO        enoxaparin (LOVENOX) subcutaneous injection 40 mg  40 mg Subcutaneous Q12H [address] Alexander Kuster, DO   40 mg at 03/08/21 2217    gabapentin (NEURONTIN) capsule 300 mg  300 mg Oral BID Alexander Kuster, DO   300 mg at 03/08/21 2222    insulin glargine (LANTUS) subcutaneous injection 10 Units 0 1 mL  10 Units Subcutaneous HS Alexander Kuster, DO   10 Units at 03/08/21 2219    insulin lispro (HumaLOG) 100 units/mL subcutaneous injection 1-6 Units  1-6 Units Subcutaneous HS Alexander Rayster,         insulin lispro (HumaLOG) 100 units/mL subcutaneous injection 2-12 Units  2-12 Units Subcutaneous TID AC Derek Montesinos, DO        Labetalol HCl (NORMODYNE) injection 10 mg  10 mg Intravenous Q4H PRN Alexander Gomezer,         lisinopril (ZESTRIL) tablet 5 mg  5 mg Oral Daily Alexander Kuster,         magnesium oxide (MAG-OX) tablet 400 mg  400 mg Oral Once Alexander KuerDO        oxyCODONE (ROXICODONE) IR tablet 5 mg  5 mg Oral Q4H PRN Alexander William,         Or    oxyCODONE (ROXICODONE) immediate release tablet 10 mg  10 mg Oral Q4H PRN Alexander William, DO   10 mg at 03/09/21 0541    tamsulosin (FLOMAX) capsule 0 4 mg  0 4 mg Oral QPM Alexander William, DO   0 4 mg at 03/08/21 2221    vancomycin (VANCOCIN) 1,250 mg in sodium chloride 0 9 % 250 mL IVPB  10 mg/kg Intravenous Q12H Alexander William DO   Stopped at 03/09/21 0021     Current Outpatient Medications   Medication Sig Dispense Refill    aspirin 81 MG tablet Take 81 mg by mouth daily      atorvastatin (LIPITOR) 40 mg tablet Take 1 tablet (40 mg total) by mouth daily after dinner      BD PEN NEEDLE JUNIOR U/F 32G X 4 MM MISC   0    carvedilol (COREG) 12 5 mg tablet Take 1 tablet (12 5 mg total) by mouth 2 (two) times a day      cholecalciferol 2000 units TABS Take 1 tablet (2,000 Units total) by mouth daily  0    finasteride (PROSCAR) 5 mg tablet Take 1 tablet (5 mg total) by mouth daily for 30 days 30 tablet 0    gabapentin (NEURONTIN) 600 MG tablet Take 1 tablet (600 mg total) by mouth 2 (two) times a day  0    glucagon (GLUCAGON EMERGENCY) 1 MG injection Inject 1 mg under the skin once as needed (PRN blood glucose less than 70 if unconscious or uncorrectable by oral means) for up to 1 dose  0    insulin glargine (LANTUS) 100 units/mL subcutaneous injection Inject 20 Units under the skin 2 (two) times a day for 14 days 560 Units 0    lisinopril (ZESTRIL) 5 mg tablet Take 1 tablet (5 mg total) by mouth daily 30 tablet 0    nystatin (MYCOSTATIN) powder Apply topically 2 (two) times a day 15 g 0    oxyCODONE (ROXICODONE) 10 MG TABS Take 1 tablet (10 mg total) by mouth every 4 (four) hours as needed for moderate pain or severe painMax Daily Amount: 60 mg (Patient taking differently: Take 20 mg by mouth every 6 (six) hours as needed for moderate pain or severe pain ) 10 tablet 0    Semaglutide (OZEMPIC, 0 25 OR 0 5 MG/DOSE, SC) Inject 0 5 mg under the skin once a week      tamsulosin (FLOMAX) 0 4 mg Take 0 4 mg by mouth every evening           Invasive Devices     Peripheral Intravenous Line            Peripheral IV 03/08/21 Left Antecubital less than 1 day                Lab, Imaging and other studies: I have personally reviewed pertinent reports      VTE Pharmacologic Prophylaxis: Enoxaparin (Lovenox)  VTE Mechanical Prophylaxis: sequential compression device    Isabela Adame MD 8:15 AM  03/09/21

## 2021-03-09 NOTE — ED NOTES
Patient in bed resting at this time  IV ABX infusing        Aislinn Steiner, THEODORE  03/09/21 0005

## 2021-03-09 NOTE — H&P
H&P- Srinivas Cluster 1945, 66 y o  male MRN: [id number]    Unit/Bed#: RM16 Encounter: [id number]    Primary Care Provider: Charissa Peterson MD   Date and time admitted to hospital: 3/8/2021  2:30 PM        * Cellulitis of right lower extremity  Assessment & Plan  · Admit to med/surg level of care  · Associated with an open wound of the right lower extremity  · Treat with broad-spectrum antibiotics including IV cefepime and IV vancomycin  · Check blood cultures x 2 sets  · Check a MRSA nasal screen  · Consult Podiatry  · May require an MRI of the right lower extremity to rule out osteomyelitis    Open wound of right lower extremity  Assessment & Plan  · Associated with cellulitis of the right lower extremity  · Treat with broad-spectrum antibiotics including IV cefepime and IV vancomycin  · Check blood cultures x 2 sets  · Check a MRSA nasal screen  · Consult Podiatry  · May require an MRI of the right lower extremity to rule out osteomyelitis    Accelerated hypertension  Assessment & Plan  · Continue coreg 12 5 mg PO BID  · Continue lisinopril 5 mg PO Qdaily  · Utilize PRN IV labetalol  · Will likely require an adjustment to his home hypertension medication regimen  · Follow the blood pressure trend    Elevated alkaline phosphatase level  Assessment & Plan  · Secondary to unclear etiology  · Possibly secondary to immobility  · Follow the total alkaline phosphatase level    Hypercalcemia  Assessment & Plan  · Check an ionized calcium level and intact-PTH level    Type 2 diabetes mellitus with hyperglycemia, with long-term current use of insulin ([facility]CC)  Assessment & Plan  Lab Results   Component Value Date    HGBA1C 6 6 (H) 02/05/2020       No results for input(s): POCGLU in the last 72 hours      Blood Sugar Average: Last 72 hrs:     · Utilize lantus 10 Units SQ QHS  · Continue lisinopril for renal protection  · Hypoglycemia protocol  · Insulin sliding scale with blood glucose monitoring ACHS    Morbid obesity with BMI of 45 0-49 9, adult [facility])  Assessment & Plan  · Lifestyle modifications are recommended including weight loss, improving his diet, and increasing his amount of activity  Obstructive sleep apnea syndrome  Assessment & Plan  · Utilize CPAP QHS and anytime while sleeping    Dyspnea on exertion  Assessment & Plan  · Needs outpatient pulmonary function tests completed by his outpatient Pulmonologist  · Check a pro-BNP level  · Check a D-dimer level  · Check a portable chest xray      VTE Prophylaxis: Enoxaparin (Lovenox) 40 mg SQ every 12 hours (dose based on his BMI)/ sequential compression device on the left lower extremity only  No SCD on the right lower extremity with an open wound of the right lower extremity  Code Status: Level 1-Full Code    Anticipated Length of Stay:  The patient will be admitted on an Inpatient basis with an anticipated length of stay of greater than 2 midnights  Justification for Hospital Stay:  The patient requires IV antibiotic treatment, work-up for the shortness of breath, treatment of the accelerated hypertension, and a Podiatry consultation  Chief Complaint:  Open wound of the right lower extremity    History of Present Illness:    Keven Oglesby is a 66 y o  male who presents to the emergency department with the complaint of an open wound involving the right lower extremity  The patient has been experiencing worsening edema of the bilateral lower extremities developed a blister of the right anterior shin region  The blister then opened up over the last 48-72 hours with drainage of a clear fluid leaving an open wound of the right lower extremity  The patient then developed erythema surrounding the open wound of the right lower extremity as well as severe pain involving the right anterior shin region  The symptoms were constant in nature and severe in intensity  Associated symptoms include shortness of breath with exertion and difficulty ambulating    Nothing seemed to improve his symptoms  Review of Systems:    Review of Systems:  Per HPI, all other systems have been reviewed and were negative  Past Medical and Surgical History:     Past Medical History:   Diagnosis Date    Cataract     CHF (congestive heart failure) (HCC)     chronic diastolic CHF    Coronary artery disease     Diabetes mellitus ([facility] )     Glaucoma     Hyperlipidemia     Hypertension     Neuropathy     Obesity     Renal disorder     chronic kidney disease stage 3     Sleep apnea     Stroke ([facility] )     TIA (transient ischemic attack)     Uncontrolled type 2 diabetes mellitus with hyperglycemia, with long-term current use of insulin ([facility] ) 10/4/2018       Past Surgical History:   Procedure Laterality Date    APPENDECTOMY      CARPAL TUNNEL RELEASE      EYE SURGERY      cataracts       Meds/Allergies:    Prior to Admission medications    Medication Sig Start Date End Date Taking?  Authorizing Provider   aspirin 81 MG tablet Take 81 mg by mouth daily    Historical Provider, MD   atorvastatin (LIPITOR) 40 mg tablet Take 1 tablet (40 mg total) by mouth daily after dinner 3/5/19   Jinny Croft DO   BD PEN NEEDLE JUNIOR U/F 32G X 4 MM MISC  12/31/17   Historical Provider, MD   carvedilol (COREG) 12 5 mg tablet Take 1 tablet (12 5 mg total) by mouth 2 (two) times a day 3/4/19   Jinny Croft DO   cholecalciferol 2000 units TABS Take 1 tablet (2,000 Units total) by mouth daily 3/4/19   Jinny Croft DO   finasteride (PROSCAR) 5 mg tablet Take 1 tablet (5 mg total) by mouth daily for 30 days 7/3/19 2/5/20  Syed Petersen MD   gabapentin (NEURONTIN) 600 MG tablet Take 1 tablet (600 mg total) by mouth 2 (two) times a day 3/4/19   Jinny Croft DO   glucagon (GLUCAGON EMERGENCY) 1 MG injection Inject 1 mg under the skin once as needed (PRN blood glucose less than 70 if unconscious or uncorrectable by oral means) for up to 1 dose 3/4/19   Jinny Croft DO   insulin glargine (LANTUS) 100 units/mL subcutaneous injection Inject 20 Units under the skin 2 (two) times a day for 14 days 2/7/20 2/21/20  Toshia Valles MD   lisinopril (ZESTRIL) 5 mg tablet Take 1 tablet (5 mg total) by mouth daily 10/7/18   Alex Granados MD   nystatin (MYCOSTATIN) powder Apply topically 2 (two) times a day 3/4/19   Lillian Avina, DO   oxyCODONE (ROXICODONE) 10 MG TABS Take 1 tablet (10 mg total) by mouth every 4 (four) hours as needed for moderate pain or severe painMax Daily Amount: 60 mg  Patient taking differently: Take 20 mg by mouth every 6 (six) hours as needed for moderate pain or severe pain  3/4/19   Lillian Avina, DO   Semaglutide (OZEMPIC, 0 25 OR 0 5 MG/DOSE, SC) Inject 0 5 mg under the skin once a week    Historical Provider, MD   tamsulosin (FLOMAX) 0 4 mg Take 0 4 mg by mouth every evening      Historical Provider, MD     I have reviewed home medications with patient personally      Allergies: No Known Allergies    Social History:     Marital Status: /Civil Union     Substance Use History:   Social History     Substance and Sexual Activity   Alcohol Use Never    Frequency: Never    Drinks per session: Patient refused    Binge frequency: Never     Social History     Tobacco Use   Smoking Status Former Smoker    Types: Cigars   Smokeless Tobacco Former User     Social History     Substance and Sexual Activity   Drug Use No       Family History:    non-contributory    Physical Exam:     Vitals:   Blood Pressure: (!) 215/106 (03/08/21 1915)  Pulse: 77 (03/08/21 1915)  Temperature: 97 6 °F (36 4 °C) (03/08/21 1503)  Temp Source: Temporal (03/08/21 1503)  Respirations: 20 (03/08/21 1915)  Height: 5' 6" (167 6 cm) (03/08/21 1503)  Weight - Scale: (!) 136 kg (300 lb 0 7 oz) (03/08/21 1503)  SpO2: 98 % (03/08/21 1915)    Physical Exam  General:  NAD, follows commands  HEENT:  NC/AT, mucous membranes moist  Neck:  Supple, No JVP elevation  CV:  + S1, + S2, RRR  Pulm:  Lung fields are CTA bilaterally  Abd:  Soft, Non-tender, Non-distended  Ext:  No clubbing/cyanosis, Edema of the bilateral lower extremities  Skin:  Open wound of the right anterior shin region with surrounding erythema  Neuro:  Awake, alert, oriented  Psych:  Normal mood and affect      Additional Data:     Lab Results: I have personally reviewed pertinent reports  Results from last 7 days   Lab Units 03/08/21  1547   WBC Thousand/uL 10 51*   HEMOGLOBIN g/dL 11 3*   HEMATOCRIT % 36 9   PLATELETS Thousands/uL 213   NEUTROS PCT % 69   LYMPHS PCT % 16   MONOS PCT % 9   EOS PCT % 5     Results from last 7 days   Lab Units 03/08/21  1658 03/08/21  1547   SODIUM mmol/L 140 138   POTASSIUM mmol/L 4 1 5 6*   CHLORIDE mmol/L 103 103   CO2 mmol/L 27 27   BUN mg/dL 32* 32*   CREATININE mg/dL 1 46* 1 38*   ANION GAP mmol/L 10 8   CALCIUM mg/dL 9 5 9 3   ALBUMIN g/dL  --  2 8*   TOTAL BILIRUBIN mg/dL  --  0 60   ALK PHOS U/L  --  145*   ALT U/L  --  30   AST U/L  --  45   GLUCOSE RANDOM mg/dL 217* 233*     Results from last 7 days   Lab Units 03/08/21  1547   INR  1 08             Results from last 7 days   Lab Units 03/08/21  1547   LACTIC ACID mmol/L 1 7   PROCALCITONIN ng/ml <0 05       Imaging: I have personally reviewed pertinent reports  XR tibia fibula 2 views RIGHT   ED Interpretation by Julián Still MD (03/08 1635)   No fracture or foreign body in the tissue      Final Result by Nicholas Bowling DO (03/08 1641)      No acute osseous abnormality  Workstation performed: [id number]         XR chest portable    (Results Pending)       Allscripts / Epic Records Reviewed: Yes     ** Please Note: This note has been constructed using a voice recognition system   **

## 2021-03-09 NOTE — ED NOTES
Patient upset because he has to be stuck again for blood work, informed that we need the blood work in order to help him  He stated "I can walk the hell out of here if I want to", explained to patient that if that is his wish he is able to do so  He wishes to speak to the doctor at this time  Inpatient provider made aware  Vancomycin placed on hold        Nancy Haynes RN  03/08/21 2042

## 2021-03-09 NOTE — ASSESSMENT & PLAN NOTE
· Admit to med/surg level of care  · Associated with an open wound of the right lower extremity  · Treat with broad-spectrum antibiotics including IV cefepime and IV vancomycin  · Check blood cultures x 2 sets  · Check a MRSA nasal screen  · Consult Podiatry  · May require an MRI of the right lower extremity to rule out osteomyelitis

## 2021-03-09 NOTE — CONSULTS
Consultation - [address] Argueta 66 y o  male MRN: [id number]  Unit/Bed#: 408-01 Encounter: [id number]    Assessment/Plan     Assessment:    1  Venous ulcer right anterior leg, edema/venous insufficiency  2  Cellulitis right leg      Plan:  VSS afebrile  Continue IV Cefepime  Blister/non-viable skin to right leg removed (non-excisional) with forceps  Underlying ulceration is partial thickness, superficial, with granular base and no necrotic tissue noted  Adaptic/Maxorb silver/DSD applied, change daily  Patient will need home care nurses on discharge and possible follow-up at wound center  Instructed on leg elevation, low sodium diet etc     History of Present Illness   HPI:  Ruby Mora is a 66 y o  male who presents with a wound located at right anterior leg  The wound has been present for less than 1week(s)  The wound is from edema blister secondary to venous insufficiency and subsequently became infection  Admitted through ER for IV antibiotics and wound evaluation      Inpatient consult to Podiatry  Consult performed by: Trice Emery DPM  Consult ordered by: Denise Duran DO          Review of Systems  Reviewed, non-contributory    Historical Information   Past Medical History:   Diagnosis Date    Cataract     CHF (congestive heart failure) ([facility] )     chronic diastolic CHF    Coronary artery disease     Diabetes mellitus ([facility]     Glaucoma     Hyperlipidemia     Hypertension     Neuropathy     Obesity     Renal disorder     chronic kidney disease stage 3     Sleep apnea     Stroke ([facility]     TIA (transient ischemic attack)     Uncontrolled type 2 diabetes mellitus with hyperglycemia, with long-term current use of insulin ([facility] ) 10/4/2018     Past Surgical History:   Procedure Laterality Date    APPENDECTOMY      CARPAL TUNNEL RELEASE      EYE SURGERY      cataracts     Social History   Social History     Substance and Sexual Activity   Alcohol Use Never    Frequency: Never    Drinks per session: Patient refused    Binge frequency: Never     Social History     Substance and Sexual Activity   Drug Use No     E-Cigarette/Vaping    E-Cigarette Use Never User      E-Cigarette/Vaping Substances     Social History     Tobacco Use   Smoking Status Former Smoker    Types: Cigars   Smokeless Tobacco Former User     Family History: non-contributory    Meds/Allergies   all current active meds have been reviewed  No Known Allergies    Objective   Vitals: Blood pressure 170/90, pulse 73, temperature 97 7 °F (36 5 °C), resp  rate 21, height 5' 6" (1 676 m), weight (!) 136 kg (300 lb 0 7 oz), SpO2 94 %  Wounds:     Wound 06/30/19 Pressure Injury Buttocks Right (Active)       Wound 07/17/19 Venous stasis ulcer Leg Left; Anterior (Active)       Wound 03/09/21 Venous Ulcer Pretibial Right;Upper;Proximal (Active)   Wound Description Other (Comment) 03/09/21 1214   Mary-wound Assessment Intact; Erythema 03/09/21 1214   Wound Length (cm) 6 cm 03/09/21 1214   Wound Width (cm) 5 cm 03/09/21 1214   Wound Surface Area (cm^2) 30 cm^2 03/09/21 1214   Treatments Cleansed;Site care 03/09/21 1214   Dressing Xeroform;ABD 03/09/21 1214   Dressing Changed New 03/09/21 1214   Patient Tolerance Tolerated well 03/09/21 1214   Dressing Status Clean;Dry; Intact 03/09/21 1214         Physical Exam     Derm:  Large blister removed  Wound as measured and described above, granular wound bed, no necrotic tissue noted  Mild periwound erythema  Vascular:  Pulses non-palpable due to edema  CFT normal   Skin temp normal   Neuro:  Protective sensation intact bilaterally  Ortho:  No deformities, MMT normal    Lab Results: I have personally reviewed pertinent reports  Imaging: I have personally reviewed pertinent reports  EKG, Pathology, and Other Studies: I have personally reviewed pertinent reports        Code Status: Level 1 - Full Code  Advance Directive and Living Will: Yes    Power of : POLST:      Counseling / Coordination of Care  Total floor / unit time spent today 45 minutes  Greater than 50% of total time was spent with the patient and / or family counseling and / or coordination of care    A description of the counseling / coordination of care: Need for home care, elevate legs etc

## 2021-03-09 NOTE — ASSESSMENT & PLAN NOTE
Lab Results   Component Value Date    HGBA1C 6 6 (H) 02/05/2020       No results for input(s): POCGLU in the last 72 hours      Blood Sugar Average: Last 72 hrs:     · Utilize lantus 10 Units SQ QHS  · Continue lisinopril for renal protection  · Hypoglycemia protocol  · Insulin sliding scale with blood glucose monitoring ACHS

## 2021-03-09 NOTE — ASSESSMENT & PLAN NOTE
Lab Results   Component Value Date    HGBA1C 6 6 (H) 02/05/2020       Recent Labs     03/08/21  2215   POCGLU 139       Blood Sugar Average: Last 72 hrs:  (P) 139   · Utilize lantus 10 Units SQ QHS  · Continue lisinopril for renal protection  · Hypoglycemia protocol  · Insulin sliding scale with blood glucose monitoring ACHS

## 2021-03-09 NOTE — ED NOTES
I spoke with Danette Baez from 4th floor, patient to go to floor after imaging, they will come for him     Indu Burroughs, THEODORE  03/09/21 1080

## 2021-03-09 NOTE — PLAN OF CARE
Problem: Potential for Falls  Goal: Patient will remain free of falls  Description: INTERVENTIONS:  - Assess patient frequently for physical needs  -  Identify cognitive and physical deficits and behaviors that affect risk of falls    -  [place] fall precautions as indicated by assessment   - Educate patient/family on patient safety including physical limitations  - Instruct patient to call for assistance with activity based on assessment  - Modify environment to reduce risk of injury  - Consider OT/PT consult to assist with strengthening/mobility  Outcome: Progressing     Problem: SKIN/TISSUE INTEGRITY - ADULT  Goal: Skin integrity remains intact  Description: INTERVENTIONS  - Identify patients at risk for skin breakdown  - Assess and monitor skin integrity  - Assess and monitor nutrition and hydration status  - Monitor labs (i e  albumin)  - Assess for incontinence   - Turn and reposition patient  - Assist with mobility/ambulation  - Relieve pressure over bony prominences  - Avoid friction and shearing  - Provide appropriate hygiene as needed including keeping skin clean and dry  - Evaluate need for skin moisturizer/barrier cream  - Collaborate with interdisciplinary team (i e  Nutrition, Rehabilitation, etc )   - Patient/family teaching  Outcome: Progressing     Problem: INFECTION - ADULT  Goal: Absence or prevention of progression during hospitalization  Description: INTERVENTIONS:  - Assess and monitor for signs and symptoms of infection  - Monitor lab/diagnostic results  - Monitor all insertion sites, i e  indwelling lines, tubes, and drains  - Monitor endotracheal if appropriate and nasal secretions for changes in amount and color  - [place] appropriate cooling/warming therapies per order  - Administer medications as ordered  - Instruct and encourage patient and family to use good hand hygiene technique  - Identify and instruct in appropriate isolation precautions for identified infection/condition  Outcome: Progressing

## 2021-03-09 NOTE — UTILIZATION REVIEW
Initial Clinical Review    Admission: Date/Time/Statement:   Admission Orders (From admission, onward)     Ordered        03/08/21 1659  Inpatient Admission  Once                   Orders Placed This Encounter   Procedures    Inpatient Admission     Standing Status:   Standing     Number of Occurrences:   1     Order Specific Question:   Level of Care     Answer:   Med Surg [16]     Order Specific Question:   Estimated length of stay     Answer:   More than 2 Midnights     Order Specific Question:   Certification     Answer:   I certify that inpatient services are medically necessary for this patient for a duration of greater than two midnights  See H&P and MD Progress Notes for additional information about the patient's course of treatment  ED Arrival Information     Expected Arrival Acuity Means of Arrival Escorted By Service Admission Type    - 3/8/2021 14:24 Urgent Walk-In Family Member Hospitalist Urgent    Arrival Complaint    Wound Check        Chief Complaint   Patient presents with    Wound Check     pt has open wound on right lower leg, was sent by his pcp to be evaluated for possible admission for iv abx     HPI:  66 y o  male with PMH of DM2 on insulin, HTN, morbid obesity and BHARGAVI who presents to the ED from home with an open wound involving the right lower extremity  The patient has been experiencing worsening edema of the bilateral lower extremities and developed a blister of the right anterior shin region  The blister opened up over the last 48-72 hours with clear drainage  The patient then developed erythema surrounding the wound as well as severe pain involving the right anterior shin region  Hypertensive in the ED   ED labs revealed elevated alk phos level and leukocytosis      Plan: Inpatient Med Surg admission for evaluation and treatment of cellulitis of right lower extremity associated with open wound, accelerated hypertension and MENDOZA:  Broad spectrum IV antibiotics, blood cultures, check MRSA screen, consult Podiatry  Continue Coreg and lisinopril, PRN IV labetalol, trend blood pressure  Check proBNP, D-dimer, CXR      3/9 Remains on IV antibiotics, Podiatry consult pending  Pulmonology consult:  Dyspnea with exertion likely secondary to obesity and deconditioning  Former smoker  Recommend home O2 evaluation prior to discharge  CPAP at HS  Patient has had significant weight gain since last polysomnogram, will likely need CPCP titration study  ED Triage Vitals [03/08/21 1503]   Temperature Pulse Respirations Blood Pressure SpO2   97 6 °F (36 4 °C) 88 22 (!) 185/77 93 %      Temp Source Heart Rate Source Patient Position - Orthostatic VS BP Location FiO2 (%)   Temporal Monitor Sitting Left arm --      Pain Score       8          Wt Readings from Last 1 Encounters:   03/08/21 (!) 136 kg (300 lb 0 7 oz)     Additional Vital Signs:       Date/Time  Temp  Pulse  Resp  BP   SpO2  O2 Device  Patient Position - Orthostatic VS   03/09/21 10:20:59  --  72  20  172/89Abnormal    96 %  --  Sitting   03/09/21 1018  98 1 °F (36 7 °C)  --  --  --   --  --  --   03/09/21 0832  --  72  --  194/93Abnormal    --  --  --   03/09/21 0830  --  --  --  194/93Abnormal    --  --  --   03/09/21 0015  --  82  18  149/84   93 %  None (Room air)  Lying   03/08/21 2223  --  77  --  198/91Abnormal    --  --  --   03/08/21 2030  --  77  18  193/90Abnormal    95 %  None (Room air)  Lying   03/08/21 2000  --  78  18  195/86Abnormal    94 %  None (Room air)  Sitting   03/08/21 1915  --  77  20  215/106Abnormal    98 %  None (Room air)  Lying   03/08/21 1645  --  91  22  198/88Abnormal    92 %  Nasal cannula  Lying           Pertinent Labs/Diagnostic Test Results:     3/9 ECHO:      LEFT VENTRICLE:  Systolic function was normal  Ejection fraction was estimated to be 60 %  There were no regional wall motion abnormalities  Wall thickness was moderately increased    There was moderate concentric hypertrophy  Features were consistent with a pseudonormal left ventricular filling pattern, with concomitant abnormal relaxation and increased filling pressure (grade 2 diastolic dysfunction)  Doppler parameters were consistent with high ventricular filling pressure      RIGHT VENTRICLE:  The size was normal  Systolic function was normal      LEFT ATRIUM: The atrium was moderately dilated      MITRAL VALVE: There was trace regurgitation        3/9 CTA chest:  No evidence of pulmonary embolus  3/8 CXR: No focal consolidation, pleural effusion, or pneumothorax  3/8 x-ray right tibia and fibula:  No acute osseous abnormality  3/8 ED EKG: EKG shows sinus rhythm, QRS duration of 146, left axis deviation with a right bundle branch block, unchanged from previous EKG         Results from last 7 days   Lab Units 03/08/21  1802   SARS-COV-2  Negative     Results from last 7 days   Lab Units 03/09/21  0601 03/08/21  1547   WBC Thousand/uL 10 47* 10 51*   HEMOGLOBIN g/dL 12 1 11 3*   HEMATOCRIT % 39 5 36 9   PLATELETS Thousands/uL 218 213   NEUTROS ABS Thousands/µL 7 65* 7 30         Results from last 7 days   Lab Units 03/09/21  0601 03/08/21  1658 03/08/21  1547   SODIUM mmol/L 141 140 138   POTASSIUM mmol/L 4 3 4 1 5 6*   CHLORIDE mmol/L 105 103 103   CO2 mmol/L 29 27 27   ANION GAP mmol/L 7 10 8   BUN mg/dL 29* 32* 32*   CREATININE mg/dL 1 28 1 46* 1 38*   EGFR ml/min/1 73sq m 54 46 49   CALCIUM mg/dL 9 5 9 5 9 3   CALCIUM, IONIZED mmol/L 1 23  --   --    MAGNESIUM mg/dL 1 9  --   --    PHOSPHORUS mg/dL 2 9  --   --      Results from last 7 days   Lab Units 03/09/21  0601 03/08/21  1547   AST U/L 15 45   ALT U/L 27 30   ALK PHOS U/L 144* 145*   TOTAL PROTEIN g/dL 7 8 7 6   ALBUMIN g/dL 3 1* 2 8*   TOTAL BILIRUBIN mg/dL 0 60 0 60     Results from last 7 days   Lab Units 03/09/21  1152 03/09/21  0830 03/08/21  2215   POC GLUCOSE mg/dl 160* 146* 139     Results from last 7 days   Lab Units 03/09/21  0601 03/08/21  1658 03/08/21  1547   GLUCOSE RANDOM mg/dL 171* 217* 233*         Results from last 7 days   Lab Units 03/09/21  0601   HEMOGLOBIN A1C % 7 0*   EAG mg/dl 154         Results from last 7 days   Lab Units 03/09/21  0601   CK TOTAL U/L 91     Results from last 7 days   Lab Units 03/09/21  0240 03/08/21  2216   TROPONIN I ng/mL 0 02 0 02     Results from last 7 days   Lab Units 03/08/21  2216   D-DIMER QUANTITATIVE ug/ml FEU 1 10*     Results from last 7 days   Lab Units 03/08/21  1547   PROTIME seconds 13 8   INR  1 08   PTT seconds 33         Results from last 7 days   Lab Units 03/08/21  2216 03/08/21  1547   PROCALCITONIN ng/ml 0 05  0 06 <0 05     Results from last 7 days   Lab Units 03/09/21  0601 03/08/21  1547   LACTIC ACID mmol/L 1 1 1 7             Results from last 7 days   Lab Units 03/08/21  2216   NT-PRO BNP pg/mL 533*         Results from last 7 days   Lab Units 03/08/21  1802   INFLUENZA A PCR  Negative   INFLUENZA B PCR  Negative   RSV PCR  Negative             Results from last 7 days   Lab Units 03/08/21  1658 03/08/21  1547   BLOOD CULTURE  Received in Microbiology Lab  Culture in Progress  Received in Microbiology Lab  Culture in Progress                 ED Treatment:   Medication Administration from 03/08/2021 1424 to 03/09/2021 0932       Date/Time Order Dose Route Action     03/08/2021 1736 cefTRIAXone (ROCEPHIN) IVPB (premix in dextrose) 1,000 mg 50 mL 0 mg Intravenous Stopped     03/08/2021 1706 cefTRIAXone (ROCEPHIN) IVPB (premix in dextrose) 1,000 mg 50 mL 1,000 mg Intravenous New Bag     03/09/2021 0632 cefepime (MAXIPIME) IVPB (premix in dextrose) 2,000 mg 50 mL 0 mg Intravenous Stopped     03/09/2021 0602 cefepime (MAXIPIME) IVPB (premix in dextrose) 2,000 mg 50 mL 2,000 mg Intravenous New Bag     03/09/2021 0831 vancomycin (VANCOCIN) 1,250 mg in sodium chloride 0 9 % 250 mL IVPB 1,250 mg Intravenous New Bag     03/09/2021 0021 vancomycin (VANCOCIN) 1,250 mg in sodium chloride 0 9 % 250 mL IVPB 0 mg/kg Intravenous Stopped     03/08/2021 2227 vancomycin (VANCOCIN) 1,250 mg in sodium chloride 0 9 % 250 mL IVPB 1,250 mg Intravenous New Bag     03/08/2021 2042 vancomycin (VANCOCIN) 1,250 mg in sodium chloride 0 9 % 250 mL IVPB 0 mg Intravenous Hold     03/09/2021 0831 enoxaparin (LOVENOX) subcutaneous injection 40 mg 40 mg Subcutaneous Given     03/08/2021 2217 enoxaparin (LOVENOX) subcutaneous injection 40 mg 40 mg Subcutaneous Given     03/08/2021 2221 insulin lispro (HumaLOG) 100 units/mL subcutaneous injection 1-6 Units 0 Units Subcutaneous Not Given     03/09/2021 0830 aspirin chewable tablet 81 mg 81 mg Oral Given     03/08/2021 2221 atorvastatin (LIPITOR) tablet 40 mg 40 mg Oral Given     03/09/2021 0832 carvedilol (COREG) tablet 12 5 mg 12 5 mg Oral Given     03/08/2021 2223 carvedilol (COREG) tablet 12 5 mg 12 5 mg Oral Given     03/09/2021 0830 cholecalciferol (VITAMIN D3) tablet 2,000 Units 2,000 Units Oral Given     03/09/2021 0830 gabapentin (NEURONTIN) capsule 300 mg 300 mg Oral Given     03/08/2021 2222 gabapentin (NEURONTIN) capsule 300 mg 300 mg Oral Given     03/09/2021 0830 lisinopril (ZESTRIL) tablet 5 mg 5 mg Oral Given     03/08/2021 2219 insulin glargine (LANTUS) subcutaneous injection 10 Units 0 1 mL 10 Units Subcutaneous Given     03/08/2021 2221 tamsulosin (FLOMAX) capsule 0 4 mg 0 4 mg Oral Given     03/09/2021 0541 oxyCODONE (ROXICODONE) immediate release tablet 10 mg 10 mg Oral Given     03/09/2021 0830 magnesium oxide (MAG-OX) tablet 400 mg 400 mg Oral Given     03/09/2021 0853 iohexol (OMNIPAQUE) 350 MG/ML injection (MULTI-DOSE) 100 mL 100 mL Intravenous Given        Past Medical History:   Diagnosis Date    Cataract     CHF (congestive heart failure) (HCC)     chronic diastolic CHF    Coronary artery disease     [facility]     Glaucoma     Hyperlipidemia     Hypertension     Neuropathy     Obesity     Renal disorder     chronic kidney disease stage 3     Sleep apnea     Stroke ([facility] )     TIA (transient ischemic attack)     Uncontrolled type 2 diabetes mellitus with hyperglycemia, with long-term current use of insulin ([facility] ) 10/4/2018     Present on Admission:   Dyspnea on exertion   Obstructive sleep apnea syndrome      Admitting Diagnosis: Cellulitis [L03 90]  Dyspnea on exertion [R06 00]  Wound check, abscess [Z51 89]  Cellulitis of right lower extremity [L03 115]  Open wound of right lower extremity, initial encounter [S81 801A]  Age/Sex: 66 y o  male       Admission Orders:    SCD left leg, PT/OT, CPAP at HS  Scheduled Medications:      aspirin, 81 mg, Oral, Daily  atorvastatin, 40 mg, Oral, After Dinner  carvedilol, 12 5 mg, Oral, BID  cefepime, 2,000 mg, Intravenous, Q12H  cholecalciferol, 2,000 Units, Oral, Daily  enoxaparin, 40 mg, Subcutaneous, Q12H SHARONA  gabapentin, 300 mg, Oral, BID  insulin glargine, 10 Units, Subcutaneous, HS  insulin lispro, 1-6 Units, Subcutaneous, HS  insulin lispro, 2-12 Units, Subcutaneous, TID AC  [START ON 3/10/2021] lisinopril, 10 mg, Oral, Daily  tamsulosin, 0 4 mg, Oral, QPM  vancomycin, 10 mg/kg, Intravenous, Q12H      Continuous IV Infusions: None  PRN Meds:      acetaminophen, 650 mg, Oral, Q6H PRN  dextrose, 25 mL, Intravenous, Daily PRN  Labetalol HCl, 10 mg, Intravenous, Q4H PRN  oxyCODONE, 5 mg, Oral, Q4H PRN    Or  oxyCODONE, 10 mg, Oral, Q4H PRN x 1 dose 3/9 @ 0541        IP CONSULT TO PHARMACY  IP CONSULT TO PODIATRY  IP CONSULT TO PULMONOLOGY            Network Utilization Review Department  ATTENTION: Please call with any questions or concerns to [phone] and carefully listen to the prompts so that you are directed to the right person  All voicemails are confidential   Flavia Mitchell all requests for admission clinical reviews, approved or denied determinations and any other requests to dedicated fax number below belonging to the campus where the patient is receiving treatment   List of dedicated fax numbers for the Facilities:  [address] [place] [address] DENIALS (Administrative/Medical Necessity) [phone]   [address] (Maternity/NICU/Pediatrics) [phone] [address] Jocelin Mcfarland 4625 (  Thea Londono "[address]" 103) [address] [address] Alcides Henderson 14[address] [place] [address] [phone]

## 2021-03-09 NOTE — CONSULTS
Consultation - Pulmonary Medicine   Dennis Mondragon 66 y o  male MRN: [id number]  Unit/Bed#: 408-01 Encounter: [id number]      Assessment:  1  Dyspnea with exertion-likely secondary to obesity and deconditioning  Patient was former smoker quit 4 years ago will get outpatient pulmonary function testing  2  BHARGAVI noncompliant with CPAP  3  Obesity with BMI 48    4  Pulmonary nodules largest 9 mm left lower lobe   5  Cellulitis right lower extremity  6  Former smoker quit approximately 4 years ago     Questionable underlying COPD   7  Chronicdiastolic heart failure-today's echo reviewed with estimated EF 60% with grade 2 diastolic dysfunction  8  Hypertension    Plan:   1  Home O2 evaluation with ambulation prior to discharge  2  Pulmonary follow-up scheduled for 29 March  3  Outpatient pulmonary function testing  4  Lifestyle modifications, diet, weight loss and exercise discussed  5  Patient instructed to start using his home CPAP  Patient has had significant weight gain since his last polysomnogram 5 years ago and will likely need CPAP titration study  6   CPAP q h s  patient would like to restart his CPAP while hospitalized  Reports CPAP pressure at home 13 cm H2O  7  Pulmonary toilet/airway clearance protocol  8  Blood pressure control     History of Present Illness   Physician Requesting Consult: Erick Loza DO  Reason for Consult / Principal Problem:  Dyspnea with exertion    HPI: Dennis oMndragon is a 66y o  year old male who presented to the emergency department with complaint of open wound involving right lower extremity  Patient has been experiencing worsening edema of bilateral lower extremities  Patient also reporting dyspnea with exertion for approximately 1 month  Patient states former smoker quit approximately 4 years ago  Smoked 1 pack per day of cigarettes for approximately 15 years and smoked 1 cigar daily for approximately 10 years  Denies history of asthma or COPD    Patient is concerned because his son recently  at age 62 from severe COPD  History of BHARGAVI with patient reports last polysomnogram approximately 5 years ago however patient has not been compliant with his home CPAP  Also states has put on significant weight since last sleep study  Patient states not very active due to chronic lower extremity pain and edema  Patient was /  Multiple pets at home  Positive 2 birds, a dog and several cats  Denies any allergy symptoms to his pets  Inpatient consult to Pulmonology  Consult performed by: Verena Meyer PA-C  Consult ordered by: Bladimir Mayo,           Review of Systems  Per HPI  All other systems negative  Historical Information   Past Medical History:   Diagnosis Date    Cataract     [facility]congest[facility]) [facility] )     chronic diastolic CHF    Coronary artery disease     [facility]     Glaucoma     Hyperlipidemia     Hypertension     Neuropathy     Obesity     Renal disorder     chronic kidney disease stage 3     Sleep apnea     [facility]     TIA (transient ischemic attack)     Uncontrolled type 2 diabetes mellitus with hyperglycemia, with long-term current use of insulin ([facility] ) 10/4/2018     Past Surgical History:   Procedure Laterality Date    APPENDECTOMY      CARPAL TUNNEL RELEASE      EYE SURGERY      cataracts     Social History   Social History     Substance and Sexual Activity   Alcohol Use Never    Frequency: Never    Drinks per session: Patient refused    Binge frequency: Never     Social History     Substance and Sexual Activity   Drug Use No     E-Cigarette/Vaping    E-Cigarette Use Never User      E-Cigarette/Vaping Substances     Social History     Tobacco Use   Smoking Status Former Smoker    Types: Cigars   Smokeless Tobacco Former User     Occupational History:  Retired  and       Family History: non-contributory son recently  from severe COPD     Meds/Allergies   all current active meds have been reviewed, pertinent pulmonary meds have been reviewed, current meds:   Current Facility-Administered Medications   Medication Dose Route Frequency    acetaminophen (TYLENOL) tablet 650 mg  650 mg Oral Q6H PRN    aspirin chewable tablet 81 mg  81 mg Oral Daily    atorvastatin (LIPITOR) tablet 40 mg  40 mg Oral After Dinner    carvedilol (COREG) tablet 12 5 mg  12 5 mg Oral BID    cefepime (MAXIPIME) IVPB (premix in dextrose) 2,000 mg 50 mL  2,000 mg Intravenous Q12H    cholecalciferol (VITAMIN D3) tablet 2,000 Units  2,000 Units Oral Daily    dextrose 50 % IV solution 25 mL  25 mL Intravenous Daily PRN    enoxaparin (LOVENOX) subcutaneous injection 40 mg  40 mg Subcutaneous Q12H SHARONA    gabapentin (NEURONTIN) capsule 300 mg  300 mg Oral BID    insulin glargine (LANTUS) subcutaneous injection 10 Units 0 1 mL  10 Units Subcutaneous HS    insulin lispro (HumaLOG) 100 units/mL subcutaneous injection 1-6 Units  1-6 Units Subcutaneous HS    insulin lispro (HumaLOG) 100 units/mL subcutaneous injection 2-12 Units  2-12 Units Subcutaneous TID AC    Labetalol HCl (NORMODYNE) injection 10 mg  10 mg Intravenous Q4H PRN    [START ON 3/10/2021] lisinopril (ZESTRIL) tablet 10 mg  10 mg Oral Daily    oxyCODONE (ROXICODONE) IR tablet 5 mg  5 mg Oral Q4H PRN    Or    oxyCODONE (ROXICODONE) immediate release tablet 10 mg  10 mg Oral Q4H PRN    tamsulosin (FLOMAX) capsule 0 4 mg  0 4 mg Oral QPM    vancomycin (VANCOCIN) 1,250 mg in sodium chloride 0 9 % 250 mL IVPB  10 mg/kg Intravenous Q12H    and PTA meds:   Prior to Admission Medications   Prescriptions Last Dose Informant Patient Reported? Taking?    BD PEN NEEDLE JUNIOR U/F 32G X 4 MM MISC   Yes No   Semaglutide (OZEMPIC, 0 25 OR 0 5 MG/DOSE, SC)   Yes No   Sig: Inject 0 5 mg under the skin once a week   aspirin 81 MG tablet   Yes No   Sig: Take 81 mg by mouth daily   atorvastatin (LIPITOR) 40 mg tablet   No No   Sig: Take 1 tablet (40 mg total) by mouth daily after dinner   carvedilol (COREG) 12 5 mg tablet   No No   Sig: Take 1 tablet (12 5 mg total) by mouth 2 (two) times a day   cholecalciferol 2000 units TABS   No No   Sig: Take 1 tablet (2,000 Units total) by mouth daily   finasteride (PROSCAR) 5 mg tablet   No No   Sig: Take 1 tablet (5 mg total) by mouth daily for 30 days   gabapentin (NEURONTIN) 600 MG tablet   No No   Sig: Take 1 tablet (600 mg total) by mouth 2 (two) times a day   glucagon (GLUCAGON EMERGENCY) 1 MG injection   No No   Sig: Inject 1 mg under the skin once as needed (PRN blood glucose less than 70 if unconscious or uncorrectable by oral means) for up to 1 dose   insulin glargine (LANTUS) 100 units/mL subcutaneous injection   No No   Sig: Inject 20 Units under the skin 2 (two) times a day for 14 days   lisinopril (ZESTRIL) 5 mg tablet   No No   Sig: Take 1 tablet (5 mg total) by mouth daily   nystatin (MYCOSTATIN) powder   No No   Sig: Apply topically 2 (two) times a day   oxyCODONE (ROXICODONE) 10 MG TABS   No No   Sig: Take 1 tablet (10 mg total) by mouth every 4 (four) hours as needed for moderate pain or severe painMax Daily Amount: 60 mg   Patient taking differently: Take 20 mg by mouth every 6 (six) hours as needed for moderate pain or severe pain    tamsulosin (FLOMAX) 0 4 mg   Yes No   Sig: Take 0 4 mg by mouth every evening        Facility-Administered Medications: None       No Known Allergies    Objective   Vitals: Blood pressure (!) 172/89, pulse 72, temperature 98 1 °F (36 7 °C), temperature source Temporal, resp  rate 20, height 5' 6" (1 676 m), weight (!) 136 kg (300 lb 0 7 oz), SpO2 96 %  ,Body mass index is 48 43 kg/m²      Intake/Output Summary (Last 24 hours) at 3/9/2021 1342  Last data filed at 3/9/2021 8949  Gross per 24 hour   Intake 350 ml   Output --   Net 350 ml     Invasive Devices     Peripheral Intravenous Line            Peripheral IV 03/08/21 Left Antecubital less than 1 day                Physical Exam  Vitals signs and nursing note reviewed  Constitutional:       General: He is not in acute distress  Appearance: He is obese  He is not ill-appearing, toxic-appearing or diaphoretic  HENT:      Head: Normocephalic  Nose: Nose normal       Mouth/Throat:      Mouth: Mucous membranes are moist       Pharynx: Oropharynx is clear  Eyes:      Conjunctiva/sclera: Conjunctivae normal    Neck:      Musculoskeletal: Neck supple  Cardiovascular:      Rate and Rhythm: Normal rate and regular rhythm  Pulmonary:      Effort: Pulmonary effort is normal       Breath sounds: Normal breath sounds  No wheezing, rhonchi or rales  Comments: Diminished breath sounds likely secondary to body habitus  Abdominal:      General: Bowel sounds are normal       Tenderness: There is no abdominal tenderness  Musculoskeletal:      Right lower leg: Edema present  Left lower leg: Edema present  Lymphadenopathy:      Cervical: No cervical adenopathy  Neurological:      General: No focal deficit present  Mental Status: He is alert and oriented to person, place, and time  Psychiatric:         Mood and Affect: Mood normal          Lab Results:   I have personally reviewed pertinent lab results  , ABG: No results found for: PHART, KWZ4GZD, PO2ART, PHD9GUX, H7UEXJWK, BEART, SOURCE, BNP: No results found for: BNP, CBC:   Lab Results   Component Value Date    WBC 10 47 (H) 03/09/2021    HGB 12 1 03/09/2021    HCT 39 5 03/09/2021    MCV 88 03/09/2021     03/09/2021    MCH 27 0 03/09/2021    MCHC 30 6 (L) 03/09/2021    RDW 14 6 03/09/2021    MPV 9 8 03/09/2021    NRBC 0 03/09/2021   , CMP:   Lab Results   Component Value Date    SODIUM 141 03/09/2021    K 4 3 03/09/2021     03/09/2021    CO2 29 03/09/2021    BUN 29 (H) 03/09/2021    CREATININE 1 28 03/09/2021    CALCIUM 9 5 03/09/2021    AST 15 03/09/2021    ALT 27 03/09/2021    ALKPHOS 144 (H) 03/09/2021    EGFR 54 03/09/2021   , PT/INR:   Lab Results   Component Value Date    INR 1 08 03/08/2021   , Troponin:   Lab Results   Component Value Date    TROPONINI 0 02 03/09/2021     Imaging Studies: I have personally reviewed pertinent reports  and I have personally reviewed pertinent films in PACS  EKG, Pathology, and Other Studies: I have personally reviewed pertinent reports     and I have personally reviewed pertinent films in PACS  VTE Prophylaxis: Enoxaparin (Lovenox)    Code Status: Level 1 - Full Code  Advance Directive and Living Will: Yes    Power of :    POLST:

## 2021-03-09 NOTE — ASSESSMENT & PLAN NOTE
· Continue coreg 12 5 mg PO BID  · Continue lisinopril 5 mg PO Qdaily  · Utilize PRN IV labetalol  · Will likely require an adjustment to his home hypertension medication regimen  · Follow the blood pressure trend

## 2021-03-09 NOTE — ASSESSMENT & PLAN NOTE
· Secondary to unclear etiology  · Possibly secondary to immobility  · Follow the total alkaline phosphatase level

## 2021-03-09 NOTE — ASSESSMENT & PLAN NOTE
· Associated with cellulitis of the right lower extremity  · Treat with broad-spectrum antibiotics including IV cefepime and IV vancomycin  · Check blood cultures x 2 sets  · Check a MRSA nasal screen  · Consult Podiatry  · May require an MRI of the right lower extremity to rule out osteomyelitis

## 2021-03-09 NOTE — RESPIRATORY THERAPY NOTE
RT Protocol Note  Rosalee Law 66 y o  male MRN: [id number]  Unit/Bed#: RM16 Encounter: [id number]    Assessment    Principal Problem:    Cellulitis of right lower extremity  Active Problems:    Dyspnea on exertion    Obstructive sleep apnea syndrome    Morbid obesity with BMI of 45 0-49 9, adult ([facility])    Type 2 diabetes mellitus with hyperglycemia, with long-term current use of insulin ([facility])    Accelerated hypertension    Hypercalcemia    Open wound of right lower extremity    Elevated alkaline phosphatase level      Home Pulmonary Medications:  N/A  Home Devices/Therapy: (P) BiPAP/CPAP    Past Medical History:   Diagnosis Date    Cataract     CHF (congestive heart failure) ([facility])     chronic diastolic CHF    Coronary artery disease     Diabetes mellitus ([facility] )     Glaucoma     Hyperlipidemia     Hypertension     Neuropathy     Obesity     Renal disorder     chronic kidney disease stage 3     Sleep apnea     Stroke ([place] [address] )     TIA (transient ischemic attack)     Uncontrolled type 2 diabetes mellitus with hyperglycemia, with long-term current use of insulin ([facility] ) 10/4/2018     Social History     Socioeconomic History    Marital status: /Civil Union     Spouse name: None    Number of children: None    Years of education: None    Highest education level: None   Occupational History    None   Social Needs    Financial resource strain: None    Food insecurity     Worry: None     Inability: None    Transportation needs     Medical: None     Non-medical: None   Tobacco Use    Smoking status: Former Smoker     Types: Cigars    Smokeless tobacco: Former User   Substance and Sexual Activity    Alcohol use: Never     Frequency: Never     Drinks per session: Patient refused     Binge frequency: Never    Drug use: No    Sexual activity: Not Currently   Lifestyle    Physical activity     Days per week: None     Minutes per session: None    Stress: None   Relationships    Social connections Talks on phone: None     Gets together: None     Attends Yazidi service: None     Active member of club or organization: None     Attends meetings of clubs or organizations: None     Relationship status: None    Intimate partner violence     Fear of current or ex partner: None     Emotionally abused: None     Physically abused: None     Forced sexual activity: None   Other Topics Concern    None   Social History Narrative    None       Subjective         Objective    Physical Exam:   Assessment Type: (P) Assess only  General Appearance: (P) Alert, Awake  Respiratory Pattern: (P) Normal  Chest Assessment: (P) Chest expansion symmetrical  Bilateral Breath Sounds: (P) Diminished  Cough: (P) None    Vitals:  Blood pressure (!) 193/90, pulse 77, temperature 97 6 °F (36 4 °C), temperature source Temporal, resp  rate 18, height 5' 6" (1 676 m), weight (!) 136 kg (300 lb 0 7 oz), SpO2 95 %  Imaging and other studies: I have personally reviewed pertinent reports  Plan    Respiratory Plan: (P) Vent/NIV/HFNC        Resp Comments: (P) Patient is a pleasant 66 yr old male who has a cpap machine at home but does not wear it  He has no other pulmonary hx and takes no breathing medications at home  we will continue to monitor and follow patient per protocol

## 2021-03-09 NOTE — PLAN OF CARE
Problem: PHYSICAL THERAPY ADULT  Goal: Performs mobility at highest level of function for planned discharge setting  See evaluation for individualized goals  Description: Treatment/Interventions: Functional transfer training, LE strengthening/ROM, Elevations, Therapeutic exercise, Endurance training, Patient/family training, Equipment eval/education, Bed mobility, Gait training, Spoke to nursing  Equipment Recommended: Walker(has)       See flowsheet documentation for full assessment, interventions and recommendations  Note: Prognosis: Good  Problem List: Decreased strength, Decreased range of motion, Decreased endurance, Impaired balance, Decreased mobility, Impaired sensation, Obesity, Decreased skin integrity, Pain  Assessment: pt admitted on advice from pcp for rle wound and redness  dx wtih cellulitis, accelerated htn and hypercalcemia  pt referred to PT  pt was living at home with wife and son, staying on first floor of home  uses rw for distances of about 150'  pt uses motorized cart to shop  pt denies recent falls  pt reports using bsc for toileting  pt demonstrated mild to moderate functional limitations due to chronic debility and current illness  pt needed min assist for transfers, supervision for amb 7' to bed and min assist for bed mobility to [facility]  pt has current deficits in strength, balance, gait sequencing, sensation, skin integrity, activity tolerance due to pain  pt will likely be able to return home  may need home PT, depending on progress  Barriers to Discharge: Inaccessible home environment     PT Discharge Recommendation: Return to previous environment with no needs(may need home PT)     PT - OK to Discharge: No    See flowsheet documentation for full assessment

## 2021-03-09 NOTE — ED NOTES
HEATH Humphries, responded to tiger text  She states she will be down when she gets the time to talk to the patient  Patient is upset due to not being able to get a meal tray, having to stay in the ED, and having to be re-stuck for blood work  Medications due at this time were placed on hold until provider re-evaluation        Nancy Haynes RN  03/08/21 2142

## 2021-03-09 NOTE — PROGRESS NOTES
Vancomycin IV Pharmacy-to-Dose Consultation    Zenaida Che is a 66 y o  male who is currently receiving Vancomycin IV with management by the Pharmacy Consult service  Assessment/Plan:  The patient was reviewed  Renal function is stable and no signs or symptoms of nephrotoxicity and/or infusion reactions were documented in the chart  Based on todays assessment, continue current vancomycin (day # 1) dosing of 1250mg q12h, with a plan for trough to be drawn at 0730 on 3/10/21  We will continue to follow the patients culture results and clinical progress daily      Lisandra Dewey, Pharmacist

## 2021-03-09 NOTE — PHYSICAL THERAPY NOTE
Physical Therapy Evaluation      Patient Active Problem List   Diagnosis    Dyspnea on exertion    Type 2 diabetes mellitus with hypoglycemia without coma, with long-term current use of insulin ([facility])    Chronic kidney disease, stage III (moderate)    Chronic diastolic CHF (congestive heart failure) ([facility])    Obstructive sleep apnea syndrome    Morbid obesity with BMI of 45 0-49 9, adult ([facility] )    Essential hypertension    Type 2 diabetes mellitus with hyperglycemia, with long-term current use of insulin ([facility])    Complicated UTI (urinary tract infection)    Uncontrolled type 2 diabetes mellitus with hyperglycemia, with long-term current use of insulin ([facility])    Dehydration with hyponatremia    Hypoglycemia    Acute cystitis    Opiate dependence, continuous ([facility])    Paresthesia of left upper extremity    Neural foraminal stenosis of cervical spine    Acute pain of right lower extremity    Elevated TSH    Pressure injury of skin of right buttock    Ambulatory dysfunction    UTI (urinary tract infection)    Accelerated hypertension    Hypercalcemia    Open wound of right lower extremity    Cellulitis of right lower extremity    Elevated alkaline phosphatase level    Elevated d-dimer       Past Medical History:   Diagnosis Date    Cataract     CHF (congestive heart failure) ([facility])     chronic diastolic CHF    Coronary artery disease     Diabetes mellitus ([place] [address] )     Glaucoma     Hyperlipidemia     Hypertension     Neuropathy     Obesity     Renal disorder     chronic kidney disease stage 3     Sleep apnea     Stroke ([facility] )     TIA (transient ischemic attack)     Uncontrolled type 2 diabetes mellitus with hyperglycemia, with long-term current use of insulin ([place] [address] ) 10/4/2018       Past Surgical History:   Procedure Laterality Date    APPENDECTOMY      CARPAL TUNNEL RELEASE      EYE SURGERY      cataracts      03/09/21 1100   PT Last Visit   PT Visit Date 03/09/21   Note Type   Note type Evaluation   Pain Assessment   Pain Assessment Tool 0-10   Pain Score 8   Pain Location/Orientation Orientation: Right;Location: Leg   Hospital Pain Intervention(s) Repositioned; Ambulation/increased activity; Emotional support;Elevated   Home Living   Type of [address] Two level;Performs ADLs on one level   [address] Walker;Cane   Prior Function   Level of [place] Needs assistance with IADLs; Needs assistance with ADLs and functional mobility   Lives With Spouse; Son   Receives Help From Family   ADL Assistance Needs assistance   IADLs Needs assistance   Falls in the last 6 months 0   Restrictions/Precautions   Other Precautions Multiple lines; Fall Risk;Pain   General   Family/Caregiver Present No   Cognition   Overall Cognitive Status WFL   Orientation Level Oriented X4   RUE Assessment   RUE Assessment X  (limited hand rom, str 4-/5 generalized)   LUE Assessment   LUE Assessment X  (limited hand rom, str 4-/5 generalized)   RLE Assessment   RLE Assessment X  (str 3+/5 due to pain, ankle not tested, extensive wound)   LLE Assessment   LLE Assessment X  (str 4+/5, ankle 3/5)   Coordination   Movements are Fluid and Coordinated 1   Sensation X   Light Touch   RLE Light Touch Impaired   LLE Light Touch Impaired   Bed Mobility   Rolling R 7  Independent   Rolling L 7  Independent   Sit to Supine 3  Moderate assistance   Additional items Assist x 1; Increased time required;Verbal cues;LE management   Transfers   Sit to Stand 4  Minimal assistance   Additional items Assist x 1; Increased time required;Verbal cues;Armrests   Stand to Sit 5  Supervision   Additional items Assist x 1   Ambulation/Elevation   Gait pattern Decreased foot clearance; Forward Flexion; Short stride; Excessively slow   Gait Assistance 5  Supervision   Assistive Device Rolling walker   Distance 7'   Balance   Static Sitting Good   Dynamic Sitting Fair   Static Standing Fair -   Dynamic Standing Poor +   Ambulatory Poor +   Endurance Deficit   Endurance Deficit Yes   Endurance Deficit Description leg pain   Activity Tolerance   Activity Tolerance Patient tolerated treatment well;Patient limited by pain   Nurse Made Aware yes   Assessment   Prognosis Good   Problem List Decreased strength;Decreased range of motion;Decreased endurance; Impaired balance;Decreased mobility; Impaired sensation;Obesity; Decreased skin integrity;Pain   Assessment pt admitted on advice from pcp for rle wound and redness  dx wtih cellulitis, accelerated htn and hypercalcemia  pt referred to PT  pt was living at home with wife and son, staying on first floor of home  uses rw for distances of about 150'  pt uses motorized cart to shop  pt denies recent falls  pt reports using bsc for toileting  pt demonstrated mild to moderate functional limitations due to chronic debility and current illness  pt needed min assist for transfers, supervision for amb 7' to bed and min assist for bed mobility to mange le's  pt has current deficits in strength, balance, gait sequencing, sensation, skin integrity, activity tolerance due to pain  pt will likely be able to return home  may need home PT, depending on progress  Barriers to Discharge Inaccessible home environment   Goals   Patient Goals leg to get better and go home  STG Expiration Date 03/23/21   Short Term Goal #1 indep with bed mobility, transfers, amb using rw for 150'  stairs with min assist and railing  improve strength and balance by 1/2 grade  demonstrate good safety practices  Plan   Treatment/Interventions Functional transfer training;LE strengthening/ROM; Elevations; Therapeutic exercise; Endurance training;Patient/family training;Equipment eval/education; Bed mobility;Gait training;Spoke to nursing   PT Frequency   (3-5x/week)   Recommendation   PT Discharge Recommendation Return to previous environment with no needs  (may need home PT)   Equipment Recommended Walker  (has)   PT - OK to Discharge No   AM-PAC Basic Mobility Inpatient   Turning in Bed Without Bedrails 4   Lying on Back to Sitting on Edge of Flat Bed 3   Moving Bed to Chair 3   Standing Up From Chair 3   Walk in Room 4   Climb 3-5 Stairs 1   Basic Mobility Inpatient Raw Score 18   Basic Mobility Standardized Score 41 05   History: co - morbidities, fall risk, use of assistive device, assist for iadl's,  multiple lines  Exam: impairments in locomotion, musculoskeletal, balance,posture, pain control, skin integrity,    Clinical: unstable/unpredictable  Complexity:high        Bev , PT

## 2021-03-09 NOTE — ASSESSMENT & PLAN NOTE
· Admit to med/surg level of care  · Associated with an open wound of the right lower extremity  · Treat with broad-spectrum antibiotics including IV cefepime and IV vancomycin day#2  · Check blood cultures x 2 sets  · Check a MRSA nasal screen  · Consult Podiatry  · May require an MRI of the right lower extremity to rule out osteomyelitis

## 2021-03-09 NOTE — PROGRESS NOTES
Vancomycin Assessment    Satnam Cramer is a 66 y o  male who is currently receiving vancomycin 1250mg IV every 12 hrs for skin-soft tissue infection     Relevant clinical data and objective history reviewed:  Creatinine   Date Value Ref Range Status   03/08/2021 1 46 (H) 0 60 - 1 30 mg/dL Final     Comment:     Standardized to IDMS reference method   03/08/2021 1 38 (H) 0 60 - 1 30 mg/dL Final     Comment:     Standardized to IDMS reference method   12/22/2020 1 30 0 60 - 1 30 mg/dL Final     Comment:     Standardized to IDMS reference method   12/03/2015 1 44 (H) 0 60 - 1 30 mg/dL Final     Comment:     Standardized to IDMS reference method   12/02/2015 1 59 (H) 0 60 - 1 30 mg/dL Final     Comment:     Standardized to IDMS reference method   12/01/2015 2 40 (H) 0 60 - 1 30 mg/dL Final     Comment:     Standardized to IDMS reference method     BP (!) 193/90 (BP Location: Left arm)   Pulse 77   Temp 97 6 °F (36 4 °C) (Temporal)   Resp 18   Ht 5' 6" (1 676 m)   Wt (!) 136 kg (300 lb 0 7 oz)   SpO2 95%   BMI 48 43 kg/m²   I/O last 3 completed shifts: In: 48 [IV Piggyback:50]  Out: -   Lab Results   Component Value Date/Time    BUN 32 (H) 03/08/2021 04:58 PM    BUN 37 (H) 12/03/2015 02:10 PM    WBC 10 51 (H) 03/08/2021 03:47 PM    WBC 10 43 (H) 12/01/2015 04:50 AM    HGB 11 3 (L) 03/08/2021 03:47 PM    HGB 10 8 (L) 12/01/2015 04:50 AM    HCT 36 9 03/08/2021 03:47 PM    HCT 35 0 (L) 12/01/2015 04:50 AM    MCV 89 03/08/2021 03:47 PM    MCV 88 12/01/2015 04:50 AM     03/08/2021 03:47 PM     12/01/2015 04:50 AM     Temp Readings from Last 3 Encounters:   03/08/21 97 6 °F (36 4 °C) (Temporal)   12/22/20 98 °F (36 7 °C) (Temporal)   02/07/20 98 6 °F (37 °C)     Vancomycin Days of Therapy: 1    Assessment/Plan  The patient is currently on vancomycin utilizing scheduled dosing based on adjusted body weight (due to obesity)    Baseline risks associated with therapy include: pre-existing renal impairment, concomitant nephrotoxic medications, advanced age, and dehydration  The patient is currently receiving 1250mg IV every 12 hrs and is clinically appropriate and dose will be continued  Pharmacy will also follow closely for s/sx of nephrotoxicity, infusion reactions, and appropriateness of therapy  BMP and CBC will be ordered per protocol  Plan for trough as patient approaches steady state, prior to the 4th  dose at approximately (awaiting for 1st dose to be given to schedule trough)  Due to infection severity, will target a trough of 15-20 (appropriate for most indications)   Pharmacy will continue to follow the patients culture results and clinical progress daily      Martha Kilgore, Pharmacist

## 2021-03-09 NOTE — ASSESSMENT & PLAN NOTE
· Needs outpatient pulmonary function tests completed by his outpatient Pulmonologist  · Check a pro-BNP level  · Check a D-dimer level  · Check a portable chest xray  · Check a transthoracic echocardiogram

## 2021-03-09 NOTE — ED NOTES
HEATH Ferrer at bedside speaking with patient at this time        Casandra Pickering RN  03/08/21 [id number]

## 2021-03-10 PROBLEM — R82.81 PYURIA: Status: ACTIVE | Noted: 2021-03-10

## 2021-03-10 PROBLEM — R31.29 MICROSCOPIC HEMATURIA: Status: ACTIVE | Noted: 2021-03-10

## 2021-03-10 PROBLEM — R26.81 GAIT INSTABILITY: Status: ACTIVE | Noted: 2021-03-10

## 2021-03-10 PROBLEM — R94.31 ABNORMAL EKG: Status: ACTIVE | Noted: 2021-03-10

## 2021-03-10 PROBLEM — R91.8 MULTIPLE PULMONARY NODULES: Status: ACTIVE | Noted: 2021-03-10

## 2021-03-10 LAB
ALBUMIN SERPL BCP-MCNC: 3 G/DL (ref 3.5–5)
ALP SERPL-CCNC: 144 U/L (ref 46–116)
ALT SERPL W P-5'-P-CCNC: 23 U/L (ref 12–78)
ANION GAP SERPL CALCULATED.3IONS-SCNC: 7 MMOL/L (ref 4–13)
AST SERPL W P-5'-P-CCNC: 17 U/L (ref 5–45)
BACTERIA UR QL AUTO: ABNORMAL /HPF
BASOPHILS # BLD AUTO: 0.05 THOUSANDS/ΜL (ref 0–0.1)
BASOPHILS NFR BLD AUTO: 1 % (ref 0–1)
BILIRUB SERPL-MCNC: 0.5 MG/DL (ref 0.2–1)
BILIRUB UR QL STRIP: NEGATIVE
BUN SERPL-MCNC: 29 MG/DL (ref 5–25)
CA-I BLD-SCNC: 1.22 MMOL/L (ref 1.12–1.32)
CALCIUM ALBUM COR SERPL-MCNC: 10.2 MG/DL (ref 8.3–10.1)
CALCIUM SERPL-MCNC: 9.4 MG/DL (ref 8.3–10.1)
CHLORIDE SERPL-SCNC: 105 MMOL/L (ref 100–108)
CLARITY UR: CLEAR
CO2 SERPL-SCNC: 28 MMOL/L (ref 21–32)
COLOR UR: YELLOW
CREAT SERPL-MCNC: 1.35 MG/DL (ref 0.6–1.3)
EOSINOPHIL # BLD AUTO: 0.48 THOUSAND/ΜL (ref 0–0.61)
EOSINOPHIL NFR BLD AUTO: 6 % (ref 0–6)
ERYTHROCYTE [DISTWIDTH] IN BLOOD BY AUTOMATED COUNT: 14.6 % (ref 11.6–15.1)
GFR SERPL CREATININE-BSD FRML MDRD: 50 ML/MIN/1.73SQ M
GLUCOSE SERPL-MCNC: 156 MG/DL (ref 65–140)
GLUCOSE SERPL-MCNC: 158 MG/DL (ref 65–140)
GLUCOSE SERPL-MCNC: 198 MG/DL (ref 65–140)
GLUCOSE SERPL-MCNC: 214 MG/DL (ref 65–140)
GLUCOSE SERPL-MCNC: 242 MG/DL (ref 65–140)
GLUCOSE UR STRIP-MCNC: NEGATIVE MG/DL
HCT VFR BLD AUTO: 38.1 % (ref 36.5–49.3)
HGB BLD-MCNC: 11.4 G/DL (ref 12–17)
HGB UR QL STRIP.AUTO: NEGATIVE
IMM GRANULOCYTES # BLD AUTO: 0.04 THOUSAND/UL (ref 0–0.2)
IMM GRANULOCYTES NFR BLD AUTO: 1 % (ref 0–2)
KETONES UR STRIP-MCNC: NEGATIVE MG/DL
LEUKOCYTE ESTERASE UR QL STRIP: NEGATIVE
LYMPHOCYTES # BLD AUTO: 1.52 THOUSANDS/ΜL (ref 0.6–4.47)
LYMPHOCYTES NFR BLD AUTO: 19 % (ref 14–44)
MAGNESIUM SERPL-MCNC: 2 MG/DL (ref 1.6–2.6)
MCH RBC QN AUTO: 27 PG (ref 26.8–34.3)
MCHC RBC AUTO-ENTMCNC: 29.9 G/DL (ref 31.4–37.4)
MCV RBC AUTO: 90 FL (ref 82–98)
MONOCYTES # BLD AUTO: 0.73 THOUSAND/ΜL (ref 0.17–1.22)
MONOCYTES NFR BLD AUTO: 9 % (ref 4–12)
MRSA NOSE QL CULT: NORMAL
NEUTROPHILS # BLD AUTO: 5.4 THOUSANDS/ΜL (ref 1.85–7.62)
NEUTS SEG NFR BLD AUTO: 64 % (ref 43–75)
NITRITE UR QL STRIP: NEGATIVE
NON-SQ EPI CELLS URNS QL MICRO: ABNORMAL /HPF
NRBC BLD AUTO-RTO: 0 /100 WBCS
PH UR STRIP.AUTO: 8 [PH]
PHOSPHATE SERPL-MCNC: 3.3 MG/DL (ref 2.3–4.1)
PLATELET # BLD AUTO: 236 THOUSANDS/UL (ref 149–390)
PMV BLD AUTO: 10.3 FL (ref 8.9–12.7)
POTASSIUM SERPL-SCNC: 4.6 MMOL/L (ref 3.5–5.3)
PROCALCITONIN SERPL-MCNC: 0.07 NG/ML
PROT SERPL-MCNC: 7.6 G/DL (ref 6.4–8.2)
PROT UR STRIP-MCNC: ABNORMAL MG/DL
RBC # BLD AUTO: 4.22 MILLION/UL (ref 3.88–5.62)
RBC #/AREA URNS AUTO: ABNORMAL /HPF
SODIUM SERPL-SCNC: 140 MMOL/L (ref 136–145)
SP GR UR STRIP.AUTO: 1.01 (ref 1–1.03)
TRI-PHOS CRY URNS QL MICRO: ABNORMAL /HPF
UROBILINOGEN UR QL STRIP.AUTO: 0.2 E.U./DL
VANCOMYCIN TROUGH SERPL-MCNC: 16.4 UG/ML (ref 10–20)
WBC # BLD AUTO: 8.22 THOUSAND/UL (ref 4.31–10.16)
WBC #/AREA URNS AUTO: ABNORMAL /HPF

## 2021-03-10 PROCEDURE — 87086 URINE CULTURE/COLONY COUNT: CPT | Performed by: INTERNAL MEDICINE

## 2021-03-10 PROCEDURE — 84145 PROCALCITONIN (PCT): CPT | Performed by: INTERNAL MEDICINE

## 2021-03-10 PROCEDURE — 99232 SBSQ HOSP IP/OBS MODERATE 35: CPT | Performed by: INTERNAL MEDICINE

## 2021-03-10 PROCEDURE — 80202 ASSAY OF VANCOMYCIN: CPT | Performed by: INTERNAL MEDICINE

## 2021-03-10 PROCEDURE — 83735 ASSAY OF MAGNESIUM: CPT | Performed by: INTERNAL MEDICINE

## 2021-03-10 PROCEDURE — 81001 URINALYSIS AUTO W/SCOPE: CPT | Performed by: INTERNAL MEDICINE

## 2021-03-10 PROCEDURE — 84100 ASSAY OF PHOSPHORUS: CPT | Performed by: INTERNAL MEDICINE

## 2021-03-10 PROCEDURE — 85025 COMPLETE CBC W/AUTO DIFF WBC: CPT | Performed by: INTERNAL MEDICINE

## 2021-03-10 PROCEDURE — 80053 COMPREHEN METABOLIC PANEL: CPT | Performed by: INTERNAL MEDICINE

## 2021-03-10 PROCEDURE — 82330 ASSAY OF CALCIUM: CPT | Performed by: INTERNAL MEDICINE

## 2021-03-10 PROCEDURE — 82948 REAGENT STRIP/BLOOD GLUCOSE: CPT

## 2021-03-10 RX ORDER — LISINOPRIL 10 MG/1
10 TABLET ORAL 2 TIMES DAILY
Status: DISCONTINUED | OUTPATIENT
Start: 2021-03-10 | End: 2021-03-12 | Stop reason: HOSPADM

## 2021-03-10 RX ORDER — OXYCODONE HYDROCHLORIDE 10 MG/1
10 TABLET ORAL ONCE
Status: COMPLETED | OUTPATIENT
Start: 2021-03-10 | End: 2021-03-10

## 2021-03-10 RX ADMIN — INSULIN LISPRO 2 UNITS: 100 INJECTION, SOLUTION INTRAVENOUS; SUBCUTANEOUS at 11:52

## 2021-03-10 RX ADMIN — OXYCODONE HYDROCHLORIDE 10 MG: 10 TABLET ORAL at 09:19

## 2021-03-10 RX ADMIN — OXYCODONE HYDROCHLORIDE 10 MG: 10 TABLET ORAL at 22:09

## 2021-03-10 RX ADMIN — CEFEPIME HYDROCHLORIDE 2000 MG: 2 INJECTION, SOLUTION INTRAVENOUS at 05:05

## 2021-03-10 RX ADMIN — CARVEDILOL 12.5 MG: 12.5 TABLET, FILM COATED ORAL at 09:19

## 2021-03-10 RX ADMIN — CEFEPIME HYDROCHLORIDE 2000 MG: 2 INJECTION, SOLUTION INTRAVENOUS at 18:00

## 2021-03-10 RX ADMIN — LISINOPRIL 10 MG: 10 TABLET ORAL at 09:18

## 2021-03-10 RX ADMIN — INSULIN LISPRO 6 UNITS: 100 INJECTION, SOLUTION INTRAVENOUS; SUBCUTANEOUS at 16:56

## 2021-03-10 RX ADMIN — ENOXAPARIN SODIUM 40 MG: 40 INJECTION SUBCUTANEOUS at 22:13

## 2021-03-10 RX ADMIN — GABAPENTIN 300 MG: 300 CAPSULE ORAL at 09:18

## 2021-03-10 RX ADMIN — INSULIN GLARGINE 10 UNITS: 100 INJECTION, SOLUTION SUBCUTANEOUS at 22:12

## 2021-03-10 RX ADMIN — ENOXAPARIN SODIUM 40 MG: 40 INJECTION SUBCUTANEOUS at 09:19

## 2021-03-10 RX ADMIN — VANCOMYCIN HYDROCHLORIDE 1250 MG: 5 INJECTION, POWDER, LYOPHILIZED, FOR SOLUTION INTRAVENOUS at 19:40

## 2021-03-10 RX ADMIN — TAMSULOSIN HYDROCHLORIDE 0.4 MG: 0.4 CAPSULE ORAL at 17:59

## 2021-03-10 RX ADMIN — OXYCODONE HYDROCHLORIDE 10 MG: 10 TABLET ORAL at 04:25

## 2021-03-10 RX ADMIN — ASPIRIN 81 MG 81 MG: 81 TABLET ORAL at 09:18

## 2021-03-10 RX ADMIN — VANCOMYCIN HYDROCHLORIDE 1250 MG: 5 INJECTION, POWDER, LYOPHILIZED, FOR SOLUTION INTRAVENOUS at 09:25

## 2021-03-10 RX ADMIN — INSULIN LISPRO 2 UNITS: 100 INJECTION, SOLUTION INTRAVENOUS; SUBCUTANEOUS at 22:13

## 2021-03-10 RX ADMIN — INSULIN LISPRO 2 UNITS: 100 INJECTION, SOLUTION INTRAVENOUS; SUBCUTANEOUS at 07:47

## 2021-03-10 RX ADMIN — LISINOPRIL 10 MG: 10 TABLET ORAL at 22:10

## 2021-03-10 RX ADMIN — OXYCODONE HYDROCHLORIDE 10 MG: 10 TABLET ORAL at 18:02

## 2021-03-10 RX ADMIN — CARVEDILOL 12.5 MG: 12.5 TABLET, FILM COATED ORAL at 17:59

## 2021-03-10 RX ADMIN — Medication 2000 UNITS: at 09:18

## 2021-03-10 RX ADMIN — INSULIN LISPRO 3 UNITS: 100 INJECTION, SOLUTION INTRAVENOUS; SUBCUTANEOUS at 16:56

## 2021-03-10 RX ADMIN — GABAPENTIN 300 MG: 300 CAPSULE ORAL at 17:59

## 2021-03-10 RX ADMIN — ATORVASTATIN CALCIUM 40 MG: 40 TABLET, FILM COATED ORAL at 17:59

## 2021-03-10 NOTE — ASSESSMENT & PLAN NOTE
Lab Results   Component Value Date    HGBA1C 7 0 (H) 03/09/2021       Recent Labs     03/09/21  1623 03/09/21  2110 03/10/21  0737 03/10/21  1123   POCGLU 177* 191* 156* 198*       Blood Sugar Average: Last 72 hrs:  (P) 166 [id number]     · Continue lantus 10 Units SQ QHS  · Add humalog 3 Units TID with meals  · Continue lisinopril for renal protection  · Hypoglycemia protocol  · Insulin sliding scale with blood glucose monitoring ACHS

## 2021-03-10 NOTE — PROGRESS NOTES
Progress Note - Wound   Hien Rahman 66 y o  male MRN: [id number]  Unit/Bed#: 408-01 Encounter: [id number]    Assessment:     1   Venous ulcer right anterior leg, edema/venous insufficiency  2  Cellulitis right leg - resolving        Plan:  VSS afebrile, CBC normal this AM   Continue IV Cefepime  Blister/non-viable skin to right leg removed (non-excisional) with forceps yesterday  Underlying ulceration is partial thickness, superficial, with granular base and no necrotic tissue noted, localized surrounding cellulitis is improved  Dressing change performed today with Adaptic/Maxorb silver/DSD applied, continue to change daily  Patient will need home care nurses on discharge and possible follow-up at wound center  Instructed on leg elevation, low sodium diet etc   Likely discharge tomorrow         History of Present Illness     HPI:  Hien Rahman is a 66 y o  male who presents with a wound located at right anterior leg  The wound has been present for less than 1week(s)  The wound is from edema blister secondary to venous insufficiency and subsequently became infection  Admitted through ER for IV antibiotics and wound evaluation  Objective:    Vitals: Blood pressure 114/74, pulse 83, temperature (!) 97 4 °F (36 3 °C), temperature source Temporal, resp  rate 18, height 5' 6" (1 676 m), weight (!) 136 kg (300 lb 0 7 oz), SpO2 90 %  ,Body mass index is 48 43 kg/m²  Physical Exam:    Derm:  Large blister removed  Wound as measured and described above, granular wound bed, no necrotic tissue noted  Mild periwound erythema  Vascular:  Pulses non-palpable due to edema  CFT normal   Skin temp normal   Neuro:  Protective sensation intact bilaterally  Ortho:  No deformities, MMT normal    Lab, Imaging and other studies: I have personally reviewed pertinent reports  Wound 06/30/19 Pressure Injury Buttocks Right (Active)       Wound 07/17/19 Venous stasis ulcer Leg Left; Anterior (Active)       Wound 03/09/21 Venous Ulcer Pretibial Right;Upper;Proximal (Active)   Wound Description Other (Comment) 03/09/21 1214   Mary-wound Assessment [place]ON 03/09/21 2010   Wound Length (cm) 6 cm 03/09/21 1214   Wound Width (cm) 5 cm 03/09/21 1214   Wound Surface Area (cm^2) 30 cm^2 03/09/21 1214   Treatments Cleansed;Site care 03/09/21 1214   Dressing ABD;Xeroform 03/09/21 2010   Dressing Changed New 03/09/21 1214   Patient Tolerance Tolerated well 03/09/21 1214   Dressing Status Clean;Dry; Intact 03/09/21 2010

## 2021-03-10 NOTE — ASSESSMENT & PLAN NOTE
· Associated with cellulitis of the right lower extremity  · The patient was seen in consultation by Podiatry, who completed a bedside debridement on 03/09/2021 of the right anterior shin blister    · Continue broad-spectrum antibiotics with IV cefepime and IV vancomycin for now  · Follow culture results

## 2021-03-10 NOTE — PROGRESS NOTES
Vancomycin Assessment    Wally Ricketts is a 66 y o  male who is currently receiving vancomycin 1250mg IV Q12h for skin-soft tissue infection     Relevant clinical data and objective history reviewed:  Creatinine   Date Value Ref Range Status   03/10/2021 1 35 (H) 0 60 - 1 30 mg/dL Final     Comment:     Standardized to IDMS reference method   03/09/2021 1 28 0 60 - 1 30 mg/dL Final     Comment:     Standardized to IDMS reference method   03/08/2021 1 46 (H) 0 60 - 1 30 mg/dL Final     Comment:     Standardized to IDMS reference method   12/03/2015 1 44 (H) 0 60 - 1 30 mg/dL Final     Comment:     Standardized to IDMS reference method   12/02/2015 1 59 (H) 0 60 - 1 30 mg/dL Final     Comment:     Standardized to IDMS reference method   12/01/2015 2 40 (H) 0 60 - 1 30 mg/dL Final     Comment:     Standardized to IDMS reference method     BP (!) 171/89   Pulse 77   Temp 97 7 °F (36 5 °C)   Resp 18   Ht 5' 6" (1 676 m)   Wt (!) 136 kg (300 lb 0 7 oz)   SpO2 95%   BMI 48 43 kg/m²   I/O last 3 completed shifts: In: 540 [P O :240; IV Piggyback:300]  Out: 250 [Urine:250]  Lab Results   Component Value Date/Time    BUN 29 (H) 03/10/2021 04:37 AM    BUN 37 (H) 12/03/2015 02:10 PM    WBC 8 22 03/10/2021 04:37 AM    WBC 10 43 (H) 12/01/2015 04:50 AM    HGB 11 4 (L) 03/10/2021 04:37 AM    HGB 10 8 (L) 12/01/2015 04:50 AM    HCT 38 1 03/10/2021 04:37 AM    HCT 35 0 (L) 12/01/2015 04:50 AM    MCV 90 03/10/2021 04:37 AM    MCV 88 12/01/2015 04:50 AM     03/10/2021 04:37 AM     12/01/2015 04:50 AM     Temp Readings from Last 3 Encounters:   03/10/21 97 7 °F (36 5 °C)   12/22/20 98 °F (36 7 °C) (Temporal)   02/07/20 98 6 °F (37 °C)     Vancomycin Days of Therapy: 2    Assessment/Plan  The patient is currently on vancomycin utilizing scheduled dosing  Baseline risks associated with therapy include: dehydration    The patient is receiving 1250mg IV Q12h with the most recent vancomycin level being at steady-state and therapeutic based on a goal of 15-20 (appropriate for most indications) ; therefore, is clinically appropriate and dose will be continued   Pharmacy will continue to follow closely for s/sx of nephrotoxicity, infusion reactions, and appropriateness of therapy  BMP and CBC will be ordered per protocol  Plan for trough as patient approaches steady state in 5-7 days unless renal function changes prior  Pharmacy will continue to follow the patients culture results and clinical progress daily      Connie Bond, Pharmacist

## 2021-03-10 NOTE — ASSESSMENT & PLAN NOTE
· Continue coreg 12 5 mg PO BID  · Increase lisinopril to 10 mg PO BID with persistently elevated blood pressure readings  · Utilize PRN IV labetalol  · Follow the blood pressure trend

## 2021-03-10 NOTE — ASSESSMENT & PLAN NOTE
· Outpatient Cardiology evaluation    ECG 12 lead  Order: [id number]  Status:  Final result   Visible to patient:  No (inaccessible in [address])   Next appt:  03/29/2021 at 01:40 PM in Pulmonology ([address] [place] [address])   Ref Range & Units 3/8/21 1553   Ventricular Rate BPM 88    Atrial Rate BPM 91    VT Interval ms    QRSD Interval ms 146    QT Interval ms 400    QTC Interval ms 484    P Axis degrees    QRS Axis degrees -88    T Wave Axis degrees 50       Narrative & Impression    Sinus rhythm with 1st degree A-V block  Left axis deviation  Right bundle branch block  Abnormal ECG  Confirmed by Tristan Banks ([id number]) on 3/9/2021 4:20:21 PM      Specimen Collected: 03/08/21 15:53   Last Resulted: 03/09/21 16:20

## 2021-03-10 NOTE — ASSESSMENT & PLAN NOTE
· The patient was seen in consultation by Pulmonology  · The patient will need an ambulatory pulse oximetry check on room air on the day of discharge to see if he qualifies for supplemental oxygen    · Needs outpatient pulmonary function tests and completed by his outpatient Pulmonologist  · Will also need a repeat outpatient sleep study completed per Pulmonology's recommendations

## 2021-03-10 NOTE — ASSESSMENT & PLAN NOTE
· Outpatient Endocrinology evaluation    Results for Al Allred (MRN [id number]) as of 3/10/2021 13:41   Ref   Range 3/9/2021 06:01   PARATHYROID HORMONE Latest Ref Range: 18 4 - 80 1 pg/mL 130 4 (H)

## 2021-03-10 NOTE — CASE MANAGEMENT
Cm met with the patient to evaluate the patients prior function and living situation and any barriers to d/c and form a safe d/c plan  Cm also evaluated the patient for any services in the home or needs for services  CM evaluated pt for any needs  Pt lives with wife and has set up on first floor  No steps to endter  Pt uses walker for ambulation  He has BSC, walker and cane  Used walker most of time  Also has CPAP through [facility]  Pt uses [facility] for medications  PCP Mikhail Sahu  Pt had previous stays on 5th floor for rehab  Plans at this time is discharge to home with [facility]  Will await PT/OT recommendations  Wife will transport home

## 2021-03-10 NOTE — PROGRESS NOTES
Progress Note - Pulmonary   Ruby Mora 66 y o  male MRN: [id number]  Unit/Bed#: 408-01 Encounter: [id number]      Assessment:  1  Dyspnea on exertion  2  Pulmonary nodules largest left lower lobe 9 mm  3  BHARGAVI noncompliant with CPAP  4  Former smoker-quit 4 years ago  5  Chronic diastolic heart failure  6  Obesity BMI 48  7  Hypertension  8  Cellulitis right lower extremity       Today's labs reviewed  Creatinine 1 46> 1 28> 1 35  Urine culture pending  White count 10 5> 10 4> 8 2  Phosphorus, magnesium and calcium normal    Plan:  1  Home O2 evaluation with ambulation prior to discharge  Patient currently 97% on room air at rest   2  Pulmonary follow-up scheduled for 29 March 2021  3  Outpatient pulmonary function testing  4  CPAP q h s   5  Outpatient split study  6  Repeat CT chest 6 months  7  Blood pressure control  8  Pulmonary toilet/airway clearance protocol  9  Lifestyle modifications, diet, exercise and weight loss discussed with patient  Subjective:   Patient seen and examined  Patient is seeking in chair when I came to room with significant snoring  Patient admits to significant daytime drowsiness  Denies any shortness of breath  Denies any fevers, chills, cough, chest pain    Objective:     Vitals: Blood pressure (!) 171/89, pulse 77, temperature 97 7 °F (36 5 °C), temperature source Oral, resp  rate 18, height 5' 6" (1 676 m), weight (!) 136 kg (300 lb 0 7 oz), SpO2 95 % , , Body mass index is 48 43 kg/m²  Intake/Output Summary (Last 24 hours) at 3/10/2021 1054  Last data filed at 3/10/2021 0842  Gross per 24 hour   Intake 480 ml   Output 250 ml   Net 230 ml         Physical Exam  Gen: Awake, alert, oriented x 3, no acute distress  HEENT: Mucous membranes moist, no oral lesions, no thrush  NECK: No accessory muscle use  Cardiac: Regular, single S1, single S2, no murmurs  Lungs:  Diminished breath sounds throughout likely secondary to body habitus    Otherwise clear to auscultation  Abdomen: normoactive bowel sounds, soft nontender  Extremities: no cyanosis, no clubbing, positive bilateral lower extremity edema    Labs: I have personally reviewed pertinent lab results  , ABG: No results found for: PHART, KAN4ZQQ, PO2ART, WFC3CKB, E0FVUZRH, BEART, SOURCE, BNP: No results found for: BNP, CBC:   Lab Results   Component Value Date    WBC 8 22 03/10/2021    HGB 11 4 (L) 03/10/2021    HCT 38 1 03/10/2021    MCV 90 03/10/2021     03/10/2021    MCH 27 0 03/10/2021    MCHC 29 9 (L) 03/10/2021    RDW 14 6 03/10/2021    MPV 10 3 03/10/2021    NRBC 0 03/10/2021   , CMP:   Lab Results   Component Value Date    SODIUM 140 03/10/2021    K 4 6 03/10/2021     03/10/2021    CO2 28 03/10/2021    BUN 29 (H) 03/10/2021    CREATININE 1 35 (H) 03/10/2021    CALCIUM 9 4 03/10/2021    AST 17 03/10/2021    ALT 23 03/10/2021    ALKPHOS 144 (H) 03/10/2021    EGFR 50 03/10/2021   , PT/INR: No results found for: PT, INR, Troponin: No results found for: TROPONINI  Imaging and other studies: I have personally reviewed pertinent reports     and I have personally reviewed pertinent films in PACS      Sophie Hunter PA-C

## 2021-03-10 NOTE — ASSESSMENT & PLAN NOTE
VAS lower limb venous duplex study, complete bilateral  Status: Final result   PACS Images     Show images for VAS lower limb venous duplex study, complete bilateral   Study Result       [facility] REPORT  CLINICAL:  Indications: Patient has been experiencing constant right lower leg pain for  1-2 months  He takes a daily 81mg aspirin  Risk Factors  The patient has history of Obesity, HTN, IDDM, CAD, stroke, CKD, diastolic CHF  and previous smoking (quit 1-5yrs ago)  He has no history of DVT  FINDINGS:     Segment  Right            Left                Impression       Impression         CFV      Normal (Patent)  Normal (Patent)             CONCLUSION:     Impression:  RIGHT LOWER LIMB:  No gross evidence of acute or chronic deep vein thrombosis  No gross evidence of superficial thrombophlebitis noted  Doppler evaluation shows a normal response to augmentation maneuvers  Popliteal, posterior tibial and anterior tibial arterial Doppler waveforms are  triphasic  Limited visualization of calf veins  LEFT LOWER LIMB:  No gross evidence of acute or chronic deep vein thrombosis  No gross evidence of superficial thrombophlebitis noted  Doppler evaluation shows a normal response to augmentation maneuvers  Popliteal, posterior tibial and anterior tibial arterial Doppler waveforms are  triphasic  Limited visualization of calf veins  Technically difficult/limited study due to body habitus       SIGNATURE:  Electronically Signed by: Luann Romeo on 2019-03-03 09:12:09 PM     CTA chest pe study  Status: Edited Result - FINAL   PACS Images     Show images for CTA chest pe study   Study Result    CTA - CHEST WITH IV CONTRAST - PULMONARY ANGIOGRAM     INDICATION:   Shortness of breath  PE suspected, intermediate prob, positive D-dimer  PE suspected, shortness of breath, elevated D-dimer      COMPARISON: None      TECHNIQUE: CTA examination of the chest was performed using angiographic technique according to a protocol specifically tailored to evaluate for pulmonary embolism  Axial, sagittal, and coronal 2D reformatted images were created from the source data and   submitted for interpretation  In addition, coronal 3D MIP postprocessing was performed on the acquisition scanner        Radiation dose length product (DLP) for this visit:  491 17 mGy-cm   This examination, like all CT scans performed in the [address] [place] [address], was performed utilizing techniques to minimize radiation dose exposure, including the use of iterative   reconstruction and automated exposure control      IV Contrast:  100 mL of iohexol (OMNIPAQUE)     FINDINGS:     PULMONARY ARTERIAL TREE:  No pulmonary embolus is seen       LUNGS:  6 mm right middle lobe pulmonary nodule is noted series 3 image 68     9 mm left lower lobe pulmonary nodules noted series 3 image 66   4 mm inferior left lower lobe pulmonary nodules noted series 3 image 81        Minimal linear atelectasis is present at the left lung base  Scattered calcified granulomas are present  There is no tracheal or endobronchial lesion      PLEURA:  Unremarkable      HEART/GREAT VESSELS:  Unremarkable for patient's age      MEDIASTINUM AND VANESSA:  Unremarkable      CHEST WALL AND LOWER NECK:   Unremarkable      VISUALIZED STRUCTURES IN THE UPPER ABDOMEN:  Unremarkable      OSSEOUS STRUCTURES:  No acute fracture or destructive osseous lesion      IMPRESSION:     No evidence of pulmonary embolus      Pulmonary nodules measuring up to 9 mm  Based on current Fleischner Society 2017 Guidelines on incidental pulmonary nodule, followup non-contrast CT is recommended at 3-6 months from the initial examination and, if stable at that time, an additional   followup is recommended for 18-24 months from the initial examination

## 2021-03-10 NOTE — PROGRESS NOTES
[facility] [address]  Progress Note Derek Lynn 1945, 66 y o  male MRN: [id number]  Unit/Bed#: 321-84 Encounter: [id number]  Primary Care Provider: Cesar Kiran MD   Date and time admitted to hospital: 3/8/2021  2:30 PM    * Cellulitis of right lower extremity  Assessment & Plan  · Associated with an open wound of the right lower extremity  · The patient was seen in consultation by Podiatry, who completed a bedside debridement on 03/09/2021 of the right anterior shin blister  · Continue broad-spectrum antibiotics with IV cefepime and IV vancomycin for now  · Follow culture results      Open wound of right lower extremity  Assessment & Plan  · Associated with cellulitis of the right lower extremity  · The patient was seen in consultation by Podiatry, who completed a bedside debridement on 03/09/2021 of the right anterior shin blister  · Continue broad-spectrum antibiotics with IV cefepime and IV vancomycin for now  · Follow culture results    Accelerated hypertension  Assessment & Plan  · Continue coreg 12 5 mg PO BID  · Increase lisinopril to 10 mg PO BID with persistently elevated blood pressure readings  · Utilize PRN IV labetalol  · Follow the blood pressure trend    Gait instability  Assessment & Plan  · PT/OT  · The patient may require short-term rehabilitation SNF placement upon discharge  Multiple pulmonary nodules  Assessment & Plan  · Outpatient Pulmonology evaluation  · Outpatient surveillance imaging with PCP    Microscopic hematuria  Assessment & Plan  · Check a urine culture  · Outpatient Urology evaluation    Pyuria  Assessment & Plan  · Check a urine culture    Elevated parathyroid hormone  Assessment & Plan  · Outpatient Endocrinology evaluation    Results for Kavya Padron (MRN [id number]) as of 3/10/2021 13:41   Ref   Range 3/9/2021 06:01   PARATHYROID HORMONE Latest Ref Range: 18 4 - 80 1 pg/mL 130 4 (H)       Elevated d-dimer  Assessment & Plan  VAS lower limb venous duplex study, complete bilateral  Status: Final result   PACS Images     Show images for VAS lower limb venous duplex study, complete bilateral   Study Result       [facility] REPORT  CLINICAL:  Indications: Patient has been experiencing constant right lower leg pain for  1-2 months  He takes a daily 81mg aspirin  Risk Factors  The patient has history of Obesity, HTN, IDDM, CAD, stroke, CKD, diastolic CHF  and previous smoking (quit 1-5yrs ago)  He has no history of DVT  FINDINGS:     Segment  Right            Left                Impression       Impression         CFV      Normal (Patent)  Normal (Patent)             CONCLUSION:     Impression:  RIGHT LOWER LIMB:  No gross evidence of acute or chronic deep vein thrombosis  No gross evidence of superficial thrombophlebitis noted  Doppler evaluation shows a normal response to augmentation maneuvers  Popliteal, posterior tibial and anterior tibial arterial Doppler waveforms are  triphasic  Limited visualization of calf veins  LEFT LOWER LIMB:  No gross evidence of acute or chronic deep vein thrombosis  No gross evidence of superficial thrombophlebitis noted  Doppler evaluation shows a normal response to augmentation maneuvers  Popliteal, posterior tibial and anterior tibial arterial Doppler waveforms are  triphasic  Limited visualization of calf veins  Technically difficult/limited study due to body habitus       SIGNATURE:  Electronically Signed by: Oliva Cruz on 2019-03-03 09:12:09 PM     CTA chest pe study  Status: Edited Result - FINAL   PACS Images     Show images for CTA chest pe study   Study Result    CTA - CHEST WITH IV CONTRAST - PULMONARY ANGIOGRAM     INDICATION:   Shortness of breath  PE suspected, intermediate prob, positive D-dimer  PE suspected, shortness of breath, elevated D-dimer      COMPARISON: None      TECHNIQUE: CTA examination of the chest was performed using angiographic technique according to a protocol specifically tailored to evaluate for pulmonary embolism  Axial, sagittal, and coronal 2D reformatted images were created from the source data and   submitted for interpretation  In addition, coronal 3D MIP postprocessing was performed on the acquisition scanner        Radiation dose length product (DLP) for this visit:  491 17 mGy-cm   This examination, like all CT scans performed in the [facility], was performed utilizing techniques to minimize radiation dose exposure, including the use of iterative   reconstruction and automated exposure control      IV Contrast:  100 mL of iohexol (OMNIPAQUE)     FINDINGS:     PULMONARY ARTERIAL TREE:  No pulmonary embolus is seen       LUNGS:  6 mm right middle lobe pulmonary nodule is noted series 3 image 68     9 mm left lower lobe pulmonary nodules noted series 3 image 66   4 mm inferior left lower lobe pulmonary nodules noted series 3 image 81        Minimal linear atelectasis is present at the left lung base  Scattered calcified granulomas are present  There is no tracheal or endobronchial lesion      PLEURA:  Unremarkable      HEART/GREAT VESSELS:  Unremarkable for patient's age      MEDIASTINUM AND VANESSA:  Unremarkable      CHEST WALL AND LOWER NECK:   Unremarkable      VISUALIZED STRUCTURES IN THE UPPER ABDOMEN:  Unremarkable      OSSEOUS STRUCTURES:  No acute fracture or destructive osseous lesion      IMPRESSION:     No evidence of pulmonary embolus      Pulmonary nodules measuring up to 9 mm  Based on current Fleischner Society 2017 Guidelines on incidental pulmonary nodule, followup non-contrast CT is recommended at 3-6 months from the initial examination and, if stable at that time, an additional   followup is recommended for 18-24 months from the initial examination           Elevated alkaline phosphatase level  Assessment & Plan  · Secondary to unclear etiology  · Possibly secondary to immobility  · Follow the total alkaline phosphatase level    Hypercalcemia  Assessment & Plan  · Mild hypercalcemia with an elevated intact-PTH level  · Possible primary hyperparathyroidism  · Follow the ionized calcium level  · Outpatient Endocrinology evaluation    Type 2 diabetes mellitus with hyperglycemia, with long-term current use of insulin [facility])  Assessment & Plan  Lab Results   Component Value Date    HGBA1C 7 0 (H) 03/09/2021       Recent Labs     03/09/21  1623 03/09/21  2110 03/10/21  0737 03/10/21  1123   POCGLU 177* 191* 156* 198*       Blood Sugar Average: Last 72 hrs:  (P) 166 [id number]     · Continue lantus 10 Units SQ QHS  · Add humalog 3 Units TID with meals  · Continue lisinopril for renal protection  · Hypoglycemia protocol  · Insulin sliding scale with blood glucose monitoring ACHS    Morbid obesity with BMI of 45 0-49 9, adult (HCC)  Assessment & Plan  · Lifestyle modifications are recommended including weight loss, improving his diet, and increasing his amount of activity  Obstructive sleep apnea syndrome  Assessment & Plan  · Utilize CPAP QHS and anytime while sleeping    Dyspnea on exertion  Assessment & Plan  · The patient was seen in consultation by Pulmonology  · The patient will need an ambulatory pulse oximetry check on room air on the day of discharge to see if he qualifies for supplemental oxygen  · Needs outpatient pulmonary function tests and completed by his outpatient Pulmonologist  · Will also need a repeat outpatient sleep study completed per Pulmonology's recommendations        VTE Pharmacologic Prophylaxis:   Pharmacologic: Enoxaparin (Lovenox) 40 mg SQ every 12 hours (dose based on the patient's BMI)  Mechanical VTE Prophylaxis in Place: Yes on the left lower extremity only  No SCD on the right lower extremity with an open wound and cellulitis of the right anterior shin region  Patient Centered Rounds: I have performed bedside rounds with nursing staff today      Discussions with Specialists or Other Care Team Provider: I discussed the case with Dr Salma Talley (Podiatry)  Time Spent for Care: 30 minutes  More than 50% of total time spent on counseling and coordination of care as described above  Current Length of Stay: 2 day(s)    Current Patient Status: Inpatient   Certification Statement: The patient will continue to require additional inpatient hospital stay due to the need for IV antibiotic treatment and possibly requiring short-term rehabilitation SNF placement upon discharge  Code Status: Level 1 - Full Code      Subjective: The patient was seen and examined  The patient continues to complain of pain involving the right lower extremity  No chest pain  No shortness of breath  No abdominal pain  No nausea or vomiting  Objective:     Vitals:   Temp (24hrs), Av 6 °F (36 4 °C), Min:97 4 °F (36 3 °C), Max:97 7 °F (36 5 °C)    Temp:  [97 4 °F (36 3 °C)-97 7 °F (36 5 °C)] 97 7 °F (36 5 °C)  HR:  [73-83] 77  Resp:  [18-21] 18  BP: (114-171)/(74-90) 171/89  SpO2:  [90 %-95 %] 95 %  Body mass index is 48 43 kg/m²  Input and Output Summary (last 24 hours):        Intake/Output Summary (Last 24 hours) at 3/10/2021 1406  Last data filed at 3/10/2021 1227  Gross per 24 hour   Intake 720 ml   Output 250 ml   Net 470 ml       Physical Exam:     Physical Exam  General:  NAD, follows commands  HEENT:  NC/AT, mucous membranes moist  Neck:  Supple, No JVP elevation  CV:  + S1, + S2, RRR  Pulm:  Lung fields are CTA bilaterally  Abd:  Soft, Non-tender, Non-distended  Ext:  No clubbing/cyanosis, Edema of the bilateral lower extremities  Skin:  Open wound of the right anterior shin region with surrounding erythema  Neuro:  Awake, alert, oriented  Psych:  Normal mood and affect      Additional Data:    Labs:    Results from last 7 days   Lab Units 03/10/21  0437   WBC Thousand/uL 8 22   HEMOGLOBIN g/dL 11 4*   HEMATOCRIT % 38 1   PLATELETS Thousands/uL 236   NEUTROS PCT % 64   LYMPHS PCT % 19 MONOS PCT % 9   EOS PCT % 6     Results from last 7 days   Lab Units 03/10/21  0437   SODIUM mmol/L 140   POTASSIUM mmol/L 4 6   CHLORIDE mmol/L 105   CO2 mmol/L 28   BUN mg/dL 29*   CREATININE mg/dL 1 35*   ANION GAP mmol/L 7   CALCIUM mg/dL 9 4   ALBUMIN g/dL 3 0*   TOTAL BILIRUBIN mg/dL 0 50   ALK PHOS U/L 144*   ALT U/L 23   AST U/L 17   GLUCOSE RANDOM mg/dL 158*     Results from last 7 days   Lab Units 03/08/21  1547   INR  1 08     Results from last 7 days   Lab Units 03/10/21  1123 03/10/21  0737 03/09/21  2110 03/09/21  1623 03/09/21  1152 03/09/21  0830 03/08/21  2215   POC GLUCOSE mg/dl 198* 156* 191* 177* 160* 146* 139     Results from last 7 days   Lab Units 03/09/21  0601   HEMOGLOBIN A1C % 7 0*     Results from last 7 days   Lab Units 03/10/21  0437 03/09/21  0601 03/08/21  2216 03/08/21  1547   LACTIC ACID mmol/L  --  1 1  --  1 7   PROCALCITONIN ng/ml 0 07  --  0 05  0 06 <0 05           * I Have Reviewed All Lab Data Listed Above  * Additional Pertinent Lab Tests Reviewed: Amanda 64 Admission Reviewed      Recent Cultures (last 7 days):     Results from last 7 days   Lab Units 03/08/21  1658 03/08/21  1547   BLOOD CULTURE  No Growth at 24 hrs  No Growth at 24 hrs         Last 24 Hours Medication List:   Current Facility-Administered Medications   Medication Dose Route Frequency Provider Last Rate    acetaminophen  650 mg Oral Q6H PRN Efren Sands, DO      aspirin  81 mg Oral Daily Efren Sands, DO      atorvastatin  40 mg Oral After [employer] Kath[employer], DO      carvedilol  12 5 mg Oral BID Efren Sands, DO      cefepime  2,000 mg Intravenous Q12H Efren Sands, DO 2,000 mg (03/10/21 0505)    cholecalciferol  2,000 Units Oral Daily Efren Sands, DO      dextrose  25 mL Intravenous Daily PRN Efren Sands, DO      enoxaparin  40 mg Subcutaneous Q12H [facility], DO      gabapentin  300 mg Oral BID Efren Sands, DO      insulin glargine  10 Units Subcutaneous HS [employer], DO      insulin lispro  1-6 Units Subcutaneous HS [employer], DO      insulin lispro  2-12 Units Subcutaneous TID AC [employer], DO      insulin lispro  3 Units Subcutaneous TID With Meals [employer], DO      Labetalol HCl  10 mg Intravenous Q4H PRN [employer], DO      lisinopril  10 mg Oral BID [employer], DO      oxyCODONE  5 mg Oral Q4H PRN [employer], DO      Or    oxyCODONE  10 mg Oral Q4H PRN [employer], DO      tamsulosin  0 4 mg Oral QPM [employer], DO      vancomycin  10 mg/kg Intravenous Q12H [employer], DO 1,250 mg (03/10/21 9733)        Today, Patient Was Seen By: Erick Loza DO    ** Please Note: Dictation voice to text software may have been used in the creation of this document   **

## 2021-03-10 NOTE — ASSESSMENT & PLAN NOTE
· Mild hypercalcemia with an elevated intact-PTH level  · Possible primary hyperparathyroidism  · Follow the ionized calcium level  · Outpatient Endocrinology evaluation

## 2021-03-10 NOTE — UTILIZATION REVIEW
Notification of Inpatient Admission/Inpatient Authorization Request   This is a Notification of Inpatient Admission for [facility] [address]  Be advised that this patient was admitted to our facility under Inpatient Status  Contact Quentin Rinaldi at [phone] for additional admission information  [facility]  DEDICATED -[phone]  Patient Name:   Morgan Vargas   YOB: 1945       [address]   [address]:   [address]  Tax ID: [id number]  NPI: [id number] Attending Provider/NPI:  Phone:  Address: Genie Quintanilla See [[id number]]  [phone]  Same as Facility   Place of Service Code: 24 Place of Service Name:  [address] [place] [address]   Start Date: 3/8/21 1659 Discharge Date & Time: No discharge date for patient encounter  Type of Admission: Inpatient Status Discharge Disposition   (if discharged): Home/Self Care   Patient Diagnoses: Cellulitis [L03 90]  Dyspnea on exertion [R06 00]  Wound check, abscess [Z51 89]  Cellulitis of right lower extremity [M12 181]  Open wound of right lower extremity, initial encounter Kirti Rivera     Orders: Admission Orders (From admission, onward)     Ordered        03/08/21 1659  Inpatient Admission  Once                    Assigned Utilization Review Contact: Saavedra Bye  Utilization   Network Utilization Review Department  Phone: [phone]; Fax [phone]  Email: [email or web address]   ATTENTION PAYERS: Please call the assigned Utilization  directly with any questions or concerns ALL voicemails in the department are confidential  Send all requests for admission clinical reviews, approved or denied determinations and any other requests to dedicated fax number belonging to the campus where the patient is receiving treatment

## 2021-03-10 NOTE — ASSESSMENT & PLAN NOTE
· Associated with an open wound of the right lower extremity  · The patient was seen in consultation by Podiatry, who completed a bedside debridement on 03/09/2021 of the right anterior shin blister    · Continue broad-spectrum antibiotics with IV cefepime and IV vancomycin for now  · Follow culture results

## 2021-03-11 ENCOUNTER — TELEPHONE (OUTPATIENT)
Dept: UROLOGY | Facility: CLINIC | Age: 76
End: 2021-03-11

## 2021-03-11 LAB
ALBUMIN SERPL BCP-MCNC: 2.8 G/DL (ref 3.5–5)
ALP SERPL-CCNC: 149 U/L (ref 46–116)
ALT SERPL W P-5'-P-CCNC: 28 U/L (ref 12–78)
ANION GAP SERPL CALCULATED.3IONS-SCNC: 3 MMOL/L (ref 4–13)
AST SERPL W P-5'-P-CCNC: 12 U/L (ref 5–45)
BACTERIA UR CULT: NORMAL
BASOPHILS # BLD AUTO: 0.05 THOUSANDS/ΜL (ref 0–0.1)
BASOPHILS NFR BLD AUTO: 1 % (ref 0–1)
BILIRUB SERPL-MCNC: 0.4 MG/DL (ref 0.2–1)
BUN SERPL-MCNC: 31 MG/DL (ref 5–25)
CA-I BLD-SCNC: 1.22 MMOL/L (ref 1.12–1.32)
CALCIUM ALBUM COR SERPL-MCNC: 10.2 MG/DL (ref 8.3–10.1)
CALCIUM SERPL-MCNC: 9.2 MG/DL (ref 8.3–10.1)
CHLORIDE SERPL-SCNC: 103 MMOL/L (ref 100–108)
CO2 SERPL-SCNC: 31 MMOL/L (ref 21–32)
CREAT SERPL-MCNC: 1.49 MG/DL (ref 0.6–1.3)
EOSINOPHIL # BLD AUTO: 0.48 THOUSAND/ΜL (ref 0–0.61)
EOSINOPHIL NFR BLD AUTO: 6 % (ref 0–6)
ERYTHROCYTE [DISTWIDTH] IN BLOOD BY AUTOMATED COUNT: 14.3 % (ref 11.6–15.1)
GFR SERPL CREATININE-BSD FRML MDRD: 45 ML/MIN/1.73SQ M
GLUCOSE SERPL-MCNC: 178 MG/DL (ref 65–140)
GLUCOSE SERPL-MCNC: 203 MG/DL (ref 65–140)
GLUCOSE SERPL-MCNC: 206 MG/DL (ref 65–140)
GLUCOSE SERPL-MCNC: 212 MG/DL (ref 65–140)
GLUCOSE SERPL-MCNC: 214 MG/DL (ref 65–140)
GLUCOSE SERPL-MCNC: 242 MG/DL (ref 65–140)
HCT VFR BLD AUTO: 36.9 % (ref 36.5–49.3)
HGB BLD-MCNC: 11.4 G/DL (ref 12–17)
IMM GRANULOCYTES # BLD AUTO: 0.04 THOUSAND/UL (ref 0–0.2)
IMM GRANULOCYTES NFR BLD AUTO: 1 % (ref 0–2)
LYMPHOCYTES # BLD AUTO: 1.71 THOUSANDS/ΜL (ref 0.6–4.47)
LYMPHOCYTES NFR BLD AUTO: 23 % (ref 14–44)
MAGNESIUM SERPL-MCNC: 1.9 MG/DL (ref 1.6–2.6)
MCH RBC QN AUTO: 27.4 PG (ref 26.8–34.3)
MCHC RBC AUTO-ENTMCNC: 30.9 G/DL (ref 31.4–37.4)
MCV RBC AUTO: 89 FL (ref 82–98)
MONOCYTES # BLD AUTO: 0.66 THOUSAND/ΜL (ref 0.17–1.22)
MONOCYTES NFR BLD AUTO: 9 % (ref 4–12)
NEUTROPHILS # BLD AUTO: 4.64 THOUSANDS/ΜL (ref 1.85–7.62)
NEUTS SEG NFR BLD AUTO: 60 % (ref 43–75)
NRBC BLD AUTO-RTO: 0 /100 WBCS
PHOSPHATE SERPL-MCNC: 3.2 MG/DL (ref 2.3–4.1)
PLATELET # BLD AUTO: 218 THOUSANDS/UL (ref 149–390)
PMV BLD AUTO: 10 FL (ref 8.9–12.7)
POTASSIUM SERPL-SCNC: 4.3 MMOL/L (ref 3.5–5.3)
PROCALCITONIN SERPL-MCNC: <0.05 NG/ML
PROT SERPL-MCNC: 7.5 G/DL (ref 6.4–8.2)
RBC # BLD AUTO: 4.16 MILLION/UL (ref 3.88–5.62)
SODIUM SERPL-SCNC: 137 MMOL/L (ref 136–145)
WBC # BLD AUTO: 7.58 THOUSAND/UL (ref 4.31–10.16)

## 2021-03-11 PROCEDURE — 82330 ASSAY OF CALCIUM: CPT | Performed by: INTERNAL MEDICINE

## 2021-03-11 PROCEDURE — 84145 PROCALCITONIN (PCT): CPT | Performed by: INTERNAL MEDICINE

## 2021-03-11 PROCEDURE — 84100 ASSAY OF PHOSPHORUS: CPT | Performed by: INTERNAL MEDICINE

## 2021-03-11 PROCEDURE — 83735 ASSAY OF MAGNESIUM: CPT | Performed by: INTERNAL MEDICINE

## 2021-03-11 PROCEDURE — 97116 GAIT TRAINING THERAPY: CPT

## 2021-03-11 PROCEDURE — 82948 REAGENT STRIP/BLOOD GLUCOSE: CPT

## 2021-03-11 PROCEDURE — 97167 OT EVAL HIGH COMPLEX 60 MIN: CPT

## 2021-03-11 PROCEDURE — 99239 HOSP IP/OBS DSCHRG MGMT >30: CPT | Performed by: INTERNAL MEDICINE

## 2021-03-11 PROCEDURE — 80053 COMPREHEN METABOLIC PANEL: CPT | Performed by: INTERNAL MEDICINE

## 2021-03-11 PROCEDURE — 85025 COMPLETE CBC W/AUTO DIFF WBC: CPT | Performed by: INTERNAL MEDICINE

## 2021-03-11 PROCEDURE — 99232 SBSQ HOSP IP/OBS MODERATE 35: CPT | Performed by: INTERNAL MEDICINE

## 2021-03-11 RX ORDER — INSULIN GLARGINE 100 [IU]/ML
15 INJECTION, SOLUTION SUBCUTANEOUS
Status: DISCONTINUED | OUTPATIENT
Start: 2021-03-11 | End: 2021-03-12 | Stop reason: HOSPADM

## 2021-03-11 RX ADMIN — CARVEDILOL 12.5 MG: 12.5 TABLET, FILM COATED ORAL at 10:06

## 2021-03-11 RX ADMIN — INSULIN GLARGINE 15 UNITS: 100 INJECTION, SOLUTION SUBCUTANEOUS at 22:18

## 2021-03-11 RX ADMIN — VANCOMYCIN HYDROCHLORIDE 1250 MG: 5 INJECTION, POWDER, LYOPHILIZED, FOR SOLUTION INTRAVENOUS at 07:33

## 2021-03-11 RX ADMIN — CARVEDILOL 12.5 MG: 12.5 TABLET, FILM COATED ORAL at 18:36

## 2021-03-11 RX ADMIN — GABAPENTIN 300 MG: 300 CAPSULE ORAL at 10:05

## 2021-03-11 RX ADMIN — OXYCODONE HYDROCHLORIDE 10 MG: 10 TABLET ORAL at 22:17

## 2021-03-11 RX ADMIN — INSULIN LISPRO 3 UNITS: 100 INJECTION, SOLUTION INTRAVENOUS; SUBCUTANEOUS at 11:45

## 2021-03-11 RX ADMIN — INSULIN LISPRO 4 UNITS: 100 INJECTION, SOLUTION INTRAVENOUS; SUBCUTANEOUS at 16:57

## 2021-03-11 RX ADMIN — INSULIN LISPRO 1 UNITS: 100 INJECTION, SOLUTION INTRAVENOUS; SUBCUTANEOUS at 22:18

## 2021-03-11 RX ADMIN — GABAPENTIN 300 MG: 300 CAPSULE ORAL at 18:37

## 2021-03-11 RX ADMIN — CEFEPIME HYDROCHLORIDE 2000 MG: 2 INJECTION, SOLUTION INTRAVENOUS at 18:37

## 2021-03-11 RX ADMIN — LABETALOL HYDROCHLORIDE 10 MG: 5 INJECTION, SOLUTION INTRAVENOUS at 18:51

## 2021-03-11 RX ADMIN — TAMSULOSIN HYDROCHLORIDE 0.4 MG: 0.4 CAPSULE ORAL at 18:36

## 2021-03-11 RX ADMIN — INSULIN LISPRO 3 UNITS: 100 INJECTION, SOLUTION INTRAVENOUS; SUBCUTANEOUS at 07:39

## 2021-03-11 RX ADMIN — ENOXAPARIN SODIUM 40 MG: 40 INJECTION SUBCUTANEOUS at 22:32

## 2021-03-11 RX ADMIN — LISINOPRIL 10 MG: 10 TABLET ORAL at 22:16

## 2021-03-11 RX ADMIN — ATORVASTATIN CALCIUM 40 MG: 40 TABLET, FILM COATED ORAL at 18:36

## 2021-03-11 RX ADMIN — LISINOPRIL 10 MG: 10 TABLET ORAL at 10:06

## 2021-03-11 RX ADMIN — VANCOMYCIN HYDROCHLORIDE 1250 MG: 5 INJECTION, POWDER, LYOPHILIZED, FOR SOLUTION INTRAVENOUS at 22:26

## 2021-03-11 RX ADMIN — OXYCODONE HYDROCHLORIDE 10 MG: 10 TABLET ORAL at 05:41

## 2021-03-11 RX ADMIN — Medication 2000 UNITS: at 10:05

## 2021-03-11 RX ADMIN — INSULIN LISPRO 4 UNITS: 100 INJECTION, SOLUTION INTRAVENOUS; SUBCUTANEOUS at 11:45

## 2021-03-11 RX ADMIN — ENOXAPARIN SODIUM 40 MG: 40 INJECTION SUBCUTANEOUS at 10:06

## 2021-03-11 RX ADMIN — INSULIN LISPRO 4 UNITS: 100 INJECTION, SOLUTION INTRAVENOUS; SUBCUTANEOUS at 07:38

## 2021-03-11 RX ADMIN — CEFEPIME HYDROCHLORIDE 2000 MG: 2 INJECTION, SOLUTION INTRAVENOUS at 05:38

## 2021-03-11 RX ADMIN — ASPIRIN 81 MG 81 MG: 81 TABLET ORAL at 10:06

## 2021-03-11 NOTE — ASSESSMENT & PLAN NOTE
Wt Readings from Last 3 Encounters:   03/08/21 (!) 136 kg (300 lb 0 7 oz)   12/22/20 135 kg (298 lb 1 oz)   02/07/20 133 kg (292 lb 15 9 oz)         Chronic diastolic chf, due to htn, a/e/b DC summary dated 7/3/19,  echo 3/1/19 with grade 1 diastolic dysfunction,  treated with monitoring for fluid overload, Coreg, echo

## 2021-03-11 NOTE — ASSESSMENT & PLAN NOTE
· Outpatient Endocrinology evaluation    Results for Tea Mulligan (MRN [id number]) as of 3/10/2021 13:41   Ref   Range 3/9/2021 06:01   PARATHYROID HORMONE Latest Ref Range: 18 4 - 80 1 pg/mL 130 4 (H)

## 2021-03-11 NOTE — OCCUPATIONAL THERAPY NOTE
Occupational Therapy Evaluation     Patient Name: Keven Oglesby  Today's Date: 3/11/2021  Problem List  Principal Problem:    Cellulitis of right lower extremity  Active Problems:    Dyspnea on exertion    Obstructive sleep apnea syndrome    Morbid obesity with BMI of 45 0-49 9, adult ([facility] )    Type 2 diabetes mellitus with hyperglycemia, with long-term current use of insulin ([facility])    Accelerated hypertension    Hypercalcemia    Open wound of right lower extremity    Elevated alkaline phosphatase level    Elevated d-dimer    Elevated parathyroid hormone    Pyuria    Microscopic hematuria    Multiple pulmonary nodules    Gait instability    Abnormal EKG    Past Medical History  Past Medical History:   Diagnosis Date    Cataract     CHF (congestive heart failure) ([facility])     chronic diastolic CHF    Coronary artery disease     Diabetes mellitus ([facility] )     Glaucoma     Hyperlipidemia     Hypertension     Neuropathy     Obesity     Renal disorder     chronic kidney disease stage 3     Sleep apnea     Stroke ([facility] )     TIA (transient ischemic attack)     Uncontrolled type 2 diabetes mellitus with hyperglycemia, with long-term current use of insulin ([facility] ) 10/4/2018     Past Surgical History  Past Surgical History:   Procedure Laterality Date    APPENDECTOMY      CARPAL TUNNEL RELEASE      EYE SURGERY      cataracts             03/11/21 0851   OT Last Visit   OT Visit Date 03/11/21   Note Type   Note type Evaluation   Restrictions/Precautions   Weight Bearing Precautions Per Order No   Other Precautions Multiple lines;Telemetry; Fall Risk;Pain   Pain Assessment   Pain Assessment Tool 0-10   Pain Score 8   Pain Location/Orientation Orientation: Left;Orientation: Right;Location: Leg   Home Living   Type of [address] [place] [address] Two level;Performs ADLs on one level; Able to live on main level with bedroom/bathroom;1/2 bath on main level   Bathroom Shower/Tub None  (sponge bathes)   Bathroom Toilet Standard Bathroom Equipment Grab bars in shower;Commode   [address] Walker;Cane;Reacher;Long-handled shoehorn;Grab bars   Additional Comments pt performs functional mobility with RW at baseline    Prior Function   Level of [place] Needs assistance with ADLs and functional mobility; Needs assistance with IADLs   Lives With Spouse; Son   Receives Help From Family   ADL Assistance Needs assistance   IADLs Needs assistance   Falls in the last 6 months 0   Vocational Retired   Comments pt does not drive   Psychosocial   Psychosocial (WDL) WDL   Subjective   Subjective "I need the gown on the walker"   ADL   Where Assessed Other (Comment)  (bathroom)   Grooming Assistance   (declines hand hygiene )   LB Dressing Assistance 4  Minimal Assistance   LB Dressing Deficit Pull up over hips   Toileting Assistance  4  Minimal Assistance   Toileting Deficit Clothing management up;Clothing management down   Additional Comments pt requires increased time and (A) to perform LB dressing and toileting following bowel movement   Bed Mobility   Additional Comments pt in bathroom and start of session and in chair at end of session; pt on RA with no complaints of SOB during session   Transfers   Sit to Stand 5  Supervision   Additional items Increased time required;Verbal cues  (RW)   Stand to Sit 5  Supervision   Additional items Increased time required;Verbal cues  (RW)   Toilet transfer 5  Supervision   Additional items Increased time required;Standard toilet  (RW)   Additional Comments pt performed functional transfers with RW and no significant LOB   Functional Mobility   Functional Mobility 5  Supervision   Additional Comments pt performed functional mobility with RW and no LOB; mild instability and performed ~20ft to chair this date from bathroom   Additional items Rolling walker   Balance   Static Sitting Good   Dynamic Sitting [address]   Ambulatory Fair -   Activity Tolerance   Activity Tolerance Patient limited by fatigue;Patient limited by pain   RUE Assessment   RUE Assessment WFL   LUE Assessment   LUE Assessment WFL   Hand Function   Gross Motor Coordination Functional   Fine Motor Coordination Functional   Sensation   Light Touch No apparent deficits   Sharp/Dull No apparent deficits   Cognition   Overall Cognitive Status WFL   Arousal/Participation Alert   Attention Within functional limits   Orientation Level Oriented X4   Memory Within functional limits   Following Commands Follows all commands and directions without difficulty   Assessment   Limitation Decreased ADL status; Decreased UE strength;Decreased Safe judgement during ADL;Decreased endurance;Decreased self-care trans;Decreased high-level ADLs   Assessment  Pt is a 66 y o  male seen for OT evaluation s/p admit to [facility] on 3/8/2021 w/ Cellulitis of right lower extremity  Comorbidities affecting pt's functional performance at time of assessment include: HTN, CVA, renal disorder, CHF, sleep apnea, DM, CAD, TIA, neuropathy, glaucoma  Personal factors affecting pt at time of IE include:difficulty performing ADLS, difficulty performing IADLS , limited insight into deficits, compliance, flat affect, decreased initiation and engagement  and health management   Prior to admission, pt was (A) with ADLs and IADLs with use of RW during mobility  Upon evaluation: Pt requires (A)-min (A) level with use of RW during mobility 2* the following deficits impacting occupational performance: weakness, decreased strength, decreased balance, decreased tolerance, impaired initiation, impaired problem solving, impulsivity, decreased safety awareness and increased pain  Pt to benefit from continued skilled OT tx while in the hospital to address deficits as defined above and maximize level of functional independence w ADL's and functional mobility   Occupational Performance areas to address include: grooming, bathing/shower, toilet hygiene, dressing, functional mobility, community mobility and clothing management  From OT standpoint, recommendation at time of d/c would be home with home health services  Pt's raw score on the AM-PAC Daily Activity inpatient short form is 17, standardized score is 37 26, less than 39 4  Patients at this level are likely to benefit from DC to post-acute rehab services, however, please refer to therapist recommendation for safe DC planning   Goals   Patient Goals to go home    Short Term Goal  pt will perform UE strengthening exercises    Long Term Goal #1 pt will demonstrate toliet transfers and hygiene at (I) level    Long Term Goal #2 pt will demonstrate UB bathing and grooming tasks at (I) level    Long Term Goal pt will demonstrate functional mobility with RW at mod (I) level    Plan   Treatment Interventions ADL retraining;Functional transfer training;UE strengthening/ROM; Endurance training;Patient/family training;Equipment evaluation/education; Activityengagement   Goal Expiration Date 03/25/21   OT Frequency 3-5x/wk   Recommendation   OT Discharge Recommendation Home with skilled therapy   [facility] Daily Activity Inpatient   Lower Body Dressing 2   Bathing 2   Toileting 3   Upper Body Dressing 3   Grooming 3   Eating 4   Daily Activity Raw Score 17   Daily Activity Standardized Score (Calc for Raw Score >=11) 37 26   AM-[facility] Applied Cognition Inpatient   Following a Speech/Presentation 4   Understanding Ordinary Conversation 4   Taking Medications 3   Remembering Where Things Are Placed or Put Away 3   Remembering List of 4-5 Errands 3   Taking Care of Complicated Tasks 3   Applied Cognition Raw Score 20   Applied Cognition Standardized Score 41 76     Pt will benefit from continued OT services in order to maximize (I) c ADL performance, FM c RW, and improve overall endurance/strength required to complete functional tasks in preparation for d/c      Pt left seated in chair at end of session; all needs within reach; all lines intact

## 2021-03-11 NOTE — CASE MANAGEMENT
A [facility]  Discussed Freedom of Choice with patient  List of agencies given to patient via in person  patient aware the list is custom filtered for them by preference  and that [facility]'s post acute providers are designated  [facility], awaiting for return call

## 2021-03-11 NOTE — ASSESSMENT & PLAN NOTE
· Outpatient Cardiology evaluation    ECG 12 lead  Order: [id number]  Status:  Final result   Visible to patient:  No (inaccessible in [employer])   Next appt:  03/29/2021 at 01:40 PM in Pulmonology ([address] [place] [address])   Ref Range & Units 3/8/21 1553   Ventricular Rate BPM 88    Atrial Rate BPM 91    HI Interval ms    QRSD Interval ms 146    QT Interval ms 400    QTC Interval ms 484    P Axis degrees    QRS Axis degrees -88    T Wave Axis degrees 50       Narrative & Impression    Sinus rhythm with 1st degree A-V block  Left axis deviation  Right bundle branch block  Abnormal ECG  Confirmed by Pawan Marcano ([id number]) on 3/9/2021 4:20:21 PM      Specimen Collected: 03/08/21 15:53   Last Resulted: 03/09/21 16:20

## 2021-03-11 NOTE — DISCHARGE SUMMARY
[address]  Discharge- Gabo Parker 1945, 66 y o  male MRN: [id number]  Unit/Bed#: 408-01 Encounter: [id number]  Primary Care Provider: Eri Bhakta MD   Date and time admitted to hospital: 3/8/2021  2:30 PM    Abnormal EKG  Assessment & Plan  · Outpatient Cardiology evaluation    ECG 12 lead  Order: [id number]  Status:  Final result   Visible to patient:  No (inaccessible in [address])   Next appt:  03/29/2021 at 01:40 PM in Pulmonology ([address] [place] [address])   Ref Range & Units 3/8/21 1553   Ventricular Rate BPM 88    Atrial Rate BPM 91    MT Interval ms    QRSD Interval ms 146    QT Interval ms 400    QTC Interval ms 484    P Axis degrees    QRS Axis degrees -88    T Wave Axis degrees 50       Narrative & Impression    Sinus rhythm with 1st degree A-V block  Left axis deviation  Right bundle branch block  Abnormal ECG  Confirmed by Mayra Route ([id number]) on 3/9/2021 4:20:21 PM      Specimen Collected: 03/08/21 15:53   Last Resulted: 03/09/21 16:20                  Gait instability  Assessment & Plan  · PT/OT  · The patient may require short-term rehabilitation SNF placement upon discharge  Multiple pulmonary nodules  Assessment & Plan  · Outpatient Pulmonology evaluation  · Outpatient surveillance imaging with PCP    Microscopic hematuria  Assessment & Plan  · Check a urine culture  · Outpatient Urology evaluation    Pyuria  Assessment & Plan  · Check a urine culture    Elevated parathyroid hormone  Assessment & Plan  · Outpatient Endocrinology evaluation    Results for Tremaine Jerez (MRN [id number]) as of 3/10/2021 13:41   Ref   Range 3/9/2021 06:01   PARATHYROID HORMONE Latest Ref Range: 18 4 - 80 1 pg/mL 130 4 (H)       Elevated d-dimer  Assessment & Plan  VAS lower limb venous duplex study, complete bilateral  Status: Final result   PACS Images     Show images for VAS lower limb venous duplex study, complete bilateral   Study Result       [facility] REPORT  CLINICAL:  Indications: Patient has been experiencing constant right lower leg pain for  1-2 months  He takes a daily 81mg aspirin  Risk Factors  The patient has history of Obesity, HTN, IDDM, CAD, stroke, CKD, diastolic CHF  and previous smoking (quit 1-5yrs ago)  He has no history of DVT  FINDINGS:     Segment  Right            Left                Impression       Impression         CFV      Normal (Patent)  Normal (Patent)             CONCLUSION:     Impression:  RIGHT LOWER LIMB:  No gross evidence of acute or chronic deep vein thrombosis  No gross evidence of superficial thrombophlebitis noted  Doppler evaluation shows a normal response to augmentation maneuvers  Popliteal, posterior tibial and anterior tibial arterial Doppler waveforms are  triphasic  Limited visualization of calf veins  LEFT LOWER LIMB:  No gross evidence of acute or chronic deep vein thrombosis  No gross evidence of superficial thrombophlebitis noted  Doppler evaluation shows a normal response to augmentation maneuvers  Popliteal, posterior tibial and anterior tibial arterial Doppler waveforms are  triphasic  Limited visualization of calf veins  Technically difficult/limited study due to body habitus  SIGNATURE:  Electronically Signed by: Miguelangel Feliciano on 2019-03-03 09:12:09 PM     CTA chest pe study  Status: Edited Result - FINAL   PACS Images     Show images for CTA chest pe study   Study Result    CTA - CHEST WITH IV CONTRAST - PULMONARY ANGIOGRAM     INDICATION:   Shortness of breath  PE suspected, intermediate prob, positive D-dimer  PE suspected, shortness of breath, elevated D-dimer      COMPARISON: None      TECHNIQUE: CTA examination of the chest was performed using angiographic technique according to a protocol specifically tailored to evaluate for pulmonary embolism    Axial, sagittal, and coronal 2D reformatted images were created from the source data and   submitted for interpretation  In addition, coronal 3D MIP postprocessing was performed on the acquisition scanner        Radiation dose length product (DLP) for this visit:  491 17 mGy-cm   This examination, like all CT scans performed in the [facility], was performed utilizing techniques to minimize radiation dose exposure, including the use of iterative   reconstruction and automated exposure control      IV Contrast:  100 mL of iohexol (OMNIPAQUE)     FINDINGS:     PULMONARY ARTERIAL TREE:  No pulmonary embolus is seen       LUNGS:  6 mm right middle lobe pulmonary nodule is noted series 3 image 68     9 mm left lower lobe pulmonary nodules noted series 3 image 66   4 mm inferior left lower lobe pulmonary nodules noted series 3 image 81        Minimal linear atelectasis is present at the left lung base  Scattered calcified granulomas are present  There is no tracheal or endobronchial lesion      PLEURA:  Unremarkable      HEART/GREAT VESSELS:  Unremarkable for patient's age      MEDIASTINUM AND VANESSA:  Unremarkable      CHEST WALL AND LOWER NECK:   Unremarkable      VISUALIZED STRUCTURES IN THE UPPER ABDOMEN:  Unremarkable      OSSEOUS STRUCTURES:  No acute fracture or destructive osseous lesion      IMPRESSION:     No evidence of pulmonary embolus      Pulmonary nodules measuring up to 9 mm  Based on current Fleischner Society 2017 Guidelines on incidental pulmonary nodule, followup non-contrast CT is recommended at 3-6 months from the initial examination and, if stable at that time, an additional   followup is recommended for 18-24 months from the initial examination           Elevated alkaline phosphatase level  Assessment & Plan  · Secondary to unclear etiology  · Possibly secondary to immobility  · Follow the total alkaline phosphatase level    Open wound of right lower extremity  Assessment & Plan  · Associated with cellulitis of the right lower extremity  · The patient was seen in consultation by Podiatry, who completed a bedside debridement on 03/09/2021 of the right anterior shin blister  · Continue broad-spectrum antibiotics with IV cefepime and IV vancomycin for now  · Follow culture results    Hypercalcemia  Assessment & Plan  · Mild hypercalcemia with an elevated intact-PTH level  · Possible primary hyperparathyroidism  · Follow the ionized calcium level  · Outpatient Endocrinology evaluation    Accelerated hypertension  Assessment & Plan  · Continue coreg 12 5 mg PO BID  · Increase lisinopril to 10 mg PO BID with persistently elevated blood pressure readings  · Utilize PRN IV labetalol  · Follow the blood pressure trend    Type 2 diabetes mellitus with hyperglycemia, with long-term current use of insulin [facility])  Assessment & Plan  Lab Results   Component Value Date    HGBA1C 7 0 (H) 03/09/2021       Recent Labs     03/10/21  0737 03/10/21  1123 03/10/21  1629 03/10/21  2209   POCGLU 156* 198* 242* 214*       Blood Sugar Average: Last 72 hrs:  (P) 878 [id number]     · Continue lantus 10 Units SQ QHS  · Add humalog 3 Units TID with meals  · Continue lisinopril for renal protection  · Hypoglycemia protocol  · Insulin sliding scale with blood glucose monitoring ACHS    Morbid obesity with BMI of 45 0-49 9, adult (HCC)  Assessment & Plan  · Lifestyle modifications are recommended including weight loss, improving his diet, and increasing his amount of activity  Obstructive sleep apnea syndrome  Assessment & Plan  · Utilize CPAP QHS and anytime while sleeping    Dyspnea on exertion  Assessment & Plan  · The patient was seen in consultation by Pulmonology  · The patient will need an ambulatory pulse oximetry check on room air on the day of discharge to see if he qualifies for supplemental oxygen    · Needs outpatient pulmonary function tests and completed by his outpatient Pulmonologist  · Will also need a repeat outpatient sleep study completed per Pulmonology's recommendations      * Cellulitis of right lower extremity  Assessment & Plan  · Associated with an open wound of the right lower extremity  · The patient was seen in consultation by Podiatry, who completed a bedside debridement on 03/09/2021 of the right anterior shin blister  · Continue broad-spectrum antibiotics with IV cefepime and IV vancomycin for now  · Follow culture results            [place] [facility] Room / Bed: [facility] 153/226-38 Encounter: [id number]            [address] [place] [address]      HPI per H&P  Betha Goodell is a 66 y o  male who presents to the emergency department with the complaint of an open wound involving the right lower extremity  The patient has been experiencing worsening edema of the bilateral lower extremities developed a blister of the right anterior shin region  The blister then opened up over the last 48-72 hours with drainage of a clear fluid leaving an open wound of the right lower extremity  The patient then developed erythema surrounding the open wound of the right lower extremity as well as severe pain involving the right anterior shin region  The symptoms were constant in nature and severe in intensity  Associated symptoms include shortness of breath with exertion and difficulty ambulating  Nothing seemed to improve his symptoms  Hospital Course  Patient admitted to the hospital for evaluation treatment workup of open wound of the left lower extremity  There concerns for cellulitis and possible underlying infection and thus patient was started on broad-spectrum antibiotics  While hospitalized he was seen in conjunction with Podiatry who performed bedside debridement of the wound and evaluation as well as localized wound care  Simultaneously patient's blood pressure was noted to be out of control in his home regimen of lisinopril and amlodipine was uptitrated to manage his blood pressure    There are hospital stay patient made good symptomatic improvement and was seen in conjunction with PT/OT who recommended short-term rehabilitation  On day of discharge patient was pain free ambulating at baseline as baseline mental status  He was deemed medically stable for discharge to a short-term rehab facility with continuation of his antibiotic course in the form of Keflex  Physical Exam at Discharge  Physical Exam  General:  NAD, follows commands  HEENT:  NC/AT, mucous membranes moist  Neck:  Supple, No JVP elevation  CV:  + S1, + S2, RRR  Pulm:  Lung fields are CTA bilaterally  Abd:  Soft, Non-tender, Non-distended  Ext:  No clubbing/cyanosis, Edema of the bilateral lower extremities  Skin:  Open wound of the right anterior shin region with surrounding erythema  Neuro:  Awake, alert, oriented  Psych:  Normal mood and affect    Allergies  No Known Allergies    Diet restrictions: none  Activity restrictions: none  Code Status: Level 1 - Full Code  Advance Directive and Living Will: Received  Power of :    POLST:      Discharge Condition: stable      Discharge  Statement   I spent 30 minutes discharging the patient  This time was spent on the day of discharge  I had direct contact with the patient on the day of discharge  Additional documentation is required if more than 30 minutes were spent on discharge

## 2021-03-11 NOTE — ASSESSMENT & PLAN NOTE
VAS lower limb venous duplex study, complete bilateral  Status: Final result   PACS Images     Show images for VAS lower limb venous duplex study, complete bilateral   Study Result       [facility] REPORT  CLINICAL:  Indications: Patient has been experiencing constant right lower leg pain for  1-2 months  He takes a daily 81mg aspirin  Risk Factors  The patient has history of Obesity, HTN, IDDM, CAD, stroke, CKD, diastolic CHF  and previous smoking (quit 1-5yrs ago)  He has no history of DVT  FINDINGS:     Segment  Right            Left                Impression       Impression         CFV      Normal (Patent)  Normal (Patent)             CONCLUSION:     Impression:  RIGHT LOWER LIMB:  No gross evidence of acute or chronic deep vein thrombosis  No gross evidence of superficial thrombophlebitis noted  Doppler evaluation shows a normal response to augmentation maneuvers  Popliteal, posterior tibial and anterior tibial arterial Doppler waveforms are  triphasic  Limited visualization of calf veins  LEFT LOWER LIMB:  No gross evidence of acute or chronic deep vein thrombosis  No gross evidence of superficial thrombophlebitis noted  Doppler evaluation shows a normal response to augmentation maneuvers  Popliteal, posterior tibial and anterior tibial arterial Doppler waveforms are  triphasic  Limited visualization of calf veins  Technically difficult/limited study due to body habitus       SIGNATURE:  Electronically Signed by: Martin Quick on 2019-03-03 09:12:09 PM     CTA chest pe study  Status: Edited Result - FINAL   PACS Images     Show images for CTA chest pe study   Study Result    CTA - CHEST WITH IV CONTRAST - PULMONARY ANGIOGRAM     INDICATION:   Shortness of breath  PE suspected, intermediate prob, positive D-dimer  PE suspected, shortness of breath, elevated D-dimer      COMPARISON: None      TECHNIQUE: CTA examination of the chest was performed using angiographic technique according to a protocol specifically tailored to evaluate for pulmonary embolism  Axial, sagittal, and coronal 2D reformatted images were created from the source data and   submitted for interpretation  In addition, coronal 3D MIP postprocessing was performed on the acquisition scanner        Radiation dose length product (DLP) for this visit:  491 17 mGy-cm   This examination, like all CT scans performed in the [facility], was performed utilizing techniques to minimize radiation dose exposure, including the use of iterative   reconstruction and automated exposure control      IV Contrast:  100 mL of iohexol (OMNIPAQUE)     FINDINGS:     PULMONARY ARTERIAL TREE:  No pulmonary embolus is seen       LUNGS:  6 mm right middle lobe pulmonary nodule is noted series 3 image 68     9 mm left lower lobe pulmonary nodules noted series 3 image 66   4 mm inferior left lower lobe pulmonary nodules noted series 3 image 81        Minimal linear atelectasis is present at the left lung base  Scattered calcified granulomas are present  There is no tracheal or endobronchial lesion      PLEURA:  Unremarkable      HEART/GREAT VESSELS:  Unremarkable for patient's age      MEDIASTINUM AND VANESSA:  Unremarkable      CHEST WALL AND LOWER NECK:   Unremarkable      VISUALIZED STRUCTURES IN THE UPPER ABDOMEN:  Unremarkable      OSSEOUS STRUCTURES:  No acute fracture or destructive osseous lesion      IMPRESSION:     No evidence of pulmonary embolus      Pulmonary nodules measuring up to 9 mm  Based on current Fleischner Society 2017 Guidelines on incidental pulmonary nodule, followup non-contrast CT is recommended at 3-6 months from the initial examination and, if stable at that time, an additional   followup is recommended for 18-24 months from the initial examination

## 2021-03-11 NOTE — PLAN OF CARE
Problem: Potential for Falls  Goal: Patient will remain free of falls  Description: INTERVENTIONS:  - Assess patient frequently for physical needs  -  Identify cognitive and physical deficits and behaviors that affect risk of falls    -  [place] fall precautions as indicated by assessment   - Educate patient/family on patient safety including physical limitations  - Instruct patient to call for assistance with activity based on assessment  - Modify environment to reduce risk of injury  - Consider OT/PT consult to assist with strengthening/mobility  Outcome: Progressing     Problem: Prexisting or High Potential for Compromised Skin Integrity  Goal: Skin integrity is maintained or improved  Description: INTERVENTIONS:  - Identify patients at risk for skin breakdown  - Assess and monitor skin integrity  - Assess and monitor nutrition and hydration status  - Monitor labs   - Assess for incontinence   - Turn and reposition patient  - Assist with mobility/ambulation  - Relieve pressure over bony prominences  - Avoid friction and shearing  - Provide appropriate hygiene as needed including keeping skin clean and dry  - Evaluate need for skin moisturizer/barrier cream  - Collaborate with interdisciplinary team   - Patient/family teaching  - Consider wound care consult   Outcome: Progressing     Problem: SKIN/TISSUE INTEGRITY - ADULT  Goal: Skin integrity remains intact  Description: INTERVENTIONS  - Identify patients at risk for skin breakdown  - Assess and monitor skin integrity  - Assess and monitor nutrition and hydration status  - Monitor labs (i e  albumin)  - Assess for incontinence   - Turn and reposition patient  - Assist with mobility/ambulation  - Relieve pressure over bony prominences  - Avoid friction and shearing  - Provide appropriate hygiene as needed including keeping skin clean and dry  - Evaluate need for skin moisturizer/barrier cream  - Collaborate with interdisciplinary team (i e  Nutrition, Rehabilitation, etc )   - Patient/family teaching  Outcome: Progressing     Problem: MUSCULOSKELETAL - ADULT  Goal: Maintain or return mobility to safest level of function  Description: INTERVENTIONS:  - Assess patient's ability to carry out ADLs; assess patient's baseline for ADL function and identify physical deficits which impact ability to perform ADLs (bathing, care of mouth/teeth, toileting, grooming, dressing, etc )  - Assess/evaluate cause of self-care deficits   - Assess range of motion  - Assess patient's mobility  - Assess patient's need for assistive devices and provide as appropriate  - Encourage maximum independence but intervene and supervise when necessary  - Involve family in performance of ADLs  - Assess for home care needs following discharge   - Consider OT consult to assist with ADL evaluation and planning for discharge  - Provide patient education as appropriate  Outcome: Progressing     Problem: PAIN - ADULT  Goal: Verbalizes/displays adequate comfort level or baseline comfort level  Description: Interventions:  - Encourage patient to monitor pain and request assistance  - Assess pain using appropriate pain scale  - Administer analgesics based on type and severity of pain and evaluate response  - Implement non-pharmacological measures as appropriate and evaluate response  - Consider cultural and social influences on pain and pain management  - Notify physician/advanced practitioner if interventions unsuccessful or patient reports new pain  Outcome: Progressing     Problem: INFECTION - ADULT  Goal: Absence or prevention of progression during hospitalization  Description: INTERVENTIONS:  - Assess and monitor for signs and symptoms of infection  - Monitor lab/diagnostic results  - Monitor all insertion sites, i e  indwelling lines, tubes, and drains  - Monitor endotracheal if appropriate and nasal secretions for changes in amount and color  - [place] appropriate cooling/warming therapies per order  - Administer medications as ordered  - Instruct and encourage patient and family to use good hand hygiene technique  - Identify and instruct in appropriate isolation precautions for identified infection/condition  Outcome: Progressing     Problem: SAFETY ADULT  Goal: Patient will remain free of falls  Description: INTERVENTIONS:  - Assess patient frequently for physical needs  -  Identify cognitive and physical deficits and behaviors that affect risk of falls    -  [place] fall precautions as indicated by assessment   - Educate patient/family on patient safety including physical limitations  - Instruct patient to call for assistance with activity based on assessment  - Modify environment to reduce risk of injury  - Consider OT/PT consult to assist with strengthening/mobility  Outcome: Progressing  Goal: Maintain or return to baseline ADL function  Description: INTERVENTIONS:  -  Assess patient's ability to carry out ADLs; assess patient's baseline for ADL function and identify physical deficits which impact ability to perform ADLs (bathing, care of mouth/teeth, toileting, grooming, dressing, etc )  - Assess/evaluate cause of self-care deficits   - Assess range of motion  - Assess patient's mobility; develop plan if impaired  - Assess patient's need for assistive devices and provide as appropriate  - Encourage maximum independence but intervene and supervise when necessary  - Involve family in performance of ADLs  - Assess for home care needs following discharge   - Consider OT consult to assist with ADL evaluation and planning for discharge  - Provide patient education as appropriate  Outcome: Progressing  Goal: Maintain or return mobility status to optimal level  Description: INTERVENTIONS:  - Assess patient's baseline mobility status (ambulation, transfers, stairs, etc )    - Identify cognitive and physical deficits and behaviors that affect mobility  - Identify mobility aids required to assist with transfers and/or ambulation (gait belt, sit-to-stand, lift, walker, cane, etc )  - [place] fall precautions as indicated by assessment  - Record patient progress and toleration of activity level on Mobility SBAR; progress patient to next Phase/Stage  - Instruct patient to call for assistance with activity based on assessment  - Consider rehabilitation consult to assist with strengthening/weightbearing, etc   Outcome: Progressing     Problem: DISCHARGE PLANNING  Goal: Discharge to home or other facility with appropriate resources  Description: INTERVENTIONS:  - Identify barriers to discharge w/patient and caregiver  - Arrange for needed discharge resources and transportation as appropriate  - Identify discharge learning needs (meds, wound care, etc )  - Arrange for interpretive services to assist at discharge as needed  - Refer to Case Management Department for coordinating discharge planning if the patient needs post-hospital services based on physician/advanced practitioner order or complex needs related to functional status, cognitive ability, or social support system  Outcome: Progressing     Problem: Knowledge Deficit  Goal: Patient/family/caregiver demonstrates understanding of disease process, treatment plan, medications, and discharge instructions  Description: Complete learning assessment and assess knowledge base    Interventions:  - Provide teaching at level of understanding  - Provide teaching via preferred learning methods  Outcome: Progressing

## 2021-03-11 NOTE — PROGRESS NOTES
[address] [facility] [address]  Progress Note Medhat Praful 1945, 66 y o  male MRN: [id number]  Unit/Bed#: 408-01 Encounter: [id number]  Primary Care Provider: Bridgette Espinal MD   Date and time admitted to hospital: 3/8/2021  2:30 PM    Abnormal EKG  Assessment & Plan  · Outpatient Cardiology evaluation    ECG 12 lead  Order: [id number]  Status:  Final result   Visible to patient:  No (inaccessible in [address])   Next appt:  03/29/2021 at 01:40 PM in Pulmonology ([address] [place] [address])   Ref Range & Units 3/8/21 1553   Ventricular Rate BPM 88    Atrial Rate BPM 91    LA Interval ms    QRSD Interval ms 146    QT Interval ms 400    QTC Interval ms 484    P Axis degrees    QRS Axis degrees -88    T Wave Axis degrees 50       Narrative & Impression    Sinus rhythm with 1st degree A-V block  Left axis deviation  Right bundle branch block  Abnormal ECG  Confirmed by Pamela Dietz ([id number]) on 3/9/2021 4:20:21 PM      Specimen Collected: 03/08/21 15:53   Last Resulted: 03/09/21 16:20                  Gait instability  Assessment & Plan  · PT/OT  · The patient may require short-term rehabilitation SNF placement upon discharge  Multiple pulmonary nodules  Assessment & Plan  · Outpatient Pulmonology evaluation  · Outpatient surveillance imaging with PCP    Microscopic hematuria  Assessment & Plan  · Check a urine culture  · Outpatient Urology evaluation    Elevated parathyroid hormone  Assessment & Plan  · Outpatient Endocrinology evaluation    Results for Jason Blades (MRN [id number]) as of 3/10/2021 13:41   Ref   Range 3/9/2021 06:01   PARATHYROID HORMONE Latest Ref Range: 18 4 - 80 1 pg/mL 130 4 (H)       Elevated d-dimer  Assessment & Plan  VAS lower limb venous duplex study, complete bilateral  Status: Final result   PACS Images     Show images for VAS lower limb venous duplex study, complete bilateral   Study Result       [facility] REPORT  CLINICAL:  Indications: Patient has been experiencing constant right lower leg pain for  1-2 months  He takes a daily 81mg aspirin  Risk Factors  The patient has history of Obesity, HTN, IDDM, CAD, stroke, CKD, diastolic CHF  and previous smoking (quit 1-5yrs ago)  He has no history of DVT  FINDINGS:     Segment  Right            Left                Impression       Impression         CFV      Normal (Patent)  Normal (Patent)             CONCLUSION:     Impression:  RIGHT LOWER LIMB:  No gross evidence of acute or chronic deep vein thrombosis  No gross evidence of superficial thrombophlebitis noted  Doppler evaluation shows a normal response to augmentation maneuvers  Popliteal, posterior tibial and anterior tibial arterial Doppler waveforms are  triphasic  Limited visualization of calf veins  LEFT LOWER LIMB:  No gross evidence of acute or chronic deep vein thrombosis  No gross evidence of superficial thrombophlebitis noted  Doppler evaluation shows a normal response to augmentation maneuvers  Popliteal, posterior tibial and anterior tibial arterial Doppler waveforms are  triphasic  Limited visualization of calf veins  Technically difficult/limited study due to body habitus  SIGNATURE:  Electronically Signed by: Deja Metzger on 2019-03-03 09:12:09 PM     CTA chest pe study  Status: Edited Result - FINAL   PACS Images     Show images for CTA chest pe study   Study Result    CTA - CHEST WITH IV CONTRAST - PULMONARY ANGIOGRAM     INDICATION:   Shortness of breath  PE suspected, intermediate prob, positive D-dimer  PE suspected, shortness of breath, elevated D-dimer      COMPARISON: None      TECHNIQUE: CTA examination of the chest was performed using angiographic technique according to a protocol specifically tailored to evaluate for pulmonary embolism  Axial, sagittal, and coronal 2D reformatted images were created from the source data and   submitted for interpretation    In addition, coronal 3D MIP postprocessing was performed on the acquisition scanner        Radiation dose length product (DLP) for this visit:  491 17 mGy-cm   This examination, like all CT scans performed in the [facility], was performed utilizing techniques to minimize radiation dose exposure, including the use of iterative   reconstruction and automated exposure control      IV Contrast:  100 mL of iohexol (OMNIPAQUE)     FINDINGS:     PULMONARY ARTERIAL TREE:  No pulmonary embolus is seen       LUNGS:  6 mm right middle lobe pulmonary nodule is noted series 3 image 68     9 mm left lower lobe pulmonary nodules noted series 3 image 66   4 mm inferior left lower lobe pulmonary nodules noted series 3 image 81        Minimal linear atelectasis is present at the left lung base  Scattered calcified granulomas are present  There is no tracheal or endobronchial lesion      PLEURA:  Unremarkable      HEART/GREAT VESSELS:  Unremarkable for patient's age      MEDIASTINUM AND VANESSA:  Unremarkable      CHEST WALL AND LOWER NECK:   Unremarkable      VISUALIZED STRUCTURES IN THE UPPER ABDOMEN:  Unremarkable      OSSEOUS STRUCTURES:  No acute fracture or destructive osseous lesion      IMPRESSION:     No evidence of pulmonary embolus      Pulmonary nodules measuring up to 9 mm  Based on current Fleischner Society 2017 Guidelines on incidental pulmonary nodule, followup non-contrast CT is recommended at 3-6 months from the initial examination and, if stable at that time, an additional   followup is recommended for 18-24 months from the initial examination           Elevated alkaline phosphatase level  Assessment & Plan  · Secondary to unclear etiology  · Possibly secondary to immobility  · Follow the total alkaline phosphatase level    Hypercalcemia  Assessment & Plan  · Mild hypercalcemia with an elevated intact-PTH level  · Possible primary hyperparathyroidism  · Follow the ionized calcium level  · Outpatient Endocrinology evaluation    Accelerated hypertension  Assessment & Plan  · Continue coreg 12 5 mg PO BID  · Increase lisinopril to 10 mg PO BID with persistently elevated blood pressure readings  · Utilize PRN IV labetalol  · Follow the blood pressure trend    Morbid obesity with BMI of 45 0-49 9, adult ([facility])  Assessment & Plan  · Lifestyle modifications are recommended including weight loss, improving his diet, and increasing his amount of activity  Chronic diastolic CHF (congestive heart failure) ([facility])  Assessment & Plan  Wt Readings from Last 3 Encounters:   03/08/21 (!) 136 kg (300 lb 0 7 oz)   12/22/20 135 kg (298 lb 1 oz)   02/07/20 133 kg (292 lb 15 9 oz)         Chronic diastolic chf, due to htn, a/e/b DC summary dated 7/3/19,  echo 3/1/19 with grade 1 diastolic dysfunction,  treated with monitoring for fluid overload, Coreg, echo      CKD (chronic kidney disease)  Assessment & Plan  Lab Results   Component Value Date    EGFR 45 03/11/2021    EGFR 50 03/10/2021    EGFR 54 03/09/2021    CREATININE 1 49 (H) 03/11/2021    CREATININE 1 35 (H) 03/10/2021    CREATININE 1 28 03/09/2021     Chronic kidney disease, stage 2- 3, due to htn, a/e/b Problem detail note dated 12/27/16 stating CKD 3 with baseline "around 1 16,"  Gfr on admission 49-46, treated with lab monitoring and avoiding nephrotoxic agents  Findings: Gfr  11/4/20 - 59; 2/7/20 - 66; 7/3/19 - 51National       Dyspnea on exertion  Assessment & Plan  · The patient was seen in consultation by Pulmonology  · The patient will need an ambulatory pulse oximetry check on room air on the day of discharge to see if he qualifies for supplemental oxygen    · Needs outpatient pulmonary function tests and completed by his outpatient Pulmonologist  · Will also need a repeat outpatient sleep study completed per Pulmonology's recommendations      * Cellulitis of right lower extremity  Assessment & Plan  · Associated with an open wound of the right lower extremity  · The patient was seen in consultation by Podiatry, who completed a bedside debridement on 03/09/2021 of the right anterior shin blister  · Continue broad-spectrum antibiotics with IV cefepime and IV vancomycin for now  · Follow culture results        Subjective:     Patient seen examined at bedside, resting comfortably no acute distress  No acute overnight events as reported by both patient and staff  No new issues or concerns at time examination      Objective:     Vitals: Blood pressure 159/84, pulse 68, temperature 97 9 °F (36 6 °C), temperature source Oral, resp  rate 18, height 5' 6" (1 676 m), weight (!) 136 kg (300 lb 0 7 oz), SpO2 92 %  ,Body mass index is 48 43 kg/m²    Wt Readings from Last 3 Encounters:   03/08/21 (!) 136 kg (300 lb 0 7 oz)   12/22/20 135 kg (298 lb 1 oz)   02/07/20 133 kg (292 lb 15 9 oz)       Intake/Output Summary (Last 24 hours) at 3/11/2021 1107  Last data filed at 3/11/2021 0538  Gross per 24 hour   Intake 960 ml   Output 300 ml   Net 660 ml       Physical Exam:        Physical Exam  General:  NAD, follows commands  HEENT:  NC/AT, mucous membranes moist  Neck:  Supple, No JVP elevation  CV:  + S1, + S2, RRR  Pulm:  Lung fields are CTA bilaterally  Abd:  Soft, Non-tender, Non-distended  Ext:  No clubbing/cyanosis, Edema of the bilateral lower extremities  Skin:  Open wound of the right anterior shin region with surrounding erythema  Neuro:  Awake, alert, oriented  Psych:  Normal mood and affect    Recent Results (from the past 24 hour(s))   Fingerstick Glucose (POCT)    Collection Time: 03/10/21 11:23 AM   Result Value Ref Range    POC Glucose 198 (H) 65 - 140 mg/dl   Fingerstick Glucose (POCT)    Collection Time: 03/10/21  4:29 PM   Result Value Ref Range    POC Glucose 242 (H) 65 - 140 mg/dl   Fingerstick Glucose (POCT)    Collection Time: 03/10/21 10:09 PM   Result Value Ref Range    POC Glucose 214 (H) 65 - 140 mg/dl   CBC and differential    Collection Time: 03/11/21  6:04 AM   Result Value Ref Range    WBC 7 58 4 31 - 10 16 Thousand/uL    RBC 4 16 3 88 - 5 62 Million/uL    Hemoglobin 11 4 (L) 12 0 - 17 0 g/dL    Hematocrit 36 9 36 5 - 49 3 %    MCV 89 82 - 98 fL    MCH 27 4 26 8 - 34 3 pg    MCHC 30 9 (L) 31 4 - 37 4 g/dL    RDW 14 3 11 6 - 15 1 %    MPV 10 0 8 9 - 12 7 fL    Platelets 910 202 - 329 Thousands/uL    nRBC 0 /100 WBCs    Neutrophils Relative 60 43 - 75 %    Immat GRANS % 1 0 - 2 %    Lymphocytes Relative 23 14 - 44 %    Monocytes Relative 9 4 - 12 %    Eosinophils Relative 6 0 - 6 %    Basophils Relative 1 0 - 1 %    Neutrophils Absolute 4 64 1 85 - 7 62 Thousands/µL    Immature Grans Absolute 0 04 0 00 - 0 20 Thousand/uL    Lymphocytes Absolute 1 71 0 60 - 4 47 Thousands/µL    Monocytes Absolute 0 66 0 17 - 1 22 Thousand/µL    Eosinophils Absolute 0 48 0 00 - 0 61 Thousand/µL    Basophils Absolute 0 05 0 00 - 0 10 Thousands/µL   Comprehensive metabolic panel    Collection Time: 03/11/21  6:04 AM   Result Value Ref Range    Sodium 137 136 - 145 mmol/L    Potassium 4 3 3 5 - 5 3 mmol/L    Chloride 103 100 - 108 mmol/L    CO2 31 21 - 32 mmol/L    ANION GAP 3 (L) 4 - 13 mmol/L    BUN 31 (H) 5 - 25 mg/dL    Creatinine 1 49 (H) 0 60 - 1 30 mg/dL    Glucose 206 (H) 65 - 140 mg/dL    Calcium 9 2 8 3 - 10 1 mg/dL    Corrected Calcium 10 2 (H) 8 3 - 10 1 mg/dL    AST 12 5 - 45 U/L    ALT 28 12 - 78 U/L    Alkaline Phosphatase 149 (H) 46 - 116 U/L    Total Protein 7 5 6 4 - 8 2 g/dL    Albumin 2 8 (L) 3 5 - 5 0 g/dL    Total Bilirubin 0 40 0 20 - 1 00 mg/dL    eGFR 45 ml/min/1 73sq m   Magnesium    Collection Time: 03/11/21  6:04 AM   Result Value Ref Range    Magnesium 1 9 1 6 - 2 6 mg/dL   Phosphorus    Collection Time: 03/11/21  6:04 AM   Result Value Ref Range    Phosphorus 3 2 2 3 - 4 1 mg/dL   Fingerstick Glucose (POCT)    Collection Time: 03/11/21  6:52 AM   Result Value Ref Range    POC Glucose 212 (H) 65 - 140 mg/dl   Fingerstick Glucose (POCT)    Collection Time: 03/11/21  7:35 AM   Result Value Ref Range    POC Glucose 203 (H) 65 - 140 mg/dl   Fingerstick Glucose (POCT)    Collection Time: 03/11/21 11:02 AM   Result Value Ref Range    POC Glucose 242 (H) 65 - 140 mg/dl       Current Facility-Administered Medications   Medication Dose Route Frequency Provider Last Rate Last Admin    acetaminophen (TYLENOL) tablet 650 mg  650 mg Oral Q6H PRN Temple Poland, DO        aspirin chewable tablet 81 mg  81 mg Oral Daily Anglican Poland, DO   81 mg at 03/11/21 1006    atorvastatin (LIPITOR) tablet 40 mg  40 mg Oral After Peck & Kathia, DO   40 mg at 03/10/21 1759    carvedilol (COREG) tablet 12 5 mg  12 5 mg Oral BID Anglican Poland, DO   12 5 mg at 03/11/21 1006    cefepime (MAXIPIME) IVPB (premix in dextrose) 2,000 mg 50 mL  2,000 mg Intravenous Q12H Temple Poland,  mL/hr at 03/11/21 0538 2,000 mg at 03/11/21 0538    cholecalciferol (VITAMIN D3) tablet 2,000 Units  2,000 Units Oral Daily Anglican Poland, DO   2,000 Units at 03/11/21 1005    dextrose 50 % IV solution 25 mL  25 mL Intravenous Daily PRN Temple Poland, DO        enoxaparin (LOVENOX) subcutaneous injection 40 mg  40 mg Subcutaneous Q12H [facility] Derek Montesinos, DO   40 mg at 03/11/21 1006    gabapentin (NEURONTIN) capsule 300 mg  300 mg Oral BID Temple Poland, DO   300 mg at 03/11/21 1005    insulin glargine (LANTUS) subcutaneous injection 10 Units 0 1 mL  10 Units Subcutaneous HS Anglican Poland, DO   10 Units at 03/10/21 2212    insulin lispro (HumaLOG) 100 units/mL subcutaneous injection 1-6 Units  1-6 Units Subcutaneous HS Anglican Poland, DO   2 Units at 03/10/21 2213    insulin lispro (HumaLOG) 100 units/mL subcutaneous injection 2-12 Units  2-12 Units Subcutaneous TID [facility], DO   4 Units at 03/11/21 0738    insulin lispro (HumaLOG) 100 units/mL subcutaneous injection 3 Units  3 Units Subcutaneous TID With Meals Anglican Poland, DO   3 Units at 03/11/21 0739    Labetalol HCl (Tomie ) injection 10 mg  10 mg Intravenous Q4H PRN Redgie Alonso, DO        lisinopril (ZESTRIL) tablet 10 mg  10 mg Oral BID Redgie Alonso, DO   10 mg at 03/11/21 1006    oxyCODONE (ROXICODONE) IR tablet 5 mg  5 mg Oral Q4H PRN Redgie Alonso, DO        Or    oxyCODONE (ROXICODONE) immediate release tablet 10 mg  10 mg Oral Q4H PRN Redmiguele Alonso, DO   10 mg at 03/11/21 0541    tamsulosin (FLOMAX) capsule 0 4 mg  0 4 mg Oral QPM Redmiguele Gavin, DO   0 4 mg at 03/10/21 1759    vancomycin (VANCOCIN) 1,250 mg in sodium chloride 0 9 % 250 mL IVPB  10 mg/kg Intravenous Q12H Redtiny Alonso,  7 mL/hr at 03/11/21 0733 1,250 mg at 03/11/21 0733       Invasive Devices     Peripheral Intravenous Line            Peripheral IV 03/08/21 Left Antecubital 2 days                Lab, Imaging and other studies: I have personally reviewed pertinent reports      VTE Pharmacologic Prophylaxis: Enoxaparin (Lovenox)  VTE Mechanical Prophylaxis: sequential compression device    Stevie Sandhu MD 11:07 AM  03/11/21

## 2021-03-11 NOTE — ASSESSMENT & PLAN NOTE
Lab Results   Component Value Date    EGFR 45 03/11/2021    EGFR 50 03/10/2021    EGFR 54 03/09/2021    CREATININE 1 49 (H) 03/11/2021    CREATININE 1 35 (H) 03/10/2021    CREATININE 1 28 03/09/2021     Chronic kidney disease, stage 2- 3, due to htn, a/e/b Problem detail note dated 12/27/16 stating CKD 3 with baseline "around 1 16,"  Gfr on admission 49-46, treated with lab monitoring and avoiding nephrotoxic agents  Findings: Gfr  11/4/20 - 59; 2/7/20 - 66; 7/3/19 - 51National

## 2021-03-11 NOTE — PROGRESS NOTES
Vancomycin IV Pharmacy-to-Dose Consultation    Sienna Francois is a 66 y o  male who is currently receiving Vancomycin IV with management by the Pharmacy Consult service  Assessment/Plan:  The patient was reviewed  Renal function is stable and no signs or symptoms of nephrotoxicity and/or infusion reactions were documented in the chart  Based on todays assessment, continue current vancomycin (day # 3) dosing of 1250mg Q12H, with a plan for trough on Saturday if patient remains on vanco    We will continue to follow the patients culture results and clinical progress daily      Flavia Lima, Pharmacist

## 2021-03-11 NOTE — PHYSICAL THERAPY NOTE
Physical Therapy Progress Note    Patient Name: Nestor SALAZAR'D Date: 3/11/2021     Problem List  Principal Problem:    Cellulitis of right lower extremity  Active Problems:    Dyspnea on exertion    Obstructive sleep apnea syndrome    Morbid obesity with BMI of 45 0-49 9, adult ([facility] )    Type 2 diabetes mellitus with hyperglycemia, with long-term current use of insulin ([facility])    Accelerated hypertension    Hypercalcemia    Open wound of right lower extremity    Elevated alkaline phosphatase level    Elevated d-dimer    Elevated parathyroid hormone    Pyuria    Microscopic hematuria    Multiple pulmonary nodules    Gait instability    Abnormal EKG       Past Medical History  Past Medical History:   Diagnosis Date    Cataract     CHF (congestive heart failure) ([facility])     chronic diastolic CHF    Coronary artery disease     Diabetes mellitus ([facility] )     Glaucoma     Hyperlipidemia     Hypertension     Neuropathy     Obesity     Renal disorder     chronic kidney disease stage 3     Sleep apnea     Stroke ([facility] )     TIA (transient ischemic attack)     Uncontrolled type 2 diabetes mellitus with hyperglycemia, with long-term current use of insulin ([facility] ) 10/4/2018        Past Surgical History  Past Surgical History:   Procedure Laterality Date    APPENDECTOMY      CARPAL TUNNEL RELEASE      EYE SURGERY      cataracts        03/11/21 0852   PT Last Visit   PT Visit Date 03/11/21   Note Type   Note Type Treatment   Pain Assessment   Pain Assessment Tool 0-10   Pain Score 8   Pain Location/Orientation Orientation: Bilateral;Location: Leg   Restrictions/Precautions   Weight Bearing Precautions Per Order No   Other Precautions Multiple lines;Telemetry; Fall Risk;Pain   General   Chart Reviewed Yes   Family/Caregiver Present No   Cognition   Overall Cognitive Status WFL   Arousal/Participation Alert   Attention Within functional limits   Orientation Level Oriented X4   Following Commands Follows all commands and directions without difficulty   Subjective   Subjective "I guess that's okay"   Bed Mobility   Additional Comments pt OOB at start/end of session   Transfers   Sit to Stand 5  Supervision   Additional items Increased time required;Verbal cues   Stand to Sit 5  Supervision   Additional items Increased time required;Verbal cues   Toilet transfer 5  Supervision   Additional items Increased time required;Verbal cues;Standard toilet   Additional Comments RW used   Ambulation/Elevation   Gait pattern Excessively slow; Short stride; Foward flexed;Decreased foot clearance; Wide CAMPOS   Gait Assistance 5  Supervision   Additional items Verbal cues   Assistive Device Rolling walker   Distance 15'   Balance   Static Sitting Fair +   Dynamic Sitting Fair +   Static Standing Fair   Dynamic Standing Loretta Heart 2024 -  (with RW)   Endurance Deficit   Endurance Deficit Yes   Endurance Deficit Description pt easily fatigued with ambulation   Activity Tolerance   Activity Tolerance Patient limited by fatigue;Patient limited by pain   Assessment   Prognosis Good   Problem List Decreased strength;Decreased endurance; Impaired balance;Decreased mobility;Obesity;Pain   Assessment Pt seen this date for PT treatment session to increase level of mobility and functional activity tolerance  Pt able to perform all functional mobility with SUP, RW, and increased time  Occasional verbal cuing provided for safety awareness and sequencing  Pt taking seated rest break following 15' of ambulation d/t fatigue; HR and SpO2 remained WFL on RA throughout  No true LOB experienced  The patient's AM-PAC Basic Mobility Inpatient Short Form Raw Score is 21, Standardized Score is 45 55  A standardized score greater than 42 9 suggests the patient may benefit from discharge to home  Please also refer to the recommendation of the Physical Therapist for safe discharge planning   Continued PT intervention during LOS indicated to address remaining deficits, increase LOF, and facilitate safe d/c to next level of care when medically appropriate  D/c recommendation is home with home PT  Goals   STG Expiration Date 03/23/21   PT Treatment Day 1   Plan   Treatment/Interventions Functional transfer training;LE strengthening/ROM; Elevations; Therapeutic exercise; Endurance training;Bed mobility;Gait training   Progress Slow progress, decreased activity tolerance   PT Frequency Other (Comment)  (3-5x/week)   Recommendation   PT Discharge Recommendation Home with skilled therapy   Equipment Recommended Other (Comment)  (pt owns all necessary equipment)   PT - OK to Discharge Yes  (when medically appropriate)   AM-PAC Basic Mobility Inpatient   Turning in Bed Without Bedrails 4   Lying on Back to Sitting on Edge of Flat Bed 3   Moving Bed to Chair 4   Standing Up From Chair 4   Walk in Room 4   Climb 3-5 Stairs 2   Basic Mobility Inpatient Raw Score 21   Basic Mobility Standardized Score 45 55     Pt seated in recliner at end of session with all needs in reach

## 2021-03-11 NOTE — ASSESSMENT & PLAN NOTE
Lab Results   Component Value Date    HGBA1C 7 0 (H) 03/09/2021       Recent Labs     03/10/21  0737 03/10/21  1123 03/10/21  1629 03/10/21  2209   POCGLU 156* 198* 242* 214*       Blood Sugar Average: Last 72 hrs:  (P) 699 [id number]     · Continue lantus 10 Units SQ QHS  · Add humalog 3 Units TID with meals  · Continue lisinopril for renal protection  · Hypoglycemia protocol  · Insulin sliding scale with blood glucose monitoring ACHS

## 2021-03-11 NOTE — ASSESSMENT & PLAN NOTE
VAS lower limb venous duplex study, complete bilateral  Status: Final result   PACS Images     Show images for VAS lower limb venous duplex study, complete bilateral   Study Result       [facility] REPORT  CLINICAL:  Indications: Patient has been experiencing constant right lower leg pain for  1-2 months  He takes a daily 81mg aspirin  Risk Factors  The patient has history of Obesity, HTN, IDDM, CAD, stroke, CKD, diastolic CHF  and previous smoking (quit 1-5yrs ago)  He has no history of DVT  FINDINGS:     Segment  Right            Left                Impression       Impression         CFV      Normal (Patent)  Normal (Patent)             CONCLUSION:     Impression:  RIGHT LOWER LIMB:  No gross evidence of acute or chronic deep vein thrombosis  No gross evidence of superficial thrombophlebitis noted  Doppler evaluation shows a normal response to augmentation maneuvers  Popliteal, posterior tibial and anterior tibial arterial Doppler waveforms are  triphasic  Limited visualization of calf veins  LEFT LOWER LIMB:  No gross evidence of acute or chronic deep vein thrombosis  No gross evidence of superficial thrombophlebitis noted  Doppler evaluation shows a normal response to augmentation maneuvers  Popliteal, posterior tibial and anterior tibial arterial Doppler waveforms are  triphasic  Limited visualization of calf veins  Technically difficult/limited study due to body habitus       SIGNATURE:  Electronically Signed by: Ni Valentine on 2019-03-03 09:12:09 PM     CTA chest pe study  Status: Edited Result - FINAL   PACS Images     Show images for CTA chest pe study   Study Result    CTA - CHEST WITH IV CONTRAST - PULMONARY ANGIOGRAM     INDICATION:   Shortness of breath  PE suspected, intermediate prob, positive D-dimer  PE suspected, shortness of breath, elevated D-dimer      COMPARISON: None      TECHNIQUE: CTA examination of the chest was performed using angiographic technique according to a protocol specifically tailored to evaluate for pulmonary embolism  Axial, sagittal, and coronal 2D reformatted images were created from the source data and   submitted for interpretation  In addition, coronal 3D MIP postprocessing was performed on the acquisition scanner        Radiation dose length product (DLP) for this visit:  491 17 mGy-cm   This examination, like all CT scans performed in the [facility], was performed utilizing techniques to minimize radiation dose exposure, including the use of iterative   reconstruction and automated exposure control      IV Contrast:  100 mL of iohexol (OMNIPAQUE)     FINDINGS:     PULMONARY ARTERIAL TREE:  No pulmonary embolus is seen       LUNGS:  6 mm right middle lobe pulmonary nodule is noted series 3 image 68     9 mm left lower lobe pulmonary nodules noted series 3 image 66   4 mm inferior left lower lobe pulmonary nodules noted series 3 image 81        Minimal linear atelectasis is present at the left lung base  Scattered calcified granulomas are present  There is no tracheal or endobronchial lesion      PLEURA:  Unremarkable      HEART/GREAT VESSELS:  Unremarkable for patient's age      MEDIASTINUM AND VANESSA:  Unremarkable      CHEST WALL AND LOWER NECK:   Unremarkable      VISUALIZED STRUCTURES IN THE UPPER ABDOMEN:  Unremarkable      OSSEOUS STRUCTURES:  No acute fracture or destructive osseous lesion      IMPRESSION:     No evidence of pulmonary embolus      Pulmonary nodules measuring up to 9 mm  Based on current Fleischner Society 2017 Guidelines on incidental pulmonary nodule, followup non-contrast CT is recommended at 3-6 months from the initial examination and, if stable at that time, an additional   followup is recommended for 18-24 months from the initial examination

## 2021-03-11 NOTE — TELEPHONE ENCOUNTER
New Patient referred over for: Microscopic hematuria  Can you please triage and advise if patient can be seen by Axel Ball ? Thank you

## 2021-03-11 NOTE — PLAN OF CARE
Problem: PHYSICAL THERAPY ADULT  Goal: Performs mobility at highest level of function for planned discharge setting  See evaluation for individualized goals  Description: Treatment/Interventions: Functional transfer training, LE strengthening/ROM, Elevations, Therapeutic exercise, Endurance training, Patient/family training, Equipment eval/education, Bed mobility, Gait training, Spoke to nursing  Equipment Recommended: Walker(has)       See flowsheet documentation for full assessment, interventions and recommendations  Outcome: Progressing  Note: Prognosis: Good  Problem List: Decreased strength, Decreased endurance, Impaired balance, Decreased mobility, Obesity, Pain  Assessment: Pt seen this date for PT treatment session to increase level of mobility and functional activity tolerance  Pt able to perform all functional mobility with SUP, RW, and increased time  Occasional verbal cuing provided for safety awareness and sequencing  Pt taking seated rest break following 15' of ambulation d/t fatigue; HR and SpO2 remained WFL on RA throughout  No true LOB experienced  The patient's AM-PAC Basic Mobility Inpatient Short Form Raw Score is 21, Standardized Score is 45 55  A standardized score greater than 42 9 suggests the patient may benefit from discharge to home  Please also refer to the recommendation of the Physical Therapist for safe discharge planning  Continued PT intervention during LOS indicated to address remaining deficits, increase LOF, and facilitate safe d/c to next level of care when medically appropriate  D/c recommendation is home with home PT  PT Discharge Recommendation: Home with skilled therapy     PT - OK to Discharge: Yes(when medically appropriate)    See flowsheet documentation for full assessment

## 2021-03-11 NOTE — ASSESSMENT & PLAN NOTE
· Outpatient Endocrinology evaluation    Results for Mic Harris (MRN [id number]) as of 3/10/2021 13:41   Ref   Range 3/9/2021 06:01   PARATHYROID HORMONE Latest Ref Range: 18 4 - 80 1 pg/mL 130 4 (H)

## 2021-03-11 NOTE — PLAN OF CARE
Problem: OCCUPATIONAL THERAPY ADULT  Goal: Performs self-care activities at highest level of function for planned discharge setting  See evaluation for individualized goals  Description: Treatment Interventions: ADL retraining, Functional transfer training, UE strengthening/ROM, Endurance training, Patient/family training, Equipment evaluation/education, Activityengagement          See flowsheet documentation for full assessment, interventions and recommendations  Note: Limitation: Decreased ADL status, Decreased UE strength, Decreased Safe judgement during ADL, Decreased endurance, Decreased self-care trans, Decreased high-level ADLs     Assessment:  Pt is a 66 y o  male seen for OT evaluation s/p admit to [facility] on 3/8/2021 w/ Cellulitis of right lower extremity  Comorbidities affecting pt's functional performance at time of assessment include: HTN, CVA, renal disorder, CHF, sleep apnea, DM, CAD, TIA, neuropathy, glaucoma  Personal factors affecting pt at time of IE include:difficulty performing ADLS, difficulty performing IADLS , limited insight into deficits, compliance, flat affect, decreased initiation and engagement  and health management   Prior to admission, pt was (A) with ADLs and IADLs with use of RW during mobility  Upon evaluation: Pt requires (A)-min (A) level with use of RW during mobility 2* the following deficits impacting occupational performance: weakness, decreased strength, decreased balance, decreased tolerance, impaired initiation, impaired problem solving, impulsivity, decreased safety awareness and increased pain  Pt to benefit from continued skilled OT tx while in the hospital to address deficits as defined above and maximize level of functional independence w ADL's and functional mobility  Occupational Performance areas to address include: grooming, bathing/shower, toilet hygiene, dressing, functional mobility, community mobility and clothing management   From OT standpoint, recommendation at time of d/c would be home with home health services  Pt's raw score on the AM-PAC Daily Activity inpatient short form is 17, standardized score is 37 26, less than 39 4   Patients at this level are likely to benefit from DC to post-acute rehab services, however, please refer to therapist recommendation for safe DC planning     OT Discharge Recommendation: Home with skilled therapy

## 2021-03-11 NOTE — ASSESSMENT & PLAN NOTE
· Outpatient Cardiology evaluation    ECG 12 lead  Order: [id number]  Status:  Final result   Visible to patient:  No (inaccessible in [employer])   Next appt:  03/29/2021 at 01:40 PM in Pulmonology ([address] [place] [address])   Ref Range & Units 3/8/21 1553   Ventricular Rate BPM 88    Atrial Rate BPM 91    GA Interval ms    QRSD Interval ms 146    QT Interval ms 400    QTC Interval ms 484    P Axis degrees    QRS Axis degrees -88    T Wave Axis degrees 50       Narrative & Impression    Sinus rhythm with 1st degree A-V block  Left axis deviation  Right bundle branch block  Abnormal ECG  Confirmed by Zoanne Sample ([id number]) on 3/9/2021 4:20:21 PM      Specimen Collected: 03/08/21 15:53   Last Resulted: 03/09/21 16:20

## 2021-03-12 VITALS
SYSTOLIC BLOOD PRESSURE: 155 MMHG | RESPIRATION RATE: 18 BRPM | TEMPERATURE: 97.4 F | HEIGHT: 66 IN | BODY MASS INDEX: 48.22 KG/M2 | HEART RATE: 75 BPM | OXYGEN SATURATION: 91 % | WEIGHT: 300.05 LBS | DIASTOLIC BLOOD PRESSURE: 83 MMHG

## 2021-03-12 LAB
ALBUMIN SERPL BCP-MCNC: 2.7 G/DL (ref 3.5–5)
ALP SERPL-CCNC: 137 U/L (ref 46–116)
ALT SERPL W P-5'-P-CCNC: 27 U/L (ref 12–78)
ANION GAP SERPL CALCULATED.3IONS-SCNC: 8 MMOL/L (ref 4–13)
AST SERPL W P-5'-P-CCNC: 18 U/L (ref 5–45)
BASOPHILS # BLD AUTO: 0.03 THOUSANDS/ΜL (ref 0–0.1)
BASOPHILS NFR BLD AUTO: 0 % (ref 0–1)
BILIRUB SERPL-MCNC: 0.4 MG/DL (ref 0.2–1)
BUN SERPL-MCNC: 26 MG/DL (ref 5–25)
CA-I BLD-SCNC: 1.19 MMOL/L (ref 1.12–1.32)
CALCIUM ALBUM COR SERPL-MCNC: 10.1 MG/DL (ref 8.3–10.1)
CALCIUM SERPL-MCNC: 9.1 MG/DL (ref 8.3–10.1)
CHLORIDE SERPL-SCNC: 105 MMOL/L (ref 100–108)
CO2 SERPL-SCNC: 27 MMOL/L (ref 21–32)
CREAT SERPL-MCNC: 1.28 MG/DL (ref 0.6–1.3)
EOSINOPHIL # BLD AUTO: 0.56 THOUSAND/ΜL (ref 0–0.61)
EOSINOPHIL NFR BLD AUTO: 7 % (ref 0–6)
ERYTHROCYTE [DISTWIDTH] IN BLOOD BY AUTOMATED COUNT: 14.3 % (ref 11.6–15.1)
GFR SERPL CREATININE-BSD FRML MDRD: 54 ML/MIN/1.73SQ M
GLUCOSE SERPL-MCNC: 157 MG/DL (ref 65–140)
GLUCOSE SERPL-MCNC: 175 MG/DL (ref 65–140)
GLUCOSE SERPL-MCNC: 206 MG/DL (ref 65–140)
HCT VFR BLD AUTO: 36.1 % (ref 36.5–49.3)
HGB BLD-MCNC: 11.1 G/DL (ref 12–17)
IMM GRANULOCYTES # BLD AUTO: 0.03 THOUSAND/UL (ref 0–0.2)
IMM GRANULOCYTES NFR BLD AUTO: 0 % (ref 0–2)
LYMPHOCYTES # BLD AUTO: 1.66 THOUSANDS/ΜL (ref 0.6–4.47)
LYMPHOCYTES NFR BLD AUTO: 20 % (ref 14–44)
MAGNESIUM SERPL-MCNC: 2 MG/DL (ref 1.6–2.6)
MCH RBC QN AUTO: 26.9 PG (ref 26.8–34.3)
MCHC RBC AUTO-ENTMCNC: 30.7 G/DL (ref 31.4–37.4)
MCV RBC AUTO: 88 FL (ref 82–98)
MONOCYTES # BLD AUTO: 0.66 THOUSAND/ΜL (ref 0.17–1.22)
MONOCYTES NFR BLD AUTO: 8 % (ref 4–12)
NEUTROPHILS # BLD AUTO: 5.38 THOUSANDS/ΜL (ref 1.85–7.62)
NEUTS SEG NFR BLD AUTO: 65 % (ref 43–75)
NRBC BLD AUTO-RTO: 0 /100 WBCS
PHOSPHATE SERPL-MCNC: 2.8 MG/DL (ref 2.3–4.1)
PLATELET # BLD AUTO: 225 THOUSANDS/UL (ref 149–390)
PMV BLD AUTO: 10.1 FL (ref 8.9–12.7)
POTASSIUM SERPL-SCNC: 4.3 MMOL/L (ref 3.5–5.3)
PROCALCITONIN SERPL-MCNC: 0.05 NG/ML
PROT SERPL-MCNC: 7.1 G/DL (ref 6.4–8.2)
RBC # BLD AUTO: 4.12 MILLION/UL (ref 3.88–5.62)
SODIUM SERPL-SCNC: 140 MMOL/L (ref 136–145)
WBC # BLD AUTO: 8.32 THOUSAND/UL (ref 4.31–10.16)

## 2021-03-12 PROCEDURE — 82330 ASSAY OF CALCIUM: CPT | Performed by: INTERNAL MEDICINE

## 2021-03-12 PROCEDURE — 82948 REAGENT STRIP/BLOOD GLUCOSE: CPT

## 2021-03-12 PROCEDURE — 83735 ASSAY OF MAGNESIUM: CPT | Performed by: INTERNAL MEDICINE

## 2021-03-12 PROCEDURE — 85025 COMPLETE CBC W/AUTO DIFF WBC: CPT | Performed by: INTERNAL MEDICINE

## 2021-03-12 PROCEDURE — 84145 PROCALCITONIN (PCT): CPT | Performed by: INTERNAL MEDICINE

## 2021-03-12 PROCEDURE — 84100 ASSAY OF PHOSPHORUS: CPT | Performed by: INTERNAL MEDICINE

## 2021-03-12 PROCEDURE — 80053 COMPREHEN METABOLIC PANEL: CPT | Performed by: INTERNAL MEDICINE

## 2021-03-12 RX ORDER — AMLODIPINE BESYLATE 5 MG/1
5 TABLET ORAL DAILY
Status: DISCONTINUED | OUTPATIENT
Start: 2021-03-12 | End: 2021-03-12

## 2021-03-12 RX ORDER — HYDROMORPHONE HCL/PF 1 MG/ML
1 SYRINGE (ML) INJECTION ONCE
Status: COMPLETED | OUTPATIENT
Start: 2021-03-12 | End: 2021-03-12

## 2021-03-12 RX ORDER — CEPHALEXIN 500 MG/1
500 CAPSULE ORAL EVERY 8 HOURS SCHEDULED
Qty: 21 CAPSULE | Refills: 0 | Status: SHIPPED | OUTPATIENT
Start: 2021-03-12 | End: 2021-03-19

## 2021-03-12 RX ORDER — ACETAMINOPHEN 325 MG/1
650 TABLET ORAL EVERY 6 HOURS PRN
Refills: 0
Start: 2021-03-12

## 2021-03-12 RX ORDER — LISINOPRIL 10 MG/1
10 TABLET ORAL DAILY
Qty: 30 TABLET | Refills: 0 | Status: SHIPPED | OUTPATIENT
Start: 2021-03-12 | End: 2021-04-12 | Stop reason: HOSPADM

## 2021-03-12 RX ORDER — AMLODIPINE BESYLATE 10 MG/1
10 TABLET ORAL DAILY
Status: DISCONTINUED | OUTPATIENT
Start: 2021-03-12 | End: 2021-03-12 | Stop reason: HOSPADM

## 2021-03-12 RX ORDER — FINASTERIDE 5 MG/1
5 TABLET, FILM COATED ORAL DAILY
Refills: 0
Start: 2021-03-12 | End: 2021-04-11

## 2021-03-12 RX ORDER — AMLODIPINE BESYLATE 10 MG/1
10 TABLET ORAL DAILY
Qty: 30 TABLET | Refills: 0 | Status: SHIPPED | OUTPATIENT
Start: 2021-03-12 | End: 2022-04-13 | Stop reason: HOSPADM

## 2021-03-12 RX ADMIN — OXYCODONE HYDROCHLORIDE 10 MG: 10 TABLET ORAL at 03:47

## 2021-03-12 RX ADMIN — OXYCODONE HYDROCHLORIDE 10 MG: 10 TABLET ORAL at 08:03

## 2021-03-12 RX ADMIN — INSULIN LISPRO 4 UNITS: 100 INJECTION, SOLUTION INTRAVENOUS; SUBCUTANEOUS at 12:43

## 2021-03-12 RX ADMIN — CEFEPIME HYDROCHLORIDE 2000 MG: 2 INJECTION, SOLUTION INTRAVENOUS at 06:14

## 2021-03-12 RX ADMIN — LISINOPRIL 10 MG: 10 TABLET ORAL at 10:05

## 2021-03-12 RX ADMIN — INSULIN LISPRO 2 UNITS: 100 INJECTION, SOLUTION INTRAVENOUS; SUBCUTANEOUS at 07:55

## 2021-03-12 RX ADMIN — Medication 2000 UNITS: at 10:03

## 2021-03-12 RX ADMIN — ASPIRIN 81 MG 81 MG: 81 TABLET ORAL at 10:05

## 2021-03-12 RX ADMIN — CARVEDILOL 12.5 MG: 12.5 TABLET, FILM COATED ORAL at 10:03

## 2021-03-12 RX ADMIN — LABETALOL HYDROCHLORIDE 10 MG: 5 INJECTION, SOLUTION INTRAVENOUS at 07:52

## 2021-03-12 RX ADMIN — AMLODIPINE BESYLATE 10 MG: 10 TABLET ORAL at 10:03

## 2021-03-12 RX ADMIN — ENOXAPARIN SODIUM 40 MG: 40 INJECTION SUBCUTANEOUS at 10:05

## 2021-03-12 RX ADMIN — HYDROMORPHONE HYDROCHLORIDE 1 MG: 1 INJECTION, SOLUTION INTRAMUSCULAR; INTRAVENOUS; SUBCUTANEOUS at 10:13

## 2021-03-12 RX ADMIN — GABAPENTIN 300 MG: 300 CAPSULE ORAL at 10:05

## 2021-03-12 NOTE — PLAN OF CARE
Problem: Potential for Falls  Goal: Patient will remain free of falls  Description: INTERVENTIONS:  - Assess patient frequently for physical needs  -  Identify cognitive and physical deficits and behaviors that affect risk of falls    -  [place] fall precautions as indicated by assessment   - Educate patient/family on patient safety including physical limitations  - Instruct patient to call for assistance with activity based on assessment  - Modify environment to reduce risk of injury  - Consider OT/PT consult to assist with strengthening/mobility  Outcome: Completed     Problem: Prexisting or High Potential for Compromised Skin Integrity  Goal: Skin integrity is maintained or improved  Description: INTERVENTIONS:  - Identify patients at risk for skin breakdown  - Assess and monitor skin integrity  - Assess and monitor nutrition and hydration status  - Monitor labs   - Assess for incontinence   - Turn and reposition patient  - Assist with mobility/ambulation  - Relieve pressure over bony prominences  - Avoid friction and shearing  - Provide appropriate hygiene as needed including keeping skin clean and dry  - Evaluate need for skin moisturizer/barrier cream  - Collaborate with interdisciplinary team   - Patient/family teaching  - Consider wound care consult   Outcome: Completed     Problem: SKIN/TISSUE INTEGRITY - ADULT  Goal: Skin integrity remains intact  Description: INTERVENTIONS  - Identify patients at risk for skin breakdown  - Assess and monitor skin integrity  - Assess and monitor nutrition and hydration status  - Monitor labs (i e  albumin)  - Assess for incontinence   - Turn and reposition patient  - Assist with mobility/ambulation  - Relieve pressure over bony prominences  - Avoid friction and shearing  - Provide appropriate hygiene as needed including keeping skin clean and dry  - Evaluate need for skin moisturizer/barrier cream  - Collaborate with interdisciplinary team (i e  Nutrition, Rehabilitation, etc )   - Patient/family teaching  Outcome: Completed     Problem: MUSCULOSKELETAL - ADULT  Goal: Maintain or return mobility to safest level of function  Description: INTERVENTIONS:  - Assess patient's ability to carry out ADLs; assess patient's baseline for ADL function and identify physical deficits which impact ability to perform ADLs (bathing, care of mouth/teeth, toileting, grooming, dressing, etc )  - Assess/evaluate cause of self-care deficits   - Assess range of motion  - Assess patient's mobility  - Assess patient's need for assistive devices and provide as appropriate  - Encourage maximum independence but intervene and supervise when necessary  - Involve family in performance of ADLs  - Assess for home care needs following discharge   - Consider OT consult to assist with ADL evaluation and planning for discharge  - Provide patient education as appropriate  Outcome: Completed     Problem: PAIN - ADULT  Goal: Verbalizes/displays adequate comfort level or baseline comfort level  Description: Interventions:  - Encourage patient to monitor pain and request assistance  - Assess pain using appropriate pain scale  - Administer analgesics based on type and severity of pain and evaluate response  - Implement non-pharmacological measures as appropriate and evaluate response  - Consider cultural and social influences on pain and pain management  - Notify physician/advanced practitioner if interventions unsuccessful or patient reports new pain  Outcome: Completed     Problem: INFECTION - ADULT  Goal: Absence or prevention of progression during hospitalization  Description: INTERVENTIONS:  - Assess and monitor for signs and symptoms of infection  - Monitor lab/diagnostic results  - Monitor all insertion sites, i e  indwelling lines, tubes, and drains  - Monitor endotracheal if appropriate and nasal secretions for changes in amount and color  - [place] appropriate cooling/warming therapies per order  - Administer medications as ordered  - Instruct and encourage patient and family to use good hand hygiene technique  - Identify and instruct in appropriate isolation precautions for identified infection/condition  Outcome: Completed     Problem: SAFETY ADULT  Goal: Patient will remain free of falls  Description: INTERVENTIONS:  - Assess patient frequently for physical needs  -  Identify cognitive and physical deficits and behaviors that affect risk of falls    -  [place] fall precautions as indicated by assessment   - Educate patient/family on patient safety including physical limitations  - Instruct patient to call for assistance with activity based on assessment  - Modify environment to reduce risk of injury  - Consider OT/PT consult to assist with strengthening/mobility  Outcome: Completed  Goal: Maintain or return to baseline ADL function  Description: INTERVENTIONS:  -  Assess patient's ability to carry out ADLs; assess patient's baseline for ADL function and identify physical deficits which impact ability to perform ADLs (bathing, care of mouth/teeth, toileting, grooming, dressing, etc )  - Assess/evaluate cause of self-care deficits   - Assess range of motion  - Assess patient's mobility; develop plan if impaired  - Assess patient's need for assistive devices and provide as appropriate  - Encourage maximum independence but intervene and supervise when necessary  - Involve family in performance of ADLs  - Assess for home care needs following discharge   - Consider OT consult to assist with ADL evaluation and planning for discharge  - Provide patient education as appropriate  Outcome: Completed  Goal: Maintain or return mobility status to optimal level  Description: INTERVENTIONS:  - Assess patient's baseline mobility status (ambulation, transfers, stairs, etc )    - Identify cognitive and physical deficits and behaviors that affect mobility  - Identify mobility aids required to assist with transfers and/or ambulation (gait belt, sit-to-stand, lift, walker, cane, etc )  - [place] fall precautions as indicated by assessment  - Record patient progress and toleration of activity level on Mobility SBAR; progress patient to next Phase/Stage  - Instruct patient to call for assistance with activity based on assessment  - Consider rehabilitation consult to assist with strengthening/weightbearing, etc   Outcome: Completed     Problem: DISCHARGE PLANNING  Goal: Discharge to home or other facility with appropriate resources  Description: INTERVENTIONS:  - Identify barriers to discharge w/patient and caregiver  - Arrange for needed discharge resources and transportation as appropriate  - Identify discharge learning needs (meds, wound care, etc )  - Arrange for interpretive services to assist at discharge as needed  - Refer to Case Management Department for coordinating discharge planning if the patient needs post-hospital services based on physician/advanced practitioner order or complex needs related to functional status, cognitive ability, or social support system  Outcome: Completed     Problem: Knowledge Deficit  Goal: Patient/family/caregiver demonstrates understanding of disease process, treatment plan, medications, and discharge instructions  Description: Complete learning assessment and assess knowledge base    Interventions:  - Provide teaching at level of understanding  - Provide teaching via preferred learning methods  Outcome: Completed

## 2021-03-12 NOTE — CASE MANAGEMENT
Pt is being discharged today  Pt's wife will give the patient a ride home   I called [facility] and notified them of discharge  I [facility]ed discharge instructions to [facility]  I in basket messaged endocrine, cardiology and wound clinic to call patient at home with follow up appointment  Pt already has a follow up appointment with Pulmonary and Urology  AVS and discharge instructions reviewed with patient with good understanding  Case Management reviewed discharge planning process including the following: identifying help that is needed at home, pt's preference for discharge needs and Meds at [facility]  Reviewed with Pt that any member of the healthcare team can answer questions regarding : medications, jmportance of recognizing  Signs and symptoms of any  medical problems  Case Management also encouraged pt to follow up with all recommended appointments after discharge

## 2021-03-12 NOTE — PROGRESS NOTES
Progress Note - Wound   Hyun Bradley 66 y o  male MRN: [id number]  Unit/Bed#: 408-01 Encounter: [id number]    Assessment:     1   Venous ulcer right anterior leg, edema/venous insufficiency  2  Cellulitis right leg - resolved        Plan:  VSS afebrile, CBC normal this AM, procalcitonin normal   Continue IV Cefepime  Dressing removed and wound cleansed with NSS, no remaining localized surrounding cellulitis noted  Dressing change performed today with Adaptic/DSD applied, continue to change daily  Patient will need home care nurses on discharge and possible follow-up at wound center  Instructed on leg elevation, low sodium diet etc   Likely discharge today  Wound site should not be causing a degree of pain whereby patient can't be discharged on PO pain medication         History of Present Illness     HPI:  Hyun Bradley is a 66 y o  male who presents with a wound located at right anterior leg  The wound has been present for less than 1week(s)  The wound is from edema blister secondary to venous insufficiency and subsequently became infection  Admitted through ER for IV antibiotics and wound evaluation  No acute complaints other than pain last evening  Objective:    Vitals: Blood pressure 160/78, pulse 77, temperature (!) 97 4 °F (36 3 °C), temperature source Oral, resp  rate 18, height 5' 6" (1 676 m), weight (!) 136 kg (300 lb 0 7 oz), SpO2 92 %  ,Body mass index is 48 43 kg/m²  Physical Exam:    Derm:  Wound as measured and described above, granular wound bed, no necrotic tissue noted  No remaining periwound erythema  Vascular:  Pulses non-palpable due to edema  CFT normal   Skin temp normal   Neuro:  Protective sensation intact bilaterally  Ortho:  No deformities, MMT normal    Lab, Imaging and other studies: I have personally reviewed pertinent reports        Wound 06/30/19 Pressure Injury Buttocks Right (Active)       Wound 03/09/21 Venous Ulcer Pretibial Right;Upper;Proximal (Active) Wound Description Beefy red 03/11/21 1945   Mary-wound Assessment Pink;Fragile 03/11/21 1945   Wound Length (cm) 6 cm 03/09/21 1214   Wound Width (cm) 5 cm 03/09/21 1214   Wound Surface Area (cm^2) 30 cm^2 03/09/21 1214   Drainage Amount Small 03/11/21 1945   Drainage Description Serosanguineous 03/11/21 1945   Treatments Irrigation with NSS 03/11/21 1945   Dressing ABD;Hydrocolloid;Dry dressing 03/11/21 1945   Dressing Changed New 03/11/21 1945   Patient Tolerance Tolerated well 03/11/21 1945   Dressing Status Clean;Dry; Intact 03/11/21 1945

## 2021-03-12 NOTE — NURSING NOTE
Patient discharged in stable condition via wheelchair to home  Prescriptions for blood work given to patient  RN explained AVS to patient and he verbalized understanding

## 2021-03-12 NOTE — PROGRESS NOTES
Vancomycin IV Pharmacy-to-Dose Consultation    Nancy Anderson is a 66 y o  male who is currently receiving Vancomycin IV with management by the Pharmacy Consult service  Assessment/Plan:  The patient was reviewed  Renal function is stable and no signs or symptoms of nephrotoxicity and/or infusion reactions were documented in the chart  Based on todays assessment, continue current vancomycin (day # 4) dosing of Vanco , with a plan for trough to be drawn tentatively on Saturday if patient remains on vanco  He currently has no IV Access  We will continue to follow the patients culture results and clinical progress daily      Vicki Beltre, Pharmacist

## 2021-03-13 LAB — BACTERIA BLD CULT: NORMAL

## 2021-03-14 LAB — BACTERIA BLD CULT: NORMAL

## 2021-03-15 NOTE — UTILIZATION REVIEW
Notification of Discharge  This is a Notification of Discharge from our facility [address]  Please be advised that this patient has been discharge from our facility  Below you will find the admission and discharge date and time including the patients disposition  PRESENTATION DATE: 3/8/2021  2:30 PM  OBS ADMISSION DATE:   IP ADMISSION DATE: 3/8/21 1659   DISCHARGE DATE: 3/12/2021  2:15 PM  DIS[facility]   Admission Orders listed below:  Admission Orders (From admission, onward)     Ordered        03/08/21 1659  Inpatient Admission  Once                   Please contact the UR Department if additional information is required to close this patient's authorization/case  [facility] Utilization Review Department  Main: [phone] x carefully listen to the prompts  All voicemails are confidential   [email or web address]  Send all requests for admission clinical reviews, approved or denied determinations and any other requests to dedicated fax number below belonging to the campus where the patient is receiving treatment   List of dedicated fax numbers:  [address] DENIALS (Administrative/Medical Necessity) [phone]   [address] (Maternity/NICU/Pediatrics) [phone]   [place] Notice [phone]   Jair Nicholson [phone]   Sarthak Alberto [phone]   Dea Dhaliwal[address] [phone]   [facility]  [phone]   [address] [facility] [address]  [address] [address] [address] [facility] [phone]

## 2021-03-17 ENCOUNTER — APPOINTMENT (OUTPATIENT)
Dept: LAB | Facility: HOSPITAL | Age: 76
End: 2021-03-17
Attending: INTERNAL MEDICINE
Payer: COMMERCIAL

## 2021-03-17 DIAGNOSIS — I50.32 CHRONIC DIASTOLIC CHF (CONGESTIVE HEART FAILURE) (HCC): ICD-10-CM

## 2021-03-17 DIAGNOSIS — E34.9 ELEVATED PARATHYROID HORMONE: ICD-10-CM

## 2021-03-17 DIAGNOSIS — Z79.4 TYPE 2 DIABETES MELLITUS WITH HYPERGLYCEMIA, WITH LONG-TERM CURRENT USE OF INSULIN (HCC): ICD-10-CM

## 2021-03-17 DIAGNOSIS — I10 ACCELERATED HYPERTENSION: ICD-10-CM

## 2021-03-17 DIAGNOSIS — E11.65 TYPE 2 DIABETES MELLITUS WITH HYPERGLYCEMIA, WITH LONG-TERM CURRENT USE OF INSULIN (HCC): ICD-10-CM

## 2021-03-17 DIAGNOSIS — E83.52 HYPERCALCEMIA: ICD-10-CM

## 2021-03-17 DIAGNOSIS — S81.801D OPEN WOUND OF RIGHT LOWER EXTREMITY, SUBSEQUENT ENCOUNTER: ICD-10-CM

## 2021-03-17 DIAGNOSIS — L03.115 CELLULITIS OF RIGHT LOWER EXTREMITY: ICD-10-CM

## 2021-03-17 LAB
ALBUMIN SERPL BCP-MCNC: 3.2 G/DL (ref 3.5–5)
ALP SERPL-CCNC: 165 U/L (ref 46–116)
ALT SERPL W P-5'-P-CCNC: 44 U/L (ref 12–78)
ANION GAP SERPL CALCULATED.3IONS-SCNC: 6 MMOL/L (ref 4–13)
AST SERPL W P-5'-P-CCNC: 20 U/L (ref 5–45)
BASOPHILS # BLD AUTO: 0.05 THOUSANDS/ΜL (ref 0–0.1)
BASOPHILS NFR BLD AUTO: 1 % (ref 0–1)
BILIRUB SERPL-MCNC: 0.3 MG/DL (ref 0.2–1)
BUN SERPL-MCNC: 30 MG/DL (ref 5–25)
CALCIUM ALBUM COR SERPL-MCNC: 9.9 MG/DL (ref 8.3–10.1)
CALCIUM SERPL-MCNC: 9.3 MG/DL (ref 8.3–10.1)
CHLORIDE SERPL-SCNC: 105 MMOL/L (ref 100–108)
CO2 SERPL-SCNC: 29 MMOL/L (ref 21–32)
CREAT SERPL-MCNC: 1.37 MG/DL (ref 0.6–1.3)
EOSINOPHIL # BLD AUTO: 0.81 THOUSAND/ΜL (ref 0–0.61)
EOSINOPHIL NFR BLD AUTO: 8 % (ref 0–6)
ERYTHROCYTE [DISTWIDTH] IN BLOOD BY AUTOMATED COUNT: 14.4 % (ref 11.6–15.1)
GFR SERPL CREATININE-BSD FRML MDRD: 49 ML/MIN/1.73SQ M
GLUCOSE P FAST SERPL-MCNC: 166 MG/DL (ref 65–99)
HCT VFR BLD AUTO: 40.8 % (ref 36.5–49.3)
HGB BLD-MCNC: 12.4 G/DL (ref 12–17)
IMM GRANULOCYTES # BLD AUTO: 0.06 THOUSAND/UL (ref 0–0.2)
IMM GRANULOCYTES NFR BLD AUTO: 1 % (ref 0–2)
LYMPHOCYTES # BLD AUTO: 2.05 THOUSANDS/ΜL (ref 0.6–4.47)
LYMPHOCYTES NFR BLD AUTO: 19 % (ref 14–44)
MAGNESIUM SERPL-MCNC: 1.8 MG/DL (ref 1.6–2.6)
MCH RBC QN AUTO: 26.9 PG (ref 26.8–34.3)
MCHC RBC AUTO-ENTMCNC: 30.4 G/DL (ref 31.4–37.4)
MCV RBC AUTO: 89 FL (ref 82–98)
MONOCYTES # BLD AUTO: 0.64 THOUSAND/ΜL (ref 0.17–1.22)
MONOCYTES NFR BLD AUTO: 6 % (ref 4–12)
NEUTROPHILS # BLD AUTO: 7.18 THOUSANDS/ΜL (ref 1.85–7.62)
NEUTS SEG NFR BLD AUTO: 65 % (ref 43–75)
NRBC BLD AUTO-RTO: 0 /100 WBCS
PLATELET # BLD AUTO: 262 THOUSANDS/UL (ref 149–390)
PMV BLD AUTO: 9.8 FL (ref 8.9–12.7)
POTASSIUM SERPL-SCNC: 4.6 MMOL/L (ref 3.5–5.3)
PROT SERPL-MCNC: 7.9 G/DL (ref 6.4–8.2)
RBC # BLD AUTO: 4.61 MILLION/UL (ref 3.88–5.62)
SODIUM SERPL-SCNC: 140 MMOL/L (ref 136–145)
WBC # BLD AUTO: 10.79 THOUSAND/UL (ref 4.31–10.16)

## 2021-03-17 PROCEDURE — 85025 COMPLETE CBC W/AUTO DIFF WBC: CPT

## 2021-03-17 PROCEDURE — 80053 COMPREHEN METABOLIC PANEL: CPT

## 2021-03-17 PROCEDURE — 83735 ASSAY OF MAGNESIUM: CPT

## 2021-03-17 PROCEDURE — 36415 COLL VENOUS BLD VENIPUNCTURE: CPT

## 2021-03-19 LAB — PTH RELATED PROT SERPL-SCNC: <2 PMOL/L

## 2021-03-29 ENCOUNTER — OFFICE VISIT (OUTPATIENT)
Dept: PULMONOLOGY | Facility: CLINIC | Age: 76
End: 2021-03-29
Payer: COMMERCIAL

## 2021-03-29 ENCOUNTER — TELEPHONE (OUTPATIENT)
Dept: PULMONOLOGY | Facility: CLINIC | Age: 76
End: 2021-03-29

## 2021-03-29 VITALS
TEMPERATURE: 99.2 F | BODY MASS INDEX: 50.62 KG/M2 | HEART RATE: 78 BPM | WEIGHT: 315 LBS | DIASTOLIC BLOOD PRESSURE: 71 MMHG | SYSTOLIC BLOOD PRESSURE: 146 MMHG | HEIGHT: 66 IN | OXYGEN SATURATION: 93 %

## 2021-03-29 DIAGNOSIS — R91.8 MULTIPLE PULMONARY NODULES: Primary | ICD-10-CM

## 2021-03-29 DIAGNOSIS — G47.33 OSA (OBSTRUCTIVE SLEEP APNEA): ICD-10-CM

## 2021-03-29 DIAGNOSIS — F17.211 CIGARETTE NICOTINE DEPENDENCE IN REMISSION: ICD-10-CM

## 2021-03-29 DIAGNOSIS — E66.01 MORBID OBESITY WITH BMI OF 50.0-59.9, ADULT (HCC): ICD-10-CM

## 2021-03-29 DIAGNOSIS — R06.00 DYSPNEA ON EXERTION: ICD-10-CM

## 2021-03-29 DIAGNOSIS — L03.115 CELLULITIS OF RIGHT LOWER EXTREMITY: ICD-10-CM

## 2021-03-29 PROCEDURE — 99214 OFFICE O/P EST MOD 30 MIN: CPT | Performed by: PHYSICIAN ASSISTANT

## 2021-03-29 NOTE — PROGRESS NOTES
Pulmonary Follow Up Note   Rigo Hernandez 64 y o  male MRN: [id number]  3/29/2021      Assessment:    Multiple pulmonary nodules   Reviewed results of most recent CT chest with patient today in the office  He has multiple pulmonary nodules largest measuring 9 mm in left lower lobe  Recommend repeat CT chest in 6 months to document stability  BHARGAVI (obstructive sleep apnea)  Patient has a history of obstructive sleep apnea but no diagnostics for me to review and per patient is been longer than 12 months since his last sleep study  Recommend split study now and pending results restarting PAP therapy  Cigarette nicotine dependence in remission  Patient has a long history of smoking with sustained remission the last 4 years  Nonetheless, his smoking history and symptoms raise concern for COPD  Will check PFTs to check for any degree of obstruction and if so consider inhalers in the future  Dyspnea on exertion  Multifactorial in the setting of morbid obesity, deconditioning, CHF, and suspected COPD  Will check PFTs now to determine if there is any degree of obstruction before starting inhalers as patient does not feel like his breathing is his primary issue at this time  Morbid obesity with BMI of 50 0-59 9, adult (HCC)  Weight loss encouraged     Cellulitis of right lower extremity  Recommend he follow-up with his PCP      Plan:    Diagnoses and all orders for this visit:    Multiple pulmonary nodules  -     Ambulatory referral to Pulmonology  -     CT chest wo contrast; Future    BHARGAVI (obstructive sleep apnea)  -     Split Study; Future    Cigarette nicotine dependence in remission  -     Complete PFT with post bronchodilator; Future    Dyspnea on exertion  -     Complete PFT with post bronchodilator; Future    Morbid obesity with BMI of 50 0-59 9, adult (HCC)    Cellulitis of right lower extremity    Other orders  -     Cancel: Complete PFT with post bronchodilator;  Future  -     Cancel: Split Study; Future  -     Cancel: CT chest wo contrast; Future        Return in about 6 months (around 9/29/2021)  History of Present Illness   HPI:  Latia Ramachandran is a 64 y o  male who   Presents to the office today for hospital follow-up  Patient has a past medical history positive for diabetes mellitus type 2, obstructive sleep apnea, morbid obesity, hypertension, hyperlipidemia, CAD, CHF, nicotine dependence in remission with a quit date 40 years ago  Patient was recently hospitalized at [facility] [place] [facility] [place]-3/11 for  Right lower extremity open wound with cellulitis  During hospitalization, pulmonary was consulted for dyspnea on exertion and abnormal CT chest secondary to multiple pulmonary nodules  Since discharge, patient reports that his breathing is fine and denies SOB at rest  He endorses chronic MENDOZA and mild cough without wheeze  When he does cough he will occasionally bring up clear phlegm  No fevers, chills, headache, dizziness  No chest pain, palpitations, hemoptysis  Patient has chronic lower extremity swelling especially in his right leg  He still has significant pain follow antibiotics in the hospital      He has history of BHARGAVI, noncompliant on his CPAP for the 6 months stating that it makes him feel like he is suffocating  He is a former smoker with a quit date 4 years ago  Review of Systems   All other systems reviewed and are negative        Historical Information   Past Medical History:   Diagnosis Date    Cataract     CHF (congestive heart failure) (HCC)     chronic diastolic CHF    Coronary artery disease     Diabetes mellitus ([address] )     Glaucoma     Hyperlipidemia     Hypertension     Neuropathy     Obesity     Renal disorder     chronic kidney disease stage 3     Sleep apnea     Stroke ([address] )     TIA (transient ischemic attack)     Uncontrolled type 2 diabetes mellitus with hyperglycemia, with long-term current use of insulin ([address] ) 10/4/2018     Past Surgical History:   Procedure Laterality Date    APPENDECTOMY      CARPAL TUNNEL RELEASE      EYE SURGERY      cataracts     Family History   Problem Relation Age of Onset    No Known Problems Mother     No Known Problems Father        Social History     Tobacco Use   Smoking Status Former Smoker    Types: Cigars   Smokeless Tobacco Former User         Meds/Allergies     Current Outpatient Medications:     acetaminophen (TYLENOL) 325 mg tablet, Take 2 tablets (650 mg total) by mouth every 6 (six) hours as needed for mild pain, headaches or fever, Disp:  , Rfl: 0    amLODIPine (NORVASC) 10 mg tablet, Take 1 tablet (10 mg total) by mouth daily, Disp: 30 tablet, Rfl: 0    aspirin 81 MG tablet, Take 81 mg by mouth daily, Disp: , Rfl:     atorvastatin (LIPITOR) 40 mg tablet, Take 1 tablet (40 mg total) by mouth daily after dinner, Disp: , Rfl:     BD PEN NEEDLE JUNIOR U/F 32G X 4 MM MISC, , Disp: , Rfl: 0    carvedilol (COREG) 12 5 mg tablet, Take 1 tablet (12 5 mg total) by mouth 2 (two) times a day, Disp: , Rfl:     cholecalciferol 2000 units TABS, Take 1 tablet (2,000 Units total) by mouth daily, Disp: , Rfl: 0    finasteride (PROSCAR) 5 mg tablet, Take 1 tablet (5 mg total) by mouth daily, Disp: , Rfl: 0    gabapentin (NEURONTIN) 600 MG tablet, Take 1 tablet (600 mg total) by mouth 2 (two) times a day, Disp: , Rfl: 0    glucagon (GLUCAGON EMERGENCY) 1 MG injection, Inject 1 mg under the skin once as needed (PRN blood glucose less than 70 if unconscious or uncorrectable by oral means) for up to 1 dose, Disp: , Rfl: 0    insulin glargine (LANTUS) 100 units/mL subcutaneous injection, Inject 20 Units under the skin 2 (two) times a day for 14 days, Disp: 560 Units, Rfl: 0    lisinopril (ZESTRIL) 10 mg tablet, Take 1 tablet (10 mg total) by mouth daily, Disp: 30 tablet, Rfl: 0    nystatin (MYCOSTATIN) powder, Apply topically 2 (two) times a day, Disp: 15 g, Rfl: 0    oxyCODONE (ROXICODONE) 10 MG TABS, Take 1 tablet (10 mg total) by mouth every 4 (four) hours as needed for moderate pain or severe painMax Daily Amount: 60 mg (Patient taking differently: Take 20 mg by mouth every 6 (six) hours as needed for moderate pain or severe pain ), Disp: 10 tablet, Rfl: 0    Semaglutide (OZEMPIC, 0 25 OR 0 5 MG/DOSE, SC), Inject 0 5 mg under the skin once a week, Disp: , Rfl:     tamsulosin (FLOMAX) 0 4 mg, Take 0 4 mg by mouth every evening  , Disp: , Rfl:   No Known Allergies    Vitals: Blood pressure 146/71, pulse 78, temperature 99 2 °F (37 3 °C), temperature source Tympanic, height 5' 6" (1 676 m), weight (!) 144 kg (317 lb 6 4 oz), SpO2 93 %  Body mass index is 51 23 kg/m²  Oxygen Therapy  SpO2: 93 %  Oxygen Therapy: None (Room air)    Physical Exam  Physical Exam  Vitals signs reviewed  Constitutional:       Appearance: Normal appearance  He is well-developed  He is obese  HENT:      Head: Normocephalic and atraumatic  Right Ear: External ear normal       Left Ear: External ear normal    Eyes:      Extraocular Movements: Extraocular movements intact  Pupils: Pupils are equal, round, and reactive to light  Neck:      Musculoskeletal: Normal range of motion and neck supple  Cardiovascular:      Rate and Rhythm: Normal rate and regular rhythm  Pulses: Normal pulses  Heart sounds: Normal heart sounds  No murmur  Pulmonary:      Effort: Pulmonary effort is normal  No respiratory distress  Breath sounds: Normal breath sounds  No stridor  No wheezing, rhonchi or rales  Abdominal:      Palpations: Abdomen is soft  Tenderness: There is no abdominal tenderness  Hernia: No hernia is present  Musculoskeletal: Normal range of motion  Skin:     General: Skin is warm and dry  Capillary Refill: Capillary refill takes less than 2 seconds  Neurological:      General: No focal deficit present  Mental Status: He is alert and oriented to person, place, and time   Mental status is at baseline  Psychiatric:         Behavior: Behavior normal          Thought Content: Thought content normal          Judgment: Judgment normal          Labs: I have personally reviewed pertinent lab results  , ABG: No results found for: PHART, LMN2HCC, PO2ART, OLI2PRV, B8TVIHWE, BEART, SOURCE, BNP: No results found for: BNP, CBC: No results found for: WBC, HGB, HCT, MCV, PLT, ADJUSTEDWBC, MCH, MCHC, RDW, MPV, NRBC, CMP: No results found for: SODIUM, K, CL, CO2, ANIONGAP, BUN, CREATININE, GLUCOSE, CALCIUM, AST, ALT, ALKPHOS, PROT, BILITOT, EGFR, PT/INR: No results found for: PT, INR, Troponin: No results found for: TROPONINI  Lab Results   Component Value Date    WBC 10 79 (H) 03/17/2021    HGB 12 4 03/17/2021    HCT 40 8 03/17/2021    MCV 89 03/17/2021     03/17/2021     Lab Results   Component Value Date    GLUCOSE 248 (H) 12/03/2015    CALCIUM 9 3 03/17/2021     12/03/2015    K 4 6 03/17/2021    CO2 29 03/17/2021     03/17/2021    BUN 30 (H) 03/17/2021    CREATININE 1 37 (H) 03/17/2021     No results found for: IGE  Lab Results   Component Value Date    ALT 44 03/17/2021    AST 20 03/17/2021    ALKPHOS 165 (H) 03/17/2021    BILITOT 0 43 11/30/2015       Imaging and other studies: I have personally reviewed pertinent reports  and I have personally reviewed pertinent films in PACS       CTA chest PE study 03/09/2021   No PE  Multiple pulmonary nodules noted, the largest measuring 9 mm in the LLL  Pulmonary function testing: none to review     Other Studies: I have personally reviewed pertinent reports  Echocardiogram 03/09/2021   EF 60%  Grade 2 diastolic dysfunction    Right ventricular size and systolic function normal

## 2021-03-29 NOTE — ASSESSMENT & PLAN NOTE
Patient has a long history of smoking with sustained remission the last 4 years  Nonetheless, his smoking history and symptoms raise concern for COPD  Will check PFTs to check for any degree of obstruction and if so consider inhalers in the future

## 2021-03-29 NOTE — ASSESSMENT & PLAN NOTE
Reviewed results of most recent CT chest with patient today in the office  He has multiple pulmonary nodules largest measuring 9 mm in left lower lobe  Recommend repeat CT chest in 6 months to document stability

## 2021-03-29 NOTE — ASSESSMENT & PLAN NOTE
Patient has a history of obstructive sleep apnea but no diagnostics for me to review and per patient is been longer than 12 months since his last sleep study  Recommend split study now and pending results restarting PAP therapy

## 2021-03-29 NOTE — ASSESSMENT & PLAN NOTE
Multifactorial in the setting of morbid obesity, deconditioning, CHF, and suspected COPD  Will check PFTs now to determine if there is any degree of obstruction before starting inhalers as patient does not feel like his breathing is his primary issue at this time

## 2021-04-05 ENCOUNTER — HOSPITAL ENCOUNTER (INPATIENT)
Facility: HOSPITAL | Age: 76
LOS: 6 days | Discharge: HOME HEALTH | DRG: 682 | End: 2021-04-12
Attending: EMERGENCY MEDICINE | Admitting: INTERNAL MEDICINE
Payer: COMMERCIAL

## 2021-04-05 ENCOUNTER — APPOINTMENT (EMERGENCY)
Dept: CT IMAGING | Facility: HOSPITAL | Age: 76
DRG: 682 | End: 2021-04-05
Payer: COMMERCIAL

## 2021-04-05 ENCOUNTER — APPOINTMENT (EMERGENCY)
Dept: RADIOLOGY | Facility: HOSPITAL | Age: 76
DRG: 682 | End: 2021-04-05
Payer: COMMERCIAL

## 2021-04-05 ENCOUNTER — APPOINTMENT (EMERGENCY)
Dept: NON INVASIVE DIAGNOSTICS | Facility: HOSPITAL | Age: 76
DRG: 682 | End: 2021-04-05
Payer: COMMERCIAL

## 2021-04-05 DIAGNOSIS — N17.9 AKI (ACUTE KIDNEY INJURY) (HCC): ICD-10-CM

## 2021-04-05 DIAGNOSIS — Q61.02 MULTIPLE RENAL CYSTS: ICD-10-CM

## 2021-04-05 DIAGNOSIS — N20.0 RENAL CALCULUS: ICD-10-CM

## 2021-04-05 DIAGNOSIS — L03.119 LOWER EXTREMITY CELLULITIS: ICD-10-CM

## 2021-04-05 DIAGNOSIS — R94.31 ABNORMAL EKG: ICD-10-CM

## 2021-04-05 DIAGNOSIS — I10 ACCELERATED HYPERTENSION: ICD-10-CM

## 2021-04-05 DIAGNOSIS — E11.65 TYPE 2 DIABETES MELLITUS WITH HYPERGLYCEMIA, WITH LONG-TERM CURRENT USE OF INSULIN (HCC): ICD-10-CM

## 2021-04-05 DIAGNOSIS — N17.9 ACUTE KIDNEY INJURY (HCC): ICD-10-CM

## 2021-04-05 DIAGNOSIS — R09.02 HYPOXEMIA: Primary | ICD-10-CM

## 2021-04-05 DIAGNOSIS — G47.34 NOCTURNAL HYPOXIA: ICD-10-CM

## 2021-04-05 DIAGNOSIS — R26.81 GAIT INSTABILITY: ICD-10-CM

## 2021-04-05 DIAGNOSIS — R29.898 LOWER EXTREMITY WEAKNESS: ICD-10-CM

## 2021-04-05 DIAGNOSIS — G47.33 OBSTRUCTIVE SLEEP APNEA: ICD-10-CM

## 2021-04-05 DIAGNOSIS — Z79.4 TYPE 2 DIABETES MELLITUS WITH HYPERGLYCEMIA, WITH LONG-TERM CURRENT USE OF INSULIN (HCC): ICD-10-CM

## 2021-04-05 DIAGNOSIS — I50.33 ACUTE ON CHRONIC DIASTOLIC CHF (CONGESTIVE HEART FAILURE) (HCC): ICD-10-CM

## 2021-04-05 PROBLEM — R59.0 INGUINAL ADENOPATHY: Status: ACTIVE | Noted: 2021-04-05

## 2021-04-05 PROBLEM — J90 BILATERAL PLEURAL EFFUSION: Status: ACTIVE | Noted: 2021-04-05

## 2021-04-05 PROBLEM — I87.2 VENOUS STASIS DERMATITIS OF BOTH LOWER EXTREMITIES: Status: ACTIVE | Noted: 2021-04-05

## 2021-04-05 PROBLEM — J98.11 ATELECTASIS: Status: ACTIVE | Noted: 2021-04-05

## 2021-04-05 LAB
ALBUMIN SERPL BCP-MCNC: 3.1 G/DL (ref 3.5–5)
ALP SERPL-CCNC: 169 U/L (ref 46–116)
ALT SERPL W P-5'-P-CCNC: 24 U/L (ref 12–78)
ANION GAP SERPL CALCULATED.3IONS-SCNC: 6 MMOL/L (ref 4–13)
APTT PPP: 28 SECONDS (ref 23–37)
ARTERIAL PATENCY WRIST A: YES
AST SERPL W P-5'-P-CCNC: 19 U/L (ref 5–45)
ATRIAL RATE: 75 BPM
BACTERIA UR QL AUTO: ABNORMAL /HPF
BASE EXCESS BLDA CALC-SCNC: -2 MMOL/L
BASOPHILS # BLD AUTO: 0.03 THOUSANDS/ΜL (ref 0–0.1)
BASOPHILS NFR BLD AUTO: 0 % (ref 0–1)
BILIRUB SERPL-MCNC: 0.31 MG/DL (ref 0.2–1)
BILIRUB UR QL STRIP: NEGATIVE
BUN SERPL-MCNC: 32 MG/DL (ref 5–25)
CALCIUM ALBUM COR SERPL-MCNC: 9.8 MG/DL (ref 8.3–10.1)
CALCIUM SERPL-MCNC: 9.1 MG/DL (ref 8.3–10.1)
CHLORIDE SERPL-SCNC: 106 MMOL/L (ref 100–108)
CLARITY UR: CLEAR
CO2 SERPL-SCNC: 28 MMOL/L (ref 21–32)
COLOR UR: YELLOW
CREAT SERPL-MCNC: 2.16 MG/DL (ref 0.6–1.3)
D DIMER PPP FEU-MCNC: 1.61 UG/ML FEU
EOSINOPHIL # BLD AUTO: 0.23 THOUSAND/ΜL (ref 0–0.61)
EOSINOPHIL NFR BLD AUTO: 3 % (ref 0–6)
ERYTHROCYTE [DISTWIDTH] IN BLOOD BY AUTOMATED COUNT: 15.1 % (ref 11.6–15.1)
FLUAV RNA RESP QL NAA+PROBE: NEGATIVE
FLUBV RNA RESP QL NAA+PROBE: NEGATIVE
GFR SERPL CREATININE-BSD FRML MDRD: 28 ML/MIN/1.73SQ M
GLUCOSE SERPL-MCNC: 144 MG/DL (ref 65–140)
GLUCOSE SERPL-MCNC: 85 MG/DL (ref 65–140)
GLUCOSE UR STRIP-MCNC: NEGATIVE MG/DL
HCO3 BLDA-SCNC: 23.4 MMOL/L (ref 22–28)
HCT VFR BLD AUTO: 36.2 % (ref 36.5–49.3)
HGB BLD-MCNC: 11.1 G/DL (ref 12–17)
HGB UR QL STRIP.AUTO: NEGATIVE
IMM GRANULOCYTES # BLD AUTO: 0.04 THOUSAND/UL (ref 0–0.2)
IMM GRANULOCYTES NFR BLD AUTO: 1 % (ref 0–2)
INR PPP: 1.13 (ref 0.84–1.19)
KETONES UR STRIP-MCNC: NEGATIVE MG/DL
LACTATE SERPL-SCNC: 1.4 MMOL/L (ref 0.5–2)
LEUKOCYTE ESTERASE UR QL STRIP: ABNORMAL
LYMPHOCYTES # BLD AUTO: 1.67 THOUSANDS/ΜL (ref 0.6–4.47)
LYMPHOCYTES NFR BLD AUTO: 19 % (ref 14–44)
MAGNESIUM SERPL-MCNC: 2.1 MG/DL (ref 1.6–2.6)
MCH RBC QN AUTO: 27.3 PG (ref 26.8–34.3)
MCHC RBC AUTO-ENTMCNC: 30.7 G/DL (ref 31.4–37.4)
MCV RBC AUTO: 89 FL (ref 82–98)
MONOCYTES # BLD AUTO: 0.75 THOUSAND/ΜL (ref 0.17–1.22)
MONOCYTES NFR BLD AUTO: 9 % (ref 4–12)
NASAL CANNULA: ABNORMAL
NEUTROPHILS # BLD AUTO: 6.04 THOUSANDS/ΜL (ref 1.85–7.62)
NEUTS SEG NFR BLD AUTO: 68 % (ref 43–75)
NITRITE UR QL STRIP: NEGATIVE
NON-SQ EPI CELLS URNS QL MICRO: ABNORMAL /HPF
NRBC BLD AUTO-RTO: 0 /100 WBCS
NT-PROBNP SERPL-MCNC: 738 PG/ML
O2 CT BLDA-SCNC: 15.2 ML/DL (ref 16–23)
OXYHGB MFR BLDA: 92.4 % (ref 94–97)
P AXIS: 60 DEGREES
PCO2 BLDA: 42.2 MM HG (ref 36–44)
PH BLDA: 7.36 [PH] (ref 7.35–7.45)
PH UR STRIP.AUTO: 7 [PH]
PLATELET # BLD AUTO: 226 THOUSANDS/UL (ref 149–390)
PMV BLD AUTO: 9.7 FL (ref 8.9–12.7)
PO2 BLDA: 69.3 MM HG (ref 75–129)
POTASSIUM SERPL-SCNC: 4.7 MMOL/L (ref 3.5–5.3)
PR INTERVAL: 246 MS
PROT SERPL-MCNC: 7.6 G/DL (ref 6.4–8.2)
PROT UR STRIP-MCNC: ABNORMAL MG/DL
PROTHROMBIN TIME: 14.3 SECONDS (ref 11.6–14.5)
QRS AXIS: -80 DEGREES
QRSD INTERVAL: 150 MS
QT INTERVAL: 408 MS
QTC INTERVAL: 455 MS
RBC # BLD AUTO: 4.07 MILLION/UL (ref 3.88–5.62)
RBC #/AREA URNS AUTO: ABNORMAL /HPF
RSV RNA RESP QL NAA+PROBE: NEGATIVE
SARS-COV-2 RNA RESP QL NAA+PROBE: NEGATIVE
SODIUM SERPL-SCNC: 140 MMOL/L (ref 136–145)
SP GR UR STRIP.AUTO: 1.02 (ref 1–1.03)
SPECIMEN SOURCE: ABNORMAL
T WAVE AXIS: 48 DEGREES
TROPONIN I SERPL-MCNC: 0.02 NG/ML
TSH SERPL DL<=0.05 MIU/L-ACNC: 0.9 UIU/ML (ref 0.36–3.74)
UROBILINOGEN UR QL STRIP.AUTO: 0.2 E.U./DL
VENTRICULAR RATE: 75 BPM
WBC # BLD AUTO: 8.76 THOUSAND/UL (ref 4.31–10.16)
WBC #/AREA URNS AUTO: ABNORMAL /HPF

## 2021-04-05 PROCEDURE — 80053 COMPREHEN METABOLIC PANEL: CPT

## 2021-04-05 PROCEDURE — 81001 URINALYSIS AUTO W/SCOPE: CPT | Performed by: INTERNAL MEDICINE

## 2021-04-05 PROCEDURE — 84443 ASSAY THYROID STIM HORMONE: CPT | Performed by: EMERGENCY MEDICINE

## 2021-04-05 PROCEDURE — 90471 IMMUNIZATION ADMIN: CPT

## 2021-04-05 PROCEDURE — 85379 FIBRIN DEGRADATION QUANT: CPT | Performed by: EMERGENCY MEDICINE

## 2021-04-05 PROCEDURE — 93005 ELECTROCARDIOGRAM TRACING: CPT

## 2021-04-05 PROCEDURE — 90715 TDAP VACCINE 7 YRS/> IM: CPT | Performed by: EMERGENCY MEDICINE

## 2021-04-05 PROCEDURE — 87086 URINE CULTURE/COLONY COUNT: CPT | Performed by: INTERNAL MEDICINE

## 2021-04-05 PROCEDURE — 36415 COLL VENOUS BLD VENIPUNCTURE: CPT

## 2021-04-05 PROCEDURE — 74176 CT ABD & PELVIS W/O CONTRAST: CPT

## 2021-04-05 PROCEDURE — 70450 CT HEAD/BRAIN W/O DYE: CPT

## 2021-04-05 PROCEDURE — 84300 ASSAY OF URINE SODIUM: CPT | Performed by: INTERNAL MEDICINE

## 2021-04-05 PROCEDURE — 94760 N-INVAS EAR/PLS OXIMETRY 1: CPT

## 2021-04-05 PROCEDURE — 96365 THER/PROPH/DIAG IV INF INIT: CPT

## 2021-04-05 PROCEDURE — 99285 EMERGENCY DEPT VISIT HI MDM: CPT | Performed by: EMERGENCY MEDICINE

## 2021-04-05 PROCEDURE — 36600 WITHDRAWAL OF ARTERIAL BLOOD: CPT

## 2021-04-05 PROCEDURE — 93010 ELECTROCARDIOGRAM REPORT: CPT | Performed by: INTERNAL MEDICINE

## 2021-04-05 PROCEDURE — 84156 ASSAY OF PROTEIN URINE: CPT | Performed by: INTERNAL MEDICINE

## 2021-04-05 PROCEDURE — 0241U HB NFCT DS VIR RESP RNA 4 TRGT: CPT | Performed by: EMERGENCY MEDICINE

## 2021-04-05 PROCEDURE — 85025 COMPLETE CBC W/AUTO DIFF WBC: CPT

## 2021-04-05 PROCEDURE — G1004 CDSM NDSC: HCPCS

## 2021-04-05 PROCEDURE — 83735 ASSAY OF MAGNESIUM: CPT | Performed by: EMERGENCY MEDICINE

## 2021-04-05 PROCEDURE — 84484 ASSAY OF TROPONIN QUANT: CPT

## 2021-04-05 PROCEDURE — 93970 EXTREMITY STUDY: CPT

## 2021-04-05 PROCEDURE — 72125 CT NECK SPINE W/O DYE: CPT

## 2021-04-05 PROCEDURE — 82948 REAGENT STRIP/BLOOD GLUCOSE: CPT

## 2021-04-05 PROCEDURE — 71045 X-RAY EXAM CHEST 1 VIEW: CPT

## 2021-04-05 PROCEDURE — 82805 BLOOD GASES W/O2 SATURATION: CPT | Performed by: EMERGENCY MEDICINE

## 2021-04-05 PROCEDURE — 83605 ASSAY OF LACTIC ACID: CPT

## 2021-04-05 PROCEDURE — 85730 THROMBOPLASTIN TIME PARTIAL: CPT | Performed by: EMERGENCY MEDICINE

## 2021-04-05 PROCEDURE — 85610 PROTHROMBIN TIME: CPT | Performed by: EMERGENCY MEDICINE

## 2021-04-05 PROCEDURE — 99220 PR INITIAL OBSERVATION CARE/DAY 70 MINUTES: CPT | Performed by: INTERNAL MEDICINE

## 2021-04-05 PROCEDURE — 99285 EMERGENCY DEPT VISIT HI MDM: CPT

## 2021-04-05 PROCEDURE — 93970 EXTREMITY STUDY: CPT | Performed by: SURGERY

## 2021-04-05 PROCEDURE — 82570 ASSAY OF URINE CREATININE: CPT | Performed by: INTERNAL MEDICINE

## 2021-04-05 PROCEDURE — 87040 BLOOD CULTURE FOR BACTERIA: CPT | Performed by: EMERGENCY MEDICINE

## 2021-04-05 PROCEDURE — 83880 ASSAY OF NATRIURETIC PEPTIDE: CPT | Performed by: EMERGENCY MEDICINE

## 2021-04-05 RX ORDER — CEFAZOLIN SODIUM 2 G/50ML
2000 SOLUTION INTRAVENOUS ONCE
Status: COMPLETED | OUTPATIENT
Start: 2021-04-05 | End: 2021-04-05

## 2021-04-05 RX ORDER — HEPARIN SODIUM 10000 [USP'U]/100ML
3-30 INJECTION, SOLUTION INTRAVENOUS
Status: DISCONTINUED | OUTPATIENT
Start: 2021-04-05 | End: 2021-04-06

## 2021-04-05 RX ORDER — CARVEDILOL 12.5 MG/1
12.5 TABLET ORAL 2 TIMES DAILY
Status: DISCONTINUED | OUTPATIENT
Start: 2021-04-05 | End: 2021-04-12 | Stop reason: HOSPADM

## 2021-04-05 RX ORDER — HYDROMORPHONE HCL/PF 1 MG/ML
0.5 SYRINGE (ML) INJECTION EVERY 4 HOURS PRN
Status: DISCONTINUED | OUTPATIENT
Start: 2021-04-05 | End: 2021-04-12 | Stop reason: HOSPADM

## 2021-04-05 RX ORDER — INSULIN GLARGINE 100 [IU]/ML
10 INJECTION, SOLUTION SUBCUTANEOUS
Status: DISCONTINUED | OUTPATIENT
Start: 2021-04-05 | End: 2021-04-07

## 2021-04-05 RX ORDER — OXYCODONE HYDROCHLORIDE 5 MG/1
5 TABLET ORAL EVERY 4 HOURS PRN
Status: DISCONTINUED | OUTPATIENT
Start: 2021-04-05 | End: 2021-04-12 | Stop reason: HOSPADM

## 2021-04-05 RX ORDER — HEPARIN SODIUM 1000 [USP'U]/ML
5000 INJECTION, SOLUTION INTRAVENOUS; SUBCUTANEOUS
Status: DISCONTINUED | OUTPATIENT
Start: 2021-04-05 | End: 2021-04-06

## 2021-04-05 RX ORDER — TAMSULOSIN HYDROCHLORIDE 0.4 MG/1
0.4 CAPSULE ORAL EVERY EVENING
Status: DISCONTINUED | OUTPATIENT
Start: 2021-04-05 | End: 2021-04-12 | Stop reason: HOSPADM

## 2021-04-05 RX ORDER — OXYCODONE HYDROCHLORIDE 10 MG/1
10 TABLET ORAL EVERY 4 HOURS PRN
Status: DISCONTINUED | OUTPATIENT
Start: 2021-04-05 | End: 2021-04-12 | Stop reason: HOSPADM

## 2021-04-05 RX ORDER — HEPARIN SODIUM 1000 [USP'U]/ML
10000 INJECTION, SOLUTION INTRAVENOUS; SUBCUTANEOUS
Status: DISCONTINUED | OUTPATIENT
Start: 2021-04-05 | End: 2021-04-06

## 2021-04-05 RX ORDER — ASPIRIN 81 MG/1
81 TABLET ORAL DAILY
Status: DISCONTINUED | OUTPATIENT
Start: 2021-04-06 | End: 2021-04-12 | Stop reason: HOSPADM

## 2021-04-05 RX ORDER — HEPARIN SODIUM 1000 [USP'U]/ML
10000 INJECTION, SOLUTION INTRAVENOUS; SUBCUTANEOUS ONCE
Status: COMPLETED | OUTPATIENT
Start: 2021-04-05 | End: 2021-04-05

## 2021-04-05 RX ORDER — ATORVASTATIN CALCIUM 40 MG/1
40 TABLET, FILM COATED ORAL
Status: DISCONTINUED | OUTPATIENT
Start: 2021-04-05 | End: 2021-04-12 | Stop reason: HOSPADM

## 2021-04-05 RX ORDER — FUROSEMIDE 10 MG/ML
40 INJECTION INTRAMUSCULAR; INTRAVENOUS
Status: DISCONTINUED | OUTPATIENT
Start: 2021-04-05 | End: 2021-04-09

## 2021-04-05 RX ORDER — INSULIN ASPART 100 [IU]/ML
INJECTION, SUSPENSION SUBCUTANEOUS
COMMUNITY
End: 2021-04-12 | Stop reason: HOSPADM

## 2021-04-05 RX ORDER — MELATONIN
2000 DAILY
Status: DISCONTINUED | OUTPATIENT
Start: 2021-04-06 | End: 2021-04-12 | Stop reason: HOSPADM

## 2021-04-05 RX ORDER — ACETAMINOPHEN 325 MG/1
650 TABLET ORAL EVERY 6 HOURS PRN
Status: DISCONTINUED | OUTPATIENT
Start: 2021-04-05 | End: 2021-04-12 | Stop reason: HOSPADM

## 2021-04-05 RX ORDER — AMLODIPINE BESYLATE 10 MG/1
10 TABLET ORAL DAILY
Status: DISCONTINUED | OUTPATIENT
Start: 2021-04-06 | End: 2021-04-12 | Stop reason: HOSPADM

## 2021-04-05 RX ADMIN — CARVEDILOL 12.5 MG: 12.5 TABLET, FILM COATED ORAL at 20:33

## 2021-04-05 RX ADMIN — HEPARIN SODIUM 18 UNITS/KG/HR: 10000 INJECTION, SOLUTION INTRAVENOUS at 18:41

## 2021-04-05 RX ADMIN — TAMSULOSIN HYDROCHLORIDE 0.4 MG: 0.4 CAPSULE ORAL at 20:33

## 2021-04-05 RX ADMIN — ATORVASTATIN CALCIUM 40 MG: 40 TABLET, FILM COATED ORAL at 20:33

## 2021-04-05 RX ADMIN — FUROSEMIDE 40 MG: 10 INJECTION, SOLUTION INTRAVENOUS at 20:33

## 2021-04-05 RX ADMIN — HEPARIN SODIUM 10000 UNITS: 1000 INJECTION, SOLUTION INTRAVENOUS; SUBCUTANEOUS at 18:41

## 2021-04-05 RX ADMIN — TETANUS TOXOID, REDUCED DIPHTHERIA TOXOID AND ACELLULAR PERTUSSIS VACCINE, ADSORBED 0.5 ML: 5; 2.5; 8; 8; 2.5 SUSPENSION INTRAMUSCULAR at 15:51

## 2021-04-05 RX ADMIN — CEFAZOLIN SODIUM 2000 MG: 2 SOLUTION INTRAVENOUS at 17:27

## 2021-04-05 NOTE — ED PROVIDER NOTES
History  Chief Complaint   Patient presents with    Weakness - Generalized     Patient complains of generalized weakness since this morning, denies CP, SOD, HA     70-year-old male presents by ambulance with complaint of bilateral lower extremity weakness  He states he feels like as if they are going to give out  He had a fall yesterday morning  He denies loss of consciousness he fell forward  He had difficulty getting out of his recliner today was recently admitted in from March 8th to March 12th with cellulitis of the right lower extremity he completed his course of Keflex  His past medical history significant for type 2 diabetes obstructive sleep apnea morbid obesity hypertension hyperlipidemia coronary artery disease and CHF he quit smoking 4 years ago  Patient is unsure of his last tetanus  He denies any headache or neck pain he has no visual disturbance he denies shortness of breath or or chest pain he has no upper or lower back pain no abdominal pain no hip pain he denies any dizziness or lightheaded miss  He states that 1 time he was prescribed oxygen for home use but he does not use it  Blood sugars have been 78-1 0 weight today he feels his lower extremity edema is slightly worse  Patients only anticoagulant is baby aspirin  Review of discharge summary from [id number] [id number] demonstrates he was treated for cellulitis with cefepime and Vanco and discharged on Keflex he was negative for PE and bilateral LE dopplers where negative  His blood pressures were elevated his amlodipine and lisinopril were increased  He was followed up by Pulmonary who recommended pulmonary function test as a suspected degree of COPD as well as the sleep study  Oxygen sats 93% on RA in pulm office several weaks ago          Prior to Admission Medications   Prescriptions Last Dose Informant Patient Reported? Taking?    BD PEN NEEDLE JUNIOR U/F 32G X 4 MM MISC   Yes No   Semaglutide (OZEMPIC, 0 25 OR 0 5 MG/DOSE, SC) 3/29/2021 at Unknown time  Yes Yes   Sig: Inject 0 5 mg under the skin once a week Usually takes on Mon   acetaminophen (TYLENOL) 325 mg tablet Past Month at Unknown time  No Yes   Sig: Take 2 tablets (650 mg total) by mouth every 6 (six) hours as needed for mild pain, headaches or fever   amLODIPine (NORVASC) 10 mg tablet 4/5/2021 at 1100  No Yes   Sig: Take 1 tablet (10 mg total) by mouth daily   aspirin 81 MG tablet 4/5/2021 at 1100  Yes Yes   Sig: Take 81 mg by mouth daily   atorvastatin (LIPITOR) 40 mg tablet 4/5/2021 at 1100  No Yes   Sig: Take 1 tablet (40 mg total) by mouth daily after dinner   carvedilol (COREG) 12 5 mg tablet 4/5/2021 at 1100  No Yes   Sig: Take 1 tablet (12 5 mg total) by mouth 2 (two) times a day   cholecalciferol 2000 units TABS 4/4/2021 at 2300  No Yes   Sig: Take 1 tablet (2,000 Units total) by mouth daily   finasteride (PROSCAR) 5 mg tablet 4/5/2021 at 1100  No Yes   Sig: Take 1 tablet (5 mg total) by mouth daily   gabapentin (NEURONTIN) 600 MG tablet 4/5/2021 at 1100  No Yes   Sig: Take 1 tablet (600 mg total) by mouth 2 (two) times a day   glucagon (GLUCAGON EMERGENCY) 1 MG injection   No No   Sig: Inject 1 mg under the skin once as needed (PRN blood glucose less than 70 if unconscious or uncorrectable by oral means) for up to 1 dose   insulin aspart protamine-insulin aspart (NovoLOG 70/30) 100 units/mL injection 4/5/2021 at Unknown time  Yes Yes   Sig: Inject under the skin 2 (two) times a day before meals   insulin glargine (LANTUS) 100 units/mL subcutaneous injection 4/5/2021 at 1100  No Yes   Sig: Inject 20 Units under the skin 2 (two) times a day for 14 days   lisinopril (ZESTRIL) 10 mg tablet 4/5/2021 at 1100  No Yes   Sig: Take 1 tablet (10 mg total) by mouth daily   nystatin (MYCOSTATIN) powder Past Week at Unknown time  No Yes   Sig: Apply topically 2 (two) times a day   oxyCODONE (ROXICODONE) 10 MG TABS 4/5/2021 at 1100  No Yes   Sig: Take 1 tablet (10 mg total) by mouth every 4 (four) hours as needed for moderate pain or severe painMax Daily Amount: 60 mg   Patient taking differently: Take 20 mg by mouth every 6 (six) hours as needed for moderate pain or severe pain    tamsulosin (FLOMAX) 0 4 mg 2021 at 1100  Yes Yes   Sig: Take 0 4 mg by mouth every evening        Facility-Administered Medications: None       Past Medical History:   Diagnosis Date    Cataract     [facility]congest[facility] failure[facility])     chronic diastolic CHF    Coronary artery disease     Diabetes mellitus ([place] [address] )     Glaucoma     Hyperlipidemia     Hypertension     Neuropathy     Obesity     Renal disorder     chronic kidney disease stage 3     Sleep apnea     Stroke ([place] [address] )     TIA (transient ischemic attack)     Uncontrolled type 2 diabetes mellitus with hyperglycemia, with long-term current use of insulin ([place] [address] ) 10/4/2018       Past Surgical History:   Procedure Laterality Date    APPENDECTOMY      CARPAL TUNNEL RELEASE      EYE SURGERY      cataracts       Family History   Problem Relation Age of Onset    No Known Problems Mother     No Known Problems Father      I have reviewed and agree with the history as documented  E-Cigarette/Vaping    E-Cigarette Use Never User      E-Cigarette/Vaping Substances    Nicotine No     THC No     CBD No     Flavoring No     Other No     Unknown No      Social History     Tobacco Use    Smoking status: Former Smoker     Types: Cigars     Quit date:      Years since quittin 2    Smokeless tobacco: Former User   Substance Use Topics    Alcohol use: Never     Frequency: Never     Drinks per session: Patient refused     Binge frequency: Never    Drug use: No       Review of Systems   Constitutional: Positive for activity change  Negative for appetite change, chills, diaphoresis, fatigue and fever  HENT: Negative for congestion, ear pain, rhinorrhea, sneezing and sore throat  Eyes: Negative for discharge     Respiratory: Negative for cough and shortness of breath  Cardiovascular: Positive for leg swelling  Negative for chest pain  Gastrointestinal: Negative for abdominal pain, blood in stool, diarrhea, nausea and vomiting  Endocrine: Negative for polyuria  Genitourinary: Negative for difficulty urinating, dysuria, frequency and urgency  Musculoskeletal: Negative for back pain and myalgias  Skin: Negative for rash  Neurological: Positive for weakness (lower extremties)  Negative for dizziness, speech difficulty, light-headedness, numbness and headaches  Hematological: Negative for adenopathy  Psychiatric/Behavioral: Negative for confusion  All other systems reviewed and are negative  Physical Exam  Physical Exam  Vitals signs reviewed  HENT:      Head: Normocephalic and atraumatic  Right Ear: Tympanic membrane normal       Left Ear: Tympanic membrane normal       Nose: Nose normal       Mouth/Throat:      Mouth: Mucous membranes are moist    Eyes:      General:         Right eye: No discharge  Left eye: No discharge  Extraocular Movements: Extraocular movements intact  Conjunctiva/sclera: Conjunctivae normal       Pupils: Pupils are equal, round, and reactive to light  Neck:      Musculoskeletal: Normal range of motion and neck supple  No neck rigidity or muscular tenderness  Cardiovascular:      Rate and Rhythm: Normal rate and regular rhythm  Pulmonary:      Effort: Pulmonary effort is normal  No respiratory distress  Breath sounds: No stridor  No wheezing, rhonchi or rales  Comments: 2L NC 93%; distant BS no wheezing  Chest:      Chest wall: No tenderness  Abdominal:      General: Bowel sounds are normal  There is no distension  Tenderness: There is no abdominal tenderness  There is no right CVA tenderness, left CVA tenderness or guarding  Comments: Back no midline T or L spine tenderness   Musculoskeletal: Normal range of motion  Right lower leg: Edema present  Left lower leg: Edema present  Comments: Venous stasis changes to bilateral lower extremity with increased symmetric erythema there is no open wound but has blebs to the lower extremities  Able to fire quads with atempt at striaght leg raise   Skin:     Capillary Refill: Capillary refill takes less than 2 seconds  Neurological:      General: No focal deficit present  Mental Status: He is alert  Cranial Nerves: No cranial nerve deficit  Sensory: No sensory deficit  Motor: No weakness        Coordination: Coordination normal       Deep Tendon Reflexes: Reflexes normal       Comments: Patient can plantar and dorsiflex against resistance bilaterally great toe dorsiflexion and is intact toes are downgoing bilaterally   Psychiatric:         Mood and Affect: Mood normal          Vital Signs  ED Triage Vitals   Temperature Pulse Respirations Blood Pressure SpO2   04/05/21 1507 04/05/21 1507 04/05/21 1507 04/05/21 1507 04/05/21 1507   98 3 °F (36 8 °C) 78 20 158/74 (!) 72 %      Temp Source Heart Rate Source Patient Position - Orthostatic VS BP Location FiO2 (%)   04/05/21 1507 04/05/21 1507 04/05/21 1507 04/05/21 1507 --   Temporal Monitor Lying Left arm       Pain Score       04/05/21 1930       9           Vitals:    04/05/21 1630 04/05/21 1930 04/05/21 2154 04/05/21 2306   BP: 136/63 123/75  129/75   Pulse: 77 81 92 84   Patient Position - Orthostatic VS: Sitting Lying           Visual Acuity      ED Medications  Medications   heparin (porcine) 25,000 units in 0 45% NaCl 250 mL infusion (premix) (18 Units/kg/hr × 125 kg (Order-Specific) Intravenous New Bag 4/5/21 1841)   heparin (porcine) injection 10,000 Units (has no administration in time range)   heparin (porcine) injection 5,000 Units (has no administration in time range)   amLODIPine (NORVASC) tablet 10 mg (has no administration in time range)   acetaminophen (TYLENOL) tablet 650 mg (has no administration in time range)   cholecalciferol (VITAMIN D3) tablet 2,000 Units (has no administration in time range)   carvedilol (COREG) tablet 12 5 mg (12 5 mg Oral Given 4/5/21 2033)   aspirin (ECOTRIN LOW STRENGTH) EC tablet 81 mg (has no administration in time range)   atorvastatin (LIPITOR) tablet 40 mg (40 mg Oral Given 4/5/21 2033)   insulin glargine (LANTUS) subcutaneous injection 10 Units 0 1 mL (10 Units Subcutaneous Not Given 4/5/21 2231)   tamsulosin (FLOMAX) capsule 0 4 mg (0 4 mg Oral Given 4/5/21 2033)   oxyCODONE (ROXICODONE) IR tablet 5 mg (has no administration in time range)     Or   oxyCODONE (ROXICODONE) immediate release tablet 10 mg (has no administration in time range)   HYDROmorphone (DILAUDID) injection 0 5 mg (has no administration in time range)   furosemide (LASIX) injection 40 mg (40 mg Intravenous Given 4/5/21 2033)   insulin lispro (HumaLOG) 100 units/mL subcutaneous injection 1-6 Units (has no administration in time range)   insulin lispro (HumaLOG) 100 units/mL subcutaneous injection 1-6 Units (1 Units Subcutaneous Not Given 4/5/21 2231)   tetanus-diphtheria-acellular pertussis (BOOSTRIX) IM injection 0 5 mL (0 5 mL Intramuscular Given 4/5/21 1551)   ceFAZolin (ANCEF) IVPB (premix in dextrose) 2,000 mg 50 mL (0 mg Intravenous Stopped 4/5/21 1757)   heparin (porcine) injection 10,000 Units (10,000 Units Intravenous Given 4/5/21 1841)       Diagnostic Studies  Results Reviewed     Procedure Component Value Units Date/Time    Blood culture #1 [[id number]] Collected: 04/05/21 1516    Lab Status: Preliminary result Specimen: Blood from Arm, Right Updated: 04/05/21 2301     Blood Culture Received in Microbiology Lab  Culture in Progress  Blood culture #2 [[id number]] Collected: 04/05/21 1517    Lab Status: Preliminary result Specimen: Blood from Arm, Left Updated: 04/05/21 2301     Blood Culture Received in Microbiology Lab  Culture in Progress      Urinalysis with microscopic [[id number]]  (Abnormal) Collected: 04/05/21 1924    Lab Status: Final result Specimen: Urine, Clean Catch Updated: 04/05/21 2017     Clarity, UA Clear     Color, UA Yellow     Specific Gravity, UA 1 020     pH, UA 7 0     Glucose, UA Negative mg/dl      Ketones, UA Negative mg/dl      Blood, UA Negative     Protein, UA Trace mg/dl      Nitrite, UA Negative     Bilirubin, UA Negative     Urobilinogen, UA 0 2 E U /dl      Leukocytes, UA Trace     WBC, UA 2-4 /hpf      RBC, UA None Seen /hpf      Bacteria, UA Occasional /hpf      Epithelial Cells Occasional /hpf     Urine culture [[id number]] Collected: 04/05/21 1926    Lab Status: In process Specimen: Urine, Clean Catch Updated: 04/05/21 1942    [id number]  (Normal) Collected: 04/05/21 1517    Lab Status: Final result Specimen: Nares from Nasopharyngeal Swab Updated: 04/05/21 1605     SARS-CoV-2 Negative     INFLUENZA A PCR Negative     INFLUENZA B PCR Negative     RSV PCR Negative    Narrative: This test has been authorized by FDA under an EUA (Emergency Use Assay) for use by authorized laboratories  Clinical caution and judgement should be used with the interpretation of these results with consideration of the clinical impression and other laboratory testing  Testing reported as "Positive" or "Negative" has been proven to be accurate according to standard laboratory validation requirements  All testing is performed with control materials showing appropriate reactivity at standard intervals      Blood gas, arterial [[id number]]  (Abnormal) Collected: 04/05/21 1523    Lab Status: Final result Specimen: Blood, Arterial from Radial, Right Updated: 04/05/21 1540     pH, Arterial 7 361     pCO2, Arterial 42 2 mm Hg      pO2, Arterial 69 3 mm Hg      HCO3, Arterial 23 4 mmol/L      Base Excess, Arterial -2 0 mmol/L      O2 Content, Arterial 15 2 mL/dL      O2 HGB,Arterial  92 4 %      SOURCE Radial, Right     ODILON TEST Yes     Nasal Cannula 2l    NT-BNP PRO [[id number]]  (Abnormal) Collected: 04/05/21 1456    Lab Status: Final result Specimen: Blood from Arm, Right Updated: 04/05/21 1533     NT-proBNP 738 pg/mL     TSH, 3rd generation with Free T4 reflex [[id number]]  (Normal) Collected: 04/05/21 1456    Lab Status: Final result Specimen: Blood from Arm, Right Updated: 04/05/21 1533     TSH 3RD GENERATON 0 899 uIU/mL     Narrative:      Patients undergoing fluorescein dye angiography may retain small amounts of fluorescein in the body for 48-72 hours post procedure  Samples containing fluorescein can produce falsely depressed TSH values  If the patient had this procedure,a specimen should be resubmitted post fluorescein clearance  Magnesium [[id number]]  (Normal) Collected: 04/05/21 1456    Lab Status: Final result Specimen: Blood from Arm, Right Updated: 04/05/21 1533     Magnesium 2 1 mg/dL     Lactic acid, plasma [[id number]]  (Normal) Collected: 04/05/21 1456    Lab Status: Final result Specimen: Blood from Arm, Right Updated: 04/05/21 1522     LACTIC ACID 1 4 mmol/L     Narrative:      Result may be elevated if tourniquet was used during collection      D-Dimer [[id number]]  (Abnormal) Collected: 04/05/21 1456    Lab Status: Final result Specimen: Blood from Arm, Right Updated: 04/05/21 1520     D-Dimer, Quant 1 61 ug/ml FEU     Troponin I [[id number]]  (Normal) Collected: 04/05/21 1456    Lab Status: Final result Specimen: Blood from Arm, Right Updated: 04/05/21 1519     Troponin I 0 02 ng/mL     APTT [[id number]]  (Normal) Collected: 04/05/21 1456    Lab Status: Final result Specimen: Blood from Arm, Right Updated: 04/05/21 1518     PTT 28 seconds     Protime-INR [[id number]]  (Normal) Collected: 04/05/21 1456    Lab Status: Final result Specimen: Blood from Arm, Right Updated: 04/05/21 1518     Protime 14 3 seconds      INR 1 13    Comprehensive metabolic panel [[id number]]  (Abnormal) Collected: 04/05/21 1456    Lab Status: Final result Specimen: Blood from Arm, Right Updated: 04/05/21 1516     Sodium 140 mmol/L      Potassium 4 7 mmol/L      Chloride 106 mmol/L      CO2 28 mmol/L      ANION GAP 6 mmol/L      BUN 32 mg/dL      Creatinine 2 16 mg/dL      Glucose 144 mg/dL      Calcium 9 1 mg/dL      Corrected Calcium 9 8 mg/dL      AST 19 U/L      ALT 24 U/L      Alkaline Phosphatase 169 U/L      Total Protein 7 6 g/dL      Albumin 3 1 g/dL      Total Bilirubin 0 31 mg/dL      eGFR 28 ml/min/1 73sq m     Narrative:      National Kid[employer] Disease Foundation guidelines for Chronic Kidney Disease (CKD):     Stage 1 with normal or high GFR (GFR > 90 mL/min/1 73 square meters)    Stage 2 Mild CKD (GFR = 60-89 mL/min/1 73 square meters)    Stage 3A Moderate CKD (GFR = 45-59 mL/min/1 73 square meters)    Stage 3B Moderate CKD (GFR = 30-44 mL/min/1 73 square meters)    Stage 4 Severe CKD (GFR = 15-29 mL/min/1 73 square meters)    Stage 5 End Stage CKD (GFR <15 mL/min/1 73 square meters)  Note: GFR calculation is accurate only with a steady state creatinine    CBC and differential [[id number]]  (Abnormal) Collected: 04/05/21 1456    Lab Status: Final result Specimen: Blood from Arm, Right Updated: 04/05/21 1502     WBC 8 76 Thousand/uL      RBC 4 07 Million/uL      Hemoglobin 11 1 g/dL      Hematocrit 36 2 %      MCV 89 fL      MCH 27 3 pg      MCHC 30 7 g/dL      RDW 15 1 %      MPV 9 7 fL      Platelets 551 Thousands/uL      nRBC 0 /100 WBCs      Neutrophils Relative 68 %      Immat GRANS % 1 %      Lymphocytes Relative 19 %      Monocytes Relative 9 %      Eosinophils Relative 3 %      Basophils Relative 0 %      Neutrophils Absolute 6 04 Thousands/µL      Immature Grans Absolute 0 04 Thousand/uL      Lymphocytes Absolute 1 67 Thousands/µL      Monocytes Absolute 0 75 Thousand/µL      Eosinophils Absolute 0 23 Thousand/µL      Basophils Absolute 0 03 Thousands/µL                  CT abdomen pelvis wo contrast   Final Result by Kathia Ku MD (04/05 1759)      No acute intra-abdominal pathology  Small effusions and basilar atelectasis  Nonspecific inguinal adenopathy could be reactive but recommend clinical correlation and follow-up as warranted  Workstation performed: [id number]         CT cervical spine without contrast   Final Result by Anne Fernandez MD (04/05 1748)      No cervical spine fracture or traumatic malalignment  Workstation performed: [id number]         CT head without contrast   Final Result by Anne Fernandez MD (04/05 1748)      No acute intracranial abnormality  Workstation performed: [id number]         XR chest 1 view portable   ED Interpretation by Slim Colon MD (96/23 1632)   Read by me; Radiologist to provide formal interpretation  No acute process no significant change from previous 3/8/21      VAS lower limb venous duplex study, complete bilateral   Final Result by Daniel Lobo MD (04/05 1625)      NM inpatient order    (Results Pending)              Procedures  ECG 12 Lead Documentation Only    Date/Time: 4/5/2021 3:53 PM  Performed by: Slim Colon MD  Authorized by: Slim Colon MD     Indications / Diagnosis:  Leg weakness  ECG reviewed by me, the ED Provider: yes    Patient location:  ED  Previous ECG:     Previous ECG:  Compared to current    Comparison ECG info:  3/8/21 15:53    Similarity:  No change  Rate:     ECG rate:  75    ECG rate assessment: normal    Rhythm:     Rhythm: sinus rhythm    QRS:     QRS axis:  Left  Comments:      RBBB; 1st degree AV block             ED Course  ED Course as of Apr 05 2327 Mon Apr 05, 2021   1611 Venous dopper u/s preliminarily negative for DVT      1635 Asking for supper  Declines rectal exam able to fire quadriceps  Will proceed with noncontrast scans of head neck and a/p      1818 Case reviewed with Dr Vaughn Pineda tender recommends cefazolin IV for cellultis  And if no traumatic injury on scan heparin gtt   Recommends full admit MDM  Number of Diagnoses or Management Options  KIRT (acute kidney injury) ([facility] ): Hypoxemia:   Lower extremity cellulitis:   Lower extremity weakness:   Diagnosis management comments: Mdm:  75-year-old male with new onset hypoxia will check ABGs evaluate for pulmonary embolism CHF pneumonia and COVID  Eval for sepsis electrolyte abnormality abnormal TSH and re-evaluate    Secondary to acute kidney injury cannot pursue CTA of the chest will check venous Dopplers he was negative for pulmonary embolism last month will discuss empiric anticoagulation with admitting team      Disposition  Final diagnoses:   Hypoxemia   Lower extremity weakness   KIRT (acute kidney injury) ([facility] )   Lower extremity cellulitis     Time reflects when diagnosis was documented in both MDM as applicable and the Disposition within this note     Time User Action Codes Description Comment    4/5/2021  4:44 PM Sabine Bugler Add [R09 02] Hypoxemia     4/5/2021  4:45 PM Sabine Bugler Add [R29 898] Lower extremity weakness     4/5/2021  4:45 PM Sabine Bugler Add [N17 9] KIRT (acute kidney injury) ([place] [address] )     4/5/2021  4:45 PM Sabine Bugler Add [L03 119] Lower extremity cellulitis       ED Disposition     ED Disposition Condition Date/Time Comment    Admit Stable Mon Apr 5, 2021  4:57 PM Case was discussed with Dr Isiah Dc  and the patient's admission status was agreed to be Admission Status: inpatient status to the service of Dr Isiah Dc   Follow-up Information    None         Current Discharge Medication List      CONTINUE these medications which have NOT CHANGED    Details   acetaminophen (TYLENOL) 325 mg tablet Take 2 tablets (650 mg total) by mouth every 6 (six) hours as needed for mild pain, headaches or fever  Qty:  , Refills: 0    Comments: Do not exceed a total of 3 grams of tylenol/acetaminophen in a 24-hour period    Associated Diagnoses: Cellulitis of right lower extremity; Open wound of right lower extremity, subsequent encounter      amLODIPine (NORVASC) 10 mg tablet Take 1 tablet (10 mg total) by mouth daily  Qty: 30 tablet, Refills: 0    Associated Diagnoses: Accelerated hypertension      aspirin 81 MG tablet Take 81 mg by mouth daily      atorvastatin (LIPITOR) 40 mg tablet Take 1 tablet (40 mg total) by mouth daily after dinner    Associated Diagnoses: Type 2 diabetes mellitus with hyperglycemia, with long-term current use of insulin ([facility])      carvedilol (COREG) 12 5 mg tablet Take 1 tablet (12 5 mg total) by mouth 2 (two) times a day    Comments: Hold for HR<60 or SBP<110  Associated Diagnoses: Essential hypertension      cholecalciferol 2000 units TABS Take 1 tablet (2,000 Units total) by mouth daily  Refills: 0    Associated Diagnoses: Type 2 diabetes mellitus with hyperglycemia, with long-term current use of insulin ([facility])      finasteride (PROSCAR) 5 mg tablet Take 1 tablet (5 mg total) by mouth daily  Refills: 0    Comments: This medication should be handled with gloves at all times  No one who is pregnant or of child-bearing age should touch this medication    Associated Diagnoses: Urinary retention      gabapentin (NEURONTIN) 600 MG tablet Take 1 tablet (600 mg total) by mouth 2 (two) times a day  Refills: 0    Associated Diagnoses: Acute pain of right lower extremity      insulin aspart protamine-insulin aspart (NovoLOG 70/30) 100 units/mL injection Inject under the skin 2 (two) times a day before meals      insulin glargine (LANTUS) 100 units/mL subcutaneous injection Inject 20 Units under the skin 2 (two) times a day for 14 days  Qty: 560 Units, Refills: 0    Associated Diagnoses: Hypoglycemia      lisinopril (ZESTRIL) 10 mg tablet Take 1 tablet (10 mg total) by mouth daily  Qty: 30 tablet, Refills: 0    Associated Diagnoses: Accelerated hypertension      nystatin (MYCOSTATIN) powder Apply topically 2 (two) times a day  Qty: 15 g, Refills: 0    Associated Diagnoses: Type 2 diabetes mellitus with hyperglycemia, with long-term current use of insulin ([facility])      oxyCODONE (ROXICODONE) 10 MG TABS Take 1 tablet (10 mg total) by mouth every 4 (four) hours as needed for moderate pain or severe painMax Daily Amount: 60 mg  Qty: 10 tablet, Refills: 0    Associated Diagnoses: Acute pain of right lower extremity      Semaglutide (OZEMPIC, 0 25 OR 0 5 MG/DOSE, SC) Inject 0 5 mg under the skin once a week Usually takes on Mon      tamsulosin (FLOMAX) 0 4 mg Take 0 4 mg by mouth every evening        BD PEN NEEDLE JUNIOR U/F 32G X 4 MM MISC Refills: 0      glucagon (GLUCAGON EMERGENCY) 1 MG injection Inject 1 mg under the skin once as needed (PRN blood glucose less than 70 if unconscious or uncorrectable by oral means) for up to 1 dose  Refills: 0    Associated Diagnoses: Type 2 diabetes mellitus with hyperglycemia, with long-term current use of insulin (HCC)           No discharge procedures on file      PDMP Review     None          ED Provider  Electronically Signed by           Andrea Pastor MD  04/05/21 9485

## 2021-04-05 NOTE — NURSING NOTE
Received patient from ED on heparin drip 18 units/kg/hr 125 kg 22 5 ml/hr    No sign off complete at this time

## 2021-04-05 NOTE — ED NOTES
Patient saturated with urine  Brief and bedding changed at this time       Marcela Hitchcock RN  04/05/21 9046

## 2021-04-06 ENCOUNTER — APPOINTMENT (OUTPATIENT)
Dept: NUCLEAR MEDICINE | Facility: HOSPITAL | Age: 76
DRG: 682 | End: 2021-04-06
Payer: COMMERCIAL

## 2021-04-06 LAB
ALBUMIN SERPL BCP-MCNC: 3 G/DL (ref 3.5–5)
ALP SERPL-CCNC: 160 U/L (ref 46–116)
ALT SERPL W P-5'-P-CCNC: 27 U/L (ref 12–78)
ANION GAP SERPL CALCULATED.3IONS-SCNC: 7 MMOL/L (ref 4–13)
APTT PPP: 167 SECONDS (ref 23–37)
APTT PPP: 33 SECONDS (ref 23–37)
APTT PPP: 62 SECONDS (ref 23–37)
AST SERPL W P-5'-P-CCNC: 22 U/L (ref 5–45)
ATRIAL RATE: 75 BPM
ATRIAL RATE: 84 BPM
BASOPHILS # BLD AUTO: 0.03 THOUSANDS/ΜL (ref 0–0.1)
BASOPHILS NFR BLD AUTO: 0 % (ref 0–1)
BILIRUB SERPL-MCNC: 0.54 MG/DL (ref 0.2–1)
BUN SERPL-MCNC: 29 MG/DL (ref 5–25)
CA-I BLD-SCNC: 1.18 MMOL/L (ref 1.12–1.32)
CALCIUM ALBUM COR SERPL-MCNC: 9.7 MG/DL (ref 8.3–10.1)
CALCIUM SERPL-MCNC: 8.9 MG/DL (ref 8.3–10.1)
CHLORIDE SERPL-SCNC: 107 MMOL/L (ref 100–108)
CK SERPL-CCNC: 158 U/L (ref 39–308)
CO2 SERPL-SCNC: 28 MMOL/L (ref 21–32)
CREAT SERPL-MCNC: 1.92 MG/DL (ref 0.6–1.3)
CREAT UR-MCNC: 104 MG/DL
EOSINOPHIL # BLD AUTO: 0.27 THOUSAND/ΜL (ref 0–0.61)
EOSINOPHIL NFR BLD AUTO: 3 % (ref 0–6)
ERYTHROCYTE [DISTWIDTH] IN BLOOD BY AUTOMATED COUNT: 15.1 % (ref 11.6–15.1)
GFR SERPL CREATININE-BSD FRML MDRD: 33 ML/MIN/1.73SQ M
GLUCOSE SERPL-MCNC: 110 MG/DL (ref 65–140)
GLUCOSE SERPL-MCNC: 115 MG/DL (ref 65–140)
GLUCOSE SERPL-MCNC: 201 MG/DL (ref 65–140)
GLUCOSE SERPL-MCNC: 232 MG/DL (ref 65–140)
GLUCOSE SERPL-MCNC: 243 MG/DL (ref 65–140)
HCT VFR BLD AUTO: 35.5 % (ref 36.5–49.3)
HGB BLD-MCNC: 10.8 G/DL (ref 12–17)
IMM GRANULOCYTES # BLD AUTO: 0.06 THOUSAND/UL (ref 0–0.2)
IMM GRANULOCYTES NFR BLD AUTO: 1 % (ref 0–2)
LACTATE SERPL-SCNC: 0.7 MMOL/L (ref 0.5–2)
LYMPHOCYTES # BLD AUTO: 1.64 THOUSANDS/ΜL (ref 0.6–4.47)
LYMPHOCYTES NFR BLD AUTO: 16 % (ref 14–44)
MAGNESIUM SERPL-MCNC: 2 MG/DL (ref 1.6–2.6)
MCH RBC QN AUTO: 26.7 PG (ref 26.8–34.3)
MCHC RBC AUTO-ENTMCNC: 30.4 G/DL (ref 31.4–37.4)
MCV RBC AUTO: 88 FL (ref 82–98)
MONOCYTES # BLD AUTO: 0.92 THOUSAND/ΜL (ref 0.17–1.22)
MONOCYTES NFR BLD AUTO: 9 % (ref 4–12)
NEUTROPHILS # BLD AUTO: 7.31 THOUSANDS/ΜL (ref 1.85–7.62)
NEUTS SEG NFR BLD AUTO: 71 % (ref 43–75)
NRBC BLD AUTO-RTO: 0 /100 WBCS
P AXIS: 69 DEGREES
PHOSPHATE SERPL-MCNC: 3 MG/DL (ref 2.3–4.1)
PLATELET # BLD AUTO: 208 THOUSANDS/UL (ref 149–390)
PMV BLD AUTO: 9.8 FL (ref 8.9–12.7)
POTASSIUM SERPL-SCNC: 4.7 MMOL/L (ref 3.5–5.3)
PR INTERVAL: 250 MS
PROCALCITONIN SERPL-MCNC: 0.06 NG/ML
PROT SERPL-MCNC: 7.3 G/DL (ref 6.4–8.2)
PROT UR-MCNC: 46 MG/DL
PROT/CREAT UR: 0.44 MG/G{CREAT} (ref 0–0.1)
QRS AXIS: -80 DEGREES
QRS AXIS: 0 DEGREES
QRSD INTERVAL: 150 MS
QRSD INTERVAL: 8 MS
QT INTERVAL: 410 MS
QT INTERVAL: 486 MS
QTC INTERVAL: 457 MS
QTC INTERVAL: 567 MS
RBC # BLD AUTO: 4.04 MILLION/UL (ref 3.88–5.62)
SODIUM 24H UR-SCNC: 75 MOL/L
SODIUM SERPL-SCNC: 142 MMOL/L (ref 136–145)
T WAVE AXIS: 207 DEGREES
T WAVE AXIS: 34 DEGREES
TROPONIN I SERPL-MCNC: <0.02 NG/ML
VENTRICULAR RATE: 75 BPM
VENTRICULAR RATE: 82 BPM
WBC # BLD AUTO: 10.23 THOUSAND/UL (ref 4.31–10.16)

## 2021-04-06 PROCEDURE — 84145 PROCALCITONIN (PCT): CPT | Performed by: INTERNAL MEDICINE

## 2021-04-06 PROCEDURE — A9540 TC99M MAA: HCPCS

## 2021-04-06 PROCEDURE — 83605 ASSAY OF LACTIC ACID: CPT | Performed by: INTERNAL MEDICINE

## 2021-04-06 PROCEDURE — 84484 ASSAY OF TROPONIN QUANT: CPT | Performed by: INTERNAL MEDICINE

## 2021-04-06 PROCEDURE — 83735 ASSAY OF MAGNESIUM: CPT | Performed by: INTERNAL MEDICINE

## 2021-04-06 PROCEDURE — G1004 CDSM NDSC: HCPCS

## 2021-04-06 PROCEDURE — 85730 THROMBOPLASTIN TIME PARTIAL: CPT | Performed by: INTERNAL MEDICINE

## 2021-04-06 PROCEDURE — 82330 ASSAY OF CALCIUM: CPT | Performed by: INTERNAL MEDICINE

## 2021-04-06 PROCEDURE — 82550 ASSAY OF CK (CPK): CPT | Performed by: INTERNAL MEDICINE

## 2021-04-06 PROCEDURE — 97530 THERAPEUTIC ACTIVITIES: CPT

## 2021-04-06 PROCEDURE — 84100 ASSAY OF PHOSPHORUS: CPT | Performed by: INTERNAL MEDICINE

## 2021-04-06 PROCEDURE — 78580 LUNG PERFUSION IMAGING: CPT

## 2021-04-06 PROCEDURE — 82948 REAGENT STRIP/BLOOD GLUCOSE: CPT

## 2021-04-06 PROCEDURE — 80053 COMPREHEN METABOLIC PANEL: CPT | Performed by: INTERNAL MEDICINE

## 2021-04-06 PROCEDURE — 97167 OT EVAL HIGH COMPLEX 60 MIN: CPT

## 2021-04-06 PROCEDURE — 99223 1ST HOSP IP/OBS HIGH 75: CPT | Performed by: INTERNAL MEDICINE

## 2021-04-06 PROCEDURE — 99232 SBSQ HOSP IP/OBS MODERATE 35: CPT | Performed by: INTERNAL MEDICINE

## 2021-04-06 PROCEDURE — 85025 COMPLETE CBC W/AUTO DIFF WBC: CPT | Performed by: INTERNAL MEDICINE

## 2021-04-06 PROCEDURE — 93010 ELECTROCARDIOGRAM REPORT: CPT | Performed by: INTERNAL MEDICINE

## 2021-04-06 PROCEDURE — 94760 N-INVAS EAR/PLS OXIMETRY 1: CPT

## 2021-04-06 PROCEDURE — 94664 DEMO&/EVAL PT USE INHALER: CPT

## 2021-04-06 PROCEDURE — 97163 PT EVAL HIGH COMPLEX 45 MIN: CPT

## 2021-04-06 RX ORDER — HEPARIN SODIUM 5000 [USP'U]/ML
5000 INJECTION, SOLUTION INTRAVENOUS; SUBCUTANEOUS EVERY 8 HOURS SCHEDULED
Status: DISCONTINUED | OUTPATIENT
Start: 2021-04-06 | End: 2021-04-07

## 2021-04-06 RX ADMIN — OXYCODONE HYDROCHLORIDE 10 MG: 10 TABLET ORAL at 18:44

## 2021-04-06 RX ADMIN — INSULIN LISPRO 3 UNITS: 100 INJECTION, SOLUTION INTRAVENOUS; SUBCUTANEOUS at 12:13

## 2021-04-06 RX ADMIN — ASPIRIN 81 MG: 81 TABLET, COATED ORAL at 08:59

## 2021-04-06 RX ADMIN — AMLODIPINE BESYLATE 10 MG: 10 TABLET ORAL at 08:59

## 2021-04-06 RX ADMIN — CARVEDILOL 12.5 MG: 12.5 TABLET, FILM COATED ORAL at 17:08

## 2021-04-06 RX ADMIN — CARVEDILOL 12.5 MG: 12.5 TABLET, FILM COATED ORAL at 08:59

## 2021-04-06 RX ADMIN — INSULIN GLARGINE 10 UNITS: 100 INJECTION, SOLUTION SUBCUTANEOUS at 21:39

## 2021-04-06 RX ADMIN — FUROSEMIDE 40 MG: 10 INJECTION, SOLUTION INTRAVENOUS at 08:59

## 2021-04-06 RX ADMIN — HEPARIN SODIUM 5000 UNITS: 5000 INJECTION INTRAVENOUS; SUBCUTANEOUS at 21:39

## 2021-04-06 RX ADMIN — ATORVASTATIN CALCIUM 40 MG: 40 TABLET, FILM COATED ORAL at 17:10

## 2021-04-06 RX ADMIN — FUROSEMIDE 40 MG: 10 INJECTION, SOLUTION INTRAVENOUS at 17:10

## 2021-04-06 RX ADMIN — HEPARIN SODIUM 15.04 UNITS/KG/HR: 10000 INJECTION, SOLUTION INTRAVENOUS at 10:03

## 2021-04-06 RX ADMIN — Medication 2000 UNITS: at 08:59

## 2021-04-06 RX ADMIN — INSULIN LISPRO 3 UNITS: 100 INJECTION, SOLUTION INTRAVENOUS; SUBCUTANEOUS at 17:24

## 2021-04-06 RX ADMIN — TAMSULOSIN HYDROCHLORIDE 0.4 MG: 0.4 CAPSULE ORAL at 17:09

## 2021-04-06 RX ADMIN — OXYCODONE HYDROCHLORIDE 10 MG: 10 TABLET ORAL at 11:35

## 2021-04-06 RX ADMIN — INSULIN LISPRO 2 UNITS: 100 INJECTION, SOLUTION INTRAVENOUS; SUBCUTANEOUS at 21:39

## 2021-04-06 RX ADMIN — OXYCODONE HYDROCHLORIDE 10 MG: 10 TABLET ORAL at 00:29

## 2021-04-06 NOTE — ASSESSMENT & PLAN NOTE
· Outpatient follow-up with Cardiology    ECG 12 lead  Order: [id number]  Status:  Final result   Visible to patient:  No (inaccessible in [address])   Next appt:  05/18/2021 at 12:40 PM in Cardiology Jolyn Goltz, MD)   Ref Range & Units 4/5/21 1515   Ventricular Rate BPM 75    Atrial Rate BPM 75    CO Interval ms 246    QRSD Interval ms 150    QT Interval ms 408    QTC Interval ms 455    P Axis degrees 60    QRS Axis degrees -80    T Wave Axis degrees 48       Narrative & Impression    Sinus rhythm with 1st degree A-V block  Left axis deviation  Right bundle branch block  Abnormal ECG  When compared with ECG of 08-MAR-2021 15:53,     Confirmed by Nichelle Guillory [phone] on 4/5/2021 3:26:16 PM      Specimen Collected: 04/05/21 15:15   Last Resulted: 04/05/21 15:26

## 2021-04-06 NOTE — ASSESSMENT & PLAN NOTE
· Cannot have a CTA of the chest/PE protocol with acute kidney injury  · Check a nuclear medicine V/Q lung scan  · Utilize an empiric IV heparin drip/infusion per the VTE/PE (Raschke) protocol for now    VAS lower limb venous duplex study, complete bilateral  Status: Final result   PACS Images     Show images for VAS lower limb venous duplex study, complete bilateral   Study Result       [facility] REPORT  CLINICAL:  Indications:  Patient presents with bilateral lower extremity pain and swelling  Risk Factors  The patient has history of Obesity, HTN, Diabetes (Yes), CAD and previous  smoking (quit 1-5yrs ago)  He has no history of DVT  CONCLUSION:     Impression:  RIGHT LOWER LIMB:  No evidence of acute or chronic deep vein thrombosis  No evidence of superficial thrombophlebitis noted  Doppler evaluation shows a normal response to augmentation maneuvers  Popliteal, posterior tibial and anterior tibial arterial Doppler waveforms are  biphasic/monophasic  LEFT LOWER LIMB:  No evidence of acute or chronic deep vein thrombosis  No evidence of superficial thrombophlebitis noted  Doppler evaluation shows a normal response to augmentation maneuvers  Popliteal, posterior tibial and anterior tibial arterial Doppler waveforms are  biphasic/monophasic  Technical findings were tiger texted to Dr Bard Millard       SIGNATURE:  Electronically Signed by: Alan Saavedra on 2021-04-05 04:25:35 PM

## 2021-04-06 NOTE — RESPIRATORY THERAPY NOTE
RT Protocol Note  Alex Pink 64 y o  male MRN: [id number]  Unit/Bed#: 424-01 Encounter: [id number]    Assessment    Principal Problem:    Acute kidney injury ([facility] )  Active Problems:    BHARGAVI (obstructive sleep apnea)    Type 2 diabetes mellitus with hyperglycemia, with long-term current use of insulin ([facility])    Elevated d-dimer    Multiple pulmonary nodules    Gait instability    Abnormal EKG    Inguinal lymphadenopathy    Bilateral pleural effusion    Atelectasis    Acute on chronic diastolic CHF (congestive heart failure) ([facility])    Multiple renal cysts    Renal calculus    Venous stasis dermatitis of both lower extremities    Lower extremity weakness      Home Pulmonary Medications:  Patient does not use bronchodialator therapy  He does not have oxygen therapy   He does have a pap machine that he does not use at home, he is refusing pap therapy here at the hospital       Past Medical History:   Diagnosis Date    Cataract     CHF (congestive heart failure) ([facility])     chronic diastolic CHF    Coronary artery disease     Diabetes mellitus ([place] [address] )     Glaucoma     Hyperlipidemia     Hypertension     Neuropathy     Obesity     Renal disorder     chronic kidney disease stage 3     Sleep apnea     Stroke ([place] [address] )     TIA (transient ischemic attack)     Uncontrolled type 2 diabetes mellitus with hyperglycemia, with long-term current use of insulin ([place] [address] ) 10/4/2018     Social History     Socioeconomic History    Marital status: /Civil Union     Spouse name: None    Number of children: None    Years of education: None    Highest education level: None   Occupational History    None   Social Needs    Financial resource strain: None    Food insecurity     Worry: None     Inability: None    Transportation needs     Medical: None     Non-medical: None   Tobacco Use    Smoking status: Former Smoker     Types: Cigars     Quit date:      Years since quittin 2    Smokeless tobacco: Former User Substance and Sexual Activity    Alcohol use: Never     Frequency: Never     Drinks per session: Patient refused     Binge frequency: Never    Drug use: No    Sexual activity: Not Currently     Partners: Female   Lifestyle    Physical activity     Days per week: None     Minutes per session: None    Stress: None   Relationships    Social connections     Talks on phone: None     Gets together: None     Attends Sabianism service: None     Active member of club or organization: None     Attends meetings of clubs or organizations: None     Relationship status: None    Intimate partner violence     Fear of current or ex partner: None     Emotionally abused: None     Physically abused: None     Forced sexual activity: None   Other Topics Concern    None   Social History Narrative    None       Subjective    Subjective Data: Patient stated he is not sob    Objective    Physical Exam:   Assessment Type: During-treatment  General Appearance: Alert, Awake  Respiratory Pattern: Dyspnea with exertion  Chest Assessment: Chest expansion symmetrical  Bilateral Breath Sounds: Clear, Diminished  Cough: Non-productive, Dry  O2 Device: 3 lpm oxygen via nasal cannula    Vitals:  Blood pressure 123/75, pulse 92, temperature 98 2 °F (36 8 °C), temperature source Oral, resp  rate 18, height 5' 6" (1 676 m), weight (!) 144 kg (317 lb 3 9 oz), SpO2 (!) 76 %  Results from last 7 days   Lab Units 04/05/21  1523   PH ART  7 361   PCO2 ART mm Hg 42 2   PO2 ART mm Hg 69 3*   HCO3 ART mmol/L 23 4   BASE EXC ART mmol/L -2 0   O2 CONTENT ART mL/dL 15 2*   O2 HGB, ARTERIAL % 92 4*   ABG SOURCE  Radial, Right   ODILON TEST  Yes       Imaging and other studies: I have personally reviewed pertinent reports  O2 Device: 3 lpm oxygen via nasal cannula     Plan    Respiratory Plan: Vent/NIV/HFNC  Airway Clearance Plan: Incentive Spirometer     Resp Comments: Received patient in his room  He was sleeping on room air   Patient's oxygen saturation was 83%  I woke patient up and placed him on 3 lpm nasal oxygen  Patient stated he does have a pap machine, but he can't turn it on and hasn't used it in over a year  I tried to educate patient on using the machine and to call his home care company and have them service it  He is refusing pap therapy tonight, saying he doesn't need it  I did try and persuade him to use it  He grumbled a little bit with me just placing him on the oxygen  I told him how low his oxygen had dropped, he stated he just wants to go home  Patient does not use any bronchodialator therapy  I did start the incentive spirometer with the patient educating him to use it Q1H  Patient had very poor effort and technique, he will have to be encouraged to use

## 2021-04-06 NOTE — CASE MANAGEMENT
Cm met with the patient to evaluate the patients prior function and living situation and any barriers to d/c and form a safe d/c plan  Cm also evaluated the patient for any services in the home or needs for services  Pt states he lives with wife and son  He lives in multi story home but has first floor set up  No KYLE  Pt states he has walker, cane, BSC and hospital bed  He states he doesn't use his walker all the time because he only goes short distance  Wife assists with adls  He has a cpap at home through [place]  He states he doesn't use it  Pt recently discharged from our facility back in March  Was set up with [facility]  I called agency and they said when they set up visit, wife Salma Bernal refused  [facility] [place]  W[facility] but he states ambulance had to bring him in because he couldn't get his legs working  Call placed to wife, no answer and no machine  Will attempt later today  D/C TBD  Plans at this time are STR vs Home with [facility] if pt agrees  Awaiting therapy recommendations also  CM to follow

## 2021-04-06 NOTE — CONSULTS
Consultation - Nephrology   Bianca Hunt 64 y o  male MRN: [id number]  Unit/Bed#: 424-01 Encounter: [id number]      A/P:  1  Acute kidney injury on top of chronic kidney disease    Continue diuresis, patient may have cardiorenal syndrome, avoid nephrotoxins  Patient's fraction excretion of sodium is greater than 2%, however this is likely due to diuretic effect  CT scan the abdomen pelvis did not reveal significant anatomic issues from a renal standpoint  2  Chronic kidney disease stage 3 with baseline creatinine between 1 3-1 4 mg/ dL   3  Hypertensive nephrosclerosis likely    Continue manage blood pressures as best as possible, blood pressure from earlier this morning is a little elevated, would simply reassess for now  4  Mild proteinuria    Potentially due to diabetic nephropathy, patient would benefit from an outpatient workup would defer this at this time as it is not likely clinically appropriate to pursue this specific issue  Patient is on lisinopril in the outpatient setting, he will benefit from having his re-initiated  5  Mild cardiorenal syndrome    Agree with aggressive diuresis, patient's creatinine has improved with the diuresis  Would continue with current medications including low-sodium diet as well  6  Acute on chronic diastolic congestive heart failure    Continue to diurese aggressively, check daily weights  7  Nephrolithiasis    No anatomic with significant kidney stones at this time, continue to monitor for now  Outpatient Urology evaluation on routine basis  Thank you for allowing us to participate in the care of your patient  Please feel free to contact us regarding the care of this patient, or any other questions/concerns that may be applicable      Patient Active Problem List   Diagnosis    Dyspnea on exertion    Type 2 diabetes mellitus with hypoglycemia without coma, with long-term current use of insulin (HCC)    CKD (chronic kidney disease)    Chronic diastolic [facility]congestive heart failure) [facility]    BHARGAVI (obstructive sleep apnea)    Morbid obesity with BMI of 50 0-59 9, adult ([facility])    Essential hypertension    Type 2 diabetes mellitus with hyperglycemia, with long-term current use of insulin ([facility])    Complicated UTI (urinary tract infection)    Uncontrolled type 2 diabetes mellitus with hyperglycemia, with long-term current use of insulin ([facility])    Acute kidney injury ([address] )    Dehydration with hyponatremia    Hypoglycemia    Acute cystitis    Opiate dependence, continuous ([facility])    Paresthesia of left upper extremity    Neural foraminal stenosis of cervical spine    Acute pain of right lower extremity    Elevated TSH    Pressure injury of skin of right buttock    Ambulatory dysfunction    UTI (urinary tract infection)    Accelerated hypertension    Hypercalcemia    Open wound of right lower extremity    Cellulitis of right lower extremity    Elevated alkaline phosphatase level    Elevated d-dimer    Elevated parathyroid hormone    Pyuria    Microscopic hematuria    Multiple pulmonary nodules    Gait instability    Abnormal EKG    Cigarette nicotine dependence in remission    Inguinal lymphadenopathy    Bilateral pleural effusion    Atelectasis    Acute on chronic diastolic CHF (congestive heart failure) ([facility])    Multiple renal cysts    Renal calculus    Venous stasis dermatitis of both lower extremities    Lower extremity weakness       History of Present Illness   Physician Requesting Consult: Boom Schroeder DO  Reason for Consult / Principal Problem: acute kidney injury  Hx and PE limited by:   HPI: Puneet Murray is a 64y o  year old male who presented to the emergency department yesterday, April 5th with complaints of weakness the bilateral lower extremities  Patient is fairly fixated on the weakness, he claims that 1 episode this about 3 years ago which ultimately landed him in rehab    For this admission, the patient is today fell at home after his legs gave out from underneath him  He continued to feel weaker and weaker up to the point where he said that he was having trouble taking a spoon from level into his mouth to feed himself  At presentation the hospital, the patient was noted to be in acute kidney injury along with acute on chronic congestive heart failure  Patient appears have put on approximately 17 lb of fluid weight over last month or so  He claims to be taking all medications as prescribed, denies the use of nonsteroidal anti-inflammatory medications  Patient is unsure if he is on appropriate diet from a sodium standpoint  From renal standpoint, patient appears to have a baseline creatinine around 1 3-1 4 mg/ dL, he presents to the hospital a creatinine of 2 16 mg/ dL  With aggressive diuresis, the patient's creatinine this morning has improved down to 1 92 mg/ dL with no significant electrolyte abnormalities  Patient is currently on low-sodium diet  Further from the renal standpoint, patient has history of urinary retention and he is currently on Flomax 0 4 mg once daily  Previous electronic medical records reviewed, including records from about 1 year ago in the summer of 2020 when the patient had an admission for congestive heart failure and acute kidney injury  History obtained from chart review and the patient    Review of Systems - Negative except as mentioned above in HPI, more specifics as mentioned below    Review of Systems - General ROS: positive for  - fatigue  Psychological ROS: negative  Ophthalmic ROS: negative  ENT ROS: negative  Allergy and Immunology ROS: negative  Hematological and Lymphatic ROS: negative  Endocrine ROS: negative  Respiratory ROS: positive for - shortness of breath  Cardiovascular ROS: positive for - dyspnea on exertion  Gastrointestinal ROS: no abdominal pain, change in bowel habits, or black or bloody stools  Genito-Urinary ROS: negative  Musculoskeletal ROS: positive for - gait disturbance and muscular weakness  Neurological ROS: no TIA or stroke symptoms  Dermatological ROS: negative    Historical Information   Past Medical History:   Diagnosis Date    Cataract     CH[facility]congestive heart failure) [facility])     chronic diastolic CHF    Coronary artery disease     Diabetes mellitus ([place] [id number] )     Glaucoma     Hyperlipidemia     Hypertension     Neuropathy     Obesity     Renal disorder     chronic kidney disease stage 3     Sleep apnea     Stroke ([place] [address] )     TIA (transient ischemic attack)     Uncontrolled type 2 diabetes mellitus with hyperglycemia, with long-term current use of insulin ([place] [address] ) 10/4/2018     Past Surgical History:   Procedure Laterality Date    APPENDECTOMY      CARPAL TUNNEL RELEASE      EYE SURGERY      cataracts     Social History   Social History     Substance and Sexual Activity   Alcohol Use Never    Frequency: Never    Drinks per session: Patient refused    Binge frequency: Never     Social History     Substance and Sexual Activity   Drug Use No     Social History     Tobacco Use   Smoking Status Former Smoker    Types: Cigars    Quit date:     Years since quittin 2   Smokeless Tobacco Former User     Family History   Problem Relation Age of Onset    No Known Problems Mother     No Known Problems Father        Meds/Allergies   all current active meds have been reviewed, current meds:   Current Facility-Administered Medications   Medication Dose Route Frequency    acetaminophen (TYLENOL) tablet 650 mg  650 mg Oral Q6H PRN    amLODIPine (NORVASC) tablet 10 mg  10 mg Oral Daily    aspirin (ECOTRIN LOW STRENGTH) EC tablet 81 mg  81 mg Oral Daily    atorvastatin (LIPITOR) tablet 40 mg  40 mg Oral After Dinner    carvedilol (COREG) tablet 12 5 mg  12 5 mg Oral BID    cholecalciferol (VITAMIN D3) tablet 2,000 Units  2,000 Units Oral Daily    furosemide (LASIX) injection 40 mg  40 mg Intravenous BID (diuretic)    heparin (porcine) 25,000 units in 0 45% NaCl 250 mL infusion (premix)  3-30 Units/kg/hr (Order-Specific) Intravenous Titrated    heparin (porcine) injection 10,000 Units  10,000 Units Intravenous Q1H PRN    heparin (porcine) injection 5,000 Units  5,000 Units Intravenous Q1H PRN    HYDROmorphone (DILAUDID) injection 0 5 mg  0 5 mg Intravenous Q4H PRN    insulin glargine (LANTUS) subcutaneous injection 10 Units 0 1 mL  10 Units Subcutaneous HS    insulin lispro (HumaLOG) 100 units/mL subcutaneous injection 1-6 Units  1-6 Units Subcutaneous TID AC    insulin lispro (HumaLOG) 100 units/mL subcutaneous injection 1-6 Units  1-6 Units Subcutaneous HS    oxyCODONE (ROXICODONE) IR tablet 5 mg  5 mg Oral Q4H PRN    Or    oxyCODONE (ROXICODONE) immediate release tablet 10 mg  10 mg Oral Q4H PRN    tamsulosin (FLOMAX) capsule 0 4 mg  0 4 mg Oral QPM    and PTA meds:    Medications Prior to Admission   Medication    acetaminophen (TYLENOL) 325 mg tablet    amLODIPine (NORVASC) 10 mg tablet    aspirin 81 MG tablet    atorvastatin (LIPITOR) 40 mg tablet    carvedilol (COREG) 12 5 mg tablet    cholecalciferol 2000 units TABS    finasteride (PROSCAR) 5 mg tablet    gabapentin (NEURONTIN) 600 MG tablet    insulin aspart protamine-insulin aspart (NovoLOG 70/30) 100 units/mL injection    insulin glargine (LANTUS) 100 units/mL subcutaneous injection    lisinopril (ZESTRIL) 10 mg tablet    nystatin (MYCOSTATIN) powder    oxyCODONE (ROXICODONE) 10 MG TABS    Semaglutide (OZEMPIC, 0 25 OR 0 5 MG/DOSE, SC)    tamsulosin (FLOMAX) 0 4 mg    BD PEN NEEDLE JUNIOR U/F 32G X 4 MM MISC    glucagon (GLUCAGON EMERGENCY) 1 MG injection         No Known Allergies    Objective     Intake/Output Summary (Last 24 hours) at 4/6/2021 1105  Last data filed at 4/6/2021 0701  Gross per 24 hour   Intake 50 ml   Output 3150 ml   Net -3100 ml       Invasive Devices:        Physical Exam      I/O last 3 completed shifts: In: 48 [IV Piggyback:50]  Out: 1591 [Urine:2575]    Vitals:    04/06/21 0917   BP:    Pulse: 89   Resp: 22   Temp:    SpO2: 90%       Gen: in NAD, Alert/Awake  HEENT: no sclerous icterus, MMM, neck supple  CV: +S1/S2, RRR  Lungs:  Reduced bilaterally  Abd: +BS, soft NT/ND  Ext: all four extremities are warm,  Plus two bilateral lower extremity edema  Skin: no rashes noted  Neuro: CN II-XII intact    Current Weight: Weight - Scale: (!) 142 kg (313 lb 4 4 oz)  First Weight: Weight - Scale: (!) 144 kg (317 lb 7 4 oz)    Lab Results:  I have personally reviewed pertinent labs      CBC:   Lab Results   Component Value Date    WBC 10 23 (H) 04/06/2021    HGB 10 8 (L) 04/06/2021    HCT 35 5 (L) 04/06/2021    MCV 88 04/06/2021     04/06/2021    MCH 26 7 (L) 04/06/2021    MCHC 30 4 (L) 04/06/2021    RDW 15 1 04/06/2021    MPV 9 8 04/06/2021    NRBC 0 04/06/2021     CMP:   Lab Results   Component Value Date    K 4 7 04/06/2021     04/06/2021    CO2 28 04/06/2021    BUN 29 (H) 04/06/2021    CREATININE 1 92 (H) 04/06/2021    CALCIUM 8 9 04/06/2021    AST 22 04/06/2021    ALT 27 04/06/2021    ALKPHOS 160 (H) 04/06/2021    EGFR 33 04/06/2021     Phosphorus:   Lab Results   Component Value Date    PHOS 3 0 04/06/2021     Magnesium:   Lab Results   Component Value Date    MG 2 0 04/06/2021     Urinalysis:   Lab Results   Component Value Date    COLORU Yellow 04/05/2021    CLARITYU Clear 04/05/2021    SPECGRAV 1 020 04/05/2021    PHUR 7 0 04/05/2021    LEUKOCYTESUR Trace (A) 04/05/2021    NITRITE Negative 04/05/2021    GLUCOSEU Negative 04/05/2021    KETONESU Negative 04/05/2021    BILIRUBINUR Negative 04/05/2021    BLOODU Negative 04/05/2021     Ionized Calcium: No results found for: CAION  Coagulation:   Lab Results   Component Value Date    INR 1 13 04/05/2021     Troponin:   Lab Results   Component Value Date    TROPONINI <0 02 04/06/2021     ABG:   Lab Results   Component Value Date    PHART 7 361 04/05/2021    MQV6ORM 42 2 04/05/2021    PO2ART 69 3 (L) 04/05/2021    JAM3VLZ 23 4 04/05/2021    BEART -2 0 04/05/2021    SOURCE Radial, Right 04/05/2021       Results from last 7 days   Lab Units 04/06/21  0519 04/05/21  1456   POTASSIUM mmol/L 4 7 4 7   CHLORIDE mmol/L 107 106   CO2 mmol/L 28 28   BUN mg/dL 29* 32*   CREATININE mg/dL 1 92* 2 16*   CALCIUM mg/dL 8 9 9 1   ALK PHOS U/L 160* 169*   ALT U/L 27 24   AST U/L 22 19       Radiology review:  Procedure: Ct Abdomen Pelvis Wo Contrast    Result Date: 4/5/2021  Narrative: CT ABDOMEN AND PELVIS WITHOUT IV CONTRAST INDICATION:   Abdominal trauma, blunt fall yesterday leg weakness KIRT  COMPARISON:  Images from chest CT dated 3/9/2021, renal ultrasound dated 12/1/2015 and ultrasound dated 10/21/2015  TECHNIQUE:  CT examination of the abdomen and pelvis was performed without intravenous contrast   Axial, sagittal, and coronal 2D reformatted images were created from the source data and submitted for interpretation  Radiation dose length product (DLP) for this visit:  2362 99 mGy-cm   This examination, like all CT scans performed in the [facility], was performed utilizing techniques to minimize radiation dose exposure, including the use of iterative reconstruction and automated exposure control  Enteric contrast was not administered  FINDINGS: ABDOMEN LOWER CHEST: Small bilateral pleural effusions and basilar atelectasis  LIVER/BILIARY TREE:  Unremarkable  GALLBLADDER:  No calcified gallstones  No pericholecystic inflammatory change  SPLEEN:  Unremarkable  PANCREAS:  Unremarkable  ADRENAL GLANDS:  Unremarkable  KIDNEYS/URETERS:  3 mm nonobstructing right renal stone  Small renal cysts better demonstrated on prior ultrasound  No obstructing stone or hydronephrosis  STOMACH AND BOWEL:  Unremarkable  APPENDIX:  Surgically absent  ABDOMINOPELVIC CAVITY:  No ascites  No pneumoperitoneum  No lymphadenopathy   VESSELS:  Atherosclerotic changes are present  No evidence of aneurysm  PELVIS REPRODUCTIVE ORGANS:  Unremarkable for patient's age  URINARY BLADDER:  Bladder diverticula are noted  ABDOMINAL WALL/INGUINAL REGIONS:  Diastases of the rectus abdominis  Fat-containing umbilical and supraumbilical hernias  Bilateral nonspecific inguinal adenopathy measuring up to 1 4 cm on the right and 1 6 cm on the left  OSSEOUS STRUCTURES:  No acute fracture or destructive osseous lesion  Spinal degenerative changes are noted  Impression: No acute intra-abdominal pathology  Small effusions and basilar atelectasis  Nonspecific inguinal adenopathy could be reactive but recommend clinical correlation and follow-up as warranted  Workstation performed: [id number]     Procedure: Xr Chest 1 View Portable    Result Date: 4/6/2021  Narrative: CHEST INDICATION:   low oxygen sats  COMPARISON:  3/8/2021 EXAM PERFORMED/VIEWS:  XR CHEST PORTABLE FINDINGS: Cardiomediastinal silhouette appears unremarkable  The lungs are clear  No pneumothorax or pleural effusion  Osseous structures appear within normal limits for patient age  Impression: No acute cardiopulmonary disease  Workstation performed: [id number]     Procedure: Ct Head Without Contrast    Result Date: 4/5/2021  Narrative: CT BRAIN - WITHOUT CONTRAST INDICATION:   Head trauma, minor (Age => 63y) fall yesterday leg weakness  COMPARISON:  None  TECHNIQUE:  CT examination of the brain was performed  In addition to axial images, sagittal and coronal 2D reformatted images were created and submitted for interpretation  Radiation dose length product (DLP) for this visit:  957 6 mGy-cm   This examination, like all CT scans performed in the [facility], was performed utilizing techniques to minimize radiation dose exposure, including the use of iterative reconstruction and automated exposure control  IMAGE QUALITY:  Diagnostic   FINDINGS: PARENCHYMA: Decreased attenuation is noted in periventricular and subcortical white matter demonstrating an appearance that is statistically most likely to represent mild microangiopathic change  No CT signs of acute infarction  No intracranial mass, mass effect or midline shift  No acute parenchymal hemorrhage  Mild age-appropriate volume loss  VENTRICLES AND EXTRA-AXIAL SPACES:  Normal for the patient's age  VISUALIZED ORBITS AND PARANASAL SINUSES:  Orbits are unremarkable  Stable ethmoid and sphenoid sinus mucosal thickening  CALVARIUM AND EXTRACRANIAL SOFT TISSUES:  Normal      Impression: No acute intracranial abnormality  Workstation performed: [id number]     Procedure: Ct Cervical Spine Without Contrast    Result Date: 4/5/2021  Narrative: CT CERVICAL SPINE - WITHOUT CONTRAST INDICATION:   Neck trauma (Age => 63y) fall yesterday leg weakness  COMPARISON:  CT dated 3/1/2019  TECHNIQUE:  CT examination of the cervical spine was performed without intravenous contrast   Contiguous axial images were obtained  Sagittal and coronal reconstructions were performed  Radiation dose length product (DLP) for this visit:  685 38 mGy-cm   This examination, like all CT scans performed in the [facility], was performed utilizing techniques to minimize radiation dose exposure, including the use of iterative  reconstruction and automated exposure control  IMAGE QUALITY:  Diagnostic  FINDINGS: ALIGNMENT:  Normal alignment of the cervical spine  No subluxation  VERTEBRAL BODIES:  No fracture  DEGENERATIVE CHANGES:  Moderate multilevel cervical degenerative changes are noted  No critical central canal stenosis  New ankylosis of the right C2-3 facet complex  PREVERTEBRAL AND PARASPINAL SOFT TISSUES:  Unremarkable  THORACIC INLET:  Normal      Impression: No cervical spine fracture or traumatic malalignment    Workstation performed: [id number]     Procedure: Vas Lower Limb Venous Duplex Study, Complete Bilateral    Result Date: 4/5/2021  Narrative:  [facility] REPORT CLINICAL: Indications: Patient presents with bilateral lower extremity pain and swelling  Risk Factors The patient has history of Obesity, HTN, Diabetes (Yes), CAD and previous smoking (quit 1-5yrs ago)  He has no history of DVT  CONCLUSION:  Impression: RIGHT LOWER LIMB: No evidence of acute or chronic deep vein thrombosis  No evidence of superficial thrombophlebitis noted  Doppler evaluation shows a normal response to augmentation maneuvers  Popliteal, posterior tibial and anterior tibial arterial Doppler waveforms are biphasic/monophasic  LEFT LOWER LIMB: No evidence of acute or chronic deep vein thrombosis  No evidence of superficial thrombophlebitis noted  Doppler evaluation shows a normal response to augmentation maneuvers  Popliteal, posterior tibial and anterior tibial arterial Doppler waveforms are biphasic/monophasic  Technical findings were tiger texted to Dr Nelson Howell  SIGNATURE: Electronically Signed by: Jonathon Bermudez on 2021-04-05 04:25:35 PM      Magi Chairez DO      This consultation note was produced in part using a dictation device which may document imprecise wording from author's original intent

## 2021-04-06 NOTE — ASSESSMENT & PLAN NOTE
· Possibly reactive in nature  · Will need outpatient surveillance imaging to ensure resolution of the inguinal lymphadenopathy

## 2021-04-06 NOTE — H&P
[address] 1945, 64 y o  male MRN: [id number]  Unit/Bed#: 424-01 Encounter: [id number]  Primary Care Provider: Christopher Abad MD   Date and time admitted to hospital: 4/5/2021  2:40 PM    * Acute kidney injury [facility])  Assessment & Plan  · Admit to med/surg level of care  · Acute kidney injury was present on admission and likely secondary to cardiorenal syndrome    · Baseline serum creatinine of 1 2-1 4 mg/dl  · Treat with lasix 40 mg IV BID  · Check a urine sodium level and urine protein/creatinine ratio  · Avoid all nephrotoxic agents  · Consult Nephrology  · Serial laboratory testing to monitor the patient's renal function and electrolyte levels    Lower extremity weakness  Assessment & Plan  · May require additional neurologic imaging to rule-out a CVA/stroke  · PT/OT      Acute on chronic diastolic CHF (congestive heart failure) ([facility])  Assessment & Plan  Wt Readings from Last 3 Encounters:   04/05/21 (!) 144 kg (317 lb 3 9 oz)   03/29/21 (!) 144 kg (317 lb 6 4 oz)   03/08/21 (!) 136 kg (300 lb 0 7 oz)     · Treat with lasix 40 mg IV BID  · Continue PO coreg  · Daily weights  · Strict intake/output measurements        Bilateral pleural effusion  Assessment & Plan  · Likely secondary to acute on chronic diastolic CHF (congestive heart failure)  · New supplemental oxygen requirement of 2 lpm via the nasal cannula to maintain oxygen saturation levels at 90% and above  · Treat with lasix 40 mg IV BID    Venous stasis dermatitis of both lower extremities  Assessment & Plan  · Does not appear to be any active infection at this time    Renal calculus  Assessment & Plan  · Outpatient Urology evaluation    Multiple renal cysts  Assessment & Plan  · Outpatient surveillance imaging with PCP    Atelectasis  Assessment & Plan  · Incentive spirometry 10 times per hour while awake    Inguinal lymphadenopathy  Assessment & Plan  · Possibly reactive in nature  · Will need outpatient surveillance imaging to ensure resolution of the inguinal lymphadenopathy    Abnormal EKG  Assessment & Plan  · Outpatient follow-up with Cardiology    ECG 12 lead  Order: [id number]  Status:  Final result   Visible to patient:  No (inaccessible in [employer])   Next appt:  05/18/2021 at 12:40 PM in Cardiology Emy Lazcano MD)   Ref Range & Units 4/5/21 1515   Ventricular Rate BPM 75    Atrial Rate BPM 75    AZ Interval ms 246    QRSD Interval ms 150    QT Interval ms 408    QTC Interval ms 455    P Axis degrees 60    QRS Axis degrees -80    T Wave Axis degrees 48       Narrative & Impression    Sinus rhythm with 1st degree A-V block  Left axis deviation  Right bundle branch block  Abnormal ECG  When compared with ECG of 08-MAR-2021 15:53,     Confirmed by Becky Espino [phone]) on 4/5/2021 3:26:16 PM      Specimen Collected: 04/05/21 15:15   Last Resulted: 04/05/21 15:26               Gait instability  Assessment & Plan  · May require additional neurologic imaging to rule-out a CVA/stroke  · PT/OT    Multiple pulmonary nodules  Assessment & Plan  · Outpatient surveillance imaging with Pulmonology    Elevated d-dimer  Assessment & Plan  · Cannot have a CTA of the chest/PE protocol with acute kidney injury  · Check a nuclear medicine V/Q lung scan  · Utilize an empiric IV heparin drip/infusion per the VTE/PE (Raschke) protocol for now    VAS lower limb venous duplex study, complete bilateral  Status: Final result   PACS Images     Show images for VAS lower limb venous duplex study, complete bilateral   Study Result       [facility] REPORT  CLINICAL:  Indications:  Patient presents with bilateral lower extremity pain and swelling  Risk Factors  The patient has history of Obesity, HTN, Diabetes (Yes), CAD and previous  smoking (quit 1-5yrs ago)  He has no history of DVT  CONCLUSION:     Impression:  RIGHT LOWER LIMB:  No evidence of acute or chronic deep vein thrombosis    No evidence of superficial thrombophlebitis noted  Doppler evaluation shows a normal response to augmentation maneuvers  Popliteal, posterior tibial and anterior tibial arterial Doppler waveforms are  biphasic/monophasic  LEFT LOWER LIMB:  No evidence of acute or chronic deep vein thrombosis  No evidence of superficial thrombophlebitis noted  Doppler evaluation shows a normal response to augmentation maneuvers  Popliteal, posterior tibial and anterior tibial arterial Doppler waveforms are  biphasic/monophasic  Technical findings were tiger texted to Dr Cindy Jacobs  SIGNATURE:  Electronically Signed Patrice Mares on 2021-04-05 04:25:35 PM         Type 2 diabetes mellitus with hyperglycemia, with long-term current use of insulin ([facility])  Assessment & Plan  Lab Results   Component Value Date    HGBA1C 7 0 (H) 03/09/2021       No results for input(s): POCGLU in the last 72 hours  Blood Sugar Average: Last 72 hrs:     · Utilize lantus 10 Units SQ QHS  · Hypoglycemia protocol  · Insulin sliding scale with blood glucose monitoring ACHS    BHARGAVI (obstructive sleep apnea)  Assessment & Plan  · Continue CPAP QHS and anytime while sleeping    VTE Prophylaxis: Heparin Drip per the VTE/PE (Raschke) protocol/ sequential compression device   Code Status: Level 3-DNAR and DNI    Anticipated Length of Stay:  The patient will be admitted on an Observation basis with an anticipated length of stay of less than 2 midnights  Justification for Hospital Stay:  The patient requires IV lasix treatment, PT/OT evaluations, and serial laboratory testing to monitor his renal function  Chief Complaint:  Bilateral lower extremity weakness    History of Present Illness:    Olesya Russo is a 64 y o  male who presents to the emergency room with the complaint of bilateral lower extremity weakness    The patient developed the acute onset of bilateral lower extremity weakness when he was attempting to ambulate on Sunday, 04/04/2021  The patient's legs "gave out" and the patient fell in his home  Over the last 24 hours, the patient has been unable to ambulate secondary to the bilateral lower extremity weakness  The bilateral lower extremity weakness was severe in intensity and constant in nature  Nothing seemed to improve his symptoms  No chest pain  No shortness of breath  No abdominal pain  No nausea or vomiting  Review of Systems:    Review of Systems:  Per HPI, all other systems have been reviewed and were negative  Past Medical and Surgical History:     Past Medical History:   Diagnosis Date    Cataract     CHF (congestive heart failure) (HCC)     chronic diastolic CHF    Coronary artery disease     Diabetes mellitus ([place] [address] )     Glaucoma     Hyperlipidemia     Hypertension     Neuropathy     Obesity     Renal disorder     chronic kidney disease stage 3     Sleep apnea     Stroke ([place] [address] )     TIA (transient ischemic attack)     Uncontrolled type 2 diabetes mellitus with hyperglycemia, with long-term current use of insulin ([place] [address] ) 10/4/2018       Past Surgical History:   Procedure Laterality Date    APPENDECTOMY      CARPAL TUNNEL RELEASE      EYE SURGERY      cataracts       Meds/Allergies:    Prior to Admission medications    Medication Sig Start Date End Date Taking?  Authorizing Provider   acetaminophen (TYLENOL) 325 mg tablet Take 2 tablets (650 mg total) by mouth every 6 (six) hours as needed for mild pain, headaches or fever 3/12/21  Yes Damaris Beebe DO   amLODIPine (NORVASC) 10 mg tablet Take 1 tablet (10 mg total) by mouth daily 3/12/21  Yes Damaris Beebe DO   aspirin 81 MG tablet Take 81 mg by mouth daily   Yes Historical Provider, MD   atorvastatin (LIPITOR) 40 mg tablet Take 1 tablet (40 mg total) by mouth daily after dinner 3/5/19  Yes Damaris Beebe DO   carvedilol (COREG) 12 5 mg tablet Take 1 tablet (12 5 mg total) by mouth 2 (two) times a day 3/4/19  Yes Damaris Beebe DO cholecalciferol 2000 units TABS Take 1 tablet (2,000 Units total) by mouth daily 3/4/19  Yes [employer], DO   finasteride (PROSCAR) 5 mg tablet Take 1 tablet (5 mg total) by mouth daily 3/12/21 4/11/21 Yes [employer], DO   gabapentin (NEURONTIN) 600 MG tablet Take 1 tablet (600 mg total) by mouth 2 (two) times a day 3/4/19  Yes [employer], DO   insulin aspart protamine-insulin aspart (NovoLOG 70/30) 100 units/mL injection Inject under the skin 2 (two) times a day before meals   Yes Historical Provider, MD   insulin glargine (LANTUS) 100 units/mL subcutaneous injection Inject 20 Units under the skin 2 (two) times a day for 14 days 2/7/20 4/5/21 Yes Sky Pelaez MD   lisinopril (ZESTRIL) 10 mg tablet Take 1 tablet (10 mg total) by mouth daily 3/12/21  Yes [employer], DO   nystatin (MYCOSTATIN) powder Apply topically 2 (two) times a day 3/4/19  Yes [employer], DO   oxyCODONE (ROXICODONE) 10 MG TABS Take 1 tablet (10 mg total) by mouth every 4 (four) hours as needed for moderate pain or severe painMax Daily Amount: 60 mg  Patient taking differently: Take 20 mg by mouth every 6 (six) hours as needed for moderate pain or severe pain  3/4/19  Yes [employer], DO   Semaglutide (OZEMPIC, 0 25 OR 0 5 MG/DOSE, SC) Inject 0 5 mg under the skin once a week Usually takes on Mon   Yes Historical Provider, MD   tamsulosin (FLOMAX) 0 4 mg Take 0 4 mg by mouth every evening     Yes Historical Provider, MD   BD PEN NEEDLE JUNIOR U/F 32G X 4 MM MISC  12/31/17   Historical Provider, MD   glucagon (GLUCAGON EMERGENCY) 1 MG injection Inject 1 mg under the skin once as needed (PRN blood glucose less than 70 if unconscious or uncorrectable by oral means) for up to 1 dose 3/4/19   [employer], DO     I have reviewed home medications with patient personally      Allergies: No Known Allergies    Social History:     Marital Status: /Civil Union     Substance Use History:   Social History Substance and Sexual Activity   Alcohol Use Never    Frequency: Never    Drinks per session: Patient refused    Binge frequency: Never     Social History     Tobacco Use   Smoking Status Former Smoker    Types: Cigars    Quit date:     Years since quittin 2   Smokeless Tobacco Former User     Social History     Substance and Sexual Activity   Drug Use No       Family History:    non-contributory    Physical Exam:     Vitals:   Blood Pressure: 123/75 (21)  Pulse: 81 (21)  Temperature: 98 2 °F (36 8 °C) (21)  Temp Source: Oral (21)  Respirations: 20 (21)  Height: 5' 6" (167 6 cm) (21)  Weight - Scale: (!) 144 kg (317 lb 3 9 oz) (21)  SpO2: 92 % (21)    Physical Exam  General:  NAD, follows commands  HEENT:  NC/AT, mucous membranes moist  Neck:  Supple, No JVP elevation  CV:  + S1, + S2, RRR  Pulm:  Bibasilar crackles  Abd:  Soft, Non-tender, Non-distended  Ext:  No clubbing/cyanosis/edema  Skin:  No rashes  Neuro:  Awake, alert, oriented  Psych:  Normal mood and affect      Additional Data:     Lab Results: I have personally reviewed pertinent reports        Results from last 7 days   Lab Units 21  1456   WBC Thousand/uL 8 76   HEMOGLOBIN g/dL 11 1*   HEMATOCRIT % 36 2*   PLATELETS Thousands/uL 226   NEUTROS PCT % 68   LYMPHS PCT % 19   MONOS PCT % 9   EOS PCT % 3     Results from last 7 days   Lab Units 21  1456   SODIUM mmol/L 140   POTASSIUM mmol/L 4 7   CHLORIDE mmol/L 106   CO2 mmol/L 28   BUN mg/dL 32*   CREATININE mg/dL 2 16*   ANION GAP mmol/L 6   CALCIUM mg/dL 9 1   ALBUMIN g/dL 3 1*   TOTAL BILIRUBIN mg/dL 0 31   ALK PHOS U/L 169*   ALT U/L 24   AST U/L 19   GLUCOSE RANDOM mg/dL 144*     Results from last 7 days   Lab Units 21  1456   INR  1 13             Results from last 7 days   Lab Units 21  1456   LACTIC ACID mmol/L 1 4       Imaging: I have personally reviewed pertinent reports  CT abdomen pelvis wo contrast   Final Result by Cele Paiz MD (04/05 1759)      No acute intra-abdominal pathology  Small effusions and basilar atelectasis  Nonspecific inguinal adenopathy could be reactive but recommend clinical correlation and follow-up as warranted  Workstation performed: [id number]         CT cervical spine without contrast   Final Result by Cele Paiz MD (04/05 1748)      No cervical spine fracture or traumatic malalignment  Workstation performed: [id number]         CT head without contrast   Final Result by Cele Paiz MD (04/05 1748)      No acute intracranial abnormality  Workstation performed: [id number]         XR chest 1 view portable   ED Interpretation by Andrea Pastor MD ([phone])   Read by me; Radiologist to provide formal interpretation   No acute process no significant change from previous 3/8/21      VAS lower limb venous duplex study, complete bilateral   Final Result by Linnea Strickland MD (04/05 1625)      NM inpatient order    (Results Pending)       EKG, Pathology, and Other Studies Reviewed on Admission:   · EKG:   ECG 12 lead  Order: [id number]  Status:  Final result   Visible to patient:  No (inaccessible in [employer])   Next appt:  05/18/2021 at 12:40 PM in Cardiology Neelam Conde MD)   Ref Range & Units 4/5/21 1515   Ventricular Rate BPM 75    Atrial Rate BPM 75    N[place] Interval ms 246    QRSD Interval ms 150    QT Interval ms 408    QTC Interval ms 455    P Axis degrees 60    QRS Axis degrees -80    T Wave Axis degrees 48       Narrative & Impression    Sinus rhythm with 1st degree A-V block  Left axis deviation  Right bundle branch block  Abnormal ECG  When compared with ECG of 08-MAR-2021 15:53,     Confirmed by Jhony Hutson [phone] on 4/5/2021 3:26:16 PM      Specimen Collected: 04/05/21 15:15   Last Resulted: 04/05/21 15:26             [employer] / [address] Records Reviewed: Yes     ** Please Note: This note has been constructed using a voice recognition system   **

## 2021-04-06 NOTE — ASSESSMENT & PLAN NOTE
Wt Readings from Last 3 Encounters:   04/06/21 (!) 142 kg (313 lb 4 4 oz)   03/29/21 (!) 144 kg (317 lb 6 4 oz)   03/08/21 (!) 136 kg (300 lb 0 7 oz)     · Continue lasix 40 mg IV BID  · Continue PO coreg  · Daily weights  · Strict intake/output measurements

## 2021-04-06 NOTE — ASSESSMENT & PLAN NOTE
· Admit to med/surg level of care  · Acute kidney injury was present on admission and likely secondary to cardiorenal syndrome    · Baseline serum creatinine of 1 2-1 4 mg/dl  · Treat with lasix 40 mg IV BID  · Check a urine sodium level and urine protein/creatinine ratio  · Avoid all nephrotoxic agents  · Consult Nephrology  · Serial laboratory testing to monitor the patient's renal function and electrolyte levels

## 2021-04-06 NOTE — PLAN OF CARE
Problem: Potential for Falls  Goal: Patient will remain free of falls  Description: INTERVENTIONS:  - Assess patient frequently for physical needs  -  Identify cognitive and physical deficits and behaviors that affect risk of falls    -  [place] fall precautions as indicated by assessment   - Educate patient/family on patient safety including physical limitations  - Instruct patient to call for assistance with activity based on assessment  - Modify environment to reduce risk of injury  - Consider OT/PT consult to assist with strengthening/mobility  Outcome: Progressing     Problem: Prexisting or High Potential for Compromised Skin Integrity  Goal: Skin integrity is maintained or improved  Description: INTERVENTIONS:  - Identify patients at risk for skin breakdown  - Assess and monitor skin integrity  - Assess and monitor nutrition and hydration status  - Monitor labs   - Assess for incontinence   - Turn and reposition patient  - Assist with mobility/ambulation  - Relieve pressure over bony prominences  - Avoid friction and shearing  - Provide appropriate hygiene as needed including keeping skin clean and dry  - Evaluate need for skin moisturizer/barrier cream  - Collaborate with interdisciplinary team   - Patient/family teaching  - Consider wound care consult   Outcome: Progressing     Problem: PAIN - ADULT  Goal: Verbalizes/displays adequate comfort level or baseline comfort level  Description: Interventions:  - Encourage patient to monitor pain and request assistance  - Assess pain using appropriate pain scale  - Administer analgesics based on type and severity of pain and evaluate response  - Implement non-pharmacological measures as appropriate and evaluate response  - Consider cultural and social influences on pain and pain management  - Notify physician/advanced practitioner if interventions unsuccessful or patient reports new pain  Outcome: Progressing     Problem: INFECTION - ADULT  Goal: Absence or prevention of progression during hospitalization  Description: INTERVENTIONS:  - Assess and monitor for signs and symptoms of infection  - Monitor lab/diagnostic results  - Monitor all insertion sites, i e  indwelling lines, tubes, and drains  - Monitor endotracheal if appropriate and nasal secretions for changes in amount and color  - [place] appropriate cooling/warming therapies per order  - Administer medications as ordered  - Instruct and encourage patient and family to use good hand hygiene technique  - Identify and instruct in appropriate isolation precautions for identified infection/condition  Outcome: Progressing     Problem: SAFETY ADULT  Goal: Patient will remain free of falls  Description: INTERVENTIONS:  - Assess patient frequently for physical needs  -  Identify cognitive and physical deficits and behaviors that affect risk of falls    -  [place] fall precautions as indicated by assessment   - Educate patient/family on patient safety including physical limitations  - Instruct patient to call for assistance with activity based on assessment  - Modify environment to reduce risk of injury  - Consider OT/PT consult to assist with strengthening/mobility  Outcome: Progressing  Goal: Maintain or return to baseline ADL function  Description: INTERVENTIONS:  -  Assess patient's ability to carry out ADLs; assess patient's baseline for ADL function and identify physical deficits which impact ability to perform ADLs (bathing, care of mouth/teeth, toileting, grooming, dressing, etc )  - Assess/evaluate cause of self-care deficits   - Assess range of motion  - Assess patient's mobility; develop plan if impaired  - Assess patient's need for assistive devices and provide as appropriate  - Encourage maximum independence but intervene and supervise when necessary  - Involve family in performance of ADLs  - Assess for home care needs following discharge   - Consider OT consult to assist with ADL evaluation and planning for discharge  - Provide patient education as appropriate  Outcome: Progressing  Goal: Maintain or return mobility status to optimal level  Description: INTERVENTIONS:  - Assess patient's baseline mobility status (ambulation, transfers, stairs, etc )    - Identify cognitive and physical deficits and behaviors that affect mobility  - Identify mobility aids required to assist with transfers and/or ambulation (gait belt, sit-to-stand, lift, walker, cane, etc )  - [place] fall precautions as indicated by assessment  - Record patient progress and toleration of activity level on Mobility SBAR; progress patient to next Phase/Stage  - Instruct patient to call for assistance with activity based on assessment  - Consider rehabilitation consult to assist with strengthening/weightbearing, etc   Outcome: Progressing     Problem: DISCHARGE PLANNING  Goal: Discharge to home or other facility with appropriate resources  Description: INTERVENTIONS:  - Identify barriers to discharge w/patient and caregiver  - Arrange for needed discharge resources and transportation as appropriate  - Identify discharge learning needs (meds, wound care, etc )  - Arrange for interpretive services to assist at discharge as needed  - Refer to Case Management Department for coordinating discharge planning if the patient needs post-hospital services based on physician/advanced practitioner order or complex needs related to functional status, cognitive ability, or social support system  Outcome: Progressing     Problem: Knowledge Deficit  Goal: Patient/family/caregiver demonstrates understanding of disease process, treatment plan, medications, and discharge instructions  Description: Complete learning assessment and assess knowledge base    Interventions:  - Provide teaching at level of understanding  - Provide teaching via preferred learning methods  Outcome: Progressing     Problem: METABOLIC, FLUID AND ELECTROLYTES - ADULT  Goal: Glucose maintained within target range  Description: INTERVENTIONS:  - Monitor Blood Glucose as ordered  - Assess for signs and symptoms of hyperglycemia and hypoglycemia  - Administer ordered medications to maintain glucose within target range  - Assess nutritional intake and initiate nutrition service referral as needed  Outcome: Progressing     Problem: SKIN/TISSUE INTEGRITY - ADULT  Goal: Skin integrity remains intact  Description: INTERVENTIONS  - Identify patients at risk for skin breakdown  - Assess and monitor skin integrity  - Assess and monitor nutrition and hydration status  - Monitor labs (i e  albumin)  - Assess for incontinence   - Turn and reposition patient  - Assist with mobility/ambulation  - Relieve pressure over bony prominences  - Avoid friction and shearing  - Provide appropriate hygiene as needed including keeping skin clean and dry  - Evaluate need for skin moisturizer/barrier cream  - Collaborate with interdisciplinary team (i e  Nutrition, Rehabilitation, etc )   - Patient/family teaching  Outcome: Progressing

## 2021-04-06 NOTE — RESPIRATORY THERAPY NOTE
04/06/21 0917   Respiratory Assessment   Assessment Type Assess only   General Appearance Alert; Awake   Respiratory Pattern Dyspnea with exertion   Chest Assessment Chest expansion symmetrical   Bilateral Breath Sounds Diminished   O2 Device 3L   Additional Assessments   Pulse 89   Respirations 22   SpO2 90 %

## 2021-04-06 NOTE — PHYSICAL THERAPY NOTE
Physical Therapy Evaluation    Patient Name: Smitha Benitez    Today's Date: 4/6/2021     Problem List  Principal Problem:    Acute kidney injury ([facility] [address] )  Active Problems:    BHARGAVI (obstructive sleep apnea)    Type 2 diabetes mellitus with hyperglycemia, with long-term current use of insulin ([facility])    Elevated d-dimer    Multiple pulmonary nodules    Gait instability    Abnormal EKG    Inguinal lymphadenopathy    Bilateral pleural effusion    Atelectasis    Acute on chronic diastolic CHF (congestive heart failure) ([facility])    Multiple renal cysts    Renal calculus    Venous stasis dermatitis of both lower extremities    Lower extremity weakness       Past Medical History  Past Medical History:   Diagnosis Date    Cataract     CHF (congestive heart failure) ([facility])     chronic diastolic CHF    Coronary artery disease     Diabetes mellitus ([facility] [address] )     Glaucoma     Hyperlipidemia     Hypertension     Neuropathy     Obesity     Renal disorder     chronic kidney disease stage 3     Sleep apnea     Stroke ([facility] )     TIA (transient ischemic attack)     Uncontrolled type 2 diabetes mellitus with hyperglycemia, with long-term current use of insulin ([facility] ) 10/4/2018        Past Surgical History  Past Surgical History:   Procedure Laterality Date    APPENDECTOMY      CARPAL TUNNEL RELEASE      EYE SURGERY      cataracts           04/06/21 1330   PT Last Visit   PT Visit Date 04/06/21   Note Type   Note type Evaluation   Pain Assessment   Pain Assessment Tool 0-10   Pain Score 9   Pain Location/Orientation Orientation: Right;Location: Leg   Home Living   Type of Home House   Home Layout Two level;Performs ADLs on one level; Able to live on main level with bedroom/bathroom   [address];Cane;Hospital bed   Additional Comments pt reports use of RW at baseline for short distances   Prior Function   Level of [place] Needs assistance with IADLs; Needs assistance with ADLs and functional mobility   Lives With Spouse   Receives Help From Family   ADL Assistance Needs assistance   IADLs Needs assistance   Falls in the last 6 months 1 to 4   Comments (-) driving   Restrictions/Precautions   Weight Bearing Precautions Per Order No   Other Precautions Pain; Fall Risk;O2;Telemetry;Multiple lines; Bed Alarm; Chair Alarm   General   Family/Caregiver Present Yes   Cognition   Overall Cognitive Status WFL   Arousal/Participation Lethargic   Orientation Level Oriented X4   Following Commands Follows one step commands with increased time or repetition   RLE Assessment   RLE Assessment X  (3+/5 grossly)   LLE Assessment   LLE Assessment X  (3+/5 grossly)   Bed Mobility   Supine to Sit 3  Moderate assistance   Additional items Assist x 2; Increased time required;Verbal cues;LE management   Sit to Supine   (pt OOB at end of session)   Transfers   Sit to Stand 3  Moderate assistance   Additional items Assist x 2; Increased time required;Verbal cues   Stand to Sit 3  Moderate assistance   Additional items Assist x 2; Increased time required;Verbal cues   Stand pivot 3  Moderate assistance   Additional items Assist x 2; Increased time required;Verbal cues   Additional Comments RW used   Ambulation/Elevation   Gait pattern Not appropriate; Not tested   Balance   Static Sitting Fair   Dynamic Sitting Fair   Static Standing Fair -   Dynamic Standing Poor +   Endurance Deficit   Endurance Deficit Yes   Endurance Deficit Description pt extremely fatigued at baseline; exacerbated with exertion   Activity Tolerance   Activity Tolerance Patient limited by fatigue;Patient limited by pain   Assessment   Prognosis Guarded   Problem List Decreased strength;Decreased endurance; Impaired balance;Decreased mobility; Impaired judgement;Obesity;Pain;Decreased skin integrity; Impaired hearing;Decreased safety awareness   Assessment Patient is a 64 y o  male evaluated by Physical Therapy s/p admit to [facility] on 4/5/2021 with admitting diagnosis of: Hypoxemia, Bilateral leg edema, KIRT (acute kidney injury), Lower extremity weakness, Lower extremity cellulitis, and principal problem of: Acute kidney injury  PT was consulted to assess patient's functional mobility and discharge needs  Ordered are PT Evaluation and treatment with activity level of: up with assistance  Comorbidities affecting patient's physical performance at time of assessment include: HLD, HTN, hx of stroke, CHF, DM, CAD, neuropathy  Personal factors affecting the patient at time of IE include: lives in 2 story home, ambulating with assistive device, inability to navigate community distances, history of fall(s), impaired safety awareness, decreased initiation and engagement, hearing impairments, questionable non-compliance, limited insight into impairments, inability/difficulty performing IADLs and inability/difficulty performing ADLs  Please locate objective findings from PT assessment regarding body systems outlined above  Upon evaluation, pt requiring modA x 2, RW, and increased time to perform all functional mobility  Pt reports being limited by significant weakness and increased LE pain  While standing at EOB, pt experiencing B knee buckling, so mobility limited to stand pivot transfer from bed to chair  HR and SpO2 remained WFL on 2L O2 throughout  Frequent education provided on importance of OOB participation  The patient's AM-PAC Basic Mobility Inpatient Short Form Low Function Raw Score 13 , Standardized Score is 20  14  A standardized score less 42 9 suggests the patient may benefit from discharge to post-acute rehab services  Please also refer to the recommendation of the Physical Therapist for safe discharge planning  Pt will benefit from continued PT intervention during LOS to address current deficits, increase LOF, and facilitate safe d/c to next level of care when medically appropriate   D/c recommendation at this time is post-acute rehabilitation services  Goals   Patient Goals to feel better   Short Term Goal #1 Pt will participate in B LE strengthening exercises to facilitate improved functional mobility  LTG Expiration Date 04/20/21   Long Term Goal #1 Pt will perform all functional transfers and bed mobility with SUP and good safety awareness  Long Term Goal #2 Pt will ambulate 100' with RW and SUP while maintaining good functional dynamic balance  Plan   Treatment/Interventions Functional transfer training;LE strengthening/ROM; Therapeutic exercise; Endurance training;Bed mobility;Gait training   PT Frequency Other (Comment)  (3-5x/week)   Recommendation   PT Discharge Recommendation Post-Acute Rehabilitation Services   AM-PAC Basic Mobility Inpatient   Turning in Bed Without Bedrails 1   Lying on Back to Sitting on Edge of Flat Bed 1   Moving Bed to Chair 1   Standing Up From Chair 1   Walk in Room 1   Climb 3-5 Stairs 1   Basic Mobility Inpatient Raw Score 6   Turning Head Towards Sound 4   Follow Simple Instructions 3   Low Function Basic Mobility Raw Score 13   Low Function Basic Mobility Standardized Score 20 14     Pt seated in recliner at end of session with all needs in reach

## 2021-04-06 NOTE — ASSESSMENT & PLAN NOTE
Wt Readings from Last 3 Encounters:   04/05/21 (!) 144 kg (317 lb 3 9 oz)   03/29/21 (!) 144 kg (317 lb 6 4 oz)   03/08/21 (!) 136 kg (300 lb 0 7 oz)     · Treat with lasix 40 mg IV BID  · Continue PO coreg  · Daily weights  · Strict intake/output measurements

## 2021-04-06 NOTE — PLAN OF CARE
Problem: PHYSICAL THERAPY ADULT  Goal: Performs mobility at highest level of function for planned discharge setting  See evaluation for individualized goals  Description: Treatment/Interventions: Functional transfer training, LE strengthening/ROM, Therapeutic exercise, Endurance training, Bed mobility, Gait training          See flowsheet documentation for full assessment, interventions and recommendations  Note: Prognosis: Guarded  Problem List: Decreased strength, Decreased endurance, Impaired balance, Decreased mobility, Impaired judgement, Obesity, Pain, Decreased skin integrity, Impaired hearing, Decreased safety awareness  Assessment: Patient is a 64 y o  male evaluated by Physical Therapy s/p admit to [address] [place] [address],[address] on 4/5/2021 with admitting diagnosis of: Hypoxemia, Bilateral leg edema, KIRT (acute kidney injury), Lower extremity weakness, Lower extremity cellulitis, and principal problem of: Acute kidney injury  PT was consulted to assess patient's functional mobility and discharge needs  Ordered are PT Evaluation and treatment with activity level of: up with assistance  Comorbidities affecting patient's physical performance at time of assessment include: HLD, HTN, hx of stroke, CHF, DM, CAD, neuropathy  Personal factors affecting the patient at time of IE include: lives in 2 story home, ambulating with assistive device, inability to navigate community distances, history of fall(s), impaired safety awareness, decreased initiation and engagement, hearing impairments, questionable non-compliance, limited insight into impairments, inability/difficulty performing IADLs and inability/difficulty performing ADLs  Please locate objective findings from PT assessment regarding body systems outlined above  Upon evaluation, pt requiring modA x 2, RW, and increased time to perform all functional mobility  Pt reports being limited by significant weakness and increased LE pain   While standing at EOB, pt experiencing B knee buckling, so mobility limited to stand pivot transfer from bed to chair  HR and SpO2 remained WFL on 2L O2 throughout  Frequent education provided on importance of OOB participation  The patient's AM-PAC Basic Mobility Inpatient Short Form Low Function Raw Score 13 , Standardized Score is 20  14  A standardized score less 42 9 suggests the patient may benefit from discharge to post-acute rehab services  Please also refer to the recommendation of the Physical Therapist for safe discharge planning  Pt will benefit from continued PT intervention during LOS to address current deficits, increase LOF, and facilitate safe d/c to next level of care when medically appropriate  D/c recommendation at this time is post-acute rehabilitation services  PT Discharge Recommendation: Post-Acute Rehabilitation Services          See flowsheet documentation for full assessment

## 2021-04-06 NOTE — ASSESSMENT & PLAN NOTE
· Cannot have a CTA of the chest/PE protocol with acute kidney injury  · Normal V/Q lung scan on 04/06/2021  · The IV heparin drip/infusion per the VTE/PE (Raschke) protocol was discontinued with a normal V/Q lung scan  VAS lower limb venous duplex study, complete bilateral  Status: Final result   PACS Images     Show images for VAS lower limb venous duplex study, complete bilateral   Study Result       [facility] REPORT  CLINICAL:  Indications:  Patient presents with bilateral lower extremity pain and swelling  Risk Factors  The patient has history of Obesity, HTN, Diabetes (Yes), CAD and previous  smoking (quit 1-5yrs ago)  He has no history of DVT  CONCLUSION:     Impression:  RIGHT LOWER LIMB:  No evidence of acute or chronic deep vein thrombosis  No evidence of superficial thrombophlebitis noted  Doppler evaluation shows a normal response to augmentation maneuvers  Popliteal, posterior tibial and anterior tibial arterial Doppler waveforms are  biphasic/monophasic  LEFT LOWER LIMB:  No evidence of acute or chronic deep vein thrombosis  No evidence of superficial thrombophlebitis noted  Doppler evaluation shows a normal response to augmentation maneuvers  Popliteal, posterior tibial and anterior tibial arterial Doppler waveforms are  biphasic/monophasic  Technical findings were tiger texted to Dr Danielle Avila       SIGNATURE:  Electronically Signed by: Lexie Sood on 2021-04-05 04:25:35 PM

## 2021-04-06 NOTE — PLAN OF CARE
Problem: OCCUPATIONAL THERAPY ADULT  Goal: Performs self-care activities at highest level of function for planned discharge setting  See evaluation for individualized goals  Description: Treatment Interventions: ADL retraining, Functional transfer training, UE strengthening/ROM, Endurance training, Patient/family training, Equipment evaluation/education, Activityengagement, Energy conservation          See flowsheet documentation for full assessment, interventions and recommendations  Note: Limitation: Decreased ADL status, Decreased UE strength, Decreased Safe judgement during ADL, Decreased endurance, Decreased self-care trans, Decreased high-level ADLs     Assessment: Pt is a 64 y o  male seen for OT evaluation s/p admit to [facility] on 4/5/2021 w/ Acute kidney injury ([facility] [address] )  Comorbidities affecting pt's functional performance at time of assessment include: hyperlipidemia, CVA, CHF, renal disorder, glaucoma, sleep apnea, neuropathy, DM, CAD, obesity  Personal factors affecting pt at time of IE include:steps to enter environment, limited home support, behavioral pattern, difficulty performing ADLS, difficulty performing IADLS , level of education, limited insight into deficits, compliance, flat affect, decreased initiation and engagement  and health management   Prior to admission, pt was (A) with ADLs and IADLs with use of RW during mobility  Upon evaluation: Pt requires mod-max (A) x1-2 with use of RW during SPT to chair 2* the following deficits impacting occupational performance: weakness, decreased strength, decreased balance, decreased tolerance, impaired initiation, impaired sequencing, impaired problem solving, impulsivity, decreased safety awareness, increased pain, impaired interpersonal skills and decreased coping skills  Pt to benefit from continued skilled OT tx while in the hospital to address deficits as defined above and maximize level of functional independence w ADL's and functional mobility  Occupational Performance areas to address include: grooming, bathing/shower, toilet hygiene, dressing, functional mobility, community mobility and clothing management  The patient's raw score on the AM-PAC Daily Activity inpatient short form is 15, standardized score is 34 69, less than 39 4  Patients at this level are likely to benefit from discharge to post-acute rehabilitation services  Please refer to the recommendation of the Occupational Therapist for safe discharge planning       OT Discharge Recommendation: Post-Acute Rehabilitation Services

## 2021-04-06 NOTE — ASSESSMENT & PLAN NOTE
· Likely secondary to acute on chronic diastolic CHF (congestive heart failure)  · New supplemental oxygen requirement of 3 lpm via the nasal cannula to maintain oxygen saturation levels at 90% and above  · Continue lasix 40 mg IV BID

## 2021-04-06 NOTE — OCCUPATIONAL THERAPY NOTE
Occupational Therapy Evaluation     Patient Name: Calvin López  Today's Date: 4/6/2021  Problem List  Principal Problem:    Acute kidney injury ([facility] [address] )  Active Problems:    BHARGAVI (obstructive sleep apnea)    Type 2 diabetes mellitus with hyperglycemia, with long-term current use of insulin ([facility])    Elevated d-dimer    Multiple pulmonary nodules    Gait instability    Abnormal EKG    Inguinal lymphadenopathy    Bilateral pleural effusion    Atelectasis    Acute on chronic diastolic CHF (congestive heart failure) ([facility])    Multiple renal cysts    Renal calculus    Venous stasis dermatitis of both lower extremities    Lower extremity weakness    Past Medical History  Past Medical History:   Diagnosis Date    Cataract     CHF (congestive heart failure) ([facility])     chronic diastolic CHF    Coronary artery disease     Diabetes mellitus ([facility] [address] [facility]     Glaucoma     Hyperlipidemia     Hypertension     Neuropathy     Obesity     Renal disorder     chronic kidney disease stage 3     Sleep apnea     Stroke ([facility] )     TIA (transient ischemic attack)     Uncontrolled type 2 diabetes mellitus with hyperglycemia, with long-term current use of insulin ([facility] ) 10/4/2018     Past Surgical History  Past Surgical History:   Procedure Laterality Date    APPENDECTOMY      CARPAL TUNNEL RELEASE      EYE SURGERY      cataracts             04/06/21 1331   OT Last Visit   OT Visit Date 04/06/21   Note Type   Note type Evaluation   Restrictions/Precautions   Weight Bearing Precautions Per Order No   Other Precautions Chair Alarm; Bed Alarm;Multiple lines;O2;Fall Risk;Pain   Pain Assessment   Pain Assessment Tool 0-10   Pain Score 9   Pain Location/Orientation Orientation: Right;Location: Leg   Home Living   Type of [address] [place] [address] Two level;Performs ADLs on one level; Able to live on main level with bedroom/bathroom; Other (Comment)  (no KYLE)   Bathroom Shower/Tub None  (sponge bathes)   Bathroom Toilet Standard   Bathroom Equipment Commode   Bathroom Accessibility Not accessible   Home Equipment Walker;Cane;Hospital bed   Additional Comments pt reports use of RW during mobility   Prior Function   Level of [place] Needs assistance with ADLs and functional mobility; Needs assistance with IADLs   Lives With Spouse   Receives Help From Family   ADL Assistance Needs assistance   IADLs Needs assistance   Falls in the last 6 months 1 to 4   Vocational Retired   Comments pt does not drive   Psychosocial   Psychosocial (WDL) X   Patient Behaviors/Mood Irritable   Subjective   Subjective "I am in good health, it's just this leg"   ADL   Where Assessed Edge of bed   LB Dressing Assistance 2  Maximal Assistance   LB Dressing Deficit Don/doff R sock; Don/doff L sock; Thread RLE into underwear; Thread LLE into underwear   Toileting Assistance  2  Maximal Assistance   Toileting Deficit Clothing management up;Clothing management down   Additional Comments pt incontinent of urine during session; requires max (A) for cleanup as well as disposing brief   Bed Mobility   Supine to Sit 3  Moderate assistance   Additional items Assist x 2; Increased time required;Verbal cues;LE management   Sit to Supine   (seated in chair at end of session)   Additional Comments pt on 2L O2 during session; Spo2 WFL with no complaints of SOB   Transfers   Sit to Stand 3  Moderate assistance   Additional items Assist x 2; Increased time required;Verbal cues  (RW)   Stand to Sit 3  Moderate assistance   Additional items Assist x 2; Increased time required;Verbal cues  (RW)   Stand pivot 3  Moderate assistance   Additional items Assist x 2; Increased time required;Verbal cues  (RW)   Additional Comments pt requires increased encouragement to participate during session; utilized RW during session and performed stand pivot to chair; pt with LE buckling and requires bed moved and chair brought from behind   Functional Mobility   Additional Comments pt does not advance foward this date; performs SPT as stated above   Balance   Static Sitting Fair +   Dynamic Sitting Fair +   Static Standing Fair -   Dynamic Standing Poor +   Activity Tolerance   Activity Tolerance Patient limited by fatigue;Patient limited by pain   RUE Assessment   RUE Assessment WFL   LUE Assessment   LUE Assessment WFL   Hand Function   Gross Motor Coordination Functional   Fine Motor Coordination Functional   Sensation   Light Touch No apparent deficits   Sharp/Dull No apparent deficits   Cognition   Overall Cognitive Status WFL   Arousal/Participation Alert   Attention Within functional limits   Orientation Level Oriented X4   Memory Within functional limits   Following Commands Follows one step commands without difficulty   Comments pt with decreased motivation and requires encouragement    Assessment   Limitation Decreased ADL status; Decreased UE strength;Decreased Safe judgement during ADL;Decreased endurance;Decreased self-care trans;Decreased high-level ADLs   Assessment Pt is a 64 y o  male seen for OT evaluation s/p admit to [facility] on 4/5/2021 w/ Acute kidney injury ([facility] [address] )  Comorbidities affecting pt's functional performance at time of assessment include: hyperlipidemia, CVA, CHF, renal disorder, glaucoma, sleep apnea, neuropathy, DM, CAD, obesity  Personal factors affecting pt at time of IE include:steps to enter environment, limited home support, behavioral pattern, difficulty performing ADLS, difficulty performing IADLS , level of education, limited insight into deficits, compliance, flat affect, decreased initiation and engagement  and health management   Prior to admission, pt was (A) with ADLs and IADLs with use of RW during mobility   Upon evaluation: Pt requires mod-max (A) x1-2 with use of RW during SPT to chair 2* the following deficits impacting occupational performance: weakness, decreased strength, decreased balance, decreased tolerance, impaired initiation, impaired sequencing, impaired problem solving, impulsivity, decreased safety awareness, increased pain, impaired interpersonal skills and decreased coping skills  Pt to benefit from continued skilled OT tx while in the hospital to address deficits as defined above and maximize level of functional independence w ADL's and functional mobility  Occupational Performance areas to address include: grooming, bathing/shower, toilet hygiene, dressing, functional mobility, community mobility and clothing management  The patient's raw score on the AM-PAC Daily Activity inpatient short form is 15, standardized score is 34 69, less than 39 4  Patients at this level are likely to benefit from discharge to post-acute rehabilitation services  Please refer to the recommendation of the Occupational Therapist for safe discharge planning  Goals   Patient Goals to go home    Short Term Goal  pt will perform UE strengthening exercises    Long Term Goal #1 pt will perform UB bathing and grooming tasks at min (A) level   Long Term Goal #2 pt will demonstrate toilet transfers and hygiene at min (A) level   Long Term Goal pt will demonstrate functional transfers and mobility at min (A) x2 level   Plan   Treatment Interventions ADL retraining;Functional transfer training;UE strengthening/ROM; Endurance training;Patient/family training;Equipment evaluation/education; Activityengagement; Energy conservation   Goal Expiration Date 04/20/21   OT Frequency 3-5x/wk   Recommendation   OT Discharge Recommendation Post-Acute Rehabilitation Services   [facility]   Lower Body Dressing 2   Bathing 2   Toileting 2   Upper Body Dressing 3   Grooming 3   Eating 3   Daily Activity Raw Score 15   Daily Activity Standardized Score (Calc for Raw Score >=11) 34 69   AM-[facility] Applied Cognition Inpatient   Following a Speech/Presentation 4   Understanding Ordinary Conversation 4   Taking Medications 3   Remembering Where Things Are Placed or Put Away 3   Remembering List of 4-5 Errands 2 Taking Care of Complicated Tasks 2   Applied Cognition Raw Score 18   Applied Cognition Standardized Score 38 07     Pt will benefit from continued OT services in order to maximize (I) c ADL performance, FM c RW, and improve overall endurance/strength required to complete functional tasks in preparation for d/c  Pt left seated in chair at end of session; all needs within reach; all lines intact

## 2021-04-06 NOTE — ASSESSMENT & PLAN NOTE
· Likely secondary to acute on chronic diastolic CHF (congestive heart failure)  · New supplemental oxygen requirement of 2 lpm via the nasal cannula to maintain oxygen saturation levels at 90% and above  · Treat with lasix 40 mg IV BID

## 2021-04-06 NOTE — ASSESSMENT & PLAN NOTE
· Acute kidney injury was present on admission and likely secondary to cardiorenal syndrome  · Baseline serum creatinine of 1 2-1 4 mg/dl  · Continue lasix 40 mg IV BID  · Check a urine sodium level and urine protein/creatinine ratio  · Avoid all nephrotoxic agents  · The patient was seen in consultation by Nephrology    · Serial laboratory testing to monitor the patient's renal function and electrolyte levels    Results from last 7 days   Lab Units 04/06/21  0519 04/05/21  1456   SODIUM mmol/L 142 140   POTASSIUM mmol/L 4 7 4 7   CHLORIDE mmol/L 107 106   CO2 mmol/L 28 28   BUN mg/dL 29* 32*   CREATININE mg/dL 1 92* 2 16*   CALCIUM mg/dL 8 9 9 1

## 2021-04-06 NOTE — ASSESSMENT & PLAN NOTE
· Outpatient follow-up with Cardiology    ECG 12 lead  Order: [id number]  Status:  Final result   Visible to patient:  No (inaccessible in [address])   Next appt:  05/18/2021 at 12:40 PM in Cardiology Kannan Garcia MD)   Ref Range & Units 4/5/21 1515   Ventricular Rate BPM 75    Atrial Rate BPM 75    MD Interval ms 246    QRSD Interval ms 150    QT Interval ms 408    QTC Interval ms 455    P Axis degrees 60    QRS Axis degrees -80    T Wave Axis degrees 48       Narrative & Impression    Sinus rhythm with 1st degree A-V block  Left axis deviation  Right bundle branch block  Abnormal ECG  When compared with ECG of 08-MAR-2021 15:53,     Confirmed by Viji Hickman [phone] on 4/5/2021 3:26:16 PM      Specimen Collected: 04/05/21 15:15   Last Resulted: 04/05/21 15:26

## 2021-04-06 NOTE — UTILIZATION REVIEW
Initial Clinical Review  Observation 4/5/21 @ 1810, converted to inpatient admission 4/6/21 @ 1559 for continued care & tx for KIRT  Per MD 4/6:  Temp 99 5  KIRT, continued IV lasix as well as serial laboratory testing to monitor his renal function  VQ scan neg, heparin gtt d/c'd  Per renal:  Acute kidney injury on top of chronic kidney disease   Continue diuresis, patient may have cardiorenal syndrome, avoid nephrotoxins  Patient's fraction excretion of sodium is greater than 2%, however this is likely due to diuretic effect  4/7:  IV lasix continued for KIRT per renal  Persistent BLE weakness & edema  Venous stasis dermatitis of the bilateral anterior shin regions  Creatinine continues to improve, avoid nephrotoxins  Lungs with bibasilar crackles noted, O2 @ 3ltr nc at this time  Tmax 99 5  /70   Pulse 69   Temp 98 °F (36 7 °C)   Resp 18   Ht 5' 6" (1 676 m)   Wt 136 kg (299 lb 9 7 oz)   SpO2 92%   BMI 48 36 kg/m²    Intake/Output Summary (Last 24 hours) at 4/7/2021 1419  Last data filed at 4/6/2021 2332      Gross per 24 hour   Intake 300 ml   Output 1700 ml   Net -1400 ml       Admission: Date/Time/Statement:   Level of Care Med Surg    Estimated length of stay More than 2 Midnights    Certification I certify that inpatient services are medically necessary for this patient for a duration of greater than two midnights   See H&P and MD Progress Notes for additional information about the patient's course of treatment            Order History  Inpatient  Date/Time Action Taken User   04/06/21 1559 Release Boom Schroeder DO (auto-released)   04/06/21 1559 Complete Boom Schroeder DO       ED Arrival Information     Expected Arrival Acuity Means of Arrival Escorted By Service Admission Type    - 4/5/2021 14:40 Urgent Ambulance [facility] Hospitalist Urgent    Arrival Complaint    Bilateral Leg Edema        Chief Complaint   Patient presents with    Weakness - Generalized Patient complains of generalized weakness since this morning, denies CP, SOD, HA     Assessment/Plan:   42-year-old male presents by ambulance with complaint of bilateral lower extremity weakness  He states he feels like as if they are going to give out  He had a fall yesterday morning  He denies loss of consciousness he fell forward  He had difficulty getting out of his recliner today was recently admitted in from March 8th to March 12th with cellulitis of the right lower extremity he completed his course of Keflex  His past medical history significant for type 2 diabetes obstructive sleep apnea morbid obesity hypertension hyperlipidemia coronary artery disease and CHF he quit smoking 4 years ago      ED Triage Vitals   Temperature Pulse Respirations Blood Pressure SpO2   04/05/21 1507 04/05/21 1507 04/05/21 1507 04/05/21 1507 04/05/21 1507   98 3 °F (36 8 °C) 78 20 158/74 (!) 72 %      Temp Source Heart Rate Source Patient Position - Orthostatic VS BP Location FiO2 (%)   04/05/21 1507 04/05/21 1507 04/05/21 1507 04/05/21 1507 --   Temporal Monitor Lying Left arm       Pain Score       04/05/21 1930       9          Wt Readings from Last 1 Encounters:   04/07/21 136 kg (299 lb 9 7 oz)     Additional Vital Signs:   Date/Time  Temp  Pulse  Resp  BP  MAP (mmHg)  SpO2  Calculated FIO2 (%) - Nasal Cannula  Nasal Cannula O2 Flow Rate (L/min)  O2 Device  Patient Position - Orthostatic VS   04/05/21 23:06:37  98 3 °F (36 8 °C)  84  22  129/75  93  92 %  32  3 L/min  Nasal cannula  Lying   04/05/21 2154  --  92  18  --  --  76 %Abnormal   --  --  --  --   04/05/21 2153  --  --  --  --  --  --  32  3 L/min  Nasal cannula  --   04/05/21 2136  --  --  --  --  --  --  --  --  None (Room air)  --   04/05/21 1946  --  --  --  --  --  --  28  2 L/min  Nasal cannula  --   04/05/21 19:30:54  98 2 °F (36 8 °C)  81  20  123/75  91  92 %  28  2 L/min  Nasal cannula  Lying   04/05/21 1630  --  77  21  136/63  91  93 %  28  2 L/min Nasal cannula  Sitting     Pertinent Labs/Diagnostic Test Results:   Results from last 7 days   Lab Units 04/05/21  1517   SARS-COV-2  Negative     Results from last 7 days   Lab Units 04/07/21  0418 04/06/21  0519 04/05/21  1456   WBC Thousand/uL 8 33 10 23* 8 76   HEMOGLOBIN g/dL 11 0* 10 8* 11 1*   HEMATOCRIT % 35 2* 35 5* 36 2*   PLATELETS Thousands/uL 214 208 226   NEUTROS ABS Thousands/µL 5 93 7 31 6 04     Results from last 7 days   Lab Units 04/07/21  0418 04/06/21  0519 04/05/21  1456   SODIUM mmol/L 137 142 140   POTASSIUM mmol/L 4 2 4 7 4 7   CHLORIDE mmol/L 101 107 106   CO2 mmol/L 31 28 28   ANION GAP mmol/L 5 7 6   BUN mg/dL 31* 29* 32*   CREATININE mg/dL 1 89* 1 92* 2 16*   EGFR ml/min/1 73sq m 33 33 28   CALCIUM mg/dL 9 3 8 9 9 1   CALCIUM, IONIZED mmol/L  --  1 18  --    MAGNESIUM mg/dL 1 9 2 0 2 1   PHOSPHORUS mg/dL 3 0 3 0  --      Results from last 7 days   Lab Units 04/07/21  0418 04/06/21  0519 04/05/21  1456   AST U/L 13 22 19   ALT U/L 20 27 24   ALK PHOS U/L 150* 160* 169*   TOTAL PROTEIN g/dL 7 5 7 3 7 6   ALBUMIN g/dL 3 0* 3 0* 3 1*   TOTAL BILIRUBIN mg/dL 0 75 0 54 0 31     Results from last 7 days   Lab Units 04/07/21  1118 04/07/21  0723 04/06/21  2112 04/06/21  1523 04/06/21  1132 04/06/21  0734 04/05/21  2058   POC GLUCOSE mg/dl 239* 185* 201* 243* 232* 115 85     Results from last 7 days   Lab Units 04/07/21  0418 04/06/21  0519 04/05/21  1456   GLUCOSE RANDOM mg/dL 180* 110 144*     Results from last 7 days   Lab Units 04/05/21  1523   PH ART  7 361   PCO2 ART mm Hg 42 2   PO2 ART mm Hg 69 3*   HCO3 ART mmol/L 23 4   BASE EXC ART mmol/L -2 0   O2 CONTENT ART mL/dL 15 2*   O2 HGB, ARTERIAL % 92 4*   ABG SOURCE  Radial, Right     Results from last 7 days   Lab Units 04/06/21  0519   CK TOTAL U/L 158     Results from last 7 days   Lab Units 04/06/21  0519 04/05/21  1456   TROPONIN I ng/mL <0 02 0 02     Results from last 7 days   Lab Units 04/05/21  1456   D-DIMER QUANTITATIVE ug/ml FEU 1 61*     Results from last 7 days   Lab Units 04/06/21  1530 04/06/21  1016 04/06/21  0220 04/05/21  1456   PROTIME seconds  --   --   --  14 3   INR   --   --   --  1 13   PTT seconds 62* 33 167* 28     Results from last 7 days   Lab Units 04/05/21  1456   TSH 3RD GENERATON uIU/mL 0 899     Results from last 7 days   Lab Units 04/07/21  0418 04/06/21  0519   PROCALCITONIN ng/ml 0 09 0 06     Results from last 7 days   Lab Units 04/06/21  0519 04/05/21  1456   LACTIC ACID mmol/L 0 7 1 4     Results from last 7 days   Lab Units 04/05/21  1456   NT-PRO BNP pg/mL 738*     Results from last 7 days   Lab Units 04/05/21  1924   CLARITY UA  Clear   COLOR UA  Yellow   SPEC GRAV UA  1 020   PH UA  7 0   GLUCOSE UA mg/dl Negative   KETONES UA mg/dl Negative   BLOOD UA  Negative   PROTEIN UA mg/dl Trace*   NITRITE UA  Negative   BILIRUBIN UA  Negative   UROBILINOGEN UA E U /dl 0 2   LEUKOCYTES UA  Trace*   WBC UA /hpf 2-4   RBC UA /hpf None Seen   BACTERIA UA /hpf Occasional   EPITHELIAL CELLS WET PREP /hpf Occasional   SODIUM UR  75   CREATININE UR mg/dL 104 0   PROTEIN UR mg/dL 46   PROT/CREAT RATIO UR  0 44*     Results from last 7 days   Lab Units 04/05/21  1517   INFLUENZA A PCR  Negative   INFLUENZA B PCR  Negative   RSV PCR  Negative     Results from last 7 days   Lab Units 04/05/21  1926 04/05/21  1517 04/05/21  1516   BLOOD CULTURE   --  No Growth at 24 hrs  No Growth at 24 hrs  URINE CULTURE  No Growth <1000 cfu/mL  --   --      4/5  ble venous duplex=  RIGHT LOWER LIMB:  No evidence of acute or chronic deep vein thrombosis  No evidence of superficial thrombophlebitis noted  Doppler evaluation shows a normal response to augmentation maneuvers  Popliteal, posterior tibial and anterior tibial arterial Doppler waveforms are biphasic/monophasic  LEFT LOWER LIMB:  No evidence of acute or chronic deep vein thrombosis  No evidence of superficial thrombophlebitis noted    Doppler evaluation shows a normal response to augmentation maneuvers  Popliteal, posterior tibial and anterior tibial arterial Doppler waveforms are biphasic/monophasic  Cxr=  No acute cardiopulmonary disease  Ct head=  No acute intracranial abnormality  Ct cervical spine=  No cervical spine fracture or traumatic malalignment  Ct a/p=  No acute intra-abdominal pathology  Small effusions and basilar atelectasis  Nonspecific inguinal adenopathy could be reactive but recommend clinical correlation and follow-up as warranted     Ekg=  Sinus rhythm with 1st degree A-V block  Left axis deviation  Right bundle branch block    4/6  VQ scan=  Normal exam     ED Treatment:   Medication Administration from 04/05/2021 1440 to 04/05/2021 1908       Date/Time Order Dose Route Action     04/05/2021 1551 tetanus-diphtheria-acellular pertussis (BOOSTRIX) IM injection 0 5 mL 0 5 mL Intramuscular Given     04/05/2021 1727 ceFAZolin (ANCEF) IVPB (premix in dextrose) 2,000 mg 50 mL 2,000 mg Intravenous New Bag     04/05/2021 1841 heparin (porcine) injection 10,000 Units 10,000 Units Intravenous Given     04/05/2021 1841 heparin (porcine) 25,000 units in 0 45% NaCl 250 mL infusion (premix) 18 Units/kg/hr Intravenous New Bag        Past Medical History:   Diagnosis Date    Cataract     CHF (congestive heart failure) (HCC)     chronic diastolic CHF    Coronary artery disease     Diabetes mellitus ([facility] )     Glaucoma     Hyperlipidemia     Hypertension     Neuropathy     Obesity     Renal disorder     chronic kidney disease stage 3     Sleep apnea     [facility]     TIA (transient ischemic attack)     Uncontrolled type 2 diabetes mellitus with hyperglycemia, with long-term current use of insulin ([facility] ) 10/4/2018     Present on Admission:   Elevated d-dimer   BHARGAVI (obstructive sleep apnea)   Multiple pulmonary nodules   Gait instability   Abnormal EKG      Admitting Diagnosis: Hypoxemia [R09 02]  Bilateral leg edema [R60 0]  KIRT (acute kidney injury) ([facility] ) [N17 9]  Lower extremity weakness [R29 898]  Lower extremity cellulitis [L14 041]  Age/Sex: 64 y o  male  Admission Orders:  Cont pulse ox  Labs per IV heparin gtt protocol  O2 to keep sats>90%  Pt/ot eval &tx  scd  accuchecks  Telemetry     Scheduled Medications:  amLODIPine, 10 mg, Oral, Daily  aspirin, 81 mg, Oral, Daily  atorvastatin, 40 mg, Oral, After Dinner  carvedilol, 12 5 mg, Oral, BID  cholecalciferol, 2,000 Units, Oral, Daily  furosemide, 40 mg, Intravenous, BID (diuretic)  heparin (porcine), 5,000 Units, Subcutaneous, Q8H SHARONA  insulin glargine, 10 Units, Subcutaneous, HS  insulin lispro, 1-6 Units, Subcutaneous, TID AC  insulin lispro, 1-6 Units, Subcutaneous, HS  tamsulosin, 0 4 mg, Oral, QPM    Continuous infusions:  heparin (porcine) 25,000 units in 0 45% NaCl 250 mL infusion   Rate: 3 8-37 5 mL/hr Dose: 3-30 Units/kg/hr  Weight Dosing Info: 125 kg (Order-Specific)  Start: 04/05/21 1830 End: 04/06/21 1630    PRN Meds:  acetaminophen, 650 mg, Oral, Q6H PRN  HYDROmorphone, 0 5 mg, Intravenous, Q4H PRN  oxyCODONE, 5 mg, Oral, Q4H PRN    Or  oxyCODONE, 10 mg, Oral, Q4H PRN    Network Utilization Review Department  ATTENTION: Please call with any questions or concerns to [phone] and carefully listen to the prompts so that you are directed to the right person  All voicemails are confidential   Fly Smiley all requests for admission clinical reviews, approved or denied determinations and any other requests to dedicated fax number below belonging to the campus where the patient is receiving treatment   List of dedicated fax numbers for the Facilities:  [address] DENIALS (Administrative/Medical Necessity) [phone]   [address] [place] [address] (Maternity/NICU/Pediatrics) 261 [facility] [place],[address] [place] Kristen Mcfarland [address] ([address]) [address] [place] [address] Abran Henderson 1481 [phone]   [address] [phone]

## 2021-04-06 NOTE — ASSESSMENT & PLAN NOTE
Lab Results   Component Value Date    HGBA1C 7 0 (H) 03/09/2021       Recent Labs     04/05/21 2058 04/06/21  0734 04/06/21  1132 04/06/21  1523   POCGLU 85 115 232* 243*       Blood Sugar Average: Last 72 hrs:  (P) 168 75   · Utilize lantus 10 Units SQ QHS  · Hypoglycemia protocol  · Insulin sliding scale with blood glucose monitoring ACHS

## 2021-04-06 NOTE — ASSESSMENT & PLAN NOTE
Lab Results   Component Value Date    HGBA1C 7 0 (H) 03/09/2021       No results for input(s): POCGLU in the last 72 hours      Blood Sugar Average: Last 72 hrs:     · Utilize lantus 10 Units SQ QHS  · Hypoglycemia protocol  · Insulin sliding scale with blood glucose monitoring ACHS

## 2021-04-07 LAB
ALBUMIN SERPL BCP-MCNC: 3 G/DL (ref 3.5–5)
ALP SERPL-CCNC: 150 U/L (ref 46–116)
ALT SERPL W P-5'-P-CCNC: 20 U/L (ref 12–78)
ANION GAP SERPL CALCULATED.3IONS-SCNC: 5 MMOL/L (ref 4–13)
AST SERPL W P-5'-P-CCNC: 13 U/L (ref 5–45)
BACTERIA UR CULT: NORMAL
BASOPHILS # BLD AUTO: 0.04 THOUSANDS/ΜL (ref 0–0.1)
BASOPHILS NFR BLD AUTO: 1 % (ref 0–1)
BILIRUB SERPL-MCNC: 0.75 MG/DL (ref 0.2–1)
BUN SERPL-MCNC: 31 MG/DL (ref 5–25)
CALCIUM ALBUM COR SERPL-MCNC: 10.1 MG/DL (ref 8.3–10.1)
CALCIUM SERPL-MCNC: 9.3 MG/DL (ref 8.3–10.1)
CHLORIDE SERPL-SCNC: 101 MMOL/L (ref 100–108)
CO2 SERPL-SCNC: 31 MMOL/L (ref 21–32)
CREAT SERPL-MCNC: 1.89 MG/DL (ref 0.6–1.3)
EOSINOPHIL # BLD AUTO: 0.26 THOUSAND/ΜL (ref 0–0.61)
EOSINOPHIL NFR BLD AUTO: 3 % (ref 0–6)
ERYTHROCYTE [DISTWIDTH] IN BLOOD BY AUTOMATED COUNT: 14.8 % (ref 11.6–15.1)
GFR SERPL CREATININE-BSD FRML MDRD: 33 ML/MIN/1.73SQ M
GLUCOSE SERPL-MCNC: 180 MG/DL (ref 65–140)
GLUCOSE SERPL-MCNC: 185 MG/DL (ref 65–140)
GLUCOSE SERPL-MCNC: 207 MG/DL (ref 65–140)
GLUCOSE SERPL-MCNC: 213 MG/DL (ref 65–140)
GLUCOSE SERPL-MCNC: 239 MG/DL (ref 65–140)
HCT VFR BLD AUTO: 35.2 % (ref 36.5–49.3)
HGB BLD-MCNC: 11 G/DL (ref 12–17)
IMM GRANULOCYTES # BLD AUTO: 0.03 THOUSAND/UL (ref 0–0.2)
IMM GRANULOCYTES NFR BLD AUTO: 0 % (ref 0–2)
LYMPHOCYTES # BLD AUTO: 1.25 THOUSANDS/ΜL (ref 0.6–4.47)
LYMPHOCYTES NFR BLD AUTO: 15 % (ref 14–44)
MAGNESIUM SERPL-MCNC: 1.9 MG/DL (ref 1.6–2.6)
MCH RBC QN AUTO: 27.1 PG (ref 26.8–34.3)
MCHC RBC AUTO-ENTMCNC: 31.3 G/DL (ref 31.4–37.4)
MCV RBC AUTO: 87 FL (ref 82–98)
MONOCYTES # BLD AUTO: 0.82 THOUSAND/ΜL (ref 0.17–1.22)
MONOCYTES NFR BLD AUTO: 10 % (ref 4–12)
NEUTROPHILS # BLD AUTO: 5.93 THOUSANDS/ΜL (ref 1.85–7.62)
NEUTS SEG NFR BLD AUTO: 71 % (ref 43–75)
NRBC BLD AUTO-RTO: 0 /100 WBCS
PHOSPHATE SERPL-MCNC: 3 MG/DL (ref 2.3–4.1)
PLATELET # BLD AUTO: 214 THOUSANDS/UL (ref 149–390)
PMV BLD AUTO: 10 FL (ref 8.9–12.7)
POTASSIUM SERPL-SCNC: 4.2 MMOL/L (ref 3.5–5.3)
PROCALCITONIN SERPL-MCNC: 0.09 NG/ML
PROT SERPL-MCNC: 7.5 G/DL (ref 6.4–8.2)
RBC # BLD AUTO: 4.06 MILLION/UL (ref 3.88–5.62)
SODIUM SERPL-SCNC: 137 MMOL/L (ref 136–145)
WBC # BLD AUTO: 8.33 THOUSAND/UL (ref 4.31–10.16)

## 2021-04-07 PROCEDURE — 97530 THERAPEUTIC ACTIVITIES: CPT

## 2021-04-07 PROCEDURE — 84100 ASSAY OF PHOSPHORUS: CPT | Performed by: INTERNAL MEDICINE

## 2021-04-07 PROCEDURE — 82948 REAGENT STRIP/BLOOD GLUCOSE: CPT

## 2021-04-07 PROCEDURE — 80053 COMPREHEN METABOLIC PANEL: CPT | Performed by: INTERNAL MEDICINE

## 2021-04-07 PROCEDURE — 94760 N-INVAS EAR/PLS OXIMETRY 1: CPT

## 2021-04-07 PROCEDURE — 83735 ASSAY OF MAGNESIUM: CPT | Performed by: INTERNAL MEDICINE

## 2021-04-07 PROCEDURE — 99232 SBSQ HOSP IP/OBS MODERATE 35: CPT | Performed by: INTERNAL MEDICINE

## 2021-04-07 PROCEDURE — 97116 GAIT TRAINING THERAPY: CPT

## 2021-04-07 PROCEDURE — 94660 CPAP INITIATION&MGMT: CPT

## 2021-04-07 PROCEDURE — 84145 PROCALCITONIN (PCT): CPT | Performed by: INTERNAL MEDICINE

## 2021-04-07 PROCEDURE — 85025 COMPLETE CBC W/AUTO DIFF WBC: CPT | Performed by: INTERNAL MEDICINE

## 2021-04-07 RX ORDER — NYSTATIN 100000 [USP'U]/G
POWDER TOPICAL 2 TIMES DAILY
Status: DISCONTINUED | OUTPATIENT
Start: 2021-04-07 | End: 2021-04-12 | Stop reason: HOSPADM

## 2021-04-07 RX ORDER — HEPARIN SODIUM 5000 [USP'U]/ML
7500 INJECTION, SOLUTION INTRAVENOUS; SUBCUTANEOUS EVERY 8 HOURS SCHEDULED
Status: DISCONTINUED | OUTPATIENT
Start: 2021-04-07 | End: 2021-04-12 | Stop reason: HOSPADM

## 2021-04-07 RX ORDER — INSULIN GLARGINE 100 [IU]/ML
15 INJECTION, SOLUTION SUBCUTANEOUS
Status: DISCONTINUED | OUTPATIENT
Start: 2021-04-07 | End: 2021-04-09

## 2021-04-07 RX ADMIN — INSULIN LISPRO 3 UNITS: 100 INJECTION, SOLUTION INTRAVENOUS; SUBCUTANEOUS at 11:43

## 2021-04-07 RX ADMIN — FUROSEMIDE 40 MG: 10 INJECTION, SOLUTION INTRAVENOUS at 09:10

## 2021-04-07 RX ADMIN — INSULIN LISPRO 2 UNITS: 100 INJECTION, SOLUTION INTRAVENOUS; SUBCUTANEOUS at 22:20

## 2021-04-07 RX ADMIN — TAMSULOSIN HYDROCHLORIDE 0.4 MG: 0.4 CAPSULE ORAL at 17:56

## 2021-04-07 RX ADMIN — ATORVASTATIN CALCIUM 40 MG: 40 TABLET, FILM COATED ORAL at 17:56

## 2021-04-07 RX ADMIN — INSULIN LISPRO 1 UNITS: 100 INJECTION, SOLUTION INTRAVENOUS; SUBCUTANEOUS at 09:09

## 2021-04-07 RX ADMIN — HEPARIN SODIUM 7500 UNITS: 5000 INJECTION INTRAVENOUS; SUBCUTANEOUS at 22:17

## 2021-04-07 RX ADMIN — INSULIN GLARGINE 15 UNITS: 100 INJECTION, SOLUTION SUBCUTANEOUS at 22:21

## 2021-04-07 RX ADMIN — FUROSEMIDE 40 MG: 10 INJECTION, SOLUTION INTRAVENOUS at 16:05

## 2021-04-07 RX ADMIN — CARVEDILOL 12.5 MG: 12.5 TABLET, FILM COATED ORAL at 09:10

## 2021-04-07 RX ADMIN — ASPIRIN 81 MG: 81 TABLET, COATED ORAL at 09:09

## 2021-04-07 RX ADMIN — HEPARIN SODIUM 5000 UNITS: 5000 INJECTION INTRAVENOUS; SUBCUTANEOUS at 05:46

## 2021-04-07 RX ADMIN — Medication 2000 UNITS: at 09:09

## 2021-04-07 RX ADMIN — INSULIN LISPRO 2 UNITS: 100 INJECTION, SOLUTION INTRAVENOUS; SUBCUTANEOUS at 16:04

## 2021-04-07 RX ADMIN — INSULIN LISPRO 3 UNITS: 100 INJECTION, SOLUTION INTRAVENOUS; SUBCUTANEOUS at 16:05

## 2021-04-07 RX ADMIN — CARVEDILOL 12.5 MG: 12.5 TABLET, FILM COATED ORAL at 17:56

## 2021-04-07 RX ADMIN — OXYCODONE HYDROCHLORIDE 10 MG: 10 TABLET ORAL at 22:24

## 2021-04-07 RX ADMIN — OXYCODONE HYDROCHLORIDE 10 MG: 10 TABLET ORAL at 09:19

## 2021-04-07 RX ADMIN — AMLODIPINE BESYLATE 10 MG: 10 TABLET ORAL at 09:10

## 2021-04-07 NOTE — ASSESSMENT & PLAN NOTE
Lab Results   Component Value Date    HGBA1C 7 0 (H) 03/09/2021       Recent Labs     04/06/21  1523 04/06/21  2112 04/07/21  0723 04/07/21  1118   POCGLU 243* 201* 185* 239*       Blood Sugar Average: Last 72 hrs:  (P) 185 [id number]   · Increase lantus to 15 Units SQ QHS  · Add humalog 3 Units SQ TID with meals  · Hypoglycemia protocol  · Insulin sliding scale with blood glucose monitoring ACHS

## 2021-04-07 NOTE — UTILIZATION REVIEW
Notification of Inpatient Admission/Inpatient Authorization Request   This is a Notification of Inpatient Admission for [address]  Be advised that this patient was admitted to our facility under Inpatient Status  Contact Beryl Yeh at [phone] for additional admission information  [facility] DEPT  DEDICATED -[phone]  Patient Name:   Joelle Hearn   YOB: 1945       [address]   [address]:   [address]  Tax ID: [id number]  NPI: [id number] Attending Provider/NPI:  Phone:  Address: Clementine Brownlee [[id number]]  [phone]  Same as STANISLAV/Giorgio Gamboa [address] of Service Code: 24 Place of Service Name:  [address] [place] [address]   Start Date: 4/5/21 1809 Discharge Date & Time: No discharge date for patient encounter  Type of Admission: Inpatient Status Discharge Disposition   [facility]if [facility]   Patient Diagnoses: Hypoxemia [R09 02]  Bilateral leg edema [R60 0]  KIRT (acute kidney injury) ([facility] ) [N17 9]  Lower extremity weakness [R29 898]  Lower extremity cellulitis [N95 984]     Orders: Admission Orders (From admission, onward)     Ordered        04/06/21 1559  Inpatient Admission  Once         04/05/21 1810  Place in Observation  Once         04/05/21 1809  Inpatient Admission  Once                    Assigned Utilization Review Contact: Beryl Yeh  Utilization   Network Utilization Review Department  Phone: [phone]; Fax [phone]  Email: [email or web address]   ATTENTION PAYERS: Please call the assigned Utilization  directly with any questions or concerns ALL voicemails in the department are confidential  Send all requests for admission clinical reviews, approved or denied determinations and any other requests to dedicated fax number belonging to the campus where the patient is receiving treatment

## 2021-04-07 NOTE — PROGRESS NOTES
Progress Note - Nephrology   Mariaelena Mahmood 64 y o  male MRN: [id number]  Unit/Bed#: 424-01 Encounter: [id number]    A/P:  1  Acute kidney injury on top of chronic kidney disease                creatinine continues to improve, may continue diuresis at this time, avoid nephrotoxins  2  Chronic kidney disease stage 3 with baseline creatinine between 1 3-1 4 mg/ dL   3  Hypertensive nephrosclerosis likely                blood pressure is controlled, continue current medications  4  Mild proteinuria                continue current care, may reinitiate lisinopril when clinically appropriate and he is at his baseline creatinine  5  Mild cardiorenal syndrome                continue diuresis, patient continues to improve from a clinical standpoint  6  Acute on chronic diastolic congestive heart failure                as mentioned above continue diuresis  7   Nephrolithiasis                continue monitor for now, may seek additional workup in the outpatient setting as indicated by Urology    Follow up reason for today's visit:  Acute kidney injury/chronic kidney disease/hypertension    Acute kidney injury [facility])    Patient Active Problem List   Diagnosis    Dyspnea on exertion    Type 2 diabetes mellitus with hypoglycemia without coma, with long-term current use of insulin ([facility] [address] )    CKD (chronic kidney disease)    Chronic diastolic CHF (congestive heart failure) ([facility] [address] )    BHARGAVI (obstructive sleep apnea)    Morbid obesity with BMI of 50 0-59 9, adult ([facility] [address] )    Essential hypertension    Type 2 diabetes mellitus with hyperglycemia, with long-term current use of insulin ([facility])    Complicated UTI (urinary tract infection)    Uncontrolled type 2 diabetes mellitus with hyperglycemia, with long-term current use of insulin ([facility])    Acute kidney injury ([facility] [address] )    Dehydration with hyponatremia    Hypoglycemia    Acute cystitis    Opiate dependence, continuous ([facility])    Paresthesia of left upper extremity    Neural foraminal stenosis of cervical spine    Acute pain of right lower extremity    Elevated TSH    Pressure injury of skin of right buttock    Ambulatory dysfunction    UTI (urinary tract infection)    Accelerated hypertension    Hypercalcemia    Open wound of right lower extremity    Cellulitis of right lower extremity    Elevated alkaline phosphatase level    Elevated d-dimer    Elevated parathyroid hormone    Pyuria    Microscopic hematuria    Multiple pulmonary nodules    Gait instability    Abnormal EKG    Cigarette nicotine dependence in remission    Inguinal lymphadenopathy    Bilateral pleural effusion    Atelectasis    Acute on chronic diastolic CHF (congestive heart failure) (HCC)    Multiple renal cysts    Renal calculus    Venous stasis dermatitis of both lower extremities    Lower extremity weakness         Subjective:   Patient claims to feel "lousy" this morning, otherwise no acute complaints  Objective:     Vitals: Blood pressure 139/70, pulse 69, temperature 98 °F (36 7 °C), resp  rate 18, height 5' 6" (1 676 m), weight 136 kg (299 lb 9 7 oz), SpO2 92 %  ,Body mass index is 48 36 kg/m²  Weight (last 2 days)     Date/Time   Weight    04/07/21 0600   136 (299 61)    04/06/21 0600   (!) 142 (313 27)    04/05/21 19:30:54   (!) 144 (317 24)    04/05/21 1507   (!) 144 (317 46)                Intake/Output Summary (Last 24 hours) at 4/7/2021 0811  Last data filed at 4/6/2021 2332  Gross per 24 hour   Intake 780 ml   Output 2950 ml   Net -2170 ml     I/O last 3 completed shifts:   In: 80 [P O :780]  Out: 6100 [Urine:6100]         Physical Exam: /70   Pulse 69   Temp 98 °F (36 7 °C)   Resp 18   Ht 5' 6" (1 676 m)   Wt 136 kg (299 lb 9 7 oz)   SpO2 92%   BMI 48 36 kg/m²     General Appearance:    Alert, cooperative, no distress, appears stated age   Head:    Normocephalic, without obvious abnormality, atraumatic   Eyes:    Conjunctiva/corneas clear   Ears:    Normal external ears   Nose:   Nares normal, septum midline, mucosa normal, no drainage    or sinus tenderness   Throat:   Lips, mucosa, and tongue normal; teeth and gums normal   Neck:   Supple   Back:     Symmetric, no curvature, ROM normal, no CVA tenderness   Lungs:     Reduced but otherwise clear   Chest wall:    No tenderness or deformity   Heart:    Regular rate and rhythm, S1 and S2 normal, no murmur, rub   or gallop   Abdomen:     Soft, non-tender, bowel sounds active   Extremities:   Extremities normal, atraumatic, no cyanosis, +1 +2 bilateral lower extremity edema   Skin:   Skin color, texture, turgor normal, no rashes or lesions   Lymph nodes:   Cervical normal   Neurologic:   CNII-XII intact            Lab, Imaging and other studies: I have personally reviewed pertinent labs  CBC:   Lab Results   Component Value Date    WBC 8 33 04/07/2021    HGB 11 0 (L) 04/07/2021    HCT 35 2 (L) 04/07/2021    MCV 87 04/07/2021     04/07/2021    MCH 27 1 04/07/2021    MCHC 31 3 (L) 04/07/2021    RDW 14 8 04/07/2021    MPV 10 0 04/07/2021    NRBC 0 04/07/2021     CMP:   Lab Results   Component Value Date    K 4 2 04/07/2021     04/07/2021    CO2 31 04/07/2021    BUN 31 (H) 04/07/2021    CREATININE 1 89 (H) 04/07/2021    CALCIUM 9 3 04/07/2021    AST 13 04/07/2021    ALT 20 04/07/2021    ALKPHOS 150 (H) 04/07/2021    EGFR 33 04/07/2021           Results from last 7 days   Lab Units 04/07/21  0418 04/06/21  0519 04/05/21  1456   POTASSIUM mmol/L 4 2 4 7 4 7   CHLORIDE mmol/L 101 107 106   CO2 mmol/L 31 28 28   BUN mg/dL 31* 29* 32*   CREATININE mg/dL 1 89* 1 92* 2 16*   CALCIUM mg/dL 9 3 8 9 9 1   ALK PHOS U/L 150* 160* 169*   ALT U/L 20 27 24   AST U/L 13 22 19         Phosphorus:   Lab Results   Component Value Date    PHOS 3 0 04/07/2021     Magnesium:   Lab Results   Component Value Date    MG 1 9 04/07/2021     Urinalysis: No results found for: COLORU, CLARITYU, SPECGRAV, PHUR, LEUKOCYTESUR, NITRITE, Solange Dominique, KETONESU, BILIRUBINUR, BLOODU  Ionized Calcium: No results found for: CAION  Coagulation: No results found for: PT, INR, APTT  Troponin: No results found for: TROPONINI  ABG: No results found for: PHART, HJX1MXH, PO2ART, ZZR4DQJ, J9AMOFGU, BEART, SOURCE  Radiology review:     IMAGING  Procedure: Ct Abdomen Pelvis Wo Contrast    Result Date: 4/5/2021  Narrative: CT ABDOMEN AND PELVIS WITHOUT IV CONTRAST INDICATION:   Abdominal trauma, blunt fall yesterday leg weakness KIRT  COMPARISON:  Images from chest CT dated 3/9/2021, renal ultrasound dated 12/1/2015 and ultrasound dated 10/21/2015  TECHNIQUE:  CT examination of the abdomen and pelvis was performed without intravenous contrast   Axial, sagittal, and coronal 2D reformatted images were created from the source data and submitted for interpretation  Radiation dose length product (DLP) for this visit:  2362 99 mGy-cm   This examination, like all CT scans performed in the [facility], was performed utilizing techniques to minimize radiation dose exposure, including the use of iterative reconstruction and automated exposure control  Enteric contrast was not administered  FINDINGS: ABDOMEN LOWER CHEST: Small bilateral pleural effusions and basilar atelectasis  LIVER/BILIARY TREE:  Unremarkable  GALLBLADDER:  No calcified gallstones  No pericholecystic inflammatory change  SPLEEN:  Unremarkable  PANCREAS:  Unremarkable  ADRENAL GLANDS:  Unremarkable  KIDNEYS/URETERS:  3 mm nonobstructing right renal stone  Small renal cysts better demonstrated on prior ultrasound  No obstructing stone or hydronephrosis  STOMACH AND BOWEL:  Unremarkable  APPENDIX:  Surgically absent  ABDOMINOPELVIC CAVITY:  No ascites  No pneumoperitoneum  No lymphadenopathy  VESSELS:  Atherosclerotic changes are present  No evidence of aneurysm  PELVIS REPRODUCTIVE ORGANS:  Unremarkable for patient's age  URINARY BLADDER:  Bladder diverticula are noted   ABDOMINAL WALL/INGUINAL REGIONS:  Diastases of the rectus abdominis  Fat-containing umbilical and supraumbilical hernias  Bilateral nonspecific inguinal adenopathy measuring up to 1 4 cm on the right and 1 6 cm on the left  OSSEOUS STRUCTURES:  No acute fracture or destructive osseous lesion  Spinal degenerative changes are noted  Impression: No acute intra-abdominal pathology  Small effusions and basilar atelectasis  Nonspecific inguinal adenopathy could be reactive but recommend clinical correlation and follow-up as warranted  Workstation performed: [id number]     Procedure: Xr Chest 1 View Portable    Result Date: 4/6/2021  Narrative: CHEST INDICATION:   low oxygen sats  COMPARISON:  3/8/2021 EXAM PERFORMED/VIEWS:  XR CHEST PORTABLE FINDINGS: Cardiomediastinal silhouette appears unremarkable  The lungs are clear  No pneumothorax or pleural effusion  Osseous structures appear within normal limits for patient age  Impression: No acute cardiopulmonary disease  Workstation performed: [id number]     Procedure: Ct Head Without Contrast    Result Date: 4/5/2021  Narrative: CT BRAIN - WITHOUT CONTRAST INDICATION:   Head trauma, minor (Age => 63y) fall yesterday leg weakness  COMPARISON:  None  TECHNIQUE:  CT examination of the brain was performed  In addition to axial images, sagittal and coronal 2D reformatted images were created and submitted for interpretation  Radiation dose length product (DLP) for this visit:  957 6 mGy-cm   This examination, like all CT scans performed in the [facility], was performed utilizing techniques to minimize radiation dose exposure, including the use of iterative reconstruction and automated exposure control  IMAGE QUALITY:  Diagnostic  FINDINGS: PARENCHYMA: Decreased attenuation is noted in periventricular and subcortical white matter demonstrating an appearance that is statistically most likely to represent mild microangiopathic change   No CT signs of acute infarction  No intracranial mass, mass effect or midline shift  No acute parenchymal hemorrhage  Mild age-appropriate volume loss  VENTRICLES AND EXTRA-AXIAL SPACES:  Normal for the patient's age  VISUALIZED ORBITS AND PARANASAL SINUSES:  Orbits are unremarkable  Stable ethmoid and sphenoid sinus mucosal thickening  CALVARIUM AND EXTRACRANIAL SOFT TISSUES:  Normal      Impression: No acute intracranial abnormality  Workstation performed: [id number]     Procedure: Ct Cervical Spine Without Contrast    Result Date: 4/5/2021  Narrative: CT CERVICAL SPINE - WITHOUT CONTRAST INDICATION:   Neck trauma (Age => 63y) fall yesterday leg weakness  COMPARISON:  CT dated 3/1/2019  TECHNIQUE:  CT examination of the cervical spine was performed without intravenous contrast   Contiguous axial images were obtained  Sagittal and coronal reconstructions were performed  Radiation dose length product (DLP) for this visit:  685 38 mGy-cm   This examination, like all CT scans performed in the [facility], was performed utilizing techniques to minimize radiation dose exposure, including the use of iterative  reconstruction and automated exposure control  IMAGE QUALITY:  Diagnostic  FINDINGS: ALIGNMENT:  Normal alignment of the cervical spine  No subluxation  VERTEBRAL BODIES:  No fracture  DEGENERATIVE CHANGES:  Moderate multilevel cervical degenerative changes are noted  No critical central canal stenosis  New ankylosis of the right C2-3 facet complex  PREVERTEBRAL AND PARASPINAL SOFT TISSUES:  Unremarkable  THORACIC INLET:  Normal      Impression: No cervical spine fracture or traumatic malalignment    Workstation performed: [id number]     Procedure: Nm Lung Perfusion Imaging (particulate)    Result Date: 4/6/2021  Narrative: LUNG PERFUSION SCAN INDICATION: elevated D-dimer, hypoxia, cant have CTA of the chest with KIRT COMPARISON:  Chest radiograph  4/5/2021 TECHNIQUE:  Multiplanar perfusion imaging was performed following the intravenous administration of 4 2 mCi Tc-99m labeled MAA  No ventilation imaging was performed as per current Covid-19 protocol  FINDINGS:  Perfusion imaging demonstrates homogeneous distribution of the radiopharmaceutical throughout both lungs  There is no significant  segmental or subsegmental defect  Impression: Normal exam  Workstation performed: [id number]     Procedure: Vas Lower Limb Venous Duplex Study, Complete Bilateral    Result Date: 4/5/2021  Narrative:  [facility] REPORT CLINICAL: Indications: Patient presents with bilateral lower extremity pain and swelling  Risk Factors The patient has history of Obesity, HTN, Diabetes (Yes), CAD and previous smoking (quit 1-5yrs ago)  He has no history of DVT  CONCLUSION:  Impression: RIGHT LOWER LIMB: No evidence of acute or chronic deep vein thrombosis  No evidence of superficial thrombophlebitis noted  Doppler evaluation shows a normal response to augmentation maneuvers  Popliteal, posterior tibial and anterior tibial arterial Doppler waveforms are biphasic/monophasic  LEFT LOWER LIMB: No evidence of acute or chronic deep vein thrombosis  No evidence of superficial thrombophlebitis noted  Doppler evaluation shows a normal response to augmentation maneuvers  Popliteal, posterior tibial and anterior tibial arterial Doppler waveforms are biphasic/monophasic  Technical findings were tiger texted to Dr Charleen Rodriguez    SIGNATURE: Electronically Signed by: Siri Munoz on 2021-04-05 04:25:35 PM      Current Facility-Administered Medications   Medication Dose Route Frequency    acetaminophen (TYLENOL) tablet 650 mg  650 mg Oral Q6H PRN    amLODIPine (NORVASC) tablet 10 mg  10 mg Oral Daily    aspirin (ECOTRIN LOW STRENGTH) EC tablet 81 mg  81 mg Oral Daily    atorvastatin (LIPITOR) tablet 40 mg  40 mg Oral After Dinner    carvedilol (COREG) tablet 12 5 mg  12 5 mg Oral BID    cholecalciferol (VITAMIN D3) tablet 2,000 Units  2,000 Units Oral Daily    furosemide (LASIX) injection 40 mg  40 mg Intravenous BID (diuretic)    heparin (porcine) subcutaneous injection 5,000 Units  5,000 Units Subcutaneous Q8H [facility]    HYDROmorphone (DILAUDID) injection 0 5 mg  0 5 mg Intravenous Q4H PRN    insulin glargine (LANTUS) subcutaneous injection 10 Units 0 1 mL  10 Units Subcutaneous HS    insulin lispro (HumaLOG) 100 units/mL subcutaneous injection 1-6 Units  1-6 Units Subcutaneous TID AC    insulin lispro (HumaLOG) 100 units/mL subcutaneous injection 1-6 Units  1-6 Units Subcutaneous HS    oxyCODONE (ROXICODONE) IR tablet 5 mg  5 mg Oral Q4H PRN    Or    oxyCODONE (ROXICODONE) immediate release tablet 10 mg  10 mg Oral Q4H PRN    tamsulosin (FLOMAX) capsule 0 4 mg  0 4 mg Oral QPM     Medications Discontinued During This Encounter   Medication Reason    heparin (VTE/PE) high     heparin (porcine) 25,000 units in 0 45% NaCl 250 mL infusion (premix)     heparin (porcine) injection 10,000 Units     heparin (porcine) injection 5,000 Units        Marlon Lilly DO      This progress note was produced in part using a dictation device which may document imprecise wording from author's original intent

## 2021-04-07 NOTE — PLAN OF CARE
Problem: PHYSICAL THERAPY ADULT  Goal: Performs mobility at highest level of function for planned discharge setting  See evaluation for individualized goals  Description: Treatment/Interventions: Functional transfer training, LE strengthening/ROM, Therapeutic exercise, Endurance training, Bed mobility, Gait training          See flowsheet documentation for full assessment, interventions and recommendations  Outcome: Progressing  Note: Prognosis: Fair  Problem List: Decreased strength, Decreased endurance, Impaired balance, Decreased mobility, Impaired judgement, Decreased safety awareness, Obesity, Decreased skin integrity, Pain  Assessment: Pt  seen for PT treatment session this date with interventions consisting of  transfers and  gait training w/ emphasis on improving pt's ability to ambulate  Pt  Currently performing  tx and ambulation at (min ) x 1-2 level of function  The patient's AM-PAC Basic Mobility Inpatient Short Form Raw Score is 12, Standardized Score is 32 23  A standardized score less than 42 9 suggests the patient may benefit from discharge to post-acute rehabilitation services  Please also refer to physical therapy recommendation for safe DC planning  In comparison to previous session, Pt  With improvements in activity tolerance  Tolerated short distance ambulation excessively slow gait  Pt is in need of continued activity in PT to improve strength balance endurance mobility transfers and ambulation with return to maximize LOF  From PT/mobility standpoint, recommendation at time of d/c would be post acute rehab  in order to promote return to PLOF and independence  PT Discharge Recommendation: Post-Acute Rehabilitation Services          See flowsheet documentation for full assessment

## 2021-04-07 NOTE — PLAN OF CARE
Problem: Potential for Falls  Goal: Patient will remain free of falls  Description: INTERVENTIONS:  - Assess patient frequently for physical needs  -  Identify cognitive and physical deficits and behaviors that affect risk of falls    -  [place] fall precautions as indicated by assessment   - Educate patient/family on patient safety including physical limitations  - Instruct patient to call for assistance with activity based on assessment  - Modify environment to reduce risk of injury  - Consider OT/PT consult to assist with strengthening/mobility  Outcome: Progressing     Problem: Prexisting or High Potential for Compromised Skin Integrity  Goal: Skin integrity is maintained or improved  Description: INTERVENTIONS:  - Identify patients at risk for skin breakdown  - Assess and monitor skin integrity  - Assess and monitor nutrition and hydration status  - Monitor labs   - Assess for incontinence   - Turn and reposition patient  - Assist with mobility/ambulation  - Relieve pressure over bony prominences  - Avoid friction and shearing  - Provide appropriate hygiene as needed including keeping skin clean and dry  - Evaluate need for skin moisturizer/barrier cream  - Collaborate with interdisciplinary team   - Patient/family teaching  - Consider wound care consult   Outcome: Progressing     Problem: PAIN - ADULT  Goal: Verbalizes/displays adequate comfort level or baseline comfort level  Description: Interventions:  - Encourage patient to monitor pain and request assistance  - Assess pain using appropriate pain scale  - Administer analgesics based on type and severity of pain and evaluate response  - Implement non-pharmacological measures as appropriate and evaluate response  - Consider cultural and social influences on pain and pain management  - Notify physician/advanced practitioner if interventions unsuccessful or patient reports new pain  Outcome: Progressing     Problem: INFECTION - ADULT  Goal: Absence or prevention of progression during hospitalization  Description: INTERVENTIONS:  - Assess and monitor for signs and symptoms of infection  - Monitor lab/diagnostic results  - Monitor all insertion sites, i e  indwelling lines, tubes, and drains  - Monitor endotracheal if appropriate and nasal secretions for changes in amount and color  - [place] appropriate cooling/warming therapies per order  - Administer medications as ordered  - Instruct and encourage patient and family to use good hand hygiene technique  - Identify and instruct in appropriate isolation precautions for identified infection/condition  Outcome: Progressing     Problem: SAFETY ADULT  Goal: Patient will remain free of falls  Description: INTERVENTIONS:  - Assess patient frequently for physical needs  -  Identify cognitive and physical deficits and behaviors that affect risk of falls    -  [place] fall precautions as indicated by assessment   - Educate patient/family on patient safety including physical limitations  - Instruct patient to call for assistance with activity based on assessment  - Modify environment to reduce risk of injury  - Consider OT/PT consult to assist with strengthening/mobility  Outcome: Progressing  Goal: Maintain or return to baseline ADL function  Description: INTERVENTIONS:  -  Assess patient's ability to carry out ADLs; assess patient's baseline for ADL function and identify physical deficits which impact ability to perform ADLs (bathing, care of mouth/teeth, toileting, grooming, dressing, etc )  - Assess/evaluate cause of self-care deficits   - Assess range of motion  - Assess patient's mobility; develop plan if impaired  - Assess patient's need for assistive devices and provide as appropriate  - Encourage maximum independence but intervene and supervise when necessary  - Involve family in performance of ADLs  - Assess for home care needs following discharge   - Consider OT consult to assist with ADL evaluation and planning for discharge  - Provide patient education as appropriate  Outcome: Progressing  Goal: Maintain or return mobility status to optimal level  Description: INTERVENTIONS:  - Assess patient's baseline mobility status (ambulation, transfers, stairs, etc )    - Identify cognitive and physical deficits and behaviors that affect mobility  - Identify mobility aids required to assist with transfers and/or ambulation (gait belt, sit-to-stand, lift, walker, cane, etc )  - [place] fall precautions as indicated by assessment  - Record patient progress and toleration of activity level on Mobility SBAR; progress patient to next Phase/Stage  - Instruct patient to call for assistance with activity based on assessment  - Consider rehabilitation consult to assist with strengthening/weightbearing, etc   Outcome: Progressing     Problem: DISCHARGE PLANNING  Goal: Discharge to home or other facility with appropriate resources  Description: INTERVENTIONS:  - Identify barriers to discharge w/patient and caregiver  - Arrange for needed discharge resources and transportation as appropriate  - Identify discharge learning needs (meds, wound care, etc )  - Arrange for interpretive services to assist at discharge as needed  - Refer to Case Management Department for coordinating discharge planning if the patient needs post-hospital services based on physician/advanced practitioner order or complex needs related to functional status, cognitive ability, or social support system  Outcome: Progressing     Problem: Knowledge Deficit  Goal: Patient/family/caregiver demonstrates understanding of disease process, treatment plan, medications, and discharge instructions  Description: Complete learning assessment and assess knowledge base    Interventions:  - Provide teaching at level of understanding  - Provide teaching via preferred learning methods  Outcome: Progressing     Problem: METABOLIC, FLUID AND ELECTROLYTES - ADULT  Goal: Glucose maintained within target range  Description: INTERVENTIONS:  - Monitor Blood Glucose as ordered  - Assess for signs and symptoms of hyperglycemia and hypoglycemia  - Administer ordered medications to maintain glucose within target range  - Assess nutritional intake and initiate nutrition service referral as needed  Outcome: Progressing     Problem: SKIN/TISSUE INTEGRITY - ADULT  Goal: Skin integrity remains intact  Description: INTERVENTIONS  - Identify patients at risk for skin breakdown  - Assess and monitor skin integrity  - Assess and monitor nutrition and hydration status  - Monitor labs (i e  albumin)  - Assess for incontinence   - Turn and reposition patient  - Assist with mobility/ambulation  - Relieve pressure over bony prominences  - Avoid friction and shearing  - Provide appropriate hygiene as needed including keeping skin clean and dry  - Evaluate need for skin moisturizer/barrier cream  - Collaborate with interdisciplinary team (i e  Nutrition, Rehabilitation, etc )   - Patient/family teaching  Outcome: Progressing

## 2021-04-07 NOTE — ASSESSMENT & PLAN NOTE
· Outpatient follow-up with Cardiology    ECG 12 lead  Order: [id number]  Status:  Final result   Visible to patient:  No (inaccessible in [address])   Next appt:  05/18/2021 at 12:40 PM in Cardiology Aidee Tran MD)   Ref Range & Units 4/5/21 1515   Ventricular Rate BPM 75    Atrial Rate BPM 75    IN Interval ms 246    QRSD Interval ms 150    QT Interval ms 408    QTC Interval ms 455    P Axis degrees 60    QRS Axis degrees -80    T Wave Axis degrees 48       Narrative & Impression    Sinus rhythm with 1st degree A-V block  Left axis deviation  Right bundle branch block  Abnormal ECG  When compared with ECG of 08-MAR-2021 15:53,     Confirmed by Schuyler Davis [phone] on 4/5/2021 3:26:16 PM      Specimen Collected: 04/05/21 15:15   Last Resulted: 04/05/21 15:26

## 2021-04-07 NOTE — RESPIRATORY THERAPY NOTE
04/06/21 2100   Oxygen Therapy/Pulse Ox   O2 Device Nasal cannula   O2 Therapy Oxygen   Nasal Cannula O2 Flow Rate (L/min) 3 L/min   Calculated FIO2 (%) - Nasal Cannula 32   SpO2 Activity At Rest   $ Pulse Oximetry Spot Check Charge Completed       Patient states he is doing well with his breathing  Not sob   Patient refuses pap therapy

## 2021-04-07 NOTE — ASSESSMENT & PLAN NOTE
· Acute kidney injury was present on admission and likely secondary to cardiorenal syndrome  · Baseline serum creatinine of 1 2-1 4 mg/dl  · Continue lasix 40 mg IV BID  · Avoid all nephrotoxic agents  · The patient was seen in consultation by Nephrology  · Serial laboratory testing to monitor the patient's renal function and electrolyte levels    Results from last 7 days   Lab Units 04/07/21  0418 04/06/21  0519 04/05/21  1456   SODIUM mmol/L 137 142 140   POTASSIUM mmol/L 4 2 4 7 4 7   CHLORIDE mmol/L 101 107 106   CO2 mmol/L 31 28 28   BUN mg/dL 31* 29* 32*   CREATININE mg/dL 1 89* 1 92* 2 16*   CALCIUM mg/dL 9 3 8 9 9 1     Results for Michelle Model (MRN [id number]) as of 4/7/2021 14:19   Ref   Range 4/5/2021 19:24   EXT Creatinine Urine Latest Units: mg/dL 104 0   Protein Urine Random Latest Units: mg/dL 46   SODIUM URINE Unknown 75   Prot/Creat Ratio, Ur Latest Ref Range: 0 00 - 0 10  0 44 (H)

## 2021-04-07 NOTE — PROGRESS NOTES
[address] [facility] [address]  Progress Note Polo Loser 1945, 64 y o  male MRN: [id number]  Unit/Bed#: 424-01 Encounter: [id number]  Primary Care Provider: Erin Gupta MD   Date and time admitted to hospital: 4/5/2021  2:40 PM    * Acute kidney injury [facility])  Assessment & Plan  · Acute kidney injury was present on admission and likely secondary to cardiorenal syndrome  · Baseline serum creatinine of 1 2-1 4 mg/dl  · Continue lasix 40 mg IV BID  · Avoid all nephrotoxic agents  · The patient was seen in consultation by Nephrology  · Serial laboratory testing to monitor the patient's renal function and electrolyte levels    Results from last 7 days   Lab Units 04/07/21  0418 04/06/21  0519 04/05/21  1456   SODIUM mmol/L 137 142 140   POTASSIUM mmol/L 4 2 4 7 4 7   CHLORIDE mmol/L 101 107 106   CO2 mmol/L 31 28 28   BUN mg/dL 31* 29* 32*   CREATININE mg/dL 1 89* 1 92* 2 16*   CALCIUM mg/dL 9 3 8 9 9 1     Results for Valerie Calabrese (MRN [id number]) as of 4/7/2021 14:19   Ref   Range 4/5/2021 19:24   EXT Creatinine Urine Latest Units: mg/dL 104 0   Protein Urine Random Latest Units: mg/dL 46   SODIUM URINE Unknown 75   Prot/Creat Ratio, Ur Latest Ref Range: 0 00 - 0 10  0 44 (H)       Lower extremity weakness  Assessment & Plan  · May require additional neurologic imaging to rule-out a CVA/stroke  · PT/OT      Acute on chronic diastolic CHF (congestive heart failure) ([facility])  Assessment & Plan  Wt Readings from Last 3 Encounters:   04/07/21 136 kg (299 lb 9 7 oz)   03/29/21 (!) 144 kg (317 lb 6 4 oz)   03/08/21 (!) 136 kg (300 lb 0 7 oz)     · Continue lasix 40 mg IV BID  · Continue PO coreg  · Daily weights  · Strict intake/output measurements        Bilateral pleural effusion  Assessment & Plan  · Likely secondary to acute on chronic diastolic CHF (congestive heart failure)  · New supplemental oxygen requirement of 3 lpm via the nasal cannula to maintain oxygen saturation levels at 90% and above  · Continue lasix 40 mg IV BID    Venous stasis dermatitis of both lower extremities  Assessment & Plan  · Does not appear to be any active infection at this time    Renal calculus  Assessment & Plan  · Outpatient Urology evaluation    Multiple renal cysts  Assessment & Plan  · Outpatient surveillance imaging with PCP    Atelectasis  Assessment & Plan  · Incentive spirometry 10 times per hour while awake    Inguinal lymphadenopathy  Assessment & Plan  · Possibly reactive in nature  · Will need outpatient surveillance imaging to ensure resolution of the inguinal lymphadenopathy    Abnormal EKG  Assessment & Plan  · Outpatient follow-up with Cardiology    ECG 12 lead  Order: [id number]  Status:  Final result   Visible to patient:  No (inaccessible in [address])   Next appt:  05/18/2021 at 12:40 PM in Cardiology Danuta Cuevas MD)   Ref Range & Units 4/5/21 1515   Ventricular Rate BPM 75    Atrial Rate BPM 75    RI Interval ms 246    QRSD Interval ms 150    QT Interval ms 408    QTC Interval ms 455    P Axis degrees 60    QRS Axis degrees -80    T Wave Axis degrees 48       Narrative & Impression    Sinus rhythm with 1st degree A-V block  Left axis deviation  Right bundle branch block  Abnormal ECG  When compared with ECG of 08-MAR-2021 15:53,     Confirmed by Heidi Sanford [phone] on 4/5/2021 3:26:16 PM      Specimen Collected: 04/05/21 15:15   Last Resulted: 04/05/21 15:26               Gait instability  Assessment & Plan  · May require additional neurologic imaging to rule-out a CVA/stroke  · PT/OT    Multiple pulmonary nodules  Assessment & Plan  · Outpatient surveillance imaging with Pulmonology    Elevated d-dimer  Assessment & Plan  · Cannot have a CTA of the chest/PE protocol with acute kidney injury  · Normal V/Q lung scan on 04/06/2021  · The patient was initially treated with IV heparin drip/infusion per the VTE/PE (Raschke) protocol, which was discontinued on 04/06/2021 with a normal V/Q lung scan  · Utilize heparin 7500 Units SQ every 8 hours (increased dose based on his BMI) for DVT prophylaxis    VAS lower limb venous duplex study, complete bilateral  Status: Final result   PACS Images     Show images for VAS lower limb venous duplex study, complete bilateral   Study Result       [facility] REPORT  CLINICAL:  Indications:  Patient presents with bilateral lower extremity pain and swelling  Risk Factors  The patient has history of Obesity, HTN, Diabetes (Yes), CAD and previous  smoking (quit 1-5yrs ago)  He has no history of DVT  CONCLUSION:     Impression:  RIGHT LOWER LIMB:  No evidence of acute or chronic deep vein thrombosis  No evidence of superficial thrombophlebitis noted  Doppler evaluation shows a normal response to augmentation maneuvers  Popliteal, posterior tibial and anterior tibial arterial Doppler waveforms are  biphasic/monophasic  LEFT LOWER LIMB:  No evidence of acute or chronic deep vein thrombosis  No evidence of superficial thrombophlebitis noted  Doppler evaluation shows a normal response to augmentation maneuvers  Popliteal, posterior tibial and anterior tibial arterial Doppler waveforms are  biphasic/monophasic  Technical findings were tiger texted to Dr Marvene Opitz       SIGNATURE:  Electronically Signed by: Rachel Lopez on 2021-04-05 04:25:35 PM         Type 2 diabetes mellitus with hyperglycemia, with long-term current use of insulin [facility])  Assessment & Plan  Lab Results   Component Value Date    HGBA1C 7 0 (H) 03/09/2021       Recent Labs     04/06/21  1523 04/06/21  2112 04/07/21  0723 04/07/21  1118   POCGLU 243* 201* 185* 239*       Blood Sugar Average: Last 72 hrs:  (P) 185 [id number]   · Increase lantus to 15 Units SQ QHS  · Add humalog 3 Units SQ TID with meals  · Hypoglycemia protocol  · Insulin sliding scale with blood glucose monitoring ACHS    BHARGAVI (obstructive sleep apnea)  Assessment & Plan  · Continue CPAP QHS and anytime while sleeping      VTE Pharmacologic Prophylaxis:   Pharmacologic: Heparin 7500 Units SQ every 8 hours (increased dose based on his BMI)  Mechanical VTE Prophylaxis in Place: Yes    Patient Centered Rounds: I have performed bedside rounds with nursing staff today  Discussions with Specialists or Other Care Team Provider: I discussed the case with Dr Jane Wood (Nephrology)  Time Spent for Care: 30 minutes  More than 50% of total time spent on counseling and coordination of care as described above  Current Length of Stay: 2 day(s)    Current Patient Status: Inpatient   Certification Statement: The patient will continue to require additional inpatient hospital stay due to the need for IV lasix treatment and for short-term rehabilitation placement upon discharge  Code Status: Level 3 - DNAR and DNI      Subjective: The patient was seen and examined  The patient is doing better  The bilateral lower extremity weakness is improving  No chest pain  No shortness of breath  No abdominal pain  No nausea or vomiting  Objective:     Vitals:   Temp (24hrs), Av 6 °F (37 °C), Min:98 °F (36 7 °C), Max:99 5 °F (37 5 °C)    Temp:  [98 °F (36 7 °C)-99 5 °F (37 5 °C)] 98 °F (36 7 °C)  HR:  [69-80] 69  Resp:  [18-21] 18  BP: (139-165)/(70-87) 139/70  SpO2:  [86 %-93 %] 92 %  Body mass index is 48 36 kg/m²  Input and Output Summary (last 24 hours):        Intake/Output Summary (Last 24 hours) at 2021 1419  Last data filed at 2021 2332  Gross per 24 hour   Intake 300 ml   Output 1700 ml   Net -1400 ml       Physical Exam:     Physical Exam  General:  NAD, follows commands  HEENT:  NC/AT, mucous membranes moist  Neck:  Supple, No JVP elevation  CV:  + S1, + S2, RRR  Pulm:  Scant bibasilar crackles  Abd:  Soft, Non-tender, Non-distended  Ext:  No clubbing/cyanosis, Improving edema of the bilateral lower extremities  Skin:  Venous stasis dermatitis of the bilateral anterior shin regions  Neuro:  Awake, alert, oriented  Psych:  Normal mood and affect      Additional Data:    Labs:    Results from last 7 days   Lab Units 04/07/21  0418   WBC Thousand/uL 8 33   HEMOGLOBIN g/dL 11 0*   HEMATOCRIT % 35 2*   PLATELETS Thousands/uL 214   NEUTROS PCT % 71   LYMPHS PCT % 15   MONOS PCT % 10   EOS PCT % 3     Results from last 7 days   Lab Units 04/07/21  0418   SODIUM mmol/L 137   POTASSIUM mmol/L 4 2   CHLORIDE mmol/L 101   CO2 mmol/L 31   BUN mg/dL 31*   CREATININE mg/dL 1 89*   ANION GAP mmol/L 5   CALCIUM mg/dL 9 3   ALBUMIN g/dL 3 0*   TOTAL BILIRUBIN mg/dL 0 75   ALK PHOS U/L 150*   ALT U/L 20   AST U/L 13   GLUCOSE RANDOM mg/dL 180*     Results from last 7 days   Lab Units 04/05/21  1456   INR  1 13     Results from last 7 days   Lab Units 04/07/21  1118 04/07/21  0723 04/06/21  2112 04/06/21  1523 04/06/21  1132 04/06/21  0734 04/05/21  2058   POC GLUCOSE mg/dl 239* 185* 201* 243* 232* 115 85         Results from last 7 days   Lab Units 04/06/21  0519 04/05/21  1456   LACTIC ACID mmol/L 0 7 1 4   PROCALCITONIN ng/ml 0 06  --            * I Have Reviewed All Lab Data Listed Above  * Additional Pertinent Lab Tests Reviewed: Amanda 64 Admission Reviewed      Recent Cultures (last 7 days):     Results from last 7 days   Lab Units 04/05/21  1926 04/05/21  1517 04/05/21  1516   BLOOD CULTURE   --  No Growth at 24 hrs  No Growth at 24 hrs     URINE CULTURE  No Growth <1000 cfu/mL  --   --        Last 24 Hours Medication List:   Current Facility-Administered Medications   Medication Dose Route Frequency Provider Last Rate    acetaminophen  650 mg Oral Q6H PRN [employer], DO      amLODIPine  10 mg Oral Daily [employer], DO      aspirin  81 mg Oral Daily [employer], DO      atorvastatin  40 mg Oral After Cisco Bae, DO      carvedilol  12 5 mg Oral BID [employer], DO      cholecalciferol  2,000 Units Oral Daily [employer], DO  furosemide  40 mg Intravenous BID (diuretic) Meaganritiffanie Lopez, DO      heparin (porcine)  7,500 Units Subcutaneous [facility] Lopez [place]      HYDROmorphone  0 5 mg Intravenous Q4H PRN Zorita Giancarlot, DO      insulin glargine  15 Units Subcutaneous HS Zorita Giancarlot, DO      insulin lispro  1-6 Units Subcutaneous TID [facility], DO      insulin lispro  1-6 Units Subcutaneous HS Meaganrita Giancarlot, DO      insulin lispro  3 Units Subcutaneous TID With Meals Meaganritiffanie Lopez, DO      oxyCODONE  5 mg Oral Q4H PRN Zorita Giancarlot, DO      Or    oxyCODONE  10 mg Oral Q4H PRN Meaganrita Giancarlot, DO      tamsulosin  0 4 mg Oral QPM Meaganritiffanie Lopez, DO          Today, Patient Was Seen By: Verito Lopez DO    ** Please Note: Dictation voice to text software may have been used in the creation of this document   **

## 2021-04-07 NOTE — PHYSICAL THERAPY NOTE
PHYSICAL THERAPY NOTE          Patient Name: Gali Cleary  [id number] Date: 4/7/2021 04/07/21 1118   Note Type   Note Type Treatment   Restrictions/Precautions   Weight Bearing Precautions Per Order No   Other Precautions Pain; Fall Risk;Telemetry;Multiple lines; Bed Alarm; Chair Alarm   Cognition   Overall Cognitive Status [facility]   Arousal/Participation Alert   Following Commands Follows one step commands with increased time or repetition   Subjective   Subjective Agreeable to therapy  I have to go to the bathroom   Bed Mobility   Additional Comments Stood x 4 min to void with urinal, fair- balance  Then stood x 5 min  for pericare  minx 1 assist   Transfers   Sit to Stand 4  Minimal assistance   Additional items Assist x 1; Armrests; Increased time required;Verbal cues   Stand to Sit 4  Minimal assistance   Additional items Assist x 1; Armrests;Trapeze bar;Verbal cues   Ambulation/Elevation   Gait pattern Excessively slow; Short stride; Wide CAMPOS   Gait Assistance 4  Minimal assist   Additional items Assist x 1;Verbal cues   Assistive Device Rolling walker   Distance 4' with chair follow   Balance   Ambulatory Poor +  (RW)   Endurance Deficit   Endurance Deficit Yes   Activity Tolerance   Activity Tolerance Patient limited by fatigue   Assessment   Prognosis Fair   Problem List Decreased strength;Decreased endurance; Impaired balance;Decreased mobility; Impaired judgement;Decreased safety awareness; Obesity; Decreased skin integrity;Pain   Assessment Pt  seen for PT treatment session this date with interventions consisting of  transfers and  gait training w/ emphasis on improving pt's ability to ambulate  Pt  Currently performing  tx and ambulation at (min ) x 1-2 level of function  The patient's AM-PAC Basic Mobility Inpatient Short Form Raw Score is 12, Standardized Score is 32 23   A standardized score less than 42 9 suggests the patient may benefit from discharge to post-acute rehabilitation services  Please also refer to physical therapy recommendation for safe DC planning  In comparison to previous session, Pt  With improvements in activity tolerance  Tolerated short distance ambulation excessively slow gait  Pt is in need of continued activity in PT to improve strength balance endurance mobility transfers and ambulation with return to maximize LOF  From PT/mobility standpoint, recommendation at time of d/c would be post acute rehab  in order to promote return to PLOF and independence  Goals   LTG Expiration Date 04/20/21   Plan   Treatment/Interventions Functional transfer training;LE strengthening/ROM; Therapeutic exercise; Endurance training;Gait training;Bed mobility   Progress Slow progress, decreased activity tolerance   Recommendation   PT Discharge Recommendation Post-Acute Rehabilitation Services   AM-PAC Basic Mobility Inpatient   Turning in Bed Without Bedrails 2   Lying on Back to Sitting on Edge of Flat Bed 2   Moving Bed to Chair 2   Standing Up From Chair 3   Walk in Room 2   Climb 3-5 Stairs 1   Basic Mobility Inpatient Raw Score 12   Basic Mobility Standardized Score 32 23   Pt  OOB in chair  with call bell within reach, all lines intact and alarm on at end of PT session  Discussed with  PT today's treatment and patient's current level of function for care coordination

## 2021-04-07 NOTE — CASE MANAGEMENT
A post acute care recommendation was made by your care team for STR  Discussed Freedom of Choice with both patient and caregiver  List of facilities given to both patient and caregiver via in person  both patient and caregiver aware the list is custom filtered for them by preference  and that [facility] post acute providers are designated  Pt states he was on our 5th floor 2 years ago  If no bed available at present, Wife and pt preference is [facility] because family works there  Referrals sent will wait on response then call [facility] for authorization

## 2021-04-07 NOTE — ASSESSMENT & PLAN NOTE
· Cannot have a CTA of the chest/PE protocol with acute kidney injury  · Normal V/Q lung scan on 04/06/2021  · The patient was initially treated with IV heparin drip/infusion per the VTE/PE (Raschke) protocol, which was discontinued on 04/06/2021 with a normal V/Q lung scan  · Utilize heparin 7500 Units SQ every 8 hours (increased dose based on his BMI)    VAS lower limb venous duplex study, complete bilateral  Status: Final result   PACS Images     Show images for VAS lower limb venous duplex study, complete bilateral   Study Result       [facility] REPORT  CLINICAL:  Indications:  Patient presents with bilateral lower extremity pain and swelling  Risk Factors  The patient has history of Obesity, HTN, Diabetes (Yes), CAD and previous  smoking (quit 1-5yrs ago)  He has no history of DVT  CONCLUSION:     Impression:  RIGHT LOWER LIMB:  No evidence of acute or chronic deep vein thrombosis  No evidence of superficial thrombophlebitis noted  Doppler evaluation shows a normal response to augmentation maneuvers  Popliteal, posterior tibial and anterior tibial arterial Doppler waveforms are  biphasic/monophasic  LEFT LOWER LIMB:  No evidence of acute or chronic deep vein thrombosis  No evidence of superficial thrombophlebitis noted  Doppler evaluation shows a normal response to augmentation maneuvers  Popliteal, posterior tibial and anterior tibial arterial Doppler waveforms are  biphasic/monophasic  Technical findings were tiger texted to Dr Dominic Mendiola       SIGNATURE:  Electronically Signed by: Luplilo Kim on 2021-04-05 04:25:35 PM

## 2021-04-07 NOTE — ASSESSMENT & PLAN NOTE
Wt Readings from Last 3 Encounters:   04/07/21 136 kg (299 lb 9 7 oz)   03/29/21 (!) 144 kg (317 lb 6 4 oz)   03/08/21 (!) 136 kg (300 lb 0 7 oz)     · Continue lasix 40 mg IV BID  · Continue PO coreg  · Daily weights  · Strict intake/output measurements

## 2021-04-08 ENCOUNTER — APPOINTMENT (INPATIENT)
Dept: RADIOLOGY | Facility: HOSPITAL | Age: 76
DRG: 682 | End: 2021-04-08
Payer: COMMERCIAL

## 2021-04-08 LAB
ALBUMIN SERPL BCP-MCNC: 2.9 G/DL (ref 3.5–5)
ALP SERPL-CCNC: 144 U/L (ref 46–116)
ALT SERPL W P-5'-P-CCNC: 17 U/L (ref 12–78)
ANION GAP SERPL CALCULATED.3IONS-SCNC: 7 MMOL/L (ref 4–13)
AST SERPL W P-5'-P-CCNC: 12 U/L (ref 5–45)
BASOPHILS # BLD AUTO: 0.04 THOUSANDS/ΜL (ref 0–0.1)
BASOPHILS NFR BLD AUTO: 1 % (ref 0–1)
BILIRUB SERPL-MCNC: 0.61 MG/DL (ref 0.2–1)
BUN SERPL-MCNC: 37 MG/DL (ref 5–25)
CALCIUM ALBUM COR SERPL-MCNC: 9.8 MG/DL (ref 8.3–10.1)
CALCIUM SERPL-MCNC: 8.9 MG/DL (ref 8.3–10.1)
CHLORIDE SERPL-SCNC: 103 MMOL/L (ref 100–108)
CO2 SERPL-SCNC: 31 MMOL/L (ref 21–32)
CREAT SERPL-MCNC: 1.81 MG/DL (ref 0.6–1.3)
EOSINOPHIL # BLD AUTO: 0.31 THOUSAND/ΜL (ref 0–0.61)
EOSINOPHIL NFR BLD AUTO: 4 % (ref 0–6)
ERYTHROCYTE [DISTWIDTH] IN BLOOD BY AUTOMATED COUNT: 15 % (ref 11.6–15.1)
GFR SERPL CREATININE-BSD FRML MDRD: 35 ML/MIN/1.73SQ M
GLUCOSE SERPL-MCNC: 155 MG/DL (ref 65–140)
GLUCOSE SERPL-MCNC: 160 MG/DL (ref 65–140)
GLUCOSE SERPL-MCNC: 185 MG/DL (ref 65–140)
GLUCOSE SERPL-MCNC: 201 MG/DL (ref 65–140)
GLUCOSE SERPL-MCNC: 225 MG/DL (ref 65–140)
HCT VFR BLD AUTO: 36.5 % (ref 36.5–49.3)
HGB BLD-MCNC: 11.2 G/DL (ref 12–17)
IMM GRANULOCYTES # BLD AUTO: 0.03 THOUSAND/UL (ref 0–0.2)
IMM GRANULOCYTES NFR BLD AUTO: 0 % (ref 0–2)
LYMPHOCYTES # BLD AUTO: 1.62 THOUSANDS/ΜL (ref 0.6–4.47)
LYMPHOCYTES NFR BLD AUTO: 20 % (ref 14–44)
MAGNESIUM SERPL-MCNC: 2 MG/DL (ref 1.6–2.6)
MCH RBC QN AUTO: 26.8 PG (ref 26.8–34.3)
MCHC RBC AUTO-ENTMCNC: 30.7 G/DL (ref 31.4–37.4)
MCV RBC AUTO: 87 FL (ref 82–98)
MONOCYTES # BLD AUTO: 0.81 THOUSAND/ΜL (ref 0.17–1.22)
MONOCYTES NFR BLD AUTO: 10 % (ref 4–12)
NEUTROPHILS # BLD AUTO: 5.32 THOUSANDS/ΜL (ref 1.85–7.62)
NEUTS SEG NFR BLD AUTO: 65 % (ref 43–75)
NRBC BLD AUTO-RTO: 0 /100 WBCS
PHOSPHATE SERPL-MCNC: 3.4 MG/DL (ref 2.3–4.1)
PLATELET # BLD AUTO: 241 THOUSANDS/UL (ref 149–390)
PMV BLD AUTO: 9.9 FL (ref 8.9–12.7)
POTASSIUM SERPL-SCNC: 4 MMOL/L (ref 3.5–5.3)
PROCALCITONIN SERPL-MCNC: 0.1 NG/ML
PROT SERPL-MCNC: 7.5 G/DL (ref 6.4–8.2)
RBC # BLD AUTO: 4.18 MILLION/UL (ref 3.88–5.62)
SODIUM SERPL-SCNC: 141 MMOL/L (ref 136–145)
WBC # BLD AUTO: 8.13 THOUSAND/UL (ref 4.31–10.16)

## 2021-04-08 PROCEDURE — 80053 COMPREHEN METABOLIC PANEL: CPT | Performed by: INTERNAL MEDICINE

## 2021-04-08 PROCEDURE — 83735 ASSAY OF MAGNESIUM: CPT | Performed by: INTERNAL MEDICINE

## 2021-04-08 PROCEDURE — 97110 THERAPEUTIC EXERCISES: CPT

## 2021-04-08 PROCEDURE — 99232 SBSQ HOSP IP/OBS MODERATE 35: CPT | Performed by: INTERNAL MEDICINE

## 2021-04-08 PROCEDURE — 94660 CPAP INITIATION&MGMT: CPT

## 2021-04-08 PROCEDURE — 82948 REAGENT STRIP/BLOOD GLUCOSE: CPT

## 2021-04-08 PROCEDURE — 71045 X-RAY EXAM CHEST 1 VIEW: CPT

## 2021-04-08 PROCEDURE — 84145 PROCALCITONIN (PCT): CPT | Performed by: INTERNAL MEDICINE

## 2021-04-08 PROCEDURE — 94760 N-INVAS EAR/PLS OXIMETRY 1: CPT

## 2021-04-08 PROCEDURE — 97116 GAIT TRAINING THERAPY: CPT

## 2021-04-08 PROCEDURE — 84100 ASSAY OF PHOSPHORUS: CPT | Performed by: INTERNAL MEDICINE

## 2021-04-08 PROCEDURE — 85025 COMPLETE CBC W/AUTO DIFF WBC: CPT | Performed by: INTERNAL MEDICINE

## 2021-04-08 RX ADMIN — FUROSEMIDE 40 MG: 10 INJECTION, SOLUTION INTRAVENOUS at 09:05

## 2021-04-08 RX ADMIN — HEPARIN SODIUM 7500 UNITS: 5000 INJECTION INTRAVENOUS; SUBCUTANEOUS at 21:25

## 2021-04-08 RX ADMIN — INSULIN GLARGINE 15 UNITS: 100 INJECTION, SOLUTION SUBCUTANEOUS at 21:25

## 2021-04-08 RX ADMIN — NYSTATIN: 100000 POWDER TOPICAL at 09:17

## 2021-04-08 RX ADMIN — INSULIN LISPRO 2 UNITS: 100 INJECTION, SOLUTION INTRAVENOUS; SUBCUTANEOUS at 11:45

## 2021-04-08 RX ADMIN — FUROSEMIDE 40 MG: 10 INJECTION, SOLUTION INTRAVENOUS at 15:57

## 2021-04-08 RX ADMIN — AMLODIPINE BESYLATE 10 MG: 10 TABLET ORAL at 09:05

## 2021-04-08 RX ADMIN — CARVEDILOL 12.5 MG: 12.5 TABLET, FILM COATED ORAL at 09:05

## 2021-04-08 RX ADMIN — Medication 2000 UNITS: at 09:05

## 2021-04-08 RX ADMIN — OXYCODONE HYDROCHLORIDE 10 MG: 10 TABLET ORAL at 16:03

## 2021-04-08 RX ADMIN — INSULIN LISPRO 1 UNITS: 100 INJECTION, SOLUTION INTRAVENOUS; SUBCUTANEOUS at 15:57

## 2021-04-08 RX ADMIN — TAMSULOSIN HYDROCHLORIDE 0.4 MG: 0.4 CAPSULE ORAL at 17:25

## 2021-04-08 RX ADMIN — HEPARIN SODIUM 7500 UNITS: 5000 INJECTION INTRAVENOUS; SUBCUTANEOUS at 14:13

## 2021-04-08 RX ADMIN — INSULIN LISPRO 3 UNITS: 100 INJECTION, SOLUTION INTRAVENOUS; SUBCUTANEOUS at 11:45

## 2021-04-08 RX ADMIN — ASPIRIN 81 MG: 81 TABLET, COATED ORAL at 09:05

## 2021-04-08 RX ADMIN — INSULIN LISPRO 2 UNITS: 100 INJECTION, SOLUTION INTRAVENOUS; SUBCUTANEOUS at 21:25

## 2021-04-08 RX ADMIN — OXYCODONE HYDROCHLORIDE 10 MG: 10 TABLET ORAL at 09:14

## 2021-04-08 RX ADMIN — CARVEDILOL 12.5 MG: 12.5 TABLET, FILM COATED ORAL at 17:25

## 2021-04-08 RX ADMIN — ATORVASTATIN CALCIUM 40 MG: 40 TABLET, FILM COATED ORAL at 17:24

## 2021-04-08 RX ADMIN — INSULIN LISPRO 3 UNITS: 100 INJECTION, SOLUTION INTRAVENOUS; SUBCUTANEOUS at 09:10

## 2021-04-08 RX ADMIN — HYDROMORPHONE HYDROCHLORIDE 0.5 MG: 1 INJECTION, SOLUTION INTRAMUSCULAR; INTRAVENOUS; SUBCUTANEOUS at 11:43

## 2021-04-08 RX ADMIN — INSULIN LISPRO 1 UNITS: 100 INJECTION, SOLUTION INTRAVENOUS; SUBCUTANEOUS at 09:09

## 2021-04-08 RX ADMIN — HEPARIN SODIUM 7500 UNITS: 5000 INJECTION INTRAVENOUS; SUBCUTANEOUS at 05:12

## 2021-04-08 NOTE — CASE MANAGEMENT
Voicemail received from Aquilino Hernandez U  60  at [place]  Auth obtained for [facility] Rehab  Sandy Zoe [phone]  Update to Kimber Navarro [phone] ext [phone]  Fax [phone] on 4/12  Message sent to MD tentative discharge tomorrow  Spoke with Shirlene Jessica at [address] [facility] ambulance, transport set up for [employer]  Will update wife and pt

## 2021-04-08 NOTE — ASSESSMENT & PLAN NOTE
Wt Readings from Last 3 Encounters:   04/08/21 134 kg (295 lb 6 7 oz)   03/29/21 (!) 144 kg (317 lb 6 4 oz)   03/08/21 (!) 136 kg (300 lb 0 7 oz)     · Continue lasix 40 mg IV BID  · Continue PO coreg  · Daily weights  · Strict intake/output measurements

## 2021-04-08 NOTE — PHYSICAL THERAPY NOTE
PHYSICAL THERAPY NOTE          Patient Name: Yoav Andres  [id number] Date: 4/8/2021 04/08/21 1148   Note Type   Note Type Treatment   Pain Assessment   Pain Score 9   Pain Location/Orientation Orientation: Bilateral;Orientation: Lower; Location: Leg   Restrictions/Precautions   Weight Bearing Precautions Per Order No   Other Precautions Pain; Fall Risk;O2;Telemetry; Bed Alarm; Chair Alarm   Cognition   Overall Cognitive Status WFL   Following Commands Follows one step commands with increased time or repetition   Subjective   Subjective c/o sever B LE pain with ambulation short distance  Requested pain medication  (nurse notified of request for meds)   Transfers   Sit to Stand 4  Minimal assistance   Additional items Assist x 1; Armrests; Increased time required;Verbal cues   Stand to Sit 4  Minimal assistance   Additional items Assist x 1; Armrests; Increased time required;Verbal cues   Ambulation/Elevation   Gait pattern Excessively slow; Step to; Foward flexed; Wide CAMPOS   Gait Assistance 4  Minimal assist   Additional items Assist x 1;Verbal cues   Assistive Device Bariatric Rolling walker   Distance 5'   Balance   Ambulatory Poor +  (RW)   Endurance Deficit   Endurance Deficit Yes   Activity Tolerance   Activity Tolerance Patient limited by fatigue;Patient limited by pain   Exercises   Hip Flexion Sitting;10 reps   Hip Abduction Sitting;15 reps   Knee AROM Sitting;15 reps   Knee AROM Long Arc Quad Sitting;10 reps   Ankle Pumps Sitting;20 reps   Assessment   Prognosis Fair   Problem List Decreased strength;Decreased endurance; Impaired balance;Decreased mobility; Impaired judgement;Decreased safety awareness;Decreased skin integrity;Pain   Assessment Pt  seen for PT treatment session this date with interventions consisting of  therapeutic exercises,  transfers and gait training w/ emphasis on improving pt's ability to ambulate   Pt  Currently performing  tx and ambulation at ( min) x 1 level of function  The patient's AM-PAC Basic Mobility Inpatient Short Form Raw Score is 13, Standardized Score is 33 99  A standardized score greater than and less than 42 9 suggests the patient may benefit from discharge to post-acute rehabilitation services  Please also refer to physical therapy recommendation for safe DC planning  In comparison to previous session, Pt  With no change in activity tolerance  Ambulation limited by pain and weakness  Pt is in need of continued activity in PT to improve strength balance endurance mobility transfers and ambulation with return to maximize LOF  From PT/mobility standpoint, recommendation at time of d/c would be post acute rehab  in order to promote return to PLOF and independence  Goals   LTG Expiration Date 04/20/21   Plan   Treatment/Interventions Functional transfer training;LE strengthening/ROM; Therapeutic exercise; Endurance training;Bed mobility;Gait training   Progress Slow progress, decreased activity tolerance   Recommendation   PT Discharge Recommendation Post-Acute Rehabilitation Services   AM-PAC Basic Mobility Inpatient   Turning in Bed Without Bedrails 2   Lying on Back to Sitting on Edge of Flat Bed 2   Moving Bed to Chair 3   Standing Up From Chair 3   Walk in Room 2   Climb 3-5 Stairs 1   Basic Mobility Inpatient Raw Score 13   Basic Mobility Standardized Score 33 99   Pt  OOB in chair  with call bell within reach, all lines intact and alarm on at end of PT session  Discussed with  PT today's treatment and patient's current level of function for care coordination

## 2021-04-08 NOTE — PROGRESS NOTES
[address] [facility] [address]  Progress Note Lizzeth Dan 1945, 64 y o  male MRN: [id number]  Unit/Bed#: 424-01 Encounter: [id number]  Primary Care Provider: Jeffery Maciel MD   Date and time admitted to hospital: 4/5/2021  2:40 PM    * Acute kidney injury [facility])  Assessment & Plan  · Acute kidney injury was present on admission and likely secondary to cardiorenal syndrome  · Baseline serum creatinine of 1 2-1 4 mg/dl  · Continue lasix 40 mg IV BID  · Avoid all nephrotoxic agents  · The patient was seen in consultation by Nephrology  · Serial laboratory testing to monitor the patient's renal function and electrolyte levels    Results from last 7 days   Lab Units 04/08/21  0502 04/07/21  0418 04/06/21  0519   SODIUM mmol/L 141 137 142   POTASSIUM mmol/L 4 0 4 2 4 7   CHLORIDE mmol/L 103 101 107   CO2 mmol/L 31 31 28   BUN mg/dL 37* 31* 29*   CREATININE mg/dL 1 81* 1 89* 1 92*   CALCIUM mg/dL 8 9 9 3 8 9     Results for Chris Kincaid (MRN [id number]) as of 4/7/2021 14:19   Ref   Range 4/5/2021 19:24   EXT Creatinine Urine Latest Units: mg/dL 104 0   Protein Urine Random Latest Units: mg/dL 46   SODIUM URINE Unknown 75   Prot/Creat Ratio, Ur Latest Ref Range: 0 00 - 0 10  0 44 (H)       Lower extremity weakness  Assessment & Plan  · May require additional neurologic imaging to rule-out a CVA/stroke  · PT/OT      Acute on chronic diastolic CHF (congestive heart failure) (Formerly [facility])  Assessment & Plan  Wt Readings from Last 3 Encounters:   04/08/21 134 kg (295 lb 6 7 oz)   03/29/21 (!) 144 kg (317 lb 6 4 oz)   03/08/21 (!) 136 kg (300 lb 0 7 oz)     · Continue lasix 40 mg IV BID  · Continue PO coreg  · Daily weights  · Strict intake/output measurements        Bilateral pleural effusion  Assessment & Plan  · Likely secondary to acute on chronic diastolic CHF (congestive heart failure)  · New supplemental oxygen requirement of 3 lpm via the nasal cannula to maintain oxygen saturation levels at 90% and above  · Continue lasix 40 mg IV BID    Venous stasis dermatitis of both lower extremities  Assessment & Plan  · Does not appear to be any active infection at this time    Renal calculus  Assessment & Plan  · Outpatient Urology evaluation    Multiple renal cysts  Assessment & Plan  · Outpatient surveillance imaging with PCP    Atelectasis  Assessment & Plan  · Incentive spirometry 10 times per hour while awake    Inguinal lymphadenopathy  Assessment & Plan  · Possibly reactive in nature  · Will need outpatient surveillance imaging to ensure resolution of the inguinal lymphadenopathy    Abnormal EKG  Assessment & Plan  · Outpatient follow-up with Cardiology    ECG 12 lead  Order: [id number]  Status:  Final result   Visible to patient:  No (inaccessible in [address])   Next appt:  05/18/2021 at 12:40 PM in Cardiology Ramon Velazquez MD)   Ref Range & Units 4/5/21 1515   Ventricular Rate BPM 75    Atrial Rate BPM 75    KS Interval ms 246    QRSD Interval ms 150    QT Interval ms 408    QTC Interval ms 455    P Axis degrees 60    QRS Axis degrees -80    T Wave Axis degrees 48       Narrative & Impression    Sinus rhythm with 1st degree A-V block  Left axis deviation  Right bundle branch block  Abnormal ECG  When compared with ECG of 08-MAR-2021 15:53,     Confirmed by Nicolas Winkler [phone] on 4/5/2021 3:26:16 PM      Specimen Collected: 04/05/21 15:15   Last Resulted: 04/05/21 15:26               Gait instability  Assessment & Plan  · May require additional neurologic imaging to rule-out a CVA/stroke  · PT/OT    Multiple pulmonary nodules  Assessment & Plan  · Outpatient surveillance imaging with Pulmonology    Elevated d-dimer  Assessment & Plan  · Cannot have a CTA of the chest/PE protocol with acute kidney injury  · Normal V/Q lung scan on 04/06/2021  · The patient was initially treated with IV heparin drip/infusion per the VTE/PE (Raschke) protocol, which was discontinued on 04/06/2021 with a normal V/Q lung scan  · Utilize heparin 7500 Units SQ every 8 hours (increased dose based on his BMI) for DVT prophylaxis    VAS lower limb venous duplex study, complete bilateral  Status: Final result   PACS Images     Show images for VAS lower limb venous duplex study, complete bilateral   Study Result       [facility] REPORT  CLINICAL:  Indications:  Patient presents with bilateral lower extremity pain and swelling  Risk Factors  The patient has history of Obesity, HTN, Diabetes (Yes), CAD and previous  smoking (quit 1-5yrs ago)  He has no history of DVT  CONCLUSION:     Impression:  RIGHT LOWER LIMB:  No evidence of acute or chronic deep vein thrombosis  No evidence of superficial thrombophlebitis noted  Doppler evaluation shows a normal response to augmentation maneuvers  Popliteal, posterior tibial and anterior tibial arterial Doppler waveforms are  biphasic/monophasic  LEFT LOWER LIMB:  No evidence of acute or chronic deep vein thrombosis  No evidence of superficial thrombophlebitis noted  Doppler evaluation shows a normal response to augmentation maneuvers  Popliteal, posterior tibial and anterior tibial arterial Doppler waveforms are  biphasic/monophasic  Technical findings were tiger texted to Dr Bard Millard       SIGNATURE:  Electronically Signed by: Alan Saavedra on 2021-04-05 04:25:35 PM         Type 2 diabetes mellitus with hyperglycemia, with long-term current use of insulin [facility])  Assessment & Plan  Lab Results   Component Value Date    HGBA1C 7 0 (H) 03/09/2021       Recent Labs     04/07/21  1532 04/07/21  2051 04/08/21  0743 04/08/21  1118   POCGLU 207* 213* 155* 225*       Blood Sugar Average: Last 72 hrs:  (P) 190 [id number]   · Continue lantus 15 Units SQ QHS  · Increase to humalog 4 Units SQ TID with meals  · Hypoglycemia protocol  · Insulin sliding scale with blood glucose monitoring ACHS    BHARGAVI (obstructive sleep apnea)  Assessment & Plan  · Continue CPAP QHS and anytime while sleeping        VTE Pharmacologic Prophylaxis:   Pharmacologic: Heparin 7500 Units SQ every 8 hours  Mechanical VTE Prophylaxis in Place: Yes    Patient Centered Rounds: I have performed bedside rounds with nursing staff today  Time Spent for Care: 30 minutes  More than 50% of total time spent on counseling and coordination of care as described above  Current Length of Stay: 3 day(s)    Current Patient Status: Inpatient   Certification Statement: The patient, who was admitted as an inpatient, is being discharged sooner than originally anticipated due to: the need for IV lasix and for short-term rehabilitation placement upon discharge  Code Status: Level 3 - DNAR and DNI      Subjective: The patient was seen and examined  The patient is doing better  No chest pain  No shortness of breath  No abdominal pain  No nausea or vomiting  Objective:     Vitals:   Temp (24hrs), Av °F (36 1 °C), Min:96 3 °F (35 7 °C), Max:97 6 °F (36 4 °C)    Temp:  [96 3 °F (35 7 °C)-97 6 °F (36 4 °C)] 96 3 °F (35 7 °C)  HR:  [69-71] 71  Resp:  [17-18] 18  BP: ()/(45-74) 140/73  SpO2:  [87 %-97 %] 97 %  Body mass index is 47 68 kg/m²  Input and Output Summary (last 24 hours):        Intake/Output Summary (Last 24 hours) at 2021 1416  Last data filed at 2021 1046  Gross per 24 hour   Intake 300 ml   Output 1001 ml   Net -701 ml       Physical Exam:     Physical Exam  General:  NAD, follows commands  HEENT:  NC/AT, mucous membranes moist   Neck:  Supple, No JVP elevation  CV:  + S1, + S2, RRR  Pulm:  Lung fields are CTA bilaterally  Abd:  Soft, Non-tender, Non-distended  Ext:  No clubbing/cyanosis, Improving edema of the bilateral lower extremities  Skin:  No rashes  Neuro:  Awake, alert, oriented  Psych:  Normal mood and affect      Additional Data:    Labs:    Results from last 7 days   Lab Units 21  0502   WBC Thousand/uL 8 13   HEMOGLOBIN g/dL 11 2*   HEMATOCRIT % 36 5 PLATELETS Thousands/uL 241   NEUTROS PCT % 65   LYMPHS PCT % 20   MONOS PCT % 10   EOS PCT % 4     Results from last 7 days   Lab Units 04/08/21  0502   SODIUM mmol/L 141   POTASSIUM mmol/L 4 0   CHLORIDE mmol/L 103   CO2 mmol/L 31   BUN mg/dL 37*   CREATININE mg/dL 1 81*   ANION GAP mmol/L 7   CALCIUM mg/dL 8 9   ALBUMIN g/dL 2 9*   TOTAL BILIRUBIN mg/dL 0 61   ALK PHOS U/L 144*   ALT U/L 17   AST U/L 12   GLUCOSE RANDOM mg/dL 160*     Results from last 7 days   Lab Units 04/05/21  1456   INR  1 13     Results from last 7 days   Lab Units 04/08/21  1118 04/08/21  0743 04/07/21  2051 04/07/21  1532 04/07/21  1118 04/07/21  0723 04/06/21  2112 04/06/21  1523 04/06/21  1132 04/06/21  0734 04/05/21  2058   POC GLUCOSE mg/dl 225* 155* 213* 207* 239* 185* 201* 243* 232* 115 85         Results from last 7 days   Lab Units 04/08/21  0502 04/07/21  0418 04/06/21  0519 04/05/21  1456   LACTIC ACID mmol/L  --   --  0 7 1 4   PROCALCITONIN ng/ml 0 10 0 09 0 06  --            * I Have Reviewed All Lab Data Listed Above  * Additional Pertinent Lab Tests Reviewed: All [facility] Admission Reviewed      Recent Cultures (last 7 days):     Results from last 7 days   Lab Units 04/05/21  1926 04/05/21  1517 04/05/21  1516   BLOOD CULTURE   --  No Growth at 48 hrs  No Growth at 48 hrs     URINE CULTURE  No Growth <1000 cfu/mL  --   --        Last 24 Hours Medication List:   Current Facility-Administered Medications   Medication Dose Route Frequency Provider Last Rate    acetaminophen  650 mg Oral Q6H PRN Harley Lir, DO      amLODIPine  10 mg Oral Daily Harley Gutierrez, DO      aspirin  81 mg Oral Daily Harley Gutierrez, DO      atorvastatin  40 mg Oral After Cisco Bae, DO      carvedilol  12 5 mg Oral BID Harley Gutierrez, DO      cholecalciferol  2,000 Units Oral Daily Harley Gutierrez, DO      furosemide  40 mg Intravenous BID (diuretic) Harley Gutierrez, DO      heparin (porcine) 7,500 Units Subcutaneous [facility] Jose, DO      HYDROmorphone  0 5 mg Intravenous Q4H PRN Jose, DO      insulin glargine  15 Units Subcutaneous HS Jose, DO      insulin lispro  1-6 Units Subcutaneous TID [facility], DO      insulin lispro  1-6 Units Subcutaneous HS Jose, DO      insulin lispro  4 Units Subcutaneous TID With Meals Jose, DO      nystatin   Topical BID Jose, DO      oxyCODONE  5 mg Oral Q4H PRN Jose, DO      Or    oxyCODONE  10 mg Oral Q4H PRN Jose, DO      tamsulosin  0 4 mg Oral QPM Jose, DO          Today, Patient Was Seen By: DO Jose    ** Please Note: Dictation voice to text software may have been used in the creation of this document   **

## 2021-04-08 NOTE — ASSESSMENT & PLAN NOTE
· Cannot have a CTA of the chest/PE protocol with acute kidney injury  · Normal V/Q lung scan on 04/06/2021  · The patient was initially treated with IV heparin drip/infusion per the VTE/PE (Raschke) protocol, which was discontinued on 04/06/2021 with a normal V/Q lung scan  · Utilize heparin 7500 Units SQ every 8 hours (increased dose based on his BMI)    VAS lower limb venous duplex study, complete bilateral  Status: Final result   PACS Images     Show images for VAS lower limb venous duplex study, complete bilateral   Study Result       [facility] REPORT  CLINICAL:  Indications:  Patient presents with bilateral lower extremity pain and swelling  Risk Factors  The patient has history of Obesity, HTN, Diabetes (Yes), CAD and previous  smoking (quit 1-5yrs ago)  He has no history of DVT  CONCLUSION:     Impression:  RIGHT LOWER LIMB:  No evidence of acute or chronic deep vein thrombosis  No evidence of superficial thrombophlebitis noted  Doppler evaluation shows a normal response to augmentation maneuvers  Popliteal, posterior tibial and anterior tibial arterial Doppler waveforms are  biphasic/monophasic  LEFT LOWER LIMB:  No evidence of acute or chronic deep vein thrombosis  No evidence of superficial thrombophlebitis noted  Doppler evaluation shows a normal response to augmentation maneuvers  Popliteal, posterior tibial and anterior tibial arterial Doppler waveforms are  biphasic/monophasic  Technical findings were tiger texted to Dr Charleen Rodriguez       SIGNATURE:  Electronically Signed by: Siri Munoz on 2021-04-05 04:25:35 PM

## 2021-04-08 NOTE — PROGRESS NOTES
Progress Note - Nephrology   Puneet Murray 64 y o  male MRN: [id number]  Unit/Bed#: 424-01 Encounter: [id number]    A/P:  1    Acute kidney injury on top of chronic kidney disease                 creatinine continues to slowly improve with diuresis, now down to the 1 8 mg/dL  Continue avoid potential nephrotoxins and work toward improving overall hemodynamics and offer supportive care  2  Chronic kidney disease stage 3 with baseline creatinine between 1 3-1 4 mg/ dL   3  Hypertensive nephrosclerosis likely                 continue current medications, blood pressure is well controlled       4  Mild proteinuria                 continue to hold lisinopril at this time, a reinitiate once he is at his baseline creatinine and blood pressures allow  5  Mild cardiorenal syndrome                 continue diuresis, patient continues to improve overall  6  Acute on chronic diastolic congestive heart failure                 as mentioned above, patient appears to be improving clinically, continue diuresis  7  Nephrolithiasis                 continue with adequate urine flow, continue low-sodium diet, follow-up with Urology as an outpatient as indicated      Follow up reason for today's visit:  Acute kidney injury/chronic kidney disease/hypertension/proteinuria    Acute kidney injury [facility])    Patient Active Problem List   Diagnosis    Dyspnea on exertion    Type 2 diabetes mellitus with hypoglycemia without coma, with long-term current use of insulin (HCC)    CKD (chronic kidney disease)    Chronic diastolic CHF (congestive heart failure) (HCC)    BHARGAVI (obstructive sleep apnea)    Morbid obesity with BMI of 50 0-59 9, adult ([facility] [address] )    Essential hypertension    Type 2 diabetes mellitus with hyperglycemia, with long-term current use of insulin (HCC)    Complicated UTI (urinary tract infection)    Uncontrolled type 2 diabetes mellitus with hyperglycemia, with long-term current use of insulin (HCC)    Acute kidney injury ([address] )    Dehydration with hyponatremia    Hypoglycemia    Acute cystitis    Opiate dependence, continuous ([facility])    Paresthesia of left upper extremity    Neural foraminal stenosis of cervical spine    Acute pain of right lower extremity    Elevated TSH    Pressure injury of skin of right buttock    Ambulatory dysfunction    UTI (urinary tract infection)    Accelerated hypertension    Hypercalcemia    Open wound of right lower extremity    Cellulitis of right lower extremity    Elevated alkaline phosphatase level    Elevated d-dimer    Elevated parathyroid hormone    Pyuria    Microscopic hematuria    Multiple pulmonary nodules    Gait instability    Abnormal EKG    Cigarette nicotine dependence in remission    Inguinal lymphadenopathy    Bilateral pleural effusion    Atelectasis    Acute on chronic diastolic CHF (congestive heart failure) ([facility])    Multiple renal cysts    Renal calculus    Venous stasis dermatitis of both lower extremities    Lower extremity weakness         Subjective:   No Acute events overnight, patient continues to feel on improvement overall  Objective:     Vitals: Blood pressure 140/73, pulse 71, temperature (!) 96 3 °F (35 7 °C), resp  rate 18, height 5' 6" (1 676 m), weight 134 kg (295 lb 6 7 oz), SpO2 97 %  ,Body mass index is 47 68 kg/m²  Weight (last 2 days)     Date/Time   Weight    04/08/21 0544   134 (295 42)    04/07/21 0600   136 (299 61)    04/06/21 0600   (!) 142 (313 27)                Intake/Output Summary (Last 24 hours) at 4/8/2021 1104  Last data filed at 4/8/2021 1046  Gross per 24 hour   Intake 300 ml   Output 1001 ml   Net -701 ml     I/O last 3 completed shifts:   In: 300 [P O :300]  Out: 1700 [Urine:1700]         Physical Exam: /73   Pulse 71   Temp (!) 96 3 °F (35 7 °C)   Resp 18   Ht 5' 6" (1 676 m)   Wt 134 kg (295 lb 6 7 oz)   SpO2 97%   BMI 47 68 kg/m²     General Appearance:    Alert, cooperative, no distress, appears stated age   Head:    Normocephalic, without obvious abnormality, atraumatic   Eyes:    Conjunctiva/corneas clear   Ears:    Normal external ears   Nose:   Nares normal, septum midline, mucosa normal, no drainage    or sinus tenderness   Throat:   Lips, mucosa, and tongue normal; teeth and gums normal   Neck:   Supple   Back:     Symmetric, no curvature, ROM normal, no CVA tenderness   Lungs:     Clear to auscultation bilaterally, respirations unlabored   Chest wall:    No tenderness or deformity   Heart:    Regular rate and rhythm, S1 and S2 normal, no murmur, rub   or gallop   Abdomen:     Soft, non-tender, bowel sounds active   Extremities:   Extremities normal, atraumatic, no cyanosis, +1 to +2 bilateral lower extremity edema   Skin:   Skin color, texture, turgor normal, no rashes or lesions   Lymph nodes:   Cervical normal   Neurologic:   CNII-XII intact            Lab, Imaging and other studies: I have personally reviewed pertinent labs  CBC:   Lab Results   Component Value Date    WBC 8 13 04/08/2021    HGB 11 2 (L) 04/08/2021    HCT 36 5 04/08/2021    MCV 87 04/08/2021     04/08/2021    MCH 26 8 04/08/2021    MCHC 30 7 (L) 04/08/2021    RDW 15 0 04/08/2021    MPV 9 9 04/08/2021    NRBC 0 04/08/2021     CMP:   Lab Results   Component Value Date    K 4 0 04/08/2021     04/08/2021    CO2 31 04/08/2021    BUN 37 (H) 04/08/2021    CREATININE 1 81 (H) 04/08/2021    CALCIUM 8 9 04/08/2021    AST 12 04/08/2021    ALT 17 04/08/2021    ALKPHOS 144 (H) 04/08/2021    EGFR 35 04/08/2021           Results from last 7 days   Lab Units 04/08/21  0502 04/07/21  0418 04/06/21  0519   POTASSIUM mmol/L 4 0 4 2 4 7   CHLORIDE mmol/L 103 101 107   CO2 mmol/L 31 31 28   BUN mg/dL 37* 31* 29*   CREATININE mg/dL 1 81* 1 89* 1 92*   CALCIUM mg/dL 8 9 9 3 8 9   ALK PHOS U/L 144* 150* 160*   ALT U/L 17 20 27   AST U/L 12 13 22         Phosphorus:   Lab Results   Component Value Date    PHOS 3 4 04/08/2021 Magnesium:   Lab Results   Component Value Date    MG 2 0 04/08/2021     Urinalysis: No results found for: Tee Isael, SPECGRAV, PHUR, LEUKOCYTESUR, NITRITE, PROTEINUA, GLUCOSEU, KETONESU, BILIRUBINUR, BLOODU  Ionized Calcium: No results found for: CAION  Coagulation: No results found for: PT, INR, APTT  Troponin: No results found for: TROPONINI  ABG: No results found for: PHART, PCG9KNJ, PO2ART, NVK7QEM, S7LLTGRZ, BEART, SOURCE  Radiology review:     IMAGING  Procedure: Ct Abdomen Pelvis Wo Contrast    Result Date: 4/5/2021  Narrative: CT ABDOMEN AND PELVIS WITHOUT IV CONTRAST INDICATION:   Abdominal trauma, blunt fall yesterday leg weakness KIRT  COMPARISON:  Images from chest CT dated 3/9/2021, renal ultrasound dated 12/1/2015 and ultrasound dated 10/21/2015  TECHNIQUE:  CT examination of the abdomen and pelvis was performed without intravenous contrast   Axial, sagittal, and coronal 2D reformatted images were created from the source data and submitted for interpretation  Radiation dose length product (DLP) for this visit:  2362 99 mGy-cm   This examination, like all CT scans performed in the [facility], was performed utilizing techniques to minimize radiation dose exposure, including the use of iterative reconstruction and automated exposure control  Enteric contrast was not administered  FINDINGS: ABDOMEN LOWER CHEST: Small bilateral pleural effusions and basilar atelectasis  LIVER/BILIARY TREE:  Unremarkable  GALLBLADDER:  No calcified gallstones  No pericholecystic inflammatory change  SPLEEN:  Unremarkable  PANCREAS:  Unremarkable  ADRENAL GLANDS:  Unremarkable  KIDNEYS/URETERS:  3 mm nonobstructing right renal stone  Small renal cysts better demonstrated on prior ultrasound  No obstructing stone or hydronephrosis  STOMACH AND BOWEL:  Unremarkable  APPENDIX:  Surgically absent  ABDOMINOPELVIC CAVITY:  No ascites  No pneumoperitoneum  No lymphadenopathy   VESSELS:  Atherosclerotic changes are present  No evidence of aneurysm  PELVIS REPRODUCTIVE ORGANS:  Unremarkable for patient's age  URINARY BLADDER:  Bladder diverticula are noted  ABDOMINAL WALL/INGUINAL REGIONS:  Diastases of the rectus abdominis  Fat-containing umbilical and supraumbilical hernias  Bilateral nonspecific inguinal adenopathy measuring up to 1 4 cm on the right and 1 6 cm on the left  OSSEOUS STRUCTURES:  No acute fracture or destructive osseous lesion  Spinal degenerative changes are noted  Impression: No acute intra-abdominal pathology  Small effusions and basilar atelectasis  Nonspecific inguinal adenopathy could be reactive but recommend clinical correlation and follow-up as warranted  Workstation performed: [id number]     Procedure: Xr Chest Portable    Result Date: 4/8/2021  Narrative: CHEST INDICATION:   hypoxia  COMPARISON:  Chest radiograph from 4/2/2021 and chest CT from 3/9/2021  EXAM PERFORMED/VIEWS:  XR CHEST PORTABLE  FINDINGS: Cardiomediastinal silhouette normal  No acute disease  Redemonstration of mild left base scar  No effusion or pneumothorax  Mild degenerative disease in the spine  Impression: No acute cardiopulmonary disease  Workstation performed: [id number]     Procedure: Xr Chest 1 View Portable    Result Date: 4/6/2021  Narrative: CHEST INDICATION:   low oxygen sats  COMPARISON:  3/8/2021 EXAM PERFORMED/VIEWS:  XR CHEST PORTABLE FINDINGS: Cardiomediastinal silhouette appears unremarkable  The lungs are clear  No pneumothorax or pleural effusion  Osseous structures appear within normal limits for patient age  Impression: No acute cardiopulmonary disease  Workstation performed: [id number]     Procedure: Ct Head Without Contrast    Result Date: 4/5/2021  Narrative: CT BRAIN - WITHOUT CONTRAST INDICATION:   Head trauma, minor (Age => 63y) fall yesterday leg weakness  COMPARISON:  None  TECHNIQUE:  CT examination of the brain was performed    In addition to axial images, sagittal and coronal 2D reformatted images were created and submitted for interpretation  Radiation dose length product (DLP) for this visit:  957 6 mGy-cm   This examination, like all CT scans performed in the [facility], was performed utilizing techniques to minimize radiation dose exposure, including the use of iterative reconstruction and automated exposure control  IMAGE QUALITY:  Diagnostic  FINDINGS: PARENCHYMA: Decreased attenuation is noted in periventricular and subcortical white matter demonstrating an appearance that is statistically most likely to represent mild microangiopathic change  No CT signs of acute infarction  No intracranial mass, mass effect or midline shift  No acute parenchymal hemorrhage  Mild age-appropriate volume loss  VENTRICLES AND EXTRA-AXIAL SPACES:  Normal for the patient's age  VISUALIZED ORBITS AND PARANASAL SINUSES:  Orbits are unremarkable  Stable ethmoid and sphenoid sinus mucosal thickening  CALVARIUM AND EXTRACRANIAL SOFT TISSUES:  Normal      Impression: No acute intracranial abnormality  Workstation performed: [id number]     Procedure: Ct Cervical Spine Without Contrast    Result Date: 4/5/2021  Narrative: CT CERVICAL SPINE - WITHOUT CONTRAST INDICATION:   Neck trauma (Age => 63y) fall yesterday leg weakness  COMPARISON:  CT dated 3/1/2019  TECHNIQUE:  CT examination of the cervical spine was performed without intravenous contrast   Contiguous axial images were obtained  Sagittal and coronal reconstructions were performed  Radiation dose length product (DLP) for this visit:  685 38 mGy-cm   This examination, like all CT scans performed in the [facility], was performed utilizing techniques to minimize radiation dose exposure, including the use of iterative  reconstruction and automated exposure control  IMAGE QUALITY:  Diagnostic  FINDINGS: ALIGNMENT:  Normal alignment of the cervical spine  No subluxation  VERTEBRAL BODIES:  No fracture  DEGENERATIVE CHANGES:  Moderate multilevel cervical degenerative changes are noted  No critical central canal stenosis  New ankylosis of the right C2-3 facet complex  PREVERTEBRAL AND PARASPINAL SOFT TISSUES:  Unremarkable  THORACIC INLET:  Normal      Impression: No cervical spine fracture or traumatic malalignment  Workstation performed: [id number]     Procedure: Nm Lung Perfusion Imaging (particulate)    Result Date: 4/6/2021  Narrative: LUNG PERFUSION SCAN INDICATION: elevated D-dimer, hypoxia, cant have CTA of the chest with KIRT COMPARISON:  Chest radiograph  4/5/2021 TECHNIQUE:  Multiplanar perfusion imaging was performed following the intravenous administration of 4 2 mCi Tc-99m labeled MAA  No ventilation imaging was performed as per current Covid-19 protocol  FINDINGS:  Perfusion imaging demonstrates homogeneous distribution of the radiopharmaceutical throughout both lungs  There is no significant  segmental or subsegmental defect  Impression: Normal exam  Workstation performed: [id number]     Procedure: Vas Lower Limb Venous Duplex Study, Complete Bilateral    Result Date: 4/5/2021  Narrative:  [facility] REPORT CLINICAL: Indications: Patient presents with bilateral lower extremity pain and swelling  Risk Factors The patient has history of Obesity, HTN, Diabetes (Yes), CAD and previous smoking (quit 1-5yrs ago)  He has no history of DVT  CONCLUSION:  Impression: RIGHT LOWER LIMB: No evidence of acute or chronic deep vein thrombosis  No evidence of superficial thrombophlebitis noted  Doppler evaluation shows a normal response to augmentation maneuvers  Popliteal, posterior tibial and anterior tibial arterial Doppler waveforms are biphasic/monophasic  LEFT LOWER LIMB: No evidence of acute or chronic deep vein thrombosis  No evidence of superficial thrombophlebitis noted  Doppler evaluation shows a normal response to augmentation maneuvers   Popliteal, posterior tibial and anterior tibial arterial Doppler waveforms are biphasic/monophasic  Technical findings were tiger texted to Dr Leila Overton    SIGNATURE: Electronically Signed by: Kel Peralta on 2021-04-05 04:25:35 PM      Current Facility-Administered Medications   Medication Dose Route Frequency    acetaminophen (TYLENOL) tablet 650 mg  650 mg Oral Q6H PRN    amLODIPine (NORVASC) tablet 10 mg  10 mg Oral Daily    aspirin (ECOTRIN LOW STRENGTH) EC tablet 81 mg  81 mg Oral Daily    atorvastatin (LIPITOR) tablet 40 mg  40 mg Oral After Dinner    carvedilol (COREG) tablet 12 5 mg  12 5 mg Oral BID    cholecalciferol (VITAMIN D3) tablet 2,000 Units  2,000 Units Oral Daily    furosemide (LASIX) injection 40 mg  40 mg Intravenous BID (diuretic)    heparin (porcine) subcutaneous injection 7,500 Units  7,500 Units Subcutaneous Q8H [address]    HYDROmorphone (DILAUDID) injection 0 5 mg  0 5 mg Intravenous Q4H PRN    insulin glargine (LANTUS) subcutaneous injection 15 Units 0 15 mL  15 Units Subcutaneous HS    insulin lispro (HumaLOG) 100 units/mL subcutaneous injection 1-6 Units  1-6 Units Subcutaneous TID AC    insulin lispro (HumaLOG) 100 units/mL subcutaneous injection 1-6 Units  1-6 Units Subcutaneous HS    insulin lispro (HumaLOG) 100 units/mL subcutaneous injection 3 Units  3 Units Subcutaneous TID With Meals    nystatin (MYCOSTATIN) powder   Topical BID    oxyCODONE (ROXICODONE) IR tablet 5 mg  5 mg Oral Q4H PRN    Or    oxyCODONE (ROXICODONE) immediate release tablet 10 mg  10 mg Oral Q4H PRN    tamsulosin (FLOMAX) capsule 0 4 mg  0 4 mg Oral QPM     Medications Discontinued During This Encounter   Medication Reason    heparin (VTE/PE) high     heparin (porcine) 25,000 units in 0 45% NaCl 250 mL infusion (premix)     heparin (porcine) injection 10,000 Units     heparin (porcine) injection 5,000 Units     insulin glargine (LANTUS) subcutaneous injection 10 Units 0 1 mL     heparin (porcine) subcutaneous injection 5,000 Units [place], DO      This progress note was produced in part using a dictation device which may document imprecise wording from author's original intent

## 2021-04-08 NOTE — ASSESSMENT & PLAN NOTE
· Outpatient follow-up with Cardiology    ECG 12 lead  Order: [id number]  Status:  Final result   Visible to patient:  No (inaccessible in [address])   Next appt:  05/18/2021 at 12:40 PM in Cardiology Jose Luna MD)   Ref Range & Units 4/5/21 1515   Ventricular Rate BPM 75    Atrial Rate BPM 75    VA Interval ms 246    QRSD Interval ms 150    QT Interval ms 408    QTC Interval ms 455    P Axis degrees 60    QRS Axis degrees -80    T Wave Axis degrees 48       Narrative & Impression    Sinus rhythm with 1st degree A-V block  Left axis deviation  Right bundle branch block  Abnormal ECG  When compared with ECG of 08-MAR-2021 15:53,     Confirmed by Mace Dies [phone] on 4/5/2021 3:26:16 PM      Specimen Collected: 04/05/21 15:15   Last Resulted: 04/05/21 15:26

## 2021-04-08 NOTE — ASSESSMENT & PLAN NOTE
Lab Results   Component Value Date    HGBA1C 7 0 (H) 03/09/2021       Recent Labs     04/07/21  1532 04/07/21 2051 04/08/21  0743 04/08/21  1118   POCGLU 207* 213* 155* 225*       Blood Sugar Average: Last 72 hrs:  (P) 190 [id number]   · Continue lantus 15 Units SQ QHS  · Increase to humalog 4 Units SQ TID with meals  · Hypoglycemia protocol  · Insulin sliding scale with blood glucose monitoring ACHS

## 2021-04-08 NOTE — ASSESSMENT & PLAN NOTE
· Acute kidney injury was present on admission and likely secondary to cardiorenal syndrome  · Baseline serum creatinine of 1 2-1 4 mg/dl  · Continue lasix 40 mg IV BID  · Avoid all nephrotoxic agents  · The patient was seen in consultation by Nephrology  · Serial laboratory testing to monitor the patient's renal function and electrolyte levels    Results from last 7 days   Lab Units 04/08/21  0502 04/07/21  0418 04/06/21  0519   SODIUM mmol/L 141 137 142   POTASSIUM mmol/L 4 0 4 2 4 7   CHLORIDE mmol/L 103 101 107   CO2 mmol/L 31 31 28   BUN mg/dL 37* 31* 29*   CREATININE mg/dL 1 81* 1 89* 1 92*   CALCIUM mg/dL 8 9 9 3 8 9     Results for Luis Méndez (MRN [id number]) as of 4/7/2021 14:19   Ref   Range 4/5/2021 19:24   EXT Creatinine Urine Latest Units: mg/dL 104 0   Protein Urine Random Latest Units: mg/dL 46   SODIUM URINE Unknown 75   Prot/Creat Ratio, Ur Latest Ref Range: 0 00 - 0 10  0 44 (H)

## 2021-04-08 NOTE — UTILIZATION REVIEW
Notification of Inpatient Admission/Inpatient Authorization Request   This is a Notification of Inpatient Admission for [facility] [address]  Be advised that this patient was admitted to our facility under Inpatient Status  Contact Tristan Lazcano at [phone] for additional admission information  [facility]  DEDICATED -[phone]  Patient Name:   Raymond Lopez   YOB: 1945       [address]   [address]:   [address]  Tax ID: [id number]  NPI: [id number] Attending Provider/NPI:  Phone:  Address: Natasha Lira [[id number]]  [phone]  Same as STANISLAV/Giorgio Gamboa [address]6 of Service Code: 24 Place of Service Name:  [address] [place] [address]   Start Date: 4/5/21 1809 Discharge Date & Time: No discharge date for patient encounter  Type of Admission: Inpatient Status Discharge Disposition   [facility]if discharged[facility]   Patient Diagnoses: Hypoxemia [R09 02]  Bilateral leg edema [R60 0]  KIRT (acute kidney injury) ([facility] [address] ) [N17 9]  Lower extremity weakness [R29 898]  Lower extremity cellulitis [E57 752]     Orders: Admission Orders (From admission, onward)     Ordered        04/06/21 1559  Inpatient Admission  Once         04/05/21 1810  Place in Observation  Once         04/05/21 1809  Inpatient Admission  Once                    Assigned Utilization Review Contact: Tristan Lazcano  Utilization   Network Utilization Review Department  Phone: [phone]; Fax [phone]  Email: [email or web address]   ATTENTION PAYERS: Please call the assigned Utilization  directly with any questions or concerns ALL voicemails in the department are confidential  Send all requests for admission clinical reviews, approved or denied determinations and any other requests to dedicated fax number belonging to the campus where the patient is receiving treatment

## 2021-04-08 NOTE — PLAN OF CARE
Problem: PHYSICAL THERAPY ADULT  Goal: Performs mobility at highest level of function for planned discharge setting  See evaluation for individualized goals  Description: Treatment/Interventions: Functional transfer training, LE strengthening/ROM, Therapeutic exercise, Endurance training, Bed mobility, Gait training          See flowsheet documentation for full assessment, interventions and recommendations  Outcome: Progressing  Note: Prognosis: Fair  Problem List: Decreased strength, Decreased endurance, Impaired balance, Decreased mobility, Impaired judgement, Decreased safety awareness, Decreased skin integrity, Pain  Assessment: Pt  seen for PT treatment session this date with interventions consisting of  therapeutic exercises,  transfers and gait training w/ emphasis on improving pt's ability to ambulate  Pt  Currently performing  tx and ambulation at ( min) x 1 level of function  The patient's AM-PAC Basic Mobility Inpatient Short Form Raw Score is 13, Standardized Score is 33 99  A standardized score greater than and less than 42 9 suggests the patient may benefit from discharge to post-acute rehabilitation services  Please also refer to physical therapy recommendation for safe DC planning  In comparison to previous session, Pt  With no change in activity tolerance  Ambulation limited by pain and weakness  Pt is in need of continued activity in PT to improve strength balance endurance mobility transfers and ambulation with return to maximize LOF  From PT/mobility standpoint, recommendation at time of d/c would be post acute rehab  in order to promote return to PLOF and independence  PT Discharge Recommendation: Post-Acute Rehabilitation Services          See flowsheet documentation for full assessment

## 2021-04-09 LAB
ALBUMIN SERPL BCP-MCNC: 2.9 G/DL (ref 3.5–5)
ALP SERPL-CCNC: 145 U/L (ref 46–116)
ALT SERPL W P-5'-P-CCNC: 17 U/L (ref 12–78)
ANION GAP SERPL CALCULATED.3IONS-SCNC: 9 MMOL/L (ref 4–13)
AST SERPL W P-5'-P-CCNC: 13 U/L (ref 5–45)
BASOPHILS # BLD AUTO: 0.04 THOUSANDS/ΜL (ref 0–0.1)
BASOPHILS NFR BLD AUTO: 1 % (ref 0–1)
BILIRUB SERPL-MCNC: 0.54 MG/DL (ref 0.2–1)
BUN SERPL-MCNC: 48 MG/DL (ref 5–25)
CALCIUM ALBUM COR SERPL-MCNC: 10 MG/DL (ref 8.3–10.1)
CALCIUM SERPL-MCNC: 9.1 MG/DL (ref 8.3–10.1)
CHLORIDE SERPL-SCNC: 103 MMOL/L (ref 100–108)
CO2 SERPL-SCNC: 27 MMOL/L (ref 21–32)
CREAT SERPL-MCNC: 1.98 MG/DL (ref 0.6–1.3)
EOSINOPHIL # BLD AUTO: 0.38 THOUSAND/ΜL (ref 0–0.61)
EOSINOPHIL NFR BLD AUTO: 5 % (ref 0–6)
ERYTHROCYTE [DISTWIDTH] IN BLOOD BY AUTOMATED COUNT: 15 % (ref 11.6–15.1)
GFR SERPL CREATININE-BSD FRML MDRD: 31 ML/MIN/1.73SQ M
GLUCOSE SERPL-MCNC: 161 MG/DL (ref 65–140)
GLUCOSE SERPL-MCNC: 164 MG/DL (ref 65–140)
GLUCOSE SERPL-MCNC: 173 MG/DL (ref 65–140)
GLUCOSE SERPL-MCNC: 189 MG/DL (ref 65–140)
GLUCOSE SERPL-MCNC: 192 MG/DL (ref 65–140)
HCT VFR BLD AUTO: 35.7 % (ref 36.5–49.3)
HGB BLD-MCNC: 11.3 G/DL (ref 12–17)
IMM GRANULOCYTES # BLD AUTO: 0.02 THOUSAND/UL (ref 0–0.2)
IMM GRANULOCYTES NFR BLD AUTO: 0 % (ref 0–2)
LYMPHOCYTES # BLD AUTO: 1.67 THOUSANDS/ΜL (ref 0.6–4.47)
LYMPHOCYTES NFR BLD AUTO: 21 % (ref 14–44)
MAGNESIUM SERPL-MCNC: 2.2 MG/DL (ref 1.6–2.6)
MCH RBC QN AUTO: 27.4 PG (ref 26.8–34.3)
MCHC RBC AUTO-ENTMCNC: 31.7 G/DL (ref 31.4–37.4)
MCV RBC AUTO: 87 FL (ref 82–98)
MONOCYTES # BLD AUTO: 0.72 THOUSAND/ΜL (ref 0.17–1.22)
MONOCYTES NFR BLD AUTO: 9 % (ref 4–12)
NEUTROPHILS # BLD AUTO: 5.32 THOUSANDS/ΜL (ref 1.85–7.62)
NEUTS SEG NFR BLD AUTO: 64 % (ref 43–75)
NRBC BLD AUTO-RTO: 0 /100 WBCS
PHOSPHATE SERPL-MCNC: 3.7 MG/DL (ref 2.3–4.1)
PLATELET # BLD AUTO: 253 THOUSANDS/UL (ref 149–390)
PMV BLD AUTO: 10 FL (ref 8.9–12.7)
POTASSIUM SERPL-SCNC: 4 MMOL/L (ref 3.5–5.3)
PROT SERPL-MCNC: 7.5 G/DL (ref 6.4–8.2)
RBC # BLD AUTO: 4.12 MILLION/UL (ref 3.88–5.62)
SODIUM SERPL-SCNC: 139 MMOL/L (ref 136–145)
WBC # BLD AUTO: 8.15 THOUSAND/UL (ref 4.31–10.16)

## 2021-04-09 PROCEDURE — 97116 GAIT TRAINING THERAPY: CPT

## 2021-04-09 PROCEDURE — 82948 REAGENT STRIP/BLOOD GLUCOSE: CPT

## 2021-04-09 PROCEDURE — 84100 ASSAY OF PHOSPHORUS: CPT | Performed by: INTERNAL MEDICINE

## 2021-04-09 PROCEDURE — 83735 ASSAY OF MAGNESIUM: CPT | Performed by: INTERNAL MEDICINE

## 2021-04-09 PROCEDURE — 80053 COMPREHEN METABOLIC PANEL: CPT | Performed by: INTERNAL MEDICINE

## 2021-04-09 PROCEDURE — 99232 SBSQ HOSP IP/OBS MODERATE 35: CPT | Performed by: NURSE PRACTITIONER

## 2021-04-09 PROCEDURE — 99232 SBSQ HOSP IP/OBS MODERATE 35: CPT | Performed by: INTERNAL MEDICINE

## 2021-04-09 PROCEDURE — 97110 THERAPEUTIC EXERCISES: CPT

## 2021-04-09 PROCEDURE — 94761 N-INVAS EAR/PLS OXIMETRY MLT: CPT

## 2021-04-09 PROCEDURE — 85025 COMPLETE CBC W/AUTO DIFF WBC: CPT | Performed by: INTERNAL MEDICINE

## 2021-04-09 PROCEDURE — 94760 N-INVAS EAR/PLS OXIMETRY 1: CPT

## 2021-04-09 RX ORDER — INSULIN GLARGINE 100 [IU]/ML
20 INJECTION, SOLUTION SUBCUTANEOUS
Status: DISCONTINUED | OUTPATIENT
Start: 2021-04-09 | End: 2021-04-10

## 2021-04-09 RX ADMIN — TAMSULOSIN HYDROCHLORIDE 0.4 MG: 0.4 CAPSULE ORAL at 17:04

## 2021-04-09 RX ADMIN — Medication 2000 UNITS: at 08:05

## 2021-04-09 RX ADMIN — OXYCODONE HYDROCHLORIDE 10 MG: 10 TABLET ORAL at 10:54

## 2021-04-09 RX ADMIN — CARVEDILOL 12.5 MG: 12.5 TABLET, FILM COATED ORAL at 08:05

## 2021-04-09 RX ADMIN — NYSTATIN: 100000 POWDER TOPICAL at 17:04

## 2021-04-09 RX ADMIN — AMLODIPINE BESYLATE 10 MG: 10 TABLET ORAL at 08:05

## 2021-04-09 RX ADMIN — INSULIN LISPRO 1 UNITS: 100 INJECTION, SOLUTION INTRAVENOUS; SUBCUTANEOUS at 11:46

## 2021-04-09 RX ADMIN — INSULIN LISPRO 2 UNITS: 100 INJECTION, SOLUTION INTRAVENOUS; SUBCUTANEOUS at 21:58

## 2021-04-09 RX ADMIN — INSULIN GLARGINE 20 UNITS: 100 INJECTION, SOLUTION SUBCUTANEOUS at 21:57

## 2021-04-09 RX ADMIN — HEPARIN SODIUM 7500 UNITS: 5000 INJECTION INTRAVENOUS; SUBCUTANEOUS at 13:34

## 2021-04-09 RX ADMIN — HEPARIN SODIUM 7500 UNITS: 5000 INJECTION INTRAVENOUS; SUBCUTANEOUS at 05:01

## 2021-04-09 RX ADMIN — ATORVASTATIN CALCIUM 40 MG: 40 TABLET, FILM COATED ORAL at 17:04

## 2021-04-09 RX ADMIN — NYSTATIN: 100000 POWDER TOPICAL at 08:06

## 2021-04-09 RX ADMIN — OXYCODONE HYDROCHLORIDE 10 MG: 10 TABLET ORAL at 04:58

## 2021-04-09 RX ADMIN — CARVEDILOL 12.5 MG: 12.5 TABLET, FILM COATED ORAL at 17:04

## 2021-04-09 RX ADMIN — OXYCODONE HYDROCHLORIDE 10 MG: 10 TABLET ORAL at 17:08

## 2021-04-09 RX ADMIN — INSULIN LISPRO 1 UNITS: 100 INJECTION, SOLUTION INTRAVENOUS; SUBCUTANEOUS at 17:05

## 2021-04-09 RX ADMIN — ASPIRIN 81 MG: 81 TABLET, COATED ORAL at 08:05

## 2021-04-09 RX ADMIN — HEPARIN SODIUM 7500 UNITS: 5000 INJECTION INTRAVENOUS; SUBCUTANEOUS at 21:58

## 2021-04-09 RX ADMIN — INSULIN LISPRO 1 UNITS: 100 INJECTION, SOLUTION INTRAVENOUS; SUBCUTANEOUS at 08:06

## 2021-04-09 NOTE — ASSESSMENT & PLAN NOTE
Wt Readings from Last 3 Encounters:   04/09/21 134 kg (296 lb 1 2 oz)   03/29/21 (!) 144 kg (317 lb 6 4 oz)   03/08/21 (!) 136 kg (300 lb 0 7 oz)     · Initially treated with lasix 40 mg IV BID  · Hold IV lasix on 04/09/2021 with worsening renal function on 04/09/2021  · Continue PO coreg  · Daily weights  · Strict intake/output measurements

## 2021-04-09 NOTE — ASSESSMENT & PLAN NOTE
· Acute kidney injury was present on admission and likely secondary to cardiorenal syndrome and a possible component of acute tubular necrosis  · Baseline serum creatinine of 1 2-1 4 mg/dl  · Initially treated with lasix 40 mg IV BID  · Hold IV lasix on 04/09/2021 with worsening renal function on 04/09/2021  · Avoid all nephrotoxic agents  · The patient was seen in consultation by Nephrology  · Serial laboratory testing to monitor the patient's renal function and electrolyte levels    Results from last 7 days   Lab Units 04/09/21  0508 04/08/21  0502 04/07/21  0418   SODIUM mmol/L 139 141 137   POTASSIUM mmol/L 4 0 4 0 4 2   CHLORIDE mmol/L 103 103 101   CO2 mmol/L 27 31 31   BUN mg/dL 48* 37* 31*   CREATININE mg/dL 1 98* 1 81* 1 89*   CALCIUM mg/dL 9 1 8 9 9 3     Results for Birt Najjar (MRN [id number]) as of 4/7/2021 14:19   Ref   Range 4/5/2021 19:24   EXT Creatinine Urine Latest Units: mg/dL 104 0   Protein Urine Random Latest Units: mg/dL 46   SODIUM URINE Unknown 75   Prot/Creat Ratio, Ur Latest Ref Range: 0 00 - 0 10  0 44 (H)

## 2021-04-09 NOTE — ASSESSMENT & PLAN NOTE
· Outpatient follow-up with Cardiology    ECG 12 lead  Order: [id number]  Status:  Final result   Visible to patient:  No (inaccessible in [address])   Next appt:  05/18/2021 at 12:40 PM in Cardiology Virginie Paul MD)   Ref Range & Units 4/5/21 1515   Ventricular Rate BPM 75    Atrial Rate BPM 75    KY Interval ms 246    QRSD Interval ms 150    QT Interval ms 408    QTC Interval ms 455    P Axis degrees 60    QRS Axis degrees -80    T Wave Axis degrees 48       Narrative & Impression    Sinus rhythm with 1st degree A-V block  Left axis deviation  Right bundle branch block  Abnormal ECG  When compared with ECG of 08-MAR-2021 15:53,     Confirmed by Shelby Enriquez [phone] on 4/5/2021 3:26:16 PM      Specimen Collected: 04/05/21 15:15   Last Resulted: 04/05/21 15:26

## 2021-04-09 NOTE — CASE MANAGEMENT
CM cancelled pt's transport that was arranged for pt to go to [facility] , [address] ambulance was to pick pt up at 2pm  Tentatively set up for tomorrow with [address] ambulance for a noon  to [facility]

## 2021-04-09 NOTE — PROGRESS NOTES
NEPHROLOGY PROGRESS NOTE   Gali Cleary 64 y o  male MRN: [id number]  Unit/Bed#: 424-01 Encounter: [id number]    Assessment/Plan:    63 yo man admitted 4/5 for bilateral lower extremity weakness and fall being treated for multiple problems including acute kidney injury, acute on chronic diastolic congestive heart failure, bilateral pleural effusion, acute hypoxemia (resolved)  Renal following for acute kidney injury, chronic kidney disease, hypertension, proteinuria     1  Acute kidney injury (POA) atop chronic kidney disease  · Presented with a creatinine of 2 16 mg/dL -> 1 98 mg/dL today  Etiology likely ATN in the setting of renal vascular congestion/decongestion, +/- failure to autoregulate due to Ace inhibition, hypotension noted on 04/07 and 4/8  Renal function slightly worse today and diuretics were held and blood pressure is now appropriate  Agree with holding diuretics, continue to hold lisinopril, maximize hemodynamics and provide supportive care  2  Stage 3 chronic kidney disease with baseline creatinine around 1 3-1 4 mg/dL  3  Acute on chronic diastolic congestive heart failure  · If weight is accurate, weight loss of 10 kg since admission however scales are varying  He was receiving IV Lasix 40 mg b i d  Up until yesterday evening his as renal function noted to be worse today  Agree with holding diuretic and checking BMP tomorrow  Will gently fluid restrict in the interim  Consider starting oral diuretic if renal function stable  4  Hypertensive nephrosclerosis, likely  · Few episodes of hypotension noted  ACE-inhibitor appropriately on hold  MAP goal > 65 mmHg  5  Proteinuria  · Continue to hold lisinopril as above  6  Mild cardiorenal syndrome  · Diuretics on hold  See # 3  7  Nephrolithiasis  · Recommend low-sodium diet      ROS  No physical complaints on exam  A complete 10 point review of systems have been performed and are otherwise negative         Historical Information   Past Medical History:   Diagnosis Date    Cataract     CHF (congestive heart failure) (HCC)     chronic diastolic CHF    Coronary artery disease     Diabetes mellitus ([facility] )     Glaucoma     Hyperlipidemia     Hypertension     Neuropathy     Obesity     Renal disorder     chronic kidney disease stage 3     Sleep apnea     Stroke ([facility] )     TIA (transient ischemic attack)     Uncontrolled type 2 diabetes mellitus with hyperglycemia, with long-term current use of insulin ([facility] ) 10/4/2018     Past Surgical History:   Procedure Laterality Date    APPENDECTOMY      CARPAL TUNNEL RELEASE      EYE SURGERY      cataracts     Social History   Social History     Substance and Sexual Activity   Alcohol Use Never    Frequency: Never    Drinks per session: Patient refused    Binge frequency: Never     Social History     Substance and Sexual Activity   Drug Use No     Social History     Tobacco Use   Smoking Status Former Smoker    Types: Cigars    Quit date:     Years since quittin 2   Smokeless Tobacco Former User       Family History:   Family History   Problem Relation Age of Onset    No Known Problems Mother     No Known Problems Father        Medications:  Pertinent medications were reviewed  Current Facility-Administered Medications   Medication Dose Route Frequency Provider Last Rate    acetaminophen  650 mg Oral Q6H PRN [employer], DO      amLODIPine  10 mg Oral Daily [employer], DO      aspirin  81 mg Oral Daily [employer], DO      atorvastatin  40 mg Oral After [employer] Kathia, DO      carvedilol  12 5 mg Oral BID [employer], DO      cholecalciferol  2,000 Units Oral Daily [employer], DO      heparin (porcine)  7,500 Units Subcutaneous Q8H [facility] [employer], DO      HYDROmorphone  0 5 mg Intravenous Q4H PRN [employer], DO      insulin glargine  15 Units Subcutaneous HS [employer], DO      insulin lispro  1-6 Units Subcutaneous TID [facility], DO      insulin lispro  1-6 Units Subcutaneous HS Lorмаринаe Rehman, DO      insulin lispro  4 Units Subcutaneous TID With Meals Lorwesly Rehman, DO      nystatin   Topical BID Lorмаринаe Ori, DO      oxyCODONE  5 mg Oral Q4H PRN Lorelie Rehman, DO      Or    oxyCODONE  10 mg Oral Q4H PRN Lorelie Rehman, DO      tamsulosin  0 4 mg Oral QPM Derek Montesinos,            No Known Allergies      Vitals:   /65   Pulse 66   Temp 98 3 °F (36 8 °C)   Resp 18   Ht 5' 6" (1 676 m)   Wt 134 kg (296 lb 1 2 oz)   SpO2 92%   BMI 47 79 kg/m²   Body mass index is 47 79 kg/m²  SpO2: 92 %,   SpO2 Activity: During Exercise,   O2 Device: None (Room air)      Intake/Output Summary (Last 24 hours) at 4/9/2021 1436  Last data filed at 4/9/2021 1300  Gross per 24 hour   Intake 900 ml   Output 1351 ml   Net -451 ml     Invasive Devices     Peripheral Intravenous Line            Peripheral IV 04/05/21 Left Antecubital 3 days    Peripheral IV 04/05/21 Right Antecubital 3 days                Physical Exam  General: conscious, cooperative, in no acute distress, chronically ill appearing   Eyes: conjunctivae pink, anicteric sclerae  ENT: lips and mucous membranes moist  Neck: supple, no JVD, no masses  Chest: diminished breath sounds bilateral, no crackles, ronchus or wheezings  CVS: S1 & S2, normal rate, regular rhythm  Abdomen: soft, non-tender, non-distended, normoactive bowel sounds  Extremities: moderate edema of both legs  Skin: no rash  Neuro: awake, alert, oriented      Diagnostic Data:  Lab: I have personally reviewed pertinent lab results  ,   CBC:  Results from last 7 days   Lab Units 04/09/21  0508   WBC Thousand/uL 8 15   HEMOGLOBIN g/dL 11 3*   HEMATOCRIT % 35 7*   PLATELETS Thousands/uL 253      CMP:   Lab Results   Component Value Date    SODIUM 139 04/09/2021    K 4 0 04/09/2021     04/09/2021    CO2 27 04/09/2021    BUN 48 (H) 04/09/2021    CREATININE 1 98 (H) 04/09/2021    CALCIUM 9 1 04/09/2021    AST 13 04/09/2021    ALT 17 04/09/2021    ALKPHOS 145 (H) 04/09/2021    EGFR 31 04/09/2021   ,   PT/INR: No results found for: PT, INR,   Magnesium: No components found for: MAG,  Phosphorous:   Lab Results   Component Value Date    PHOS 3 7 04/09/2021       Microbiology:  @LAB[facility],(urinecx:7)@        HEATH Loera    Portions of the record may have been created with voice recognition software  Occasional wrong word or "sound a like" substitutions may have occurred due to the inherent limitations of voice recognition software  Read the chart carefully and recognize, using context, where substitutions have occurred

## 2021-04-09 NOTE — PROGRESS NOTES
[address] [address]  Progress Note Edwar Sosa 1945, 64 y o  male MRN: [id number]  Unit/Bed#: 424-01 Encounter: [id number]  Primary Care Provider: Jeremy العلي MD   Date and time admitted to hospital: 4/5/2021  2:40 PM    * Acute kidney injury [facility])  Assessment & Plan  · Acute kidney injury was present on admission and likely secondary to cardiorenal syndrome and a possible component of acute tubular necrosis  · Baseline serum creatinine of 1 2-1 4 mg/dl  · Initially treated with lasix 40 mg IV BID  · Hold IV lasix on 04/09/2021 with worsening renal function on 04/09/2021  · Avoid all nephrotoxic agents  · The patient was seen in consultation by Nephrology  · Serial laboratory testing to monitor the patient's renal function and electrolyte levels    Results from last 7 days   Lab Units 04/09/21  0508 04/08/21  0502 04/07/21  0418   SODIUM mmol/L 139 141 137   POTASSIUM mmol/L 4 0 4 0 4 2   CHLORIDE mmol/L 103 103 101   CO2 mmol/L 27 31 31   BUN mg/dL 48* 37* 31*   CREATININE mg/dL 1 98* 1 81* 1 89*   CALCIUM mg/dL 9 1 8 9 9 3     Results for Imtiaz Sahu (MRN [id number]) as of 4/7/2021 14:19   Ref   Range 4/5/2021 19:24   EXT Creatinine Urine Latest Units: mg/dL 104 0   Protein Urine Random Latest Units: mg/dL 46   SODIUM URINE Unknown 75   Prot/Creat Ratio, Ur Latest Ref Range: 0 00 - 0 10  0 44 (H)       Lower extremity weakness  Assessment & Plan  · May require additional neurologic imaging to rule-out a CVA/stroke  · PT/OT      Acute on chronic diastolic CHF (congestive heart failure) (HCC)  Assessment & Plan  Wt Readings from Last 3 Encounters:   04/09/21 134 kg (296 lb 1 2 oz)   03/29/21 (!) 144 kg (317 lb 6 4 oz)   03/08/21 (!) 136 kg (300 lb 0 7 oz)     · Initially treated with lasix 40 mg IV BID  · Hold IV lasix on 04/09/2021 with worsening renal function on 04/09/2021  · Continue PO coreg  · Daily weights  · Strict intake/output measurements        Bilateral pleural effusion  Assessment & Plan  · Likely secondary to acute on chronic diastolic CHF (congestive heart failure)  · Initially with a new supplemental oxygen requirement of 3 lpm via the nasal cannula to maintain oxygen saturation levels at 90% and above  · Hold IV lasix at this time with worsening renal function on 04/09/2021    Venous stasis dermatitis of both lower extremities  Assessment & Plan  · Does not appear to be any active infection at this time    Renal calculus  Assessment & Plan  · Outpatient Urology evaluation    Multiple renal cysts  Assessment & Plan  · Outpatient surveillance imaging with PCP    Atelectasis  Assessment & Plan  · Incentive spirometry 10 times per hour while awake    Inguinal lymphadenopathy  Assessment & Plan  · Possibly reactive in nature  · Will need outpatient surveillance imaging to ensure resolution of the inguinal lymphadenopathy    Abnormal EKG  Assessment & Plan  · Outpatient follow-up with Cardiology    ECG 12 lead  Order: [id number]  Status:  Final result   Visible to patient:  No (inaccessible in [facility]'s MyChart)   Next appt:  05/18/2021 at 12:40 PM in Cardiology Rishabh Mirza MD)   Ref Range & Units 4/5/21 1515   Ventricular Rate BPM 75    Atrial Rate BPM 75    [place] Interval ms 246    QRSD Interval ms 150    QT Interval ms 408    QTC Interval ms 455    P Axis degrees 60    QRS Axis degrees -80    T Wave Axis degrees 48       Narrative & Impression    Sinus rhythm with 1st degree A-V block  Left axis deviation  Right bundle branch block  Abnormal ECG  When compared with ECG of 08-MAR-2021 15:53,     Confirmed by Jeancarlos Curry ([id number]) on 4/5/2021 3:26:16 PM      Specimen Collected: 04/05/21 15:15   Last Resulted: 04/05/21 15:26               Gait instability  Assessment & Plan  · May require additional neurologic imaging to rule-out a CVA/stroke  · PT/OT    Multiple pulmonary nodules  Assessment & Plan  · Outpatient surveillance imaging with Pulmonology    Elevated d-dimer  Assessment & Plan  · Cannot have a CTA of the chest/PE protocol with acute kidney injury  · Normal V/Q lung scan on 04/06/2021  · The patient was initially treated with IV heparin drip/infusion per the VTE/PE (Raschke) protocol, which was discontinued on 04/06/2021 with a normal V/Q lung scan  · Utilize heparin 7500 Units SQ every 8 hours (increased dose based on his BMI) for DVT prophylaxis    VAS lower limb venous duplex study, complete bilateral  Status: Final result   PACS Images     Show images for VAS lower limb venous duplex study, complete bilateral   Study Result       [facility] REPORT  CLINICAL:  Indications:  Patient presents with bilateral lower extremity pain and swelling  Risk Factors  The patient has history of Obesity, HTN, Diabetes (Yes), CAD and previous  smoking (quit 1-5yrs ago)  He has no history of DVT  CONCLUSION:     Impression:  RIGHT LOWER LIMB:  No evidence of acute or chronic deep vein thrombosis  No evidence of superficial thrombophlebitis noted  Doppler evaluation shows a normal response to augmentation maneuvers  Popliteal, posterior tibial and anterior tibial arterial Doppler waveforms are  biphasic/monophasic  LEFT LOWER LIMB:  No evidence of acute or chronic deep vein thrombosis  No evidence of superficial thrombophlebitis noted  Doppler evaluation shows a normal response to augmentation maneuvers  Popliteal, posterior tibial and anterior tibial arterial Doppler waveforms are  biphasic/monophasic  Technical findings were tiger texted to Dr Marvene Opitz       SIGNATURE:  Electronically Signed by: Rachel Lopez on 2021-04-05 04:25:35 PM         Type 2 diabetes mellitus with hyperglycemia, with long-term current use of insulin [facility])  Assessment & Plan  Lab Results   Component Value Date    HGBA1C 7 0 (H) 03/09/2021       Recent Labs     04/08/21  1539 04/08/21  2036 04/09/21  0734 04/09/21  1113   POCGLU 185* 201* 161* 189*       Blood Sugar Average: Last 72 hrs:  (P) 196 5     · Increase lantus to 20 Units SQ QHS  · Increase to humalog 5 Units SQ TID with meals  · Hypoglycemia protocol  · Insulin sliding scale with blood glucose monitoring ACHS    BHARGAVI (obstructive sleep apnea)  Assessment & Plan  · Refusing CPAP at this time  · Experiencing nocturnal hypoxia requiring supplemental oxygen        VTE Pharmacologic Prophylaxis:   Pharmacologic: Heparin 7500 SQ every 8 hours (increased dose based on his BMI)  Mechanical VTE Prophylaxis in Place: Yes    Patient Centered Rounds: I have performed bedside rounds with nursing staff today  Time Spent for Care: 30 minutes  More than 50% of total time spent on counseling and coordination of care as described above  Current Length of Stay: 4 day(s)    Current Patient Status: Inpatient   Certification Statement: The patient will continue to require additional inpatient hospital stay due to the need for serial laboratory testing to monitor the patient's renal function in the setting of worsening acute kidney injury on 2021  Code Status: Level 3 - DNAR and DNI      Subjective: The patient was seen and examined  The patient is doing better  No chest pain  No shortness of breath  No abdominal pain  No nausea or vomiting  Objective:     Vitals:   Temp (24hrs), Av °F (36 7 °C), Min:97 6 °F (36 4 °C), Max:98 3 °F (36 8 °C)    Temp:  [97 6 °F (36 4 °C)-98 3 °F (36 8 °C)] 98 3 °F (36 8 °C)  HR:  [66-67] 66  Resp:  [18-20] 18  BP: (131-134)/(64-65) 134/65  SpO2:  [90 %-96 %] 92 %  Body mass index is 47 79 kg/m²  Input and Output Summary (last 24 hours):        Intake/Output Summary (Last 24 hours) at 2021 1544  Last data filed at 2021 1300  Gross per 24 hour   Intake 900 ml   Output 1350 ml   Net -450 ml       Physical Exam:     Physical Exam  General:  NAD, follows commands  HEENT:  NC/AT, mucous membranes moist  Neck:  Supple, No JVP elevation  CV:  + S1, + S2, RRR  Pulm:  Lung fields are CTA bilaterally  Abd:  Soft, Non-tender, Non-distended  Ext:  No clubbing/cyanosis, Improving edema of the bilateral lower extremities  Skin:  No rashes  Neuro:  Awake, alert, oriented  Psych:  Normal mood and affect      Additional Data:    Labs:    Results from last 7 days   Lab Units 04/09/21  0508   WBC Thousand/uL 8 15   HEMOGLOBIN g/dL 11 3*   HEMATOCRIT % 35 7*   PLATELETS Thousands/uL 253   NEUTROS PCT % 64   LYMPHS PCT % 21   MONOS PCT % 9   EOS PCT % 5     Results from last 7 days   Lab Units 04/09/21  0508   SODIUM mmol/L 139   POTASSIUM mmol/L 4 0   CHLORIDE mmol/L 103   CO2 mmol/L 27   BUN mg/dL 48*   CREATININE mg/dL 1 98*   ANION GAP mmol/L 9   CALCIUM mg/dL 9 1   ALBUMIN g/dL 2 9*   TOTAL BILIRUBIN mg/dL 0 54   ALK PHOS U/L 145*   ALT U/L 17   AST U/L 13   GLUCOSE RANDOM mg/dL 173*     Results from last 7 days   Lab Units 04/05/21  1456   INR  1 13     Results from last 7 days   Lab Units 04/09/21  1113 04/09/21  0734 04/08/21  2036 04/08/21  1539 04/08/21  1118 04/08/21  0743 04/07/21  2051 04/07/21  1532 04/07/21  1118 04/07/21  0723 04/06/21  2112 04/06/21  1523   POC GLUCOSE mg/dl 189* 161* 201* 185* 225* 155* 213* 207* 239* 185* 201* 243*         Results from last 7 days   Lab Units 04/08/21  0502 04/07/21  0418 04/06/21  0519 04/05/21  1456   LACTIC ACID mmol/L  --   --  0 7 1 4   PROCALCITONIN ng/ml 0 10 0 09 0 06  --            * I Have Reviewed All Lab Data Listed Above  * Additional Pertinent Lab Tests Reviewed: Amanda 64 Admission Reviewed      Recent Cultures (last 7 days):     Results from last 7 days   Lab Units 04/05/21  1926 04/05/21  1517 04/05/21  1516   BLOOD CULTURE   --  No Growth at 72 hrs  No Growth at 72 hrs     URINE CULTURE  No Growth <1000 cfu/mL  --   --        Last 24 Hours Medication List:   Current Facility-Administered Medications   Medication Dose Route Frequency Provider Last Rate    acetaminophen  650 mg Oral Q6H PRN Tara Faye Yamel, DO      amLODIPine  10 mg Oral Daily [employer], DO      aspirin  81 mg Oral Daily [employer], DO      atorvastatin  40 mg Oral After [employer] Kathia, DO      carvedilol  12 5 mg Oral BID [employer], DO      cholecalciferol  2,000 Units Oral Daily [employer], DO      heparin (porcine)  7,500 Units Subcutaneous [facility] [employer], [place]      HYDROmorphone  0 5 mg Intravenous Q4H PRN [employer], DO      insulin glargine  20 Units Subcutaneous HS [employer], DO      insulin lispro  1-6 Units Subcutaneous TID [facility], DO      insulin lispro  1-6 Units Subcutaneous HS [employer], DO      insulin lispro  5 Units Subcutaneous TID With Meals [employer], DO      nystatin   Topical BID [employer], DO      oxyCODONE  5 mg Oral Q4H PRN [employer], DO      Or    oxyCODONE  10 mg Oral Q4H PRN [employer], DO      tamsulosin  0 4 mg Oral QPM [employer], DO          Today, Patient Was Seen By: Erick Loza DO    ** Please Note: Dictation voice to text software may have been used in the creation of this document   **

## 2021-04-09 NOTE — RESPIRATORY THERAPY NOTE
Home Oxygen Qualifying Test       Patient name: Raymond Lopez        : 1945   Date of Test:  2021  Diagnosis: Acute kidney injury     Home Oxygen Test:    **Medicare Guidelines require item(s) 1-5 on all ambulatory patients or 1 and 2 on non-ambulatory patients  1   Baseline SPO2 on Room Air at rest 92%  2   SPO2 during exercise on Room Air 91 %  During exercise monitor SpO2  If SPO2 increases >=89% with ambulation do not add supplemental             oxygen  If <= 88% on room air add O2 via NC and titrate patient  Patient must be ambulated with O2 and titrated to > 88% with exertion  3   SPO2 on Oxygen at rest na % na lpm     4   SPO2 during exercise on Oxygen  na% a liter flow of na lpm     5   Exercise performed:          walking, duration 10 (min), distance 15 (feet)          []  Supplemental Home Oxygen is indicated  [x]  Client does not qualify for home oxygen  Respiratory Additional Notes- pt ambulated with Respiratory therapist and PT Lawrence Fischer on room air with walker  Pt ambulated very slow approx 15 feet  Placed pt on chair as need to sit      Maximo Amezquita, RT

## 2021-04-09 NOTE — ASSESSMENT & PLAN NOTE
· Likely secondary to acute on chronic diastolic CHF (congestive heart failure)  · Initially with a new supplemental oxygen requirement of 3 lpm via the nasal cannula to maintain oxygen saturation levels at 90% and above  · Hold IV lasix at this time with worsening renal function on 04/09/2021

## 2021-04-09 NOTE — PLAN OF CARE
Problem: PHYSICAL THERAPY ADULT  Goal: Performs mobility at highest level of function for planned discharge setting  See evaluation for individualized goals  Description: Treatment/Interventions: Functional transfer training, LE strengthening/ROM, Therapeutic exercise, Endurance training, Bed mobility, Gait training          See flowsheet documentation for full assessment, interventions and recommendations  Outcome: Progressing  Note: Prognosis: Good  Problem List: Decreased strength, Decreased endurance, Impaired balance, Decreased mobility, Decreased safety awareness  Assessment: Pt  seen for PT treatment session this date with interventions consisting of  therapeutic exercises,  transfers and  gait training w/ emphasis on improving pt's ability to ambulate  Pt  Currently performing  tx and ambulation at (SUP-min ) x 1 level of function  The patient's AM-PAC Basic Mobility Inpatient Short Form Raw Score is 16, Standardized Score is 38 32  A standardized score greater than 42 9 suggests the patient may benefit from discharge to post-acute rehabilitation services  Please also refer to physical therapy recommendation for safe DC planning  In comparison to previous session, Pt  With improvements in activity tolerance  Able to ambulate 13' with chair follow compared to previous 4-5' with RW  Pt is in need of continued activity in PT to improve strength balance endurance mobility transfers and ambulation with return to maximize LOF  From PT/mobility standpoint, recommendation at time of d/c would be post acute rehab  in order to promote return to PLOF and independence  PT Discharge Recommendation: Post-Acute Rehabilitation Services          See flowsheet documentation for full assessment

## 2021-04-09 NOTE — PHYSICAL THERAPY NOTE
PHYSICAL THERAPY NOTE          Patient Name: Dk Melgoza  AOTUF'K Date: 4/9/2021 04/09/21 1148   Note Type   Note Type Treatment   Restrictions/Precautions   Weight Bearing Precautions Per Order No   Other Precautions Fall Risk;Pain; Chair Alarm; Bed Alarm   Cognition   Overall Cognitive Status WFL   Following Commands Follows one step commands with increased time or repetition   Subjective   Subjective Agreeable to therapy  Pt  would like to ambulate more often  Transfers   Sit to Stand 5  Supervision   Additional items Assist x 1; Armrests; Increased time required;Verbal cues   Stand to Sit 5  Supervision   Additional items Assist x 1; Armrests; Verbal cues   Ambulation/Elevation   Gait pattern Decreased foot clearance; Excessively slow; Foward flexed; Wide CAMPOS; Improper Weight shift   Gait Assistance 4  Minimal assist   Additional items Assist x 1;Verbal cues   Assistive Device Bariatric Rolling walker   Distance 15' with chair follow   Balance   Static Sitting Fair +   Dynamic Sitting Fair +   Static Standing Fair -   Dynamic Standing Fair -   Ambulatory Fair -  (RW)   Endurance Deficit   Endurance Deficit Yes   Activity Tolerance   Activity Tolerance Patient limited by fatigue   Exercises   Hip Flexion Sitting;15 reps   Hip Abduction Sitting;15 reps   Hip Adduction Sitting;15 reps   Knee AROM Long Arc Quad Sitting;15 reps   Ankle Pumps Sitting;15 reps   Assessment   Prognosis Good   Problem List Decreased strength;Decreased endurance; Impaired balance;Decreased mobility; Decreased safety awareness   Assessment Pt  seen for PT treatment session this date with interventions consisting of  therapeutic exercises,  transfers and  gait training w/ emphasis on improving pt's ability to ambulate  Pt  Currently performing  tx and ambulation at (SUP-min ) x 1 level of function   The patient's AM-PAC Basic Mobility Inpatient Short Form Raw Score is 16, Standardized Score is 38 32  A standardized score greater than 42 9 suggests the patient may benefit from discharge to post-acute rehabilitation services  Please also refer to physical therapy recommendation for safe DC planning  In comparison to previous session, Pt  With improvements in activity tolerance  Able to ambulate 13' with chair follow compared to previous 4-5' with RW  SpO2 91% with exertion, monitored by RT  Pt is in need of continued activity in PT to improve strength balance endurance mobility transfers and ambulation with return to maximize LOF  From PT/mobility standpoint, recommendation at time of d/c would be post acute rehab  in order to promote return to PLOF and independence  Goals   LTG Expiration Date 04/20/21   Plan   Treatment/Interventions Functional transfer training;LE strengthening/ROM; Therapeutic exercise; Endurance training;Bed mobility;Gait training   Progress Slow progress, decreased activity tolerance   Recommendation   PT Discharge Recommendation Post-Acute Rehabilitation Services   AM-PAC Basic Mobility Inpatient   Turning in Bed Without Bedrails 3   Lying on Back to Sitting on Edge of Flat Bed 2   Moving Bed to Chair 3   Standing Up From Chair 4   Walk in Room 3   Climb 3-5 Stairs 1   Basic Mobility Inpatient Raw Score 16   Basic Mobility Standardized Score 38 32   Pt  OOB in chair  with call bell within reach, all lines intact and alarm on at end of PT session  Discussed with  PT today's treatment and patient's current level of function for care coordination  You can continue to use the G tube.  Please follow up with your primary care doctor as needed.

## 2021-04-09 NOTE — ASSESSMENT & PLAN NOTE
Lab Results   Component Value Date    HGBA1C 7 0 (H) 03/09/2021       Recent Labs     04/08/21  1539 04/08/21  2036 04/09/21  0734 04/09/21  1113   POCGLU 185* 201* 161* 189*       Blood Sugar Average: Last 72 hrs:  (P) 196 5     · Increase lantus to 20 Units SQ QHS  · Increase to humalog 5 Units SQ TID with meals  · Hypoglycemia protocol  · Insulin sliding scale with blood glucose monitoring ACHS

## 2021-04-09 NOTE — ASSESSMENT & PLAN NOTE
· Cannot have a CTA of the chest/PE protocol with acute kidney injury  · Normal V/Q lung scan on 04/06/2021  · The patient was initially treated with IV heparin drip/infusion per the VTE/PE (Raschke) protocol, which was discontinued on 04/06/2021 with a normal V/Q lung scan  · Utilize heparin 7500 Units SQ every 8 hours (increased dose based on his BMI)    VAS lower limb venous duplex study, complete bilateral  Status: Final result   PACS Images     Show images for VAS lower limb venous duplex study, complete bilateral   Study Result       [facility] REPORT  CLINICAL:  Indications:  Patient presents with bilateral lower extremity pain and swelling  Risk Factors  The patient has history of Obesity, HTN, Diabetes (Yes), CAD and previous  smoking (quit 1-5yrs ago)  He has no history of DVT  CONCLUSION:     Impression:  RIGHT LOWER LIMB:  No evidence of acute or chronic deep vein thrombosis  No evidence of superficial thrombophlebitis noted  Doppler evaluation shows a normal response to augmentation maneuvers  Popliteal, posterior tibial and anterior tibial arterial Doppler waveforms are  biphasic/monophasic  LEFT LOWER LIMB:  No evidence of acute or chronic deep vein thrombosis  No evidence of superficial thrombophlebitis noted  Doppler evaluation shows a normal response to augmentation maneuvers  Popliteal, posterior tibial and anterior tibial arterial Doppler waveforms are  biphasic/monophasic  Technical findings were tiger texted to Dr Kevin Polanco       SIGNATURE:  Electronically Signed by: Zelalem Alejo on 2021-04-05 04:25:35 PM

## 2021-04-10 PROBLEM — G47.34 NOCTURNAL HYPOXIA: Status: ACTIVE | Noted: 2021-04-10

## 2021-04-10 LAB
ANION GAP SERPL CALCULATED.3IONS-SCNC: 7 MMOL/L (ref 4–13)
BACTERIA BLD CULT: NORMAL
BACTERIA BLD CULT: NORMAL
BUN SERPL-MCNC: 54 MG/DL (ref 5–25)
CALCIUM SERPL-MCNC: 9.1 MG/DL (ref 8.3–10.1)
CHLORIDE SERPL-SCNC: 104 MMOL/L (ref 100–108)
CO2 SERPL-SCNC: 30 MMOL/L (ref 21–32)
CREAT SERPL-MCNC: 2.06 MG/DL (ref 0.6–1.3)
FLUAV RNA RESP QL NAA+PROBE: NEGATIVE
FLUBV RNA RESP QL NAA+PROBE: NEGATIVE
GFR SERPL CREATININE-BSD FRML MDRD: 30 ML/MIN/1.73SQ M
GLUCOSE SERPL-MCNC: 153 MG/DL (ref 65–140)
GLUCOSE SERPL-MCNC: 163 MG/DL (ref 65–140)
GLUCOSE SERPL-MCNC: 182 MG/DL (ref 65–140)
GLUCOSE SERPL-MCNC: 224 MG/DL (ref 65–140)
GLUCOSE SERPL-MCNC: 229 MG/DL (ref 65–140)
GLUCOSE SERPL-MCNC: 264 MG/DL (ref 65–140)
POTASSIUM SERPL-SCNC: 4.2 MMOL/L (ref 3.5–5.3)
RSV RNA RESP QL NAA+PROBE: NEGATIVE
SARS-COV-2 RNA RESP QL NAA+PROBE: NEGATIVE
SODIUM SERPL-SCNC: 141 MMOL/L (ref 136–145)

## 2021-04-10 PROCEDURE — 0241U HB NFCT DS VIR RESP RNA 4 TRGT: CPT | Performed by: INTERNAL MEDICINE

## 2021-04-10 PROCEDURE — 99232 SBSQ HOSP IP/OBS MODERATE 35: CPT | Performed by: INTERNAL MEDICINE

## 2021-04-10 PROCEDURE — 94760 N-INVAS EAR/PLS OXIMETRY 1: CPT

## 2021-04-10 PROCEDURE — 82948 REAGENT STRIP/BLOOD GLUCOSE: CPT

## 2021-04-10 PROCEDURE — 80048 BASIC METABOLIC PNL TOTAL CA: CPT | Performed by: NURSE PRACTITIONER

## 2021-04-10 RX ORDER — INSULIN GLARGINE 100 [IU]/ML
22 INJECTION, SOLUTION SUBCUTANEOUS
Status: DISCONTINUED | OUTPATIENT
Start: 2021-04-10 | End: 2021-04-11

## 2021-04-10 RX ADMIN — INSULIN LISPRO 3 UNITS: 100 INJECTION, SOLUTION INTRAVENOUS; SUBCUTANEOUS at 09:18

## 2021-04-10 RX ADMIN — NYSTATIN: 100000 POWDER TOPICAL at 22:11

## 2021-04-10 RX ADMIN — CARVEDILOL 12.5 MG: 12.5 TABLET, FILM COATED ORAL at 09:19

## 2021-04-10 RX ADMIN — TAMSULOSIN HYDROCHLORIDE 0.4 MG: 0.4 CAPSULE ORAL at 17:48

## 2021-04-10 RX ADMIN — INSULIN GLARGINE 22 UNITS: 100 INJECTION, SOLUTION SUBCUTANEOUS at 21:54

## 2021-04-10 RX ADMIN — ASPIRIN 81 MG: 81 TABLET, COATED ORAL at 09:19

## 2021-04-10 RX ADMIN — HEPARIN SODIUM 7500 UNITS: 5000 INJECTION INTRAVENOUS; SUBCUTANEOUS at 06:08

## 2021-04-10 RX ADMIN — ATORVASTATIN CALCIUM 40 MG: 40 TABLET, FILM COATED ORAL at 17:48

## 2021-04-10 RX ADMIN — Medication 2000 UNITS: at 09:19

## 2021-04-10 RX ADMIN — CARVEDILOL 12.5 MG: 12.5 TABLET, FILM COATED ORAL at 17:48

## 2021-04-10 RX ADMIN — NYSTATIN: 100000 POWDER TOPICAL at 17:48

## 2021-04-10 RX ADMIN — HEPARIN SODIUM 7500 UNITS: 5000 INJECTION INTRAVENOUS; SUBCUTANEOUS at 21:47

## 2021-04-10 RX ADMIN — OXYCODONE HYDROCHLORIDE 10 MG: 10 TABLET ORAL at 14:08

## 2021-04-10 RX ADMIN — INSULIN LISPRO 2 UNITS: 100 INJECTION, SOLUTION INTRAVENOUS; SUBCUTANEOUS at 11:31

## 2021-04-10 RX ADMIN — HEPARIN SODIUM 7500 UNITS: 5000 INJECTION INTRAVENOUS; SUBCUTANEOUS at 14:08

## 2021-04-10 RX ADMIN — INSULIN LISPRO 2 UNITS: 100 INJECTION, SOLUTION INTRAVENOUS; SUBCUTANEOUS at 21:55

## 2021-04-10 RX ADMIN — NYSTATIN 1 APPLICATION: 100000 POWDER TOPICAL at 09:19

## 2021-04-10 RX ADMIN — INSULIN LISPRO 1 UNITS: 100 INJECTION, SOLUTION INTRAVENOUS; SUBCUTANEOUS at 17:48

## 2021-04-10 RX ADMIN — AMLODIPINE BESYLATE 10 MG: 10 TABLET ORAL at 09:19

## 2021-04-10 NOTE — PLAN OF CARE
Problem: Potential for Falls  Goal: Patient will remain free of falls  Description: INTERVENTIONS:  - Assess patient frequently for physical needs  -  Identify cognitive and physical deficits and behaviors that affect risk of falls    -  [place] fall precautions as indicated by assessment   - Educate patient/family on patient safety including physical limitations  - Instruct patient to call for assistance with activity based on assessment  - Modify environment to reduce risk of injury  - Consider OT/PT consult to assist with strengthening/mobility  4/10/2021 0734 by Aviva Neal RN  Outcome: Progressing  4/10/2021 0733 by Aviva Neal RN  Outcome: Progressing     Problem: Prexisting or High Potential for Compromised Skin Integrity  Goal: Skin integrity is maintained or improved  Description: INTERVENTIONS:  - Identify patients at risk for skin breakdown  - Assess and monitor skin integrity  - Assess and monitor nutrition and hydration status  - Monitor labs   - Assess for incontinence   - Turn and reposition patient  - Assist with mobility/ambulation  - Relieve pressure over bony prominences  - Avoid friction and shearing  - Provide appropriate hygiene as needed including keeping skin clean and dry  - Evaluate need for skin moisturizer/barrier cream  - Collaborate with interdisciplinary team   - Patient/family teaching  - Consider wound care consult   4/10/2021 0734 by Aviva Neal RN  Outcome: Progressing  4/10/2021 0733 by Aviva Neal RN  Outcome: Progressing     Problem: PAIN - ADULT  Goal: Verbalizes/displays adequate comfort level or baseline comfort level  Description: Interventions:  - Encourage patient to monitor pain and request assistance  - Assess pain using appropriate pain scale  - Administer analgesics based on type and severity of pain and evaluate response  - Implement non-pharmacological measures as appropriate and evaluate response  - Consider cultural and social influences on pain and pain management  - Notify physician/advanced practitioner if interventions unsuccessful or patient reports new pain  4/10/2021 0734 by Jyoti Wilson RN  Outcome: Progressing  4/10/2021 0733 by Jyoti Wilson RN  Outcome: Progressing     Problem: INFECTION - ADULT  Goal: Absence or prevention of progression during hospitalization  Description: INTERVENTIONS:  - Assess and monitor for signs and symptoms of infection  - Monitor lab/diagnostic results  - Monitor all insertion sites, i e  indwelling lines, tubes, and drains  - Monitor endotracheal if appropriate and nasal secretions for changes in amount and color  - [place] appropriate cooling/warming therapies per order  - Administer medications as ordered  - Instruct and encourage patient and family to use good hand hygiene technique  - Identify and instruct in appropriate isolation precautions for identified infection/condition  4/10/2021 0734 by Jyoti Wilson RN  Outcome: Progressing  4/10/2021 0733 by Jyoti Wilson RN  Outcome: Progressing     Problem: SAFETY ADULT  Goal: Patient will remain free of falls  Description: INTERVENTIONS:  - Assess patient frequently for physical needs  -  Identify cognitive and physical deficits and behaviors that affect risk of falls    -  [place] fall precautions as indicated by assessment   - Educate patient/family on patient safety including physical limitations  - Instruct patient to call for assistance with activity based on assessment  - Modify environment to reduce risk of injury  - Consider OT/PT consult to assist with strengthening/mobility  4/10/2021 0734 by Jyoti Wilson RN  Outcome: Progressing  4/10/2021 0733 by Jyoti Wilson RN  Outcome: Progressing  Goal: Maintain or return to baseline ADL function  Description: INTERVENTIONS:  -  Assess patient's ability to carry out ADLs; assess patient's baseline for ADL function and identify physical deficits which impact ability to perform ADLs (bathing, care of mouth/teeth, toileting, grooming, dressing, etc )  - Assess/evaluate cause of self-care deficits   - Assess range of motion  - Assess patient's mobility; develop plan if impaired  - Assess patient's need for assistive devices and provide as appropriate  - Encourage maximum independence but intervene and supervise when necessary  - Involve family in performance of ADLs  - Assess for home care needs following discharge   - Consider OT consult to assist with ADL evaluation and planning for discharge  - Provide patient education as appropriate  4/10/2021 0734 by Bernadette Sauceda RN  Outcome: Progressing  4/10/2021 0733 by Bernadette Sauceda RN  Outcome: Progressing  Goal: Maintain or return mobility status to optimal level  Description: INTERVENTIONS:  - Assess patient's baseline mobility status (ambulation, transfers, stairs, etc )    - Identify cognitive and physical deficits and behaviors that affect mobility  - Identify mobility aids required to assist with transfers and/or ambulation (gait belt, sit-to-stand, lift, walker, cane, etc )  - [place] fall precautions as indicated by assessment  - Record patient progress and toleration of activity level on Mobility SBAR; progress patient to next Phase/Stage  - Instruct patient to call for assistance with activity based on assessment  - Consider rehabilitation consult to assist with strengthening/weightbearing, etc   4/10/2021 0734 by Bernadette Sauceda RN  Outcome: Progressing  4/10/2021 0733 by Bernadette Sauceda RN  Outcome: Progressing     Problem: DISCHARGE PLANNING  Goal: Discharge to home or other facility with appropriate resources  Description: INTERVENTIONS:  - Identify barriers to discharge w/patient and caregiver  - Arrange for needed discharge resources and transportation as appropriate  - Identify discharge learning needs (meds, wound care, etc )  - Arrange for interpretive services to assist at discharge as needed  - Refer to Case Management Department for coordinating discharge planning if the patient needs post-hospital services based on physician/advanced practitioner order or complex needs related to functional status, cognitive ability, or social support system  4/10/2021 0734 by Lissett Chambers RN  Outcome: Progressing  4/10/2021 0733 by Lissett Chambers RN  Outcome: Progressing     Problem: Knowledge Deficit  Goal: Patient/family/caregiver demonstrates understanding of disease process, treatment plan, medications, and discharge instructions  Description: Complete learning assessment and assess knowledge base    Interventions:  - Provide teaching at level of understanding  - Provide teaching via preferred learning methods  4/10/2021 0734 by Lissett Chambers RN  Outcome: Progressing  4/10/2021 0733 by Lissett Chambers RN  Outcome: Progressing     Problem: METABOLIC, FLUID AND ELECTROLYTES - ADULT  Goal: Glucose maintained within target range  Description: INTERVENTIONS:  - Monitor Blood Glucose as ordered  - Assess for signs and symptoms of hyperglycemia and hypoglycemia  - Administer ordered medications to maintain glucose within target range  - Assess nutritional intake and initiate nutrition service referral as needed  4/10/2021 0734 by Lissett Chambers RN  Outcome: Progressing  4/10/2021 0733 by Lissett Chambers RN  Outcome: Progressing     Problem: SKIN/TISSUE INTEGRITY - ADULT  Goal: Skin integrity remains intact  Description: INTERVENTIONS  - Identify patients at risk for skin breakdown  - Assess and monitor skin integrity  - Assess and monitor nutrition and hydration status  - Monitor labs (i e  albumin)  - Assess for incontinence   - Turn and reposition patient  - Assist with mobility/ambulation  - Relieve pressure over bony prominences  - Avoid friction and shearing  - Provide appropriate hygiene as needed including keeping skin clean and dry  - Evaluate need for skin moisturizer/barrier cream  - Collaborate with interdisciplinary team (i e  Nutrition, Rehabilitation, etc )   - Patient/family teaching  4/10/2021 0734 by Dian Mcfarland RN  Outcome: Progressing  4/10/2021 0733 by Dian Mcfarland RN  Outcome: Progressing     Problem: Nutrition/Hydration-ADULT  Goal: Nutrient/Hydration intake appropriate for improving, restoring or maintaining nutritional needs  Description: Monitor and assess patient's nutrition/hydration status for malnutrition  Collaborate with interdisciplinary team and initiate plan and interventions as ordered  Monitor patient's weight and dietary intake as ordered or per policy  Utilize nutrition screening tool and intervene as necessary  Determine patient's food preferences and provide high-protein, high-caloric foods as appropriate       INTERVENTIONS:  - Monitor oral intake, urinary output, labs, and treatment plans  - Assess nutrition and hydration status and recommend course of action  - Evaluate amount of meals eaten  - Assist patient with eating if necessary   - Allow adequate time for meals  - Recommend/ encourage appropriate diets, oral nutritional supplements, and vitamin/mineral supplements  - Order, calculate, and assess calorie counts as needed  - Recommend, monitor, and adjust tube feedings and TPN/PPN based on assessed needs  - Assess need for intravenous fluids  - Provide specific nutrition/hydration education as appropriate  - Include patient/family/caregiver in decisions related to nutrition  4/10/2021 0734 by Dian Mcfarland RN  Outcome: Progressing  4/10/2021 0733 by Dian Mcfarland RN  Outcome: Progressing

## 2021-04-10 NOTE — ASSESSMENT & PLAN NOTE
· Probable obstructive sleep apnea  · Refusing CPAP at this time  · Experiencing nocturnal hypoxia requiring supplemental oxygen  · Check an overnight sleep screen/pulse oximetry  · Outpatient sleep study  · Outpatient follow-up with Pulmonology

## 2021-04-10 NOTE — RESPIRATORY THERAPY NOTE
Nocturnal Pulse Oximetry ordered on pt to qualify for O2 at   Dr Leah Guzman aware that study is unable to be completed at current time due to software updates needed, can be completed on Monday night

## 2021-04-10 NOTE — ASSESSMENT & PLAN NOTE
Wt Readings from Last 3 Encounters:   04/10/21 134 kg (295 lb 6 7 oz)   03/29/21 (!) 144 kg (317 lb 6 4 oz)   03/08/21 (!) 136 kg (300 lb 0 7 oz)     · Initially treated with lasix 40 mg IV BID  · Continue to hold IV lasix with worsening renal function  · Continue PO coreg  · Daily weights  · Strict intake/output measurements

## 2021-04-10 NOTE — ASSESSMENT & PLAN NOTE
· Possibly reactive in nature  · Check an LDH level  · Will need outpatient surveillance imaging to ensure resolution of the inguinal lymphadenopathy

## 2021-04-10 NOTE — ASSESSMENT & PLAN NOTE
· Probable obstructive sleep apnea  · Check an overnight sleep screen/pulse oximetry  · Outpatient sleep study  · Outpatient follow-up with Pulmonology

## 2021-04-10 NOTE — ASSESSMENT & PLAN NOTE
· Likely secondary to acute on chronic diastolic CHF (congestive heart failure)  · Initially with a new supplemental oxygen requirement of 3 lpm via the nasal cannula to maintain oxygen saturation levels at 90% and above  · Hold IV lasix at this time with worsening renal function

## 2021-04-10 NOTE — ASSESSMENT & PLAN NOTE
· Acute kidney injury was present on admission and likely secondary to cardiorenal syndrome and a possible component of acute tubular necrosis  · Baseline serum creatinine of 1 2-1 4 mg/dl  · Initially treated with lasix 40 mg IV BID  · Continue to hold IV lasix with worsening renal function  · Avoid all nephrotoxic agents  · The patient was seen in consultation by Nephrology  · Serial laboratory testing to monitor the patient's renal function and electrolyte levels    Results from last 7 days   Lab Units 04/10/21  0605 04/09/21  0508 04/08/21  0502   SODIUM mmol/L 141 139 141   POTASSIUM mmol/L 4 2 4 0 4 0   CHLORIDE mmol/L 104 103 103   CO2 mmol/L 30 27 31   BUN mg/dL 54* 48* 37*   CREATININE mg/dL 2 06* 1 98* 1 81*   CALCIUM mg/dL 9 1 9 1 8 9     Results for Tuan Larson (MRN [id number]) as of 4/7/2021 14:19   Ref   Range 4/5/2021 19:24   EXT Creatinine Urine Latest Units: mg/dL 104 0   Protein Urine Random Latest Units: mg/dL 46   SODIUM URINE Unknown 75   Prot/Creat Ratio, Ur Latest Ref Range: 0 00 - 0 10  0 44 (H)

## 2021-04-10 NOTE — ASSESSMENT & PLAN NOTE
Lab Results   Component Value Date    HGBA1C 7 0 (H) 03/09/2021       Recent Labs     04/09/21  2055 04/10/21  0708 04/10/21  0917 04/10/21  1109   POCGLU 192* 163* 264* 224*       Blood Sugar Average: Last 72 hrs:  (P) 197 8     · Increase lantus to 22 Units SQ QHS  · Continue humalog 5 Units SQ TID with meals  · Hypoglycemia protocol  · Insulin sliding scale with blood glucose monitoring ACHS

## 2021-04-10 NOTE — ASSESSMENT & PLAN NOTE
· Outpatient follow-up with Cardiology    ECG 12 lead  Order: [id number]  Status:  Final result   Visible to patient:  No (inaccessible in [address])   Next appt:  05/18/2021 at 12:40 PM in Cardiology Ines Germain MD)   Ref Range & Units 4/5/21 1515   Ventricular Rate BPM 75    Atrial Rate BPM 75    IL Interval ms 246    QRSD Interval ms 150    QT Interval ms 408    QTC Interval ms 455    P Axis degrees 60    QRS Axis degrees -80    T Wave Axis degrees 48       Narrative & Impression    Sinus rhythm with 1st degree A-V block  Left axis deviation  Right bundle branch block  Abnormal ECG  When compared with ECG of 08-MAR-2021 15:53,     Confirmed by Genetta Keep [phone] on 4/5/2021 3:26:16 PM      Specimen Collected: 04/05/21 15:15   Last Resulted: 04/05/21 15:26

## 2021-04-10 NOTE — CASE MANAGEMENT
Patient is now refusing to go to [facility] because he does not want to quarantine for 2 weeks  Pt refuses to go anywhere where he can not see his wife for 2 weeks  I explained to patient that he could face time with his wife while in quarantine  I told patient that I was very concerned he has only been able to walk 15 feet with much difficulty  while he was here  Pt said the therapist never let him go further she only would let him go to door and then he had to stop  I spoke with patient's wife Eloisa Randhawa And notified her that Abrahan Paez is refusing to go to [facility] because he has to quarantine  Eloisa Randhawa is agreeable to having patient come home  I told her that Abrahan Paez was only able to walk 15 feet here  Eloisa Randhawa said that Abrahan Paez will be fine at home  I notified physician, ambulance and [facility]  home of same

## 2021-04-10 NOTE — PROGRESS NOTES
[facility] [address]  Progress Note Lizzeth Dan 1945, 64 y o  male MRN: [id number]  Unit/Bed#: 424-01 Encounter: [id number]  Primary Care Provider: Jeffery Maciel MD   Date and time admitted to hospital: 4/5/2021  2:40 PM    * Acute kidney injury [facility])  Assessment & Plan  · Acute kidney injury was present on admission and likely secondary to cardiorenal syndrome and a possible component of acute tubular necrosis  · Baseline serum creatinine of 1 2-1 4 mg/dl  · Initially treated with lasix 40 mg IV BID  · Continue to hold IV lasix with worsening renal function  · Avoid all nephrotoxic agents  · The patient was seen in consultation by Nephrology  · Serial laboratory testing to monitor the patient's renal function and electrolyte levels    Results from last 7 days   Lab Units 04/10/21  0605 04/09/21  0508 04/08/21  0502   SODIUM mmol/L 141 139 141   POTASSIUM mmol/L 4 2 4 0 4 0   CHLORIDE mmol/L 104 103 103   CO2 mmol/L 30 27 31   BUN mg/dL 54* 48* 37*   CREATININE mg/dL 2 06* 1 98* 1 81*   CALCIUM mg/dL 9 1 9 1 8 9     Results for Chris Kincaid (MRN [id number]) as of 4/7/2021 14:19   Ref  Range 4/5/2021 19:24   EXT Creatinine Urine Latest Units: mg/dL 104 0   Protein Urine Random Latest Units: mg/dL 46   SODIUM URINE Unknown 75   Prot/Creat Ratio, Ur Latest Ref Range: 0 00 - 0 10  0 44 (H)       Lower extremity weakness  Assessment & Plan  · May require additional neurologic imaging to rule-out a CVA/stroke  · The patient was evaluated by Physical Therapy and Occupational Therapy during the hospitalization, and short-term rehabilitation SNF placement was recommended upon discharge  · The patient is now refusing short-term rehabilitation placement        Acute on chronic diastolic CHF (congestive heart failure) (HCC)  Assessment & Plan  Wt Readings from Last 3 Encounters:   04/10/21 134 kg (295 lb 6 7 oz)   03/29/21 (!) 144 kg (317 lb 6 4 oz)   03/08/21 (!) 136 kg (300 lb 0 7 oz) · Initially treated with lasix 40 mg IV BID  · Continue to hold IV lasix with worsening renal function  · Continue PO coreg  · Daily weights  · Strict intake/output measurements        Bilateral pleural effusion  Assessment & Plan  · Likely secondary to acute on chronic diastolic CHF (congestive heart failure)  · Initially with a new supplemental oxygen requirement of 3 lpm via the nasal cannula to maintain oxygen saturation levels at 90% and above  · Hold IV lasix at this time with worsening renal function    Nocturnal hypoxia  Assessment & Plan  · Probable obstructive sleep apnea  · Check an overnight sleep screen/pulse oximetry  · Outpatient sleep study  · Outpatient follow-up with Pulmonology    Venous stasis dermatitis of both lower extremities  Assessment & Plan  · Does not appear to be any active infection at this time    Renal calculus  Assessment & Plan  · Outpatient Urology evaluation    Multiple renal cysts  Assessment & Plan  · Outpatient surveillance imaging with PCP    Atelectasis  Assessment & Plan  · Incentive spirometry 10 times per hour while awake    Inguinal lymphadenopathy  Assessment & Plan  · Possibly reactive in nature  · Check an LDH level  · Will need outpatient surveillance imaging to ensure resolution of the inguinal lymphadenopathy    Abnormal EKG  Assessment & Plan  · Outpatient follow-up with Cardiology    ECG 12 lead  Order: [id number]  Status:  Final result   Visible to patient:  No (inaccessible in [facility]'s MyChart)   Next appt:  05/18/2021 at 12:40 PM in Cardiology Consuelo Marte MD)   Ref Range & Units 4/5/21 1515   Ventricular Rate BPM 75    Atrial Rate BPM 75    CT Interval ms 246    QRSD Interval ms 150    QT Interval ms 408    QTC Interval ms 455    P Axis degrees 60    QRS Axis degrees -80    T Wave Axis degrees 48       Narrative & Impression    Sinus rhythm with 1st degree A-V block  Left axis deviation  Right bundle branch block  Abnormal ECG  When compared with ECG of 08-MAR-2021 15:53,     Confirmed by Rain Mello ([id number]) on 4/5/2021 3:26:16 PM      Specimen Collected: 04/05/21 15:15   Last Resulted: 04/05/21 15:26               Gait instability  Assessment & Plan  · May require additional neurologic imaging to rule-out a CVA/stroke  · PT/OT    Multiple pulmonary nodules  Assessment & Plan  · Outpatient surveillance imaging with Pulmonology    Elevated d-dimer  Assessment & Plan  · Cannot have a CTA of the chest/PE protocol with acute kidney injury  · Normal V/Q lung scan on 04/06/2021  · The patient was initially treated with IV heparin drip/infusion per [facility] protocol, which was discontinued on 04/06/2021 with a normal V/Q lung scan  · Utilize heparin 7500 Units SQ every 8 hours (increased dose based on his BMI) for DVT prophylaxis    VAS lower limb venous duplex study, complete bilateral  Status: Final result   PACS Images     Show images for VAS lower limb venous duplex study, complete bilateral   Study Result       [facility] REPORT  CLINICAL:  Indications:  Patient presents with bilateral lower extremity pain and swelling  Risk Factors  The patient has history of Obesity, HTN, Diabetes (Yes), CAD and previous  smoking (quit 1-5yrs ago)  He has no history of DVT  CONCLUSION:     Impression:  RIGHT LOWER LIMB:  No evidence of acute or chronic deep vein thrombosis  No evidence of superficial thrombophlebitis noted  Doppler evaluation shows a normal response to augmentation maneuvers  Popliteal, posterior tibial and anterior tibial arterial Doppler waveforms are  biphasic/monophasic  LEFT LOWER LIMB:  No evidence of acute or chronic deep vein thrombosis  No evidence of superficial thrombophlebitis noted  Doppler evaluation shows a normal response to augmentation maneuvers  Popliteal, posterior tibial and anterior tibial arterial Doppler waveforms are  biphasic/monophasic       Technical findings were tiger texted to   Elijah  SIGNATURE:  Electronically Signed by: Tiffany Durant on 2021 04:25:35 PM         Type 2 diabetes mellitus with hyperglycemia, with long-term current use of insulin [facility])  Assessment & Plan  Lab Results   Component Value Date    HGBA1C 7 0 (H) 2021       Recent Labs     21  2055 04/10/21  0708 04/10/21  0917 04/10/21  1109   POCGLU 192* 163* 264* 224*       Blood Sugar Average: Last 72 hrs:  (P) 197 8     · Increase lantus to 22 Units SQ QHS  · Continue humalog 5 Units SQ TID with meals  · Hypoglycemia protocol  · Insulin sliding scale with blood glucose monitoring ACHS    BHARGAVI (obstructive sleep apnea)  Assessment & Plan  · Probable obstructive sleep apnea  · Refusing CPAP at this time  · Experiencing nocturnal hypoxia requiring supplemental oxygen  · Check an overnight sleep screen/pulse oximetry  · Outpatient sleep study  · Outpatient follow-up with Pulmonology        VTE Pharmacologic Prophylaxis:   Pharmacologic: Heparin 7500 Units SQ every 8 hours (increased dose based on his BMI)  Mechanical VTE Prophylaxis in Place: Yes    Patient Centered Rounds: I have performed bedside rounds with nursing staff today  Time Spent for Care: 30 minutes  More than 50% of total time spent on counseling and coordination of care as described above  Current Length of Stay: 5 day(s)    Current Patient Status: Inpatient   Certification Statement: The patient will continue to require additional inpatient hospital stay due to the need for serial laboratory testing to monitor his renal function with worsening acute kidney injury  Code Status: Level 3 - DNAR and DNI      Subjective: The patient was seen and examined  The bilateral lower extremity weakness is improving  No chest pain  No shortness of breath  No abdominal pain  No nausea or vomiting        Objective:     Vitals:   Temp (24hrs), Av 9 °F (36 6 °C), Min:97 5 °F (36 4 °C), Max:98 6 °F (37 °C)    Temp:  [97 5 °F (36 4 °C)-98 6 °F (37 °C)] 98 6 °F (37 °C)  HR:  [65-71] 71  Resp:  [18-20] 20  BP: (129-133)/(63-66) 133/63  SpO2:  [88 %-94 %] 90 %  Body mass index is 47 68 kg/m²  Input and Output Summary (last 24 hours):        Intake/Output Summary (Last 24 hours) at 4/10/2021 1159  Last data filed at 4/10/2021 0812  Gross per 24 hour   Intake 660 ml   Output 200 ml   Net 460 ml       Physical Exam:     Physical Exam  General:  NAD, follows commands  HEENT:  NC/AT, mucous membranes moist  Neck:  Supple, No JVP elevation  CV:  + S1, + S2, RRR  Pulm:  Lung fields are CTA bilaterally  Abd:  Soft, Non-tender, Non-distended  Ext:  No clubbing/cyanosis, Improving edema of the bilateral lower extremities  Skin:  No rashes  Neuro:  Awake, alert, oriented  Psych:  Normal mood and affect      Additional Data:    Labs:    Results from last 7 days   Lab Units 04/09/21  0508   WBC Thousand/uL 8 15   HEMOGLOBIN g/dL 11 3*   HEMATOCRIT % 35 7*   PLATELETS Thousands/uL 253   NEUTROS PCT % 64   LYMPHS PCT % 21   MONOS PCT % 9   EOS PCT % 5     Results from last 7 days   Lab Units 04/10/21  0605 04/09/21  0508   SODIUM mmol/L 141 139   POTASSIUM mmol/L 4 2 4 0   CHLORIDE mmol/L 104 103   CO2 mmol/L 30 27   BUN mg/dL 54* 48*   CREATININE mg/dL 2 06* 1 98*   ANION GAP mmol/L 7 9   CALCIUM mg/dL 9 1 9 1   ALBUMIN g/dL  --  2 9*   TOTAL BILIRUBIN mg/dL  --  0 54   ALK PHOS U/L  --  145*   ALT U/L  --  17   AST U/L  --  13   GLUCOSE RANDOM mg/dL 153* 173*     Results from last 7 days   Lab Units 04/05/21  1456   INR  1 13     Results from last 7 days   Lab Units 04/10/21  1109 04/10/21  0917 04/10/21  0708 04/09/21  2055 04/09/21  1639 04/09/21  1113 04/09/21  0734 04/08/21  2036 04/08/21  1539 04/08/21  1118 04/08/21  0743 04/07/21  2051   POC GLUCOSE mg/dl 224* 264* 163* 192* 164* 189* 161* 201* 185* 225* 155* 213*         Results from last 7 days   Lab Units 04/08/21  0502 04/07/21  0418 04/06/21  0519 04/05/21  1456   LACTIC ACID mmol/L  --   -- 0 7 1 4   PROCALCITONIN ng/ml 0 10 0 09 0 06  --            * I Have Reviewed All Lab Data Listed Above  * Additional Pertinent Lab Tests Reviewed: Amanda 64 Admission Reviewed      Recent Cultures (last 7 days):     Results from last 7 days   Lab Units 04/05/21  1926 04/05/21  1517 04/05/21  1516   BLOOD CULTURE   --  No Growth After 4 Days  No Growth After 4 Days  URINE CULTURE  No Growth <1000 cfu/mL  --   --        Last 24 Hours Medication List:   Current Facility-Administered Medications   Medication Dose Route Frequency Provider Last Rate    acetaminophen  650 mg Oral Q6H PRN [place] Peek, DO      amLODIPine  10 mg Oral Daily [place] Peek, DO      aspirin  81 mg Oral Daily [place] Peek, DO      atorvastatin  40 mg Oral After Cisco & Kathia, DO      carvedilol  12 5 mg Oral BID [place] Peek, DO      cholecalciferol  2,000 Units Oral Daily [place] Peek, DO      heparin (porcine)  7,500 Units Subcutaneous Q8H [facility] [place] Pe, [place]      HYDROmorphone  0 5 mg Intravenous Q4H PRN [place] Peek, DO      insulin glargine  22 Units Subcutaneous HS [place] Peek, DO      insulin lispro  1-6 Units Subcutaneous TID AC [place] Peek, DO      insulin lispro  1-6 Units Subcutaneous HS [place] Peek, DO      insulin lispro  5 Units Subcutaneous TID With Meals [place] Peek, DO      nystatin   Topical BID [place] Peek, DO      oxyCODONE  5 mg Oral Q4H PRN [place] Peek, DO      Or    oxyCODONE  10 mg Oral Q4H PRN [place] Peek, DO      tamsulosin  0 4 mg Oral QPM [place] Jessica, DO          Today, Patient Was Seen By: Florencio Lopez DO    ** Please Note: Dictation voice to text software may have been used in the creation of this document   **

## 2021-04-10 NOTE — ASSESSMENT & PLAN NOTE
· Cannot have a CTA of the chest/PE protocol with acute kidney injury  · Normal V/Q lung scan on 04/06/2021  · The patient was initially treated with IV heparin drip/infusion per the VTE/PE ([facility]) protocol, which was discontinued on 04/06/2021 with a normal V/Q lung scan  · Utilize heparin 7500 Units SQ every 8 hours (increased dose based on his BMI)    VAS lower limb venous duplex study, complete bilateral  Status: Final result   PACS Images     Show images for VAS lower limb venous duplex study, complete bilateral   Study Result       [facility] REPORT  CLINICAL:  Indications:  Patient presents with bilateral lower extremity pain and swelling  Risk Factors  The patient has history of Obesity, HTN, Diabetes (Yes), CAD and previous  smoking (quit 1-5yrs ago)  He has no history of DVT  CONCLUSION:     Impression:  RIGHT LOWER LIMB:  No evidence of acute or chronic deep vein thrombosis  No evidence of superficial thrombophlebitis noted  Doppler evaluation shows a normal response to augmentation maneuvers  Popliteal, posterior tibial and anterior tibial arterial Doppler waveforms are  biphasic/monophasic  LEFT LOWER LIMB:  No evidence of acute or chronic deep vein thrombosis  No evidence of superficial thrombophlebitis noted  Doppler evaluation shows a normal response to augmentation maneuvers  Popliteal, posterior tibial and anterior tibial arterial Doppler waveforms are  biphasic/monophasic  Technical findings were tiger texted to Dr Krystal Walker       SIGNATURE:  Electronically Signed by: Tiffany Durant on 2021-04-05 04:25:35 PM

## 2021-04-10 NOTE — ASSESSMENT & PLAN NOTE
· May require additional neurologic imaging to rule-out a CVA/stroke  · The patient was evaluated by Physical Therapy and Occupational Therapy during the hospitalization, and short-term rehabilitation SNF placement was recommended upon discharge  · The patient is now refusing short-term rehabilitation placement

## 2021-04-10 NOTE — PROGRESS NOTES
Progress Note - Nephrology   Lakeisha Arriaza 64 y o  male MRN: [id number]  Unit/Bed#: 424-01 Encounter: [id number]    A/P:  1  Acute kidney injury present on admission of type chronic kidney disease  Admitted with a creatinine of 2 16 mg/dL -> 1 98 mg/dl -> 2 06 mg/dl  He had a diuresis of 5 7 L (I/O) vs 2 kg on standing scale  Dieretics held due to worsening kidney function  Etiology acute tubular necrosis based on urine studies due to volume overload, use of ACEI initially and episodes of hypotension  Kidney ultrasound 6-19 demonstrated mild cortical scarring   - Continue daily weights, fluid restriction and daily labs   - hold lisinopril until kidney function improves   Hold nephrotoxins and monitor for hypotension    2  Chronic kidney disease stage 3A  Baseline creatinine 1 31 4 mg/dL  Has proteinuria on prior labs  Possible underlying diabetic nephropathy  He will need outpatient follow-up    3  Acute on chronic diastolic congestive failure  Weights and I/O diff for  However he is most definitely in negative fluid balance and feels improved    4   Hypertensive nephrosclerosis  At this point his blood pressure is well controlled at 133/63 to            Follow up reason for today's visit:  Acute kidney injury/chronic kidney disease    Acute kidney injury [facility])    Patient Active Problem List   Diagnosis    Dyspnea on exertion    Type 2 diabetes mellitus with hypoglycemia without coma, with long-term current use of insulin ([facility])    CKD (chronic kidney disease)    Chronic diastolic CHF (congestive heart failure) ([facility])    BHARGAVI (obstructive sleep apnea)    Morbid obesity with BMI of 50 0-59 9, adult ([facility] [address] )    Essential hypertension    Type 2 diabetes mellitus with hyperglycemia, with long-term current use of insulin ([facility])    Complicated UTI (urinary tract infection)    Uncontrolled type 2 diabetes mellitus with hyperglycemia, with long-term current use of insulin [facility]    Acute kidney injury ([facility] )    Dehydration with hyponatremia    Hypoglycemia    Acute cystitis    Opiate dependence, continuous ([facility])    Paresthesia of left upper extremity    Neural foraminal stenosis of cervical spine    Acute pain of right lower extremity    Elevated TSH    Pressure injury of skin of right buttock    Ambulatory dysfunction    UTI (urinary tract infection)    Accelerated hypertension    Hypercalcemia    Open wound of right lower extremity    Cellulitis of right lower extremity    Elevated alkaline phosphatase level    Elevated d-dimer    Elevated parathyroid hormone    Pyuria    Microscopic hematuria    Multiple pulmonary nodules    Gait instability    Abnormal EKG    Cigarette nicotine dependence in remission    Inguinal lymphadenopathy    Bilateral pleural effusion    Atelectasis    Acute on chronic diastolic CHF (congestive heart failure) ([facility])    Multiple renal cysts    Renal calculus    Venous stasis dermatitis of both lower extremities    Lower extremity weakness    Nocturnal hypoxia         Subjective:   He denies chest pain, shortness of breath, abdominal pain, nausea, vomiting, or dysuria  He says he is urinating well  Last bowel movement 1 week ago  He says he generally only has a bowel movement weekly note a large bowel movement    Objective:     Vitals: Blood pressure 133/63, pulse 71, temperature 98 6 °F (37 °C), resp  rate 20, height 5' 6" (1 676 m), weight 134 kg (295 lb 6 7 oz), SpO2 90 %  ,Body mass index is 47 68 kg/m²  Weight (last 2 days)     Date/Time   Weight    04/10/21 0600   134 (295 42)    04/09/21 0600   134 (296 08)    04/08/21 0544   134 (295 42)                Intake/Output Summary (Last 24 hours) at 4/10/2021 1217  Last data filed at 4/10/2021 0812  Gross per 24 hour   Intake 660 ml   Output 200 ml   Net 460 ml     I/O last 3 completed shifts:   In: 840 [P O :840]  Out: 1350 [Urine:1350]         Physical Exam: /63   Pulse 71   Temp 98 6 °F (37 °C) Resp 20   Ht 5' 6" (1 676 m)   Wt 134 kg (295 lb 6 7 oz)   SpO2 90%   BMI 47 68 kg/m²     General Appearance:    Alert, morbidly obese cooperative, no distress, appears stated age   Head:    Normocephalic, without obvious abnormality, atraumatic thick neck    Eyes:    Conjunctiva/corneas clear   Ears:    Normal external ears   Nose:   Nares normal, septum midline, mucosa normal, no drainage    or sinus tenderness   Throat:   Lips, mucosa, and tongue normal; teeth and gums normal   Neck:   Supple, symmetrical, trachea midline, no adenopathy;        thyroid:  No enlargement/tenderness/nodules; no carotid    bruit or JVD   Back:     Symmetric, no curvature, ROM normal, no CVA tenderness   Lungs:     Clear to auscultation bilaterally, respirations unlabored   Chest wall:    No tenderness or deformity   Heart:    Regular rate and rhythm, S1 and S2 normal, no murmur, rub   or gallop   Abdomen:     Soft, non-tender, bowel sounds active   Extremities:   Extremities normal, atraumatic, no cyanosis has 1+  edema   Skin:   Skin color, texture, turgor normal, no rashes or lesions   Lymph nodes:   Cervical normal   Neurologic:   CNII-XII intact            Lab, Imaging and other studies: I have personally reviewed pertinent labs  CBC: No results found for: WBC, HGB, HCT, MCV, PLT, ADJUSTEDWBC, MCH, MCHC, RDW, MPV, NRBC  CMP:   Lab Results   Component Value Date    K 4 2 04/10/2021     04/10/2021    CO2 30 04/10/2021    BUN 54 (H) 04/10/2021    CREATININE 2 06 (H) 04/10/2021    CALCIUM 9 1 04/10/2021    EGFR 30 04/10/2021           Results from last 7 days   Lab Units 04/10/21  0605 04/09/21  0508 04/08/21  0502 04/07/21  0418   POTASSIUM mmol/L 4 2 4 0 4 0 4 2   CHLORIDE mmol/L 104 103 103 101   CO2 mmol/L 30 27 31 31   BUN mg/dL 54* 48* 37* 31*   CREATININE mg/dL 2 06* 1 98* 1 81* 1 89*   CALCIUM mg/dL 9 1 9 1 8 9 9 3   ALK PHOS U/L  --  145* 144* 150*   ALT U/L  --  17 17 20   AST U/L  --  13 12 13         Phosphorus: No results found for: PHOS  Magnesium: No results found for: MG  Urinalysis: No results found for: [place], SPECGRAV, PHUR, LEUKOCYTESUR, NITRITE, PROTEINUA, GLUCOSEU, KETONESU, BILIRUBINUR, BLOODU  Ionized Calcium: No results found for: CAION  Coagulation: No results found for: PT, INR, APTT  Troponin: No results found for: TROPONINI  ABG: No results found for: PHART, GJU4BKF, PO2ART, BSC3ZIA, P8FLFFWA, BEART, SOURCE  Radiology review:     IMAGING  Procedure: Xr Chest Portable    Result Date: 4/8/2021  Narrative: CHEST INDICATION:   hypoxia  COMPARISON:  Chest radiograph from 4/2/2021 and chest CT from 3/9/2021  EXAM PERFORMED/VIEWS:  XR CHEST PORTABLE  FINDINGS: Cardiomediastinal silhouette normal  No acute disease  Redemonstration of mild left base scar  No effusion or pneumothorax  Mild degenerative disease in the spine  Impression: No acute cardiopulmonary disease    Workstation performed: [id number]       Current Facility-Administered Medications   Medication Dose Route Frequency    acetaminophen (TYLENOL) tablet 650 mg  650 mg Oral Q6H PRN    amLODIPine (NORVASC) tablet 10 mg  10 mg Oral Daily    aspirin (ECOTRIN LOW STRENGTH) EC tablet 81 mg  81 mg Oral Daily    atorvastatin (LIPITOR) tablet 40 mg  40 mg Oral After Dinner    carvedilol (COREG) tablet 12 5 mg  12 5 mg Oral BID    cholecalciferol (VITAMIN D3) tablet 2,000 Units  2,000 Units Oral Daily    heparin (porcine) subcutaneous injection 7,500 Units  7,500 Units Subcutaneous Q8H [address]    HYDROmorphone (DILAUDID) injection 0 5 mg  0 5 mg Intravenous Q4H PRN    insulin glargine (LANTUS) subcutaneous injection 22 Units 0 22 mL  22 Units Subcutaneous HS    insulin lispro (HumaLOG) 100 units/mL subcutaneous injection 1-6 Units  1-6 Units Subcutaneous TID AC    insulin lispro (HumaLOG) 100 units/mL subcutaneous injection 1-6 Units  1-6 Units Subcutaneous HS    insulin lispro (HumaLOG) 100 units/mL subcutaneous injection 5 Units  5 Units Subcutaneous TID With Meals    nystatin (MYCOSTATIN) powder   Topical BID    oxyCODONE (ROXICODONE) IR tablet 5 mg  5 mg Oral Q4H PRN    Or    oxyCODONE (ROXICODONE) immediate release tablet 10 mg  10 mg Oral Q4H PRN    tamsulosin (FLOMAX) capsule 0 4 mg  0 4 mg Oral QPM     Medications Discontinued During This Encounter   Medication Reason    heparin (VTE/PE) high     heparin (porcine) 25,000 units in 0 45% NaCl 250 mL infusion (premix)     heparin (porcine) injection 10,000 Units     heparin (porcine) injection 5,000 Units     insulin glargine (LANTUS) subcutaneous injection 10 Units 0 1 mL     heparin (porcine) subcutaneous injection 5,000 Units     insulin lispro (HumaLOG) 100 units/mL subcutaneous injection 3 Units     furosemide (LASIX) injection 40 mg     insulin glargine (LANTUS) subcutaneous injection 15 Units 0 15 mL     insulin lispro (HumaLOG) 100 units/mL subcutaneous injection 4 Units     insulin glargine (LANTUS) subcutaneous injection 20 Units 0 2 mL        Valentine Mariee MD      This progress note was produced in part using a dictation device which may document imprecise wording from author's original intent

## 2021-04-11 PROBLEM — K59.00 CONSTIPATION: Status: ACTIVE | Noted: 2021-04-11

## 2021-04-11 PROBLEM — G47.33 OBSTRUCTIVE SLEEP APNEA: Status: ACTIVE | Noted: 2021-04-10

## 2021-04-11 LAB
ALBUMIN SERPL BCP-MCNC: 2.8 G/DL (ref 3.5–5)
ALP SERPL-CCNC: 138 U/L (ref 46–116)
ALT SERPL W P-5'-P-CCNC: 25 U/L (ref 12–78)
ANION GAP SERPL CALCULATED.3IONS-SCNC: 9 MMOL/L (ref 4–13)
AST SERPL W P-5'-P-CCNC: 20 U/L (ref 5–45)
BASOPHILS # BLD AUTO: 0.03 THOUSANDS/ΜL (ref 0–0.1)
BASOPHILS NFR BLD AUTO: 1 % (ref 0–1)
BILIRUB SERPL-MCNC: 0.37 MG/DL (ref 0.2–1)
BUN SERPL-MCNC: 54 MG/DL (ref 5–25)
CALCIUM ALBUM COR SERPL-MCNC: 9.9 MG/DL (ref 8.3–10.1)
CALCIUM SERPL-MCNC: 8.9 MG/DL (ref 8.3–10.1)
CHLORIDE SERPL-SCNC: 105 MMOL/L (ref 100–108)
CO2 SERPL-SCNC: 28 MMOL/L (ref 21–32)
CREAT SERPL-MCNC: 1.79 MG/DL (ref 0.6–1.3)
EOSINOPHIL # BLD AUTO: 0.38 THOUSAND/ΜL (ref 0–0.61)
EOSINOPHIL NFR BLD AUTO: 7 % (ref 0–6)
ERYTHROCYTE [DISTWIDTH] IN BLOOD BY AUTOMATED COUNT: 14.7 % (ref 11.6–15.1)
GFR SERPL CREATININE-BSD FRML MDRD: 36 ML/MIN/1.73SQ M
GLUCOSE SERPL-MCNC: 146 MG/DL (ref 65–140)
GLUCOSE SERPL-MCNC: 182 MG/DL (ref 65–140)
GLUCOSE SERPL-MCNC: 224 MG/DL (ref 65–140)
GLUCOSE SERPL-MCNC: 267 MG/DL (ref 65–140)
GLUCOSE SERPL-MCNC: 309 MG/DL (ref 65–140)
HCT VFR BLD AUTO: 35.9 % (ref 36.5–49.3)
HGB BLD-MCNC: 11 G/DL (ref 12–17)
IMM GRANULOCYTES # BLD AUTO: 0.02 THOUSAND/UL (ref 0–0.2)
IMM GRANULOCYTES NFR BLD AUTO: 0 % (ref 0–2)
LDH SERPL-CCNC: 175 U/L (ref 81–234)
LYMPHOCYTES # BLD AUTO: 1.7 THOUSANDS/ΜL (ref 0.6–4.47)
LYMPHOCYTES NFR BLD AUTO: 30 % (ref 14–44)
MAGNESIUM SERPL-MCNC: 2.3 MG/DL (ref 1.6–2.6)
MCH RBC QN AUTO: 27 PG (ref 26.8–34.3)
MCHC RBC AUTO-ENTMCNC: 30.6 G/DL (ref 31.4–37.4)
MCV RBC AUTO: 88 FL (ref 82–98)
MONOCYTES # BLD AUTO: 0.66 THOUSAND/ΜL (ref 0.17–1.22)
MONOCYTES NFR BLD AUTO: 12 % (ref 4–12)
NEUTROPHILS # BLD AUTO: 2.9 THOUSANDS/ΜL (ref 1.85–7.62)
NEUTS SEG NFR BLD AUTO: 50 % (ref 43–75)
NRBC BLD AUTO-RTO: 0 /100 WBCS
PHOSPHATE SERPL-MCNC: 3.5 MG/DL (ref 2.3–4.1)
PLATELET # BLD AUTO: 253 THOUSANDS/UL (ref 149–390)
PMV BLD AUTO: 10.3 FL (ref 8.9–12.7)
POTASSIUM SERPL-SCNC: 4.2 MMOL/L (ref 3.5–5.3)
PROCALCITONIN SERPL-MCNC: <0.05 NG/ML
PROT SERPL-MCNC: 7.2 G/DL (ref 6.4–8.2)
RBC # BLD AUTO: 4.08 MILLION/UL (ref 3.88–5.62)
SODIUM SERPL-SCNC: 142 MMOL/L (ref 136–145)
WBC # BLD AUTO: 5.69 THOUSAND/UL (ref 4.31–10.16)

## 2021-04-11 PROCEDURE — 80053 COMPREHEN METABOLIC PANEL: CPT | Performed by: INTERNAL MEDICINE

## 2021-04-11 PROCEDURE — 85025 COMPLETE CBC W/AUTO DIFF WBC: CPT | Performed by: INTERNAL MEDICINE

## 2021-04-11 PROCEDURE — 84100 ASSAY OF PHOSPHORUS: CPT | Performed by: INTERNAL MEDICINE

## 2021-04-11 PROCEDURE — 99232 SBSQ HOSP IP/OBS MODERATE 35: CPT | Performed by: INTERNAL MEDICINE

## 2021-04-11 PROCEDURE — 84145 PROCALCITONIN (PCT): CPT | Performed by: INTERNAL MEDICINE

## 2021-04-11 PROCEDURE — 94760 N-INVAS EAR/PLS OXIMETRY 1: CPT

## 2021-04-11 PROCEDURE — 82948 REAGENT STRIP/BLOOD GLUCOSE: CPT

## 2021-04-11 PROCEDURE — 83735 ASSAY OF MAGNESIUM: CPT | Performed by: INTERNAL MEDICINE

## 2021-04-11 PROCEDURE — 83615 LACTATE (LD) (LDH) ENZYME: CPT | Performed by: INTERNAL MEDICINE

## 2021-04-11 RX ORDER — POLYETHYLENE GLYCOL 3350 17 G/17G
17 POWDER, FOR SOLUTION ORAL DAILY PRN
Status: DISCONTINUED | OUTPATIENT
Start: 2021-04-11 | End: 2021-04-12 | Stop reason: HOSPADM

## 2021-04-11 RX ORDER — INSULIN GLARGINE 100 [IU]/ML
25 INJECTION, SOLUTION SUBCUTANEOUS
Status: DISCONTINUED | OUTPATIENT
Start: 2021-04-11 | End: 2021-04-12 | Stop reason: HOSPADM

## 2021-04-11 RX ORDER — POLYETHYLENE GLYCOL 3350 17 G/17G
17 POWDER, FOR SOLUTION ORAL ONCE
Status: COMPLETED | OUTPATIENT
Start: 2021-04-11 | End: 2021-04-11

## 2021-04-11 RX ORDER — DOCUSATE SODIUM 100 MG/1
100 CAPSULE, LIQUID FILLED ORAL 2 TIMES DAILY
Status: DISCONTINUED | OUTPATIENT
Start: 2021-04-11 | End: 2021-04-12 | Stop reason: HOSPADM

## 2021-04-11 RX ADMIN — CARVEDILOL 12.5 MG: 12.5 TABLET, FILM COATED ORAL at 17:52

## 2021-04-11 RX ADMIN — AMLODIPINE BESYLATE 10 MG: 10 TABLET ORAL at 08:39

## 2021-04-11 RX ADMIN — INSULIN LISPRO 2 UNITS: 100 INJECTION, SOLUTION INTRAVENOUS; SUBCUTANEOUS at 12:04

## 2021-04-11 RX ADMIN — ATORVASTATIN CALCIUM 40 MG: 40 TABLET, FILM COATED ORAL at 17:44

## 2021-04-11 RX ADMIN — CARVEDILOL 12.5 MG: 12.5 TABLET, FILM COATED ORAL at 08:39

## 2021-04-11 RX ADMIN — NYSTATIN 1 APPLICATION: 100000 POWDER TOPICAL at 17:47

## 2021-04-11 RX ADMIN — INSULIN LISPRO 4 UNITS: 100 INJECTION, SOLUTION INTRAVENOUS; SUBCUTANEOUS at 15:51

## 2021-04-11 RX ADMIN — HEPARIN SODIUM 7500 UNITS: 5000 INJECTION INTRAVENOUS; SUBCUTANEOUS at 06:49

## 2021-04-11 RX ADMIN — DOCUSATE SODIUM 100 MG: 100 CAPSULE, LIQUID FILLED ORAL at 12:03

## 2021-04-11 RX ADMIN — Medication 2000 UNITS: at 08:39

## 2021-04-11 RX ADMIN — TAMSULOSIN HYDROCHLORIDE 0.4 MG: 0.4 CAPSULE ORAL at 17:44

## 2021-04-11 RX ADMIN — INSULIN GLARGINE 25 UNITS: 100 INJECTION, SOLUTION SUBCUTANEOUS at 22:43

## 2021-04-11 RX ADMIN — HEPARIN SODIUM 7500 UNITS: 5000 INJECTION INTRAVENOUS; SUBCUTANEOUS at 15:51

## 2021-04-11 RX ADMIN — NYSTATIN: 100000 POWDER TOPICAL at 08:39

## 2021-04-11 RX ADMIN — ASPIRIN 81 MG: 81 TABLET, COATED ORAL at 08:39

## 2021-04-11 RX ADMIN — DOCUSATE SODIUM 100 MG: 100 CAPSULE, LIQUID FILLED ORAL at 17:44

## 2021-04-11 RX ADMIN — HEPARIN SODIUM 7500 UNITS: 5000 INJECTION INTRAVENOUS; SUBCUTANEOUS at 22:42

## 2021-04-11 RX ADMIN — POLYETHYLENE GLYCOL 3350 17 G: 17 POWDER, FOR SOLUTION ORAL at 12:03

## 2021-04-11 RX ADMIN — INSULIN LISPRO 3 UNITS: 100 INJECTION, SOLUTION INTRAVENOUS; SUBCUTANEOUS at 22:44

## 2021-04-11 NOTE — ASSESSMENT & PLAN NOTE
Wt Readings from Last 3 Encounters:   04/11/21 134 kg (295 lb 3 1 oz)   03/29/21 (!) 144 kg (317 lb 6 4 oz)   03/08/21 (!) 136 kg (300 lb 0 7 oz)     · Initially treated with lasix 40 mg IV BID, which was discontinued with worsening renal function  · The patient appears to has been adequately diuresed    · Continue PO coreg  · Daily weights  · Strict intake/output measurements  · Outpatient follow-up with Cardiology

## 2021-04-11 NOTE — ASSESSMENT & PLAN NOTE
· Possibly reactive in nature  · Will need outpatient surveillance imaging to ensure resolution of the inguinal lymphadenopathy    Results for Kurt Bradly (MRN [id number]) as of 4/11/2021 13:40   Ref   Range 4/11/2021 05:11   LD (LDH) Latest Ref Range: 81 - 234 U/L 175

## 2021-04-11 NOTE — PROGRESS NOTES
[address] [facility] [address] [place]  Progress Note Johnathan Gilliland 1945, 64 y o  male MRN: [id number]  Unit/Bed#: 424-01 Encounter: [id number]  Primary Care Provider: Mary Fontana MD   Date and time admitted to hospital: 4/5/2021  2:40 PM    * Acute kidney injury [facility])  Assessment & Plan  · Acute kidney injury was present on admission and likely secondary to cardiorenal syndrome and a possible component of acute tubular necrosis  · Baseline serum creatinine of 1 2-1 4 mg/dl  · Initially treated with lasix 40 mg IV BID, which was discontinued with worsening renal function  · The patient appears to have been adequately diuresed  · Avoid all nephrotoxic agents  · The patient was seen in consultation by Nephrology  · Serial laboratory testing to monitor the patient's renal function and electrolyte levels    Results from last 7 days   Lab Units 04/11/21  0510 04/10/21  0605 04/09/21  0508   SODIUM mmol/L 142 141 139   POTASSIUM mmol/L 4 2 4 2 4 0   CHLORIDE mmol/L 105 104 103   CO2 mmol/L 28 30 27   BUN mg/dL 54* 54* 48*   CREATININE mg/dL 1 79* 2 06* 1 98*   CALCIUM mg/dL 8 9 9 1 9 1     Results for Jaswinder Linda (MRN [id number]) as of 4/7/2021 14:19   Ref  Range 4/5/2021 19:24   EXT Creatinine Urine Latest Units: mg/dL 104 0   Protein Urine Random Latest Units: mg/dL 46   SODIUM URINE Unknown 75   Prot/Creat Ratio, Ur Latest Ref Range: 0 00 - 0 10  0 44 (H)       Lower extremity weakness  Assessment & Plan  · May require additional neurologic imaging to rule-out a CVA/stroke if his symptoms worsen  · The patient was evaluated by Physical Therapy and Occupational Therapy during the hospitalization  · Short-term rehabilitation was recommended upon discharge, but the patient is currently refusing short-term rehabilitation due to the fact that he does not want to quarantine for 14 days when he arrives to the SNF for short-term rehabilitation        Acute on chronic diastolic CHF (congestive heart failure) ([facility] [address] )  Assessment & Plan  Wt Readings from Last 3 Encounters:   04/11/21 134 kg (295 lb 3 1 oz)   03/29/21 (!) 144 kg (317 lb 6 4 oz)   03/08/21 (!) 136 kg (300 lb 0 7 oz)     · Initially treated with lasix 40 mg IV BID, which was discontinued with worsening renal function  · The patient appears to has been adequately diuresed  · Continue PO coreg  · Daily weights  · Strict intake/output measurements  · Outpatient follow-up with Cardiology        Bilateral pleural effusion  Assessment & Plan  · Likely secondary to acute on chronic diastolic CHF (congestive heart failure)  · Initially with a new supplemental oxygen requirement of 3 lpm via the nasal cannula to maintain oxygen saturation levels at 90% and above  · The bilateral pleural effusions resolved with IV lasix treatment  Constipation  Assessment & Plan  · Treat with colace 100 mg PO BID and miralax 17 grams PO x 1 dose on 04/11/2021  · Serial abdominal examinations    Obstructive sleep apnea  Assessment & Plan  · The patient has significant hypoxia with sleeping  · The patient has a CPAP machine at home, but the machine has been broken for several months  · I will discuss this problem with Case Management to see if the patient can be ordered a new CPAP machine  · Check an overnight sleep study/pulse oximetry to qualify the patient for home supplemental oxygen  · The patient will need a home supplemental oxygen evaluation on the day of discharge    · Outpatient follow-up with Pulmonology    Venous stasis dermatitis of both lower extremities  Assessment & Plan  · Does not appear to be any active infection at this time    Renal calculus  Assessment & Plan  · Outpatient Urology evaluation    Multiple renal cysts  Assessment & Plan  · Outpatient surveillance imaging with PCP    Atelectasis  Assessment & Plan  · Incentive spirometry 10 times per hour while awake    Inguinal lymphadenopathy  Assessment & Plan  · Possibly reactive in nature  · Will need outpatient surveillance imaging to ensure resolution of the inguinal lymphadenopathy    Results for Dominik Pap (MRN [id number]) as of 4/11/2021 13:40   Ref  Range 4/11/2021 05:11   LD (LDH) Latest Ref Range: 81 - 234 U/L 175       Abnormal EKG  Assessment & Plan  · Outpatient follow-up with Cardiology    ECG 12 lead  Order: [id number]  Status:  Final result   Visible to patient:  No (inaccessible in [address])   Next appt:  05/18/2021 at 12:40 PM in Cardiology Prakash Portillo MD)   Ref Range & Units 4/5/21 1515   Ventricular Rate BPM 75    Atrial Rate BPM 75    NM Interval ms 246    QRSD Interval ms 150    QT Interval ms 408    QTC Interval ms 455    P Axis degrees 60    QRS Axis degrees -80    T Wave Axis degrees 48       Narrative & Impression    Sinus rhythm with 1st degree A-V block  Left axis deviation  Right bundle branch block  Abnormal ECG  When compared with ECG of 08-MAR-2021 15:53,     Confirmed by Wilbert Green ([id number]) on 4/5/2021 3:26:16 PM      Specimen Collected: 04/05/21 15:15   Last Resulted: 04/05/21 15:26               Gait instability  Assessment & Plan  · May require additional neurologic imaging to rule-out a CVA/stroke if his symptoms worsen  · The patient was evaluated by Physical Therapy and Occupational Therapy during the hospitalization  · Short-term rehabilitation was recommended upon discharge, but the patient is currently refusing short-term rehabilitation due to the fact that he does not want to quarantine for 14 days when he arrives to the SNF for short-term rehabilitation      Multiple pulmonary nodules  Assessment & Plan  · Outpatient surveillance imaging with Pulmonology    Elevated d-dimer  Assessment & Plan  · Cannot have a CTA of the chest/PE protocol with acute kidney injury  · Normal V/Q lung scan on 04/06/2021  · The patient was initially treated with IV heparin drip/infusion per the VTE/PE (Raschke) protocol, which was discontinued on 04/06/2021 with a normal V/Q lung scan  · Utilize heparin 7500 Units SQ every 8 hours (increased dose based on his BMI) for DVT prophylaxis  VAS lower limb venous duplex study, complete bilateral  Status: Final result   PACS Images     Show images for VAS lower limb venous duplex study, complete bilateral   Study Result       [facility] REPORT  CLINICAL:  Indications:  Patient presents with bilateral lower extremity pain and swelling  Risk Factors  The patient has history of Obesity, HTN, Diabetes (Yes), CAD and previous  smoking (quit 1-5yrs ago)  He has no history of DVT  CONCLUSION:     Impression:  RIGHT LOWER LIMB:  No evidence of acute or chronic deep vein thrombosis  No evidence of superficial thrombophlebitis noted  Doppler evaluation shows a normal response to augmentation maneuvers  Popliteal, posterior tibial and anterior tibial arterial Doppler waveforms are  biphasic/monophasic  LEFT LOWER LIMB:  No evidence of acute or chronic deep vein thrombosis  No evidence of superficial thrombophlebitis noted  Doppler evaluation shows a normal response to augmentation maneuvers  Popliteal, posterior tibial and anterior tibial arterial Doppler waveforms are  biphasic/monophasic  Technical findings were tiger texted to Dr Jg Marlow       SIGNATURE:  Electronically Signed by: Dilia Del Real on 2021-04-05 04:25:35 PM         Elevated alkaline phosphatase level  Assessment & Plan  · Likely secondary to immobility    Type 2 diabetes mellitus with hyperglycemia, with long-term current use of insulin [facility])  Assessment & Plan  Lab Results   Component Value Date    HGBA1C 7 0 (H) 03/09/2021       Recent Labs     04/10/21  1555 04/10/21  2020 04/11/21  0725 04/11/21  1101   POCGLU 182* 229* 146* 224*       Blood Sugar Average: Last 72 hrs:  (P) 193 6     · Increase lantus to 25 Units SQ QHS  · Continue humalog 6 Units SQ TID with meals  · Hypoglycemia protocol  · Insulin sliding scale with blood glucose monitoring ACHS        VTE Pharmacologic Prophylaxis:   Pharmacologic: Heparin 7500 Units SQ every 8 hours (increased dose based on his BMI)  Mechanical VTE Prophylaxis in Place: Yes    Patient Centered Rounds: I have performed bedside rounds with nursing staff today  Time Spent for Care: 30 minutes  More than 50% of total time spent on counseling and coordination of care as described above  Current Length of Stay: 6 day(s)    Current Patient Status: Inpatient   Certification Statement: The patient will continue to require additional inpatient hospital stay due to the need for serial laboratory testing to monitor his renal function and electrolyte levels  Discharge Plan: The patient will be discharged home with VNA services/home PT/home OT in the next 24-48 hours if his acute kidney function continues to improve  The patient is refusing short-term rehabilitation SNF placement at this time  Code Status: Level 3 - DNAR and DNI      Subjective: The patient was seen and examined  The patient is doing better  The bilateral lower extremity weakness is improving  No chest pain  No shortness of breath  No abdominal pain  No nausea or vomiting  Objective:     Vitals:   Temp (24hrs), Av 1 °F (36 2 °C), Min:96 5 °F (35 8 °C), Max:97 7 °F (36 5 °C)    Temp:  [96 5 °F (35 8 °C)-97 7 °F (36 5 °C)] 96 5 °F (35 8 °C)  HR:  [64-69] 64  Resp:  [18-21] 21  BP: (134-147)/(62-69) 147/69  SpO2:  [86 %-96 %] 96 %  Body mass index is 47 65 kg/m²  Input and Output Summary (last 24 hours):        Intake/Output Summary (Last 24 hours) at 2021 1350  Last data filed at 2021 1249  Gross per 24 hour   Intake 1020 ml   Output --   Net 1020 ml       Physical Exam:     Physical Exam  General:  NAD, follows commands  HEENT:  NC/AT, mucous membranes moist  Neck:  Supple, No JVP elevation  CV:  + S1, + S2, RRR  Pulm:  Lung fields are CTA bilaterally  Abd:  Soft, Non-tender, Non-distended  Ext:  No clubbing/cyanosis/edema  Skin:  No rashes  Neuro:  Awake, alert, oriented  Psych:  Normal mood and affect      Additional Data:    Labs:    Results from last 7 days   Lab Units 04/11/21  0511   WBC Thousand/uL 5 69   HEMOGLOBIN g/dL 11 0*   HEMATOCRIT % 35 9*   PLATELETS Thousands/uL 253   NEUTROS PCT % 50   LYMPHS PCT % 30   MONOS PCT % 12   EOS PCT % 7*     Results from last 7 days   Lab Units 04/11/21  0510   SODIUM mmol/L 142   POTASSIUM mmol/L 4 2   CHLORIDE mmol/L 105   CO2 mmol/L 28   BUN mg/dL 54*   CREATININE mg/dL 1 79*   ANION GAP mmol/L 9   CALCIUM mg/dL 8 9   ALBUMIN g/dL 2 8*   TOTAL BILIRUBIN mg/dL 0 37   ALK PHOS U/L 138*   ALT U/L 25   AST U/L 20   GLUCOSE RANDOM mg/dL 182*     Results from last 7 days   Lab Units 04/05/21  1456   INR  1 13     Results from last 7 days   Lab Units 04/11/21  1101 04/11/21  0725 04/10/21  2020 04/10/21  1555 04/10/21  1109 04/10/21  0917 04/10/21  0708 04/09/21  2055 04/09/21  1639 04/09/21  1113 04/09/21  0734 04/08/21  2036   POC GLUCOSE mg/dl 224* 146* 229* 182* 224* 264* 163* 192* 164* 189* 161* 201*         Results from last 7 days   Lab Units 04/08/21  0502 04/07/21  0418 04/06/21  0519 04/05/21  1456   LACTIC ACID mmol/L  --   --  0 7 1 4   PROCALCITONIN ng/ml 0 10 0 09 0 06  --            * I Have Reviewed All Lab Data Listed Above  * Additional Pertinent Lab Tests Reviewed: [address] 64 Admission Reviewed      Recent Cultures (last 7 days):     Results from last 7 days   Lab Units 04/05/21  1926 04/05/21  1517 04/05/21  1516   BLOOD CULTURE   --  No Growth After 5 Days  No Growth After 5 Days     URINE CULTURE  No Growth <1000 cfu/mL  --   --        Last 24 Hours Medication List:   Current Facility-Administered Medications   Medication Dose Route Frequency Provider Last Rate    acetaminophen  650 mg Oral Q6H PRN [facility], DO      amLODIPine  10 mg Oral Daily [facility], DO      aspirin  81 mg Oral Daily [facility], DO      atorvastatin  40 mg Oral After Cisco Bae, DO      carvedilol  12 5 mg Oral BID Rafael Sandhu DO      cholecalciferol  2,000 Units Oral Daily Rafael Sandhu,       docusate sodium  100 mg Oral BID Rafael Sandhu DO      heparin (porcine)  7,500 Units Subcutaneous [facility] [address] Rafael Sandhu [place]      HYDROmorphone  0 5 mg Intravenous Q4H PRN Rafael Sandhu DO      insulin glargine  25 Units Subcutaneous HS Rafael Sandhu,       insulin lispro  1-6 Units Subcutaneous TID [facility], DO      insulin lispro  1-6 Units Subcutaneous HS Rafael Sandhu, DO      insulin lispro  6 Units Subcutaneous TID With Meals Rafael Sandhu,       nystatin   Topical BID Rafael Sandhu,       oxyCODONE  5 mg Oral Q4H PRN Rafael Sandhu,       Or    oxyCODONE  10 mg Oral Q4H PRN Rafael Sandhu,       polyethylene glycol  17 g Oral Daily PRN Rafael Sandhu,       tamsulosin  0 4 mg Oral QPM Rafael Sandhu DO          Today, Patient Was Seen By: Rafael Sandhu DO    ** Please Note: Dictation voice to text software may have been used in the creation of this document   **

## 2021-04-11 NOTE — PROGRESS NOTES
Progress Note - Nephrology   Joana Curran 64 y o  male MRN: [id number]  Unit/Bed#: 424-01 Encounter: [id number]    A/P:  1  Acute kidney injury present on admission  Admitted with a creatinine 2 16 which had trended down to 1 79 mg/dL today  Diuretics were held due to worsening kidney function  Etiology based on urine studies of acute decompensation kidney function is acute tubular necrosis due to volume overload, use of ACEI initially and episodes of hypotension superimposed on chronic kidney disease  - He appears compensated but as he has had  Partial renal recovery , he can have diuretic therapy if needed  Today his lungs sound clear and has dec edema   - will have renal outpatient follow up     2  Chronic kidney disease stage IIIA  Baseline creatinine 1 3-1 4 mg/dL  He may have underlying diabetic nephropathy in view of proteinuria  CT of the abdomen demonstrated unremarkable kidneys with a small 3 mm nonobstructing right renal stone  Would benefit from ACE I when kidney function improved    3  Acute on chronic diastolic congestive failure  As above    4   Hypertensive nephrosclerosis  Blood pressure is well controlled        Follow up reason for today's visit:  Acute kidney injury/chronic kidney disease stage III    Acute kidney injury [facility])    Patient Active Problem List   Diagnosis    Dyspnea on exertion    Type 2 diabetes mellitus with hypoglycemia without coma, with long-term current use of insulin ([facility])    CKD (chronic kidney disease)    Chronic diastolic CHF (congestive heart failure) ([facility] )    Morbid obesity with BMI of 50 0-59 9, adult ([facility] )    Essential hypertension    Type 2 diabetes mellitus with hyperglycemia, with long-term current use of insulin ([facility])    Complicated UTI (urinary tract infection)    Uncontrolled type 2 diabetes mellitus with hyperglycemia, with long-term current use of insulin ([facility])    Acute kidney injury ([facility] )    Dehydration with hyponatremia    Hypoglycemia    Acute cystitis    Opiate dependence, continuous (HCC)    Paresthesia of left upper extremity    Neural foraminal stenosis of cervical spine    Acute pain of right lower extremity    Elevated TSH    Pressure injury of skin of right buttock    Ambulatory dysfunction    UTI (urinary tract infection)    Accelerated hypertension    Hypercalcemia    Open wound of right lower extremity    Cellulitis of right lower extremity    Elevated alkaline phosphatase level    Elevated d-dimer    Elevated parathyroid hormone    Pyuria    Microscopic hematuria    Multiple pulmonary nodules    Gait instability    Abnormal EKG    Cigarette nicotine dependence in remission    Inguinal lymphadenopathy    Bilateral pleural effusion    Atelectasis    Acute on chronic diastolic CHF (congestive heart failure) ([facility])    Multiple renal cysts    Renal calculus    Venous stasis dermatitis of both lower extremities    Lower extremity weakness    Obstructive sleep apnea    Constipation         Subjective:   A 10 point review of systems is negative    Objective:     Vitals: Blood pressure 147/69, pulse 64, temperature (!) 96 5 °F (35 8 °C), resp  rate 21, height 5' 6" (1 676 m), weight 134 kg (295 lb 3 1 oz), SpO2 96 %  ,Body mass index is 47 65 kg/m²  Weight (last 2 days)     Date/Time   Weight    04/11/21 0600   134 (295 2)    04/10/21 0600   134 (295 42)    04/09/21 0600   134 (296 08)                Intake/Output Summary (Last 24 hours) at 4/11/2021 1354  Last data filed at 4/11/2021 1249  Gross per 24 hour   Intake 1020 ml   Output --   Net 1020 ml     I/O last 3 completed shifts:   In: 840 [P O :840]  Out: 200 [Urine:200]         Physical Exam: /69   Pulse 64   Temp (!) 96 5 °F (35 8 °C)   Resp 21   Ht 5' 6" (1 676 m)   Wt 134 kg (295 lb 3 1 oz)   SpO2 96%   BMI 47 65 kg/m²     General Appearance:    Alert, obese cooperative, no distress, appears stated age   Head:    Normocephalic, without obvious abnormality, atraumatic   Eyes:    Conjunctiva/corneas clear   Ears:    Normal external ears   Nose:   Nares normal, septum midline, mucosa normal, no drainage    or sinus tenderness   Throat:   Lips, mucosa, and tongue normal; teeth and gums normal   Neck:   Supple, symmetrical, trachea midline, no adenopathy;        thyroid:  No enlargement/tenderness/nodules; no carotid    bruit or JVD   Back:     Symmetric, no curvature, ROM normal, no CVA tenderness   Lungs:     Clear to auscultation bilaterally, respirations unlabored   Chest wall:    No tenderness or deformity   Heart:    Regular rate and rhythm, S1 and S2 normal, no murmur, rub   or gallop   Abdomen:     Soft, non-tender, bowel sounds active   Extremities:   Extremities normal, atraumatic, no cyanosis or edema   Skin:   Skin color, texture, turgor normal, no rashes or lesions   Lymph nodes:   Cervical normal   Neurologic:   CNII-XII intact            Lab, Imaging and other studies: I have personally reviewed pertinent labs  CBC:   Lab Results   Component Value Date    WBC 5 69 04/11/2021    HGB 11 0 (L) 04/11/2021    HCT 35 9 (L) 04/11/2021    MCV 88 04/11/2021     04/11/2021    MCH 27 0 04/11/2021    MCHC 30 6 (L) 04/11/2021    RDW 14 7 04/11/2021    MPV 10 3 04/11/2021    NRBC 0 04/11/2021     CMP:   Lab Results   Component Value Date    K 4 2 04/11/2021     04/11/2021    CO2 28 04/11/2021    BUN 54 (H) 04/11/2021    CREATININE 1 79 (H) 04/11/2021    CALCIUM 8 9 04/11/2021    AST 20 04/11/2021    ALT 25 04/11/2021    ALKPHOS 138 (H) 04/11/2021    EGFR 36 04/11/2021           Results from last 7 days   Lab Units 04/11/21  0510 04/10/21  0605 04/09/21  0508 04/08/21  0502   POTASSIUM mmol/L 4 2 4 2 4 0 4 0   CHLORIDE mmol/L 105 104 103 103   CO2 mmol/L 28 30 27 31   BUN mg/dL 54* 54* 48* 37*   CREATININE mg/dL 1 79* 2 06* 1 98* 1 81*   CALCIUM mg/dL 8 9 9 1 9 1 8 9   ALK PHOS U/L 138*  --  145* 144*   ALT U/L 25  --  17 17   AST U/L 20  --  13 12         Phosphorus:   Lab Results   Component Value Date    PHOS 3 5 04/11/2021     Magnesium:   Lab Results   Component Value Date    MG 2 3 04/11/2021     Urinalysis: No results found for: COLORU, CLARITYU, SPECGRAV, PHUR, LEUKOCYTESUR, NITRITE, PROTEINUA, GLUCOSEU, KETONESU, BILIRUBINUR, BLOODU  Ionized Calcium: No results found for: CAION  Coagulation: No results found for: PT, INR, APTT  Troponin: No results found for: TROPONINI  ABG: No results found for: PHART, MSU5OON, PO2ART, RSM8JHA, W3OUJWMF, BEART, SOURCE  Radiology review:     IMAGING  No results found      Current Facility-Administered Medications   Medication Dose Route Frequency    acetaminophen (TYLENOL) tablet 650 mg  650 mg Oral Q6H PRN    amLODIPine (NORVASC) tablet 10 mg  10 mg Oral Daily    aspirin (ECOTRIN LOW STRENGTH) EC tablet 81 mg  81 mg Oral Daily    atorvastatin (LIPITOR) tablet 40 mg  40 mg Oral After Dinner    carvedilol (COREG) tablet 12 5 mg  12 5 mg Oral BID    cholecalciferol (VITAMIN D3) tablet 2,000 Units  2,000 Units Oral Daily    docusate sodium (COLACE) capsule 100 mg  100 mg Oral BID    heparin (porcine) subcutaneous injection 7,500 Units  7,500 Units Subcutaneous Q8H [facility]    HYDROmorphone (DILAUDID) injection 0 5 mg  0 5 mg Intravenous Q4H PRN    insulin glargine (LANTUS) subcutaneous injection 25 Units 0 25 mL  25 Units Subcutaneous HS    insulin lispro (HumaLOG) 100 units/mL subcutaneous injection 1-6 Units  1-6 Units Subcutaneous TID AC    insulin lispro (HumaLOG) 100 units/mL subcutaneous injection 1-6 Units  1-6 Units Subcutaneous HS    insulin lispro (HumaLOG) 100 units/mL subcutaneous injection 6 Units  6 Units Subcutaneous TID With Meals    nystatin (MYCOSTATIN) powder   Topical BID    oxyCODONE (ROXICODONE) IR tablet 5 mg  5 mg Oral Q4H PRN    Or    oxyCODONE (ROXICODONE) immediate release tablet 10 mg  10 mg Oral Q4H PRN    polyethylene glycol (MIRALAX) packet 17 g  17 g Oral Daily PRN    tamsulosin (FLOMAX) capsule 0 4 mg  0 4 mg Oral QPM     Medications Discontinued During This Encounter   Medication Reason    heparin (VTE/PE) high     heparin (porcine) 25,000 units in 0 45% NaCl 250 mL infusion (premix)     heparin (porcine) injection 10,000 Units     heparin (porcine) injection 5,000 Units     insulin glargine (LANTUS) subcutaneous injection 10 Units 0 1 mL     heparin (porcine) subcutaneous injection 5,000 Units     insulin lispro (HumaLOG) 100 units/mL subcutaneous injection 3 Units     furosemide (LASIX) injection 40 mg     insulin glargine (LANTUS) subcutaneous injection 15 Units 0 15 mL     insulin lispro (HumaLOG) 100 units/mL subcutaneous injection 4 Units     insulin glargine (LANTUS) subcutaneous injection 20 Units 0 2 mL     insulin lispro (HumaLOG) 100 units/mL subcutaneous injection 5 Units     insulin glargine (LANTUS) subcutaneous injection 22 Units 0 22 mL        Misha Bernabe MD      This progress note was produced in part using a dictation device which may document imprecise wording from author's original intent

## 2021-04-11 NOTE — ASSESSMENT & PLAN NOTE
· Acute kidney injury was present on admission and likely secondary to cardiorenal syndrome and a possible component of acute tubular necrosis  · Baseline serum creatinine of 1 2-1 4 mg/dl  · Initially treated with lasix 40 mg IV BID, which was discontinued with worsening renal function  · The patient appears to have been adequately diuresed  · Avoid all nephrotoxic agents  · The patient was seen in consultation by Nephrology  · Serial laboratory testing to monitor the patient's renal function and electrolyte levels    Results from last 7 days   Lab Units 04/11/21  0510 04/10/21  0605 04/09/21  0508   SODIUM mmol/L 142 141 139   POTASSIUM mmol/L 4 2 4 2 4 0   CHLORIDE mmol/L 105 104 103   CO2 mmol/L 28 30 27   BUN mg/dL 54* 54* 48*   CREATININE mg/dL 1 79* 2 06* 1 98*   CALCIUM mg/dL 8 9 9 1 9 1     Results for Mihai Bacon (MRN [id number]) as of 4/7/2021 14:19   Ref   Range 4/5/2021 19:24   EXT Creatinine Urine Latest Units: mg/dL 104 0   Protein Urine Random Latest Units: mg/dL 46   SODIUM URINE Unknown 75   Prot/Creat Ratio, Ur Latest Ref Range: 0 00 - 0 10  0 44 (H)

## 2021-04-11 NOTE — ASSESSMENT & PLAN NOTE
· Outpatient follow-up with Cardiology    ECG 12 lead  Order: [id number]  Status:  Final result   Visible to patient:  No (inaccessible in [address])   Next appt:  05/18/2021 at 12:40 PM in Cardiology Sadie Aranda MD)   Ref Range & Units 4/5/21 1515   Ventricular Rate BPM 75    Atrial Rate BPM 75    MN Interval ms 246    QRSD Interval ms 150    QT Interval ms 408    QTC Interval ms 455    P Axis degrees 60    QRS Axis degrees -80    T Wave Axis degrees 48       Narrative & Impression    Sinus rhythm with 1st degree A-V block  Left axis deviation  Right bundle branch block  Abnormal ECG  When compared with ECG of 08-MAR-2021 15:53,     Confirmed by Romulo Mora [phone] on 4/5/2021 3:26:16 PM      Specimen Collected: 04/05/21 15:15   Last Resulted: 04/05/21 15:26

## 2021-04-11 NOTE — ASSESSMENT & PLAN NOTE
· Treat with colace 100 mg PO BID and miralax 17 grams PO x 1 dose on 04/11/2021  · Serial abdominal examinations

## 2021-04-11 NOTE — ASSESSMENT & PLAN NOTE
· Likely secondary to acute on chronic diastolic CHF (congestive heart failure)  · Initially with a new supplemental oxygen requirement of 3 lpm via the nasal cannula to maintain oxygen saturation levels at 90% and above  · The bilateral pleural effusions resolved with IV lasix treatment

## 2021-04-11 NOTE — ASSESSMENT & PLAN NOTE
Lab Results   Component Value Date    HGBA1C 7 0 (H) 03/09/2021       Recent Labs     04/10/21  1555 04/10/21  2020 04/11/21  0725 04/11/21  1101   POCGLU 182* 229* 146* 224*       Blood Sugar Average: Last 72 hrs:  (P) 193 6     · Increase lantus to 25 Units SQ QHS  · Continue humalog 6 Units SQ TID with meals  · Hypoglycemia protocol  · Insulin sliding scale with blood glucose monitoring ACHS

## 2021-04-11 NOTE — ASSESSMENT & PLAN NOTE
· May require additional neurologic imaging to rule-out a CVA/stroke if his symptoms worsen  · The patient was evaluated by Physical Therapy and Occupational Therapy during the hospitalization  · Short-term rehabilitation was recommended upon discharge, but the patient is currently refusing short-term rehabilitation due to the fact that he does not want to quarantine for 14 days when he arrives to the SN[facility] for short-term rehabilitation

## 2021-04-11 NOTE — PLAN OF CARE
Problem: Potential for Falls  Goal: Patient will remain free of falls  Description: INTERVENTIONS:  - Assess patient frequently for physical needs  -  Identify cognitive and physical deficits and behaviors that affect risk of falls    -  [place] fall precautions as indicated by assessment   - Educate patient/family on patient safety including physical limitations  - Instruct patient to call for assistance with activity based on assessment  - Modify environment to reduce risk of injury  - Consider OT/PT consult to assist with strengthening/mobility  Outcome: Progressing     Problem: Prexisting or High Potential for Compromised Skin Integrity  Goal: Skin integrity is maintained or improved  Description: INTERVENTIONS:  - Identify patients at risk for skin breakdown  - Assess and monitor skin integrity  - Assess and monitor nutrition and hydration status  - Monitor labs   - Assess for incontinence   - Turn and reposition patient  - Assist with mobility/ambulation  - Relieve pressure over bony prominences  - Avoid friction and shearing  - Provide appropriate hygiene as needed including keeping skin clean and dry  - Evaluate need for skin moisturizer/barrier cream  - Collaborate with interdisciplinary team   - Patient/family teaching  - Consider wound care consult   Outcome: Progressing     Problem: PAIN - ADULT  Goal: Verbalizes/displays adequate comfort level or baseline comfort level  Description: Interventions:  - Encourage patient to monitor pain and request assistance  - Assess pain using appropriate pain scale  - Administer analgesics based on type and severity of pain and evaluate response  - Implement non-pharmacological measures as appropriate and evaluate response  - Consider cultural and social influences on pain and pain management  - Notify physician/advanced practitioner if interventions unsuccessful or patient reports new pain  Outcome: Progressing     Problem: INFECTION - ADULT  Goal: Absence or prevention of progression during hospitalization  Description: INTERVENTIONS:  - Assess and monitor for signs and symptoms of infection  - Monitor lab/diagnostic results  - Monitor all insertion sites, i e  indwelling lines, tubes, and drains  - Monitor endotracheal if appropriate and nasal secretions for changes in amount and color  - [place] appropriate cooling/warming therapies per order  - Administer medications as ordered  - Instruct and encourage patient and family to use good hand hygiene technique  - Identify and instruct in appropriate isolation precautions for identified infection/condition  Outcome: Progressing     Problem: SAFETY ADULT  Goal: Patient will remain free of falls  Description: INTERVENTIONS:  - Assess patient frequently for physical needs  -  Identify cognitive and physical deficits and behaviors that affect risk of falls    -  [place] fall precautions as indicated by assessment   - Educate patient/family on patient safety including physical limitations  - Instruct patient to call for assistance with activity based on assessment  - Modify environment to reduce risk of injury  - Consider OT/PT consult to assist with strengthening/mobility  Outcome: Progressing  Goal: Maintain or return to baseline ADL function  Description: INTERVENTIONS:  -  Assess patient's ability to carry out ADLs; assess patient's baseline for ADL function and identify physical deficits which impact ability to perform ADLs (bathing, care of mouth/teeth, toileting, grooming, dressing, etc )  - Assess/evaluate cause of self-care deficits   - Assess range of motion  - Assess patient's mobility; develop plan if impaired  - Assess patient's need for assistive devices and provide as appropriate  - Encourage maximum independence but intervene and supervise when necessary  - Involve family in performance of ADLs  - Assess for home care needs following discharge   - Consider OT consult to assist with ADL evaluation and planning for discharge  - Provide patient education as appropriate  Outcome: Progressing  Goal: Maintain or return mobility status to optimal level  Description: INTERVENTIONS:  - Assess patient's baseline mobility status (ambulation, transfers, stairs, etc )    - Identify cognitive and physical deficits and behaviors that affect mobility  - Identify mobility aids required to assist with transfers and/or ambulation (gait belt, sit-to-stand, lift, walker, cane, etc )  - [place] fall precautions as indicated by assessment  - Record patient progress and toleration of activity level on Mobility SBAR; progress patient to next Phase/Stage  - Instruct patient to call for assistance with activity based on assessment  - Consider rehabilitation consult to assist with strengthening/weightbearing, etc   Outcome: Progressing     Problem: DISCHARGE PLANNING  Goal: Discharge to home or other facility with appropriate resources  Description: INTERVENTIONS:  - Identify barriers to discharge w/patient and caregiver  - Arrange for needed discharge resources and transportation as appropriate  - Identify discharge learning needs (meds, wound care, etc )  - Arrange for interpretive services to assist at discharge as needed  - Refer to Case Management Department for coordinating discharge planning if the patient needs post-hospital services based on physician/advanced practitioner order or complex needs related to functional status, cognitive ability, or social support system  Outcome: Progressing     Problem: Knowledge Deficit  Goal: Patient/family/caregiver demonstrates understanding of disease process, treatment plan, medications, and discharge instructions  Description: Complete learning assessment and assess knowledge base    Interventions:  - Provide teaching at level of understanding  - Provide teaching via preferred learning methods  Outcome: Progressing     Problem: METABOLIC, FLUID AND ELECTROLYTES - ADULT  Goal: Glucose maintained within target range  Description: INTERVENTIONS:  - Monitor Blood Glucose as ordered  - Assess for signs and symptoms of hyperglycemia and hypoglycemia  - Administer ordered medications to maintain glucose within target range  - Assess nutritional intake and initiate nutrition service referral as needed  Outcome: Progressing     Problem: SKIN/TISSUE INTEGRITY - ADULT  Goal: Skin integrity remains intact  Description: INTERVENTIONS  - Identify patients at risk for skin breakdown  - Assess and monitor skin integrity  - Assess and monitor nutrition and hydration status  - Monitor labs (i e  albumin)  - Assess for incontinence   - Turn and reposition patient  - Assist with mobility/ambulation  - Relieve pressure over bony prominences  - Avoid friction and shearing  - Provide appropriate hygiene as needed including keeping skin clean and dry  - Evaluate need for skin moisturizer/barrier cream  - Collaborate with interdisciplinary team (i e  Nutrition, Rehabilitation, etc )   - Patient/family teaching  Outcome: Progressing     Problem: Nutrition/Hydration-ADULT  Goal: Nutrient/Hydration intake appropriate for improving, restoring or maintaining nutritional needs  Description: Monitor and assess patient's nutrition/hydration status for malnutrition  Collaborate with interdisciplinary team and initiate plan and interventions as ordered  Monitor patient's weight and dietary intake as ordered or per policy  Utilize nutrition screening tool and intervene as necessary  Determine patient's food preferences and provide high-protein, high-caloric foods as appropriate       INTERVENTIONS:  - Monitor oral intake, urinary output, labs, and treatment plans  - Assess nutrition and hydration status and recommend course of action  - Evaluate amount of meals eaten  - Assist patient with eating if necessary   - Allow adequate time for meals  - Recommend/ encourage appropriate diets, oral nutritional supplements, and vitamin/mineral supplements  - Order, calculate, and assess calorie counts as needed  - Recommend, monitor, and adjust tube feedings and TPN/PPN based on assessed needs  - Assess need for intravenous fluids  - Provide specific nutrition/hydration education as appropriate  - Include patient/family/caregiver in decisions related to nutrition  Outcome: Progressing

## 2021-04-11 NOTE — ASSESSMENT & PLAN NOTE
· The patient has significant hypoxia with sleeping  · The patient has a CPAP machine at home, but the machine has been broken for several months  · I will discuss this problem with Case Management to see if the patient can be ordered a new CPAP machine  · Check an overnight sleep study/pulse oximetry to qualify the patient for home supplemental oxygen  · The patient will need a home supplemental oxygen evaluation on the day of discharge    · Outpatient follow-up with Pulmonology

## 2021-04-11 NOTE — RESPIRATORY THERAPY NOTE
04/10/21 2049   Oxygen Therapy/Pulse Ox   O2 Device None (Room air)   O2 Therapy Room air   SpO2 (!) 89 %   SpO2 Activity At Rest   $ Pulse Oximetry Spot Check Charge Completed       Patient is sleeping in his chair  His oxygen saturation is 89%, Dr Latasha Valenzuela wants patient to be 90% and above  There is an order for pulse oximeter overnight, the machine is down until Monday, Dr Latasha Valenzuela has been notified  Patient does not want to use the pap machine  I have placed him on 2 lpm oxygen via nasal cannula   Patient denies sob

## 2021-04-11 NOTE — PLAN OF CARE
Pt is A/O x3, pt has RT FA IV Saline Locked at this time, pt has not complained of pain this shift, pt is tolerating his Carb Control/Cardiac Diet well, pt is wearing NC at 2L/min sating at 95-96% while sleeping, pt is sleeping at ths time in his bed, call light is within reach and the bed is in the lowest and locked position

## 2021-04-11 NOTE — ASSESSMENT & PLAN NOTE
· Cannot have a CTA of the chest/PE protocol with acute kidney injury  · Normal V/Q lung scan on 04/06/2021  · The patient was initially treated with IV heparin drip/infusion per the VTE/PE (Raschke) protocol, which was discontinued on 04/06/2021 with a normal V/Q lung scan  · Utilize heparin 7500 Units SQ every 8 hours (increased dose based on his BMI) for DVT prophylaxis  VAS lower limb venous duplex study, complete bilateral  Status: Final result   PACS Images     Show images for VAS lower limb venous duplex study, complete bilateral   Study Result       [facility] REPORT  CLINICAL:  Indications:  Patient presents with bilateral lower extremity pain and swelling  Risk Factors  The patient has history of Obesity, HTN, Diabetes (Yes), CAD and previous  smoking (quit 1-5yrs ago)  He has no history of DVT  CONCLUSION:     Impression:  RIGHT LOWER LIMB:  No evidence of acute or chronic deep vein thrombosis  No evidence of superficial thrombophlebitis noted  Doppler evaluation shows a normal response to augmentation maneuvers  Popliteal, posterior tibial and anterior tibial arterial Doppler waveforms are  biphasic/monophasic  LEFT LOWER LIMB:  No evidence of acute or chronic deep vein thrombosis  No evidence of superficial thrombophlebitis noted  Doppler evaluation shows a normal response to augmentation maneuvers  Popliteal, posterior tibial and anterior tibial arterial Doppler waveforms are  biphasic/monophasic  Technical findings were tiger texted to Dr Marian Taveras       SIGNATURE:  Electronically Signed by: Jacob Mayorga on 2021-04-05 04:25:35 PM

## 2021-04-12 VITALS
TEMPERATURE: 97.9 F | WEIGHT: 292.99 LBS | HEART RATE: 69 BPM | DIASTOLIC BLOOD PRESSURE: 75 MMHG | OXYGEN SATURATION: 92 % | RESPIRATION RATE: 18 BRPM | BODY MASS INDEX: 47.09 KG/M2 | HEIGHT: 66 IN | SYSTOLIC BLOOD PRESSURE: 115 MMHG

## 2021-04-12 LAB
ALBUMIN SERPL BCP-MCNC: 3 G/DL (ref 3.5–5)
ALP SERPL-CCNC: 145 U/L (ref 46–116)
ALT SERPL W P-5'-P-CCNC: 29 U/L (ref 12–78)
ANION GAP SERPL CALCULATED.3IONS-SCNC: 7 MMOL/L (ref 4–13)
AST SERPL W P-5'-P-CCNC: 27 U/L (ref 5–45)
BILIRUB SERPL-MCNC: 0.3 MG/DL (ref 0.2–1)
BUN SERPL-MCNC: 42 MG/DL (ref 5–25)
CALCIUM ALBUM COR SERPL-MCNC: 10.5 MG/DL (ref 8.3–10.1)
CALCIUM SERPL-MCNC: 9.7 MG/DL (ref 8.3–10.1)
CHLORIDE SERPL-SCNC: 106 MMOL/L (ref 100–108)
CO2 SERPL-SCNC: 27 MMOL/L (ref 21–32)
CREAT SERPL-MCNC: 1.43 MG/DL (ref 0.6–1.3)
ERYTHROCYTE [DISTWIDTH] IN BLOOD BY AUTOMATED COUNT: 14.6 % (ref 11.6–15.1)
GFR SERPL CREATININE-BSD FRML MDRD: 47 ML/MIN/1.73SQ M
GLUCOSE SERPL-MCNC: 203 MG/DL (ref 65–140)
GLUCOSE SERPL-MCNC: 204 MG/DL (ref 65–140)
GLUCOSE SERPL-MCNC: 244 MG/DL (ref 65–140)
HCT VFR BLD AUTO: 36.7 % (ref 36.5–49.3)
HGB BLD-MCNC: 11.1 G/DL (ref 12–17)
MCH RBC QN AUTO: 26.4 PG (ref 26.8–34.3)
MCHC RBC AUTO-ENTMCNC: 30.2 G/DL (ref 31.4–37.4)
MCV RBC AUTO: 87 FL (ref 82–98)
PLATELET # BLD AUTO: 258 THOUSANDS/UL (ref 149–390)
PMV BLD AUTO: 10.2 FL (ref 8.9–12.7)
POTASSIUM SERPL-SCNC: 4 MMOL/L (ref 3.5–5.3)
PROCALCITONIN SERPL-MCNC: <0.05 NG/ML
PROT SERPL-MCNC: 7.6 G/DL (ref 6.4–8.2)
RBC # BLD AUTO: 4.2 MILLION/UL (ref 3.88–5.62)
SODIUM SERPL-SCNC: 140 MMOL/L (ref 136–145)
WBC # BLD AUTO: 6.16 THOUSAND/UL (ref 4.31–10.16)

## 2021-04-12 PROCEDURE — 84145 PROCALCITONIN (PCT): CPT | Performed by: INTERNAL MEDICINE

## 2021-04-12 PROCEDURE — 85027 COMPLETE CBC AUTOMATED: CPT | Performed by: INTERNAL MEDICINE

## 2021-04-12 PROCEDURE — 97530 THERAPEUTIC ACTIVITIES: CPT

## 2021-04-12 PROCEDURE — 97116 GAIT TRAINING THERAPY: CPT

## 2021-04-12 PROCEDURE — 80053 COMPREHEN METABOLIC PANEL: CPT | Performed by: INTERNAL MEDICINE

## 2021-04-12 PROCEDURE — 99239 HOSP IP/OBS DSCHRG MGMT >30: CPT | Performed by: INTERNAL MEDICINE

## 2021-04-12 PROCEDURE — 82948 REAGENT STRIP/BLOOD GLUCOSE: CPT

## 2021-04-12 RX ADMIN — DOCUSATE SODIUM 100 MG: 100 CAPSULE, LIQUID FILLED ORAL at 08:36

## 2021-04-12 RX ADMIN — AMLODIPINE BESYLATE 10 MG: 10 TABLET ORAL at 08:36

## 2021-04-12 RX ADMIN — ASPIRIN 81 MG: 81 TABLET, COATED ORAL at 08:36

## 2021-04-12 RX ADMIN — NYSTATIN 1 APPLICATION: 100000 POWDER TOPICAL at 08:39

## 2021-04-12 RX ADMIN — INSULIN LISPRO 2 UNITS: 100 INJECTION, SOLUTION INTRAVENOUS; SUBCUTANEOUS at 08:38

## 2021-04-12 RX ADMIN — CARVEDILOL 12.5 MG: 12.5 TABLET, FILM COATED ORAL at 08:36

## 2021-04-12 RX ADMIN — INSULIN LISPRO 3 UNITS: 100 INJECTION, SOLUTION INTRAVENOUS; SUBCUTANEOUS at 11:27

## 2021-04-12 RX ADMIN — Medication 2000 UNITS: at 08:36

## 2021-04-12 RX ADMIN — HEPARIN SODIUM 7500 UNITS: 5000 INJECTION INTRAVENOUS; SUBCUTANEOUS at 05:23

## 2021-04-12 NOTE — ASSESSMENT & PLAN NOTE
Lab Results   Component Value Date    HGBA1C 7 0 (H) 03/09/2021       Recent Labs     04/11/21  1101 04/11/21  1549 04/11/21 2059 04/12/21  1116   POCGLU 224* 309* 267* 244*       Blood Sugar Average: Last 72 hrs:  (P) 211 [id number]     · Hemoglobin A1c appears ago  Continue home Lantus dose at time of discharge  Was maintained on sliding scale coverage and ADA diet

## 2021-04-12 NOTE — ASSESSMENT & PLAN NOTE
The patient was evaluated by Physical Therapy and Occupational Therapy during the hospitalization  Short-term rehabilitation was recommended upon discharge, but patient continues to refuse short-term rehabilitation due to the fact that he does not want to quarantine for 14 days upon arrival to the SNF, and does not wish to be without his spouse  He reportedly has strong social support at home with his wife, son, and daughter-in-law  Home health will be provided upon discharge

## 2021-04-12 NOTE — CASE MANAGEMENT
[facility] accepted pt  All information given to Dr Miguel Gonzalez to write script for CPAP  Once completed will fax to Daria He at [phone]   Tentative delivery of CPAP is tomorrow 4/13

## 2021-04-12 NOTE — PLAN OF CARE
Problem: PHYSICAL THERAPY ADULT  Goal: Performs mobility at highest level of function for planned discharge setting  See evaluation for individualized goals  Description: Treatment/Interventions: Functional transfer training, LE strengthening/ROM, Therapeutic exercise, Endurance training, Bed mobility, Gait training          See flowsheet documentation for full assessment, interventions and recommendations  Outcome: Progressing  Note: Prognosis: Good  Problem List: Decreased strength, Decreased endurance, Impaired balance, Decreased mobility, Obesity, Decreased skin integrity  Assessment: Pt  seen for PT treatment session this date with interventions consisting of   transfers and  gait training w/ emphasis on improving pt's ability to ambulate  Pt  Currently performing  tx and ambulation at ( SUP) x 1 level of function  The patient's AM-PAC Basic Mobility Inpatient Short Form Raw Score is 21, Standardized Score is 45 55  A standardized score greater than 42 9 suggests the patient may benefit from discharge to home  Please also refer to physical therapy recommendation for safe DC planning  In comparison to previous session, Pt  With improvements in activity tolerance  Pt is in need of continued activity in PT to improve strength balance endurance mobility transfers and ambulation with return to maximize LOF  From PT/mobility standpoint, recommendation at time of d/c would be home PT  in order to promote return to PLOF and independence  PT Discharge Recommendation: Home with skilled therapy          See flowsheet documentation for full assessment

## 2021-04-12 NOTE — RESPIRATORY THERAPY NOTE
04/11/21 2100   Oxygen Therapy/Pulse Ox   O2 Device Nasal cannula   O2 Therapy Oxygen   Nasal Cannula O2 Flow Rate (L/min) 2 L/min   Calculated FIO2 (%) - Nasal Cannula 28   SpO2 Activity At Rest   $ Pulse Oximetry Spot Check Charge Completed       Patient received sleeping on his left side, he is not in respiratory distress  Patient has been refusing pap therapy   No respiratory intervention needed at this time

## 2021-04-12 NOTE — PLAN OF CARE
Pt is A/O x3, pt has RT FA IV Saline locked at this time, pt has not complained of pain this shift, pt is tolerating his Carb/Cardiac diet well, pt is sleeping in his bed a this time, call light is within reach, bed is in lowest and locked position and the bed alarm is on for pt safety

## 2021-04-12 NOTE — PLAN OF CARE
Problem: Potential for Falls  Goal: Patient will remain free of falls  Description: INTERVENTIONS:  - Assess patient frequently for physical needs  -  Identify cognitive and physical deficits and behaviors that affect risk of falls    -  [place] fall precautions as indicated by assessment   - Educate patient/family on patient safety including physical limitations  - Instruct patient to call for assistance with activity based on assessment  - Modify environment to reduce risk of injury  - Consider OT/PT consult to assist with strengthening/mobility  Outcome: Progressing     Problem: Prexisting or High Potential for Compromised Skin Integrity  Goal: Skin integrity is maintained or improved  Description: INTERVENTIONS:  - Identify patients at risk for skin breakdown  - Assess and monitor skin integrity  - Assess and monitor nutrition and hydration status  - Monitor labs   - Assess for incontinence   - Turn and reposition patient  - Assist with mobility/ambulation  - Relieve pressure over bony prominences  - Avoid friction and shearing  - Provide appropriate hygiene as needed including keeping skin clean and dry  - Evaluate need for skin moisturizer/barrier cream  - Collaborate with interdisciplinary team   - Patient/family teaching  - Consider wound care consult   Outcome: Progressing     Problem: PAIN - ADULT  Goal: Verbalizes/displays adequate comfort level or baseline comfort level  Description: Interventions:  - Encourage patient to monitor pain and request assistance  - Assess pain using appropriate pain scale  - Administer analgesics based on type and severity of pain and evaluate response  - Implement non-pharmacological measures as appropriate and evaluate response  - Consider cultural and social influences on pain and pain management  - Notify physician/advanced practitioner if interventions unsuccessful or patient reports new pain  Outcome: Progressing     Problem: INFECTION - ADULT  Goal: Absence or prevention of progression during hospitalization  Description: INTERVENTIONS:  - Assess and monitor for signs and symptoms of infection  - Monitor lab/diagnostic results  - Monitor all insertion sites, i e  indwelling lines, tubes, and drains  - Monitor endotracheal if appropriate and nasal secretions for changes in amount and color  - [place] appropriate cooling/warming therapies per order  - Administer medications as ordered  - Instruct and encourage patient and family to use good hand hygiene technique  - Identify and instruct in appropriate isolation precautions for identified infection/condition  Outcome: Progressing     Problem: SAFETY ADULT  Goal: Patient will remain free of falls  Description: INTERVENTIONS:  - Assess patient frequently for physical needs  -  Identify cognitive and physical deficits and behaviors that affect risk of falls    -  [place] fall precautions as indicated by assessment   - Educate patient/family on patient safety including physical limitations  - Instruct patient to call for assistance with activity based on assessment  - Modify environment to reduce risk of injury  - Consider OT/PT consult to assist with strengthening/mobility  Outcome: Progressing  Goal: Maintain or return to baseline ADL function  Description: INTERVENTIONS:  -  Assess patient's ability to carry out ADLs; assess patient's baseline for ADL function and identify physical deficits which impact ability to perform ADLs (bathing, care of mouth/teeth, toileting, grooming, dressing, etc )  - Assess/evaluate cause of self-care deficits   - Assess range of motion  - Assess patient's mobility; develop plan if impaired  - Assess patient's need for assistive devices and provide as appropriate  - Encourage maximum independence but intervene and supervise when necessary  - Involve family in performance of ADLs  - Assess for home care needs following discharge   - Consider OT consult to assist with ADL evaluation and planning for discharge  - Provide patient education as appropriate  Outcome: Progressing  Goal: Maintain or return mobility status to optimal level  Description: INTERVENTIONS:  - Assess patient's baseline mobility status (ambulation, transfers, stairs, etc )    - Identify cognitive and physical deficits and behaviors that affect mobility  - Identify mobility aids required to assist with transfers and/or ambulation (gait belt, sit-to-stand, lift, walker, cane, etc )  - [place] fall precautions as indicated by assessment  - Record patient progress and toleration of activity level on Mobility SBAR; progress patient to next Phase/Stage  - Instruct patient to call for assistance with activity based on assessment  - Consider rehabilitation consult to assist with strengthening/weightbearing, etc   Outcome: Progressing     Problem: DISCHARGE PLANNING  Goal: Discharge to home or other facility with appropriate resources  Description: INTERVENTIONS:  - Identify barriers to discharge w/patient and caregiver  - Arrange for needed discharge resources and transportation as appropriate  - Identify discharge learning needs (meds, wound care, etc )  - Arrange for interpretive services to assist at discharge as needed  - Refer to Case Management Department for coordinating discharge planning if the patient needs post-hospital services based on physician/advanced practitioner order or complex needs related to functional status, cognitive ability, or social support system  Outcome: Progressing     Problem: Knowledge Deficit  Goal: Patient/family/caregiver demonstrates understanding of disease process, treatment plan, medications, and discharge instructions  Description: Complete learning assessment and assess knowledge base    Interventions:  - Provide teaching at level of understanding  - Provide teaching via preferred learning methods  Outcome: Progressing     Problem: METABOLIC, FLUID AND ELECTROLYTES - ADULT  Goal: Glucose maintained within target range  Description: INTERVENTIONS:  - Monitor Blood Glucose as ordered  - Assess for signs and symptoms of hyperglycemia and hypoglycemia  - Administer ordered medications to maintain glucose within target range  - Assess nutritional intake and initiate nutrition service referral as needed  Outcome: Progressing     Problem: SKIN/TISSUE INTEGRITY - ADULT  Goal: Skin integrity remains intact  Description: INTERVENTIONS  - Identify patients at risk for skin breakdown  - Assess and monitor skin integrity  - Assess and monitor nutrition and hydration status  - Monitor labs (i e  albumin)  - Assess for incontinence   - Turn and reposition patient  - Assist with mobility/ambulation  - Relieve pressure over bony prominences  - Avoid friction and shearing  - Provide appropriate hygiene as needed including keeping skin clean and dry  - Evaluate need for skin moisturizer/barrier cream  - Collaborate with interdisciplinary team (i e  Nutrition, Rehabilitation, etc )   - Patient/family teaching  Outcome: Progressing     Problem: Nutrition/Hydration-ADULT  Goal: Nutrient/Hydration intake appropriate for improving, restoring or maintaining nutritional needs  Description: Monitor and assess patient's nutrition/hydration status for malnutrition  Collaborate with interdisciplinary team and initiate plan and interventions as ordered  Monitor patient's weight and dietary intake as ordered or per policy  Utilize nutrition screening tool and intervene as necessary  Determine patient's food preferences and provide high-protein, high-caloric foods as appropriate       INTERVENTIONS:  - Monitor oral intake, urinary output, labs, and treatment plans  - Assess nutrition and hydration status and recommend course of action  - Evaluate amount of meals eaten  - Assist patient with eating if necessary   - Allow adequate time for meals  - Recommend/ encourage appropriate diets, oral nutritional supplements, and vitamin/mineral supplements  - Order, calculate, and assess calorie counts as needed  - Recommend, monitor, and adjust tube feedings and TPN/PPN based on assessed needs  - Assess need for intravenous fluids  - Provide specific nutrition/hydration education as appropriate  - Include patient/family/caregiver in decisions related to nutrition  Outcome: Progressing

## 2021-04-12 NOTE — PLAN OF CARE
Problem: Potential for Falls  Goal: Patient will remain free of falls  Description: INTERVENTIONS:  - Assess patient frequently for physical needs  -  Identify cognitive and physical deficits and behaviors that affect risk of falls    -  [place] fall precautions as indicated by assessment   - Educate patient/family on patient safety including physical limitations  - Instruct patient to call for assistance with activity based on assessment  - Modify environment to reduce risk of injury  - Consider OT/PT consult to assist with strengthening/mobility  4/12/2021 1233 by Betty Rodriguez RN  Outcome: Adequate for Discharge  4/12/2021 1057 by Betty Rodriguez RN  Outcome: Progressing     Problem: Prexisting or High Potential for Compromised Skin Integrity  Goal: Skin integrity is maintained or improved  Description: INTERVENTIONS:  - Identify patients at risk for skin breakdown  - Assess and monitor skin integrity  - Assess and monitor nutrition and hydration status  - Monitor labs   - Assess for incontinence   - Turn and reposition patient  - Assist with mobility/ambulation  - Relieve pressure over bony prominences  - Avoid friction and shearing  - Provide appropriate hygiene as needed including keeping skin clean and dry  - Evaluate need for skin moisturizer/barrier cream  - Collaborate with interdisciplinary team   - Patient/family teaching  - Consider wound care consult   4/12/2021 1233 by Betty Rodriguez RN  Outcome: Adequate for Discharge  4/12/2021 1057 by Betty Rodriguez RN  Outcome: Progressing     Problem: PAIN - ADULT  Goal: Verbalizes/displays adequate comfort level or baseline comfort level  Description: Interventions:  - Encourage patient to monitor pain and request assistance  - Assess pain using appropriate pain scale  - Administer analgesics based on type and severity of pain and evaluate response  - Implement non-pharmacological measures as appropriate and evaluate response  - Consider cultural and social influences on pain and pain management  - Notify physician/advanced practitioner if interventions unsuccessful or patient reports new pain  4/12/2021 1233 by Navneet Caro RN  Outcome: Adequate for Discharge  4/12/2021 1057 by Navneet Caro RN  Outcome: Progressing     Problem: INFECTION - ADULT  Goal: Absence or prevention of progression during hospitalization  Description: INTERVENTIONS:  - Assess and monitor for signs and symptoms of infection  - Monitor lab/diagnostic results  - Monitor all insertion sites, i e  indwelling lines, tubes, and drains  - Monitor endotracheal if appropriate and nasal secretions for changes in amount and color  - [place] appropriate cooling/warming therapies per order  - Administer medications as ordered  - Instruct and encourage patient and family to use good hand hygiene technique  - Identify and instruct in appropriate isolation precautions for identified infection/condition  4/12/2021 1233 by Navneet Caro RN  Outcome: Adequate for Discharge  4/12/2021 1057 by Navneet Caro RN  Outcome: Progressing     Problem: SAFETY ADULT  Goal: Patient will remain free of falls  Description: INTERVENTIONS:  - Assess patient frequently for physical needs  -  Identify cognitive and physical deficits and behaviors that affect risk of falls    -  [place] fall precautions as indicated by assessment   - Educate patient/family on patient safety including physical limitations  - Instruct patient to call for assistance with activity based on assessment  - Modify environment to reduce risk of injury  - Consider OT/PT consult to assist with strengthening/mobility  4/12/2021 1233 by Navneet Caro RN  Outcome: Adequate for Discharge  4/12/2021 1057 by Navneet Caro RN  Outcome: Progressing  Goal: Maintain or return to baseline ADL function  Description: INTERVENTIONS:  -  Assess patient's ability to carry out ADLs; assess patient's baseline for ADL function and identify physical deficits which impact ability to perform ADLs (bathing, care of mouth/teeth, toileting, grooming, dressing, etc )  - Assess/evaluate cause of self-care deficits   - Assess range of motion  - Assess patient's mobility; develop plan if impaired  - Assess patient's need for assistive devices and provide as appropriate  - Encourage maximum independence but intervene and supervise when necessary  - Involve family in performance of ADLs  - Assess for home care needs following discharge   - Consider OT consult to assist with ADL evaluation and planning for discharge  - Provide patient education as appropriate  4/12/2021 1233 by Shanell Ureña RN  Outcome: Adequate for Discharge  4/12/2021 1057 by Shanell Ureña RN  Outcome: Progressing  Goal: Maintain or return mobility status to optimal level  Description: INTERVENTIONS:  - Assess patient's baseline mobility status (ambulation, transfers, stairs, etc )    - Identify cognitive and physical deficits and behaviors that affect mobility  - Identify mobility aids required to assist with transfers and/or ambulation (gait belt, sit-to-stand, lift, walker, cane, etc )  - [place] fall precautions as indicated by assessment  - Record patient progress and toleration of activity level on Mobility SBAR; progress patient to next Phase/Stage  - Instruct patient to call for assistance with activity based on assessment  - Consider rehabilitation consult to assist with strengthening/weightbearing, etc   4/12/2021 1233 by Shanell Ureña RN  Outcome: Adequate for Discharge  4/12/2021 1057 by Shanell Ureña RN  Outcome: Progressing     Problem: DISCHARGE PLANNING  Goal: Discharge to home or other facility with appropriate resources  Description: INTERVENTIONS:  - Identify barriers to discharge w/patient and caregiver  - Arrange for needed discharge resources and transportation as appropriate  - Identify discharge learning needs (meds, wound care, etc )  - Arrange for interpretive services to assist at discharge as needed  - Refer to Case Management Department for coordinating discharge planning if the patient needs post-hospital services based on physician/advanced practitioner order or complex needs related to functional status, cognitive ability, or social support system  4/12/2021 1233 by Randal Beckwith RN  Outcome: Adequate for Discharge  4/12/2021 1057 by Randal Beckwith RN  Outcome: Progressing     Problem: Knowledge Deficit  Goal: Patient/family/caregiver demonstrates understanding of disease process, treatment plan, medications, and discharge instructions  Description: Complete learning assessment and assess knowledge base    Interventions:  - Provide teaching at level of understanding  - Provide teaching via preferred learning methods  4/12/2021 1233 by Randal Beckwith RN  Outcome: Adequate for Discharge  4/12/2021 1057 by Randal Beckwith RN  Outcome: Progressing     Problem: METABOLIC, FLUID AND ELECTROLYTES - ADULT  Goal: Glucose maintained within target range  Description: INTERVENTIONS:  - Monitor Blood Glucose as ordered  - Assess for signs and symptoms of hyperglycemia and hypoglycemia  - Administer ordered medications to maintain glucose within target range  - Assess nutritional intake and initiate nutrition service referral as needed  4/12/2021 1233 by Randal Beckwith RN  Outcome: Adequate for Discharge  4/12/2021 1057 by Randal Beckwith RN  Outcome: Progressing     Problem: SKIN/TISSUE INTEGRITY - ADULT  Goal: Skin integrity remains intact  Description: INTERVENTIONS  - Identify patients at risk for skin breakdown  - Assess and monitor skin integrity  - Assess and monitor nutrition and hydration status  - Monitor labs (i e  albumin)  - Assess for incontinence   - Turn and reposition patient  - Assist with mobility/ambulation  - Relieve pressure over bony prominences  - Avoid friction and shearing  - Provide appropriate hygiene as needed including keeping skin clean and dry  - Evaluate need for skin moisturizer/barrier cream  - Collaborate with interdisciplinary team (i e  Nutrition, Rehabilitation, etc )   - Patient/family teaching  4/12/2021 1233 by Nargis Hodges RN  Outcome: Adequate for Discharge  4/12/2021 1057 by Nargis Hodges RN  Outcome: Progressing     Problem: Nutrition/Hydration-ADULT  Goal: Nutrient/Hydration intake appropriate for improving, restoring or maintaining nutritional needs  Description: Monitor and assess patient's nutrition/hydration status for malnutrition  Collaborate with interdisciplinary team and initiate plan and interventions as ordered  Monitor patient's weight and dietary intake as ordered or per policy  Utilize nutrition screening tool and intervene as necessary  Determine patient's food preferences and provide high-protein, high-caloric foods as appropriate       INTERVENTIONS:  - Monitor oral intake, urinary output, labs, and treatment plans  - Assess nutrition and hydration status and recommend course of action  - Evaluate amount of meals eaten  - Assist patient with eating if necessary   - Allow adequate time for meals  - Recommend/ encourage appropriate diets, oral nutritional supplements, and vitamin/mineral supplements  - Order, calculate, and assess calorie counts as needed  - Recommend, monitor, and adjust tube feedings and TPN/PPN based on assessed needs  - Assess need for intravenous fluids  - Provide specific nutrition/hydration education as appropriate  - Include patient/family/caregiver in decisions related to nutrition  4/12/2021 1233 by Nargis Hodges RN  Outcome: Adequate for Discharge  4/12/2021 1057 by Nargis Hodges RN  Outcome: Progressing

## 2021-04-12 NOTE — CASE MANAGEMENT
Met with pt in room and he called wife on phone  He spoke with doctor this morning and did agree to home care since he is refusing STR  A post acute care recommendation was made by your care team for [facility]  Discussed Freedom of Choice with both patient and caregiver  List of agencies given to both patient and caregiver via in person/ wife via phone  both patient and caregiver aware the list is custom filtered for them by preference  and that [facility] post acute providers are designated  Wife Angelina Goyal said to send referral to [facility] and if they do not take him then she would like [facility]  Referral sent to [facility]  Awaiting response  Also placed call to [facility] regarding Pts cpap awaiting return call

## 2021-04-12 NOTE — CASE MANAGEMENT
Spoke with Conrad Torres at [facility]  Pt known to them for Cpap  He is doing some research as to what is needed to get pt a new machine  Cpap's are not sent out same day, earliest they can get it out is tomorrow  He will get back to me what the settings were and what is needed on discharge  Dr North Garcia updated on same  Pt was refusing Cpap here in hospital so discharge still tentative for this afternoon, still waiting for [facility] response if they can take pt

## 2021-04-12 NOTE — CASE MANAGEMENT
Spoke with Jia Underwood pts wife  I informed her again that pt would benefit for STR but he is refusing due to having to quarantine  Informed her that window visits can be done but she states she can care for him at home and they do not want to send him to rehab  Reviewed home health services and having therapy in the home  She is also refusing that  She once again states she can care for him at home  I did explain he will need O2 at night and he will need to get his CPAP machine fixed  She said she doesn't think he will wear it  She is coming over to the hospital soon and will meet with her and pt to speak about discharge plans

## 2021-04-12 NOTE — ASSESSMENT & PLAN NOTE
· The patient has hypoxia with sleeping, likely based on untreated BHARGAVI  · The patient has a CPAP machine at home, but the machine has been broken for several months  · Attempted to obtain overnight oximetry unsuccessful due to equipment malfunction  · Discussed with case management - [facility] to visit patient at home to either provide a new or repair hold CPAP machine  · Orders for CPAP placed  · The patient did not qualify for home supplemental oxygen on the day of discharge at rest or with ambulation  · Outpatient follow-up with sleep medicine

## 2021-04-12 NOTE — ASSESSMENT & PLAN NOTE
· Did not have a CTA of the chest/PE secondary to KIRT  · Normal V/Q lung scan on 04/06/2021  · The patient was initially treated with IV heparin drip, discontinued on 04/06/2021 with a normal V/Q lung scan  VAS lower limb venous duplex study, complete bilateral  Status: Final result   PACS Images     Show images for VAS lower limb venous duplex study, complete bilateral   Study Result       [facility] REPORT  CLINICAL:  Indications:  Patient presents with bilateral lower extremity pain and swelling  Risk Factors  The patient has history of Obesity, HTN, Diabetes (Yes), CAD and previous  smoking (quit 1-5yrs ago)  He has no history of DVT  CONCLUSION:     Impression:  RIGHT LOWER LIMB:  No evidence of acute or chronic deep vein thrombosis  No evidence of superficial thrombophlebitis noted  Doppler evaluation shows a normal response to augmentation maneuvers  Popliteal, posterior tibial and anterior tibial arterial Doppler waveforms are  biphasic/monophasic  LEFT LOWER LIMB:  No evidence of acute or chronic deep vein thrombosis  No evidence of superficial thrombophlebitis noted  Doppler evaluation shows a normal response to augmentation maneuvers  Popliteal, posterior tibial and anterior tibial arterial Doppler waveforms are  biphasic/monophasic  Technical findings were tiger texted to Dr Miriam Hyde       SIGNATURE:  Electronically Signed by: Charisma Gill on 2021-04-05 04:25:35 PM

## 2021-04-12 NOTE — ASSESSMENT & PLAN NOTE
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

## 2021-04-12 NOTE — PHYSICAL THERAPY NOTE
PHYSICAL THERAPY NOTE          Patient Name: Yoav Andres  GFEDN'H Date: 4/12/2021 04/12/21 7454   Note Type   Note Type Treatment   Pain Assessment   Pain Score No Pain   Restrictions/Precautions   Weight Bearing Precautions Per Order No   Subjective   Subjective Reports he is feeling better  Transfers   Sit to Stand 5  Supervision   Additional items Assist x 1; Armrests; Increased time required   Stand to Sit 5  Supervision   Additional items Assist x 1; Armrests; Increased time required   Stand pivot 5  Supervision   Toilet transfer 5  Supervision   Additional items Assist x 1;Standard toilet   Additional Comments x 2 to bathroom for BM during PT session  no episodes LOB  Ablle to manage clothing  Min A for hygiene  Ambulation/Elevation   Gait pattern Short stride; Foward flexed   Gait Assistance 5  Supervision   Additional items Assist x 1;Verbal cues   Assistive Device Rolling walker   Distance 150' x 1, 20' x 4   Balance   Ambulatory Fair  (RW)   Endurance Deficit   Endurance Deficit Yes   Activity Tolerance   Activity Tolerance Patient limited by fatigue   Assessment   Prognosis Good   Problem List Decreased strength;Decreased endurance; Impaired balance;Decreased mobility;Obesity; Decreased skin integrity   Assessment Pt  seen for PT treatment session this date with interventions consisting of   transfers and  gait training w/ emphasis on improving pt's ability to ambulate  Pt  Currently performing  tx and ambulation at ( SUP) x 1 level of function  The patient's AM-PAC Basic Mobility Inpatient Short Form Raw Score is 21, Standardized Score is 45 55  A standardized score greater than 42 9 suggests the patient may benefit from discharge to home  Please also refer to physical therapy recommendation for safe DC planning  In comparison to previous session, Pt  With improvements in activity tolerance     Pt is in need of continued activity in PT to improve strength balance endurance mobility transfers and ambulation with return to maximize LOF  From PT/mobility standpoint, recommendation at time of d/c would be home PT  in order to promote return to PLOF and independence  Goals   LTG Expiration Date 04/20/21   Plan   Treatment/Interventions Functional transfer training;LE strengthening/ROM; Therapeutic exercise; Endurance training;Bed mobility;Gait training   Progress Progressing toward goals   Recommendation   PT Discharge Recommendation Home with skilled therapy   AM-PAC Basic Mobility Inpatient   Turning in Bed Without Bedrails 3   Lying on Back to Sitting on Edge of Flat Bed 3   Moving Bed to Chair 4   Standing Up From Chair 4   Walk in Room 4   Climb 3-5 Stairs 3   Basic Mobility Inpatient Raw Score 21   Basic Mobility Standardized Score 45 55   Pt  OOB in chair  with call bell within reach, all lines intact and alarm on at end of PT session  Discussed with  PT today's treatment and patient's current level of function for care coordination

## 2021-04-12 NOTE — CASE MANAGEMENT
All information sent to [facility] via fax for cpap machine  Spoke with pt, informed him that  New CPAP machine is ordered and will be delivered tomorrow per [facility]  Also that [facility] accepted him and they will call to set up visit  Wife on way to hospital will stop back up when she arrives  CM to follow

## 2021-04-12 NOTE — CASE MANAGEMENT
Discussed with patient preferences on discharge;understanding how to manage health at home; purpose of taking medications; importance of follow up care/appointments; and symptoms to watch out for once discharged home  [facility] to follow up with pt  [facility] to tentatively deliver CPAP tomorrow once all information sent through insurance  Wife present  Wife and Pt verbalized understanding of same and importance of compliance with follow ups  Wife will transport pt home

## 2021-04-12 NOTE — CASE MANAGEMENT
Received message in [employer] that they received all the information for CPAP and they are working out details with pt

## 2021-04-12 NOTE — ASSESSMENT & PLAN NOTE
Wt Readings from Last 3 Encounters:   04/12/21 133 kg (292 lb 15 9 oz)   03/29/21 (!) 144 kg (317 lb 6 4 oz)   03/08/21 (!) 136 kg (300 lb 0 7 oz)     · Initially treated with lasix 40 mg IV BID, which was discontinued with worsening renal function  · The patient appears to has been adequately diuresed    · Continue PO coreg  · Daily weights  · Strict intake/output measurements  · Outpatient follow-up with Cardiology

## 2021-04-12 NOTE — DISCHARGE SUMMARY
[address] [facility] [address] [place]  Discharge- Calvin López 1945, 64 y o  male MRN: [id number]  Unit/Bed#: 424-01 Encounter: [id number]  Primary Care Provider: Christopher Abad MD   Date and time admitted to hospital: 4/5/2021  2:40 PM    * Acute kidney injury [facility])  Assessment & Plan  · Acute kidney injury present on admission and likely secondary to cardiorenal syndrome, with component of ATN  · Imaging without any evidence of obstruction  · Baseline serum creatinine of 1 2-1 4 - current creatinine back down to 1 43   · Initially treated with lasix 40 mg IV BID, which was discontinued with worsening renal function  · The patient appears to have been adequately diuresed  · The patient was seen in consultation by Nephrology - deemed likely KIRT superimposed on CKD 3, with CKD on the basis of underlying HTN  · Will plan on repeat BMP within 1 week of discharge  · Will continue to hold ACE-inhibitor following discharge for approximately 1 week, to be re-initiated by PCP as outpatient  Acute on chronic diastolic CHF (congestive heart failure) (HCC)  Assessment & Plan  Wt Readings from Last 3 Encounters:   04/12/21 133 kg (292 lb 15 9 oz)   03/29/21 (!) 144 kg (317 lb 6 4 oz)   03/08/21 (!) 136 kg (300 lb 0 7 oz)     · Initially treated with lasix 40 mg IV BID, which was discontinued with worsening renal function  · The patient appears to has been adequately diuresed  · Continue PO coreg  · Daily weights  · Strict intake/output measurements  · Outpatient follow-up with Cardiology        Obstructive sleep apnea  Assessment & Plan  · The patient has hypoxia with sleeping, likely based on untreated BHARGAVI  · The patient has a CPAP machine at home, but the machine has been broken for several months  · Attempted to obtain overnight oximetry unsuccessful due to equipment malfunction    · Discussed with case management - [facility] to visit patient at home to either provide a new or repair hold CPAP machine  · Orders for CPAP placed  · The patient did not qualify for home supplemental oxygen on the day of discharge at rest or with ambulation  · Outpatient follow-up with sleep medicine  Elevated d-dimer  Assessment & Plan  · Did not have a CTA of the chest/PE secondary to KIRT  · Normal V/Q lung scan on 04/06/2021  · The patient was initially treated with IV heparin drip, discontinued on 04/06/2021 with a normal V/Q lung scan  VAS lower limb venous duplex study, complete bilateral  Status: Final result   PACS Images     Show images for VAS lower limb venous duplex study, complete bilateral   Study Result       [facility] REPORT  CLINICAL:  Indications:  Patient presents with bilateral lower extremity pain and swelling  Risk Factors  The patient has history of Obesity, HTN, Diabetes (Yes), CAD and previous  smoking (quit 1-5yrs ago)  He has no history of DVT  CONCLUSION:     Impression:  RIGHT LOWER LIMB:  No evidence of acute or chronic deep vein thrombosis  No evidence of superficial thrombophlebitis noted  Doppler evaluation shows a normal response to augmentation maneuvers  Popliteal, posterior tibial and anterior tibial arterial Doppler waveforms are  biphasic/monophasic  LEFT LOWER LIMB:  No evidence of acute or chronic deep vein thrombosis  No evidence of superficial thrombophlebitis noted  Doppler evaluation shows a normal response to augmentation maneuvers  Popliteal, posterior tibial and anterior tibial arterial Doppler waveforms are  biphasic/monophasic  Technical findings were tiger texted to Dr Cindy Jacobs  SIGNATURE:  Electronically Signed Patrice Mares on 2021-04-05 04:25:35 PM         Gait instability  Assessment & Plan  The patient was evaluated by Physical Therapy and Occupational Therapy during the hospitalization   Short-term rehabilitation was recommended upon discharge, but patient continues to refuse short-term rehabilitation due to the fact that he does not want to quarantine for 14 days upon arrival to the SNF, and does not wish to be without his spouse  He reportedly has strong social support at home with his wife, son, and daughter-in-law  Home health will be provided upon discharge  Inguinal lymphadenopathy  Assessment & Plan  · Possibly reactive in nature  · Will need outpatient surveillance imaging to ensure resolution of the inguinal lymphadenopathy  Type 2 diabetes mellitus with hyperglycemia, with long-term current use of insulin [facility])  Assessment & Plan  Lab Results   Component Value Date    HGBA1C 7 0 (H) 03/09/2021       Recent Labs     04/11/21  1101 04/11/21  1549 04/11/21 2059 04/12/21  1116   POCGLU 224* 309* 267* 244*       Blood Sugar Average: Last 72 hrs:  (P) 211 [id number]     · Hemoglobin A1c appears ago  Continue home Lantus dose at time of discharge  Was maintained on sliding scale coverage and ADA diet  Renal calculus  Assessment & Plan  Recommend Outpatient Urology evaluation  Venous stasis dermatitis of both lower extremities  Assessment & Plan  Chronic finding, without any apparent evidence of infection during hospitalization  Discharging Physician / Practitioner: Anuel Mcrae MD  PCP: Jeremy العلي MD  Admission Date:   Admission Orders (From admission, onward)     Ordered        04/06/21 1559  Inpatient Admission  Once         04/05/21 1810  Place in Observation  Once         04/05/21 1809  Inpatient Admission  Once                   Discharge Date: 04/12/21    Resolved Problems  Date Reviewed: 4/12/2021    None          Consultations During Hospital Stay:  · Nephrology    Procedures Performed:   · None    Significant Findings / Test Results:   Ct Abdomen Pelvis Wo Contrast    Result Date: 4/5/2021  Impression: No acute intra-abdominal pathology  Small effusions and basilar atelectasis   Nonspecific inguinal adenopathy could be reactive but recommend clinical correlation and follow-up as warranted  Workstation performed: [id number]     Xr Chest Portable    Result Date: 4/8/2021  Impression: No acute cardiopulmonary disease  Workstation performed: [id number]     Xr Chest 1 View Portable    Result Date: 4/6/2021  Impression: No acute cardiopulmonary disease  Workstation performed: [id number]     Ct Head Without Contrast    Result Date: 4/5/2021  Impression: No acute intracranial abnormality  Workstation performed: [id number]     Ct Cervical Spine Without Contrast    Result Date: 4/5/2021  Impression: No cervical spine fracture or traumatic malalignment  Workstation performed: [id number]     Nm Lung Perfusion Imaging (particulate)    Result Date: 4/6/2021  Impression: Normal exam  Workstation performed: [id number]     Incidental Findings:   · As above     Test Results Pending at Discharge (will require follow up): · None     Outpatient Tests Requested:  · BMP    Complications:  None    Reason for Admission: KIRT, Weakness  HPI from original H&P:      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 who presents to the emergency room with the complaint of bilateral lower extremity weakness  The patient developed the acute onset of bilateral lower extremity weakness when he was attempting to ambulate on Sunday, 04/04/2021  The patient's legs "gave out" and the patient fell in his home  Over the last 24 hours, the patient has been unable to ambulate secondary to the bilateral lower extremity weakness  The bilateral lower extremity weakness was severe in intensity and constant in nature  Nothing seemed to improve his symptoms  No chest pain  No shortness of breath  No abdominal pain  No nausea or vomiting  Please see above list of diagnoses and related plan for additional information       Condition at Discharge: stable     Discharge Day Visit / Exam:     * Please refer to separate progress note for these details *    Discharge instructions/Information to patient and family:   See after visit summary for information provided to patient and family  Provisions for Follow-Up Care:  See after visit summary for information related to follow-up care and any pertinent home health orders  Disposition:     Home with VNA Services (Reminder: Complete face to face encounter)    For Discharges to [facility] SNF:   · Not Applicable to this Patient - Not Applicable to this Patient    Planned Readmission: No     Discharge Statement:  I spent 60 minutes discharging the patient  This time was spent on the day of discharge  I had direct contact with the patient on the day of discharge  Greater than 50% of the total time was spent examining patient, answering all patient questions, arranging and discussing plan of care with patient as well as directly providing post-discharge instructions  Additional time then spent on discharge activities  Discharge Medications:  See after visit summary for reconciled discharge medications provided to patient and family        ** Please Note: This note has been constructed using a voice recognition system **

## 2021-04-13 ENCOUNTER — TELEPHONE (OUTPATIENT)
Dept: ENDOCRINOLOGY | Facility: CLINIC | Age: 76
End: 2021-04-13

## 2021-04-13 NOTE — TELEPHONE ENCOUNTER
Spoke with Chao Renee advised he has not seen our provider yet and we could not change insulins   She spoke with  to move appointment

## 2021-04-13 NOTE — TELEPHONE ENCOUNTER
Kim Meneses from [facility] called and stated that his sugar right now is 384  He was discharged from the hospital on Lantus and Ozempic      [phone]      His regimen at home: Lantus 50 units BID  Novolog 20 units BID  Ozempic once weekly    6/11/21 Dr Buddy Arevalo

## 2021-04-13 NOTE — UTILIZATION REVIEW
Notification of Discharge  This is a Notification of Discharge from our facility [address]  Please be advised that this patient has been discharge from our facility  Below you will find the admission and discharge date and time including the patients disposition  PRESENTATION DATE: 4/5/2021  2:40 PM  OBS ADMISSION DATE: 04/05/2021  IP ADMISSION DATE: 4/5/21 1809   DISCHARGE DATE: 4/12/2021  1:11 PM  DISPOSITION: Home with [facility] with [facility]   Admission Orders listed below:  Admission Orders (From admission, onward)     Ordered        04/06/21 1559  Inpatient Admission  Once         04/05/21 1810  Place in Observation  Once         04/05/21 1809  Inpatient Admission  Once                   Please contact the UR Department if additional information is required to close this patient's authorization/case  [facility] Utilization Review Department  Main: [phone] x carefully listen to the prompts  All voicemails are confidential   [email or web address]  Send all requests for admission clinical reviews, approved or denied determinations and any other requests to dedicated fax number below belonging to the campus where the patient is receiving treatment   List of dedicated fax numbers:  [address] DENIALS (Administrative/Medical Necessity) [phone]   [address]  (Maternity/NICU/Pediatrics) [phone]   Kit Giron [phone]   Oli Terrell [phone]   [facility] [phone]   [facility] [address] [facility] [phone]   [facility]  [phone]   [address] [facility], [facility]  [address] [phone]

## 2021-04-16 ENCOUNTER — TELEPHONE (OUTPATIENT)
Dept: NEPHROLOGY | Facility: CLINIC | Age: 76
End: 2021-04-16

## 2021-04-16 NOTE — TELEPHONE ENCOUNTER
Patient was discharged from the hospital on 4/12/21  He was in the hospital an had an KIRT  Does patient need a hospital follow up appointment with the office?

## 2021-04-17 ENCOUNTER — APPOINTMENT (OUTPATIENT)
Dept: LAB | Facility: HOSPITAL | Age: 76
End: 2021-04-17
Attending: INTERNAL MEDICINE
Payer: COMMERCIAL

## 2021-04-17 DIAGNOSIS — N17.9 ACUTE KIDNEY INJURY (HCC): ICD-10-CM

## 2021-04-17 LAB
ANION GAP SERPL CALCULATED.3IONS-SCNC: 12 MMOL/L (ref 4–13)
BUN SERPL-MCNC: 22 MG/DL (ref 5–25)
CALCIUM SERPL-MCNC: 9.3 MG/DL (ref 8.3–10.1)
CHLORIDE SERPL-SCNC: 107 MMOL/L (ref 100–108)
CO2 SERPL-SCNC: 26 MMOL/L (ref 21–32)
CREAT SERPL-MCNC: 1.46 MG/DL (ref 0.6–1.3)
GFR SERPL CREATININE-BSD FRML MDRD: 45 ML/MIN/1.73SQ M
GLUCOSE P FAST SERPL-MCNC: 113 MG/DL (ref 65–99)
POTASSIUM SERPL-SCNC: 4.2 MMOL/L (ref 3.5–5.3)
SODIUM SERPL-SCNC: 145 MMOL/L (ref 136–145)

## 2021-04-17 PROCEDURE — 80048 BASIC METABOLIC PNL TOTAL CA: CPT

## 2021-04-17 PROCEDURE — 36415 COLL VENOUS BLD VENIPUNCTURE: CPT

## 2021-04-19 DIAGNOSIS — N18.32 STAGE 3B CHRONIC KIDNEY DISEASE (HCC): Primary | ICD-10-CM

## 2021-04-19 NOTE — TELEPHONE ENCOUNTER
Apt was made for July 2nd  Patient wanted a later in the morning appointment  Do you want blood work done? Will mail to patient

## 2021-05-11 ENCOUNTER — TELEPHONE (OUTPATIENT)
Dept: CASE MANAGEMENT | Facility: OTHER | Age: 76
End: 2021-05-11

## 2021-05-11 NOTE — TELEPHONE ENCOUNTER
Message received to Outpatient Care Management department triage line from Ashley Sanchez RN with  [facility]  Call back number [phone]    Nirmal Benjamin states she has clinical concerns regarding patient's noncompliance with nursing and PT recommendations, patient's lack of diuretic for CHF management and O2 saturation between 88-92 the last couple of visits  Nirmal Alexander and PT colleague have tried multiple times to reach the PCP office to express the above concerns and have not received any return calls  Patient is scheduled to establish with Cardiology - Dr Shantelle Mathis at the [place] office on 5/18/21  Routed note to Heart Failure Outpatient Care Managers Beverly Mondragon and Curt Padron to review and possibly outreach to patient if appropriate

## 2021-06-29 ENCOUNTER — APPOINTMENT (OUTPATIENT)
Dept: LAB | Facility: HOSPITAL | Age: 76
End: 2021-06-29
Payer: COMMERCIAL

## 2021-06-29 DIAGNOSIS — E55.9 AVITAMINOSIS D: ICD-10-CM

## 2021-06-29 DIAGNOSIS — D64.9 ANEMIA, UNSPECIFIED TYPE: ICD-10-CM

## 2021-06-29 DIAGNOSIS — S24.103S T10 SPINAL CORD INJURY, SEQUELA (HCC): ICD-10-CM

## 2021-06-29 DIAGNOSIS — I51.9 MYXEDEMA HEART DISEASE: ICD-10-CM

## 2021-06-29 DIAGNOSIS — E03.9 MYXEDEMA HEART DISEASE: ICD-10-CM

## 2021-06-29 LAB
25(OH)D3 SERPL-MCNC: 44 NG/ML (ref 30–100)
ALBUMIN SERPL BCP-MCNC: 3.5 G/DL (ref 3.5–5)
ALP SERPL-CCNC: 150 U/L (ref 46–116)
ALT SERPL W P-5'-P-CCNC: 22 U/L (ref 12–78)
ANION GAP SERPL CALCULATED.3IONS-SCNC: 9 MMOL/L (ref 4–13)
AST SERPL W P-5'-P-CCNC: 14 U/L (ref 5–45)
BASOPHILS # BLD AUTO: 0.05 THOUSANDS/ΜL (ref 0–0.1)
BASOPHILS NFR BLD AUTO: 1 % (ref 0–1)
BILIRUB SERPL-MCNC: 0.46 MG/DL (ref 0.2–1)
BUN SERPL-MCNC: 30 MG/DL (ref 5–25)
CALCIUM SERPL-MCNC: 9.2 MG/DL (ref 8.3–10.1)
CHLORIDE SERPL-SCNC: 106 MMOL/L (ref 100–108)
CO2 SERPL-SCNC: 30 MMOL/L (ref 21–32)
CREAT SERPL-MCNC: 1.37 MG/DL (ref 0.6–1.3)
EOSINOPHIL # BLD AUTO: 0.53 THOUSAND/ΜL (ref 0–0.61)
EOSINOPHIL NFR BLD AUTO: 5 % (ref 0–6)
ERYTHROCYTE [DISTWIDTH] IN BLOOD BY AUTOMATED COUNT: 15.1 % (ref 11.6–15.1)
ERYTHROCYTE [SEDIMENTATION RATE] IN BLOOD: 57 MM/HOUR (ref 0–19)
GFR SERPL CREATININE-BSD FRML MDRD: 49 ML/MIN/1.73SQ M
GLUCOSE SERPL-MCNC: 134 MG/DL (ref 65–140)
HCT VFR BLD AUTO: 39 % (ref 36.5–49.3)
HGB BLD-MCNC: 11.9 G/DL (ref 12–17)
IMM GRANULOCYTES # BLD AUTO: 0.04 THOUSAND/UL (ref 0–0.2)
IMM GRANULOCYTES NFR BLD AUTO: 0 % (ref 0–2)
LYMPHOCYTES # BLD AUTO: 1.71 THOUSANDS/ΜL (ref 0.6–4.47)
LYMPHOCYTES NFR BLD AUTO: 17 % (ref 14–44)
MCH RBC QN AUTO: 26.7 PG (ref 26.8–34.3)
MCHC RBC AUTO-ENTMCNC: 30.5 G/DL (ref 31.4–37.4)
MCV RBC AUTO: 87 FL (ref 82–98)
MONOCYTES # BLD AUTO: 0.72 THOUSAND/ΜL (ref 0.17–1.22)
MONOCYTES NFR BLD AUTO: 7 % (ref 4–12)
NEUTROPHILS # BLD AUTO: 6.96 THOUSANDS/ΜL (ref 1.85–7.62)
NEUTS SEG NFR BLD AUTO: 70 % (ref 43–75)
NRBC BLD AUTO-RTO: 0 /100 WBCS
PLATELET # BLD AUTO: 235 THOUSANDS/UL (ref 149–390)
PMV BLD AUTO: 9.8 FL (ref 8.9–12.7)
POTASSIUM SERPL-SCNC: 3.6 MMOL/L (ref 3.5–5.3)
PROT SERPL-MCNC: 8 G/DL (ref 6.4–8.2)
RBC # BLD AUTO: 4.46 MILLION/UL (ref 3.88–5.62)
SODIUM SERPL-SCNC: 145 MMOL/L (ref 136–145)
TSH SERPL DL<=0.05 MIU/L-ACNC: 2 UIU/ML (ref 0.36–3.74)
WBC # BLD AUTO: 10.01 THOUSAND/UL (ref 4.31–10.16)

## 2021-06-29 PROCEDURE — 36415 COLL VENOUS BLD VENIPUNCTURE: CPT

## 2021-06-29 PROCEDURE — 84443 ASSAY THYROID STIM HORMONE: CPT

## 2021-06-29 PROCEDURE — 85652 RBC SED RATE AUTOMATED: CPT

## 2021-06-29 PROCEDURE — 80053 COMPREHEN METABOLIC PANEL: CPT

## 2021-06-29 PROCEDURE — 82306 VITAMIN D 25 HYDROXY: CPT

## 2021-06-29 PROCEDURE — 85025 COMPLETE CBC W/AUTO DIFF WBC: CPT

## 2021-07-21 NOTE — PROGRESS NOTES
[facility] Medicine Services Daily Progress Note    Patient Name: Lenny Larson  : 1944  MRN: [id number]  Primary Care Physician:  Camron Pollard MD  Date of admission: 2021      Subjective      Chief Complaint: Cough and shortness of breath.      Patient Reports:  2021: Patient reports significant improvement in his shortness of breath after the chest tube was placed.  He reports minimal discomfort at the chest tube site that is tolerable with the current pain medication regimen.  He denies any other complaints at this time and is tolerating p.o.  He reports having a bowel movement yesterday but none today.      ROS 12 point review of systems was reviewed and was negative except as above.  Patient reports mild discomfort at his chest tube site and resolution of the shortness of breath. Patient reports that he has had nocturia 3-4 times per night for months but denies any hesitancy, decreased strength of his stream, or difficulty emptying his bladder.      Objective      Vitals:   Temp:  [97.4 °F (36.3 °C)-98.1 °F (36.7 °C)] 98 °F (36.7 °C)  Heart Rate:  [62-76] 71  Resp:  [12-20] 18  BP: (103-141)/(43-52) 114/50    Physical Exam   Vital signs and nurses notes reviewed.   Well-developed well-nourished elderly gentleman in no acute distress sitting up in bed comfortable on room air awake and alert; mucous membranes moist; sclerae anicteric; lungs with decreased air entry but clear to auscultation bilaterally; CV regular rate and rhythm; abdomen soft nontender nondistended with active bowel sounds; extremities with no edema, cyanosis or calf tenderness; palpable pedal pulses bilaterally; no Beach catheter.    Result Review    Result Review:  I have personally reviewed the results from the time of this admission to 2021 14:37 EDT and agree with these findings:  [x]  Laboratory  []  Microbiology  [x]  Radiology  []  EKG/Telemetry   []  Cardiology/Vascular   []   [facility] [address]  Progress Note Venkata Stoner 1945, 64 y o  male MRN: [id number]  Unit/Bed#: 424-01 Encounter: [id number]  Primary Care Provider: Pratik Witt MD   Date and time admitted to hospital: 4/5/2021  2:40 PM    * Acute kidney injury [facility])  Assessment & Plan  · Acute kidney injury was present on admission and likely secondary to cardiorenal syndrome  · Baseline serum creatinine of 1 2-1 4 mg/dl  · Continue lasix 40 mg IV BID  · Check a urine sodium level and urine protein/creatinine ratio  · Avoid all nephrotoxic agents  · The patient was seen in consultation by Nephrology    · Serial laboratory testing to monitor the patient's renal function and electrolyte levels    Results from last 7 days   Lab Units 04/06/21  0519 04/05/21  1456   SODIUM mmol/L 142 140   POTASSIUM mmol/L 4 7 4 7   CHLORIDE mmol/L 107 106   CO2 mmol/L 28 28   BUN mg/dL 29* 32*   CREATININE mg/dL 1 92* 2 16*   CALCIUM mg/dL 8 9 9 1         Lower extremity weakness  Assessment & Plan  · May require additional neurologic imaging to rule-out a CVA/stroke  · PT/OT      Acute on chronic diastolic CHF (congestive heart failure) (HCC)  Assessment & Plan  Wt Readings from Last 3 Encounters:   04/06/21 (!) 142 kg (313 lb 4 4 oz)   03/29/21 (!) 144 kg (317 lb 6 4 oz)   03/08/21 (!) 136 kg (300 lb 0 7 oz)     · Continue lasix 40 mg IV BID  · Continue PO coreg  · Daily weights  · Strict intake/output measurements        Bilateral pleural effusion  Assessment & Plan  · Likely secondary to acute on chronic diastolic CHF (congestive heart failure)  · New supplemental oxygen requirement of 3 lpm via the nasal cannula to maintain oxygen saturation levels at 90% and above  · Continue lasix 40 mg IV BID    Venous stasis dermatitis of both lower extremities  Assessment & Plan  · Does not appear to be any active infection at this time    Renal calculus  Assessment & Plan  · Outpatient Urology evaluation    Multiple renal Pathology  [x]  Old records  []  Other:  Most notable findings include:     Patient was recently started on trazodone 7/1/2021 office visit by PCP for insomnia.    CT chest: There is a large area of dense consolidation in the medial aspect of the right lung which is stable and may be due to posttreatment changes. There is a large right pleural effusion which is partly loculated, and is unchanged from 07/12/2021.    Incidental finding on CT chest: The liver is abnormally dense, the appearance suggesting  hemachromatosis.    Assessment/Plan      Brief Patient Summary:  Lenny Larson is a 76 y.o. male CAD s/p stent, AAA s/p repair 4/2021, afib on eliquis, lung carcinoma s/p right upper lobectomy/chemo/radiation 2005 now in remission who presented [facility]21 as a direct admit from home per pulmonary Dr. Fuentes request. Patient has a history of right pleural effusion that has required chest tube in Jan 2021 at [facility] and thoracentesis April 2021 at [facility]. The patient has developed a cough with copious clear sputum as well as shortness of breath, no fever, no chest pain. His shortness of breath worsens upon exertion and with lying flat. He had CT chest on 7/12/21 that showed new moderate to large right pleural effusion with partial loculation. He saw Dr. Fuentes and was directly admitted with plans for thoracentesis.   Dr. Valadez saw the patient and performed right thoracentesis 7/21/2021.      amiodarone, 200 mg, Oral, Q12H  budesonide, 0.5 mg, Nebulization, BID - RT  calcium 500 mg vitamin D 5 mcg (200 UT), 1 tablet, Oral, Daily  dilTIAZem CD, 240 mg, Oral, Daily  enoxaparin, 70 mg, Subcutaneous, Q12H  finasteride, 5 mg, Oral, Daily  furosemide, 20 mg, Oral, Daily  guaiFENesin, 1,200 mg, Oral, BID  ipratropium-albuterol, 3 mL, Nebulization, 4x Daily - RT  rosuvastatin, 40 mg, Oral, Nightly  sodium chloride, 10 mL, Intravenous, Q12H  tamsulosin, 0.4 mg, Oral, Nightly  theophylline, 300 mg,  cysts  Assessment & Plan  · Outpatient surveillance imaging with PCP    Atelectasis  Assessment & Plan  · Incentive spirometry 10 times per hour while awake    Inguinal lymphadenopathy  Assessment & Plan  · Possibly reactive in nature  · Will need outpatient surveillance imaging to ensure resolution of the inguinal lymphadenopathy    Abnormal EKG  Assessment & Plan  · Outpatient follow-up with Cardiology    ECG 12 lead  Order: [id number]  Status:  Final result   Visible to patient:  No (inaccessible [facility]   Next appt:  05/18/2021 at 12:40 PM in Cardiology Duran Wheeler MD)   Ref Range & Units 4/5/21 1515   Ventricular Rate BPM 75    Atrial Rate BPM 75    NV Interval ms 246    QRSD Interval ms 150    QT Interval ms 408    QTC Interval ms 455    P Axis degrees 60    QRS Axis degrees -80    T Wave Axis degrees 48       Narrative & Impression    Sinus rhythm with 1st degree A-V block  Left axis deviation  Right bundle branch block  Abnormal ECG  When compared with ECG of 08-MAR-2021 15:53,     Confirmed by Markus Palma ([id number]) on 4/5/2021 3:26:16 PM      Specimen Collected: 04/05/21 15:15   Last Resulted: 04/05/21 15:26               Gait instability  Assessment & Plan  · May require additional neurologic imaging to rule-out a CVA/stroke  · PT/OT    Multiple pulmonary nodules  Assessment & Plan  · Outpatient surveillance imaging with Pulmonology    Elevated d-dimer  Assessment & Plan  · Cannot have a CTA of the chest/PE protocol with acute kidney injury  · Normal V/Q lung scan on 04/06/2021  · The IV heparin drip/infusion per the VTE/PE (Raschke) protocol was discontinued with a normal V/Q lung scan      VAS lower limb venous duplex study, complete bilateral  Status: Final result   PACS Images     Show images for VAS lower limb venous duplex study, complete bilateral   Study Result       [facility] REPORT  CLINICAL:  Indications:  Patient presents with bilateral lower extremity pain and Oral, Daily  traZODone, 50 mg, Oral, Nightly       Pharmacy to Dose enoxaparin (LOVENOX),          Active Hospital Problems:  Active Hospital Problems    Diagnosis    • **Recurrent right pleural effusion    • Chronic atrial fibrillation (CMS/HCC)    • BPH without obstruction/lower urinary tract symptoms    • Hyperlipidemia    • Coronary artery disease    • Abdominal aortic aneurysm (AAA) (CMS/HCC)    • Chronic obstructive pulmonary disease (CMS/HCC)    • History of lung cancer      Plan:   Recurrent right pleural effusion  -symptomatic with shortness of breath, cough  -CT chest 7/12/21: new moderate to large right pleural effusion with partial loculation  -on room air with normal oxygen saturation   -cont prn diogenes  -pulmonary consulted and performed thoracentesis 7/21/2021 with large volume of ina fluid removed which was sent for cytology and culture     CAD s/p PCI  AAA s/p repair 4/2021  -cont home statin  -Therapeutic Lovenox being substituted for Eliquis  -Patient is not on any antiplatelet therapy at home according to his medication reconciliation     Chronic atrial fibrillation  -cont home Cardizem, amiodarone  -Eliquis on hold due to thoracentesis  -Continue therapeutic Lovenox for now     Chronic systolic CHF due to ischemic cardiomyopathy with valvular heart disease  -echo 1/2021: LVEF of 21 to 25% with severe MVR, mild dilatation of the aortic root, large pericardial effusion greater than 2 cm adjacent to the right atrium, with no cardiac tamponade; RVSP 36.9 mmHg  -cont home lasix     COPD  -stable not in exacerbation  -cont home bronchodilators, theophylline      BPH, urinary retention with nocturia  -Check postvoid residual  -continue home Proscar, Flomax     H/O lung carcinoma status post right upper lobectomy/chemo/radiation 2005 now in remission     Incidental finding on CT chest: The liver is abnormally dense, the appearance suggesting  Hemachromatosis  -Iron profile ordered    DVT  swelling  Risk Factors  The patient has history of Obesity, HTN, Diabetes (Yes), CAD and previous  smoking (quit 1-5yrs ago)  He has no history of DVT  CONCLUSION:     Impression:  RIGHT LOWER LIMB:  No evidence of acute or chronic deep vein thrombosis  No evidence of superficial thrombophlebitis noted  Doppler evaluation shows a normal response to augmentation maneuvers  Popliteal, posterior tibial and anterior tibial arterial Doppler waveforms are  biphasic/monophasic  LEFT LOWER LIMB:  No evidence of acute or chronic deep vein thrombosis  No evidence of superficial thrombophlebitis noted  Doppler evaluation shows a normal response to augmentation maneuvers  Popliteal, posterior tibial and anterior tibial arterial Doppler waveforms are  biphasic/monophasic  Technical findings were tiger texted to Dr Krystal Walker  SIGNATURE:  Electronically Signed by: Tiffany Durant on 2021-04-05 04:25:35 PM         Type 2 diabetes mellitus with hyperglycemia, with long-term current use of insulin [facility])  Assessment & Plan  Lab Results   Component Value Date    HGBA1C 7 0 (H) 03/09/2021       Recent Labs     04/05/21  2058 04/06/21  0734 04/06/21  1132 04/06/21  1523   POCGLU 85 115 232* 243*       Blood Sugar Average: Last 72 hrs:  (P) 168 75   · Utilize lantus 10 Units SQ QHS  · Hypoglycemia protocol  · Insulin sliding scale with blood glucose monitoring ACHS    BHARGAVI (obstructive sleep apnea)  Assessment & Plan  · Continue CPAP QHS and anytime while sleeping        VTE Pharmacologic Prophylaxis:   Pharmacologic: Heparin  Mechanical VTE Prophylaxis in Place: Yes    Patient Centered Rounds: I have performed bedside rounds with nursing staff today  Discussions with Specialists or Other Care Team Provider: I discussed the case with Dr Tay Chung (Nephrology)  Time Spent for Care: 30 minutes  More than 50% of total time spent on counseling and coordination of care as described above      Current Length of Stay: 1 day(s)    Current Patient Status: Inpatient   Certification Statement: The patient was initially admitted under observation status  The patient continues to require treatment with IV lasix as well as serial laboratory testing to monitor his renal function  He now requires at least a 2-midnight hospitalization and will be made INPATIENT admission status  Code Status: Level 3 - DNAR and DNI      Subjective: The patient was seen and examined  The patient continues to experience bilateral lower extremity weakness and is unable to ambulate at this time  Objective:     Vitals:   Temp (24hrs), Av 7 °F (37 1 °C), Min:98 2 °F (36 8 °C), Max:99 5 °F (37 5 °C)    Temp:  [98 2 °F (36 8 °C)-99 5 °F (37 5 °C)] 98 8 °F (37 1 °C)  HR:  [72-92] 75  Resp:  [18-22] 20  BP: (123-178)/(70-75) 146/70  SpO2:  [76 %-92 %] 88 %  Body mass index is 50 56 kg/m²  Input and Output Summary (last 24 hours):        Intake/Output Summary (Last 24 hours) at 2021 1635  Last data filed at 2021 1555  Gross per 24 hour   Intake 530 ml   Output 4700 ml   Net -4170 ml       Physical Exam:     Physical Exam  General:  NAD, follows commands  HEENT:  NC/AT, mucous membranes moist  Neck:  Supple, No JVP elevation  CV:  + S1, + S2, RRR  Pulm:  Scant bibasilar crackles  Abd:  Soft, Non-tender, Non-distended  Ext:  No clubbing/cyanosis, Edema of the bilateral lower extremities  Skin:  Venous stasis dermatitis of the bilateral anterior shin regions  Neuro:  Awake, alert, oriented  Psych:  Normal mood and affect      Additional Data:    Labs:    Results from last 7 days   Lab Units 21  0519   WBC Thousand/uL 10 23*   HEMOGLOBIN g/dL 10 8*   HEMATOCRIT % 35 5*   PLATELETS Thousands/uL 208   NEUTROS PCT % 71   LYMPHS PCT % 16   MONOS PCT % 9   EOS PCT % 3     Results from last 7 days   Lab Units 21  0519   SODIUM mmol/L 142   POTASSIUM mmol/L 4 7   CHLORIDE mmol/L 107   CO2 mmol/L 28   BUN mg/dL 29*   CREATININE prophylaxis:  Medical DVT prophylaxis orders are present.    CODE STATUS:    Limited Support to NOT Include: Intubation  Code Status: No CPR  Medical Interventions (Level of Support Prior to Arrest): Limited      Disposition:  I expect patient to be discharged in 3 to 4 days depending on how long his chest tube drains and whether he needs decortication.    This patient has been examined wearing appropriate Personal Protective Equipment and discussed with hospital infection control department. 07/21/21      Electronically signed by Dottie Casillas MD, 07/21/21, 14:37 EDT.  [facility] Hospitalist Team            mg/dL 1 92*   ANION GAP mmol/L 7   CALCIUM mg/dL 8 9   ALBUMIN g/dL 3 0*   TOTAL BILIRUBIN mg/dL 0 54   ALK PHOS U/L 160*   ALT U/L 27   AST U/L 22   GLUCOSE RANDOM mg/dL 110     Results from last 7 days   Lab Units 04/05/21  1456   INR  1 13     Results from last 7 days   Lab Units 04/06/21  1523 04/06/21  1132 04/06/21  0734 04/05/21  2058   POC GLUCOSE mg/dl 243* 232* 115 85         Results from last 7 days   Lab Units 04/06/21  0519 04/05/21  1456   LACTIC ACID mmol/L 0 7 1 4   PROCALCITONIN ng/ml 0 06  --            * I Have Reviewed All Lab Data Listed Above  * Additional Pertinent Lab Tests Reviewed: All [place] [facility] Reviewed      Recent Cultures (last 7 days):     Results from last 7 days   Lab Units 04/05/21  1517 04/05/21  1516   BLOOD CULTURE  Received in Microbiology Lab  Culture in Progress  Received in Microbiology Lab  Culture in Progress         Last 24 Hours Medication List:   Current Facility-Administered Medications   Medication Dose Route Frequency Provider Last Rate    acetaminophen  650 mg Oral Q6H PRN [employer], DO      amLODIPine  10 mg Oral Daily [employer], DO      aspirin  81 mg Oral Daily [employer], DO      atorvastatin  40 mg Oral After [employer], DO      carvedilol  12 5 mg Oral BID [employer], DO      cholecalciferol  2,000 Units Oral Daily [employer], DO      furosemide  40 mg Intravenous BID (diuretic) [employer], DO      heparin (porcine)  5,000 Units Subcutaneous [facility] [employer], [place]      HYDROmorphone  0 5 mg Intravenous Q4H PRN [employer], DO      insulin glargine  10 Units Subcutaneous HS [employer], DO      insulin lispro  1-6 Units Subcutaneous TID [facility], DO      insulin lispro  1-6 Units Subcutaneous HS [employer], DO      oxyCODONE  5 mg Oral Q4H PRN [employer], DO      Or    oxyCODONE  10 mg Oral Q4H PRN [employer], DO  tamsulosin  0 4 mg Oral QPM Janeth Thapa DO          Today, Patient Was Seen By: Janeth Thapa DO    ** Please Note: Dictation voice to text software may have been used in the creation of this document   **

## 2021-09-18 ENCOUNTER — HOSPITAL ENCOUNTER (EMERGENCY)
Facility: HOSPITAL | Age: 76
Discharge: HOME | End: 2021-09-18
Attending: EMERGENCY MEDICINE | Admitting: EMERGENCY MEDICINE
Payer: COMMERCIAL

## 2021-09-18 ENCOUNTER — APPOINTMENT (EMERGENCY)
Dept: CT IMAGING | Facility: HOSPITAL | Age: 76
End: 2021-09-18
Payer: COMMERCIAL

## 2021-09-18 VITALS
OXYGEN SATURATION: 92 % | SYSTOLIC BLOOD PRESSURE: 176 MMHG | DIASTOLIC BLOOD PRESSURE: 74 MMHG | TEMPERATURE: 99.2 F | RESPIRATION RATE: 20 BRPM | HEART RATE: 80 BPM

## 2021-09-18 DIAGNOSIS — L30.4 INTERTRIGO: Primary | ICD-10-CM

## 2021-09-18 DIAGNOSIS — R51.9 HEADACHE: ICD-10-CM

## 2021-09-18 LAB
ALBUMIN SERPL BCP-MCNC: 3.2 G/DL (ref 3.5–5)
ALP SERPL-CCNC: 165 U/L (ref 46–116)
ALT SERPL W P-5'-P-CCNC: 20 U/L (ref 12–78)
ANION GAP SERPL CALCULATED.3IONS-SCNC: 5 MMOL/L (ref 4–13)
APTT PPP: 30 SECONDS (ref 23–37)
AST SERPL W P-5'-P-CCNC: 18 U/L (ref 5–45)
BASOPHILS # BLD AUTO: 0.04 THOUSANDS/ΜL (ref 0–0.1)
BASOPHILS NFR BLD AUTO: 1 % (ref 0–1)
BILIRUB DIRECT SERPL-MCNC: 0.19 MG/DL (ref 0–0.2)
BILIRUB SERPL-MCNC: 0.68 MG/DL (ref 0.2–1)
BUN SERPL-MCNC: 23 MG/DL (ref 5–25)
CALCIUM SERPL-MCNC: 8.8 MG/DL (ref 8.3–10.1)
CHLORIDE SERPL-SCNC: 105 MMOL/L (ref 100–108)
CO2 SERPL-SCNC: 32 MMOL/L (ref 21–32)
CREAT SERPL-MCNC: 1.43 MG/DL (ref 0.6–1.3)
EOSINOPHIL # BLD AUTO: 0.52 THOUSAND/ΜL (ref 0–0.61)
EOSINOPHIL NFR BLD AUTO: 6 % (ref 0–6)
ERYTHROCYTE [DISTWIDTH] IN BLOOD BY AUTOMATED COUNT: 15.1 % (ref 11.6–15.1)
GFR SERPL CREATININE-BSD FRML MDRD: 47 ML/MIN/1.73SQ M
GLUCOSE SERPL-MCNC: 186 MG/DL (ref 65–140)
GLUCOSE SERPL-MCNC: 193 MG/DL (ref 65–140)
HCT VFR BLD AUTO: 38.6 % (ref 36.5–49.3)
HGB BLD-MCNC: 11.9 G/DL (ref 12–17)
IMM GRANULOCYTES # BLD AUTO: 0.03 THOUSAND/UL (ref 0–0.2)
IMM GRANULOCYTES NFR BLD AUTO: 0 % (ref 0–2)
INR PPP: 1.08 (ref 0.84–1.19)
LACTATE SERPL-SCNC: 1.2 MMOL/L (ref 0.5–2)
LYMPHOCYTES # BLD AUTO: 1.23 THOUSANDS/ΜL (ref 0.6–4.47)
LYMPHOCYTES NFR BLD AUTO: 14 % (ref 14–44)
MCH RBC QN AUTO: 26.8 PG (ref 26.8–34.3)
MCHC RBC AUTO-ENTMCNC: 30.8 G/DL (ref 31.4–37.4)
MCV RBC AUTO: 87 FL (ref 82–98)
MONOCYTES # BLD AUTO: 0.57 THOUSAND/ΜL (ref 0.17–1.22)
MONOCYTES NFR BLD AUTO: 7 % (ref 4–12)
NEUTROPHILS # BLD AUTO: 6.25 THOUSANDS/ΜL (ref 1.85–7.62)
NEUTS SEG NFR BLD AUTO: 72 % (ref 43–75)
NRBC BLD AUTO-RTO: 0 /100 WBCS
PLATELET # BLD AUTO: 266 THOUSANDS/UL (ref 149–390)
PMV BLD AUTO: 9.8 FL (ref 8.9–12.7)
POTASSIUM SERPL-SCNC: 3.8 MMOL/L (ref 3.5–5.3)
PROT SERPL-MCNC: 7.7 G/DL (ref 6.4–8.2)
PROTHROMBIN TIME: 13.5 SECONDS (ref 11.6–14.5)
RBC # BLD AUTO: 4.44 MILLION/UL (ref 3.88–5.62)
SODIUM SERPL-SCNC: 142 MMOL/L (ref 136–145)
WBC # BLD AUTO: 8.64 THOUSAND/UL (ref 4.31–10.16)

## 2021-09-18 PROCEDURE — 80048 BASIC METABOLIC PNL TOTAL CA: CPT | Performed by: EMERGENCY MEDICINE

## 2021-09-18 PROCEDURE — 96360 HYDRATION IV INFUSION INIT: CPT

## 2021-09-18 PROCEDURE — 85730 THROMBOPLASTIN TIME PARTIAL: CPT | Performed by: EMERGENCY MEDICINE

## 2021-09-18 PROCEDURE — 85025 COMPLETE CBC W/AUTO DIFF WBC: CPT | Performed by: EMERGENCY MEDICINE

## 2021-09-18 PROCEDURE — 99284 EMERGENCY DEPT VISIT MOD MDM: CPT

## 2021-09-18 PROCEDURE — 85610 PROTHROMBIN TIME: CPT | Performed by: EMERGENCY MEDICINE

## 2021-09-18 PROCEDURE — 70450 CT HEAD/BRAIN W/O DYE: CPT

## 2021-09-18 PROCEDURE — 83605 ASSAY OF LACTIC ACID: CPT | Performed by: EMERGENCY MEDICINE

## 2021-09-18 PROCEDURE — 99285 EMERGENCY DEPT VISIT HI MDM: CPT | Performed by: EMERGENCY MEDICINE

## 2021-09-18 PROCEDURE — 82948 REAGENT STRIP/BLOOD GLUCOSE: CPT

## 2021-09-18 PROCEDURE — 93005 ELECTROCARDIOGRAM TRACING: CPT

## 2021-09-18 PROCEDURE — 80076 HEPATIC FUNCTION PANEL: CPT | Performed by: EMERGENCY MEDICINE

## 2021-09-18 PROCEDURE — 96361 HYDRATE IV INFUSION ADD-ON: CPT

## 2021-09-18 PROCEDURE — 36415 COLL VENOUS BLD VENIPUNCTURE: CPT | Performed by: EMERGENCY MEDICINE

## 2021-09-18 PROCEDURE — 74177 CT ABD & PELVIS W/CONTRAST: CPT

## 2021-09-18 RX ORDER — NYSTATIN 100000 [USP'U]/G
POWDER TOPICAL 3 TIMES DAILY
Qty: 45 G | Refills: 0 | Status: SHIPPED | OUTPATIENT
Start: 2021-09-18 | End: 2021-09-18 | Stop reason: SDUPTHER

## 2021-09-18 RX ORDER — NYSTATIN 100000 [USP'U]/G
POWDER TOPICAL 3 TIMES DAILY
Qty: 45 G | Refills: 0 | Status: SHIPPED | OUTPATIENT
Start: 2021-09-18

## 2021-09-18 RX ADMIN — IOHEXOL 100 ML: 350 INJECTION, SOLUTION INTRAVENOUS at 01:58

## 2021-09-18 RX ADMIN — SODIUM CHLORIDE 1000 ML: 0.9 INJECTION, SOLUTION INTRAVENOUS at 00:37

## 2021-09-18 NOTE — ED PROVIDER NOTES
History  Chief Complaint   Patient presents with    Medical Problem     Patient called 911 tonight stating he has a headche and hasn't been feeling right overall  States hes been sleeping a lot and hasn't taken his insulin for two days  States "I just didn't get around to it"  This is a 25-year-old male with history of hypertension, hyperlipidemia, diabetes, CAD who presents with headache  Patient states that over the past few days he has not been feeling well  Patient unable to describe this  States that he has been experiencing a headache  He has been taking oxycodone without relief  Patient also states that he has not taken his insulin or other medications over the past 24 hours  "I just have not gotten around to taking the medication"  Denies any sick contacts at home  Patient is fully vaccinated for COVID  He ultimately called EMS because he is not feeling well  Denies fever/chills, nausea/vomiting, lightheadedness/dizziness, numbness/weakness, change in vision, URI symptoms, neck pain, chest pain, palpitations, shortness of breath, cough, back pain, flank pain, abdominal pain, diarrhea, hematochezia, melena, dysuria, hematuria  Prior to Admission Medications   Prescriptions Last Dose Informant Patient Reported? Taking?    BD PEN NEEDLE JUNIOR U/F 32G X 4 MM MISC   Yes No   Semaglutide (OZEMPIC, 0 25 OR 0 5 MG/DOSE, SC)   Yes No   Sig: Inject 0 5 mg under the skin once a week Usually takes on Mon   acetaminophen (TYLENOL) 325 mg tablet   No No   Sig: Take 2 tablets (650 mg total) by mouth every 6 (six) hours as needed for mild pain, headaches or fever   amLODIPine (NORVASC) 10 mg tablet   No No   Sig: Take 1 tablet (10 mg total) by mouth daily   aspirin 81 MG tablet   Yes No   Sig: Take 81 mg by mouth daily   atorvastatin (LIPITOR) 40 mg tablet   No No   Sig: Take 1 tablet (40 mg total) by mouth daily after dinner   carvedilol (COREG) 12 5 mg tablet   No No   Sig: Take 1 tablet (12 5 mg total) by mouth 2 (two) times a day   cholecalciferol 2000 units TABS   No No   Sig: Take 1 tablet (2,000 Units total) by mouth daily   finasteride (PROSCAR) 5 mg tablet   No No   Sig: Take 1 tablet (5 mg total) by mouth daily   gabapentin (NEURONTIN) 600 MG tablet   No No   Sig: Take 1 tablet (600 mg total) by mouth 2 (two) times a day   glucagon (GLUCAGON EMERGENCY) 1 MG injection   No No   Sig: Inject 1 mg under the skin once as needed (PRN blood glucose less than 70 if unconscious or uncorrectable by oral means) for up to 1 dose   insulin glargine (LANTUS) 100 units/mL subcutaneous injection   No No   Sig: Inject 20 Units under the skin 2 (two) times a day for 14 days   nystatin (MYCOSTATIN) powder   No No   Sig: Apply topically 2 (two) times a day   oxyCODONE (ROXICODONE) 10 MG TABS   No No   Sig: Take 1 tablet (10 mg total) by mouth every 4 (four) hours as needed for moderate pain or severe painMax Daily Amount: 60 mg   Patient taking differently: Take 20 mg by mouth every 6 (six) hours as needed for moderate pain or severe pain    tamsulosin (FLOMAX) 0 4 mg   Yes No   Sig: Take 0 4 mg by mouth every evening        Facility-Administered Medications: None       Past Medical History:   Diagnosis Date    Cataract     CHF (congestive heart failure) (HCC)     chronic diastolic CHF    Coronary artery disease     Diabetes mellitus [facility]     Glaucoma     Hyperlipidemia     Hypertension     Neuropathy     Obesity     Renal disorder     chronic kidney disease stage 3     Sleep apnea     Stroke ([facility]     TIA (transient ischemic attack)     Uncontrolled type 2 diabetes mellitus with hyperglycemia, with long-term current use of insulin [facility] 10/4/2018       Past Surgical History:   Procedure Laterality Date    APPENDECTOMY      CARPAL TUNNEL RELEASE      EYE SURGERY      cataracts       Family History   Problem Relation Age of Onset    No Known Problems Mother     No Known Problems Father      I have reviewed and agree with the history as documented  E-Cigarette/Vaping    E-Cigarette Use Never User      E-Cigarette/Vaping Substances    Nicotine No     THC No     CBD No     Flavoring No     Other No     Unknown No      Social History     Tobacco Use    Smoking status: Former Smoker     Types: Cigars     Quit date:      Years since quittin 7    Smokeless tobacco: Former User   Vaping Use    Vaping Use: Never used   Substance Use Topics    Alcohol use: Never    Drug use: No       Review of Systems   Constitutional: Negative for chills, fatigue and fever  HENT: Negative for rhinorrhea, sore throat and trouble swallowing  Eyes: Negative for photophobia and visual disturbance  Respiratory: Negative for cough, chest tightness and shortness of breath  Cardiovascular: Negative for chest pain, palpitations and leg swelling  Gastrointestinal: Negative for abdominal pain, blood in stool, diarrhea, nausea and vomiting  Endocrine: Negative for polyuria  Genitourinary: Negative for dysuria, flank pain and hematuria  Musculoskeletal: Negative for back pain and neck pain  Skin: Negative for color change and rash  Allergic/Immunologic: Negative for immunocompromised state  Neurological: Positive for headaches  Negative for dizziness, weakness, light-headedness and numbness  All other systems reviewed and are negative  Physical Exam  Physical Exam  Constitutional:       General: He is not in acute distress  Appearance: Normal appearance  He is well-developed  He is morbidly obese  Comments: Chronically ill-appearing  Very unkempt  HENT:      Mouth/Throat:      Pharynx: Uvula midline  Eyes:      General: Lids are normal       Conjunctiva/sclera: Conjunctivae normal       Pupils: Pupils are equal, round, and reactive to light  Neck:      Thyroid: No thyroid mass or thyromegaly        Trachea: Trachea normal    Cardiovascular:      Rate and Rhythm: Normal rate and regular rhythm  Pulses: Normal pulses  Heart sounds: Normal heart sounds  No murmur heard  Pulmonary:      Effort: Pulmonary effort is normal       Breath sounds: Normal breath sounds  Abdominal:      General: Bowel sounds are normal       Palpations: Abdomen is soft  Tenderness: There is no abdominal tenderness  There is no guarding or rebound  Negative signs include Rosales's sign  Comments: Protuberant abdomen  Erythema underneath the pannus with serous drainage  Genitourinary:     Comments: Patient has erythema and serous drainage around the perineal area  Small amount of skin sloughing to the inside of the right thigh  No open wounds or purulence drainage  Patient has erythema and tenderness to the bilateral buttock  No evidence of skin breakdown  Musculoskeletal:      Comments: Swelling and edema to the bilateral lower extremities with associated venous stasis skin changes  Serous drainage to the bilateral lower extremities  No warmth or tenderness to indicate infection  Skin:     General: Skin is warm and dry  Neurological:      Mental Status: He is alert  Psychiatric:         Speech: Speech normal          Behavior: Behavior normal  Behavior is cooperative  Thought Content:  Thought content normal          Vital Signs  ED Triage Vitals [09/18/21 0029]   Temperature Pulse Respirations Blood Pressure SpO2   99 2 °F (37 3 °C) 77 20 (!) 199/90 96 %      Temp Source Heart Rate Source Patient Position - Orthostatic VS BP Location FiO2 (%)   Temporal Monitor Lying Left arm --      Pain Score       8           Vitals:    09/18/21 0029 09/18/21 0115 09/18/21 0330   BP: (!) 199/90 (!) 188/81 (!) 176/74   Pulse: 77 79 78   Patient Position - Orthostatic VS: Lying Lying Lying         Visual Acuity      ED Medications  Medications   sodium chloride 0 9 % bolus 1,000 mL (1,000 mL Intravenous New Bag 9/18/21 0037)   iohexol (OMNIPAQUE) 350 MG/ML injection (SINGLE-DOSE) 100 mL (100 mL Intravenous Given 9/18/21 0158)       Diagnostic Studies  Results Reviewed     Procedure Component Value Units Date/Time    Lactic acid [[id number]]  (Normal) Collected: 09/18/21 0034    Lab Status: Final result Specimen: Blood from Arm, Left Updated: 09/18/21 0109     LACTIC ACID 1 2 mmol/L     Narrative:      Result may be elevated if tourniquet was used during collection      Hepatic function panel [[id number]]  (Abnormal) Collected: 09/18/21 0034    Lab Status: Final result Specimen: Blood from Arm, Left Updated: 09/18/21 0106     Total Bilirubin 0 68 mg/dL      Bilirubin, Direct 0 19 mg/dL      Alkaline Phosphatase 165 U/L      AST 18 U/L      ALT 20 U/L      Total Protein 7 7 g/dL      Albumin 3 2 g/dL     Basic metabolic panel [[id number]]  (Abnormal) Collected: 09/18/21 0034    Lab Status: Final result Specimen: Blood from Arm, Left Updated: 09/18/21 0106     Sodium 142 mmol/L      Potassium 3 8 mmol/L      Chloride 105 mmol/L      CO2 32 mmol/L      ANION GAP 5 mmol/L      BUN 23 mg/dL      Creatinine 1 43 mg/dL      Glucose 193 mg/dL      Calcium 8 8 mg/dL      eGFR 47 ml/min/1 73sq m     Narrative:      [place] guidelines for Chronic Kidney Disease (CKD):     Stage 1 with normal or high GFR (GFR > 90 mL/min/1 73 square meters)    Stage 2 Mild CKD (GFR = 60-89 mL/min/1 73 square meters)    Stage 3A Moderate CKD (GFR = 45-59 mL/min/1 73 square meters)    Stage 3B Moderate CKD (GFR = 30-44 mL/min/1 73 square meters)    Stage 4 Severe CKD (GFR = 15-29 mL/min/1 73 square meters)    Stage 5 End Stage CKD (GFR <15 mL/min/1 73 square meters)  Note: GFR calculation is accurate only with a steady state creatinine    Protime-INR [[id number]]  (Normal) Collected: 09/18/21 0034    Lab Status: Final result Specimen: Blood from Arm, Left Updated: 09/18/21 0055     Protime 13 5 seconds      INR 1 08    APTT [[id number]]  (Normal) Collected: 09/18/21 0034    Lab Status: Final result Specimen: Blood from Arm, Left Updated: 09/18/21 0055     PTT 30 seconds     CBC and differential [[id number]]  (Abnormal) Collected: 09/18/21 0034    Lab Status: Final result Specimen: Blood from Arm, Left Updated: 09/18/21 0044     WBC 8 64 Thousand/uL      RBC 4 44 Million/uL      Hemoglobin 11 9 g/dL      Hematocrit 38 6 %      MCV 87 fL      MCH 26 8 pg      MCHC 30 8 g/dL      RDW 15 1 %      MPV 9 8 fL      Platelets 057 Thousands/uL      nRBC 0 /100 WBCs      Neutrophils Relative 72 %      Immat GRANS % 0 %      Lymphocytes Relative 14 %      Monocytes Relative 7 %      Eosinophils Relative 6 %      Basophils Relative 1 %      Neutrophils Absolute 6 25 Thousands/µL      Immature Grans Absolute 0 03 Thousand/uL      Lymphocytes Absolute 1 23 Thousands/µL      Monocytes Absolute 0 57 Thousand/µL      Eosinophils Absolute 0 52 Thousand/µL      Basophils Absolute 0 04 Thousands/µL     Fingerstick Glucose (POCT) [[id number]]  (Abnormal) Collected: 09/18/21 0029    Lab Status: Final result Updated: 09/18/21 0036     POC Glucose 186 mg/dl                  CT head without contrast   Final Result by Pro Washburn MD (09/18 0326)      No acute intracranial abnormality  Workstation performed: [id number]         CT abdomen pelvis with contrast   Final Result by Ney Foster MD (09/18 1337)      There is skin thickening and some infiltration of the subcutaneous fat involving the lower anterior abdominal wall pannus which appears similar to April 5, 2021  Clinical correlation regarding the possibly of panniculitis is suggested  There is mild skin thickening and infiltration of the subcutaneous fat of the mons pubis and anterior perineum which appears somewhat worse compared with April 5, 2021  Additionally, there appears to be some skin thickening involving the bilateral    buttocks  Clinical correlation regarding the possibility of cellulitis is recommended    No abnormal soft tissue gas collections are demonstrable  There is an approximately 3 7 x 2 7 cm somewhat irregularly marginated structure within the fat of the posteromedial right buttocks, just adjacent to the gluteal cleft  This appears similar to slightly smaller compared with April 5, 2021  This may    possibly represent fat necrosis or old pilonidal process  Clinical correlation recommended  The study was marked in [facility] for immediate notification  Workstation performed: [id number]                    Procedures  ECG 12 Lead Documentation Only    Date/Time: 9/18/2021 12:45 AM  Performed by: Jovita Calles MD  Authorized by: Jovita Calles MD     ECG reviewed by me, the ED Provider: yes    Patient location:  ED  Previous ECG:     Previous ECG:  Compared to current    Comparison ECG info:  4/5/21    Similarity:  No change    Comparison to cardiac monitor: Yes    Interpretation:     Interpretation: non-specific    Rate:     ECG rate:  77    ECG rate assessment: normal    Rhythm:     Rhythm: sinus rhythm    Ectopy:     Ectopy: none    QRS:     QRS axis:  Left    QRS intervals:  Normal  Conduction:     Conduction: abnormal      Abnormal conduction: complete RBBB    ST segments:     ST segments:  Normal  T waves:     T waves: normal               ED Course  ED Course as of Sep 18 0431   Sat Sep 18, 2021   0425 No evidence of cellulitis  Intertrigo of the perineum and pressure of the buttocks  CT abdomen pelvis with contrast   0431 Patient told to take his medications  Also instructed to take better care of his skin and take better care of himself  Patient prescribed nystatin powder which needs to be applied 3 times to the perineal and abdominal area  Follow up with family doctor  Wife at bedside understands the instructions as well  MDM  Number of Diagnoses or Management Options  Diagnosis management comments: Will check labs, EKG  CT head    CT abdomen/pelvis with contrast   IV fluids  Disposition pending results  Disposition  Final diagnoses:   Intertrigo   Headache     Time reflects when diagnosis was documented in both MDM as applicable and the Disposition within this note     Time User Action Codes Description Comment    9/18/2021  4:25 AM Isha FIGUEROA Add [L30 4] Intertrigo     9/18/2021  4:26 AM Chase Kailyn Add [R51 9] Headache       ED Disposition     ED Disposition Condition Date/Time Comment    Discharge Stable Sat Sep 18, 2021  4:25 AM Sun Harris discharge to home/self care  Follow-up Information     Follow up With Specialties Details Why Contact Info Bobbi Mccracken MD [facility] Medicine Schedule an appointment as soon as possible for a visit   [address] [place] [address] [address] [place] [address] Emergency Department Emergency Medicine Go to  If symptoms worsen [place] [phone]  [facility] Emergency Department, [address], [address]          Patient's Medications   Discharge Prescriptions    NYSTATIN (MYCOSTATIN) POWDER    Apply topically 3 (three) times a day       Start Date: 9/18/2021 End Date: --       Order Dose: --       Quantity: 45 g    Refills: 0     No discharge procedures on file      PDMP Review     None          ED Provider  Electronically Signed by           Maribel Villeda MD  09/18/21 7099

## 2021-09-18 NOTE — ED NOTES
Patient with redness and lumps to bilateral lower legs  Also with macerated scrotum, groin, and buttocks  Patient states for the last few days he hasn't been hungry and he only ate 3 pieces of lr today        Jake Paz RN  09/18/21 8044

## 2021-09-20 LAB
ATRIAL RATE: 77 BPM
P AXIS: 49 DEGREES
PR INTERVAL: 246 MS
QRS AXIS: -88 DEGREES
QRSD INTERVAL: 154 MS
QT INTERVAL: 436 MS
QTC INTERVAL: 493 MS
T WAVE AXIS: 40 DEGREES
VENTRICULAR RATE: 77 BPM

## 2021-09-20 PROCEDURE — 93010 ELECTROCARDIOGRAM REPORT: CPT | Performed by: INTERNAL MEDICINE

## 2022-04-02 ENCOUNTER — APPOINTMENT (EMERGENCY)
Dept: CT IMAGING | Facility: HOSPITAL | Age: 77
DRG: 189 | End: 2022-04-02
Payer: COMMERCIAL

## 2022-04-02 ENCOUNTER — HOSPITAL ENCOUNTER (INPATIENT)
Facility: HOSPITAL | Age: 77
LOS: 10 days | Discharge: NURSING, REHAB OR LONG-TERM CARE | DRG: 189 | End: 2022-04-13
Attending: EMERGENCY MEDICINE | Admitting: HOSPITALIST
Payer: COMMERCIAL

## 2022-04-02 DIAGNOSIS — G93.41 ACUTE METABOLIC ENCEPHALOPATHY: ICD-10-CM

## 2022-04-02 DIAGNOSIS — F11.90 CHRONIC, CONTINUOUS USE OF OPIOIDS: ICD-10-CM

## 2022-04-02 DIAGNOSIS — E11.65 HYPERGLYCEMIA DUE TO TYPE 2 DIABETES MELLITUS (HCC): ICD-10-CM

## 2022-04-02 DIAGNOSIS — G30.1 LATE ONSET ALZHEIMER'S DEMENTIA WITH BEHAVIORAL DISTURBANCE (HCC): ICD-10-CM

## 2022-04-02 DIAGNOSIS — R45.850 HOMICIDAL BEHAVIOR: ICD-10-CM

## 2022-04-02 DIAGNOSIS — G93.40 ACUTE ENCEPHALOPATHY: Primary | ICD-10-CM

## 2022-04-02 DIAGNOSIS — N18.30 STAGE 3 CHRONIC KIDNEY DISEASE, UNSPECIFIED WHETHER STAGE 3A OR 3B CKD (HCC): Chronic | ICD-10-CM

## 2022-04-02 DIAGNOSIS — E11.9 DIABETES MELLITUS (HCC): ICD-10-CM

## 2022-04-02 DIAGNOSIS — J96.01 ACUTE RESPIRATORY FAILURE WITH HYPOXIA AND HYPERCAPNIA (HCC): ICD-10-CM

## 2022-04-02 DIAGNOSIS — J96.02 ACUTE RESPIRATORY FAILURE WITH HYPOXIA AND HYPERCAPNIA (HCC): ICD-10-CM

## 2022-04-02 DIAGNOSIS — S81.801A OPEN WOUND OF RIGHT LOWER EXTREMITY: ICD-10-CM

## 2022-04-02 DIAGNOSIS — R94.31 ABNORMAL EKG: ICD-10-CM

## 2022-04-02 DIAGNOSIS — F02.81 LATE ONSET ALZHEIMER'S DEMENTIA WITH BEHAVIORAL DISTURBANCE (HCC): ICD-10-CM

## 2022-04-02 LAB
2HR DELTA HS TROPONIN: 1 NG/L
ALBUMIN SERPL BCP-MCNC: 3 G/DL (ref 3.5–5)
ALP SERPL-CCNC: 152 U/L (ref 46–116)
ALT SERPL W P-5'-P-CCNC: 20 U/L (ref 12–78)
AMPHETAMINES SERPL QL SCN: NEGATIVE
ANION GAP SERPL CALCULATED.3IONS-SCNC: 9 MMOL/L (ref 4–13)
APAP SERPL-MCNC: <2 UG/ML (ref 10–20)
APTT PPP: 34 SECONDS (ref 23–37)
AST SERPL W P-5'-P-CCNC: 10 U/L (ref 5–45)
BACTERIA UR QL AUTO: NORMAL /HPF
BARBITURATES UR QL: NEGATIVE
BASE EX.OXY STD BLDV CALC-SCNC: 64.5 % (ref 60–80)
BASE EXCESS BLDV CALC-SCNC: 1.7 MMOL/L
BASOPHILS # BLD AUTO: 0.03 THOUSANDS/ΜL (ref 0–0.1)
BASOPHILS NFR BLD AUTO: 0 % (ref 0–1)
BENZODIAZ UR QL: POSITIVE
BETA-HYDROXYBUTYRATE: 0.1 MMOL/L
BILIRUB SERPL-MCNC: 0.43 MG/DL (ref 0.2–1)
BILIRUB UR QL STRIP: NEGATIVE
BUN SERPL-MCNC: 33 MG/DL (ref 5–25)
CALCIUM ALBUM COR SERPL-MCNC: 9.8 MG/DL (ref 8.3–10.1)
CALCIUM SERPL-MCNC: 9 MG/DL (ref 8.3–10.1)
CARDIAC TROPONIN I PNL SERPL HS: 20 NG/L
CARDIAC TROPONIN I PNL SERPL HS: 21 NG/L
CHLORIDE SERPL-SCNC: 101 MMOL/L (ref 100–108)
CLARITY UR: CLEAR
CO2 SERPL-SCNC: 32 MMOL/L (ref 21–32)
COCAINE UR QL: NEGATIVE
COLOR UR: YELLOW
CREAT SERPL-MCNC: 1.73 MG/DL (ref 0.6–1.3)
EOSINOPHIL # BLD AUTO: 0.25 THOUSAND/ΜL (ref 0–0.61)
EOSINOPHIL NFR BLD AUTO: 3 % (ref 0–6)
ERYTHROCYTE [DISTWIDTH] IN BLOOD BY AUTOMATED COUNT: 14.5 % (ref 11.6–15.1)
ETHANOL SERPL-MCNC: <3 MG/DL (ref 0–3)
FLUAV RNA RESP QL NAA+PROBE: NEGATIVE
FLUBV RNA RESP QL NAA+PROBE: NEGATIVE
GFR SERPL CREATININE-BSD FRML MDRD: 36 ML/MIN/1.73SQ M
GLUCOSE SERPL-MCNC: 468 MG/DL (ref 65–140)
GLUCOSE SERPL-MCNC: 478 MG/DL (ref 65–140)
GLUCOSE SERPL-MCNC: 565 MG/DL (ref 65–140)
GLUCOSE UR STRIP-MCNC: ABNORMAL MG/DL
HCO3 BLDV-SCNC: 28.4 MMOL/L (ref 24–30)
HCT VFR BLD AUTO: 39 % (ref 36.5–49.3)
HGB BLD-MCNC: 12 G/DL (ref 12–17)
HGB UR QL STRIP.AUTO: ABNORMAL
IMM GRANULOCYTES # BLD AUTO: 0.04 THOUSAND/UL (ref 0–0.2)
IMM GRANULOCYTES NFR BLD AUTO: 0 % (ref 0–2)
INR PPP: 1.18 (ref 0.84–1.19)
KETONES UR STRIP-MCNC: NEGATIVE MG/DL
LACTATE SERPL-SCNC: 2 MMOL/L (ref 0.5–2)
LEUKOCYTE ESTERASE UR QL STRIP: ABNORMAL
LYMPHOCYTES # BLD AUTO: 1.82 THOUSANDS/ΜL (ref 0.6–4.47)
LYMPHOCYTES NFR BLD AUTO: 20 % (ref 14–44)
MCH RBC QN AUTO: 27.2 PG (ref 26.8–34.3)
MCHC RBC AUTO-ENTMCNC: 30.8 G/DL (ref 31.4–37.4)
MCV RBC AUTO: 88 FL (ref 82–98)
METHADONE UR QL: NEGATIVE
MONOCYTES # BLD AUTO: 0.69 THOUSAND/ΜL (ref 0.17–1.22)
MONOCYTES NFR BLD AUTO: 8 % (ref 4–12)
NEUTROPHILS # BLD AUTO: 6.21 THOUSANDS/ΜL (ref 1.85–7.62)
NEUTS SEG NFR BLD AUTO: 69 % (ref 43–75)
NITRITE UR QL STRIP: NEGATIVE
NON-SQ EPI CELLS URNS QL MICRO: NORMAL /HPF
NRBC BLD AUTO-RTO: 0 /100 WBCS
NT-PROBNP SERPL-MCNC: 658 PG/ML
O2 CT BLDV-SCNC: 11.2 ML/DL
OPIATES UR QL SCN: NEGATIVE
OXYCODONE+OXYMORPHONE UR QL SCN: NEGATIVE
PCO2 BLDV: 53.8 MM HG (ref 42–50)
PCP UR QL: NEGATIVE
PH BLDV: 7.34 [PH] (ref 7.3–7.4)
PH UR STRIP.AUTO: 7.5 [PH]
PLATELET # BLD AUTO: 260 THOUSANDS/UL (ref 149–390)
PMV BLD AUTO: 10.8 FL (ref 8.9–12.7)
PO2 BLDV: 40.9 MM HG (ref 35–45)
POTASSIUM SERPL-SCNC: 4.5 MMOL/L (ref 3.5–5.3)
PROCALCITONIN SERPL-MCNC: 0.26 NG/ML
PROT SERPL-MCNC: 7.6 G/DL (ref 6.4–8.2)
PROT UR STRIP-MCNC: ABNORMAL MG/DL
PROTHROMBIN TIME: 14.4 SECONDS (ref 11.6–14.5)
RBC # BLD AUTO: 4.41 MILLION/UL (ref 3.88–5.62)
RBC #/AREA URNS AUTO: NORMAL /HPF
RSV RNA RESP QL NAA+PROBE: NEGATIVE
SALICYLATES SERPL-MCNC: <3 MG/DL (ref 3–20)
SARS-COV-2 RNA RESP QL NAA+PROBE: NEGATIVE
SODIUM SERPL-SCNC: 142 MMOL/L (ref 136–145)
SP GR UR STRIP.AUTO: 1.01 (ref 1–1.03)
THC UR QL: NEGATIVE
UROBILINOGEN UR QL STRIP.AUTO: 0.2 E.U./DL
WBC # BLD AUTO: 9.04 THOUSAND/UL (ref 4.31–10.16)
WBC #/AREA URNS AUTO: NORMAL /HPF

## 2022-04-02 PROCEDURE — 84145 PROCALCITONIN (PCT): CPT | Performed by: EMERGENCY MEDICINE

## 2022-04-02 PROCEDURE — 83880 ASSAY OF NATRIURETIC PEPTIDE: CPT | Performed by: EMERGENCY MEDICINE

## 2022-04-02 PROCEDURE — 80143 DRUG ASSAY ACETAMINOPHEN: CPT | Performed by: EMERGENCY MEDICINE

## 2022-04-02 PROCEDURE — 82077 ASSAY SPEC XCP UR&BREATH IA: CPT | Performed by: EMERGENCY MEDICINE

## 2022-04-02 PROCEDURE — 70498 CT ANGIOGRAPHY NECK: CPT

## 2022-04-02 PROCEDURE — 82805 BLOOD GASES W/O2 SATURATION: CPT | Performed by: EMERGENCY MEDICINE

## 2022-04-02 PROCEDURE — 96372 THER/PROPH/DIAG INJ SC/IM: CPT

## 2022-04-02 PROCEDURE — 82948 REAGENT STRIP/BLOOD GLUCOSE: CPT

## 2022-04-02 PROCEDURE — 84484 ASSAY OF TROPONIN QUANT: CPT | Performed by: EMERGENCY MEDICINE

## 2022-04-02 PROCEDURE — 81001 URINALYSIS AUTO W/SCOPE: CPT | Performed by: EMERGENCY MEDICINE

## 2022-04-02 PROCEDURE — 96361 HYDRATE IV INFUSION ADD-ON: CPT

## 2022-04-02 PROCEDURE — 80307 DRUG TEST PRSMV CHEM ANLYZR: CPT | Performed by: EMERGENCY MEDICINE

## 2022-04-02 PROCEDURE — 80053 COMPREHEN METABOLIC PANEL: CPT | Performed by: EMERGENCY MEDICINE

## 2022-04-02 PROCEDURE — 82010 KETONE BODYS QUAN: CPT | Performed by: EMERGENCY MEDICINE

## 2022-04-02 PROCEDURE — 80179 DRUG ASSAY SALICYLATE: CPT | Performed by: EMERGENCY MEDICINE

## 2022-04-02 PROCEDURE — G1004 CDSM NDSC: HCPCS

## 2022-04-02 PROCEDURE — 85730 THROMBOPLASTIN TIME PARTIAL: CPT | Performed by: EMERGENCY MEDICINE

## 2022-04-02 PROCEDURE — 85025 COMPLETE CBC W/AUTO DIFF WBC: CPT | Performed by: EMERGENCY MEDICINE

## 2022-04-02 PROCEDURE — 0241U HB NFCT DS VIR RESP RNA 4 TRGT: CPT | Performed by: EMERGENCY MEDICINE

## 2022-04-02 PROCEDURE — 96360 HYDRATION IV INFUSION INIT: CPT

## 2022-04-02 PROCEDURE — 70496 CT ANGIOGRAPHY HEAD: CPT

## 2022-04-02 PROCEDURE — 71260 CT THORAX DX C+: CPT

## 2022-04-02 PROCEDURE — 99285 EMERGENCY DEPT VISIT HI MDM: CPT

## 2022-04-02 PROCEDURE — 85610 PROTHROMBIN TIME: CPT | Performed by: EMERGENCY MEDICINE

## 2022-04-02 PROCEDURE — 99285 EMERGENCY DEPT VISIT HI MDM: CPT | Performed by: EMERGENCY MEDICINE

## 2022-04-02 PROCEDURE — 87040 BLOOD CULTURE FOR BACTERIA: CPT | Performed by: EMERGENCY MEDICINE

## 2022-04-02 PROCEDURE — 93005 ELECTROCARDIOGRAM TRACING: CPT

## 2022-04-02 PROCEDURE — 83605 ASSAY OF LACTIC ACID: CPT | Performed by: EMERGENCY MEDICINE

## 2022-04-02 PROCEDURE — 74177 CT ABD & PELVIS W/CONTRAST: CPT

## 2022-04-02 PROCEDURE — 36415 COLL VENOUS BLD VENIPUNCTURE: CPT | Performed by: EMERGENCY MEDICINE

## 2022-04-02 PROCEDURE — 96374 THER/PROPH/DIAG INJ IV PUSH: CPT

## 2022-04-02 RX ORDER — LORAZEPAM 2 MG/ML
1 INJECTION INTRAMUSCULAR ONCE
Status: COMPLETED | OUTPATIENT
Start: 2022-04-02 | End: 2022-04-02

## 2022-04-02 RX ORDER — OLANZAPINE 10 MG/1
10 INJECTION, POWDER, LYOPHILIZED, FOR SOLUTION INTRAMUSCULAR ONCE
Status: COMPLETED | OUTPATIENT
Start: 2022-04-02 | End: 2022-04-02

## 2022-04-02 RX ADMIN — IOHEXOL 100 ML: 350 INJECTION, SOLUTION INTRAVENOUS at 21:53

## 2022-04-02 RX ADMIN — OLANZAPINE 10 MG: 10 INJECTION, POWDER, FOR SOLUTION INTRAMUSCULAR at 20:26

## 2022-04-02 RX ADMIN — SODIUM CHLORIDE 1000 ML: 0.9 INJECTION, SOLUTION INTRAVENOUS at 21:51

## 2022-04-02 RX ADMIN — INSULIN HUMAN 15 UNITS: 100 INJECTION, SOLUTION PARENTERAL at 21:51

## 2022-04-02 RX ADMIN — LORAZEPAM 1 MG: 2 INJECTION INTRAMUSCULAR; INTRAVENOUS at 20:26

## 2022-04-03 PROBLEM — Z99.89 OBSTRUCTIVE SLEEP APNEA ON CPAP: Status: ACTIVE | Noted: 2021-04-10

## 2022-04-03 PROBLEM — J96.01 ACUTE RESPIRATORY FAILURE WITH HYPOXIA AND HYPERCAPNIA (HCC): Status: ACTIVE | Noted: 2022-04-03

## 2022-04-03 PROBLEM — G93.41 ACUTE METABOLIC ENCEPHALOPATHY: Status: ACTIVE | Noted: 2022-04-03

## 2022-04-03 PROBLEM — Z99.89 OBSTRUCTIVE SLEEP APNEA ON CPAP: Chronic | Status: ACTIVE | Noted: 2021-04-10

## 2022-04-03 PROBLEM — R09.02 HYPOXIA: Status: ACTIVE | Noted: 2022-04-03

## 2022-04-03 PROBLEM — F11.20 OPIATE DEPENDENCE, CONTINUOUS (HCC): Chronic | Status: ACTIVE | Noted: 2019-02-28

## 2022-04-03 PROBLEM — G47.33 OBSTRUCTIVE SLEEP APNEA: Chronic | Status: ACTIVE | Noted: 2021-04-10

## 2022-04-03 PROBLEM — I87.2 VENOUS STASIS DERMATITIS OF BOTH LOWER EXTREMITIES: Chronic | Status: ACTIVE | Noted: 2021-04-05

## 2022-04-03 PROBLEM — J96.02 ACUTE RESPIRATORY FAILURE WITH HYPOXIA AND HYPERCAPNIA (HCC): Status: ACTIVE | Noted: 2022-04-03

## 2022-04-03 LAB
2HR DELTA HS TROPONIN: -1 NG/L
4HR DELTA HS TROPONIN: -1 NG/L
4HR DELTA HS TROPONIN: -2 NG/L
AMMONIA PLAS-SCNC: <10 UMOL/L (ref 11–35)
ANION GAP SERPL CALCULATED.3IONS-SCNC: 9 MMOL/L (ref 4–13)
ARTERIAL PATENCY WRIST A: YES
ARTERIAL PATENCY WRIST A: YES
ATRIAL RATE: 72 BPM
BASE EXCESS BLDA CALC-SCNC: 0.9 MMOL/L
BASE EXCESS BLDA CALC-SCNC: 3.2 MMOL/L
BASOPHILS # BLD AUTO: 0.03 THOUSANDS/ΜL (ref 0–0.1)
BASOPHILS NFR BLD AUTO: 0 % (ref 0–1)
BUN SERPL-MCNC: 32 MG/DL (ref 5–25)
CALCIUM SERPL-MCNC: 8.6 MG/DL (ref 8.3–10.1)
CARDIAC TROPONIN I PNL SERPL HS: 18 NG/L
CARDIAC TROPONIN I PNL SERPL HS: 19 NG/L
CHLORIDE SERPL-SCNC: 106 MMOL/L (ref 100–108)
CO2 SERPL-SCNC: 29 MMOL/L (ref 21–32)
CREAT SERPL-MCNC: 1.48 MG/DL (ref 0.6–1.3)
EOSINOPHIL # BLD AUTO: 0.5 THOUSAND/ΜL (ref 0–0.61)
EOSINOPHIL NFR BLD AUTO: 5 % (ref 0–6)
ERYTHROCYTE [DISTWIDTH] IN BLOOD BY AUTOMATED COUNT: 14.7 % (ref 11.6–15.1)
EST. AVERAGE GLUCOSE BLD GHB EST-MCNC: 214 MG/DL
GFR SERPL CREATININE-BSD FRML MDRD: 44 ML/MIN/1.73SQ M
GLUCOSE SERPL-MCNC: 181 MG/DL (ref 65–140)
GLUCOSE SERPL-MCNC: 223 MG/DL (ref 65–140)
GLUCOSE SERPL-MCNC: 225 MG/DL (ref 65–140)
GLUCOSE SERPL-MCNC: 240 MG/DL (ref 65–140)
GLUCOSE SERPL-MCNC: 250 MG/DL (ref 65–140)
GLUCOSE SERPL-MCNC: 298 MG/DL (ref 65–140)
GLUCOSE SERPL-MCNC: 472 MG/DL (ref 65–140)
HBA1C MFR BLD: 9.1 %
HCO3 BLDA-SCNC: 27.2 MMOL/L (ref 22–28)
HCO3 BLDA-SCNC: 29.6 MMOL/L (ref 22–28)
HCT VFR BLD AUTO: 35.7 % (ref 36.5–49.3)
HGB BLD-MCNC: 11.1 G/DL (ref 12–17)
IMM GRANULOCYTES # BLD AUTO: 0.06 THOUSAND/UL (ref 0–0.2)
IMM GRANULOCYTES NFR BLD AUTO: 1 % (ref 0–2)
IPAP: 14
L PNEUMO1 AG UR QL IA.RAPID: NEGATIVE
LYMPHOCYTES # BLD AUTO: 1.29 THOUSANDS/ΜL (ref 0.6–4.47)
LYMPHOCYTES NFR BLD AUTO: 13 % (ref 14–44)
MCH RBC QN AUTO: 27.1 PG (ref 26.8–34.3)
MCHC RBC AUTO-ENTMCNC: 31.1 G/DL (ref 31.4–37.4)
MCV RBC AUTO: 87 FL (ref 82–98)
MONOCYTES # BLD AUTO: 0.77 THOUSAND/ΜL (ref 0.17–1.22)
MONOCYTES NFR BLD AUTO: 8 % (ref 4–12)
NASAL CANNULA: 4
NEUTROPHILS # BLD AUTO: 6.98 THOUSANDS/ΜL (ref 1.85–7.62)
NEUTS SEG NFR BLD AUTO: 73 % (ref 43–75)
NON VENT- BIPAP: ABNORMAL
NRBC BLD AUTO-RTO: 0 /100 WBCS
O2 CT BLDA-SCNC: 15.7 ML/DL (ref 16–23)
O2 CT BLDA-SCNC: 17.7 ML/DL (ref 16–23)
OXYHGB MFR BLDA: 92.4 % (ref 94–97)
OXYHGB MFR BLDA: 98.1 % (ref 94–97)
P AXIS: 68 DEGREES
PCO2 BLDA: 50.6 MM HG (ref 36–44)
PCO2 BLDA: 53.7 MM HG (ref 36–44)
PEEP MAX SETTING VENT: 6 CM[H2O]
PH BLDA: 7.35 [PH] (ref 7.35–7.45)
PH BLDA: 7.36 [PH] (ref 7.35–7.45)
PLATELET # BLD AUTO: 250 THOUSANDS/UL (ref 149–390)
PLATELET # BLD AUTO: 258 THOUSANDS/UL (ref 149–390)
PMV BLD AUTO: 10.6 FL (ref 8.9–12.7)
PMV BLD AUTO: 10.6 FL (ref 8.9–12.7)
PO2 BLDA: 140.7 MM HG (ref 75–129)
PO2 BLDA: 69.6 MM HG (ref 75–129)
POTASSIUM SERPL-SCNC: 3.2 MMOL/L (ref 3.5–5.3)
PR INTERVAL: 248 MS
PROCALCITONIN SERPL-MCNC: 0.23 NG/ML
PROCALCITONIN SERPL-MCNC: 0.26 NG/ML
QRS AXIS: -82 DEGREES
QRSD INTERVAL: 162 MS
QT INTERVAL: 456 MS
QTC INTERVAL: 499 MS
RBC # BLD AUTO: 4.09 MILLION/UL (ref 3.88–5.62)
S PNEUM AG UR QL: NEGATIVE
SODIUM SERPL-SCNC: 144 MMOL/L (ref 136–145)
SPECIMEN SOURCE: ABNORMAL
SPECIMEN SOURCE: ABNORMAL
T WAVE AXIS: 33 DEGREES
TSH SERPL DL<=0.05 MIU/L-ACNC: 1.24 UIU/ML (ref 0.36–3.74)
VENT BIPAP FIO2: 100 %
VENTRICULAR RATE: 72 BPM
WBC # BLD AUTO: 9.63 THOUSAND/UL (ref 4.31–10.16)

## 2022-04-03 PROCEDURE — 99449 NTRPROF PH1/NTRNET/EHR 31/>: CPT | Performed by: PSYCHIATRY & NEUROLOGY

## 2022-04-03 PROCEDURE — 84484 ASSAY OF TROPONIN QUANT: CPT | Performed by: HOSPITALIST

## 2022-04-03 PROCEDURE — 96375 TX/PRO/DX INJ NEW DRUG ADDON: CPT

## 2022-04-03 PROCEDURE — 94002 VENT MGMT INPAT INIT DAY: CPT

## 2022-04-03 PROCEDURE — 84145 PROCALCITONIN (PCT): CPT | Performed by: HOSPITALIST

## 2022-04-03 PROCEDURE — 99223 1ST HOSP IP/OBS HIGH 75: CPT | Performed by: HOSPITALIST

## 2022-04-03 PROCEDURE — 99291 CRITICAL CARE FIRST HOUR: CPT | Performed by: FAMILY MEDICINE

## 2022-04-03 PROCEDURE — 85049 AUTOMATED PLATELET COUNT: CPT | Performed by: HOSPITALIST

## 2022-04-03 PROCEDURE — 94664 DEMO&/EVAL PT USE INHALER: CPT

## 2022-04-03 PROCEDURE — 36600 WITHDRAWAL OF ARTERIAL BLOOD: CPT

## 2022-04-03 PROCEDURE — 82805 BLOOD GASES W/O2 SATURATION: CPT | Performed by: FAMILY MEDICINE

## 2022-04-03 PROCEDURE — 94760 N-INVAS EAR/PLS OXIMETRY 1: CPT

## 2022-04-03 PROCEDURE — 85025 COMPLETE CBC W/AUTO DIFF WBC: CPT | Performed by: FAMILY MEDICINE

## 2022-04-03 PROCEDURE — 93010 ELECTROCARDIOGRAM REPORT: CPT | Performed by: INTERNAL MEDICINE

## 2022-04-03 PROCEDURE — 82948 REAGENT STRIP/BLOOD GLUCOSE: CPT

## 2022-04-03 PROCEDURE — 82140 ASSAY OF AMMONIA: CPT | Performed by: HOSPITALIST

## 2022-04-03 PROCEDURE — 87081 CULTURE SCREEN ONLY: CPT | Performed by: HOSPITALIST

## 2022-04-03 PROCEDURE — 87449 NOS EACH ORGANISM AG IA: CPT | Performed by: HOSPITALIST

## 2022-04-03 PROCEDURE — 84145 PROCALCITONIN (PCT): CPT | Performed by: FAMILY MEDICINE

## 2022-04-03 PROCEDURE — 84443 ASSAY THYROID STIM HORMONE: CPT | Performed by: FAMILY MEDICINE

## 2022-04-03 PROCEDURE — 83036 HEMOGLOBIN GLYCOSYLATED A1C: CPT | Performed by: HOSPITALIST

## 2022-04-03 PROCEDURE — 36415 COLL VENOUS BLD VENIPUNCTURE: CPT | Performed by: HOSPITALIST

## 2022-04-03 PROCEDURE — 84484 ASSAY OF TROPONIN QUANT: CPT | Performed by: EMERGENCY MEDICINE

## 2022-04-03 PROCEDURE — 80048 BASIC METABOLIC PNL TOTAL CA: CPT | Performed by: FAMILY MEDICINE

## 2022-04-03 RX ORDER — CEFTRIAXONE 2 G/50ML
2000 INJECTION, SOLUTION INTRAVENOUS ONCE
Status: COMPLETED | OUTPATIENT
Start: 2022-04-03 | End: 2022-04-03

## 2022-04-03 RX ORDER — OLANZAPINE 10 MG/1
5 INJECTION, POWDER, LYOPHILIZED, FOR SOLUTION INTRAMUSCULAR EVERY 8 HOURS PRN
Status: DISCONTINUED | OUTPATIENT
Start: 2022-04-03 | End: 2022-04-12

## 2022-04-03 RX ORDER — FINASTERIDE 5 MG/1
5 TABLET, FILM COATED ORAL DAILY
Status: DISCONTINUED | OUTPATIENT
Start: 2022-04-03 | End: 2022-04-13 | Stop reason: HOSPADM

## 2022-04-03 RX ORDER — SODIUM CHLORIDE 9 MG/ML
125 INJECTION, SOLUTION INTRAVENOUS CONTINUOUS
Status: DISCONTINUED | OUTPATIENT
Start: 2022-04-03 | End: 2022-04-04

## 2022-04-03 RX ORDER — CEFTRIAXONE 1 G/50ML
1000 INJECTION, SOLUTION INTRAVENOUS EVERY 24 HOURS
Status: DISCONTINUED | OUTPATIENT
Start: 2022-04-03 | End: 2022-04-04

## 2022-04-03 RX ORDER — INSULIN GLARGINE 100 [IU]/ML
20 INJECTION, SOLUTION SUBCUTANEOUS 2 TIMES DAILY
Status: DISCONTINUED | OUTPATIENT
Start: 2022-04-03 | End: 2022-04-03

## 2022-04-03 RX ORDER — ONDANSETRON 2 MG/ML
4 INJECTION INTRAMUSCULAR; INTRAVENOUS EVERY 6 HOURS PRN
Status: DISCONTINUED | OUTPATIENT
Start: 2022-04-03 | End: 2022-04-12

## 2022-04-03 RX ORDER — ATORVASTATIN CALCIUM 40 MG/1
40 TABLET, FILM COATED ORAL
Status: DISCONTINUED | OUTPATIENT
Start: 2022-04-03 | End: 2022-04-03

## 2022-04-03 RX ORDER — MELATONIN
2000 DAILY
Status: DISCONTINUED | OUTPATIENT
Start: 2022-04-03 | End: 2022-04-13 | Stop reason: HOSPADM

## 2022-04-03 RX ORDER — NYSTATIN 100000 [USP'U]/G
POWDER TOPICAL 3 TIMES DAILY
Status: DISCONTINUED | OUTPATIENT
Start: 2022-04-03 | End: 2022-04-13 | Stop reason: HOSPADM

## 2022-04-03 RX ORDER — AMLODIPINE BESYLATE 10 MG/1
10 TABLET ORAL DAILY
Status: DISCONTINUED | OUTPATIENT
Start: 2022-04-03 | End: 2022-04-12

## 2022-04-03 RX ORDER — TAMSULOSIN HYDROCHLORIDE 0.4 MG/1
0.4 CAPSULE ORAL EVERY EVENING
Status: DISCONTINUED | OUTPATIENT
Start: 2022-04-03 | End: 2022-04-13 | Stop reason: HOSPADM

## 2022-04-03 RX ORDER — ATORVASTATIN CALCIUM 40 MG/1
40 TABLET, FILM COATED ORAL EVERY EVENING
Status: DISCONTINUED | OUTPATIENT
Start: 2022-04-03 | End: 2022-04-13 | Stop reason: HOSPADM

## 2022-04-03 RX ORDER — HEPARIN SODIUM 5000 [USP'U]/ML
5000 INJECTION, SOLUTION INTRAVENOUS; SUBCUTANEOUS EVERY 8 HOURS SCHEDULED
Status: DISCONTINUED | OUTPATIENT
Start: 2022-04-03 | End: 2022-04-07

## 2022-04-03 RX ORDER — GABAPENTIN 300 MG/1
300 CAPSULE ORAL 2 TIMES DAILY
Status: DISCONTINUED | OUTPATIENT
Start: 2022-04-03 | End: 2022-04-03

## 2022-04-03 RX ORDER — ASPIRIN 300 MG/1
300 SUPPOSITORY RECTAL DAILY
Status: DISCONTINUED | OUTPATIENT
Start: 2022-04-03 | End: 2022-04-04

## 2022-04-03 RX ORDER — ACETAMINOPHEN 325 MG/1
650 TABLET ORAL EVERY 6 HOURS PRN
Status: DISCONTINUED | OUTPATIENT
Start: 2022-04-03 | End: 2022-04-13 | Stop reason: HOSPADM

## 2022-04-03 RX ORDER — ASPIRIN 81 MG/1
81 TABLET ORAL DAILY
Status: DISCONTINUED | OUTPATIENT
Start: 2022-04-03 | End: 2022-04-03

## 2022-04-03 RX ORDER — CARVEDILOL 12.5 MG/1
12.5 TABLET ORAL 2 TIMES DAILY
Status: DISCONTINUED | OUTPATIENT
Start: 2022-04-03 | End: 2022-04-13 | Stop reason: HOSPADM

## 2022-04-03 RX ORDER — DOCUSATE SODIUM 100 MG/1
100 CAPSULE, LIQUID FILLED ORAL 2 TIMES DAILY
Status: DISCONTINUED | OUTPATIENT
Start: 2022-04-03 | End: 2022-04-13 | Stop reason: HOSPADM

## 2022-04-03 RX ORDER — SODIUM CHLORIDE 9 MG/ML
75 INJECTION, SOLUTION INTRAVENOUS CONTINUOUS
Status: DISPENSED | OUTPATIENT
Start: 2022-04-03 | End: 2022-04-03

## 2022-04-03 RX ORDER — ALBUTEROL SULFATE 2.5 MG/3ML
2.5 SOLUTION RESPIRATORY (INHALATION) EVERY 4 HOURS PRN
Status: DISCONTINUED | OUTPATIENT
Start: 2022-04-03 | End: 2022-04-13 | Stop reason: HOSPADM

## 2022-04-03 RX ADMIN — HEPARIN SODIUM 5000 UNITS: 5000 INJECTION INTRAVENOUS; SUBCUTANEOUS at 13:58

## 2022-04-03 RX ADMIN — SODIUM CHLORIDE, PRESERVATIVE FREE 0.04 MG: 5 INJECTION INTRAVENOUS at 15:25

## 2022-04-03 RX ADMIN — SODIUM CHLORIDE 500 ML: 9 INJECTION, SOLUTION INTRAVENOUS at 15:36

## 2022-04-03 RX ADMIN — OLANZAPINE 5 MG: 10 INJECTION, POWDER, FOR SOLUTION INTRAMUSCULAR at 23:17

## 2022-04-03 RX ADMIN — HEPARIN SODIUM 5000 UNITS: 5000 INJECTION INTRAVENOUS; SUBCUTANEOUS at 21:21

## 2022-04-03 RX ADMIN — SODIUM CHLORIDE 125 ML/HR: 0.9 INJECTION, SOLUTION INTRAVENOUS at 19:27

## 2022-04-03 RX ADMIN — ASPIRIN 300 MG: 300 SUPPOSITORY RECTAL at 09:49

## 2022-04-03 RX ADMIN — VANCOMYCIN HYDROCHLORIDE 1000 MG: 5 INJECTION, POWDER, LYOPHILIZED, FOR SOLUTION INTRAVENOUS at 14:30

## 2022-04-03 RX ADMIN — INSULIN LISPRO 4 UNITS: 100 INJECTION, SOLUTION INTRAVENOUS; SUBCUTANEOUS at 12:43

## 2022-04-03 RX ADMIN — HEPARIN SODIUM 5000 UNITS: 5000 INJECTION INTRAVENOUS; SUBCUTANEOUS at 01:58

## 2022-04-03 RX ADMIN — SODIUM CHLORIDE 125 ML/HR: 0.9 INJECTION, SOLUTION INTRAVENOUS at 17:30

## 2022-04-03 RX ADMIN — CEFTRIAXONE 1000 MG: 1 INJECTION, SOLUTION INTRAVENOUS at 21:22

## 2022-04-03 RX ADMIN — NYSTATIN: 100000 POWDER TOPICAL at 09:49

## 2022-04-03 RX ADMIN — INSULIN LISPRO 6 UNITS: 100 INJECTION, SOLUTION INTRAVENOUS; SUBCUTANEOUS at 06:24

## 2022-04-03 RX ADMIN — INSULIN LISPRO 4 UNITS: 100 INJECTION, SOLUTION INTRAVENOUS; SUBCUTANEOUS at 18:35

## 2022-04-03 RX ADMIN — SODIUM CHLORIDE 75 ML/HR: 0.9 INJECTION, SOLUTION INTRAVENOUS at 01:54

## 2022-04-03 RX ADMIN — CEFTRIAXONE 2000 MG: 2 INJECTION, SOLUTION INTRAVENOUS at 00:32

## 2022-04-03 RX ADMIN — NYSTATIN: 100000 POWDER TOPICAL at 18:34

## 2022-04-03 RX ADMIN — NYSTATIN 1 APPLICATION: 100000 POWDER TOPICAL at 21:22

## 2022-04-03 RX ADMIN — AZITHROMYCIN MONOHYDRATE 500 MG: 500 INJECTION, POWDER, LYOPHILIZED, FOR SOLUTION INTRAVENOUS at 00:15

## 2022-04-03 RX ADMIN — VANCOMYCIN HYDROCHLORIDE 1000 MG: 5 INJECTION, POWDER, LYOPHILIZED, FOR SOLUTION INTRAVENOUS at 04:08

## 2022-04-03 RX ADMIN — HEPARIN SODIUM 5000 UNITS: 5000 INJECTION INTRAVENOUS; SUBCUTANEOUS at 06:24

## 2022-04-03 RX ADMIN — WATER 10 ML: 1 INJECTION INTRAMUSCULAR; INTRAVENOUS; SUBCUTANEOUS at 23:18

## 2022-04-03 RX ADMIN — INSULIN HUMAN 15 UNITS: 100 INJECTION, SOLUTION PARENTERAL at 00:53

## 2022-04-03 RX ADMIN — INSULIN LISPRO 12 UNITS: 100 INJECTION, SOLUTION INTRAVENOUS; SUBCUTANEOUS at 01:58

## 2022-04-03 NOTE — ASSESSMENT & PLAN NOTE
Wt Readings from Last 3 Encounters:   04/03/22 133 kg (293 lb 6 9 oz)   04/12/21 133 kg (292 lb 15 9 oz)   03/29/21 (!) 144 kg (317 lb 6 4 oz)     Echo done in March of 2021 reported preserved ejection fraction grade 2 diastolic dysfunction  Continue carvedilol amlodipine

## 2022-04-03 NOTE — ASSESSMENT & PLAN NOTE
Due to hypoxia and hypercapnia patient needed to be placed on BiPAP  Continue to monitor VBG and adjust setting accordingly

## 2022-04-03 NOTE — ASSESSMENT & PLAN NOTE
Lab Results   Component Value Date    HGBA1C 11 4 (H) 12/28/2021       Recent Labs     04/02/22 2006 04/02/22  2326 04/03/22  0133   POCGLU 478* 468* 472*       Blood Sugar Average: Last 72 hrs:  (P) 103 [id number]       Poorly-controlled diabetes with significantly elevated hemoglobin A1c few months ago  Continue Lantus  If patient less responsive by a m   Will change his diet to NPO and discontinue Lantus  Continue Accu-Cheks and insulin sliding scale  Due to significant hyperglycemia above 500 present on admission patient received IV fluids and regular insulin

## 2022-04-03 NOTE — OCCUPATIONAL THERAPY NOTE
Occupational Therapy       Patient Name: Wally Ricketts  Today's Date: 4/3/2022       04/03/22 0820   OT Last Visit   OT Visit Date 04/03/22   Note Type   Note type Cancelled Session   Cancel Reasons Medical status   Additional Comments continuous bipap       Order received and chart review performed; per nursing request, pt is a HOLD from OT evaluation at this time due to not being medically appropriate  Patient is requiring continuous bipap at this time  Will follow as able for evaluation      Evelina Upton, OT

## 2022-04-03 NOTE — ED PROVIDER NOTES
EMERGENCY DEPARTMENT ENCOUNTER NOTE    This note has been generated using a voice recognition software  There may be typographic, grammatic, or word substitution errors that have escaped editorial review  Emergency Department Note- Keven Oglesby 76 y o  male MRN: [id number]    Unit/Bed#: RM06 Encounter: [id number]  ? CHIEF COMPLAINT  Chief Complaint   Patient presents with    Hyperglycemia - no symptoms     arrived via EMS, elevated blood sugar  confused and agitated per EMS  received 2mg ativan and versed 7 5mg IM for agitation  on 15L nonrebreather        HPI  Keven Oglesby is a 76 y o  male with PMH of diabetes mellitus, uncontrolled, coronary artery disease, CHF, CKD, prior stroke, chronic opioid dependence due to chronic pain and chronic opioid therapy, presenting with altered mental status  Patient resides at home, his family called 911 after patient has become progressively more confused and agitated  This started yesterday morning  He has had confusion and has not been able to ambulate  When he got more confused and weak today, wife called 911  To EMS, patient was confused and combative  He received IM Ativan followed by Versed  Following this, they were able to extricate him from the house but he did desaturate likely due to sedation  He had a left naris nasal trumpet placed and was placed on non-rebreather mask with improvement in oxygenation  Patient arrives confused, picking at monitoring devices and fighting off attempt to place an IV  He is unable to provide history of present illness  Per wife, such confusion has never happened before  He does not drink alcohol  Medical record reviewed, patient had accidental opioid overdose on 12/29/2021 for which he was seen at [facility]      REVIEW OF SYSTEMS unable to obtain any history of present illness due to current confusion/encephalopathy    PAST MEDICAL HISTORY  Past Medical History:   Diagnosis Date    Cataract     CHF (congestive heart failure) (HCC)     chronic diastolic CHF    Coronary artery disease     Diabetes mellitus ([facility]UST [facility] )     Glaucoma     Hyperlipidemia     Hypertension     Neuropathy     Obesity     Renal disorder     chronic kidney disease stage 3     Sleep apnea     Stroke ([facility] )     TIA (transient ischemic attack)     Uncontrolled type 2 diabetes mellitus with hyperglycemia, with long-term current use of insulin ([facility] 75 ) 10/4/2018       SURGICAL HISTORY  Past Surgical History:   Procedure Laterality Date    APPENDECTOMY      CARPAL TUNNEL RELEASE      EYE SURGERY      cataracts       FAMILY HISTORY  Family History   Problem Relation Age of Onset    No Known Problems Mother     No Known Problems Father         CURRENT MEDICATIONS  No current facility-administered medications on file prior to encounter       Current Outpatient Medications on File Prior to Encounter   Medication Sig    acetaminophen (TYLENOL) 325 mg tablet Take 2 tablets (650 mg total) by mouth every 6 (six) hours as needed for mild pain, headaches or fever    amLODIPine (NORVASC) 10 mg tablet Take 1 tablet (10 mg total) by mouth daily    aspirin 81 MG tablet Take 81 mg by mouth daily    atorvastatin (LIPITOR) 40 mg tablet Take 1 tablet (40 mg total) by mouth daily after dinner    BD PEN NEEDLE JUNIOR U/F 32G X 4 MM MISC     carvedilol (COREG) 12 5 mg tablet Take 1 tablet (12 5 mg total) by mouth 2 (two) times a day    cholecalciferol 2000 units TABS Take 1 tablet (2,000 Units total) by mouth daily    finasteride (PROSCAR) 5 mg tablet Take 1 tablet (5 mg total) by mouth daily    gabapentin (NEURONTIN) 600 MG tablet Take 1 tablet (600 mg total) by mouth 2 (two) times a day    glucagon (GLUCAGON EMERGENCY) 1 MG injection Inject 1 mg under the skin once as needed (PRN blood glucose less than 70 if unconscious or uncorrectable by oral means) for up to 1 dose    insulin glargine (LANTUS) 100 units/mL subcutaneous injection Inject 20 Units under the skin 2 (two) times a day for 14 days    nystatin (MYCOSTATIN) powder Apply topically 2 (two) times a day    nystatin (MYCOSTATIN) powder Apply topically 3 (three) times a day    oxyCODONE (ROXICODONE) 10 MG TABS Take 1 tablet (10 mg total) by mouth every 4 (four) hours as needed for moderate pain or severe painMax Daily Amount: 60 mg (Patient taking differently: Take 20 mg by mouth every 6 (six) hours as needed for moderate pain or severe pain )    Semaglutide (OZEMPIC, 0 25 OR 0 5 MG/DOSE, SC) Inject 0 5 mg under the skin once a week Usually takes on Mon    tamsulosin (FLOMAX) 0 4 mg Take 0 4 mg by mouth every evening         ALLERGIES  No Known Allergies    SOCIAL HISTORY  Social History     Socioeconomic History    Marital status: /Civil Union     Spouse name: None    Number of children: None    Years of education: None    Highest education level: None   Occupational History    None   Tobacco Use    Smoking status: Former Smoker     Types: Cigars     Quit date:      Years since quittin 2    Smokeless tobacco: Former User   Vaping Use    Vaping Use: Never used   Substance and Sexual Activity    Alcohol use: Never    Drug use: No    Sexual activity: Not Currently     Partners: Female   Other Topics Concern    None   Social History Narrative    None     Social Determinants of Health     Financial Resource Strain: Not on file   Food Insecurity: Not on file   Transportation Needs: Not on file   Physical Activity: Not on file   Stress: Not on file   Social Connections: Not on file   Intimate Partner Violence: Not on file   Housing Stability: Not on file       PHYSICAL EXAM    /70 (BP Location: Left arm)   Pulse 73   Temp (!) 97 °F (36 1 °C) (Temporal)   Resp 18   Wt 131 kg (288 lb 12 8 oz)   SpO2 99%   BMI 46 61 kg/m²   Vital signs and nursing notes reviewed      CONSTITUTIONAL: male appearing stated age resting in bed, confused, inattentive, picking at monitoring devices and not redirectable  HEENT: atraumatic, normocephalic  Sclera anicteric, conjunctiva are not injected  Moist oral mucosa  CARDIOVASCULAR/CHEST: RRR, no M/R/G  2+ radial pulses  PULMONARY: Breathing comfortably on RA  Breath sounds are equal and clear to auscultation  ABDOMEN:  BMI 47 29   non-distended  BS present, normoactive  Non-tender  There are no appreciable sacral ulcers and no evidence of erythema, purple bullae, or other evidence of Mica's gangrene  MSK: moves all extremities, no deformities, no peripheral edema, no calf asymmetry  NEURO: Awake, not alert, confused, picking at monitoring devices, not redirectable  Pupils 2 mm and reactive  Tracks objects and extraocular eye movements are intact  Face symmetric  Moves all extremities spontaneously  No focal neurologic deficits  SKIN: Warm, appears well-perfused  MENTAL STATUS:  Confused      ? LABS AND TESTS    Results Reviewed     Procedure Component Value Units Date/Time    Blood culture #1 [[id number]] Collected: 04/02/22 2021    Lab Status: Preliminary result Specimen: Blood from Line, Venous Updated: 04/04/22 1301     Blood Culture No Growth at 24 hrs  Blood culture #2 [[id number]] Collected: 04/02/22 2021    Lab Status: Preliminary result Specimen: Blood from Line, Venous Updated: 04/04/22 1301     Blood Culture No Growth at 24 hrs      Procalcitonin [[id number]]  (Normal) Collected: 04/04/22 0451    Lab Status: Final result Specimen: Blood from Arm, Left Updated: 04/04/22 0544     Procalcitonin 0 20 ng/ml     Basic metabolic panel, AM Draw, Tomorrow [[id number]]  (Abnormal) Collected: 04/04/22 0451    Lab Status: Final result Specimen: Blood from Arm, Left Updated: 04/04/22 0540     Sodium 145 mmol/L      Potassium 3 3 mmol/L      Chloride 110 mmol/L      CO2 24 mmol/L      ANION GAP 11 mmol/L      BUN 28 mg/dL      Creatinine 1 43 mg/dL      Glucose 264 mg/dL      Calcium 8 4 mg/dL      eGFR 46 ml/min/1 73sq m     Narrative:      [employer] guidelines for Chronic Kidney Disease (CKD):     Stage 1 with normal or high GFR (GFR > 90 mL/min/1 73 square meters)    Stage 2 Mild CKD (GFR = 60-89 mL/min/1 73 square meters)    Stage 3A Moderate CKD (GFR = 45-59 mL/min/1 73 square meters)    Stage 3B Moderate CKD (GFR = 30-44 mL/min/1 73 square meters)    Stage 4 Severe CKD (GFR = 15-29 mL/min/1 73 square meters)    Stage 5 End Stage CKD (GFR <15 mL/min/1 73 square meters)  Note: GFR calculation is accurate only with a steady state creatinine    Magnesium, AM Draw, Tomorrow [[id number]]  (Normal) Collected: 04/04/22 0451    Lab Status: Final result Specimen: Blood from Arm, Left Updated: 04/04/22 0540     Magnesium 1 6 mg/dL     Phosphorus, AM Draw, Tomorrow [[id number]]  (Normal) Collected: 04/04/22 0451    Lab Status: Final result Specimen: Blood from Arm, Left Updated: 04/04/22 0540     Phosphorus 3 4 mg/dL     CBC and Platelet, AM Draw, Tomorrow [[id number]]  (Abnormal) Collected: 04/04/22 0451    Lab Status: Final result Specimen: Blood from Arm, Left Updated: 04/04/22 0509     WBC 7 54 Thousand/uL      RBC 3 87 Million/uL      Hemoglobin 10 3 g/dL      Hematocrit 34 3 %      MCV 89 fL      MCH 26 6 pg      MCHC 30 0 g/dL      RDW 14 7 %      Platelets 045 Thousands/uL      MPV 11 0 fL     Strep Pneumoniae, Urine [[id number]]  (Normal) Collected: 04/03/22 0138    Lab Status: Final result Specimen: Urine, Catheter Updated: 04/03/22 1302     Strep pneumoniae antigen, urine Negative    Legionella antigen, Urine [[id number]]  (Normal) Collected: 04/03/22 0138    Lab Status: Final result Specimen: Urine, Catheter Updated: 04/03/22 1302     Legionella Urinary Antigen Negative    Hemoglobin A1c w/EAG Estimation (Orders if not completed within the last 90 days) [[id number]]  (Abnormal) Collected: 04/03/22 0140    Lab Status: Final result Specimen: Blood from Arm, Right Updated: 04/03/22 1241     Hemoglobin A1C 9 1 %       mg/dl HS Troponin 0hr (reflex protocol) [[id number]]  (Normal) Collected: 04/03/22 0556    Lab Status: Final result Specimen: Blood from Arm, Right Updated: 04/03/22 0700     hs TnI 0hr 19 ng/L     Procalcitonin [[id number]]  (Normal) Collected: 04/03/22 0140    Lab Status: Final result Specimen: Blood from Arm, Right Updated: 04/03/22 0221     Procalcitonin 0 23 ng/ml     HS Troponin I 4hr [[id number]]  (Normal) Collected: 04/03/22 0146    Lab Status: Final result Specimen: Blood from Arm, Right Updated: 04/03/22 0220     hs TnI 4hr 18 ng/L      Delta 4hr hsTnI -2 ng/L     Ammonia [[id number]]  (Abnormal) Collected: 04/03/22 0140    Lab Status: Final result Specimen: Blood from Arm, Right Updated: 04/03/22 0211     Ammonia <10 umol/L     Platelet count [[id number]]  (Normal) Collected: 04/03/22 0140    Lab Status: Final result Specimen: Blood from Arm, Right Updated: 04/03/22 0157     Platelets 190 Thousands/uL      MPV 10 6 fL     Fingerstick Glucose (POCT) [[id number]]  (Abnormal) Collected: 04/03/22 0133    Lab Status: Final result Updated: 04/03/22 0133     POC Glucose 472 mg/dl     Sputum culture and Gram stain [[id number]]     Lab Status: No result Specimen: Sputum     Fingerstick Glucose (POCT) [[id number]]  (Abnormal) Collected: 04/02/22 2326    Lab Status: Final result Updated: 04/02/22 2326     POC Glucose 468 mg/dl     HS Troponin I 2hr [[id number]]  (Normal) Collected: 04/02/22 2235    Lab Status: Final result Specimen: Blood from Arm, Right Updated: 04/02/22 2303     hs TnI 2hr 21 ng/L      Delta 2hr hsTnI 1 ng/L     Urine Microscopic [[id number]]  (Normal) Collected: 04/02/22 2115    Lab Status: Final result Specimen: Urine, Other Updated: 04/02/22 2149     RBC, UA 2-4 /hpf      WBC, UA 2-4 /hpf      Epithelial Cells Occasional /hpf      Bacteria, UA Occasional /hpf     UA w Reflex to Microscopic w Reflex to Culture [[id number]]  (Abnormal) Collected: 04/02/22 2115    Lab Status: Final result Specimen: Urine, Other Updated: 04/02/22 2144     Color, UA Yellow     Clarity, UA Clear     Specific Gravity, UA 1 010     pH, UA 7 5     Leukocytes, UA Trace     Nitrite, UA Negative     Protein, UA 30 (1+) mg/dl      Glucose, UA >=1000 (1%) mg/dl      Ketones, UA Negative mg/dl      Urobilinogen, UA 0 2 E U /dl      Bilirubin, UA Negative     Blood, UA Trace-Intact    Rapid drug screen, urine [[id number]]  (Abnormal) Collected: 04/02/22 2114    Lab Status: Final result Specimen: Urine, Catheter Updated: 04/02/22 2142     Amph/Meth UR Negative     Barbiturate Ur Negative     Benzodiazepine Urine Positive     Cocaine Urine Negative     Methadone Urine Negative     Opiate Urine Negative     PCP Ur Negative     THC Urine Negative     Oxycodone Urine Negative    Narrative:      Presumptive report  If requested, specimen will be sent to reference lab for confirmation  FOR MEDICAL PURPOSES ONLY  IF CONFIRMATION NEEDED PLEASE CONTACT THE LAB WITHIN 5 DAYS  Drug Screen Cutoff Levels:  AMPHETAMINE/METHAMPHETAMINES  1000 ng/mL  BARBITURATES     200 ng/mL  BENZODIAZEPINES     200 ng/mL  COCAINE      300 ng/mL  METHADONE      300 ng/mL  OPIATES      300 ng/mL  PHENCYCLIDINE     25 ng/mL  THC       50 ng/mL  OXYCODONE      100 ng/mL    COVID/FLU/RSV - 2 hour TAT [[id number]]  (Normal) Collected: 04/02/22 2021    Lab Status: Final result Specimen: Nares from Nose Updated: 04/02/22 2116     SARS-CoV-2 Negative     INFLUENZA A PCR Negative     INFLUENZA B PCR Negative     RSV PCR Negative    Narrative:      FOR PEDIATRIC PATIENTS - copy/paste COVID Guidelines URL to browser: [email or web address]  Blurrx    SARS-CoV-2 assay is a Nucleic Acid Amplification assay intended for the  qualitative detection of nucleic acid from SARS-CoV-2 in nasopharyngeal  swabs  Results are for the presumptive identification of SARS-CoV-2 RNA      Positive results are indicative of infection with SARS-CoV-2, the virus  causing COVID-19, but do not rule out bacterial infection or co-infection  with other viruses  Laboratories within the [place] and its  territories are required to report all positive results to the appropriate  public health authorities  Negative results do not preclude SARS-CoV-2  infection and should not be used as the sole basis for treatment or other  patient management decisions  Negative results must be combined with  clinical observations, patient history, and epidemiological information  This test has not been FDA cleared or approved  This test has been authorized by FDA under an Emergency Use Authorization  (EUA)  This test is only authorized for the duration of time the  declaration that circumstances exist justifying the authorization of the  emergency use of an in vitro diagnostic tests for detection of SARS-CoV-2  virus and/or diagnosis of COVID-19 infection under section 564(b)(1) of  the Act, 21 U  S C  819UNF-7(C)(7), unless the authorization is terminated  or revoked sooner  The test has been validated but independent review by FDA  and CLIA is pending  Test performed using [employer]: This RT-PCR assay targets N2,  a region unique to SARS-CoV-2  A conserved region in the E-gene was chosen  for pan-Sarbecovirus detection which includes SARS-CoV-2      Comprehensive metabolic panel [[id number]]  (Abnormal) Collected: 04/02/22 2021    Lab Status: Final result Specimen: Blood from Arm, Right Updated: 04/02/22 2104     Sodium 142 mmol/L      Potassium 4 5 mmol/L      Chloride 101 mmol/L      CO2 32 mmol/L      ANION GAP 9 mmol/L      BUN 33 mg/dL      Creatinine 1 73 mg/dL      Glucose 565 mg/dL      Calcium 9 0 mg/dL      Corrected Calcium 9 8 mg/dL      AST 10 U/L      ALT 20 U/L      Alkaline Phosphatase 152 U/L      Total Protein 7 6 g/dL      Albumin 3 0 g/dL      Total Bilirubin 0 43 mg/dL      eGFR 36 ml/min/1 73sq m     Narrative:      [employer] guidelines for Chronic Kidney Disease (CKD):     Stage 1 with normal or high GFR (GFR > 90 mL/min/1 73 square meters)    Stage 2 Mild CKD (GFR = 60-89 mL/min/1 73 square meters)    Stage 3A Moderate CKD (GFR = 45-59 mL/min/1 73 square meters)    Stage 3B Moderate CKD (GFR = 30-44 mL/min/1 73 square meters)    Stage 4 Severe CKD (GFR = 15-29 mL/min/1 73 square meters)    Stage 5 End Stage CKD (GFR <15 mL/min/1 73 square meters)  Note: GFR calculation is accurate only with a steady state creatinine    NT-BNP PRO [[id number]]  (Abnormal) Collected: 04/02/22 2021    Lab Status: Final result Specimen: Blood from Arm, Right Updated: 04/02/22 2059     NT-proBNP 753 pg/mL     Salicylate level [[id number]]  (Abnormal) Collected: 04/02/22 2021    Lab Status: Final result Specimen: Blood from Arm, Right Updated: 92/23/69 4011     Salicylate Lvl <3 mg/dL     Acetaminophen level-If concentration is detectable, please discuss with medical  on call  [[id number]]  (Abnormal) Collected: 04/02/22 2021    Lab Status: Final result Specimen: Blood from Arm, Right Updated: 04/02/22 2059     Acetaminophen Level <2 0 ug/mL     Procalcitonin [[id number]]  (Abnormal) Collected: 04/02/22 2021    Lab Status: Final result Specimen: Blood from Arm, Right Updated: 04/02/22 2058     Procalcitonin 0 26 ng/ml     Lactic acid [[id number]]  (Normal) Collected: 04/02/22 2021    Lab Status: Final result Specimen: Blood from Arm, Right Updated: 04/02/22 2057     LACTIC ACID 2 0 mmol/L     Narrative:      Result may be elevated if tourniquet was used during collection      HS Troponin 0hr (reflex protocol) [[id number]]  (Normal) Collected: 04/02/22 2021    Lab Status: Final result Specimen: Blood from Arm, Right Updated: 04/02/22 2057     hs TnI 0hr 20 ng/L     Ethanol [[id number]]  (Normal) Collected: 04/02/22 2021    Lab Status: Final result Specimen: Blood from Arm, Right Updated: 04/02/22 2051     Ethanol Lvl <3 mg/dL     Blood gas, Venous [[id number]] (Abnormal) Collected: 04/02/22 2021    Lab Status: Final result Specimen: Blood from Arm, Right Updated: 04/02/22 2049     pH, Sam 7 340     pCO2, Sam 53 8 mm Hg      pO2, Sam 40 9 mm Hg      HCO3, Sam 28 4 mmol/L      Base Excess, Sam 1 7 mmol/L      O2 Content, Sam 11 2 ml/dL      O2 HGB, VENOUS 64 5 %     Beta Hydroxybutyrate [[id number]]  (Normal) Collected: 04/02/22 2021    Lab Status: Final result Specimen: Blood from Arm, Right Updated: 04/02/22 2047     BETA-HYDROXYBUTYRATE 0 1 mmol/L     Protime-INR [[id number]]  (Normal) Collected: 04/02/22 2021    Lab Status: Final result Specimen: Blood from Arm, Right Updated: 04/02/22 2043     Protime 14 4 seconds      INR 1 18    APTT [[id number]]  (Normal) Collected: 04/02/22 2021    Lab Status: Final result Specimen: Blood from Arm, Right Updated: 04/02/22 2043     PTT 34 seconds     CBC and differential [[id number]]  (Abnormal) Collected: 04/02/22 2021    Lab Status: Final result Specimen: Blood from Arm, Right Updated: 04/02/22 2033     WBC 9 04 Thousand/uL      RBC 4 41 Million/uL      Hemoglobin 12 0 g/dL      Hematocrit 39 0 %      MCV 88 fL      MCH 27 2 pg      MCHC 30 8 g/dL      RDW 14 5 %      MPV 10 8 fL      Platelets 860 Thousands/uL      nRBC 0 /100 WBCs      Neutrophils Relative 69 %      Immat GRANS % 0 %      Lymphocytes Relative 20 %      Monocytes Relative 8 %      Eosinophils Relative 3 %      Basophils Relative 0 %      Neutrophils Absolute 6 21 Thousands/µL      Immature Grans Absolute 0 04 Thousand/uL      Lymphocytes Absolute 1 82 Thousands/µL      Monocytes Absolute 0 69 Thousand/µL      Eosinophils Absolute 0 25 Thousand/µL      Basophils Absolute 0 03 Thousands/µL     Fingerstick Glucose (POCT) [[id number]]  (Abnormal) Collected: 04/02/22 2006    Lab Status: Final result Updated: 04/02/22 2007     POC Glucose 478 mg/dl           CTA head and neck with and without contrast   Final Result by Yamile Sandoval MD (04/02 2237)      1    No acute intracranial hemorrhage, mass effect or extra-axial collection  2   No hemodynamically significant stenosis, dissection or occlusion of the carotid or vertebral arteries  3   No intracranial aneurysm  No hemodynamically significant stenosis or occlusion of the major vessels of the [place]  Workstation performed: [id number]         CT chest abdomen pelvis w contrast   Final Result by Yajaira Francis MD (04/02 2244)      Consolidative airspace disease at the bilateral lung bases and at the left upper lobe along left major fissure likely representing basilar atelectasis  Underlying pneumonia cannot excluded  No evidence of acute pathology throughout the abdomen or pelvis  Workstation performed: [id number]             ED COURSE & MEDICAL DECISION MAKING  ECG 12 Lead Documentation Only    Date/Time: 4/2/2022 8:05 PM  Performed by: Shelbi Herrera MD  Authorized by: Shelbi Herrera MD     Comments:      Normal sinus rhythm, ventricular rate 72, VA interval 248 consistent with first-degree AV block,  consistent with right bundle branch block, , left axis deviation, age-indeterminate inferior infarct, age indeterminate anteroseptal infarct, compared to prior EKG dated 09/18/2021, Q-waves are now present in anteroseptal and inferior leads     CriticalCare Time  Performed by: Shelbi Herrera MD  Authorized by: Shelbi Herrera MD     Critical care provider statement:     Critical care time (minutes):  35    Critical care time was exclusive of:  Separately billable procedures and treating other patients and teaching time    Critical care was necessary to treat or prevent imminent or life-threatening deterioration of the following conditions:  CNS failure or compromise and metabolic crisis                 Medications   OLANZapine (ZyPREXA) IM injection 10 mg (10 mg Intramuscular Given 4/2/22 2026)   LORazepam (ATIVAN) injection 1 mg (1 mg Intravenous Given 4/2/22 2026) iohexol (OMNIPAQUE) 350 MG/ML injection (SINGLE-DOSE) 100 mL (100 mL Intravenous Given 4/2/22 2153)   sodium chloride 0 9 % bolus 1,000 mL (0 mL Intravenous Stopped 4/3/22 0013)   insulin regular (HumuLIN R,NovoLIN R) injection 15 Units (15 Units Subcutaneous Given 4/2/22 2151)   cefTRIAXone (ROCEPHIN) IVPB (premix in dextrose) 2,000 mg 50 mL (0 mg Intravenous Stopped 4/3/22 0229)   azithromycin (ZITHROMAX) 500 mg in sodium chloride 0 9% 250mL IVPB 500 mg (0 mg Intravenous Stopped 4/3/22 189)     76year-old with multiple comorbidities presenting with confusion and agitation requiring sedating medications  Vital signs reviewed, afebrile, not tachycardic, mildly hypertensive  Patient is currently on non-rebreather face mask as he did receive benzodiazepines for agitation, however, we are able to titrate oxygen down to nasal cannula  Unfortunately, patient is not compliant with monitoring devices and therapies and is combative due to acute encephalopathy/delirium  Zyprexa and Ativan administered to permit evaluation and provide secured to the patient in a safe manner to both the patient and staff  IM attempting to avoid need for intubation in this 38-year-old gentleman, especially considering that record review reveals that he is a DNR/DNI level 3  Differential diagnosis for acute encephalopathy includes hypoglycemia, hyperosmolar nonketotic state, infection including pneumonia, urinary tract infection, Mica gangrene, GI source of infection, CNS infection, with other etiologies including medication interaction or inadvertent overdose, versus other toxic/metabolic etiologies  Stroke a somewhat lower on the list   Medical record reviewed, patient was hospitalized at [facility] on 12/29/21 with acute encephalopathy, found to be secondary to patient's medications  EKG obtained, as reviewed by me, as above    Patient does have prolonged QT, I considered transient dysrhythmia and reactive seizure with postictal state  Unfortunately, patient's sensorium is not clearing up despite passage of time  VBG pH is 7 34 with mild hypercarbia 53 8, CMP is with creatinine of 1 73 (up from baseline 1 4), with hyperglycemia 565, with normal anion gap and no low bicarb, HS troponin is 20, procalcitonin is 0 26, lactate is 2 0, CBCs without leukocytosis, anemia, or thrombocytopenia, coags are within normal limit, Tylenol and salicylate levels are within normal limits, COVID-19/influenza/RSV PCR is negative, medical alcohol is less than 3   UDS is positive for benzodiazepines, consistent with patient having received these to permit extrication from his place of residence and evaluation in the emergency department  UA is with trace leukocytes and trace intact blood, overall, not consistent with UTI  Hyperglycemia treated with subcutaneous regular insulin, as above  Fluids administered given hyperglycemia  CT angiography of head and neck as well as CT of the chest/abdomen/pelvis obtained, CT of the chest reveals consolidative airspace disease at the bilateral lung bases and left upper lobe representing atelectasis but with possibility of pneumonia not excluded  As a precautionary measure, I am administering ceftriaxone and azithromycin empirically  CT angiography of head and neck reveals no acute intracranial hemorrhage, mass effect, or extra-axial collection and no hemodynamically significant stenosis  Patient remains encephalopathic and critically ill  We were able to deescalate patient oxygen requirement and he is currently on nasal cannula  He does desaturate to low 80s on room air  I updated patient's wife at bedside regarding workup so far and need for patient to be admitted to the hospital   Patient does report that patient would not want to be intubated or receive CPR if his has respiratory arrest or cardiac arrest   She would be okay with BiPAP if necessary    Patient admitted to Internal Medicine for further evaluation and care  He remains critically ill           MDM  Number of Diagnoses or Management Options  Acute encephalopathy: new and requires workup  Chronic, continuous use of opioids: new and requires workup  Diabetes mellitus ([facility] ): established and worsening  Hyperglycemia due to type 2 diabetes mellitus [facility]): new and requires workup     Amount and/or Complexity of Data Reviewed  Clinical lab tests: ordered and reviewed  Tests in the radiology section of CPT®: ordered and reviewed  Tests in the medicine section of CPT®: ordered and reviewed  Obtain history from someone other than the patient: yes (EMS, patient's wife)  Review and summarize past medical records: yes  Discuss the patient with other providers: yes (Internal medicine)  Independent visualization of images, tracings, or specimens: yes    Risk of Complications, Morbidity, and/or Mortality  Presenting problems: high  Diagnostic procedures: high  Management options: high    Patient Progress  Patient progress: stable      CLINICAL IMPRESSION  Final diagnoses:   Acute encephalopathy   Chronic, continuous use of opioids   Diabetes mellitus ([facility] )   Hyperglycemia due to type 2 diabetes mellitus ([place] [address] )       DISPOSITION  Time reflects when diagnosis was documented in both MDM as applicable and the Disposition within this note     Time User Action Codes Description Comment    4/3/2022 12:24 AM Lizarraga Alayna Add [G93 40] Acute encephalopathy     4/3/2022 12:24 AM Lizarraga Alayna Add [F11 90] Chronic, continuous use of opioids     4/3/2022 12:24 AM Lizarraga Alayna Add [E11 9] Diabetes mellitus ([facility] )     4/3/2022 12:26 AM Lizarraga Alayna Add [E11 65] Hyperglycemia due to type 2 diabetes mellitus ([facility] )     4/3/2022  8:01 AM Millie Sanches Add [B01 97] Acute metabolic encephalopathy     4/3/2022  8:19 AM Hortencia Multani Add [S81 801A] Open wound of right lower extremity     4/4/2022  7:23 AM Kamla Montesinos Add [J96 01,  J96 02] Acute respiratory failure with hypoxia and hypercapnia ([facility] )     4/6/2022  9:04 AM Antonia Filter Add [R45 850] Homicidal behavior     4/6/2022  9:04 AM Antonia Filter Add [G30 1,  F02 81] Late onset Alzheimer's dementia with behavioral disturbance [facility])       ED Disposition     ED Disposition Condition Date/Time Comment    Admit Stable Sun Apr 3, 2022 12:24 AM Case was discussed with Dr Constantino Villafana and the patient's admission status was agreed to be Admission Status: inpatient status to the service of Dr Constantino Villafana           Follow-up Information    None            Lotus Samano MD  04/08/22 1920

## 2022-04-03 NOTE — RESPIRATORY THERAPY NOTE
RT Protocol Note  Keny Tanner 76 y o  male MRN: [id number]  Unit/Bed#: BIBIANA Encounter: [id number]    Assessment    Principal Problem:    Acute metabolic encephalopathy  Active Problems:    CKD (chronic kidney disease) stage 3, GFR 30-59 ml/min ([facility])    Chronic diastolic CHF (congestive heart failure) ([facility])    Morbid obesity with BMI of 45 0-49 9, adult ([facility])    Type 2 diabetes mellitus with hyperglycemia, with long-term current use of insulin ([facility])    Opiate dependence, continuous ([facility])    Obstructive sleep apnea on CPAP    Acute respiratory failure with hypoxia and hypercapnia ([facility])      Home Pulmonary Medications:  None  Home Devices/Therapy: BiPAP/CPAP    Past Medical History:   Diagnosis Date    Cataract     CHF (congestive heart failure) ([facility])     chronic diastolic CHF    Coronary artery disease     Diabetes mellitus ([place] [address] )     Glaucoma     Hyperlipidemia     Hypertension     Neuropathy     Obesity     Renal disorder     chronic kidney disease stage 3     Sleep apnea     Stroke ([facility] )     TIA (transient ischemic attack)     Uncontrolled type 2 diabetes mellitus with hyperglycemia, with long-term current use of insulin ([place] [address] ) 10/4/2018     Social History     Socioeconomic History    Marital status: /Civil Union     Spouse name: None    Number of children: None    Years of education: None    Highest education level: None   Occupational History    None   Tobacco Use    Smoking status: Former Smoker     Types: Cigars     Quit date: 2020     Years since quittin 2    Smokeless tobacco: Former User   Vaping Use    Vaping Use: Never used   Substance and Sexual Activity    Alcohol use: Never    Drug use: No    Sexual activity: Not Currently     Partners: Female   Other Topics Concern    None   Social History Narrative    None     Social Determinants of Health     Financial Resource Strain: Not on file   Food Insecurity: Not on file   Transportation Needs: Not on file   Physical Activity: Not on file   Stress: Not on file   Social Connections: Not on file   Intimate Partner Violence: Not on file   Housing Stability: Not on file       Subjective         Objective    Physical Exam:   Assessment Type: Assess only  General Appearance: Lethargic,Awake  Respiratory Pattern: Normal  Chest Assessment: Chest expansion symmetrical  Bilateral Breath Sounds: Diminished  Cough: None  O2 Device: BiPAP    Vitals:  Blood pressure 140/74, pulse 84, temperature 98 °F (36 7 °C), temperature source Tympanic, resp  rate 20, weight 131 kg (288 lb 12 8 oz), SpO2 97 %  Imaging and other studies: I have personally reviewed pertinent reports        O2 Device: BiPAP     Plan    Respiratory Plan: Vent/NIV/HFNC      Will make treatments Q4 PRN with Albuterol 2 5mg

## 2022-04-03 NOTE — H&P
[address] [facility] [address] [place]  H&P- Darlys Brink 1945, 76 y o  male MRN: [id number]  Unit/Bed#:  Encounter: [id number]  Primary Care Provider: Berta Lugo MD   Date and time admitted to hospital: 4/2/2022  7:56 PM    * Acute metabolic encephalopathy  Assessment & Plan  According to the family members patient is very altered compared to his baseline  Most likely multifactorial secondary to underlying infectious process, hyperglycemia and  possible opioid overdose (patient chronic opioid dependent and accidentally overdosed himself few months ago)  Patient was combative upon EMS arrival so he received few doses of benzos and became more lethargic  Due to transit episodes of hypoxia and evidence of hypercapnia on VBG he was placed on BiPAP  CT of the head unremarkable for intracranial pathology  If no AMS resolution within 24 hours will consider MRI of the brain  CT of the chest showed no pulmonary embolism but evidence of possible pneumonia so will treat empirically for CAP  Follow-up pneumonia workup follow up culture reports and this completed antibiotics accordingly  Continue to monitor procalcitonin trend  Continued ceftriaxone consult pharmacy for vancomycin dose    If MRSA negative will discontinue vancomycin  Scrotal erythema and   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)  Assessment & Plan  Lab Results   Component Value Date    HGBA1C 11 4 (H) 12/28/2021       Recent Labs     04/02/22 2006 04/02/22  2326 04/03/22  0133   POCGLU 478* 468* 472*       Blood Sugar Average: Last 72 hrs:  (P) 839 [id number]       Poorly-controlled diabetes with significantly elevated hemoglobin A1c few months ago  Continue Lantus  If patient less responsive by a m  Will change his diet to NPO and discontinue Lantus  Continue Accu-Cheks and insulin sliding scale  Due to significant hyperglycemia above 500 present on admission patient received IV fluids and regular insulin    Acute respiratory failure with hypoxia and hypercapnia ([facility])  Assessment & Plan  Secondary to morbid obesity obstructive sleep apnea and possible pneumonia  Management as above    Obstructive sleep apnea on CPAP  Assessment & Plan  Due to hypoxia and hypercapnia patient needed to be placed on BiPAP  Continue to monitor VBG and adjust setting accordingly    Opiate dependence, continuous (H[facility])  Assessment & Plan  Hold opioids due to altered mental status and possible over the  Restart prn when acceptable    Morbid obesity with BMI of 45 0-49 9, adult [facility])  Assessment & Plan  Low-calorie diet when acceptable    Chronic diastolic CHF (congestive heart failure) ([facility])  Assessment & Plan  Wt Readings from Last 3 Encounters:   04/03/22 133 kg (293 lb 6 9 oz)   04/12/21 133 kg (292 lb 15 9 oz)   03/29/21 (!) 144 kg (317 lb 6 4 oz)     Echo done in March of 2021 reported preserved ejection fraction grade 2 diastolic dysfunction  Continue carvedilol amlodipine      CKD (chronic kidney disease) stage 3, GFR 30-59 ml/min [facility])  Assessment & Plan  Lab Results   Component Value Date    EGFR 36 04/02/2022    EGFR 47 09/18/2021    EGFR 49 06/29/2021    CREATININE 1 73 (H) 04/02/2022    CREATININE 1 43 (H) 09/18/2021    CREATININE 1 37 (H) 06/29/2021     Avoid nephrotoxic drugs and hypotension  Continue to monitor creatinine  Gentle overnight IV hydration    VTE Pharmacologic Prophylaxis: VTE Score: 8 Moderate Risk (Score 3-4) - Pharmacological DVT Prophylaxis Ordered: heparin    Code Status: Level 3 - DNAR and DNI       Anticipated Length of Stay: Patient will be admitted on an inpatient basis with an anticipated length of stay of greater than 2 midnights secondary to AMS workup, IV antibiotic, hyperglycemia management  Total Time for Visit, including Counseling / Coordination of Care: 45 minutes Greater than 50% of this total time spent on direct patient counseling and coordination of care  Chief Complaint:  Unable to assess due to the patient being altered    History of Present Illness:  Mindy Bunch is a 76 y o  male with a PMH of morbid obesity ,obstructive sleep apnea on CPAP, poorly-controlled diabetes, diastolic heart failure with preserved ejection fraction ,CKD stage 3, chronic opioid dependence who presented from home with altered mental status  As per EMS patient was too confused and agitated ,he received 2 mg of Ativan and Versed 7 5 mg his O2 sat dropped and he needed to be placed on non-rebreather ,later in the emergency room he needed to be switched to BiPAP  Upon my evaluation of the patient, he is altered ,unable to provide history, according to ER MD who spoke to the patient's wife, patient became progressively more confused and agitated within 1 day, he was unable to ambulate and needed assistance  Temperature maximum in the emergency room 36 1C, heart rate 78  Labs revealed blood glucose 565, anion gap 9, creatinine 1 73, proBNP 658, procalcitonin 0 28, lactic acid 2 WBC 9000,sodium 142, ammonia level 10  VBG :  PH 7 34 pCO2 53 8 PO2 40 9  CT of the head unremarkable for acute intracranial pathology  CT of the chest reported"Consolidative airspace disease at the bilateral lung bases and at the left upper lobe along left major fissure likely representing basilar atelectasis  Underlying pneumonia cannot excluded    No intra-abdominal pathology"  Patient was given broad-spectrum antibiotic, insulin and IV fluids  He remains hemodynamically stable  Due to need of BiPAP therapy he admitted to step-down , inpatient       Review of Systems:  Review of Systems   Unable to perform ROS: Mental status change       Past Medical and Surgical History:   Past Medical History: Diagnosis Date    Cataract     CHF (congestive heart failure) (HCC)     chronic diastolic CHF    Coronary artery disease     Diabetes mellitus ([facility] )     Glaucoma     Hyperlipidemia     Hypertension     Neuropathy     Obesity     Renal disorder     chronic kidney disease stage 3     Sleep apnea     Stroke ([facility] )     TIA (transient ischemic attack)     Uncontrolled type 2 diabetes mellitus with hyperglycemia, with long-term current use of insulin ([place] [address] ) 10/4/2018       Past Surgical History:   Procedure Laterality Date    APPENDECTOMY      CARPAL TUNNEL RELEASE      EYE SURGERY      cataracts       Meds/Allergies:  Prior to Admission medications    Medication Sig Start Date End Date Taking?  Authorizing Provider   acetaminophen (TYLENOL) 325 mg tablet Take 2 tablets (650 mg total) by mouth every 6 (six) hours as needed for mild pain, headaches or fever 3/12/21   Janith Cowden Hostetter, DO   amLODIPine (NORVASC) 10 mg tablet Take 1 tablet (10 mg total) by mouth daily 3/12/21   Janith Cowden Hostetter, DO   aspirin 81 MG tablet Take 81 mg by mouth daily    Historical Provider, MD   atorvastatin (LIPITOR) 40 mg tablet Take 1 tablet (40 mg total) by mouth daily after dinner 3/5/19   Zackary Gowers, DO   BD PEN NEEDLE JUNIOR U/F 32G X 4 MM MISC  12/31/17   Historical Provider, MD   carvedilol (COREG) 12 5 mg tablet Take 1 tablet (12 5 mg total) by mouth 2 (two) times a day 3/4/19   Zackary Gowers, DO   cholecalciferol 2000 units TABS Take 1 tablet (2,000 Units total) by mouth daily 3/4/19   Janith Cowden Hostetter, DO   finasteride (PROSCAR) 5 mg tablet Take 1 tablet (5 mg total) by mouth daily 3/12/21 4/11/21  Janith Cowden Hostetter, DO   gabapentin (NEURONTIN) 600 MG tablet Take 1 tablet (600 mg total) by mouth 2 (two) times a day 3/4/19   Janith Cowden Hostetter, DO   glucagon (GLUCAGON EMERGENCY) 1 MG injection Inject 1 mg under the skin once as needed (PRN blood glucose less than 70 if unconscious or uncorrectable by oral means) for up to 1 dose 3/4/19   Andrew Montesinos DO   insulin glargine (LANTUS) 100 units/mL subcutaneous injection Inject 20 Units under the skin 2 (two) times a day for 14 days 20  Lizeth Richardson MD   nystatin (MYCOSTATIN) powder Apply topically 2 (two) times a day 3/4/19   Gem Hogan DO   nystatin (MYCOSTATIN) powder Apply topically 3 (three) times a day 21   Samuel Cano MD   oxyCODONE (ROXICODONE) 10 MG TABS Take 1 tablet (10 mg total) by mouth every 4 (four) hours as needed for moderate pain or severe painMax Daily Amount: 60 mg  Patient taking differently: Take 20 mg by mouth every 6 (six) hours as needed for moderate pain or severe pain  3/4/19   Andrew Montesinos DO   Semaglutide (OZEMPIC, 0 25 OR 0 5 MG/DOSE, SC) Inject 0 5 mg under the skin once a week Usually takes on Mon    Historical Provider, MD   tamsulosin (FLOMAX) 0 4 mg Take 0 4 mg by mouth every evening      Historical Provider, MD     I have reviewed home medications with a medical source (PCP, Pharmacy, other)      Allergies: No Known Allergies    Social History:  Marital Status: /Civil Union   Occupation: none  Patient Pre-hospital Living Situation: Home  Patient Pre-hospital Level of Mobility: walks  Patient Pre-hospital Diet Restrictions: reg  Substance Use History:   Social History     Substance and Sexual Activity   Alcohol Use Never     Social History     Tobacco Use   Smoking Status Former Smoker    Types: Cigars    Quit date:     Years since quittin 2   Smokeless Tobacco Former User     Social History     Substance and Sexual Activity   Drug Use No       Family History:  Family History   Problem Relation Age of Onset    No Known Problems Mother     No Known Problems Father        Physical Exam:     Vitals:   Blood Pressure: 141/90 (22)  Pulse: 84 (22)  Temperature: 97 8 °F (36 6 °C) (22)  Temp Source: Tympanic (22 0136)  Respirations: 20 (04/03/22 0136)  Height: 5' 6" (167 6 cm) (04/03/22 0136)  Weight - Scale: 133 kg (293 lb 6 9 oz) (04/03/22 0136)  SpO2: 97 % (04/03/22 0136)    Physical Exam  Constitutional:       Appearance: He is obese  HENT:      Head: Normocephalic  Mouth/Throat:      Mouth: Mucous membranes are moist    Cardiovascular:      Rate and Rhythm: Normal rate  Pulmonary:      Comments: Decreased breath sounds bilaterally  Abdominal:      Comments: Obese nontender   Genitourinary:     Comments: Read catheter in place  Musculoskeletal:         General: Swelling present  Skin:     Findings: Erythema, lesion and rash present  Comments: Venous statis dermatitis   Neurological:      Comments: Patient is altered, able to move 4 extremities, cannot follow-up commands   Psychiatric:      Comments: Patient is altered          Additional Data:     Lab Results:  Results from last 7 days   Lab Units 04/03/22 0140 04/02/22 2021 04/02/22 2021   WBC Thousand/uL  --   --  9 04   HEMOGLOBIN g/dL  --   --  12 0   HEMATOCRIT %  --   --  39 0   PLATELETS Thousands/uL 258   < > 260   NEUTROS PCT %  --   --  69   LYMPHS PCT %  --   --  20   MONOS PCT %  --   --  8   EOS PCT %  --   --  3    < > = values in this interval not displayed       Results from last 7 days   Lab Units 04/02/22 2021   SODIUM mmol/L 142   POTASSIUM mmol/L 4 5   CHLORIDE mmol/L 101   CO2 mmol/L 32   BUN mg/dL 33*   CREATININE mg/dL 1 73*   ANION GAP mmol/L 9   CALCIUM mg/dL 9 0   ALBUMIN g/dL 3 0*   TOTAL BILIRUBIN mg/dL 0 43   ALK PHOS U/L 152*   ALT U/L 20   AST U/L 10   GLUCOSE RANDOM mg/dL 565*     Results from last 7 days   Lab Units 04/02/22 2021   INR  1 18     Results from last 7 days   Lab Units 04/03/22 0133 04/02/22 2326 04/02/22 2006   POC GLUCOSE mg/dl 472* 468* 478*         Results from last 7 days   Lab Units 04/03/22 0140 04/02/22 2021   LACTIC ACID mmol/L  --  2 0   PROCALCITONIN ng/ml 0 23 0 26*       Imaging: Reviewed radiology reports from this admission including: chest CT scan and abdominal/pelvic CT  CTA head and neck with and without contrast   Final Result by Yajaira Francis MD (04/02 2237)      1  No acute intracranial hemorrhage, mass effect or extra-axial collection  2   No hemodynamically significant stenosis, dissection or occlusion of the carotid or vertebral arteries  3   No intracranial aneurysm  No hemodynamically significant stenosis or occlusion of the major vessels of the [place]  Workstation performed: [id number]         CT chest abdomen pelvis w contrast   Final Result by Yajaira Francis MD (04/02 2244)      Consolidative airspace disease at the bilateral lung bases and at the left upper lobe along left major fissure likely representing basilar atelectasis  Underlying pneumonia cannot excluded  No evidence of acute pathology throughout the abdomen or pelvis  Workstation performed: [id number]         VAS lower limb venous duplex study, complete bilateral    (Results Pending)         ** Please Note: This note has been constructed using a voice recognition system   **

## 2022-04-03 NOTE — PROGRESS NOTES
Progress Note - Constance Weiss 76 y o  male MRN: [id number]    Unit/Bed#:  Encounter: [id number]      Assessment/Plan:    Acute metabolic and possibly toxic encephalopathy  - CT/CTA head and neck negative for stroke  - Patient noncompliant with BIPAP, possibly contributing, mildly hypercapnic on ABG  - Patient with hx of narcotic medication overuse, reportedly last prescribed oxycodone in December, negative UDS however previously in similar situation also with a negative UDS and responded well to naloxone, will attempt 0 04 mg x 1  -  Possible PNA on CT chest, however, otherwise without evidence of infection, no leukocytosis or fever  - significant hyperglycemia, improving  - neurology input appreciated, there low suspicious for stroke, however, due to inability to perform a neurologic examination with patient minimally responsive, agreed to proceed with an MRI if able - limiting factors may be patient's weight and question inability to stay still, would prefer to avoid CNS altering medications  - continue along stroke pathway  - monitor and correct metabolic disturbances able  - continue BiPAP use  - NPO until mental status improves  - proceed with IV fluids    DMT2  - family unsure of blood glucose levels at home, they do not routinely check blood sugar  - monitor q 6 hours and provide coverage  - patient is NPO for now due to altered mental status    BHARGAVI  - noncompliance with BiPAP  - ABG mildly hypercapnic, the patient had been on BiPAP therapy prior to obtaining  - patient has been off BiPAP for 3 hours, recheck ABG  - continue BiPAP therapy overnight    Possible pneumonia  - infectious workup  - antibiotic coverage for now    Left heel wound  - request podiatry evaluation    Subjective:   Patient seen and examined  He was minimally responsive this morning but is starting to be more reactive to tactile stimuli and speech  He continues to not follow command and be unsafe to feed      Objective: Vitals: Blood pressure 127/60, pulse 74, temperature (!) 96 5 °F (35 8 °C), temperature source Temporal, resp  rate 18, height 5' 6" (1 676 m), weight 133 kg (293 lb 6 9 oz), SpO2 93 %  ,Body mass index is 47 36 kg/m²  Intake/Output Summary (Last 24 hours) at 4/3/2022 1457  Last data filed at 4/3/2022 1430  Gross per 24 hour   Intake 1847 5 ml   Output 600 ml   Net 1247 5 ml       Physical Exam: General appearance: slowed mentation and responsive to tactale stimuli   Lungs: diminished breath sounds  Heart: regular rate and rhythm, S1, S2 normal, no murmur, click, rub or gallop  Abdomen: soft, non-tender; bowel sounds normal; no masses,  no organomegaly  Extremities: no edema   Skin: excoriation - abdomen or left heel wound   Neurologic: Mental status: Alert, oriented, thought content appropriate, decreased responsiveness      Invasive Devices  Report    Peripheral Intravenous Line            Peripheral IV 04/02/22 Right Antecubital <1 day    Peripheral IV 04/03/22 Distal;Dorsal (posterior); Right Forearm <1 day          Drain            Urethral Catheter Non-latex 16 Fr  <1 day                Lab, Imaging and other studies: I have personally reviewed pertinent reports      VTE Pharmacologic Prophylaxis: Heparin  VTE Mechanical Prophylaxis: sequential compression device

## 2022-04-03 NOTE — CONSULTS
INTERPROFESSIONAL (PHONE) Priyanka Jung 76 y o  male MRN: [id number]  Encounter Date: 04/03/22      Reason for Consult / Principal Problem:  Metabolic encephalopathy    Consulting Provider:  Mouna GASTELUM  Requesting Provider:  Stefani Spencer MD    68-year-old morbidly obese male with history of type 2 diabetes, chronic pain, CKD, CHF who presented with altered mental status evolving over preceding 24 hours  He had been combative in the home when EMS came, and required sedation  Subsequently needed supplemental oxygen via non-rebreather  Family noted he was less able to ambulate, but did not report over lateralizing neurologic findings are weakness  Similar description is given in the emergency room, that if he is altered, not following commands, but moving extremities  Head CT was conducted and showed no acute changes  CT chest suggested findings consistent with either atelectasis or pneumonia  Procalcitonin was mildly elevated, CKD exacerbated with KIRT (GFR 36)  A UDS was notable only for benzodiazepines (given on route)  PDMP was reviewed  His last oxycodone prescription was in December  It does appear he was more recently started and continues on pregabalin (also has previous prescription for gabapentin)  His blood sugar was elevated greater than 500 initially, now improving  He has remained afebrile , with relatively unremarkable blood pressures and pulse rate  He has been placed on antibiotics, and apparently on stroke investigation pathway    In December he had an ER visit at [facility] where he presented with excessive somnolence and pinpoint pupils  He responded to Narcan despite a negative UDS    ASSESSMENT:  Encephalopathy-the reported evolution subacutely over 24 hours is most consistent with toxic metabolic factors, less suggestive stroke    There are concerns for possible underlying infection, and in addition would be concerned about possible toxic effects from accumulation of pregabalin in light of worsening renal clearance  Additional medication interactions may be contributory as well as hypoxia related to BHARGAVI (?[facility])  RECOMMENDATIONS:  1  Agree to withhold narcotics  2  Would also withhold gabapentin until his mental status improves  3  Would need careful clarification of his current outpatient prescriptions, as well as which meds he is taking, with attention to whether he is still utilizing oxycodone (?  Inappropriately), as well as whether there was intent for him to use both pregabalin and gabapentin(not typically prescribed together)  4  Continue with serial exams/supportive care  An MRI has been ordered    While unlikely this represents acute CNS focal process such as stroke, given limitations of current neurologic exam due to his altered mentation, it seems not unreasonable to utilize MRI to better exclude such contributors     Total time spent in review of data, discussion with requesting provider and rendering advice was 31 + Mins

## 2022-04-03 NOTE — PHYSICAL THERAPY NOTE
PHYSICAL THERAPY        Patient Name: Ondina Benitez  QLBAB'E Date: 4/3/2022        04/03/22 0818   PT Last Visit   PT Visit Date 04/03/22   Note Type   Note type Cancelled Session   Cancel Reasons Medical status   Additional Comments pt requiring continuous bipap       Doreen Nieves, PT

## 2022-04-03 NOTE — PLAN OF CARE
Problem: PAIN - ADULT  Goal: Verbalizes/displays adequate comfort level or baseline comfort level  Description: Interventions:  - Encourage patient to monitor pain and request assistance  - Assess pain using appropriate pain scale  - Administer analgesics based on type and severity of pain and evaluate response  - Implement non-pharmacological measures as appropriate and evaluate response  - Consider cultural and social influences on pain and pain management  - Notify physician/advanced practitioner if interventions unsuccessful or patient reports new pain  Outcome: Progressing     Problem: INFECTION - ADULT  Goal: Absence or prevention of progression during hospitalization  Description: INTERVENTIONS:  - Assess and monitor for signs and symptoms of infection  - Monitor lab/diagnostic results  - Monitor all insertion sites, i e  indwelling lines, tubes, and drains  - Monitor endotracheal if appropriate and nasal secretions for changes in amount and color  - [place] appropriate cooling/warming therapies per order  - Administer medications as ordered  - Instruct and encourage patient and family to use good hand hygiene technique  - Identify and instruct in appropriate isolation precautions for identified infection/condition  Outcome: Progressing  Goal: Absence of fever/infection during neutropenic period  Description: INTERVENTIONS:  - Monitor WBC    Outcome: Progressing     Problem: SAFETY ADULT  Goal: Patient will remain free of falls  Description: INTERVENTIONS:  - Educate patient/family on patient safety including physical limitations  - Instruct patient to call for assistance with activity   - Consult OT/PT to assist with strengthening/mobility   - Keep Call bell within reach  - Keep bed low and locked with side rails adjusted as appropriate  - Keep care items and personal belongings within reach  - Initiate and maintain comfort rounds  - Make Fall Risk Sign visible to staff  - Offer Toileting every 2 Hours, in advance of need  - Initiate/Maintain bed/chair alarm  - Obtain necessary fall risk management equipment: alarms  - Apply yellow socks and bracelet for high fall risk patients  - Consider moving patient to room near nurses station  Outcome: Progressing  Goal: Maintain or return to baseline ADL function  Description: INTERVENTIONS:  -  Assess patient's ability to carry out ADLs; assess patient's baseline for ADL function and identify physical deficits which impact ability to perform ADLs (bathing, care of mouth/teeth, toileting, grooming, dressing, etc )  - Assess/evaluate cause of self-care deficits   - Assess range of motion  - Assess patient's mobility; develop plan if impaired  - Assess patient's need for assistive devices and provide as appropriate  - Encourage maximum independence but intervene and supervise when necessary  - Involve family in performance of ADLs  - Assess for home care needs following discharge   - Consider OT consult to assist with ADL evaluation and planning for discharge  - Provide patient education as appropriate  Outcome: Progressing  Goal: Maintains/Returns to pre admission functional level  Description: INTERVENTIONS:  - Perform BMAT or MOVE assessment daily    - Set and communicate daily mobility goal to care team and patient/family/caregiver  - Collaborate with rehabilitation services on mobility goals if consulted  - Perform Range of Motion 3 times a day  - Reposition patient every 2 hours    - Dangle patient 3 times a day  - Stand patient 3 times a day  - Ambulate patient 4 times a day  - Out of bed to chair 4 times a day   - Out of bed for meals 3 times a day  - Out of bed for toileting  - Record patient progress and toleration of activity level   Outcome: Progressing     Problem: DISCHARGE PLANNING  Goal: Discharge to home or other facility with appropriate resources  Description: INTERVENTIONS:  - Identify barriers to discharge w/patient and caregiver  - Arrange for needed discharge resources and transportation as appropriate  - Identify discharge learning needs (meds, wound care, etc )  - Arrange for interpretive services to assist at discharge as needed  - Refer to Case Management Department for coordinating discharge planning if the patient needs post-hospital services based on physician/advanced practitioner order or complex needs related to functional status, cognitive ability, or social support system  Outcome: Progressing     Problem: Knowledge Deficit  Goal: Patient/family/caregiver demonstrates understanding of disease process, treatment plan, medications, and discharge instructions  Description: Complete learning assessment and assess knowledge base  Interventions:  - Provide teaching at level of understanding  - Provide teaching via preferred learning methods  Outcome: Progressing     Problem: MOBILITY - ADULT  Goal: Maintain or return to baseline ADL function  Description: INTERVENTIONS:  -  Assess patient's ability to carry out ADLs; assess patient's baseline for ADL function and identify physical deficits which impact ability to perform ADLs (bathing, care of mouth/teeth, toileting, grooming, dressing, etc )  - Assess/evaluate cause of self-care deficits   - Assess range of motion  - Assess patient's mobility; develop plan if impaired  - Assess patient's need for assistive devices and provide as appropriate  - Encourage maximum independence but intervene and supervise when necessary  - Involve family in performance of ADLs  - Assess for home care needs following discharge   - Consider OT consult to assist with ADL evaluation and planning for discharge  - Provide patient education as appropriate  Outcome: Progressing  Goal: Maintains/Returns to pre admission functional level  Description: INTERVENTIONS:  - Perform BMAT or MOVE assessment daily    - Set and communicate daily mobility goal to care team and patient/family/caregiver     - Collaborate with rehabilitation services on mobility goals if consulted  - Perform Range of Motion 3 times a day  - Reposition patient every 2 hours    - Dangle patient 3 times a day  - Stand patient 3 times a day  - Ambulate patient 4 times a day  - Out of bed to chair 4 times a day   - Out of bed for meals 3 times a day  - Out of bed for toileting  - Record patient progress and toleration of activity level   Outcome: Progressing     Problem: Prexisting or High Potential for Compromised Skin Integrity  Goal: Skin integrity is maintained or improved  Description: INTERVENTIONS:  - Identify patients at risk for skin breakdown  - Assess and monitor skin integrity  - Assess and monitor nutrition and hydration status  - Monitor labs   - Assess for incontinence   - Turn and reposition patient  - Assist with mobility/ambulation  - Relieve pressure over bony prominences  - Avoid friction and shearing  - Provide appropriate hygiene as needed including keeping skin clean and dry  - Evaluate need for skin moisturizer/barrier cream  - Collaborate with interdisciplinary team   - Patient/family teaching  - Consider wound care consult   Outcome: Progressing     Problem: Potential for Falls  Goal: Patient will remain free of falls  Description: INTERVENTIONS:  - Educate patient/family on patient safety including physical limitations  - Instruct patient to call for assistance with activity   - Consult OT/PT to assist with strengthening/mobility   - Keep Call bell within reach  - Keep bed low and locked with side rails adjusted as appropriate  - Keep care items and personal belongings within reach  - Initiate and maintain comfort rounds  - Make Fall Risk Sign visible to staff  - Offer Toileting every 2 Hours, in advance of need  - Initiate/Maintain bed/chair alarm  - Obtain necessary fall risk management equipment: alarms  - Apply yellow socks and bracelet for high fall risk patients  - Consider moving patient to room near nurses station  Outcome: Progressing

## 2022-04-03 NOTE — ASSESSMENT & PLAN NOTE
According to the family members patient is very altered compared to his baseline  Most likely multifactorial secondary to underlying infectious process, hyperglycemia and  possible opioid overdose (patient chronic opioid dependent and accidentally overdosed himself few months ago)  Patient was combative upon EMS arrival so he received few doses of benzos and became more lethargic  Due to transit episodes of hypoxia and evidence of hypercapnia on VBG he was placed on BiPAP  CT of the head unremarkable for intracranial pathology  If no AMS resolution within 24 hours will consider MRI of the brain  CT of the chest showed no pulmonary embolism but evidence of possible pneumonia so will treat empirically for CAP  Follow-up pneumonia workup follow up culture reports and this completed antibiotics accordingly  Continue to monitor procalcitonin trend  Continued ceftriaxone consult pharmacy for vancomycin dose    If MRSA negative will discontinue vancomycin  Scrotal erythema and   chronic venous statis dermatitis with erythema of lower extremity noted, continue antibiotic  Wound care consult  Continue BiPAP  Follow-up lower extremity Doppler  Will defer need of specialist consult to the primary team

## 2022-04-03 NOTE — PLAN OF CARE
Problem: PAIN - ADULT  Goal: Verbalizes/displays adequate comfort level or baseline comfort level  Description: Interventions:  - Encourage patient to monitor pain and request assistance  - Assess pain using appropriate pain scale  - Administer analgesics based on type and severity of pain and evaluate response  - Implement non-pharmacological measures as appropriate and evaluate response  - Consider cultural and social influences on pain and pain management  - Notify physician/advanced practitioner if interventions unsuccessful or patient reports new pain  4/3/2022 0207 by Key Martin RN  Outcome: Progressing  4/3/2022 0207 by Key Martin RN  Outcome: Progressing     Problem: INFECTION - ADULT  Goal: Absence or prevention of progression during hospitalization  Description: INTERVENTIONS:  - Assess and monitor for signs and symptoms of infection  - Monitor lab/diagnostic results  - Monitor all insertion sites, i e  indwelling lines, tubes, and drains  - Monitor endotracheal if appropriate and nasal secretions for changes in amount and color  - [place] appropriate cooling/warming therapies per order  - Administer medications as ordered  - Instruct and encourage patient and family to use good hand hygiene technique  - Identify and instruct in appropriate isolation precautions for identified infection/condition  4/3/2022 0207 by Key Martin RN  Outcome: Progressing  4/3/2022 0207 by Key Martin RN  Outcome: Progressing  Goal: Absence of fever/infection during neutropenic period  Description: INTERVENTIONS:  - Monitor WBC    4/3/2022 0207 by Key Martin RN  Outcome: Progressing  4/3/2022 0207 by Key Martin RN  Outcome: Progressing     Problem: SAFETY ADULT  Goal: Patient will remain free of falls  Description: INTERVENTIONS:  - Educate patient/family on patient safety including physical limitations  - Instruct patient to call for assistance with activity   - Consult OT/PT to assist with strengthening/mobility   - Keep Call bell within reach  - Keep bed low and locked with side rails adjusted as appropriate  - Keep care items and personal belongings within reach  - Initiate and maintain comfort rounds  - Make Fall Risk Sign visible to staff  - Offer Toileting every 2 Hours, in advance of need  - Initiate/Maintain bedalarm  - Obtain necessary fall risk management equipment: alarm  - Apply yellow socks and bracelet for high fall risk patients  - Consider moving patient to room near nurses station  4/3/2022 0207 by Gal Meza RN  Outcome: Progressing  4/3/2022 0207 by Gal Meza RN  Outcome: Progressing  Goal: Maintain or return to baseline ADL function  Description: INTERVENTIONS:  -  Assess patient's ability to carry out ADLs; assess patient's baseline for ADL function and identify physical deficits which impact ability to perform ADLs (bathing, care of mouth/teeth, toileting, grooming, dressing, etc )  - Assess/evaluate cause of self-care deficits   - Assess range of motion  - Assess patient's mobility; develop plan if impaired  - Assess patient's need for assistive devices and provide as appropriate  - Encourage maximum independence but intervene and supervise when necessary  - Involve family in performance of ADLs  - Assess for home care needs following discharge   - Consider OT consult to assist with ADL evaluation and planning for discharge  - Provide patient education as appropriate  4/3/2022 0207 by Gal Meza RN  Outcome: Progressing  4/3/2022 0207 by Gal Meza RN  Outcome: Progressing  Goal: Maintains/Returns to pre admission functional level  Description: INTERVENTIONS:  - Perform BMAT or MOVE assessment daily    - Set and communicate daily mobility goal to care team and patient/family/caregiver  - Collaborate with rehabilitation services on mobility goals if consulted  - Perform Range of Motion 3 times a day    - Reposition patient every 2 hours  - Dangle patient 3 times a day  - Stand patient 3 times a day  - Ambulate patient 3 times a day  - Out of bed to chair 3 times a day   - Out of bed for meals 3 times a day  - Out of bed for toileting  - Record patient progress and toleration of activity level   4/3/2022 0207 by Aleks Leyva RN  Outcome: Progressing  4/3/2022 0207 by Aleks Leyva RN  Outcome: Progressing     Problem: DISCHARGE PLANNING  Goal: Discharge to home or other facility with appropriate resources  Description: INTERVENTIONS:  - Identify barriers to discharge w/patient and caregiver  - Arrange for needed discharge resources and transportation as appropriate  - Identify discharge learning needs (meds, wound care, etc )  - Arrange for interpretive services to assist at discharge as needed  - Refer to Case Management Department for coordinating discharge planning if the patient needs post-hospital services based on physician/advanced practitioner order or complex needs related to functional status, cognitive ability, or social support system  4/3/2022 0207 by Aleks Leyva RN  Outcome: Progressing  4/3/2022 0207 by Aleks Leyva RN  Outcome: Progressing     Problem: Knowledge Deficit  Goal: Patient/family/caregiver demonstrates understanding of disease process, treatment plan, medications, and discharge instructions  Description: Complete learning assessment and assess knowledge base    Interventions:  - Provide teaching at level of understanding  - Provide teaching via preferred learning methods  4/3/2022 0207 by Aleks Leyva RN  Outcome: Progressing  4/3/2022 0207 by Aleks Leyva RN  Outcome: Progressing

## 2022-04-03 NOTE — PROGRESS NOTES
Vancomycin Assessment    Constance Weiss is a 76 y o  male who is currently receiving vancomycin via pharmacy protocol for Pneumonia  Relevant clinical data and objective history reviewed:  Creatinine   Date Value Ref Range Status   04/02/2022 1 73 (H) 0 60 - 1 30 mg/dL Final     Comment:     Standardized to IDMS reference method   09/18/2021 1 43 (H) 0 60 - 1 30 mg/dL Final     Comment:     Standardized to IDMS reference method   06/29/2021 1 37 (H) 0 60 - 1 30 mg/dL Final     Comment:     Standardized to IDMS reference method   12/03/2015 1 44 (H) 0 60 - 1 30 mg/dL Final     Comment:     Standardized to IDMS reference method   12/02/2015 1 59 (H) 0 60 - 1 30 mg/dL Final     Comment:     Standardized to IDMS reference method   12/01/2015 2 40 (H) 0 60 - 1 30 mg/dL Final     Comment:     Standardized to IDMS reference method     /90 (BP Location: Right arm)   Pulse 84   Temp 97 8 °F (36 6 °C) (Tympanic)   Resp 20   Ht 5' 6" (1 676 m)   Wt 133 kg (293 lb 6 9 oz)   SpO2 97%   BMI 47 36 kg/m²   No intake/output data recorded  Lab Results   Component Value Date/Time    BUN 33 (H) 04/02/2022 08:21 PM    BUN 37 (H) 12/03/2015 02:10 PM    WBC 9 04 04/02/2022 08:21 PM    WBC 10 43 (H) 12/01/2015 04:50 AM    HGB 12 0 04/02/2022 08:21 PM    HGB 10 8 (L) 12/01/2015 04:50 AM    HCT 39 0 04/02/2022 08:21 PM    HCT 35 0 (L) 12/01/2015 04:50 AM    MCV 88 04/02/2022 08:21 PM    MCV 88 12/01/2015 04:50 AM     04/03/2022 01:40 AM     12/01/2015 04:50 AM     Temp Readings from Last 3 Encounters:   04/03/22 97 8 °F (36 6 °C) (Tympanic)   09/18/21 99 2 °F (37 3 °C) (Temporal)   04/12/21 97 9 °F (36 6 °C)     Vancomycin Days of Therapy: 1      Assessment/Plan  The patient will be started on vancomycin utilizing scheduled dosing based on adjusted body weight (due to obesity)  Baseline risks associated with therapy include: pre-existing renal impairment and advanced age    The patient is currently ordered vancomycin and after clinical evaluation will be started at 1000mg IV q12h  Pharmacy will also follow closely for s/sx of nephrotoxicity, infusion reactions, and appropriateness of therapy  BMP and CBC will be ordered per protocol  Plan for trough as patient approaches steady state, prior to the 4th  dose at approximately 1430 on 4/4/22  Due to infection severity, will target a trough of 15-20 (appropriate for most indications)   Pharmacy will continue to follow the patients culture results and clinical progress daily      Dillon Klein, Pharmacist

## 2022-04-03 NOTE — ASSESSMENT & PLAN NOTE
Lab Results   Component Value Date    EGFR 36 04/02/2022    EGFR 47 09/18/2021    EGFR 49 06/29/2021    CREATININE 1 73 (H) 04/02/2022    CREATININE 1 43 (H) 09/18/2021    CREATININE 1 37 (H) 06/29/2021     Avoid nephrotoxic drugs and hypotension  Continue to monitor creatinine  Gentle overnight IV hydration

## 2022-04-04 ENCOUNTER — APPOINTMENT (INPATIENT)
Dept: NON INVASIVE DIAGNOSTICS | Facility: HOSPITAL | Age: 77
DRG: 189 | End: 2022-04-04
Payer: COMMERCIAL

## 2022-04-04 LAB
ANION GAP SERPL CALCULATED.3IONS-SCNC: 11 MMOL/L (ref 4–13)
AORTIC ROOT: 3.5 CM
AV PEAK GRADIENT: 8 MMHG
BUN SERPL-MCNC: 28 MG/DL (ref 5–25)
CALCIUM SERPL-MCNC: 8.4 MG/DL (ref 8.3–10.1)
CHLORIDE SERPL-SCNC: 110 MMOL/L (ref 100–108)
CHOLEST SERPL-MCNC: 166 MG/DL
CO2 SERPL-SCNC: 24 MMOL/L (ref 21–32)
CREAT SERPL-MCNC: 1.43 MG/DL (ref 0.6–1.3)
E WAVE DECELERATION TIME: 95 MS
ERYTHROCYTE [DISTWIDTH] IN BLOOD BY AUTOMATED COUNT: 14.7 % (ref 11.6–15.1)
EST. AVERAGE GLUCOSE BLD GHB EST-MCNC: 212 MG/DL
FRACTIONAL SHORTENING: 37 % (ref 28–44)
GFR SERPL CREATININE-BSD FRML MDRD: 46 ML/MIN/1.73SQ M
GLUCOSE SERPL-MCNC: 207 MG/DL (ref 65–140)
GLUCOSE SERPL-MCNC: 257 MG/DL (ref 65–140)
GLUCOSE SERPL-MCNC: 264 MG/DL (ref 65–140)
GLUCOSE SERPL-MCNC: 275 MG/DL (ref 65–140)
GLUCOSE SERPL-MCNC: 316 MG/DL (ref 65–140)
GLUCOSE SERPL-MCNC: 339 MG/DL (ref 65–140)
GLUCOSE SERPL-MCNC: 381 MG/DL (ref 65–140)
HBA1C MFR BLD: 9 %
HCT VFR BLD AUTO: 34.3 % (ref 36.5–49.3)
HDLC SERPL-MCNC: 32 MG/DL
HGB BLD-MCNC: 10.3 G/DL (ref 12–17)
INTERVENTRICULAR SEPTUM IN DIASTOLE (PARASTERNAL SHORT AXIS VIEW): 1.6 CM
INTERVENTRICULAR SEPTUM: 1.6 CM (ref 0.59–1.1)
LDLC SERPL CALC-MCNC: 84 MG/DL (ref 0–100)
LEFT ATRIUM SIZE: 4.6 CM
LEFT INTERNAL DIMENSION IN SYSTOLE: 2.6 CM (ref 7.86–11.92)
LEFT VENTRICULAR INTERNAL DIMENSION IN DIASTOLE: 4.1 CM (ref 13.44–20.04)
LEFT VENTRICULAR POSTERIOR WALL IN END DIASTOLE: 1.6 CM (ref 0.57–1.09)
LEFT VENTRICULAR STROKE VOLUME: 48 ML
LVSV (TEICH): 48 ML
MAGNESIUM SERPL-MCNC: 1.6 MG/DL (ref 1.6–2.6)
MCH RBC QN AUTO: 26.6 PG (ref 26.8–34.3)
MCHC RBC AUTO-ENTMCNC: 30 G/DL (ref 31.4–37.4)
MCV RBC AUTO: 89 FL (ref 82–98)
MRSA NOSE QL CULT: ABNORMAL
MRSA NOSE QL CULT: ABNORMAL
MV E'TISSUE VEL-SEP: 7 CM/S
MV PEAK A VEL: 1.14 M/S
MV PEAK E VEL: 101 CM/S
MV STENOSIS PRESSURE HALF TIME: 28 MS
MV VALVE AREA P 1/2 METHOD: 7.86 CM2
PHOSPHATE SERPL-MCNC: 3.4 MG/DL (ref 2.3–4.1)
PLATELET # BLD AUTO: 251 THOUSANDS/UL (ref 149–390)
PMV BLD AUTO: 11 FL (ref 8.9–12.7)
POTASSIUM SERPL-SCNC: 3.3 MMOL/L (ref 3.5–5.3)
PROCALCITONIN SERPL-MCNC: 0.2 NG/ML
RA PRESSURE ESTIMATED: 10 MMHG
RBC # BLD AUTO: 3.87 MILLION/UL (ref 3.88–5.62)
RV PSP: 36 MMHG
SL CV LV EF: 65
SL CV PED ECHO LEFT VENTRICLE DIASTOLIC VOLUME (MOD BIPLANE) 2D: 73 ML
SL CV PED ECHO LEFT VENTRICLE SYSTOLIC VOLUME (MOD BIPLANE) 2D: 26 ML
SODIUM SERPL-SCNC: 145 MMOL/L (ref 136–145)
TR MAX PG: 26 MMHG
TR PEAK VELOCITY: 2.5 M/S
TRICUSPID VALVE PEAK REGURGITATION VELOCITY: 2.53 M/S
TRIGL SERPL-MCNC: 248 MG/DL
WBC # BLD AUTO: 7.54 THOUSAND/UL (ref 4.31–10.16)
Z-SCORE OF INTERVENTRICULAR SEPTUM IN END DIASTOLE: 5.76
Z-SCORE OF LEFT VENTRICULAR DIMENSION IN END DIASTOLE: -13.87
Z-SCORE OF LEFT VENTRICULAR DIMENSION IN END SYSTOLE: -10.39
Z-SCORE OF LEFT VENTRICULAR POSTERIOR WALL IN END DIASTOLE: 5.87

## 2022-04-04 PROCEDURE — 94760 N-INVAS EAR/PLS OXIMETRY 1: CPT

## 2022-04-04 PROCEDURE — 99223 1ST HOSP IP/OBS HIGH 75: CPT | Performed by: INTERNAL MEDICINE

## 2022-04-04 PROCEDURE — 99232 SBSQ HOSP IP/OBS MODERATE 35: CPT | Performed by: PHYSICIAN ASSISTANT

## 2022-04-04 PROCEDURE — 97535 SELF CARE MNGMENT TRAINING: CPT

## 2022-04-04 PROCEDURE — 94003 VENT MGMT INPAT SUBQ DAY: CPT

## 2022-04-04 PROCEDURE — 93971 EXTREMITY STUDY: CPT | Performed by: SURGERY

## 2022-04-04 PROCEDURE — 83036 HEMOGLOBIN GLYCOSYLATED A1C: CPT | Performed by: FAMILY MEDICINE

## 2022-04-04 PROCEDURE — 85027 COMPLETE CBC AUTOMATED: CPT | Performed by: HOSPITALIST

## 2022-04-04 PROCEDURE — C8929 TTE W OR WO FOL WCON,DOPPLER: HCPCS

## 2022-04-04 PROCEDURE — 93306 TTE W/DOPPLER COMPLETE: CPT | Performed by: INTERNAL MEDICINE

## 2022-04-04 PROCEDURE — 84145 PROCALCITONIN (PCT): CPT | Performed by: HOSPITALIST

## 2022-04-04 PROCEDURE — 97167 OT EVAL HIGH COMPLEX 60 MIN: CPT

## 2022-04-04 PROCEDURE — 80048 BASIC METABOLIC PNL TOTAL CA: CPT | Performed by: HOSPITALIST

## 2022-04-04 PROCEDURE — 82948 REAGENT STRIP/BLOOD GLUCOSE: CPT

## 2022-04-04 PROCEDURE — 80061 LIPID PANEL: CPT | Performed by: FAMILY MEDICINE

## 2022-04-04 PROCEDURE — 93970 EXTREMITY STUDY: CPT

## 2022-04-04 PROCEDURE — 97163 PT EVAL HIGH COMPLEX 45 MIN: CPT

## 2022-04-04 PROCEDURE — 97530 THERAPEUTIC ACTIVITIES: CPT

## 2022-04-04 PROCEDURE — 84100 ASSAY OF PHOSPHORUS: CPT | Performed by: HOSPITALIST

## 2022-04-04 PROCEDURE — 83735 ASSAY OF MAGNESIUM: CPT | Performed by: HOSPITALIST

## 2022-04-04 RX ORDER — ASPIRIN 81 MG/1
81 TABLET, CHEWABLE ORAL DAILY
Status: DISCONTINUED | OUTPATIENT
Start: 2022-04-04 | End: 2022-04-13 | Stop reason: HOSPADM

## 2022-04-04 RX ORDER — INSULIN GLARGINE 100 [IU]/ML
20 INJECTION, SOLUTION SUBCUTANEOUS
Status: DISCONTINUED | OUTPATIENT
Start: 2022-04-04 | End: 2022-04-05

## 2022-04-04 RX ORDER — INSULIN GLARGINE 100 [IU]/ML
10 INJECTION, SOLUTION SUBCUTANEOUS
Status: DISCONTINUED | OUTPATIENT
Start: 2022-04-04 | End: 2022-04-04

## 2022-04-04 RX ADMIN — CARVEDILOL 12.5 MG: 12.5 TABLET, FILM COATED ORAL at 17:30

## 2022-04-04 RX ADMIN — DOCUSATE SODIUM 100 MG: 100 CAPSULE, LIQUID FILLED ORAL at 17:30

## 2022-04-04 RX ADMIN — NYSTATIN 1 APPLICATION: 100000 POWDER TOPICAL at 09:58

## 2022-04-04 RX ADMIN — INSULIN LISPRO 10 UNITS: 100 INJECTION, SOLUTION INTRAVENOUS; SUBCUTANEOUS at 17:31

## 2022-04-04 RX ADMIN — FINASTERIDE 5 MG: 5 TABLET, FILM COATED ORAL at 08:45

## 2022-04-04 RX ADMIN — PERFLUTREN 2 ML/MIN: 6.52 INJECTION, SUSPENSION INTRAVENOUS at 08:23

## 2022-04-04 RX ADMIN — INSULIN LISPRO 8 UNITS: 100 INJECTION, SOLUTION INTRAVENOUS; SUBCUTANEOUS at 01:11

## 2022-04-04 RX ADMIN — TAMSULOSIN HYDROCHLORIDE 0.4 MG: 0.4 CAPSULE ORAL at 17:30

## 2022-04-04 RX ADMIN — Medication 2000 UNITS: at 08:45

## 2022-04-04 RX ADMIN — CARVEDILOL 12.5 MG: 12.5 TABLET, FILM COATED ORAL at 08:45

## 2022-04-04 RX ADMIN — DOCUSATE SODIUM 100 MG: 100 CAPSULE, LIQUID FILLED ORAL at 08:45

## 2022-04-04 RX ADMIN — HEPARIN SODIUM 5000 UNITS: 5000 INJECTION INTRAVENOUS; SUBCUTANEOUS at 22:01

## 2022-04-04 RX ADMIN — VANCOMYCIN HYDROCHLORIDE 1000 MG: 5 INJECTION, POWDER, LYOPHILIZED, FOR SOLUTION INTRAVENOUS at 03:02

## 2022-04-04 RX ADMIN — NYSTATIN: 100000 POWDER TOPICAL at 22:01

## 2022-04-04 RX ADMIN — ATORVASTATIN CALCIUM 40 MG: 40 TABLET, FILM COATED ORAL at 17:30

## 2022-04-04 RX ADMIN — INSULIN GLARGINE 20 UNITS: 100 INJECTION, SOLUTION SUBCUTANEOUS at 22:01

## 2022-04-04 RX ADMIN — INSULIN LISPRO 6 UNITS: 100 INJECTION, SOLUTION INTRAVENOUS; SUBCUTANEOUS at 05:40

## 2022-04-04 RX ADMIN — AMLODIPINE BESYLATE 10 MG: 10 TABLET ORAL at 08:45

## 2022-04-04 RX ADMIN — HEPARIN SODIUM 5000 UNITS: 5000 INJECTION INTRAVENOUS; SUBCUTANEOUS at 05:42

## 2022-04-04 RX ADMIN — NYSTATIN: 100000 POWDER TOPICAL at 17:31

## 2022-04-04 RX ADMIN — INSULIN LISPRO 10 UNITS: 100 INJECTION, SOLUTION INTRAVENOUS; SUBCUTANEOUS at 16:52

## 2022-04-04 NOTE — UTILIZATION REVIEW
Initial Clinical Review    Admission: Date/Time/Statement:   Admission Orders (From admission, onward)     Ordered        04/03/22 0051  Inpatient Admission  Once                      Orders Placed This Encounter   Procedures    Inpatient Admission     Standing Status:   Standing     Number of Occurrences:   1     Order Specific Question:   Level of Care     Answer:   Level 1 Stepdown [13]     Order Specific Question:   Estimated length of stay     Answer:   More than 2 Midnights     Order Specific Question:   Certification     Answer:   I certify that inpatient services are medically necessary for this patient for a duration of greater than two midnights  See H&P and MD Progress Notes for additional information about the patient's course of treatment  ED Arrival Information     Expected Arrival Acuity    - 4/2/2022 19:56 Urgent         Means of arrival Escorted by Service Admission type    Amb[facility] Hospitalist Urgent         Arrival complaint    medical problem        Chief Complaint   Patient presents with    Hyperglycemia - no symptoms     arrived via EMS, elevated blood sugar  confused and agitated per EMS  received 2mg ativan and versed 7 5mg IM for agitation  on 15L nonrebreather        Initial Presentation: 76 y o  male to the ED from home via EMS with complaints of confusion, agitation which has worsened over the period of a day  Admitted to [facility] down for acute metabolic encephalopathy, DM Type II, acute respiratory failure with hypoxia, hypercapnia, opiate dependence continuous  H/O morbid obesity, BHARGAVI, poorly controlled DM, CHF, CKD  EMS gave him ativan and versed for agitation, requiring non-rebreather, nasal trumpet  H/O accidental opioid OD  Escalated to BIPap while in the ED for episodes of hypoxia, hypercapnia on VBG  CT head shows no acute abnormality  CT chest shows possible pneumonia  Stated on IV abx  Continue bipap    Decreased breath sounds  NOted to have erythema, rash, venous stasis dermatitis  WOund care consult  Moving all extremities  Neurology consult  IV fluids started  Neurology consult:  Hold narcotics and gabapentin until neuro status improves  Check MRI  Careful clarification of OP prescriptions/medications  Date: 4/3       NPO  Continue with iv fluids  Family unsure of blood glucose levels at home as they do not routinely check them  Monitor every 6 hours  Recheck  ABG after being off bipap for 3 hours  Unable to follow commands and safely feed  Mental status improving  4/4 UPdate:  Refusing oxygen by means of NC, or bipap  Pulse ox dropping in to 70s-80s  Soft wrist restraints applied  Placed on [address] [facility]  PT/OT recommends post acute rehab  PUlmonary consult; Awake and difficult to understand  Currently requiring 3 LNC  Lungs diminished  NOrmal procal x 4  Blood cultures negative so far  Encourage incentive spriometry  Monitor off abx  Noncompliant with home CPAP  SPeech eval:  Refusing dysphagia evaluation     ED Triage Vitals   Temperature Pulse Respirations Blood Pressure SpO2   04/02/22 2001 04/02/22 2001 04/02/22 2001 04/02/22 2001 04/02/22 2001   (!) 97 °F (36 1 °C) 73 18 142/70 99 %      Temp Source Heart Rate Source Patient Position - Orthostatic VS BP Location FiO2 (%)   04/02/22 2001 04/02/22 2001 04/02/22 2001 04/02/22 2001 04/03/22 0002   Temporal Monitor Lying Left arm 100      Pain Score       04/03/22 1908       No Pain          Wt Readings from Last 1 Encounters:   04/03/22 133 kg (293 lb 6 9 oz)     Additional Vital Signs:   Time Temp Pulse Resp BP MAP (mmHg) SpO2 FiO2 (%) Calculated FIO2 (%) - Nasal Cannula O2 Flow Rate (L/min) Nasal Cannula O2 Flow Rate (L/min) O2 Device O2 Interface Device Patient Position - Orthostatic VS   04/04/22 0802 -- 80 -- 120/58 -- -- -- -- -- -- -- -- --   04/04/22 0701 99 2 °F (37 3 °C) 98 21 132/80 99 90 % -- 40 -- 5 L/min Nasal cannula -- Lying 04/04/22 0600 -- 105 40 Abnormal  131/97 109 93 % -- -- -- -- -- -- --   04/04/22 0500 -- 93 28 Abnormal  131/62 89 91 % -- -- -- -- -- -- --   04/04/22 0400 -- 93 25 Abnormal  120/58 84 93 % -- 40 -- 5 L/min Nasal cannula -- --   04/04/22 0300 97 2 °F (36 2 °C) Abnormal  95 25 Abnormal  126/60 87 92 % -- 40 -- 5 L/min Nasal cannula -- Lying   04/03/22 2047 -- -- -- -- -- 95 % 32 -- -- -- BiPAP Full face mask --   04/03/22 2008 -- 72 17 122/58 84 96 % -- -- -- -- -- -- --   04/03/22 1908 97 °F (36 1 °C) Abnormal  71 19 112/55 78 94 % 32 -- -- -- BiPAP -- Lying   04/03/22 1500 97 4 °F (36 3 °C) Abnormal  78 19 128/58 83 96 % -- 36 -- 4 L/min Nasal cannula -- Lying   04/03/22 1445 -- 73 19 127/56 84 92 % -- -- -- -- -- -- --   04/03/22 1330 -- 71 21 120/57 83 95 % -- -- -- -- -- -- --   04/03/22 1315 -- 69 19 112/58 81 95 % -- -- -- -- -- -- --   04/03/22 1300 -- 70 21 115/57 79 96 % -- -- -- -- -- -- --   04/03/22 1245 -- 68 19 117/58 82 96 % -- -- -- -- -- -- --   04/03/22 1230 -- 70 20 111/56 81 96 % -- -- -- -- -- -- --   04/03/22 1215 -- 70 17 122/58 84 96 % -- -- -- -- -- -- --   04/03/22 1200 -- 69 20 101/56 74 96 % -- -- -- -- -- -- --   04/03/22 1145 -- 70 16 94/52 68 95 % -- -- -- -- -- -- --   04/03/22 1141 -- -- -- -- -- -- -- -- 4 L/min -- Nasal cannula -- --   04/03/22 1130 96 5 °F (35 8 °C) Abnormal  73 15 119/59 84 91 % -- 36 -- 4 L/min Nasal cannula -- Lying   04/03/22 0804 -- 63 20 113/58 79 100 % 100 -- -- -- BiPAP -- Lying   04/03/22 0730 -- 63 19 -- -- 100 % -- -- -- -- -- -- --   04/03/22 0700 -- 63 20 107/55 75 100 % -- -- -- -- -- -- --   04/03/22 0652 96 8 °F (36 °C) Abnormal  63 21 114/58 79 100 % -- -- -- -- BiPAP -- Lying   04/03/22 0403 95 5 °F (35 3 °C) Abnormal  63 15 130/62 89 99 % -- -- -- -- BiPAP -- Lying   04/03/22 0400 -- 64 21 129/63 87 99 % -- -- -- -- -- -- --   04/03/22 0341 -- -- -- -- -- 97 % 100 -- -- -- BiPAP Full face mask --   04/03/22 0330 -- 62 21 -- -- 99 % -- -- -- -- -- -- --   04/03/22 0300 -- 61 20 100/53 73 100 % -- -- -- -- -- -- --   04/03/22 0136 97 8 °F (36 6 °C) 84 20 141/90 109 97 % 100 -- -- -- BiPAP -- Lying   04/03/22 0013 98 °F (36 7 °C) 76 18 140/74 -- 97 % -- -- -- -- BiPAP -- Lying   04/03/22 0002 -- -- -- -- -- 93 % 100 -- -- -- BiPAP Full face mask --   04/02/22 2155 98 °F (36 7 °C) 78 20 144/70 -- 95 % -- -- -- -- Non-rebreather mask -- Lying   04/02/22 2001 97 °F (36 1 °C) Abnormal  73 18 142/70 -- 99 % -- -- -- -- Non-rebreather mask -- Lying     Reba Coma Scale 4/4 0305   Eye Opening 4  -TB   Best Verbal Response 4  -TB   Best Motor Response 4  -TB   [place] Coma Scale Score 12  -TB         Pertinent Labs/Diagnostic Test Results:   4/2 EKG: Sinus rhythm with 1st degree A-V block  Left axis deviation  Right bundle branch block  Inferior infarct , age undetermined  Anterior infarct , age undetermined  Abnormal ECG  When compared with ECG of 18-SEP-2021 00:37,  Anterior infarct is now Present  Inferior infarct is now Present  Nonspecific T wave abnormality now evident in Anterior leads  4/4 ECHO:     Left Ventricle: Left ventricular cavity size is normal  Wall thickness is moderately increased  There is moderate concentric hypertrophy  The left ventricular ejection fraction is 65%  Systolic function is vigorous  Wall motion is normal  Diastolic function is mildly abnormal, consistent with grade I (abnormal) relaxation    Right Ventricle: Right ventricular cavity size is normal  Systolic function is normal     Left Atrium: The atrium is mildly dilated    Study quality was poor      4/4 VAS lower limb venous duplex:  CONCLUSION:   Impression:   RIGHT LOWER LIMB:   No evidence of acute or chronic deep vein thrombosis  No evidence of superficial thrombophlebitis noted  Doppler evaluation shows a normal response to augmentation maneuvers  Popliteal, posterior tibial and anterior tibial arterial Doppler waveforms are   triphasic     LEFT LOWER LIMB: No evidence of acute or chronic deep vein thrombosis  No evidence of superficial thrombophlebitis noted  Doppler evaluation shows a normal response to augmentation maneuvers  Popliteal, posterior tibial and anterior tibial arterial Doppler waveforms are   triphasic  CTA head and neck with and without contrast   Final Result by Samir Steele MD (04/02 2237)      1  No acute intracranial hemorrhage, mass effect or extra-axial collection  2   No hemodynamically significant stenosis, dissection or occlusion of the carotid or vertebral arteries  3   No intracranial aneurysm  No hemodynamically significant stenosis or occlusion of the major vessels of the [employer] [employer] [place]  Workstation performed: [id number]         CT chest abdomen pelvis w contrast   Final Result by Samir Steele MD (04/02 2244)      Consolidative airspace disease at the bilateral lung bases and at the left upper lobe along left major fissure likely representing basilar atelectasis  Underlying pneumonia cannot excluded  No evidence of acute pathology throughout the abdomen or pelvis  Workstation performed: [id number]                 Results from last 7 days   Lab Units 04/02/22 2021   SARS-COV-2  Negative     Results from last 7 days   Lab Units 04/04/22  0451 04/03/22  0759 04/03/22  0140 04/02/22 2021 04/02/22 2021   WBC Thousand/uL 7 54 9 63  --   --  9 04   HEMOGLOBIN g/dL 10 3* 11 1*  --   --  12 0   HEMATOCRIT % 34 3* 35 7*  --   --  39 0   PLATELETS Thousands/uL 251 250 258   < > 260   NEUTROS ABS Thousands/µL  --  6 98  --   --  6 21    < > = values in this interval not displayed           Results from last 7 days   Lab Units 04/04/22  0451 04/03/22  0759 04/02/22 2021   SODIUM mmol/L 145 144 142   POTASSIUM mmol/L 3 3* 3 2* 4 5   CHLORIDE mmol/L 110* 106 101   CO2 mmol/L 24 29 32   ANION GAP mmol/L 11 9 9   BUN mg/dL 28* 32* 33*   CREATININE mg/dL 1 43* 1 48* 1 73*   EGFR ml/min/1 73sq  46 44 36   CALCIUM mg/dL 8 4 8 6 9 0   MAGNESIUM mg/dL 1 6  --   --    PHOSPHORUS mg/dL 3 4  --   --      Results from last 7 days   Lab Units 04/03/22  0140 04/02/22 2021   AST U/L  --  10   ALT U/L  --  20   ALK PHOS U/L  --  152*   TOTAL PROTEIN g/dL  --  7 6   ALBUMIN g/dL  --  3 0*   TOTAL BILIRUBIN mg/dL  --  0 43   AMMONIA umol/L <10*  --      Results from last 7 days   Lab Units 04/04/22  0540 04/04/22  0108 04/03/22  2121 04/03/22  1758 04/03/22  1147 04/03/22  0549 04/03/22  0412 04/03/22  0133 04/02/22  2326 04/02/22 2006   POC GLUCOSE mg/dl 275* 316* 223* 225* 240* 250* 298* 472* 468* 478*     Results from last 7 days   Lab Units 04/04/22  0451 04/03/22  0759 04/02/22 2021   GLUCOSE RANDOM mg/dL 264* 181* 565*         Results from last 7 days   Lab Units 04/03/22  0140   HEMOGLOBIN A1C % 9 1*   EAG mg/dl 214     BETA-HYDROXYBUTYRATE   Date Value Ref Range Status   04/02/2022 0 1 <0 6 mmol/L Final      Results from last 7 days   Lab Units 04/03/22  1518 04/03/22  0831   PH ART  7 349* 7 359   PCO2 ART mm Hg 50 6* 53 7*   PO2 ART mm Hg 69 6* 140 7*   HCO3 ART mmol/L 27 2 29 6*   BASE EXC ART mmol/L 0 9 3 2   O2 CONTENT ART mL/dL 17 7 15 7*   O2 HGB, ARTERIAL % 92 4* 98 1*   ABG SOURCE  Radial, Left Radial, Left     Results from last 7 days   Lab Units 04/02/22 2021   PH SHAVON  7 340   PCO2 SHAVON mm Hg 53 8*   PO2 SHAVON mm Hg 40 9   HCO3 SHAVON mmol/L 28 4   BASE EXC SHAVON mmol/L 1 7   O2 CONTENT SHAVON ml/dL 11 2   O2 HGB, VENOUS % 64 5             Results from last 7 days   Lab Units 04/03/22  1047 04/03/22  0759 04/03/22  0556 04/03/22  0146 04/02/22  2235 04/02/22 2021   HS TNI 0HR ng/L  --   --  19  --   --  20   HS TNI 2HR ng/L  --  18  --   --  21  --    HSTNI D2 ng/L  --  -1  --   --  1  --    HS TNI 4HR ng/L 18  --   --  18  --   --    HSTNI D4 ng/L -1  --   --  -2  --   --          Results from last 7 days   Lab Units 04/02/22 2021   PROTIME seconds 14 4   INR  1 18   PTT seconds 34     Results from last 7 days   Lab Units 04/03/22  0759   TSH 3RD GENERATON uIU/mL 1 240     Results from last 7 days   Lab Units 04/04/22  0451 04/03/22  0759 04/03/22  0140 04/02/22 2021   PROCALCITONIN ng/ml 0 20 0 26* 0 23 0 26*     Results from last 7 days   Lab Units 04/02/22 2021   LACTIC ACID mmol/L 2 0             Results from last 7 days   Lab Units 04/02/22 2021   NT-PRO BNP pg/mL 658*         Results from last 7 days   Lab Units 04/02/22 2115   CLARITY UA  Clear   COLOR UA  Yellow   SPEC GRAV UA  1 010   PH UA  7 5   GLUCOSE UA mg/dl >=1000 (1%)*   KETONES UA mg/dl Negative   BLOOD UA  Trace-Intact*   PROTEIN UA mg/dl 30 (1+)*   NITRITE UA  Negative   BILIRUBIN UA  Negative   UROBILINOGEN UA E U /dl 0 2   LEUKOCYTES UA  Trace*   WBC UA /hpf 2-4   RBC UA /hpf 2-4   BACTERIA UA /hpf Occasional   EPITHELIAL CELLS WET PREP /hpf Occasional     Results from last 7 days   Lab Units 04/03/22  0138 04/02/22 2021   STREP PNEUMONIAE ANTIGEN, URINE  Negative  --    LEGIONELLA URINARY ANTIGEN  Negative  --    INFLUENZA A PCR   --  Negative   INFLUENZA B PCR   --  Negative   RSV PCR   --  Negative         Results from last 7 days   Lab Units 04/02/22 2114   AMPH/METH  Negative   BARBITURATE UR  Negative   BENZODIAZEPINE UR  Positive*   COCAINE UR  Negative   METHADONE URINE  Negative   OPIATE UR  Negative   PCP UR  Negative   THC UR  Negative     Results from last 7 days   Lab Units 04/02/22 2021   ETHANOL LVL mg/dL <3   ACETAMINOPHEN LVL ug/mL <2 5*   SALICYLATE LVL mg/dL <3*       Results from last 7 days   Lab Units 04/02/22 2021   BLOOD CULTURE  Received in Microbiology Lab  Culture in Progress  Received in Microbiology Lab  Culture in Progress       ED Treatment:   Medication Administration from 04/02/2022 1956 to 04/03/2022 0144       Date/Time Order Dose Route Action     04/02/2022 2026 OLANZapine (ZyPREXA) IM injection 10 mg 10 mg Intramuscular Given     04/02/2022 2026 LORazepam (ATIVAN) injection 1 mg 1 mg Intravenous Given     04/02/2022 2151 sodium chloride 0 9 % bolus 1,000 mL 1,000 mL Intravenous New Bag     04/02/2022 2151 insulin regular (HumuLIN R,NovoLIN R) injection 15 Units 15 Units Subcutaneous Given     04/03/2022 0032 cefTRIAXone (ROCEPHIN) IVPB (premix in dextrose) 2,000 mg 50 mL 2,000 mg Intravenous New Bag     04/03/2022 0015 azithromycin (ZITHROMAX) 500 mg in sodium chloride 0 9% 250mL IVPB 500 mg 500 mg Intravenous New Bag     04/03/2022 0053 insulin regular (HumuLIN R,NovoLIN R) injection 15 Units 15 Units Subcutaneous Given        Past Medical History:   Diagnosis Date    Cataract     CH[facility]congestive heart failure) [facility])     chronic diastolic CHF    Coronary artery disease     Diabetes mellitus ([place] [address] )     Glaucoma     Hyperlipidemia     Hypertension     Neuropathy     Obesity     Renal disorder     chronic kidney disease stage 3     Sleep apnea     Stroke ([place] [address] )     TIA (transient ischemic attack)     Uncontrolled type 2 diabetes mellitus with hyperglycemia, with long-term current use of insulin ([place] [address] ) 10/4/2018     Admitting Diagnosis: Diabetes mellitus ([facility] ) [E11 9]  Hyperglycemia [R73 9]  Acute encephalopathy [G93 40]  Chronic, continuous use of opioids [F11 90]  Hyperglycemia due to type 2 diabetes mellitus ([facility] ) [E11 65]  Age/Sex: 76 y o  male  Admission Orders:  NIHSS every 24 hour  Neuro checkS: Every 1 hour x 4 hours, then every 2 hours x 8 hours, then every 4 hours x 72 hours  UP and OOB  SCDS  MRI  MRSA  NPO  Scheduled Medications:  amLODIPine, 10 mg, Oral, Daily  aspirin, 300 mg, Rectal, Daily  atorvastatin, 40 mg, Oral, QPM  carvedilol, 12 5 mg, Oral, BID  cefTRIAXone, 1,000 mg, Intravenous, Q24H  cholecalciferol, 2,000 Units, Oral, Daily  docusate sodium, 100 mg, Oral, BID  finasteride, 5 mg, Oral, Daily  heparin (porcine), 5,000 Units, Subcutaneous, Q8H [facility]  insulin lispro, 2-12 Units, Subcutaneous, Q6H  insulin regular, 8 Units, Intravenous, Once   nystatin, , Topical, TID  tamsulosin, 0 4 mg, Oral, QPM  vancomycin, 10 mg/kg (Adjusted), Intravenous, Q12H      Continuous IV Infusions:     PRN Meds:  acetaminophen, 650 mg, Oral, Q6H PRN  albuterol, 2 5 mg, Nebulization, Q4H PRN  OLANZapine, 5 mg, Intramuscular, Q8H PRN  ondansetron, 4 mg, Intravenous, Q6H PRN        IP CONSULT TO PHARMACY  IP CONSULT TO NEUROLOGY  IP CONSULT TO CASE MANAGEMENT  IP CONSULT TO NUTRITION SERVICES  IP CONSULT TO PODIATRY  IP CONSULT TO PULMONOLOGY    Network Utilization Review Department  ATTENTION: Please call with any questions or concerns to [phone] and carefully listen to the prompts so that you are directed to the right person  All voicemails are confidential   Wyatt Chan all requests for admission clinical reviews, approved or denied determinations and any other requests to dedicated fax number below belonging to the campus where the patient is receiving treatment   List of dedicated fax numbers for the Facilities:  [address] DENIALS (Administrative/Medical Necessity) [phone]   [address] (Maternity/NICU/Pediatrics) [phone]   [address] [place] [address] [address] [place] [address]  [address] [facility] [place] [address] [address] [place] [address] Abran [address] [facility] [facility] [phone]

## 2022-04-04 NOTE — ASSESSMENT & PLAN NOTE
Wt Readings from Last 3 Encounters:   04/04/22 133 kg (293 lb)   04/03/22 133 kg (293 lb 3 4 oz)   04/12/21 133 kg (292 lb 15 9 oz)     Does not appear to be in acute exacerbation  Echo done in March of 2021 reported preserved ejection fraction grade 2 diastolic dysfunction  Continue carvedilol amlodipine

## 2022-04-04 NOTE — ASSESSMENT & PLAN NOTE
Due to hypoxia and hypercapnia patient needed to be placed on BiPAP, took it off himself last night but tolerated much better this AM  Patient needs to continue to wear at night

## 2022-04-04 NOTE — ASSESSMENT & PLAN NOTE
Lab Results   Component Value Date    EGFR 46 04/04/2022    EGFR 44 04/03/2022    EGFR 36 04/02/2022    CREATININE 1 43 (H) 04/04/2022    CREATININE 1 48 (H) 04/03/2022    CREATININE 1 73 (H) 04/02/2022     Avoid nephrotoxic drugs and hypotension  Continue to monitor creatinine  Gentle overnight IV hydration

## 2022-04-04 NOTE — PHYSICAL THERAPY NOTE
Physical Therapy Evaluation    Patient Name: Zenaida Che    MCAQD'W Date: 4/4/2022     Problem List  Principal Problem:    Acute metabolic encephalopathy  Active Problems:    CKD (chronic kidney disease) stage 3, GFR 30-59 ml/min ([facility])    Chronic diastolic CHF (congestive heart failure) ([facility])    Morbid obesity with BMI of 45 0-49 9, adult ([facility] )    Type 2 diabetes mellitus with hyperglycemia, with long-term current use of insulin ([facility])    Opiate dependence, continuous ([facility])    Obstructive sleep apnea on CPAP    Acute respiratory failure with hypoxia and hypercapnia ([facility])       Past Medical History  Past Medical History:   Diagnosis Date    Cataract     CHF (congestive heart failure) ([facility])     chronic diastolic CHF    Coronary artery disease     Diabetes mellitus ([place] [address] )     Glaucoma     Hyperlipidemia     Hypertension     Neuropathy     Obesity     Renal disorder     chronic kidney disease stage 3     Sleep apnea     Stroke ([place] [address] )     TIA (transient ischemic attack)     Uncontrolled type 2 diabetes mellitus with hyperglycemia, with long-term current use of insulin ([place] [address] ) 10/4/2018        Past Surgical History  Past Surgical History:   Procedure Laterality Date    APPENDECTOMY      CARPAL TUNNEL RELEASE      EYE SURGERY      cataracts           04/04/22 1411   PT Last Visit   PT Visit Date 04/04/22   Note Type   Note type Evaluation   Pain Assessment   Pain Assessment Tool FLACC   Pain Rating: FLACC (Rest) - Face 0   Pain Rating: FLACC (Rest) - Legs 0   Pain Rating: FLACC (Rest) - Activity 0   Pain Rating: FLACC (Rest) - Cry 0   Pain Rating: FLACC (Rest) - Consolability 0   Score: FLACC (Rest) 0   Pain Rating: FLACC (Activity) - Face 1   Pain Rating: FLACC (Activity) - Legs 0   Pain Rating: FLACC (Activity) - Activity 1   Pain Rating: FLACC (Activity) - Cry 1   Pain Rating: FLACC (Activity) - Consolability 1   Score: FLACC (Activity) 4 Restrictions/Precautions   Weight Bearing Precautions Per Order No   Other Precautions Pain; Fall Risk;O2;Telemetry;Multiple lines   Home Living   Type of [address] Two level;Performs ADLs on one level; Able to live on main level with bedroom/bathroom  (0 KYLE)   Bathroom Shower/Tub   (pt sponge bathes)   Bathroom Toilet   (pt uses BSC on 1st floor)   [address] Accessibility Not accessible   Home Equipment Walker;Cane;Hospital bed   Additional Comments pt denies use of DME at baseline   Prior Function   Level of [place] Needs assistance with IADLs; Needs assistance with ADLs and functional mobility   Lives With Family   Receives Help From Family   ADL Assistance Needs assistance   IADLs Needs assistance   Falls in the last 6 months   (pt states none; questionable historian)   Vocational Retired   Comments (-) driving   General   Family/Caregiver Present No   Cognition   Overall Cognitive Status Impaired   Arousal/Participation Responsive   Orientation Level Oriented to person;Oriented to place; Disoriented to time;Disoriented to situation   Following Commands Follows one step commands with increased time or repetition   Subjective   Subjective "My old house got condemned  Now there's 6 of us in my grandson's house"   RLE Assessment   RLE Assessment X  (3+/5 grossly)   LLE Assessment   LLE Assessment X  (3+/5 grossly)   Bed Mobility   Supine to Sit 2  Maximal assistance   Additional items Assist x 2; Increased time required;Verbal cues;LE management   Sit to Supine 2  Maximal assistance   Additional items Assist x 2; Increased time required;Verbal cues;LE management   Transfers   Sit to Stand 2  Maximal assistance   Additional items Assist x 2; Increased time required;Verbal cues   Stand to Sit 2  Maximal assistance   Additional items Assist x 2; Increased time required;Verbal cues   Additional Comments RW used   Ambulation/Elevation   Gait pattern Not appropriate; Not tested Balance   Static Sitting Fair -   Dynamic Sitting Poor +   Static Standing Poor   Endurance Deficit   Endurance Deficit Yes   Endurance Deficit Description pt easily fatigued with minimal exertion   Activity Tolerance   Activity Tolerance Patient limited by fatigue;Patient limited by pain   Assessment   Prognosis Guarded   Problem List Decreased strength;Decreased endurance; Impaired balance;Decreased mobility; Decreased cognition; Impaired judgement;Decreased safety awareness;Pain;Obesity   Assessment Patient is a 76 y o  male evaluated by Physical Therapy s/p admit to [address] [place] [address] on 4/2/2022 with admitting diagnosis of: Diabetes mellitus, Hyperglycemia, Acute encephalopathy, Chronic, continuous use of opioids, Hyperglycemia due to type 2 diabetes mellitus, and principal problem of: Acute metabolic encephalopathy  PT was consulted to assess patient's functional mobility and discharge needs  Ordered are PT Evaluation and treatment with activity level of: up and OOB as tolerated  Comorbidities affecting patient's physical performance at time of assessment include: HLD, HTN, hx of stroke, CHF, DM, CAD, neuropathy  Personal factors affecting the patient at time of IE include: lives in 2 story home, ambulating with assistive device, inability to navigate community distances, impaired cognition, impaired safety awareness, decreased initiation and engagement, questionable non-compliance, limited insight into impairments, inability/difficulty performing IADLs and inability/difficulty performing ADLs  Please locate objective findings from PT assessment regarding body systems outlined above  Upon evaluation, pt requiring mod-maxA x 2, RW, and increased time to perform all functional mobility  Frequent verbal cuing provided for safety awareness, sequencing, and hand placement  Pt with moderate postural instability seated EOB, however pt motivated to attempt pivot transfer to chair   Pt able to stand at [facility] with maxA x 2 but not able to maintain upright posture for greater than 20 seconds  D/t this, pivot not attempted and pt assisted back to supine; repositioned for safety and comfort  RN Duran Millan present during evaluation to assist with lexus hygiene as pt was incontinent of bowel during session  The patient's AM-PAC Basic Mobility Inpatient Short Form Raw Score is 6  A Raw score of less than or equal to 16 suggests the patient may benefit from discharge to post-acute rehabilitation services  Please also refer to the recommendation of the Physical Therapist for safe discharge planning  Co treatment with OT secondary to complex medical condition of pt, possible A of 2 required to achieve and maintain transitional movements, requiring the need of skilled therapeutic intervention of 2 therapists to achieve delivery of services  Pt will benefit from continued PT intervention during LOS to address current deficits, increase LOF, and facilitate safe d/c to next level of care when medically appropriate  D/c recommendation at this time is post-acute rehab  Goals   Patient Goals to sit in the chair   LTG Expiration Date 04/18/22   Long Term Goal #1 Pt will participate in B LE strengthening exercises to facilitate improved functional activity tolerance  Pt will perform all functional transfers and bed mobility with SUP and good safety awareness  Pt will ambulate 76' with SUP and RW while maintaining good functional dynamic balance  Plan   Treatment/Interventions Functional transfer training;LE strengthening/ROM; Therapeutic exercise; Endurance training;Bed mobility;Gait training   PT Frequency 3-5x/wk   Recommendation   PT Discharge Recommendation Post acute rehabilitation services   AM-PAC Basic Mobility Inpatient   Turning in Bed Without Bedrails 1   Lying on Back to Sitting on Edge of Flat Bed 1   Moving Bed to Chair 1   Standing Up From Chair 1   Walk in Room 1   Climb 3-5 Stairs 1   Basic Mobility Inpatient Raw Score 6   Turning Head Towards Sound 3   Follow Simple Instructions 3   Low Function Basic Mobility Raw Score 12   Low Function Basic Mobility Standardized Score 18 33   Highest Level Of Mobility   -[facility] Goal 2: Bed activities/Dependent transfer   -[facility] Highest Level of Mobility 3: Sit at edge of bed   -[facility] Goal Achieved Yes   End of Consult   Patient Position at End of Consult Supine; All needs within reach

## 2022-04-04 NOTE — CONSULTS
The patients Vancomycin therapy has been completed / discontinued  Thank you for this consult; Pharmacy will sign-off now

## 2022-04-04 NOTE — OCCUPATIONAL THERAPY NOTE
Occupational Therapy Evaluation     Patient Name: Nancy Anderson  Today's Date: 4/4/2022  Problem List  Principal Problem:    Acute metabolic encephalopathy  Active Problems:    CKD (chronic kidney disease) stage 3, GFR 30-59 ml/min ([facility])    Chronic diastolic CHF (congestive heart failure) ([facility])    Morbid obesity with BMI of 45 0-49 9, adult ([facility] )    Type 2 diabetes mellitus with hyperglycemia, with long-term current use of insulin ([facility])    Opiate dependence, continuous ([facility])    Obstructive sleep apnea on CPAP    Acute respiratory failure with hypoxia and hypercapnia ([facility])    Past Medical History  Past Medical History:   Diagnosis Date    Cataract     CHF (congestive heart failure) ([facility])     chronic diastolic CHF    Coronary artery disease     Diabetes mellitus ([facility] )     Glaucoma     Hyperlipidemia     Hypertension     Neuropathy     Obesity     Renal disorder     chronic kidney disease stage 3     Sleep apnea     Stroke ([facility] )     TIA (transient ischemic attack)     Uncontrolled type 2 diabetes mellitus with hyperglycemia, with long-term current use of insulin ([facility] ) 10/4/2018     Past Surgical History  Past Surgical History:   Procedure Laterality Date    APPENDECTOMY      CARPAL TUNNEL RELEASE      EYE SURGERY      cataracts             04/04/22 1410   OT Last Visit   OT Visit Date 04/04/22   Note Type   Note type Evaluation   Restrictions/Precautions   Weight Bearing Precautions Per Order No   Other Precautions Bed Alarm;Multiple lines;Telemetry;O2;Fall Risk;Pain   Pain Assessment   Pain Assessment Tool FLACC   Pain Rating: FLACC (Rest) - Face 1   Pain Rating: FLACC (Rest) - Legs 1   Pain Rating: FLACC (Rest) - Activity 1   Pain Rating: FLACC (Rest) - Cry 1   Pain Rating: FLACC (Rest) - Consolability 1   Score: FLACC (Rest) 5   Pain Rating: FLACC (Activity) - Face 1   Pain Rating: FLACC (Activity) - Legs 1   Pain Rating: FLACC (Activity) - Activity 1   Pain Rating: FLACC (Activity) - Cry 1 Pain Rating: FLACC (Activity) - Consolability 1   Score: FLACC (Activity) 5   Home Living   Type of Home House   Home Layout Two level;Performs ADLs on one level; Able to live on main level with bedroom/bathroom;Stairs to enter without rails; Other (Comment)  (1 KYLE)   Bathroom Shower/Tub   (sponge bathes)   Bathroom Toilet Standard   [address] Walker;Cane;Grab bars; Hospital bed   Additional Comments pt reports use of no device at baseline during mobility   Prior Function   Level of [place] Needs assistance with ADLs and functional mobility; Needs assistance with IADLs   Lives With Family   Receives Help From Family   ADL Assistance Needs assistance   IADLs Needs assistance   Falls in the last 6 months   (pt reports no falls; questionable historian)   Vocational Retired   Comments pt does not drive   Psychosocial   Psychosocial (WDL) X   Patient Behaviors/Mood Flat affect   Subjective   Subjective "where's that wheelchair"   ADL   Where Assessed   (EOB and supine in bed)   LB Dressing Assistance 2  Maximal Assistance   LB Dressing Deficit Don/doff R sock; Don/doff L sock   Toileting Assistance  2  Maximal Assistance   Toileting Deficit Clothing management up;Clothing management down;Perineal hygiene; Increased time to complete;Verbal cueing   Additional Comments pt with inability to perform any self-care tasks for himself at this time; pt with very poor insight to functional deficits and incontinent of bowel movement during session with max (A) for cleanliness and lexus hygiene   Bed Mobility   Supine to Sit 2  Maximal assistance   Additional items Assist x 2;Bedrails; Increased time required;Verbal cues;LE management   Sit to Supine 2  Maximal assistance   Additional items Assist x 2; Increased time required;Verbal cues;LE management   Additional Comments pt on RA initially and SpO2 ranged 83-85%; pt agreeable to 2L O2 and with for the remainder of session; pt's SpO2 ranged WFL throughout session with 2L; pt initially requires mod-max (A) for sitting balance and then (S)-min with minor posterior lean during sitting balance; pt seated EOB for ~5 minutes prior to attempting to stand    Transfers   Sit to Stand 2  Maximal assistance   Additional items Assist x 2; Increased time required;Verbal cues  (RW)   Stand to Sit 2  Maximal assistance   Additional items Assist x 2; Increased time required;Verbal cues  (RW)   Additional Comments pt performed functional transfers with max (A) x2 for two attempts of transfers; pt with less than 10 seconds standing tolerance and unable to perform on second attempt with max (A) x2 and RW (A)   Functional Mobility   Additional Comments pt not appropriate to perform functional mobility at this time   Balance   Static Sitting Fair -   Dynamic Sitting Fair -   Static Standing Poor +   Dynamic Standing Poor   Activity Tolerance   Activity Tolerance Patient limited by fatigue;Patient limited by pain   RUE Assessment   RUE Assessment X  (3+/5 grossly; WFL AROM)   LUE Assessment   LUE Assessment X  (3+/5 grossly; WFL AROM)   Hand Function   Gross Motor Coordination Functional   Fine Motor Coordination Functional   Sensation   Light Touch No apparent deficits   Sharp/Dull No apparent deficits   Cognition   Overall Cognitive Status Impaired   Arousal/Participation Alert; Cooperative   Attention Difficulty attending to directions   Orientation Level Oriented to person;Oriented to place; Disoriented to time;Disoriented to situation   Memory Decreased long term memory;Decreased short term memory   Following Commands Follows one step commands without difficulty   Assessment   Limitation Decreased ADL status; Decreased UE strength;Decreased Safe judgement during ADL;Decreased cognition;Decreased endurance;Decreased self-care trans;Decreased high-level ADLs   Assessment Pt is a 76 y o  male seen for OT evaluation s/p admit to [facility] on 4/2/2022 w/ Acute metabolic encephalopathy  Comorbidities affecting pt's functional performance at time of assessment include: hyperlipidemia, HTN, CVA, renal disorder, CHF, obesity, TIA, neuropathy, DM  Personal factors affecting pt at time of IE include:steps to enter environment, limited home support, behavioral pattern, difficulty performing ADLS, difficulty performing IADLS , limited insight into deficits, compliance, flat affect, decreased initiation and engagement  and health management   Prior to admission, pt was (A) with ADLs and IADLs with use of no device during mobility (per pt, question reliability)  Upon evaluation: Pt requires mod-max (A) x2 with inability to perform functional transfers this date 2* the following deficits impacting occupational performance: weakness, decreased strength, decreased balance, decreased tolerance, impaired [address], impaired [address] [place], impaired initiation, impaired memory, impaired sequencing, impaired problem solving, decreased safety awareness and impaired interpersonal skills  Pt to benefit from continued skilled OT tx while in the hospital to address deficits as defined above and maximize level of functional independence w ADL's and functional mobility  Occupational Performance areas to address include: grooming, bathing/shower, toilet hygiene, dressing, functional mobility, community mobility and clothing management  The patient's raw score on the AM-PAC Daily Activity inpatient short form is 10, standardized score is 18, less than 39 4  Patients at this level are likely to benefit from discharge to post-acute rehabilitation services  Please refer to the recommendation of the Occupational Therapist for safe discharge planning  Co treatment with PT secondary to complex medical condition of pt, possible A of 2 required to achieve and maintain transitional movements, requiring the need of skilled therapeutic intervention of 2 therapists to achieve delivery of services     Goals   Patient Goals to get in the w/c   Short Term Goal  pt will perform bed mobility with mod (A) x2 level    Long Term Goal #1 pt will perform functional transfers with mod (A) x2 level and use of RW   Long Term Goal #2 pt will demonstrate UB bathing and grooming tasks at mod (A) level with improved sitting balance to fair +   Long Term Goal pt will perform UE strengthening exercises    Plan   Treatment Interventions ADL retraining;Functional transfer training;UE strengthening/ROM; Endurance training;Cognitive reorientation;Patient/family training;Equipment evaluation/education; Activityengagement   Goal Expiration Date 04/18/22   OT Frequency 3-5x/wk   Recommendation   OT Discharge Recommendation Post acute rehabilitation services   AM-PAC Daily Activity Inpatient   Lower Body Dressing 1   Bathing 1   Toileting 1   Upper Body Dressing 2   Grooming 2   Eating 3   Daily Activity Raw Score 10   Turning Head Towards Sound 4   Follow Simple Instructions 4   Low Function Daily Activity Raw Score 18   Low Function Daily Activity Standardized Score 30 17   AM-PAC Applied Cognition Inpatient   Following a Speech/Presentation 3   Understanding Ordinary Conversation 3   Taking Medications 2   Remembering Where Things Are Placed or Put Away 2   Remembering List of 4-5 Errands 2   Taking Care of Complicated Tasks 2   Applied Cognition Raw Score 14   Applied Cognition Standardized Score 32 02     Pt will benefit from continued OT services in order to maximize (I) c ADL performance, FM c RW, and improve overall endurance/strength required to complete functional tasks in preparation for d/c  Pt left supine in bed at end of session; all needs within reach; all lines intact

## 2022-04-04 NOTE — CASE MANAGEMENT
Case Management Assessment & Discharge Planning Note    Patient name Constance Weiss  Location /105-65 MRN [id number]  : 1945 Date 2022       Current Admission Date: 2022  Current Admission Diagnosis:Acute metabolic encephalopathy   Patient Active Problem List    Diagnosis Date Noted    Acute metabolic encephalopathy     Hypoxia 2022    Acute respiratory failure with hypoxia and hypercapnia ([facility] ) 2022    Constipation 2021    Obstructive sleep apnea on CPAP 04/10/2021    Inguinal lymphadenopathy 2021    Bilateral pleural effusion 2021    Atelectasis 2021    Acute on chronic diastolic CH[facility]congestive [facility] failure) [facility] 2021    Multiple renal cysts 2021    Renal calculus 2021    Venous stasis dermatitis of both lower extremities 2021    Lower extremity weakness 2021    Cigarette nicotine dependence in remission 2021    Pyuria 03/10/2021    Microscopic hematuria 03/10/2021    Multiple pulmonary nodules 03/10/2021    Gait instability 03/10/2021    Abnormal EKG 03/10/2021    Elevated d-dimer 2021    Elevated parathyroid hormone 2021    Accelerated hypertension 2021    Hypercalcemia 2021    Open wound of right lower extremity 2021    Cellulitis of right lower extremity 2021    Elevated alkaline phosphatase level 2021    Elevated TSH 2019    Pressure injury of skin of right buttock 2019    Ambulatory dysfunction 2019    UTI (urinary tract infection) 2019    Neural foraminal stenosis of cervical spine 2019    Acute pain of right lower extremity 2019    Paresthesia of left upper extremity 2019    Hypoglycemia 2019    Acute cystitis 2019    Opiate dependence, continuous ([facility] ) 2019    Uncontrolled type 2 diabetes mellitus with hyperglycemia, with long-term current use of insulin ([place] [address] ) 10/04/2018    Acute kidney injury ([place] [address] ) 10/04/2018    Dehydration with hyponatremia 10/04/2018    Type 2 diabetes mellitus with hyperglycemia, with long-term current use of insulin ([place] [address] ) 38/60/7743    Complicated UTI (urinary tract infection) 10/03/2018    Dyspnea on exertion 12/27/2016    Type 2 diabetes mellitus with hypoglycemia without coma, with long-term current use of insulin ([place] [address] ) 12/27/2016    CKD (chronic kidney disease) stage 3, GFR 30-59 ml/min ([facility]) 12/27/2016    Chronic diastolic CHF (congestive heart failure) ([place] [address] ) 12/27/2016    Morbid obesity with BMI of 45 0-49 9, adult ([place] [address] ) 12/27/2016    Essential hypertension 12/27/2016      LOS (days): 1  Geometric Mean LOS (GMLOS) (days): 3 60  Days to GMLOS:2     OBJECTIVE:    Risk of Unplanned Readmission Score: 30         Current admission status: Inpatient       Preferred Pharmacy:   [place] [facility], [place] - [address] [facility], [address] [facility], [address]  [address] [address]  Phone: [phone] Fax: [phone] [address]  [address] [place] [address]  [address]  Phone: [phone] Fax: [phone]    Primary Care Provider: Cesar Kiran MD    Primary Insurance: [facility]  Secondary Insurance:     ASSESSMENT:  Erendira Merrill Proxies    There are no active Health Care Proxies on file  Readmission Root Cause  30 Day Readmission: No    Patient Information  Admitted from[de-identified] Home  Mental Status: Confused  During Assessment patient was accompanied by: Not accompanied during assessment  Assessment information provided by[de-identified] Patient  Primary Caregiver: Self  Support Systems: [facility] of Residence: [facility]  do you live in?: [facility]    Type of Current Residence: 2 Vest home  Upon entering residence, is there a bedroom on the main floor (no further steps)?: Yes  Upon entering residence, is there a bathroom on the main floor (no further steps)?: Yes  In the last 12 months, was there a time when you were not able to pay the mortgage or rent on time?: No  In the last 12 months, how many places have you lived?: 1  In the last 12 months, was there a time when you did not have a steady place to sleep or slept in a shelter (including now)?: No  Homeless/housing insecurity resource given?: N/A  Living Arrangements: Lives w/ Extended Family (Pt lives with his wife , son and grandson )  Is patient a ?: No    Activities of Daily Living Prior to Admission  Functional Status: Assistance  Completes ADLs independently?: Yes  Ambulates independently?: Yes  Does patient use assisted devices?: Yes  Assisted Devices (DME) used: Hospital Bed,Electric wheelchair,CPAP,Walker,Cardeeo  DME Company Name (respiratory supplies):  [employer]  Does patient currently own DME?: Yes  What DME does the patient currently own?: Bedside Commode,CPAP,Electric Wheelchair,Hospital Bed,Straight Cane,Walker  Does patient have a history of Outpatient Therapy (PT/OT)?: No  Does the patient have a history of Short-Term Rehab?: No  Does patient have a history of HHC?: Yes (Pt has history of [facility] )  Does patient currently have [facility]?: No         Patient Information Continued  Income Source: Pension/FDC  Does patient have prescription coverage?: Yes  Within the past 12 months, you worried that your food would run out before you got the money to buy more : Never true  Within the past 12 months, the food you bought just didnt last and you didnt have money to get more : Never true  Food insecurity resource given?: N/A  Does patient receive dialysis treatments?: No  Does patient have a history of substance abuse?: No  Does patient have a history of Mental Health Diagnosis?: No         Means of Transportation  Means of Transport to Appts[de-identified] Family transport (Wife drives the patient to appointments )  In the past 12 months, has lack of transportation kept you from medical appointments or from getting medications?: No  In the past 12 months, has lack of transportation kept you from meetings, work, or from getting things needed for daily living?: No  Was application for public transport provided?: N/A        DISCHARGE DETAILS:    Discharge planning discussed with[de-identified] Pt and wife  Freedom of Choice: Yes     CM contacted family/caregiver?: Yes (I spoke with patients wife )  Were Treatment Team discharge recommendations reviewed with patient/caregiver?: Yes           Currently PT is recommending that patient should go to go to [facility]  Currently patient is refusing rehab  Pt is confused to time and place

## 2022-04-04 NOTE — PLAN OF CARE
Problem: OCCUPATIONAL THERAPY ADULT  Goal: Performs self-care activities at highest level of function for planned discharge setting  See evaluation for individualized goals  Description: Treatment Interventions: ADL retraining,Functional transfer training,UE strengthening/ROM,Endurance training,Cognitive reorientation,Patient/family training,Equipment evaluation/education,Activityengagement          See flowsheet documentation for full assessment, interventions and recommendations  Note: Limitation: Decreased ADL status,Decreased UE strength,Decreased Safe judgement during ADL,Decreased cognition,Decreased endurance,Decreased self-care trans,Decreased high-level ADLs     Assessment: Pt is a 76 y o  male seen for OT evaluation s/p admit to [facility] on 4/1/8202 w/ Acute metabolic encephalopathy  Comorbidities affecting pt's functional performance at time of assessment include: hyperlipidemia, HTN, CVA, renal disorder, CHF, obesity, TIA, neuropathy, DM  Personal factors affecting pt at time of IE include:steps to enter environment, limited home support, behavioral pattern, difficulty performing ADLS, difficulty performing IADLS , limited insight into deficits, compliance, flat affect, decreased initiation and engagement  and health management   Prior to admission, pt was (A) with ADLs and IADLs with use of no device during mobility (per pt, question reliability)  Upon evaluation: Pt requires mod-max (A) x2 with inability to perform functional transfers this date 2* the following deficits impacting occupational performance: weakness, decreased strength, decreased balance, decreased tolerance, impaired [address], impaired [address], impaired initiation, impaired memory, impaired sequencing, impaired problem solving, decreased safety awareness and impaired interpersonal skills   Pt to benefit from continued skilled OT tx while in the hospital to address deficits as defined above and maximize level of functional independence w ADL's and functional mobility  Occupational Performance areas to address include: grooming, bathing/shower, toilet hygiene, dressing, functional mobility, community mobility and clothing management  The patient's raw score on the AM-PAC Daily Activity inpatient short form is 10, standardized score is 18, less than 39 4  Patients at this level are likely to benefit from discharge to post-acute rehabilitation services  Please refer to the recommendation of the Occupational Therapist for safe discharge planning  Co treatment with PT secondary to complex medical condition of pt, possible A of 2 required to achieve and maintain transitional movements, requiring the need of skilled therapeutic intervention of 2 therapists to achieve delivery of services       OT Discharge Recommendation: Post acute rehabilitation services

## 2022-04-04 NOTE — CONSULTS
Consultation - Pulmonary Medicine   Latia Ramachandran 76 y o  male MRN: [id number]  Unit/Bed#:  Encounter: [id number]      Assessment/Plan:    1  Acute suspected chronic hypoxic and hypercapnic respiratory failure  1  Currently requiring 5 L nasal cannula  No oxygen requirement at baseline  Titrate oxygen to maintain SpO2 greater equal to 88%  2  Recommend BiPAP q h s  and P r n   3  Pulmonary toilet:  Increase activity as tolerated, out of bed as tolerated, cough and deep breathing as encouraged, incentive spirometry q 1 hour  2  Abnormal CT chest secondary to bibasilar atelectasis left greater than right  1  Compared to previous imaging, findings are stable   2  Would monitor off ABX from a pulmonary perspective as patient denies s/s of PNA   3  Trend WBC, PCT, temps   4  Pulmonary toilet as above   3  Morbid obesity  1  Weight loss encouraged   4  BHARGAVI  1  Noncompliant on home CPAP   2  Continue BiPAP qhs and prn   5  Nicotine dependence in remission  1  Remains committed to nicotine dependence    History of Present Illness   Physician Requesting Consult: Flordia Harada, MD  Reason for Consult / Principal Problem:  Acute hypoxic and hypercapnic respiratory failure  Hx and PE limited by: na  Chief Complaint:  Altered mental status  HPI: Latia Ramachandran is a 76 y o   male who presented to [address] [facility] with complaints of altered mental status according to family  He has past medical history positive for severe BHARGAVI, morbid obesity, poorly controlled diabetes, diastolic CHF, pulmonary hypertension, CKD stage 3, chronic opiate dependence who presented with altered mental status yesterday  Pulmonary was consulted given ABG findings concerning for chronic hypercapnic respiratory failure  Unfortunately, patient has been refusing BiPAP  Currently seen on 4 L nasal cannula during my evaluation  Patient is alert and oriented x3    He denies shortness breath, cough, sputum production or hemoptysis  Denies chest pain or palpitations  Denies fevers or sick contacts  Per nursing, patient was very agitated last night requiring Zyprexa and soft wrist restraints  Inpatient consult to Pulmonology  Consult performed by: Norma Salas PA-C  Consult ordered by: Niki Severin, DO          Review of Systems   All other systems reviewed and are negative        Historical Information   Past Medical History:   Diagnosis Date    Cataract     CHF (congestive heart failure) (HCC)     chronic diastolic CHF    Coronary artery disease     Diabetes mellitus ([facility] )     Glaucoma     Hyperlipidemia     Hypertension     Neuropathy     Obesity     Renal disorder     chronic kidney disease stage 3     Sleep apnea     Stroke ([facility] )     TIA (transient ischemic attack)     Uncontrolled type 2 diabetes mellitus with hyperglycemia, with long-term current use of insulin ([facility] ) 10/4/2018     Past Surgical History:   Procedure Laterality Date    APPENDECTOMY      CARPAL TUNNEL RELEASE      EYE SURGERY      cataracts     Social History   Social History     Substance and Sexual Activity   Alcohol Use Never     Social History     Substance and Sexual Activity   Drug Use No     Social History     Tobacco Use   Smoking Status Former Smoker    Types: Cigars    Quit date: 2020    Years since quittin 2   Smokeless Tobacco Former User     E-Cigarette/Vaping    E-Cigarette Use Never User      E-Cigarette/Vaping Substances    Nicotine No     THC No     CBD No     Flavoring No     Other No     Unknown No      Occupational History: noncontributory     Family History:   Family History   Problem Relation Age of Onset    No Known Problems Mother     No Known Problems Father        Meds/Allergies   all current active meds have been reviewed, pertinent pulmonary meds have been reviewed and current meds:   Current Facility-Administered Medications   Medication Dose Route Frequency    acetaminophen (TYLENOL) tablet 650 mg  650 mg Oral Q6H PRN    albuterol inhalation solution 2 5 mg  2 5 mg Nebulization Q4H PRN    amLODIPine (NORVASC) tablet 10 mg  10 mg Oral Daily    aspirin chewable tablet 81 mg  81 mg Oral Daily    atorvastatin (LIPITOR) tablet 40 mg  40 mg Oral QPM    carvedilol (COREG) tablet 12 5 mg  12 5 mg Oral BID    cholecalciferol (VITAMIN D3) tablet 2,000 Units  2,000 Units Oral Daily    docusate sodium (COLACE) capsule 100 mg  100 mg Oral BID    finasteride (PROSCAR) tablet 5 mg  5 mg Oral Daily    heparin (porcine) subcutaneous injection 5,000 Units  5,000 Units Subcutaneous Q8H [address]    insulin lispro (HumaLOG) 100 units/mL subcutaneous injection 2-12 Units  2-12 Units Subcutaneous Q6H    insulin regular (HumuLIN R,NovoLIN R) injection 8 Units  8 Units Intravenous Once    nystatin (MYCOSTATIN) powder   Topical TID    OLANZapine (ZyPREXA) IM injection 5 mg  5 mg Intramuscular Q8H PRN    ondansetron (ZOFRAN) injection 4 mg  4 mg Intravenous Q6H PRN    tamsulosin (FLOMAX) capsule 0 4 mg  0 4 mg Oral QPM       No Known Allergies    Objective   Vitals: Blood pressure 129/58, pulse 78, temperature 99 2 °F (37 3 °C), temperature source Temporal, resp  rate 20, height 5' 6" (1 676 m), weight 133 kg (293 lb), SpO2 98 %  5L NC,Body mass index is 47 29 kg/m²  Intake/Output Summary (Last 24 hours) at 4/4/2022 1308  Last data filed at 4/4/2022 1145  Gross per 24 hour   Intake 2128 75 ml   Output 1145 ml   Net 983 75 ml     Invasive Devices  Report    Peripheral Intravenous Line            Peripheral IV 04/03/22 Distal;Dorsal (posterior); Left Forearm 1 day    Peripheral IV 04/03/22 Right;Upper;Ventral (anterior) Arm 1 day          Drain            Urethral Catheter Non-latex 16 Fr  1 day                Physical Exam  Vitals and nursing note reviewed  Constitutional:       General: He is not in acute distress  Appearance: Normal appearance  HENT:      Head: Normocephalic and atraumatic        Right Ear: External ear normal       Left Ear: External ear normal       Nose: Nose normal       Mouth/Throat:      Mouth: Mucous membranes are moist       Pharynx: Oropharynx is clear  Eyes:      Extraocular Movements: Extraocular movements intact  Conjunctiva/sclera: Conjunctivae normal       Pupils: Pupils are equal, round, and reactive to light  Cardiovascular:      Rate and Rhythm: Normal rate and regular rhythm  Pulses: Normal pulses  Heart sounds: No murmur heard  Pulmonary:      Effort: Pulmonary effort is normal       Breath sounds: No wheezing or rhonchi  Comments: Diminished BS bilaterally  Abdominal:      General: Bowel sounds are normal       Palpations: Abdomen is soft  There is no mass  Tenderness: There is no abdominal tenderness  Hernia: No hernia is present  Musculoskeletal:         General: No tenderness or deformity  Normal range of motion  Cervical back: Normal range of motion and neck supple  No muscular tenderness  Right lower leg: No edema  Left lower leg: No edema  Skin:     General: Skin is warm and dry  Neurological:      General: No focal deficit present  Mental Status: He is alert and oriented to person, place, and time  Mental status is at baseline  Psychiatric:         Mood and Affect: Mood normal          Behavior: Behavior normal          Thought Content: Thought content normal          Judgment: Judgment normal          Lab Results:   I have personally reviewed pertinent lab results  , ABG:   Lab Results   Component Value Date    PHART 7 349 (L) 04/03/2022    OGZ2PKU 50 6 (H) 04/03/2022    PO2ART 69 6 (L) 04/03/2022    GTW7YVB 27 2 04/03/2022    BEART 0 9 04/03/2022    SOURCE Radial, Left 04/03/2022   , BNP: No results found for: BNP, CBC:   Lab Results   Component Value Date    WBC 7 54 04/04/2022    HGB 10 3 (L) 04/04/2022    HCT 34 3 (L) 04/04/2022    MCV 89 04/04/2022     04/04/2022    MCH 26 6 (L) 04/04/2022 MCHC 30 0 (L) 04/04/2022    RDW 14 7 04/04/2022    MPV 11 0 04/04/2022   , CMP:   Lab Results   Component Value Date    SODIUM 145 04/04/2022    K 3 3 (L) 04/04/2022     (H) 04/04/2022    CO2 24 04/04/2022    BUN 28 (H) 04/04/2022    CREATININE 1 43 (H) 04/04/2022    CALCIUM 8 4 04/04/2022    EGFR 46 04/04/2022   , PT/INR: No results found for: PT, INR, Troponin: No results found for: TROPONINI    Imaging Studies: I have personally reviewed pertinent reports  and I have personally reviewed pertinent films in PACS     CT chest abdomen pelvis 04/02/2022  Bibasilar atelectasis along left major fissure  No tracheal a endobronchial lesions  No pleural effusion  No pneumothorax  EKG, Pathology, and Other Studies: I have personally reviewed pertinent reports  Today shows EF 65%  Grade 1 diastolic dysfunction  Right ventricular size and systolic function normal   Right ventricular systolic pressure 36 mm Hg  Pulmonary Results (PFTs, PSG): none to review     VTE Prophylaxis: Enoxaparin (Lovenox)    Code Status: Level 3 - DNAR and DNI    Portions of the record may have been created with voice recognition software  Occasional wrong word or "sound a like" substitutions may have occurred due to the inherent limitations of voice recognition software  Read the chart carefully and recognize, using context, where substitutions have occurred

## 2022-04-04 NOTE — ASSESSMENT & PLAN NOTE
According to the family members patient is very altered compared to his baseline  Most likely multifactorial secondary to hypercarbia, hyperglycemia and  possible opioid overdose (patient chronic opioid dependent and accidentally overdosed himself few months ago)  Patient was combative upon EMS arrival so he received few doses of benzos and became more lethargic  Due to transit episodes of hypoxia and evidence of hypercapnia on VBG he was placed on BiPAP  CT of the head unremarkable for intracranial pathology  If no AMS resolution within 24 hours will consider MRI of the brain  CT of the chest showed no pulmonary embolism but evidence of possible pneumonia so will treat empirically for CAP    Follow-up pneumonia workup follow up culture reports and this completed antibiotics accordingly  Continue to monitor procalcitonin trend - negative x 3 can discontinue abx  Wound care consult  Continue BiPAP  Follow-up lower extremity Doppler

## 2022-04-04 NOTE — PLAN OF CARE
Problem: PAIN - ADULT  Goal: Verbalizes/displays adequate comfort level or baseline comfort level  Description: Interventions:  - Encourage patient to monitor pain and request assistance  - Assess pain using appropriate pain scale  - Administer analgesics based on type and severity of pain and evaluate response  - Implement non-pharmacological measures as appropriate and evaluate response  - Consider cultural and social influences on pain and pain management  - Notify physician/advanced practitioner if interventions unsuccessful or patient reports new pain  Outcome: Progressing     Problem: INFECTION - ADULT  Goal: Absence or prevention of progression during hospitalization  Description: INTERVENTIONS:  - Assess and monitor for signs and symptoms of infection  - Monitor lab/diagnostic results  - Monitor all insertion sites, i e  indwelling lines, tubes, and drains  - Monitor endotracheal if appropriate and nasal secretions for changes in amount and color  - [place] appropriate cooling/warming therapies per order  - Administer medications as ordered  - Instruct and encourage patient and family to use good hand hygiene technique  - Identify and instruct in appropriate isolation precautions for identified infection/condition  Outcome: Progressing  Goal: Absence of fever/infection during neutropenic period  Description: INTERVENTIONS:  - Monitor WBC    Outcome: Progressing     Problem: SAFETY ADULT  Goal: Patient will remain free of falls  Description: INTERVENTIONS:  - Educate patient/family on patient safety including physical limitations  - Instruct patient to call for assistance with activity   - Consult OT/PT to assist with strengthening/mobility   - Keep Call bell within reach  - Keep bed low and locked with side rails adjusted as appropriate  - Keep care items and personal belongings within reach  - Initiate and maintain comfort rounds  - Make Fall Risk Sign visible to staff  - Offer Toileting every 2 Hours, in advance of need  - Initiate/Maintain bed/chair alarm  - Obtain necessary fall risk management equipment: alarms  - Apply yellow socks and bracelet for high fall risk patients  - Consider moving patient to room near nurses station  Outcome: Progressing  Goal: Maintain or return to baseline ADL function  Description: INTERVENTIONS:  -  Assess patient's ability to carry out ADLs; assess patient's baseline for ADL function and identify physical deficits which impact ability to perform ADLs (bathing, care of mouth/teeth, toileting, grooming, dressing, etc )  - Assess/evaluate cause of self-care deficits   - Assess range of motion  - Assess patient's mobility; develop plan if impaired  - Assess patient's need for assistive devices and provide as appropriate  - Encourage maximum independence but intervene and supervise when necessary  - Involve family in performance of ADLs  - Assess for home care needs following discharge   - Consider OT consult to assist with ADL evaluation and planning for discharge  - Provide patient education as appropriate  Outcome: Progressing  Goal: Maintains/Returns to pre admission functional level  Description: INTERVENTIONS:  - Perform BMAT or MOVE assessment daily    - Set and communicate daily mobility goal to care team and patient/family/caregiver  - Collaborate with rehabilitation services on mobility goals if consulted  - Perform Range of Motion 3 times a day  - Reposition patient every 2 hours    - Dangle patient 3 times a day  - Stand patient 3 times a day  - Ambulate patient 4 times a day  - Out of bed to chair 4 times a day   - Out of bed for meals 3 times a day  - Out of bed for toileting  - Record patient progress and toleration of activity level   Outcome: Progressing     Problem: DISCHARGE PLANNING  Goal: Discharge to home or other facility with appropriate resources  Description: INTERVENTIONS:  - Identify barriers to discharge w/patient and caregiver  - Arrange for needed discharge resources and transportation as appropriate  - Identify discharge learning needs (meds, wound care, etc )  - Arrange for interpretive services to assist at discharge as needed  - Refer to Case Management Department for coordinating discharge planning if the patient needs post-hospital services based on physician/advanced practitioner order or complex needs related to functional status, cognitive ability, or social support system  Outcome: Progressing     Problem: Knowledge Deficit  Goal: Patient/family/caregiver demonstrates understanding of disease process, treatment plan, medications, and discharge instructions  Description: Complete learning assessment and assess knowledge base  Interventions:  - Provide teaching at level of understanding  - Provide teaching via preferred learning methods  Outcome: Progressing     Problem: MOBILITY - ADULT  Goal: Maintain or return to baseline ADL function  Description: INTERVENTIONS:  -  Assess patient's ability to carry out ADLs; assess patient's baseline for ADL function and identify physical deficits which impact ability to perform ADLs (bathing, care of mouth/teeth, toileting, grooming, dressing, etc )  - Assess/evaluate cause of self-care deficits   - Assess range of motion  - Assess patient's mobility; develop plan if impaired  - Assess patient's need for assistive devices and provide as appropriate  - Encourage maximum independence but intervene and supervise when necessary  - Involve family in performance of ADLs  - Assess for home care needs following discharge   - Consider OT consult to assist with ADL evaluation and planning for discharge  - Provide patient education as appropriate  Outcome: Progressing  Goal: Maintains/Returns to pre admission functional level  Description: INTERVENTIONS:  - Perform BMAT or MOVE assessment daily    - Set and communicate daily mobility goal to care team and patient/family/caregiver     - Collaborate with rehabilitation services on mobility goals if consulted  - Perform Range of Motion 3 times a day  - Reposition patient every 2 hours    - Dangle patient 3 times a day  - Stand patient 3 times a day  - Ambulate patient 4 times a day  - Out of bed to chair 4 times a day   - Out of bed for meals 3 times a day  - Out of bed for toileting  - Record patient progress and toleration of activity level   Outcome: Progressing     Problem: Prexisting or High Potential for Compromised Skin Integrity  Goal: Skin integrity is maintained or improved  Description: INTERVENTIONS:  - Identify patients at risk for skin breakdown  - Assess and monitor skin integrity  - Assess and monitor nutrition and hydration status  - Monitor labs   - Assess for incontinence   - Turn and reposition patient  - Assist with mobility/ambulation  - Relieve pressure over bony prominences  - Avoid friction and shearing  - Provide appropriate hygiene as needed including keeping skin clean and dry  - Evaluate need for skin moisturizer/barrier cream  - Collaborate with interdisciplinary team   - Patient/family teaching  - Consider wound care consult   Outcome: Progressing     Problem: Potential for Falls  Goal: Patient will remain free of falls  Description: INTERVENTIONS:  - Educate patient/family on patient safety including physical limitations  - Instruct patient to call for assistance with activity   - Consult OT/PT to assist with strengthening/mobility   - Keep Call bell within reach  - Keep bed low and locked with side rails adjusted as appropriate  - Keep care items and personal belongings within reach  - Initiate and maintain comfort rounds  - Make Fall Risk Sign visible to staff  - Offer Toileting every 2 Hours, in advance of need  - Initiate/Maintain bed/chair alarm  - Obtain necessary fall risk management equipment: alarms  - Apply yellow socks and bracelet for high fall risk patients  - Consider moving patient to room near nurses station  Outcome: Progressing

## 2022-04-04 NOTE — QUICK NOTE
Patient combative and ripping off BiPAP/ Nasal canula oxygen  Given dose of prn zyprexa which did not improve agitation  Patient is physically combative and cursing  Patient was physically restrained with soft wrist restraints and placed on 5L Midflow  Saturating well on 5L at 94%  When told that he could die if he does not wear his oxygen he stated "I don't f'ing care"

## 2022-04-04 NOTE — ASSESSMENT & PLAN NOTE
Lab Results   Component Value Date    HGBA1C 9 1 (H) 04/03/2022       Recent Labs     04/04/22  0108 04/04/22  0540 04/04/22  1143 04/04/22  1611   POCGLU 316* 275* 257* 339*       Blood Sugar Average: Last 72 hrs:  (P) 240 [id number]   Poorly-controlled diabetes with significantly elevated hemoglobin A1c few months ago  Continue Accu-Cheks and insulin sliding scale  Due to significant hyperglycemia above 500 present on admission patient received IV fluids and regular insulin  Likely contributed to patient's encephalopathy  Maintain patient on lantus 20 units at night and 10 units mealtime insulin TID, advance as necessary

## 2022-04-04 NOTE — PLAN OF CARE
Problem: INFECTION - ADULT  Goal: Absence or prevention of progression during hospitalization  Description: INTERVENTIONS:  - Assess and monitor for signs and symptoms of infection  - Monitor lab/diagnostic results  - Monitor all insertion sites, i e  indwelling lines, tubes, and drains  - Monitor endotracheal if appropriate and nasal secretions for changes in amount and color  - [place] appropriate cooling/warming therapies per order  - Administer medications as ordered  - Instruct and encourage patient and family to use good hand hygiene technique  - Identify and instruct in appropriate isolation precautions for identified infection/condition  Outcome: Progressing  Goal: Absence of fever/infection during neutropenic period  Description: INTERVENTIONS:  - Monitor WBC    Outcome: Progressing     Problem: DISCHARGE PLANNING  Goal: Discharge to home or other facility with appropriate resources  Description: INTERVENTIONS:  - Identify barriers to discharge w/patient and caregiver  - Arrange for needed discharge resources and transportation as appropriate  - Identify discharge learning needs (meds, wound care, etc )  - Arrange for interpretive services to assist at discharge as needed  - Refer to Case Management Department for coordinating discharge planning if the patient needs post-hospital services based on physician/advanced practitioner order or complex needs related to functional status, cognitive ability, or social support system  Outcome: Progressing

## 2022-04-04 NOTE — PROGRESS NOTES
[facility] [address]  Progress Note Roman Meyer 1945, 76 y o  male MRN: [id number]  Unit/Bed#: 403-01 Encounter: [id number]  Primary Care Provider: Chantel Westbrook MD   Date and time admitted to hospital: 4/2/2022  7:56 PM    * Acute metabolic encephalopathy  Assessment & Plan  According to the family members patient is very altered compared to his baseline  Most likely multifactorial secondary to hypercarbia, hyperglycemia and  possible opioid overdose (patient chronic opioid dependent and accidentally overdosed himself few months ago)  Patient was combative upon EMS arrival so he received few doses of benzos and became more lethargic  Due to transit episodes of hypoxia and evidence of hypercapnia on VBG he was placed on BiPAP  CT of the head unremarkable for intracranial pathology  If no AMS resolution within 24 hours will consider MRI of the brain  CT of the chest showed no pulmonary embolism but evidence of possible pneumonia so will treat empirically for CAP    Follow-up pneumonia workup follow up culture reports and this completed antibiotics accordingly  Continue to monitor procalcitonin trend - negative x 3 can discontinue abx  Wound care consult  Continue BiPAP  Follow-up lower extremity Doppler        Acute respiratory failure with hypoxia and hypercapnia (HCC)  Assessment & Plan  Secondary to morbid obesity obstructive sleep apnea and possible pneumonia  Management as above    Obstructive sleep apnea on CPAP  Assessment & Plan  Due to hypoxia and hypercapnia patient needed to be placed on BiPAP, took it off himself last night but tolerated much better this AM  Patient needs to continue to wear at night    Opiate dependence, continuous ([address] )  Assessment & Plan  Hold opioids due to altered mental status  Consider restart prn when acceptable    Type 2 diabetes mellitus with hyperglycemia, with long-term current use of insulin [facility])  Assessment & Plan  Lab Results   Component Value Date    HGBA1C 9 1 (H) 04/03/2022       Recent Labs     04/04/22  0108 04/04/22  0540 04/04/22  1143 04/04/22  1611   POCGLU 316* 275* 257* 339*       Blood Sugar Average: Last 72 hrs:  (P) 381 [id number]   Poorly-controlled diabetes with significantly elevated hemoglobin A1c few months ago  Continue Accu-Cheks and insulin sliding scale  Due to significant hyperglycemia above 500 present on admission patient received IV fluids and regular insulin  Likely contributed to patient's encephalopathy  Maintain patient on lantus 20 units at night and 10 units mealtime insulin TID, advance as necessary    Chronic diastolic CHF (congestive heart failure) ([facility])  Assessment & Plan  Wt Readings from Last 3 Encounters:   04/04/22 133 kg (293 lb)   04/03/22 133 kg (293 lb 3 4 oz)   04/12/21 133 kg (292 lb 15 9 oz)     Does not appear to be in acute exacerbation  Echo done in March of 2021 reported preserved ejection fraction grade 2 diastolic dysfunction  Continue carvedilol amlodipine      CKD (chronic kidney disease) stage 3, GFR 30-59 ml/min [facility])  Assessment & Plan  Lab Results   Component Value Date    EGFR 46 04/04/2022    EGFR 44 04/03/2022    EGFR 36 04/02/2022    CREATININE 1 43 (H) 04/04/2022    CREATININE 1 48 (H) 04/03/2022    CREATININE 1 73 (H) 04/02/2022     Avoid nephrotoxic drugs and hypotension  Continue to monitor creatinine  Gentle overnight IV hydration          VTE Pharmacologic Prophylaxis: VTE Score: 8 High Risk (Score >/= 5) - Pharmacological DVT Prophylaxis Ordered: heparin  Sequential Compression Devices Ordered  Patient Centered Rounds: I performed bedside rounds with nursing staff today  Discussions with Specialists or Other Care Team Provider: case management, pulmonology    Education and Discussions with Family / Patient: Patient declined call to   Time Spent for Care: 20 minutes   More than 50% of total time spent on counseling and coordination of care as described above     Current Length of Stay: 1 day(s)  Current Patient Status: Inpatient   Certification Statement: The patient will continue to require additional inpatient hospital stay due to continued hypoxia  Discharge Plan: Anticipate discharge in 48 hrs to rehab facility  Code Status: Level 3 - DNAR and DNI    Subjective:   Patient still lethargic when evaluated this AM     Objective:     Vitals:   Temp (24hrs), Av 9 °F (36 6 °C), Min:97 °F (36 1 °C), Max:99 2 °F (37 3 °C)    Temp:  [97 °F (36 1 °C)-99 2 °F (37 3 °C)] 98 9 °F (37 2 °C)  HR:  [] 87  Resp:  [16-40] 18  BP: ()/() 149/68  SpO2:  [85 %-98 %] 85 %  Body mass index is 47 29 kg/m²  Input and Output Summary (last 24 hours): Intake/Output Summary (Last 24 hours) at 2022 1720  Last data filed at 2022 1145  Gross per 24 hour   Intake 1281 25 ml   Output 845 ml   Net 436 25 ml       Physical Exam:   Physical Exam  Vitals and nursing note reviewed  Constitutional:       General: He is not in acute distress  Appearance: He is obese  He is ill-appearing  Comments: lethargic   Cardiovascular:      Rate and Rhythm: Normal rate and regular rhythm  Pulses: Normal pulses  Heart sounds: Normal heart sounds  No murmur heard  No gallop  Pulmonary:      Effort: Pulmonary effort is normal       Breath sounds: Normal breath sounds  No wheezing, rhonchi or rales  Abdominal:      General: Bowel sounds are normal       Palpations: Abdomen is soft  Tenderness: There is no abdominal tenderness  There is no guarding or rebound  Musculoskeletal:      Right lower leg: No edema  Left lower leg: No edema  Skin:     General: Skin is warm and dry  Neurological:      Mental Status: He is disoriented     Psychiatric:         Mood and Affect: Mood normal          Behavior: Behavior normal           Additional Data:     Labs:  Results from last 7 days   Lab Units 22  0451 22  0759 22  0759   WBC Thousand/uL 7 54   < > 9 63   HEMOGLOBIN g/dL 10 3*   < > 11 1*   HEMATOCRIT % 34 3*   < > 35 7*   PLATELETS Thousands/uL 251   < > 250   NEUTROS PCT %  --   --  73   LYMPHS PCT %  --   --  13*   MONOS PCT %  --   --  8   EOS PCT %  --   --  5    < > = values in this interval not displayed  Results from last 7 days   Lab Units 04/04/22  0451 04/03/22  0759 04/02/22 2021   SODIUM mmol/L 145   < > 142   POTASSIUM mmol/L 3 3*   < > 4 5   CHLORIDE mmol/L 110*   < > 101   CO2 mmol/L 24   < > 32   BUN mg/dL 28*   < > 33*   CREATININE mg/dL 1 43*   < > 1 73*   ANION GAP mmol/L 11   < > 9   CALCIUM mg/dL 8 4   < > 9 0   ALBUMIN g/dL  --   --  3 0*   TOTAL BILIRUBIN mg/dL  --   --  0 43   ALK PHOS U/L  --   --  152*   ALT U/L  --   --  20   AST U/L  --   --  10   GLUCOSE RANDOM mg/dL 264*   < > 565*    < > = values in this interval not displayed  Results from last 7 days   Lab Units 04/02/22 2021   INR  1 18     Results from last 7 days   Lab Units 04/04/22  1611 04/04/22  1143 04/04/22  0540 04/04/22  0108 04/03/22  2121 04/03/22  1758 04/03/22  1147 04/03/22  0549 04/03/22  0412 04/03/22  0133 04/02/22  2326 04/02/22 2006   POC GLUCOSE mg/dl 339* 257* 275* 316* 223* 225* 240* 250* 298* 472* 468* 478*     Results from last 7 days   Lab Units 04/03/22  0140   HEMOGLOBIN A1C % 9 1*     Results from last 7 days   Lab Units 04/04/22  0451 04/03/22  0759 04/03/22  0140 04/02/22 2021   LACTIC ACID mmol/L  --   --   --  2 0   PROCALCITONIN ng/ml 0 20 0 26* 0 23 0 26*       Lines/Drains:  Invasive Devices  Report    Peripheral Intravenous Line            Peripheral IV 04/03/22 Distal;Dorsal (posterior); Left Forearm 1 day    Peripheral IV 04/03/22 Right;Upper;Ventral (anterior) Arm 1 day          Drain            Urethral Catheter Non-latex 16 Fr  1 day              Urinary Catheter:  Goal for removal: Remove after 48 hrs of I/O monitoring               Imaging: Reviewed radiology reports from this admission including: chest xray and CT head    Recent Cultures (last 7 days):   Results from last 7 days   Lab Units 04/03/22  0138 04/02/22 2021   BLOOD CULTURE   --  No Growth at 24 hrs  No Growth at 24 hrs  LEGIONELLA URINARY ANTIGEN  Negative  --        Last 24 Hours Medication List:   Current Facility-Administered Medications   Medication Dose Route Frequency Provider Last Rate    acetaminophen  650 mg Oral Q6H PRN Samantha Flatness, PA-C      albuterol  2 5 mg Nebulization Q4H PRN Samantha Flatness, PA-C      amLODIPine  10 mg Oral Daily Samantha Flatness, PA-C      aspirin  81 mg Oral Daily Samantha Flatness, PA-C      atorvastatin  40 mg Oral QPM Samantha Flatness, PA-C      carvedilol  12 5 mg Oral BID Samantha Flatness, PA-C      cholecalciferol  2,000 Units Oral Daily Samantha Flatness, PA-C      docusate sodium  100 mg Oral BID Samantha Flatness, PA-C      finasteride  5 mg Oral Daily Samantha Flatness, PA-C      heparin (porcine)  5,000 Units Subcutaneous [facility] Samantha Flatness, PA-C      insulin glargine  20 Units Subcutaneous HS Samantha Flatness, PA-C      insulin lispro  10 Units Subcutaneous TID With Meals Samantha Flatness, PA-C      insulin lispro  2-12 Units Subcutaneous Q6H Samantha Flatness, PA-C      insulin regular  8 Units Intravenous Once Samantha Flatness, PA-C      nystatin   Topical TID Samantha Flatness, PA-C      OLANZapine  5 mg Intramuscular Q8H PRN Samantha Flatness, PA-C      ondansetron  4 mg Intravenous Q6H PRN Samantha Flatness, PA-C      tamsulosin  0 4 mg Oral QPM Samantha Flatness, PA-C          Today, Patient Was Seen By: Samantha Meza PA-C    **Please Note: This note may have been constructed using a voice recognition system  **

## 2022-04-04 NOTE — PLAN OF CARE
Problem: PHYSICAL THERAPY ADULT  Goal: Performs mobility at highest level of function for planned discharge setting  See evaluation for individualized goals  Description: Treatment/Interventions: Functional transfer training,LE strengthening/ROM,Therapeutic exercise,Endurance training,Bed mobility,Gait training          See flowsheet documentation for full assessment, interventions and recommendations  Note: Prognosis: Guarded  Problem List: Decreased strength,Decreased endurance,Impaired balance,Decreased mobility,Decreased cognition,Impaired judgement,Decreased safety awareness,Pain,Obesity  Assessment: Patient is a 76 y o  male evaluated by Physical Therapy s/p admit to [facility] on 4/2/2022 with admitting diagnosis of: Diabetes mellitus, Hyperglycemia, Acute encephalopathy, Chronic, continuous use of opioids, Hyperglycemia due to type 2 diabetes mellitus, and principal problem of: Acute metabolic encephalopathy  PT was consulted to assess patient's functional mobility and discharge needs  Ordered are PT Evaluation and treatment with activity level of: up and OOB as tolerated  Comorbidities affecting patient's physical performance at time of assessment include: HLD, HTN, hx of stroke, CHF, DM, CAD, neuropathy  Personal factors affecting the patient at time of IE include: lives in 2 story home, ambulating with assistive device, inability to navigate community distances, impaired cognition, impaired safety awareness, decreased initiation and engagement, questionable non-compliance, limited insight into impairments, inability/difficulty performing IADLs and inability/difficulty performing ADLs  Please locate objective findings from PT assessment regarding body systems outlined above  Upon evaluation, pt requiring mod-maxA x 2, RW, and increased time to perform all functional mobility  Frequent verbal cuing provided for safety awareness, sequencing, and hand placement   Pt with moderate postural instability seated EOB, however pt motivated to attempt pivot transfer to chair  Pt able to stand at [facility] with maxA x 2 but not able to maintain upright posture for greater than 20 seconds  D/t this, pivot not attempted and pt assisted back to supine; repositioned for safety and comfort  RN Felecia Sands present during evaluation to assist with lexus hygiene as pt was incontinent of bowel during session  The patient's AM-PAC Basic Mobility Inpatient Short Form Raw Score is 6  A Raw score of less than or equal to 16 suggests the patient may benefit from discharge to post-acute rehabilitation services  Please also refer to the recommendation of the Physical Therapist for safe discharge planning  Co treatment with OT secondary to complex medical condition of pt, possible A of 2 required to achieve and maintain transitional movements, requiring the need of skilled therapeutic intervention of 2 therapists to achieve delivery of services  Pt will benefit from continued PT intervention during LOS to address current deficits, increase LOF, and facilitate safe d/c to next level of care when medically appropriate  D/c recommendation at this time is post-acute rehab  PT Discharge Recommendation: Post acute rehabilitation services          See flowsheet documentation for full assessment

## 2022-04-05 ENCOUNTER — APPOINTMENT (INPATIENT)
Dept: CT IMAGING | Facility: HOSPITAL | Age: 77
DRG: 189 | End: 2022-04-05
Payer: COMMERCIAL

## 2022-04-05 LAB
ALBUMIN SERPL BCP-MCNC: 2.4 G/DL (ref 3.5–5)
ALP SERPL-CCNC: 134 U/L (ref 46–116)
ALT SERPL W P-5'-P-CCNC: 14 U/L (ref 12–78)
ANION GAP SERPL CALCULATED.3IONS-SCNC: 13 MMOL/L (ref 4–13)
ARTERIAL PATENCY WRIST A: YES
AST SERPL W P-5'-P-CCNC: 12 U/L (ref 5–45)
BASE EXCESS BLDA CALC-SCNC: 0.7 MMOL/L
BASOPHILS # BLD AUTO: 0.03 THOUSANDS/ΜL (ref 0–0.1)
BASOPHILS NFR BLD AUTO: 0 % (ref 0–1)
BILIRUB SERPL-MCNC: 0.36 MG/DL (ref 0.2–1)
BUN SERPL-MCNC: 22 MG/DL (ref 5–25)
CALCIUM ALBUM COR SERPL-MCNC: 10.1 MG/DL (ref 8.3–10.1)
CALCIUM SERPL-MCNC: 8.8 MG/DL (ref 8.3–10.1)
CHLORIDE SERPL-SCNC: 111 MMOL/L (ref 100–108)
CO2 SERPL-SCNC: 22 MMOL/L (ref 21–32)
CREAT SERPL-MCNC: 1.4 MG/DL (ref 0.6–1.3)
EOSINOPHIL # BLD AUTO: 0.47 THOUSAND/ΜL (ref 0–0.61)
EOSINOPHIL NFR BLD AUTO: 7 % (ref 0–6)
ERYTHROCYTE [DISTWIDTH] IN BLOOD BY AUTOMATED COUNT: 14.6 % (ref 11.6–15.1)
GFR SERPL CREATININE-BSD FRML MDRD: 47 ML/MIN/1.73SQ M
GLUCOSE SERPL-MCNC: 255 MG/DL (ref 65–140)
GLUCOSE SERPL-MCNC: 260 MG/DL (ref 65–140)
GLUCOSE SERPL-MCNC: 265 MG/DL (ref 65–140)
GLUCOSE SERPL-MCNC: 275 MG/DL (ref 65–140)
GLUCOSE SERPL-MCNC: 287 MG/DL (ref 65–140)
GLUCOSE SERPL-MCNC: 292 MG/DL (ref 65–140)
GLUCOSE SERPL-MCNC: 295 MG/DL (ref 65–140)
HCO3 BLDA-SCNC: 25.4 MMOL/L (ref 22–28)
HCT VFR BLD AUTO: 32.7 % (ref 36.5–49.3)
HGB BLD-MCNC: 10.6 G/DL (ref 12–17)
IMM GRANULOCYTES # BLD AUTO: 0.03 THOUSAND/UL (ref 0–0.2)
IMM GRANULOCYTES NFR BLD AUTO: 0 % (ref 0–2)
LYMPHOCYTES # BLD AUTO: 1.62 THOUSANDS/ΜL (ref 0.6–4.47)
LYMPHOCYTES NFR BLD AUTO: 22 % (ref 14–44)
MCH RBC QN AUTO: 26.9 PG (ref 26.8–34.3)
MCHC RBC AUTO-ENTMCNC: 32.4 G/DL (ref 31.4–37.4)
MCV RBC AUTO: 83 FL (ref 82–98)
MONOCYTES # BLD AUTO: 0.55 THOUSAND/ΜL (ref 0.17–1.22)
MONOCYTES NFR BLD AUTO: 8 % (ref 4–12)
NASAL CANNULA: ABNORMAL
NEUTROPHILS # BLD AUTO: 4.55 THOUSANDS/ΜL (ref 1.85–7.62)
NEUTS SEG NFR BLD AUTO: 63 % (ref 43–75)
NRBC BLD AUTO-RTO: 0 /100 WBCS
O2 CT BLDA-SCNC: 14.7 ML/DL (ref 16–23)
OXYHGB MFR BLDA: 90.5 % (ref 94–97)
PCO2 BLDA: 40.9 MM HG (ref 36–44)
PH BLDA: 7.41 [PH] (ref 7.35–7.45)
PLATELET # BLD AUTO: 279 THOUSANDS/UL (ref 149–390)
PMV BLD AUTO: 10.8 FL (ref 8.9–12.7)
PO2 BLDA: 61.3 MM HG (ref 75–129)
POTASSIUM SERPL-SCNC: 3 MMOL/L (ref 3.5–5.3)
PROT SERPL-MCNC: 7.3 G/DL (ref 6.4–8.2)
RBC # BLD AUTO: 3.94 MILLION/UL (ref 3.88–5.62)
SODIUM SERPL-SCNC: 146 MMOL/L (ref 136–145)
SPECIMEN SOURCE: ABNORMAL
WBC # BLD AUTO: 7.25 THOUSAND/UL (ref 4.31–10.16)

## 2022-04-05 PROCEDURE — 99232 SBSQ HOSP IP/OBS MODERATE 35: CPT | Performed by: PHYSICIAN ASSISTANT

## 2022-04-05 PROCEDURE — 97530 THERAPEUTIC ACTIVITIES: CPT

## 2022-04-05 PROCEDURE — 80053 COMPREHEN METABOLIC PANEL: CPT | Performed by: PHYSICIAN ASSISTANT

## 2022-04-05 PROCEDURE — G1004 CDSM NDSC: HCPCS

## 2022-04-05 PROCEDURE — 97110 THERAPEUTIC EXERCISES: CPT

## 2022-04-05 PROCEDURE — 85025 COMPLETE CBC W/AUTO DIFF WBC: CPT | Performed by: PHYSICIAN ASSISTANT

## 2022-04-05 PROCEDURE — 36600 WITHDRAWAL OF ARTERIAL BLOOD: CPT

## 2022-04-05 PROCEDURE — 82948 REAGENT STRIP/BLOOD GLUCOSE: CPT

## 2022-04-05 PROCEDURE — 70450 CT HEAD/BRAIN W/O DYE: CPT

## 2022-04-05 PROCEDURE — 94760 N-INVAS EAR/PLS OXIMETRY 1: CPT

## 2022-04-05 PROCEDURE — 82805 BLOOD GASES W/O2 SATURATION: CPT | Performed by: PHYSICIAN ASSISTANT

## 2022-04-05 RX ORDER — POTASSIUM CHLORIDE 20 MEQ/1
40 TABLET, EXTENDED RELEASE ORAL 2 TIMES DAILY
Status: DISCONTINUED | OUTPATIENT
Start: 2022-04-05 | End: 2022-04-07

## 2022-04-05 RX ORDER — INSULIN GLARGINE 100 [IU]/ML
30 INJECTION, SOLUTION SUBCUTANEOUS
Status: DISCONTINUED | OUTPATIENT
Start: 2022-04-05 | End: 2022-04-06

## 2022-04-05 RX ADMIN — TAMSULOSIN HYDROCHLORIDE 0.4 MG: 0.4 CAPSULE ORAL at 18:08

## 2022-04-05 RX ADMIN — POTASSIUM CHLORIDE 40 MEQ: 1500 TABLET, EXTENDED RELEASE ORAL at 18:08

## 2022-04-05 RX ADMIN — CARVEDILOL 12.5 MG: 12.5 TABLET, FILM COATED ORAL at 09:09

## 2022-04-05 RX ADMIN — DOCUSATE SODIUM 100 MG: 100 CAPSULE, LIQUID FILLED ORAL at 09:09

## 2022-04-05 RX ADMIN — POTASSIUM CHLORIDE 40 MEQ: 1500 TABLET, EXTENDED RELEASE ORAL at 09:09

## 2022-04-05 RX ADMIN — INSULIN GLARGINE 30 UNITS: 100 INJECTION, SOLUTION SUBCUTANEOUS at 21:54

## 2022-04-05 RX ADMIN — INSULIN LISPRO 6 UNITS: 100 INJECTION, SOLUTION INTRAVENOUS; SUBCUTANEOUS at 06:16

## 2022-04-05 RX ADMIN — HEPARIN SODIUM 5000 UNITS: 5000 INJECTION INTRAVENOUS; SUBCUTANEOUS at 21:54

## 2022-04-05 RX ADMIN — CARVEDILOL 12.5 MG: 12.5 TABLET, FILM COATED ORAL at 18:08

## 2022-04-05 RX ADMIN — ATORVASTATIN CALCIUM 40 MG: 40 TABLET, FILM COATED ORAL at 18:08

## 2022-04-05 RX ADMIN — AMLODIPINE BESYLATE 10 MG: 10 TABLET ORAL at 09:09

## 2022-04-05 RX ADMIN — ACETAMINOPHEN 650 MG: 325 TABLET ORAL at 18:19

## 2022-04-05 RX ADMIN — NYSTATIN 1 APPLICATION: 100000 POWDER TOPICAL at 09:11

## 2022-04-05 RX ADMIN — INSULIN LISPRO 10 UNITS: 100 INJECTION, SOLUTION INTRAVENOUS; SUBCUTANEOUS at 11:22

## 2022-04-05 RX ADMIN — NYSTATIN: 100000 POWDER TOPICAL at 21:54

## 2022-04-05 RX ADMIN — INSULIN LISPRO 10 UNITS: 100 INJECTION, SOLUTION INTRAVENOUS; SUBCUTANEOUS at 07:37

## 2022-04-05 RX ADMIN — FINASTERIDE 5 MG: 5 TABLET, FILM COATED ORAL at 09:09

## 2022-04-05 RX ADMIN — INSULIN LISPRO 6 UNITS: 100 INJECTION, SOLUTION INTRAVENOUS; SUBCUTANEOUS at 11:23

## 2022-04-05 RX ADMIN — HEPARIN SODIUM 5000 UNITS: 5000 INJECTION INTRAVENOUS; SUBCUTANEOUS at 06:17

## 2022-04-05 RX ADMIN — INSULIN LISPRO 10 UNITS: 100 INJECTION, SOLUTION INTRAVENOUS; SUBCUTANEOUS at 16:12

## 2022-04-05 RX ADMIN — INSULIN LISPRO 6 UNITS: 100 INJECTION, SOLUTION INTRAVENOUS; SUBCUTANEOUS at 00:11

## 2022-04-05 RX ADMIN — Medication 2000 UNITS: at 09:09

## 2022-04-05 RX ADMIN — NYSTATIN: 100000 POWDER TOPICAL at 16:12

## 2022-04-05 RX ADMIN — HEPARIN SODIUM 5000 UNITS: 5000 INJECTION INTRAVENOUS; SUBCUTANEOUS at 13:17

## 2022-04-05 RX ADMIN — INSULIN LISPRO 6 UNITS: 100 INJECTION, SOLUTION INTRAVENOUS; SUBCUTANEOUS at 18:08

## 2022-04-05 RX ADMIN — ASPIRIN 81 MG CHEWABLE TABLET 81 MG: 81 TABLET CHEWABLE at 09:09

## 2022-04-05 RX ADMIN — DOCUSATE SODIUM 100 MG: 100 CAPSULE, LIQUID FILLED ORAL at 18:08

## 2022-04-05 NOTE — CASE MANAGEMENT
Case Management Discharge Planning Note    Patient name Latia Goss /[id number] MRN [id number]  : 1945 Date 2022       Current Admission Date: 2022  Current Admission Diagnosis:Acute metabolic encephalopathy   Patient Active Problem List    Diagnosis Date Noted    Acute metabolic encephalopathy     Hypoxia 2022    Acute respiratory failure with hypoxia and hypercapnia ([facility] ) 2022    Constipation 2021    Obstructive sleep apnea on CPAP 04/10/2021    Inguinal lymphadenopathy 2021    Bilateral pleural effusion 2021    Atelectasis 2021    Acute on chronic diastolic CHF (congestive [facility] failure) [facility] ) 2021    Multiple renal cysts 2021    Renal calculus 2021    Venous stasis dermatitis of both lower extremities 2021    Lower extremity weakness 2021    Cigarette nicotine dependence in remission 2021    Pyuria 03/10/2021    Microscopic hematuria 03/10/2021    Multiple pulmonary nodules 03/10/2021    Gait instability 03/10/2021    Abnormal EKG 03/10/2021    Elevated d-dimer 2021    Elevated parathyroid hormone 2021    Accelerated hypertension 2021    Hypercalcemia 2021    Open wound of right lower extremity 2021    Cellulitis of right lower extremity 2021    Elevated alkaline phosphatase level 2021    Elevated TSH 2019    Pressure injury of skin of right buttock 2019    Ambulatory dysfunction 2019    UTI (urinary tract infection) 2019    Neural foraminal stenosis of cervical spine 2019    Acute pain of right lower extremity 2019    Paresthesia of left upper extremity 2019    Hypoglycemia 2019    Acute cystitis 2019    Opiate dependence, continuous ([facility] ) 2019    Uncontrolled type 2 diabetes mellitus with hyperglycemia, with long-term current use of insulin ([facility] ) 10/04/2018  Acute kidney injury ([facility] ) 10/04/2018    Dehydration with hyponatremia 10/04/2018    Type 2 diabetes mellitus with hyperglycemia, with long-term current use of insulin ([place] [address] ) 67/89/1325    Complicated UTI (urinary tract infection) 10/03/2018    Dyspnea on exertion 12/27/2016    Type 2 diabetes mellitus with hypoglycemia without coma, with long-term current use of insulin ([place] [address] ) 12/27/2016    CKD (chronic kidney disease) stage 3, GFR 30-59 ml/min ([facility]) 12/27/2016    Chronic diastolic CHF (congestive heart failure) ([place] [address] ) 12/27/2016    Morbid obesity with BMI of 45 0-49 9, adult ([place] [address] ) 12/27/2016    Essential hypertension 12/27/2016      LOS (days): 2  Geometric Mean LOS (GMLOS) (days): 3 60  Days to GMLOS:1     OBJECTIVE:  Risk of Unplanned Readmission Score: 29         Current admission status: Inpatient   Preferred Pharmacy:   [facility] [facility], [place] - [address] [facility], [address] [facility], [address]  [address] [place] [address]  Phone: [phone] Fax: [phone] [facility] [address]  [address] [place] [address]  [address]  Phone: [phone] Fax: [phone]    Primary Care Provider: María Elena Farfan MD    Primary Insurance: [facility]  Secondary Insurance:     DISCHARGE DETAILS:    Discharge planning discussed with[de-identified] Pt's wife Leia Pack of Choice: Yes     CM contacted family/caregiver?: Yes  Were Treatment Team discharge recommendations reviewed with patient/caregiver?: Yes  Did patient/caregiver verbalize understanding of patient care needs?: Yes  Were patient/caregiver advised of the risks associated with not following Treatment Team discharge recommendations?: Yes         Requested [address]         Is the patient interested in [address] at discharge?: No      Other Referral/Resources/Interventions Provided:  Interventions: Short Term Rehab     PT is recommending that patient should go to [facility]   I spoke with patient's wife and she would like me to send a referral to the 5h floor  I encouraged wife to give me another choice in case they do not have a bed  Wife wants only 5th floor  Referral sent as requested  As per wife pt is fully vaccinated

## 2022-04-05 NOTE — OCCUPATIONAL THERAPY NOTE
Occupational Therapy         Patient Name: Ruby Mora  PCGPF'C Date: 4/5/2022 04/05/22 1355   OT Last Visit   OT Visit Date 04/05/22   Note Type   Note Type Treatment   Restrictions/Precautions   Weight Bearing Precautions Per Order No   Other Precautions Fall Risk;Pain;Bed Alarm;Telemetry;O2   Pain Assessment   Pain Assessment Tool FLACC   Pain Score No Pain   ADL   Toileting Assistance  1  Total Assistance   Toileting Deficit Perineal hygiene   Toileting Comments Pt supine in bed, incontinent of BM during session  Pt req TD for all hygiene tasks while rolling side to side in bed  Increased time necessary for thoroughness of hygiene and for therapeutic rest breaks due to fatigue with rolling side to side in bed  Bed Mobility   Rolling R 3  Moderate assistance   Additional items Assist x 2;Bedrails; Increased time required;Verbal cues   Rolling L 3  Moderate assistance   Additional items Assist x 2;Bedrails; Increased time required;Verbal cues   Sit to Supine 2  Maximal assistance   Additional items Assist x 2; Increased time required;Verbal cues; Bedrails   Transfers   Sit to Stand Unable to assess   Cognition   Overall Cognitive Status Impaired   Arousal/Participation Alert; Cooperative   Attention Difficulty attending to directions   Orientation Level Oriented to person;Oriented to time;Disoriented to place; Disoriented to situation   Memory Decreased long term memory   Following Commands Follows one step commands without difficulty   Additional Activities   Additional Activities Comments supported and unsupport sitting tasks on EOB in order to increase functional endurance, core strength; Pt sat with UE support during functional reaching task while seated on EOB  Pt req Min-Mod A to maintain upright posture on EOB due to deficits in strength and sitting balance  Pt participates in reaching task x 2 min before requesting rest break  Pt sits with assistance on EOB x approx 10 min total this date  Activity Tolerance   Activity Tolerance Patient limited by fatigue   Assessment   Assessment Patient participated in Skilled OT session this date with interventions consisting of  therapeutic activities to: increase activity tolerance   Co treatment with PTA secondary to complex medical condition of pt, mod-max A of 2 required to achieve and maintain transitional movements, requiring the need of skilled therapeutic intervention of 2 therapists to achieve delivery of services  Patient agreeable to OT treatment session, upon arrival patient was found seated at edge of bed with PT and PCA at bedside  Patient requiring verbal cues for correct technique  Patient continues to be functioning below baseline level, occupational performance remains limited secondary to factors listed above and increased risk for falls and injury  From OT standpoint, recommendation at time of d/c would be Post acute rehabilitation services  The patient's raw score on the AM-PAC Daily Activity inpatient short form is 10, standardized score is  , less than 39 4  Patients at this level are likely to benefit from discharge to post-acute rehabilitation services  Please refer to the recommendation of the Occupational Therapist for safe discharge planning     Plan   Goal Expiration Date 04/18/22   OT Frequency 3-5x/wk   Recommendation   OT Discharge Recommendation Post acute rehabilitation services   AM-[facility] Daily Activity Inpatient   Lower Body Dressing 1   Bathing 1   Toileting 1   Upper Body Dressing 2   Grooming 2   Eating 3   Daily Activity Raw Score 10   AM-PAC Applied Cognition Inpatient   Following a Speech/Presentation 3   Understanding Ordinary Conversation 3   Taking Medications 2   Remembering Where Things Are Placed or Put Away 2   Remembering List of 4-5 Errands 2   Taking Care of Complicated Tasks 2   Applied Cognition Raw Score 14   Applied Cognition Standardized Score 32 02      Pt will benefit from continued OT services in order to maximize (I) c ADL performance, FM c RW, and improve overall endurance/strength required to complete functional tasks in preparation for d/c  Co treatment with PT secondary to complex medical condition of pt, possible A of 2 required to achieve and maintain transitional movements, requiring the need of skilled therapeutic intervention of 2 therapists to achieve delivery of services  Pt left supine in bed at end of session; all needs within reach; all lines intact    Kalpana Fernandez, OT

## 2022-04-05 NOTE — SPEECH THERAPY NOTE
Attempted to see patient for an initial dysphagia evaluation; patient refused evaluation today reporting "I wont do anything until you bring me back upstairs " Pt denied any difficulty swallowing and was oriented to person/place/time today  Will continue follow up to determine if full evaluation needed

## 2022-04-05 NOTE — PLAN OF CARE
Problem: PHYSICAL THERAPY ADULT  Goal: Performs mobility at highest level of function for planned discharge setting  See evaluation for individualized goals  Description: Treatment/Interventions: Functional transfer training,LE strengthening/ROM,Therapeutic exercise,Endurance training,Bed mobility,Gait training          See flowsheet documentation for full assessment, interventions and recommendations  Outcome: Progressing  Note: Prognosis: Good  Problem List: Decreased strength,Decreased endurance,Impaired balance,Decreased mobility,Decreased safety awareness,Decreased skin integrity,Pain  Assessment: Pt  seen for PT treatment session this date with interventions consisting of  therapeutic exercises and bed mobility w/ emphasis on improving pt's ability to ambulate  Pt  Requiring (frequent ) cues for sequence and safety  In comparison to previous session, Pt  With improvements in activity tolerance  Mobility limited by weakness and fatigue  Vitals WFL's with 3 l o2  Pt is in need of continued activity in PT to improve strength balance endurance mobility transfers and ambulation with return to maximize LOF  From PT/mobility standpoint, recommendation at time of d/c would be post acute rehab  in order to promote return to PLOF and independence  The patient's AM-PAC Basic Mobility Inpatient Short Form Raw Score is 6  A Raw score of less than or equal to 16 suggests the patient may benefit from discharge to post-acute rehabilitation services  Please also refer to physical therapy recommendation for safe DC planning  Co-treat with OT this session due to complexity of pt and requires A x 2 to provided skilled interventions  PT Discharge Recommendation: Post acute rehabilitation services          See flowsheet documentation for full assessment

## 2022-04-05 NOTE — PROGRESS NOTES
[facility] [address] [place]  Progress Note Yoli Ventura 1945, 76 y o  male MRN: [id number]  Unit/Bed#: 403-01 Encounter: [id number]  Primary Care Provider: Ismael Weston MD   Date and time admitted to hospital: 4/2/2022  7:56 PM    * Acute metabolic encephalopathy  Assessment & Plan  · According to the family members patient is very altered compared to his baseline  · Most likely multifactorial secondary to hypercarbia, hyperglycemia and  possible opioid overdose (patient chronic opioid dependent and accidentally overdosed himself few months ago)  · Patient was combative upon EMS arrival so he received few doses of benzos and became more lethargic  · Due to transit episodes of hypoxia and evidence of hypercapnia on VBG he was placed on BiPAP  · CT of the head unremarkable for intracranial pathology  If no AMS resolution within 24 hours will consider MRI of the brain - patient did not tolerate and does not fit in machine, will instead repeat CT head at this time  · CT of the chest showed no pulmonary embolism but evidence of possible pneumonia so will treat empirically for CAP    · Follow-up pneumonia workup follow up culture reports and this completed antibiotics accordingly  · Continue to monitor procalcitonin trend - negative x 3 can discontinue abx  · Wound care consult  · Continue BiPAP - patient only tolerating for about an hour at a time then taking it off himself  · Follow-up lower extremity Doppler - normal    Acute respiratory failure with hypoxia and hypercapnia (HCC)  Assessment & Plan  Secondary to morbid obesity obstructive sleep apnea   Management as above    Opiate dependence, continuous (HCC)  Assessment & Plan  Hold opioids due to altered mental status  Consider restart prn when acceptable    Type 2 diabetes mellitus with hyperglycemia, with long-term current use of insulin [facility])  Assessment & Plan  Lab Results   Component Value Date    HGBA1C 9 0 (H) 04/04/2022       Recent Labs     04/05/22  0009 04/05/22  0616 04/05/22  0746 04/05/22  1109   POCGLU 287* 292* 265* 295*       Blood Sugar Average: Last 72 hrs:  (P) 056 [id number]   Poorly-controlled diabetes with significantly elevated hemoglobin A1c few months ago  Continue Accu-Cheks and insulin sliding scale  Due to significant hyperglycemia above 500 present on admission patient received IV fluids and regular insulin  Likely contributed to patient's encephalopathy  Maintain patient on lantus 20 units at night and 10 units mealtime insulin TID, advance as necessary  Increased lantus to 30 daily    Chronic diastolic CHF (congestive heart failure) ([facility])  Assessment & Plan  Wt Readings from Last 3 Encounters:   04/04/22 133 kg (293 lb)   04/03/22 133 kg (293 lb 3 4 oz)   04/12/21 133 kg (292 lb 15 9 oz)     Does not appear to be in acute exacerbation  Echo done in March of 2021 reported preserved ejection fraction grade 2 diastolic dysfunction  Continue carvedilol amlodipine      CKD (chronic kidney disease) stage 3, GFR 30-59 ml/min [facility])  Assessment & Plan  Lab Results   Component Value Date    EGFR 47 04/05/2022    EGFR 46 04/04/2022    EGFR 44 04/03/2022    CREATININE 1 40 (H) 04/05/2022    CREATININE 1 43 (H) 04/04/2022    CREATININE 1 48 (H) 04/03/2022     Avoid nephrotoxic drugs and hypotension  Continue to monitor creatinine          VTE Pharmacologic Prophylaxis: VTE Score: 8 High Risk (Score >/= 5) - Pharmacological DVT Prophylaxis Ordered: heparin  Sequential Compression Devices Ordered  Patient Centered Rounds: I performed bedside rounds with nursing staff today  Discussions with Specialists or Other Care Team Provider: case management, pulmonology    Education and Discussions with Family / Patient: Updated  (wife) at bedside  Time Spent for Care: 30 minutes  More than 50% of total time spent on counseling and coordination of care as described above      Current Length of Stay: 2 day(s)  Current Patient Status: Inpatient   Certification Statement: The patient will continue to require additional inpatient hospital stay due to continued AMS  Discharge Plan: Anticipate discharge in 24-48 hrs to rehab facility  Code Status: Level 3 - DNAR and DNI    Subjective:   Patient still slurring his words and stating he is in [facility]  Objective:     Vitals:   Temp (24hrs), Av 4 °F (36 9 °C), Min:97 9 °F (36 6 °C), Max:98 9 °F (37 2 °C)    Temp:  [97 9 °F (36 6 °C)-98 9 °F (37 2 °C)] 97 9 °F (36 6 °C)  HR:  [83-87] 85  Resp:  [18] 18  BP: (149-161)/(68-72) 161/72  SpO2:  [85 %-94 %] 94 %  Body mass index is 47 29 kg/m²  Input and Output Summary (last 24 hours): Intake/Output Summary (Last 24 hours) at 2022 1240  Last data filed at 2022 1207  Gross per 24 hour   Intake 880 ml   Output 1650 ml   Net -770 ml       Physical Exam:   Physical Exam  Vitals and nursing note reviewed  Constitutional:       General: He is not in acute distress  Appearance: He is obese  He is ill-appearing  HENT:      Head: Normocephalic and atraumatic  Cardiovascular:      Rate and Rhythm: Normal rate and regular rhythm  Pulses: Normal pulses  Heart sounds: Normal heart sounds  No murmur heard  No gallop  Pulmonary:      Effort: Pulmonary effort is normal       Breath sounds: Normal breath sounds  No wheezing, rhonchi or rales  Abdominal:      General: Bowel sounds are normal  There is no distension  Palpations: Abdomen is soft  Tenderness: There is no abdominal tenderness  There is no guarding or rebound  Musculoskeletal:         General: Normal range of motion  Right lower leg: No edema  Left lower leg: No edema  Skin:     General: Skin is warm and dry  Neurological:      Mental Status: He is alert  He is disoriented     Psychiatric:         Mood and Affect: Mood normal          Behavior: Behavior normal           Additional Data:     Labs:  Results from last 7 days Lab Units 04/05/22  0509   WBC Thousand/uL 7 25   HEMOGLOBIN g/dL 10 6*   HEMATOCRIT % 32 7*   PLATELETS Thousands/uL 279   NEUTROS PCT % 63   LYMPHS PCT % 22   MONOS PCT % 8   EOS PCT % 7*     Results from last 7 days   Lab Units 04/05/22  0509   SODIUM mmol/L 146*   POTASSIUM mmol/L 3 0*   CHLORIDE mmol/L 111*   CO2 mmol/L 22   BUN mg/dL 22   CREATININE mg/dL 1 40*   ANION GAP mmol/L 13   CALCIUM mg/dL 8 8   ALBUMIN g/dL 2 4*   TOTAL BILIRUBIN mg/dL 0 36   ALK PHOS U/L 134*   ALT U/L 14   AST U/L 12   GLUCOSE RANDOM mg/dL 255*     Results from last 7 days   Lab Units 04/02/22  2021   INR  1 18     Results from last 7 days   Lab Units 04/05/22  1109 04/05/22  0746 04/05/22  0616 04/05/22  0009 04/04/22  2134 04/04/22  1734 04/04/22  1611 04/04/22  1143 04/04/22  0540 04/04/22  0108 04/03/22  2121 04/03/22  1758   POC GLUCOSE mg/dl 295* 265* 292* 287* 207* 381* 339* 257* 275* 316* 223* 225*     Results from last 7 days   Lab Units 04/04/22  0451 04/03/22  0140   HEMOGLOBIN A1C % 9 0* 9 1*     Results from last 7 days   Lab Units 04/04/22  0451 04/03/22  0759 04/03/22  0140 04/02/22 2021   LACTIC ACID mmol/L  --   --   --  2 0   PROCALCITONIN ng/ml 0 20 0 26* 0 23 0 26*       Lines/Drains:  Invasive Devices  Report    Peripheral Intravenous Line            Peripheral IV 04/03/22 Distal;Dorsal (posterior); Left Forearm 2 days    Peripheral IV 04/03/22 Right;Upper;Ventral (anterior) Arm 2 days          Drain            Urethral Catheter Non-latex 16 Fr  2 days              Urinary Catheter:  Goal for removal: Remove after 48 hrs of I/O monitoring               Imaging: No pertinent imaging reviewed  Recent Cultures (last 7 days):   Results from last 7 days   Lab Units 04/03/22  0138 04/02/22 2021   BLOOD CULTURE   --  No Growth at 24 hrs  No Growth at 24 hrs     LEGIONELLA URINARY ANTIGEN  Negative  --        Last 24 Hours Medication List:   Current Facility-Administered Medications   Medication Dose Route Frequency Provider Last Rate    acetaminophen  650 mg Oral Q6H PRN Grace Weiss PA-C      albuterol  2 5 mg Nebulization Q4H PRN Grace Weiss PA-C      amLODIPine  10 mg Oral Daily Grace Weiss PA-C      aspirin  81 mg Oral Daily Grace Weiss PA-C      atorvastatin  40 mg Oral QPM Grace Weiss PA-C      carvedilol  12 5 mg Oral BID Grace Weiss PA-C      cholecalciferol  2,000 Units Oral Daily Grace Weiss PA-C      docusate sodium  100 mg Oral BID Grace Weiss PA-C      finasteride  5 mg Oral Daily Grace Weiss PA-C      heparin (porcine)  5,000 Units Subcutaneous [facility] Grace Weiss PA-C      insulin glargine  30 Units Subcutaneous HS Grace Weiss PA-C      insulin lispro  10 Units Subcutaneous TID With Meals Grace Weiss PA-C      insulin lispro  2-12 Units Subcutaneous Q6H Grace Weiss PA-C      insulin regular  8 Units Intravenous Once Grace Weiss PA-C      nystatin   Topical TID Grace Weiss PA-C      OLANZapine  5 mg Intramuscular Q8H PRN Grace Weiss PA-C      ondansetron  4 mg Intravenous Q6H PRN Grace Weiss PA-C      potassium chloride  40 mEq Oral BID Grace Weiss PA-C      tamsulosin  0 4 mg Oral QPM Grace Weiss PA-C          Today, Patient Was Seen By: Grace Weiss PA-C    **Please Note: This note may have been constructed using a voice recognition system  **

## 2022-04-05 NOTE — ASSESSMENT & PLAN NOTE
· According to the family members patient is very altered compared to his baseline  · Most likely multifactorial secondary to hypercarbia, hyperglycemia and  possible opioid overdose (patient chronic opioid dependent and accidentally overdosed himself few months ago)  · Patient was combative upon EMS arrival so he received few doses of benzos and became more lethargic  · Due to transit episodes of hypoxia and evidence of hypercapnia on VBG he was placed on BiPAP  · CT of the head unremarkable for intracranial pathology  If no AMS resolution within 24 hours will consider MRI of the brain - patient did not tolerate and does not fit in machine, will instead repeat CT head at this time  · CT of the chest showed no pulmonary embolism but evidence of possible pneumonia so will treat empirically for CAP    · Follow-up pneumonia workup follow up culture reports and this completed antibiotics accordingly  · Continue to monitor procalcitonin trend - negative x 3 can discontinue abx  · Wound care consult  · Continue BiPAP - patient only tolerating for about an hour at a time then taking it off himself  · Follow-up lower extremity Doppler - normal

## 2022-04-05 NOTE — PLAN OF CARE
Problem: PAIN - ADULT  Goal: Verbalizes/displays adequate comfort level or baseline comfort level  Description: Interventions:  - Encourage patient to monitor pain and request assistance  - Assess pain using appropriate pain scale  - Administer analgesics based on type and severity of pain and evaluate response  - Implement non-pharmacological measures as appropriate and evaluate response  - Consider cultural and social influences on pain and pain management  - Notify physician/advanced practitioner if interventions unsuccessful or patient reports new pain  Outcome: Progressing     Problem: INFECTION - ADULT  Goal: Absence or prevention of progression during hospitalization  Description: INTERVENTIONS:  - Assess and monitor for signs and symptoms of infection  - Monitor lab/diagnostic results  - Monitor all insertion sites, i e  indwelling lines, tubes, and drains  - Monitor endotracheal if appropriate and nasal secretions for changes in amount and color  - [place] appropriate cooling/warming therapies per order  - Administer medications as ordered  - Instruct and encourage patient and family to use good hand hygiene technique  - Identify and instruct in appropriate isolation precautions for identified infection/condition  Outcome: Progressing  Goal: Absence of fever/infection during neutropenic period  Description: INTERVENTIONS:  - Monitor WBC    Outcome: Progressing     Problem: SAFETY ADULT  Goal: Patient will remain free of falls  Description: INTERVENTIONS:  - Educate patient/family on patient safety including physical limitations  - Instruct patient to call for assistance with activity   - Consult OT/PT to assist with strengthening/mobility   - Keep Call bell within reach  - Keep bed low and locked with side rails adjusted as appropriate  - Keep care items and personal belongings within reach  - Initiate and maintain comfort rounds  - Make Fall Risk Sign visible to staff  - Offer Toileting every 2 Hours, in advance of need  - Initiate/Maintain bed/chair alarm  - Obtain necessary fall risk management equipment: alarms  - Apply yellow socks and bracelet for high fall risk patients  - Consider moving patient to room near nurses station  Outcome: Progressing  Goal: Maintain or return to baseline ADL function  Description: INTERVENTIONS:  -  Assess patient's ability to carry out ADLs; assess patient's baseline for ADL function and identify physical deficits which impact ability to perform ADLs (bathing, care of mouth/teeth, toileting, grooming, dressing, etc )  - Assess/evaluate cause of self-care deficits   - Assess range of motion  - Assess patient's mobility; develop plan if impaired  - Assess patient's need for assistive devices and provide as appropriate  - Encourage maximum independence but intervene and supervise when necessary  - Involve family in performance of ADLs  - Assess for home care needs following discharge   - Consider OT consult to assist with ADL evaluation and planning for discharge  - Provide patient education as appropriate  Outcome: Progressing  Goal: Maintains/Returns to pre admission functional level  Description: INTERVENTIONS:  - Perform BMAT or MOVE assessment daily    - Set and communicate daily mobility goal to care team and patient/family/caregiver  - Collaborate with rehabilitation services on mobility goals if consulted  - Perform Range of Motion 3 times a day  - Reposition patient every 2 hours    - Dangle patient 3 times a day  - Stand patient 3 times a day  - Ambulate patient 4 times a day  - Out of bed to chair 4 times a day   - Out of bed for meals 3 times a day  - Out of bed for toileting  - Record patient progress and toleration of activity level   Outcome: Progressing     Problem: DISCHARGE PLANNING  Goal: Discharge to home or other facility with appropriate resources  Description: INTERVENTIONS:  - Identify barriers to discharge w/patient and caregiver  - Arrange for needed discharge resources and transportation as appropriate  - Identify discharge learning needs (meds, wound care, etc )  - Arrange for interpretive services to assist at discharge as needed  - Refer to Case Management Department for coordinating discharge planning if the patient needs post-hospital services based on physician/advanced practitioner order or complex needs related to functional status, cognitive ability, or social support system  Outcome: Progressing     Problem: Knowledge Deficit  Goal: Patient/family/caregiver demonstrates understanding of disease process, treatment plan, medications, and discharge instructions  Description: Complete learning assessment and assess knowledge base  Interventions:  - Provide teaching at level of understanding  - Provide teaching via preferred learning methods  Outcome: Progressing     Problem: MOBILITY - ADULT  Goal: Maintain or return to baseline ADL function  Description: INTERVENTIONS:  -  Assess patient's ability to carry out ADLs; assess patient's baseline for ADL function and identify physical deficits which impact ability to perform ADLs (bathing, care of mouth/teeth, toileting, grooming, dressing, etc )  - Assess/evaluate cause of self-care deficits   - Assess range of motion  - Assess patient's mobility; develop plan if impaired  - Assess patient's need for assistive devices and provide as appropriate  - Encourage maximum independence but intervene and supervise when necessary  - Involve family in performance of ADLs  - Assess for home care needs following discharge   - Consider OT consult to assist with ADL evaluation and planning for discharge  - Provide patient education as appropriate  Outcome: Progressing  Goal: Maintains/Returns to pre admission functional level  Description: INTERVENTIONS:  - Perform BMAT or MOVE assessment daily    - Set and communicate daily mobility goal to care team and patient/family/caregiver     - Collaborate with rehabilitation services on mobility goals if consulted  - Perform Range of Motion 3 times a day  - Reposition patient every 2 hours    - Dangle patient 3 times a day  - Stand patient 3 times a day  - Ambulate patient 4 times a day  - Out of bed to chair 4 times a day   - Out of bed for meals 3 times a day  - Out of bed for toileting  - Record patient progress and toleration of activity level   Outcome: Progressing     Problem: Prexisting or High Potential for Compromised Skin Integrity  Goal: Skin integrity is maintained or improved  Description: INTERVENTIONS:  - Identify patients at risk for skin breakdown  - Assess and monitor skin integrity  - Assess and monitor nutrition and hydration status  - Monitor labs   - Assess for incontinence   - Turn and reposition patient  - Assist with mobility/ambulation  - Relieve pressure over bony prominences  - Avoid friction and shearing  - Provide appropriate hygiene as needed including keeping skin clean and dry  - Evaluate need for skin moisturizer/barrier cream  - Collaborate with interdisciplinary team   - Patient/family teaching  - Consider wound care consult   Outcome: Progressing     Problem: Potential for Falls  Goal: Patient will remain free of falls  Description: INTERVENTIONS:  - Educate patient/family on patient safety including physical limitations  - Instruct patient to call for assistance with activity   - Consult OT/PT to assist with strengthening/mobility   - Keep Call bell within reach  - Keep bed low and locked with side rails adjusted as appropriate  - Keep care items and personal belongings within reach  - Initiate and maintain comfort rounds  - Make Fall Risk Sign visible to staff  - Offer Toileting every 2 Hours, in advance of need  - Initiate/Maintain bed/chair alarm  - Obtain necessary fall risk management equipment: alarms  - Apply yellow socks and bracelet for high fall risk patients  - Consider moving patient to room near nurses station  Outcome: Progressing Problem: METABOLIC, FLUID AND ELECTROLYTES - ADULT  Goal: Electrolytes maintained within normal limits  Description: INTERVENTIONS:  - Monitor labs and assess patient for signs and symptoms of electrolyte imbalances  - Administer electrolyte replacement as ordered  - Monitor response to electrolyte replacements, including repeat lab results as appropriate  - Instruct patient on fluid and nutrition as appropriate  Outcome: Progressing  Goal: Fluid balance maintained  Description: INTERVENTIONS:  - Monitor labs   - Monitor I/O and WT  - Instruct patient on fluid and nutrition as appropriate  - Assess for signs & symptoms of volume excess or deficit  Outcome: Progressing  Goal: Glucose maintained within target range  Description: INTERVENTIONS:  - Monitor Blood Glucose as ordered  - Assess for signs and symptoms of hyperglycemia and hypoglycemia  - Administer ordered medications to maintain glucose within target range  - Assess nutritional intake and initiate nutrition service referral as needed  Outcome: Progressing     Problem: Nutrition/Hydration-ADULT  Goal: Nutrient/Hydration intake appropriate for improving, restoring or maintaining nutritional needs  Description: Monitor and assess patient's nutrition/hydration status for malnutrition  Collaborate with interdisciplinary team and initiate plan and interventions as ordered  Monitor patient's weight and dietary intake as ordered or per policy  Utilize nutrition screening tool and intervene as necessary  Determine patient's food preferences and provide high-protein, high-caloric foods as appropriate       INTERVENTIONS:  - Monitor oral intake, urinary output, labs, and treatment plans  - Assess nutrition and hydration status and recommend course of action  - Evaluate amount of meals eaten  - Assist patient with eating if necessary   - Allow adequate time for meals  - Recommend/ encourage appropriate diets, oral nutritional supplements, and vitamin/mineral supplements  - Order, calculate, and assess calorie counts as needed  - Recommend, monitor, and adjust tube feedings and TPN/PPN based on assessed needs  - Assess need for intravenous fluids  - Provide specific nutrition/hydration education as appropriate  - Include patient/family/caregiver in decisions related to nutrition  Outcome: Progressing

## 2022-04-05 NOTE — PLAN OF CARE
Problem: PAIN - ADULT  Goal: Verbalizes/displays adequate comfort level or baseline comfort level  Description: Interventions:  - Encourage patient to monitor pain and request assistance  - Assess pain using appropriate pain scale  - Administer analgesics based on type and severity of pain and evaluate response  - Implement non-pharmacological measures as appropriate and evaluate response  - Consider cultural and social influences on pain and pain management  - Notify physician/advanced practitioner if interventions unsuccessful or patient reports new pain  Outcome: Progressing     Problem: INFECTION - ADULT  Goal: Absence or prevention of progression during hospitalization  Description: INTERVENTIONS:  - Assess and monitor for signs and symptoms of infection  - Monitor lab/diagnostic results  - Monitor all insertion sites, i e  indwelling lines, tubes, and drains  - Monitor endotracheal if appropriate and nasal secretions for changes in amount and color  - [place] appropriate cooling/warming therapies per order  - Administer medications as ordered  - Instruct and encourage patient and family to use good hand hygiene technique  - Identify and instruct in appropriate isolation precautions for identified infection/condition  Outcome: Progressing  Goal: Absence of fever/infection during neutropenic period  Description: INTERVENTIONS:  - Monitor WBC    Outcome: Progressing     Problem: SAFETY ADULT  Goal: Patient will remain free of falls  Description: INTERVENTIONS:  - Educate patient/family on patient safety including physical limitations  - Instruct patient to call for assistance with activity   - Consult OT/PT to assist with strengthening/mobility   - Keep Call bell within reach  - Keep bed low and locked with side rails adjusted as appropriate  - Keep care items and personal belongings within reach  - Initiate and maintain comfort rounds  - Make Fall Risk Sign visible to staff  - Offer Toileting every 2 Hours, in advance of need  - Initiate/Maintain bed/chair alarm  - Obtain necessary fall risk management equipment: alarms  - Apply yellow socks and bracelet for high fall risk patients  - Consider moving patient to room near nurses station  Outcome: Progressing  Goal: Maintain or return to baseline ADL function  Description: INTERVENTIONS:  -  Assess patient's ability to carry out ADLs; assess patient's baseline for ADL function and identify physical deficits which impact ability to perform ADLs (bathing, care of mouth/teeth, toileting, grooming, dressing, etc )  - Assess/evaluate cause of self-care deficits   - Assess range of motion  - Assess patient's mobility; develop plan if impaired  - Assess patient's need for assistive devices and provide as appropriate  - Encourage maximum independence but intervene and supervise when necessary  - Involve family in performance of ADLs  - Assess for home care needs following discharge   - Consider OT consult to assist with ADL evaluation and planning for discharge  - Provide patient education as appropriate  Outcome: Progressing  Goal: Maintains/Returns to pre admission functional level  Description: INTERVENTIONS:  - Perform BMAT or MOVE assessment daily    - Set and communicate daily mobility goal to care team and patient/family/caregiver  - Collaborate with rehabilitation services on mobility goals if consulted  - Perform Range of Motion 3 times a day  - Reposition patient every 2 hours    - Dangle patient 3 times a day  - Stand patient 3 times a day  - Ambulate patient 4 times a day  - Out of bed to chair 4 times a day   - Out of bed for meals 3 times a day  - Out of bed for toileting  - Record patient progress and toleration of activity level   Outcome: Progressing     Problem: DISCHARGE PLANNING  Goal: Discharge to home or other facility with appropriate resources  Description: INTERVENTIONS:  - Identify barriers to discharge w/patient and caregiver  - Arrange for needed discharge resources and transportation as appropriate  - Identify discharge learning needs (meds, wound care, etc )  - Arrange for interpretive services to assist at discharge as needed  - Refer to Case Management Department for coordinating discharge planning if the patient needs post-hospital services based on physician/advanced practitioner order or complex needs related to functional status, cognitive ability, or social support system  Outcome: Progressing     Problem: Knowledge Deficit  Goal: Patient/family/caregiver demonstrates understanding of disease process, treatment plan, medications, and discharge instructions  Description: Complete learning assessment and assess knowledge base  Interventions:  - Provide teaching at level of understanding  - Provide teaching via preferred learning methods  Outcome: Progressing     Problem: MOBILITY - ADULT  Goal: Maintain or return to baseline ADL function  Description: INTERVENTIONS:  -  Assess patient's ability to carry out ADLs; assess patient's baseline for ADL function and identify physical deficits which impact ability to perform ADLs (bathing, care of mouth/teeth, toileting, grooming, dressing, etc )  - Assess/evaluate cause of self-care deficits   - Assess range of motion  - Assess patient's mobility; develop plan if impaired  - Assess patient's need for assistive devices and provide as appropriate  - Encourage maximum independence but intervene and supervise when necessary  - Involve family in performance of ADLs  - Assess for home care needs following discharge   - Consider OT consult to assist with ADL evaluation and planning for discharge  - Provide patient education as appropriate  Outcome: Progressing  Goal: Maintains/Returns to pre admission functional level  Description: INTERVENTIONS:  - Perform BMAT or MOVE assessment daily    - Set and communicate daily mobility goal to care team and patient/family/caregiver     - Collaborate with rehabilitation services on mobility goals if consulted  - Perform Range of Motion 3 times a day  - Reposition patient every 2 hours    - Dangle patient 3 times a day  - Stand patient 3 times a day  - Ambulate patient 4 times a day  - Out of bed to chair 4 times a day   - Out of bed for meals 3 times a day  - Out of bed for toileting  - Record patient progress and toleration of activity level   Outcome: Progressing     Problem: Prexisting or High Potential for Compromised Skin Integrity  Goal: Skin integrity is maintained or improved  Description: INTERVENTIONS:  - Identify patients at risk for skin breakdown  - Assess and monitor skin integrity  - Assess and monitor nutrition and hydration status  - Monitor labs   - Assess for incontinence   - Turn and reposition patient  - Assist with mobility/ambulation  - Relieve pressure over bony prominences  - Avoid friction and shearing  - Provide appropriate hygiene as needed including keeping skin clean and dry  - Evaluate need for skin moisturizer/barrier cream  - Collaborate with interdisciplinary team   - Patient/family teaching  - Consider wound care consult   Outcome: Progressing     Problem: Potential for Falls  Goal: Patient will remain free of falls  Description: INTERVENTIONS:  - Educate patient/family on patient safety including physical limitations  - Instruct patient to call for assistance with activity   - Consult OT/PT to assist with strengthening/mobility   - Keep Call bell within reach  - Keep bed low and locked with side rails adjusted as appropriate  - Keep care items and personal belongings within reach  - Initiate and maintain comfort rounds  - Make Fall Risk Sign visible to staff  - Offer Toileting every 2 Hours, in advance of need  - Initiate/Maintain bed/chair alarm  - Obtain necessary fall risk management equipment: alarms  - Apply yellow socks and bracelet for high fall risk patients  - Consider moving patient to room near nurses station  Outcome: Progressing Problem: METABOLIC, FLUID AND ELECTROLYTES - ADULT  Goal: Electrolytes maintained within normal limits  Description: INTERVENTIONS:  - Monitor labs and assess patient for signs and symptoms of electrolyte imbalances  - Administer electrolyte replacement as ordered  - Monitor response to electrolyte replacements, including repeat lab results as appropriate  - Instruct patient on fluid and nutrition as appropriate  Outcome: Progressing  Goal: Fluid balance maintained  Description: INTERVENTIONS:  - Monitor labs   - Monitor I/O and WT  - Instruct patient on fluid and nutrition as appropriate  - Assess for signs & symptoms of volume excess or deficit  Outcome: Progressing  Goal: Glucose maintained within target range  Description: INTERVENTIONS:  - Monitor Blood Glucose as ordered  - Assess for signs and symptoms of hyperglycemia and hypoglycemia  - Administer ordered medications to maintain glucose within target range  - Assess nutritional intake and initiate nutrition service referral as needed  Outcome: Progressing     Problem: Nutrition/Hydration-ADULT  Goal: Nutrient/Hydration intake appropriate for improving, restoring or maintaining nutritional needs  Description: Monitor and assess patient's nutrition/hydration status for malnutrition  Collaborate with interdisciplinary team and initiate plan and interventions as ordered  Monitor patient's weight and dietary intake as ordered or per policy  Utilize nutrition screening tool and intervene as necessary  Determine patient's food preferences and provide high-protein, high-caloric foods as appropriate       INTERVENTIONS:  - Monitor oral intake, urinary output, labs, and treatment plans  - Assess nutrition and hydration status and recommend course of action  - Evaluate amount of meals eaten  - Assist patient with eating if necessary   - Allow adequate time for meals  - Recommend/ encourage appropriate diets, oral nutritional supplements, and vitamin/mineral supplements  - Order, calculate, and assess calorie counts as needed  - Recommend, monitor, and adjust tube feedings and TPN/PPN based on assessed needs  - Assess need for intravenous fluids  - Provide specific nutrition/hydration education as appropriate  - Include patient/family/caregiver in decisions related to nutrition  Outcome: Progressing

## 2022-04-05 NOTE — PHYSICAL THERAPY NOTE
PHYSICAL THERAPY NOTE          Patient Name: Yousif Singh  [id number] Date: 4/5/2022 04/05/22 1356   PT Last Visit   PT Visit Date 04/05/22   Note Type   Note Type Treatment   Restrictions/Precautions   Weight Bearing Precautions Per Order No   Other Precautions Fall Risk;Pain;Bed Alarm;Telemetry;O2   General   Family/Caregiver Present Yes   Cognition   Overall Cognitive Status Impaired   Arousal/Participation Alert; Uncooperative   Following Commands Follows one step commands without difficulty   Subjective   Subjective c/o sore L heel  Reports he is fatigued and weak  Doesn't feel strong enough to stand  "Maybe in the morning"   Bed Mobility   Rolling R 3  Moderate assistance   Additional items Assist x 2;Bedrails; Increased time required;Verbal cues   Rolling L 3  Moderate assistance   Additional items Assist x 2;Bedrails; Increased time required;Verbal cues   Supine to Sit 2  Maximal assistance   Additional items Assist x 2;Bedrails; Increased time required;Verbal cues   Sit to Supine 2  Maximal assistance   Additional items Assist x 2; Increased time required;Verbal cues   Additional Comments Incontinent BM, dependent for hygiene  Sat EOB ~ 8 min  Performed balance activities with min/CGA    Transfers   Sit to Stand Unable to assess   Ambulation/Elevation   Gait pattern Not appropriate   Balance   Static Sitting Fair -   Dynamic Sitting Poor +   Endurance Deficit   Endurance Deficit Yes   Activity Tolerance   Activity Tolerance Patient limited by fatigue;Patient limited by pain   Exercises   Heelslides Supine;15 reps;AAROM   Hip Abduction Supine;15 reps;AAROM   Hip Adduction Supine;15 reps;AAROM   Knee AROM Long Arc Quad Sitting;10 reps   Ankle Pumps Supine;Sitting;15 reps   Balance training  sitting balance activities: reaching various levels and across midline with CGA     Assessment   Prognosis Good   Problem List Decreased strength;Decreased endurance; Impaired balance;Decreased mobility; Decreased safety awareness;Decreased skin integrity;Pain   Assessment Pt  seen for PT treatment session this date with interventions consisting of  therapeutic exercises and bed mobility w/ emphasis on improving pt's ability to ambulate  Pt  Requiring (frequent ) cues for sequence and safety  In comparison to previous session, Pt  With improvements in activity tolerance  Mobility limited by weakness and fatigue  Vitals WFL's with 3 l o2  Pt is in need of continued activity in PT to improve strength balance endurance mobility transfers and ambulation with return to maximize LOF  From PT/mobility standpoint, recommendation at time of d/c would be post acute rehab  in order to promote return to PLOF and independence  The patient's AM-[facility] Basic Mobility Inpatient Short Form Raw Score is 6  A Raw score of less than or equal to 16 suggests the patient may benefit from discharge to post-acute rehabilitation services  Please also refer to physical therapy recommendation for safe DC planning  Co-treat with OT this session due to complexity of pt and requires A x 2 to provided skilled interventions  Goals   LTG Expiration Date 04/18/22   Plan   Treatment/Interventions Functional transfer training;LE strengthening/ROM; Therapeutic exercise; Endurance training;Bed mobility;Gait training   Progress Progressing toward goals   Recommendation   PT Discharge Recommendation Post acute rehabilitation services   AM-PAC Basic Mobility Inpatient   Turning in Bed Without Bedrails 1   Lying on Back to Sitting on Edge of Flat Bed 1   Moving Bed to Chair 1   Standing Up From Chair 1   Walk in Room 1   Climb 3-5 Stairs 1   Basic Mobility Inpatient Raw Score 6   Turning Head Towards Sound 3   Follow Simple Instructions 3   Low Function Basic Mobility Raw Score 12   Low Function Basic Mobility Standardized Score 18 33   Highest Level Of Mobility   [facility] [place] Goal 2: Bed activities/Dependent transfer   J[facility]-HLM Highest Level of Mobility 3: Sit at edge of bed   -HLM Goal Achieved Yes   Education   Education Provided Mobility training   Patient Reinforcement needed   End of Consult   Patient Position at End of Consult Supine;Bed/Chair alarm activated; All needs within reach   End of Consult Comments discussed POC with PT

## 2022-04-05 NOTE — PLAN OF CARE
Problem: OCCUPATIONAL THERAPY ADULT  Goal: Performs self-care activities at highest level of function for planned discharge setting  See evaluation for individualized goals  Description: Treatment Interventions: ADL retraining,Functional transfer training,UE strengthening/ROM,Endurance training,Cognitive reorientation,Patient/family training,Equipment evaluation/education,Activityengagement          See flowsheet documentation for full assessment, interventions and recommendations  Note: Limitation: Decreased ADL status,Decreased UE strength,Decreased Safe judgement during ADL,Decreased cognition,Decreased endurance,Decreased self-care trans,Decreased high-level ADLs     Assessment: Patient participated in Skilled OT session this date with interventions consisting of  therapeutic activities to: increase activity tolerance   Co treatment with PTA secondary to complex medical condition of pt, mod-max A of 2 required to achieve and maintain transitional movements, requiring the need of skilled therapeutic intervention of 2 therapists to achieve delivery of services  Patient agreeable to OT treatment session, upon arrival patient was found seated at edge of bed with PT and PCA at bedside  Patient requiring verbal cues for correct technique  Patient continues to be functioning below baseline level, occupational performance remains limited secondary to factors listed above and increased risk for falls and injury  From OT standpoint, recommendation at time of d/c would be Post acute rehabilitation services  The patient's raw score on the AM-PAC Daily Activity inpatient short form is 10, standardized score is  , less than 39 4  Patients at this level are likely to benefit from discharge to post-acute rehabilitation services  Please refer to the recommendation of the Occupational Therapist for safe discharge planning       OT Discharge Recommendation: Post acute rehabilitation services

## 2022-04-05 NOTE — RESPIRATORY THERAPY NOTE
04/04/22 2247   Respiratory Assessment   Assessment Type During-treatment   General Appearance Drowsy   Chest Assessment Chest expansion symmetrical;Trachea midline   Bilateral Breath Sounds Diminished   Resp Comments Patient was sleeping when i went into his room, patient was not in any respiratory distress   Non-Invasive Information   O2 Interface Device Full face mask   Non-Invasive Ventilation Mode BiPAP   SpO2 93 %   $ Pulse Oximetry Spot Check Charge Completed   Non-Invasive Settings   IPAP (cm) 17 cm   EPAP (cm) 6 cm   Rate (Set) 16   FiO2 (%) 32   Pressure Support (cm H2O) 11   Rise Time 3   Inspiratory Time (Set) 1   Non-Invasive Readings   Total Rate 27   Vt (mL) (Mec) 551   MV (Mec) 15 7   Skin Intervention Mask rotated;Skin intact   Non-Invasive Alarms   Insp Pressure High (cm H20) 24   Insp Pressure Low (cm H20) 4   Low Insp Pressure Time (sec) 20 sec   MV Low (L/min) 4   Vt High (mL) 1500   Vt Low (mL) 200   High Resp Rate (BPM) 35 BPM   Low Resp Rate (BPM) 8 BPM   Apnea Interval (sec) 20   Apnea Volume (mL) 10       At 11:30 brooke, patient has ripped his bipap off and will not allow me to place it back on, patient is confused   Patient is now back on his 3 lpm nasal cannula, I have notified his nurse

## 2022-04-05 NOTE — ASSESSMENT & PLAN NOTE
Lab Results   Component Value Date    HGBA1C 9 0 (H) 04/04/2022       Recent Labs     04/05/22  0009 04/05/22  0616 04/05/22  0746 04/05/22  1109   POCGLU 287* 292* 265* 295*       Blood Sugar Average: Last 72 hrs:  (P) 748 [id number]   Poorly-controlled diabetes with significantly elevated hemoglobin A1c few months ago  Continue Accu-Cheks and insulin sliding scale  Due to significant hyperglycemia above 500 present on admission patient received IV fluids and regular insulin  Likely contributed to patient's encephalopathy  Maintain patient on lantus 20 units at night and 10 units mealtime insulin TID, advance as necessary   Increased lantus to 30 daily

## 2022-04-05 NOTE — ASSESSMENT & PLAN NOTE
Lab Results   Component Value Date    EGFR 47 04/05/2022    EGFR 46 04/04/2022    EGFR 44 04/03/2022    CREATININE 1 40 (H) 04/05/2022    CREATININE 1 43 (H) 04/04/2022    CREATININE 1 48 (H) 04/03/2022     Avoid nephrotoxic drugs and hypotension  Continue to monitor creatinine

## 2022-04-06 PROBLEM — F02.81 LATE ONSET ALZHEIMER'S DEMENTIA WITH BEHAVIORAL DISTURBANCE (HCC): Status: ACTIVE | Noted: 2022-04-06

## 2022-04-06 PROBLEM — G30.1 LATE ONSET ALZHEIMER'S DEMENTIA WITH BEHAVIORAL DISTURBANCE (HCC): Status: ACTIVE | Noted: 2022-04-06

## 2022-04-06 LAB
ALBUMIN SERPL BCP-MCNC: 2.6 G/DL (ref 3.5–5)
ALP SERPL-CCNC: 150 U/L (ref 46–116)
ALT SERPL W P-5'-P-CCNC: 18 U/L (ref 12–78)
ANION GAP SERPL CALCULATED.3IONS-SCNC: 10 MMOL/L (ref 4–13)
AST SERPL W P-5'-P-CCNC: 15 U/L (ref 5–45)
BASOPHILS # BLD AUTO: 0.02 THOUSANDS/ΜL (ref 0–0.1)
BASOPHILS NFR BLD AUTO: 0 % (ref 0–1)
BILIRUB SERPL-MCNC: 0.31 MG/DL (ref 0.2–1)
BUN SERPL-MCNC: 17 MG/DL (ref 5–25)
CALCIUM ALBUM COR SERPL-MCNC: 10.4 MG/DL (ref 8.3–10.1)
CALCIUM SERPL-MCNC: 9.3 MG/DL (ref 8.3–10.1)
CHLORIDE SERPL-SCNC: 111 MMOL/L (ref 100–108)
CO2 SERPL-SCNC: 24 MMOL/L (ref 21–32)
CREAT SERPL-MCNC: 1.29 MG/DL (ref 0.6–1.3)
EOSINOPHIL # BLD AUTO: 0.56 THOUSAND/ΜL (ref 0–0.61)
EOSINOPHIL NFR BLD AUTO: 8 % (ref 0–6)
ERYTHROCYTE [DISTWIDTH] IN BLOOD BY AUTOMATED COUNT: 14.7 % (ref 11.6–15.1)
GFR SERPL CREATININE-BSD FRML MDRD: 52 ML/MIN/1.73SQ M
GLUCOSE SERPL-MCNC: 235 MG/DL (ref 65–140)
GLUCOSE SERPL-MCNC: 259 MG/DL (ref 65–140)
GLUCOSE SERPL-MCNC: 262 MG/DL (ref 65–140)
GLUCOSE SERPL-MCNC: 272 MG/DL (ref 65–140)
GLUCOSE SERPL-MCNC: 335 MG/DL (ref 65–140)
GLUCOSE SERPL-MCNC: 346 MG/DL (ref 65–140)
HCT VFR BLD AUTO: 36.1 % (ref 36.5–49.3)
HGB BLD-MCNC: 11.2 G/DL (ref 12–17)
IMM GRANULOCYTES # BLD AUTO: 0.05 THOUSAND/UL (ref 0–0.2)
IMM GRANULOCYTES NFR BLD AUTO: 1 % (ref 0–2)
LYMPHOCYTES # BLD AUTO: 1.59 THOUSANDS/ΜL (ref 0.6–4.47)
LYMPHOCYTES NFR BLD AUTO: 22 % (ref 14–44)
MCH RBC QN AUTO: 26.9 PG (ref 26.8–34.3)
MCHC RBC AUTO-ENTMCNC: 31 G/DL (ref 31.4–37.4)
MCV RBC AUTO: 87 FL (ref 82–98)
MONOCYTES # BLD AUTO: 0.46 THOUSAND/ΜL (ref 0.17–1.22)
MONOCYTES NFR BLD AUTO: 6 % (ref 4–12)
NEUTROPHILS # BLD AUTO: 4.63 THOUSANDS/ΜL (ref 1.85–7.62)
NEUTS SEG NFR BLD AUTO: 63 % (ref 43–75)
NRBC BLD AUTO-RTO: 0 /100 WBCS
PLATELET # BLD AUTO: 311 THOUSANDS/UL (ref 149–390)
PMV BLD AUTO: 10.8 FL (ref 8.9–12.7)
POTASSIUM SERPL-SCNC: 3.5 MMOL/L (ref 3.5–5.3)
PROT SERPL-MCNC: 7.6 G/DL (ref 6.4–8.2)
RBC # BLD AUTO: 4.17 MILLION/UL (ref 3.88–5.62)
SODIUM SERPL-SCNC: 145 MMOL/L (ref 136–145)
WBC # BLD AUTO: 7.31 THOUSAND/UL (ref 4.31–10.16)

## 2022-04-06 PROCEDURE — 80053 COMPREHEN METABOLIC PANEL: CPT | Performed by: PHYSICIAN ASSISTANT

## 2022-04-06 PROCEDURE — 94760 N-INVAS EAR/PLS OXIMETRY 1: CPT

## 2022-04-06 PROCEDURE — 97110 THERAPEUTIC EXERCISES: CPT

## 2022-04-06 PROCEDURE — 97530 THERAPEUTIC ACTIVITIES: CPT

## 2022-04-06 PROCEDURE — 92610 EVALUATE SWALLOWING FUNCTION: CPT | Performed by: NURSE PRACTITIONER

## 2022-04-06 PROCEDURE — 99232 SBSQ HOSP IP/OBS MODERATE 35: CPT | Performed by: PHYSICIAN ASSISTANT

## 2022-04-06 PROCEDURE — 97535 SELF CARE MNGMENT TRAINING: CPT

## 2022-04-06 PROCEDURE — 85025 COMPLETE CBC W/AUTO DIFF WBC: CPT | Performed by: PHYSICIAN ASSISTANT

## 2022-04-06 PROCEDURE — 99221 1ST HOSP IP/OBS SF/LOW 40: CPT | Performed by: PSYCHIATRY & NEUROLOGY

## 2022-04-06 PROCEDURE — 82948 REAGENT STRIP/BLOOD GLUCOSE: CPT

## 2022-04-06 PROCEDURE — 94660 CPAP INITIATION&MGMT: CPT

## 2022-04-06 RX ORDER — DIVALPROEX SODIUM 125 MG/1
250 CAPSULE, COATED PELLETS ORAL EVERY 12 HOURS SCHEDULED
Status: DISCONTINUED | OUTPATIENT
Start: 2022-04-06 | End: 2022-04-12

## 2022-04-06 RX ORDER — INSULIN GLARGINE 100 [IU]/ML
36 INJECTION, SOLUTION SUBCUTANEOUS
Status: DISCONTINUED | OUTPATIENT
Start: 2022-04-06 | End: 2022-04-07

## 2022-04-06 RX ORDER — FUROSEMIDE 10 MG/ML
40 INJECTION INTRAMUSCULAR; INTRAVENOUS ONCE
Status: COMPLETED | OUTPATIENT
Start: 2022-04-06 | End: 2022-04-06

## 2022-04-06 RX ADMIN — CARVEDILOL 12.5 MG: 12.5 TABLET, FILM COATED ORAL at 17:27

## 2022-04-06 RX ADMIN — INSULIN LISPRO 10 UNITS: 100 INJECTION, SOLUTION INTRAVENOUS; SUBCUTANEOUS at 07:39

## 2022-04-06 RX ADMIN — HEPARIN SODIUM 5000 UNITS: 5000 INJECTION INTRAVENOUS; SUBCUTANEOUS at 05:21

## 2022-04-06 RX ADMIN — INSULIN LISPRO 6 UNITS: 100 INJECTION, SOLUTION INTRAVENOUS; SUBCUTANEOUS at 00:10

## 2022-04-06 RX ADMIN — FINASTERIDE 5 MG: 5 TABLET, FILM COATED ORAL at 08:43

## 2022-04-06 RX ADMIN — TAMSULOSIN HYDROCHLORIDE 0.4 MG: 0.4 CAPSULE ORAL at 17:27

## 2022-04-06 RX ADMIN — DIVALPROEX SODIUM 250 MG: 125 CAPSULE, COATED PELLETS ORAL at 21:29

## 2022-04-06 RX ADMIN — NYSTATIN 1 APPLICATION: 100000 POWDER TOPICAL at 15:18

## 2022-04-06 RX ADMIN — INSULIN LISPRO 10 UNITS: 100 INJECTION, SOLUTION INTRAVENOUS; SUBCUTANEOUS at 11:35

## 2022-04-06 RX ADMIN — ATORVASTATIN CALCIUM 40 MG: 40 TABLET, FILM COATED ORAL at 17:27

## 2022-04-06 RX ADMIN — Medication 2000 UNITS: at 08:43

## 2022-04-06 RX ADMIN — NYSTATIN: 100000 POWDER TOPICAL at 21:31

## 2022-04-06 RX ADMIN — FUROSEMIDE 40 MG: 10 INJECTION, SOLUTION INTRAMUSCULAR; INTRAVENOUS at 15:17

## 2022-04-06 RX ADMIN — HEPARIN SODIUM 5000 UNITS: 5000 INJECTION INTRAVENOUS; SUBCUTANEOUS at 21:29

## 2022-04-06 RX ADMIN — DOCUSATE SODIUM 100 MG: 100 CAPSULE, LIQUID FILLED ORAL at 08:43

## 2022-04-06 RX ADMIN — ASPIRIN 81 MG CHEWABLE TABLET 81 MG: 81 TABLET CHEWABLE at 08:43

## 2022-04-06 RX ADMIN — HEPARIN SODIUM 5000 UNITS: 5000 INJECTION INTRAVENOUS; SUBCUTANEOUS at 15:17

## 2022-04-06 RX ADMIN — INSULIN LISPRO 6 UNITS: 100 INJECTION, SOLUTION INTRAVENOUS; SUBCUTANEOUS at 05:27

## 2022-04-06 RX ADMIN — CARVEDILOL 12.5 MG: 12.5 TABLET, FILM COATED ORAL at 08:43

## 2022-04-06 RX ADMIN — INSULIN LISPRO 10 UNITS: 100 INJECTION, SOLUTION INTRAVENOUS; SUBCUTANEOUS at 16:09

## 2022-04-06 RX ADMIN — INSULIN GLARGINE 36 UNITS: 100 INJECTION, SOLUTION SUBCUTANEOUS at 21:29

## 2022-04-06 RX ADMIN — POTASSIUM CHLORIDE 20 MEQ: 1500 TABLET, EXTENDED RELEASE ORAL at 08:43

## 2022-04-06 RX ADMIN — AMLODIPINE BESYLATE 10 MG: 10 TABLET ORAL at 08:43

## 2022-04-06 RX ADMIN — DOCUSATE SODIUM 100 MG: 100 CAPSULE, LIQUID FILLED ORAL at 17:27

## 2022-04-06 RX ADMIN — NYSTATIN: 100000 POWDER TOPICAL at 08:42

## 2022-04-06 NOTE — PLAN OF CARE
Problem: OCCUPATIONAL THERAPY ADULT  Goal: Performs self-care activities at highest level of function for planned discharge setting  See evaluation for individualized goals  Description: Treatment Interventions: ADL retraining,Functional transfer training,UE strengthening/ROM,Endurance training,Cognitive reorientation,Patient/family training,Equipment evaluation/education,Activityengagement          See flowsheet documentation for full assessment, interventions and recommendations  Outcome: Progressing  Note: Limitation: Decreased ADL status,Decreased UE strength,Decreased Safe judgement during ADL,Decreased cognition,Decreased endurance,Decreased self-care trans,Decreased high-level ADLs     Assessment: Patient participated in Skilled OT session this date with interventions consisting of ADL re training with the use of correct body mechnaics and  therapeutic activities to: increase activity tolerance   Patient agreeable to OT treatment session, upon arrival patient was found supine in bed  Bed rolling R to L with use of side rail and Mod Ax1, Supine to sit txfrs with Max Ax2, sitting balance EOB with S approx 5 minutes, sit to stand txfrs from bed with Max Ax2 for 3x with standing tolerance < 30 seconds each stand  Sit to supine Max Ax2, boosting up on bed with Max Ax2 with use of side rails, LB dressing NTD, UE dressing Min A to tie hospital gown  Patient requiring verbal cues for safety and verbal cues for correct technique  Patient continues to be functioning below baseline level, occupational performance remains limited secondary to factors listed above and increased risk for falls and injury  From OT standpoint, recommendation at time of d/c would be Post acute rehabilitation services  The patient's raw score on the AM-PAC Daily Activity inpatient short form is 11, standardized score is 29 04, less than 39 4   Patients at this level are likely to benefit from discharge to post-acute rehabilitation services  Please refer to the recommendation of the Occupational Therapist for safe discharge planning       OT Discharge Recommendation: Post acute rehabilitation services

## 2022-04-06 NOTE — ASSESSMENT & PLAN NOTE
Lab Results   Component Value Date    EGFR 52 04/06/2022    EGFR 47 04/05/2022    EGFR 46 04/04/2022    CREATININE 1 29 04/06/2022    CREATININE 1 40 (H) 04/05/2022    CREATININE 1 43 (H) 04/04/2022     Avoid nephrotoxic drugs and hypotension  Continue to monitor creatinine

## 2022-04-06 NOTE — ASSESSMENT & PLAN NOTE
· According to the family members patient is very altered compared to his baseline  · Most likely multifactorial secondary to hypercarbia, hyperglycemia and  possible opioid overdose (patient chronic opioid dependent and accidentally overdosed himself few months ago)  · Patient was combative upon EMS arrival so he received few doses of benzos and became more lethargic  · Due to transit episodes of hypoxia and evidence of hypercapnia on VBG he was placed on BiPAP  · CT of the head unremarkable for intracranial pathology  If no AMS resolution within 24 hours will consider MRI of the brain - patient did not tolerate and does not fit in machine, will instead repeat CT head at this time which was negative on 4/5  · CT of the chest showed no pulmonary embolism but evidence of possible pneumonia so will treat empirically for CAP  · Follow-up pneumonia workup follow up culture reports and this completed antibiotics accordingly  · Continue to monitor procalcitonin trend - negative x 3 can discontinue abx  · Wound care consult  · Continue BiPAP - patient only tolerating for about an hour at a time then taking it off himself  · Follow-up lower extremity Doppler - normal    Patient much improved as of 4/6 and back to baseline per family  Likely hospital delirium and sundowning's occurring at night  Appreciate psych input  Patient medically clear for rehab vs inpatient psych

## 2022-04-06 NOTE — ASSESSMENT & PLAN NOTE
Likely contributing to patient's aggression and confusion that worsens at night  Psychiatry evaluated the patient due to his homicidal threats made on 4/5  We will trial depakote sprinkles 250 BID  Should behavioral disturbances persist, patient would need inpatient psychiatric placement when medically cleared

## 2022-04-06 NOTE — PLAN OF CARE
Problem: PAIN - ADULT  Goal: Verbalizes/displays adequate comfort level or baseline comfort level  Description: Interventions:  - Encourage patient to monitor pain and request assistance  - Assess pain using appropriate pain scale  - Administer analgesics based on type and severity of pain and evaluate response  - Implement non-pharmacological measures as appropriate and evaluate response  - Consider cultural and social influences on pain and pain management  - Notify physician/advanced practitioner if interventions unsuccessful or patient reports new pain  Outcome: Progressing     Problem: INFECTION - ADULT  Goal: Absence or prevention of progression during hospitalization  Description: INTERVENTIONS:  - Assess and monitor for signs and symptoms of infection  - Monitor lab/diagnostic results  - Monitor all insertion sites, i e  indwelling lines, tubes, and drains  - Monitor endotracheal if appropriate and nasal secretions for changes in amount and color  - [place] appropriate cooling/warming therapies per order  - Administer medications as ordered  - Instruct and encourage patient and family to use good hand hygiene technique  - Identify and instruct in appropriate isolation precautions for identified infection/condition  Outcome: Progressing  Goal: Absence of fever/infection during neutropenic period  Description: INTERVENTIONS:  - Monitor WBC    Outcome: Progressing     Problem: SAFETY ADULT  Goal: Patient will remain free of falls  Description: INTERVENTIONS:  - Educate patient/family on patient safety including physical limitations  - Instruct patient to call for assistance with activity   - Consult OT/PT to assist with strengthening/mobility   - Keep Call bell within reach  - Keep bed low and locked with side rails adjusted as appropriate  - Keep care items and personal belongings within reach  - Initiate and maintain comfort rounds  - Make Fall Risk Sign visible to staff  - Offer Toileting every 2 Hours, in advance of need  - Initiate/Maintain bed/chair alarm  - Obtain necessary fall risk management equipment: alarms  - Apply yellow socks and bracelet for high fall risk patients  - Consider moving patient to room near nurses station  Outcome: Progressing  Goal: Maintain or return to baseline ADL function  Description: INTERVENTIONS:  -  Assess patient's ability to carry out ADLs; assess patient's baseline for ADL function and identify physical deficits which impact ability to perform ADLs (bathing, care of mouth/teeth, toileting, grooming, dressing, etc )  - Assess/evaluate cause of self-care deficits   - Assess range of motion  - Assess patient's mobility; develop plan if impaired  - Assess patient's need for assistive devices and provide as appropriate  - Encourage maximum independence but intervene and supervise when necessary  - Involve family in performance of ADLs  - Assess for home care needs following discharge   - Consider OT consult to assist with ADL evaluation and planning for discharge  - Provide patient education as appropriate  Outcome: Progressing  Goal: Maintains/Returns to pre admission functional level  Description: INTERVENTIONS:  - Perform BMAT or MOVE assessment daily    - Set and communicate daily mobility goal to care team and patient/family/caregiver  - Collaborate with rehabilitation services on mobility goals if consulted  - Perform Range of Motion 3 times a day  - Reposition patient every 2 hours    - Dangle patient 3 times a day  - Stand patient 3 times a day  - Ambulate patient 4 times a day  - Out of bed to chair 4 times a day   - Out of bed for meals 3 times a day  - Out of bed for toileting  - Record patient progress and toleration of activity level   Outcome: Progressing     Problem: DISCHARGE PLANNING  Goal: Discharge to home or other facility with appropriate resources  Description: INTERVENTIONS:  - Identify barriers to discharge w/patient and caregiver  - Arrange for needed discharge resources and transportation as appropriate  - Identify discharge learning needs (meds, wound care, etc )  - Arrange for interpretive services to assist at discharge as needed  - Refer to Case Management Department for coordinating discharge planning if the patient needs post-hospital services based on physician/advanced practitioner order or complex needs related to functional status, cognitive ability, or social support system  Outcome: Progressing     Problem: Knowledge Deficit  Goal: Patient/family/caregiver demonstrates understanding of disease process, treatment plan, medications, and discharge instructions  Description: Complete learning assessment and assess knowledge base  Interventions:  - Provide teaching at level of understanding  - Provide teaching via preferred learning methods  Outcome: Progressing     Problem: MOBILITY - ADULT  Goal: Maintain or return to baseline ADL function  Description: INTERVENTIONS:  -  Assess patient's ability to carry out ADLs; assess patient's baseline for ADL function and identify physical deficits which impact ability to perform ADLs (bathing, care of mouth/teeth, toileting, grooming, dressing, etc )  - Assess/evaluate cause of self-care deficits   - Assess range of motion  - Assess patient's mobility; develop plan if impaired  - Assess patient's need for assistive devices and provide as appropriate  - Encourage maximum independence but intervene and supervise when necessary  - Involve family in performance of ADLs  - Assess for home care needs following discharge   - Consider OT consult to assist with ADL evaluation and planning for discharge  - Provide patient education as appropriate  Outcome: Progressing  Goal: Maintains/Returns to pre admission functional level  Description: INTERVENTIONS:  - Perform BMAT or MOVE assessment daily    - Set and communicate daily mobility goal to care team and patient/family/caregiver     - Collaborate with rehabilitation services on mobility goals if consulted  - Perform Range of Motion 3 times a day  - Reposition patient every 2 hours    - Dangle patient 3 times a day  - Stand patient 3 times a day  - Ambulate patient 4 times a day  - Out of bed to chair 4 times a day   - Out of bed for meals 3 times a day  - Out of bed for toileting  - Record patient progress and toleration of activity level   Outcome: Progressing     Problem: Prexisting or High Potential for Compromised Skin Integrity  Goal: Skin integrity is maintained or improved  Description: INTERVENTIONS:  - Identify patients at risk for skin breakdown  - Assess and monitor skin integrity  - Assess and monitor nutrition and hydration status  - Monitor labs   - Assess for incontinence   - Turn and reposition patient  - Assist with mobility/ambulation  - Relieve pressure over bony prominences  - Avoid friction and shearing  - Provide appropriate hygiene as needed including keeping skin clean and dry  - Evaluate need for skin moisturizer/barrier cream  - Collaborate with interdisciplinary team   - Patient/family teaching  - Consider wound care consult   Outcome: Progressing     Problem: Potential for Falls  Goal: Patient will remain free of falls  Description: INTERVENTIONS:  - Educate patient/family on patient safety including physical limitations  - Instruct patient to call for assistance with activity   - Consult OT/PT to assist with strengthening/mobility   - Keep Call bell within reach  - Keep bed low and locked with side rails adjusted as appropriate  - Keep care items and personal belongings within reach  - Initiate and maintain comfort rounds  - Make Fall Risk Sign visible to staff  - Offer Toileting every 2 Hours, in advance of need  - Initiate/Maintain bed/chair alarm  - Obtain necessary fall risk management equipment: alarms  - Apply yellow socks and bracelet for high fall risk patients  - Consider moving patient to room near nurses station  Outcome: Progressing Problem: METABOLIC, FLUID AND ELECTROLYTES - ADULT  Goal: Electrolytes maintained within normal limits  Description: INTERVENTIONS:  - Monitor labs and assess patient for signs and symptoms of electrolyte imbalances  - Administer electrolyte replacement as ordered  - Monitor response to electrolyte replacements, including repeat lab results as appropriate  - Instruct patient on fluid and nutrition as appropriate  Outcome: Progressing  Goal: Fluid balance maintained  Description: INTERVENTIONS:  - Monitor labs   - Monitor I/O and WT  - Instruct patient on fluid and nutrition as appropriate  - Assess for signs & symptoms of volume excess or deficit  Outcome: Progressing  Goal: Glucose maintained within target range  Description: INTERVENTIONS:  - Monitor Blood Glucose as ordered  - Assess for signs and symptoms of hyperglycemia and hypoglycemia  - Administer ordered medications to maintain glucose within target range  - Assess nutritional intake and initiate nutrition service referral as needed  Outcome: Progressing     Problem: Nutrition/Hydration-ADULT  Goal: Nutrient/Hydration intake appropriate for improving, restoring or maintaining nutritional needs  Description: Monitor and assess patient's nutrition/hydration status for malnutrition  Collaborate with interdisciplinary team and initiate plan and interventions as ordered  Monitor patient's weight and dietary intake as ordered or per policy  Utilize nutrition screening tool and intervene as necessary  Determine patient's food preferences and provide high-protein, high-caloric foods as appropriate       INTERVENTIONS:  - Monitor oral intake, urinary output, labs, and treatment plans  - Assess nutrition and hydration status and recommend course of action  - Evaluate amount of meals eaten  - Assist patient with eating if necessary   - Allow adequate time for meals  - Recommend/ encourage appropriate diets, oral nutritional supplements, and vitamin/mineral supplements  - Order, calculate, and assess calorie counts as needed  - Recommend, monitor, and adjust tube feedings and TPN/PPN based on assessed needs  - Assess need for intravenous fluids  - Provide specific nutrition/hydration education as appropriate  - Include patient/family/caregiver in decisions related to nutrition  Outcome: Progressing

## 2022-04-06 NOTE — ASSESSMENT & PLAN NOTE
Next appt NONE  Last appt 4/25/18    Refill request for   Disp Refills Start End    HYDROcodone-acetaminophen (NORCO) 5-325 MG per tablet 60 tablet 0 5/2/2018 5/5/2018    Sig - Route:  Take 1-2 tablets by mouth every 6 hours as needed for Pain. - Oral        Refill unable to be completed per standing protocol due to; NONPROTOCOL & HISTORICAL MEDICATION  Orders pended, and routed to provider for approval. Secondary to morbid obesity obstructive sleep apnea   Management as above

## 2022-04-06 NOTE — OCCUPATIONAL THERAPY NOTE
Occupational Therapy Progress Note     Patient Name: Rigo Hernandez  Today's Date: 4/6/2022  Problem List  Principal Problem:    Acute metabolic encephalopathy  Active Problems:    CKD (chronic kidney disease) stage 3, GFR 30-59 ml/min ([facility])    Chronic diastolic CHF (congestive heart failure) ([place] [address] )    Morbid obesity with BMI of 45 0-49 9, adult ([place] [address] )    Type 2 diabetes mellitus with hyperglycemia, with long-term current use of insulin ([facility])    Opiate dependence, continuous ([facility])    Obstructive sleep apnea on CPAP    Acute respiratory failure with hypoxia and hypercapnia ([place] [address] )        04/06/22 1422   OT Last Visit   OT Visit Date 04/06/22   Note Type   Note Type Treatment   Restrictions/Precautions   Weight Bearing Precautions Per Order No   Other Precautions Bed Alarm; Fall Risk;O2   Pain Assessment   Pain Assessment Tool 0-10   Pain Score No Pain   ADL   UB Dressing Assistance 4  Minimal Assistance   UB Dressing Deficit Setup; Increased time to complete; Fasteners   Toileting Assistance  1  Total Assistance   Toileting Deficit Verbal cueing;Perineal hygiene   Toileting Comments Pt required TA to kelly brief  Bed Mobility   Rolling R 3  Moderate assistance   Additional items Assist x 1; Increased time required;Verbal cues; Bedrails   Rolling L 3  Moderate assistance   Additional items Assist x 1; Increased time required;Verbal cues; Bedrails   Supine to Sit 2  Maximal assistance   Additional items Assist x 2; Increased time required;Verbal cues;LE management;HOB elevated; Bedrails   Sit to Supine 2  Maximal assistance   Additional items Assist x 2; Increased time required;Verbal cues;LE management; Bedrails   Additional Comments O2 on room air throughout [facility]    Transfers   Sit to Stand 2  Maximal assistance   Additional items Assist x 2; Increased time required;Verbal cues   Stand to Sit 2  Maximal assistance   Additional items Assist x 2; Increased time required;Verbal cues   Additional Comments Pt sat EOB while on video call with use of side rail and SBA approx 5 minutes with no LOB  Use of RW with patients standing tolerance < 30 seconds each stand with patient unable to advance forward at this time  Functional Mobility   Additional Comments Unable assess at this time   Cognition   Overall Cognitive Status Impaired   Arousal/Participation Alert   Attention Difficulty attending to directions   Orientation Level Oriented to person;Oriented to time   Memory Decreased long term memory   Following Commands Follows one step commands without difficulty   Activity Tolerance   Activity Tolerance Patient limited by fatigue   Assessment   Assessment Patient participated in Skilled OT session this date with interventions consisting of ADL re training with the use of correct body mechnaics and  therapeutic activities to: increase activity tolerance   Patient agreeable to OT treatment session, upon arrival patient was found supine in bed  Bed rolling R to L with use of side rail and Mod Ax1, Supine to sit txfrs with Max Ax2, sitting balance EOB with S approx 5 minutes, sit to stand txfrs from bed with Max Ax2 for 3x with standing tolerance < 30 seconds each stand  Sit to supine Max Ax2, boosting up on bed with Max Ax2 with use of side rails, LB dressing NTD, UE dressing Min A to tie hospital gown  Patient requiring verbal cues for safety and verbal cues for correct technique  Patient continues to be functioning below baseline level, occupational performance remains limited secondary to factors listed above and increased risk for falls and injury  From OT standpoint, recommendation at time of d/c would be Post acute rehabilitation services  The patient's raw score on the AM-PAC Daily Activity inpatient short form is 11, standardized score is 29 04, less than 39 4  Patients at this level are likely to benefit from discharge to post-acute rehabilitation services   Please refer to the recommendation of the Occupational Therapist for safe discharge planning  Plan   Goal Expiration Date 04/18/22   OT Treatment Day 1   OT Frequency 3-5x/wk   Recommendation   OT Discharge Recommendation Post acute rehabilitation services   AM-PAC Daily Activity Inpatient   Lower Body Dressing 1   Bathing 1   Toileting 1   Upper Body Dressing 3   Grooming 2   Eating 3   Daily Activity Raw Score 11   Daily Activity Standardized Score (Calc for Raw Score >=11) 29 04   Turning Head Towards Sound 4   Follow Simple Instructions 4   Low Function Daily Activity Raw Score 19   Low Function Daily Activity Standardized Score 31 34   AM-PAC Applied Cognition Inpatient   Following a Speech/Presentation 3   Understanding Ordinary Conversation 3   Taking Medications 2   Remembering Where Things Are Placed or Put Away 2   Remembering List of 4-5 Errands 2   Taking Care of Complicated Tasks 2   Applied Cognition Raw Score 14   Applied Cognition Standardized Score 32 02     Pt left supine on bed with all needs in reach and patients daughter present throughout treatment

## 2022-04-06 NOTE — PLAN OF CARE
Problem: PHYSICAL THERAPY ADULT  Goal: Performs mobility at highest level of function for planned discharge setting  See evaluation for individualized goals  Description: Treatment/Interventions: Functional transfer training,LE strengthening/ROM,Therapeutic exercise,Endurance training,Bed mobility,Gait training          See flowsheet documentation for full assessment, interventions and recommendations  Outcome: Progressing  Note: Prognosis: Guarded  Problem List: Decreased strength,Decreased endurance,Impaired balance,Decreased mobility,Impaired judgement,Decreased safety awareness,Obesity,Pain  Assessment: Pt  seen for PT treatment session this date with interventions consisting of  therapeutic exercise and  bed mobility w/ emphasis on improving pt's strength, endurance and functional mobility  In comparison to previous session, Pt  With decrease in activity tolerance  C/o headache and fatigue  Eyes closed for most of session  Pt is in need of continued activity in PT to improve strength balance endurance mobility transfers and ambulation with return to maximize LOF  From PT/mobility standpoint, recommendation at time of d/c would be post acute rehab  in order to promote return to PLOF and independence  The patient's AM-PAC Basic Mobility Inpatient Short Form Raw Score is 6  A Raw score of less than or equal to 16 suggests the patient may benefit from discharge to post-acute rehabilitation services  Please also refer to physical therapy recommendation for safe DC planning  PT Discharge Recommendation: Post acute rehabilitation services          See flowsheet documentation for full assessment

## 2022-04-06 NOTE — CASE MANAGEMENT
Case Management Discharge Planning Note    Patient name Latia Goss /[id number] MRN [id number]  : 1945 Date 2022       Current Admission Date: 2022  Current Admission Diagnosis:Acute metabolic encephalopathy   Patient Active Problem List    Diagnosis Date Noted    Acute metabolic encephalopathy     Hypoxia 2022    Acute respiratory failure with hypoxia and hypercapnia ([facility] ) 2022    Constipation 2021    Obstructive sleep apnea on CPAP 04/10/2021    Inguinal lymphadenopathy 2021    Bilateral pleural effusion 2021    Atelectasis 2021    Acute on chronic diastolic [facility]congest[facility]    Multiple renal cysts 2021    Renal calculus 2021    Venous stasis dermatitis of both lower extremities 2021    Lower extremity weakness 2021    Cigarette nicotine dependence in remission 2021    Pyuria 03/10/2021    Microscopic hematuria 03/10/2021    Multiple pulmonary nodules 03/10/2021    Gait instability 03/10/2021    Abnormal EKG 03/10/2021    Elevated d-dimer 2021    Elevated parathyroid hormone 2021    Accelerated hypertension 2021    Hypercalcemia 2021    Open wound of right lower extremity 2021    Cellulitis of right lower extremity 2021    Elevated alkaline phosphatase level 2021    Elevated TSH 2019    Pressure injury of skin of right buttock 2019    Ambulatory dysfunction 2019    UTI (urinary tract infection) 2019    Neural foraminal stenosis of cervical spine 2019    Acute pain of right lower extremity 2019    Paresthesia of left upper extremity 2019    Hypoglycemia 2019    Acute cystitis 2019    Opiate dependence, continuous [facility] 2019    Uncontrolled type 2 diabetes mellitus with hyperglycemia, with long-term current use of insulin ([facility] ) 10/04/2018  Acute kidney injury ([place] [address] ) 10/04/2018    Dehydration with hyponatremia 10/04/2018    Type 2 diabetes mellitus with hyperglycemia, with long-term current use of insulin ([place] [address] ) 71/01/3552    Complicated UTI (urinary tract infection) 10/03/2018    Dyspnea on exertion 12/27/2016    Type 2 diabetes mellitus with hypoglycemia without coma, with long-term current use of insulin ([place] [address] ) 12/27/2016    CKD (chronic kidney disease) stage 3, GFR 30-59 ml/min ([facility]) 12/27/2016    Chronic diastolic CHF (congestive heart failure) ([place] [address] ) 12/27/2016    Morbid obesity with BMI of 45 0-49 9, adult ([place] [address] ) 12/27/2016    Essential hypertension 12/27/2016      LOS (days): 3  Geometric Mean LOS (GMLOS) (days): 3 60  Days to GMLOS:0 2     OBJECTIVE:  Risk of Unplanned Readmission Score: 27         Current admission status: Inpatient   Preferred Pharmacy:   [facility] [place] - [address] [facility] [facility], [address] [facility], [address]  [address] [place] [address]  Phone: [phone] Fax: [address] [place] [address]  Phone: [phone] Fax: [phone]    Primary Care Provider: Maikol Salazar MD    Primary Insurance: [facility]  Secondary Insurance:     DISCHARGE DETAILS:  The 5th floor is unable to accept the patient  I called patient's wife and notified her of Same  Pt's wife would like me to send a referral to [facility]  Referral sent as requested

## 2022-04-06 NOTE — TELEMEDICINE
TeleConsultation - [address] 76 y o  male MRN: [id number]  Unit/Bed#: 403-01 Encounter: [id number]        REQUIRED DOCUMENTATION:     1  This service was provided via Telemedicine  2  Provider located at [place]  3  TeleMed provider: Erasto Klein  4  Identify all parties in room with patient during tele consult:  Patient  5  Patient was then informed that this was a Telemedicine visit and that the exam was being conducted confidentially over secure lines  My office door was closed  No one else was in the room  Patient acknowledged consent and understanding of privacy and security of the Telemedicine visit, and gave us permission to have the assistant stay in the room in order to assist with the history and to conduct the exam   I informed the patient that I have reviewed their record in Epic and presented the opportunity for them to ask any questions regarding the visit today  The patient agreed to participate  Assessment/Plan     Assessment:  49-year-old male demonstrating significant mood lability with agitation and threats of aggression associated with acute metabolic encephalopathy/delirium most likely secondary to hypercarbia, hyperglycemia and possible opioid overdose  Plan:   Risks, benefits and possible side effects of Medications:   Patient does not verbalize understanding at this time and will require further explanation  If the patient has not shown significant improvement in his behavioral disturbance by the time he is medically cleared, then inpatient psychiatric treatment will be indicated for provision of precautions, further diagnostic evaluation and treatment stabilization  In the meantime recommend to consider adding Depakote Sprinkles 250 mg p o  twice daily as mood stabilizer for his mood lability and behavioral disturbances  Re-consult Psychiatry as needed      Chief Complaint:  Patient offers no complaints    History of Present Illness Reason for Consult / Principal Problem:  Acute metabolic encephalopathy with homicidal ideation    64-year-old male demonstrating significant mood lability with agitation and threats of aggression associated with acute metabolic encephalopathy/delirium most likely secondary to hypercarbia, hyperglycemia and possible opioid overdose  The patient had presented to the emergency department with provider documented the following: Won Bray is a 76 y o  male with PMH of diabetes mellitus, uncontrolled, coronary artery disease, CHF, CKD, prior stroke, chronic opioid dependence due to chronic pain and chronic opioid therapy, presenting with altered mental status  Patient resides at home, he is family called 911 after patient has become progressively more confused and agitated  This started yesterday morning  He has had confusion and has not been able to ambulate  When he got more confused and weak today, wife called 911  To EMS, patient was confused and combative  He received IM Ativan followed by Versed  Following this, they were able to extricate him from the house but he did desaturate likely due to sedation  He had a left naris nasal trumpet placed and was placed on non-rebreather mask  Patient arrives confused, picking at monitoring devices and fighting off attempt to place an IV  He is unable to provide history of present illness  Per wife, such confusion has never happened before  He does not drink alcohol  Medical record reviewed, patient had accidental opioid overdose on 12/29/2021 for which he was seen at [facility]       Nursing reports that the patient continues to show significant mood lability  At times he is more pleasant and cooperative and other times he is extremely agitated and has made homicidal threats  Mental status examination:  Patient was seated during the evaluation  He made good eye contact  Affect was constricted but pleasant    He was oriented to person and date of birth and the current month and year  He has a tailored for some period time when asked where he was located he eventually gave the name of a medical center  He currently was delayed in his response to questions  He made good eye contact  He denied having any concerns  He denied any disturbance of mood  He denies suicidal homicidal ideation  He denied hallucinations and other psychotic features  At the conclusion of the evaluation he thanked me for my visit  He has demonstrated impaired insight and judgment  Inpatient consult to Psychiatry  Consult performed by: Chanel Jauregui  Consult ordered by: Marcin West PA-C            Past Medical History:   Diagnosis Date    Cataract     [facility]congest[facility]) ([facility] )     chronic diastolic CHF    Coronary artery disease     Diabetes mellitus ([place] [address] )     Glaucoma     Hyperlipidemia     Hypertension     Neuropathy     Obesity     Renal disorder     chronic kidney disease stage 3     Sleep apnea     Stroke ([place] [address] )     TIA (transient ischemic attack)     Uncontrolled type 2 diabetes mellitus with hyperglycemia, with long-term current use of insulin ([place] [address] ) 10/4/2018       Medical Review Of Systems:  Review of Systems    Meds/Allergies   all current active meds have been reviewed  No Known Allergies    Objective   Vital signs in last 24 hours:  Temp:  [97 6 °F (36 4 °C)-98 3 °F (36 8 °C)] 98 3 °F (36 8 °C)  HR:  [64-76] 73  Resp:  [20-22] 20  BP: (142-163)/(63-87) 163/85  FiO2 (%):  [32] 32      Intake/Output Summary (Last 24 hours) at 4/6/2022 1415  Last data filed at 4/6/2022 1201  Gross per 24 hour   Intake 480 ml   Output --   Net 480 ml         Lab Results:  Reviewed  Imaging Studies:  Reviewed  EKG, Pathology, and Other Studies:  Reviewed    Code Status: Level 3 - DNAR and DNI  Advance Directive and Living Will: Yes    Power of :    POLST:      Counseling / Coordination of Care  Total floor / unit time spent today 30 minutes   Greater than 50% of total time was spent with the patient and / or family counseling and / or coordination of care  A description of the counseling / coordination of care:  Chart review, patient evaluation, coordination communication with staff, nursing and provider

## 2022-04-06 NOTE — PLAN OF CARE
Problem: PAIN - ADULT  Goal: Verbalizes/displays adequate comfort level or baseline comfort level  Description: Interventions:  - Encourage patient to monitor pain and request assistance  - Assess pain using appropriate pain scale  - Administer analgesics based on type and severity of pain and evaluate response  - Implement non-pharmacological measures as appropriate and evaluate response  - Consider cultural and social influences on pain and pain management  - Notify physician/advanced practitioner if interventions unsuccessful or patient reports new pain  Outcome: Progressing     Problem: INFECTION - ADULT  Goal: Absence or prevention of progression during hospitalization  Description: INTERVENTIONS:  - Assess and monitor for signs and symptoms of infection  - Monitor lab/diagnostic results  - Monitor all insertion sites, i e  indwelling lines, tubes, and drains  - Monitor endotracheal if appropriate and nasal secretions for changes in amount and color  - [place] appropriate cooling/warming therapies per order  - Administer medications as ordered  - Instruct and encourage patient and family to use good hand hygiene technique  - Identify and instruct in appropriate isolation precautions for identified infection/condition  Outcome: Progressing  Goal: Absence of fever/infection during neutropenic period  Description: INTERVENTIONS:  - Monitor WBC    Outcome: Progressing     Problem: SAFETY ADULT  Goal: Patient will remain free of falls  Description: INTERVENTIONS:  - Educate patient/family on patient safety including physical limitations  - Instruct patient to call for assistance with activity   - Consult OT/PT to assist with strengthening/mobility   - Keep Call bell within reach  - Keep bed low and locked with side rails adjusted as appropriate  - Keep care items and personal belongings within reach  - Initiate and maintain comfort rounds  - Make Fall Risk Sign visible to staff  - Offer Toileting every 2 Hours, in advance of need  - Initiate/Maintain bed/chair alarm  - Obtain necessary fall risk management equipment: alarms  - Apply yellow socks and bracelet for high fall risk patients  - Consider moving patient to room near nurses station  Outcome: Progressing  Goal: Maintain or return to baseline ADL function  Description: INTERVENTIONS:  -  Assess patient's ability to carry out ADLs; assess patient's baseline for ADL function and identify physical deficits which impact ability to perform ADLs (bathing, care of mouth/teeth, toileting, grooming, dressing, etc )  - Assess/evaluate cause of self-care deficits   - Assess range of motion  - Assess patient's mobility; develop plan if impaired  - Assess patient's need for assistive devices and provide as appropriate  - Encourage maximum independence but intervene and supervise when necessary  - Involve family in performance of ADLs  - Assess for home care needs following discharge   - Consider OT consult to assist with ADL evaluation and planning for discharge  - Provide patient education as appropriate  Outcome: Progressing  Goal: Maintains/Returns to pre admission functional level  Description: INTERVENTIONS:  - Perform BMAT or MOVE assessment daily    - Set and communicate daily mobility goal to care team and patient/family/caregiver  - Collaborate with rehabilitation services on mobility goals if consulted  - Perform Range of Motion 3 times a day  - Reposition patient every 2 hours    - Dangle patient 3 times a day  - Stand patient 3 times a day  - Ambulate patient 4 times a day  - Out of bed to chair 4 times a day   - Out of bed for meals 3 times a day  - Out of bed for toileting  - Record patient progress and toleration of activity level   Outcome: Progressing     Problem: DISCHARGE PLANNING  Goal: Discharge to home or other facility with appropriate resources  Description: INTERVENTIONS:  - Identify barriers to discharge w/patient and caregiver  - Arrange for needed discharge resources and transportation as appropriate  - Identify discharge learning needs (meds, wound care, etc )  - Arrange for interpretive services to assist at discharge as needed  - Refer to Case Management Department for coordinating discharge planning if the patient needs post-hospital services based on physician/advanced practitioner order or complex needs related to functional status, cognitive ability, or social support system  Outcome: Progressing     Problem: Knowledge Deficit  Goal: Patient/family/caregiver demonstrates understanding of disease process, treatment plan, medications, and discharge instructions  Description: Complete learning assessment and assess knowledge base  Interventions:  - Provide teaching at level of understanding  - Provide teaching via preferred learning methods  Outcome: Progressing     Problem: MOBILITY - ADULT  Goal: Maintain or return to baseline ADL function  Description: INTERVENTIONS:  -  Assess patient's ability to carry out ADLs; assess patient's baseline for ADL function and identify physical deficits which impact ability to perform ADLs (bathing, care of mouth/teeth, toileting, grooming, dressing, etc )  - Assess/evaluate cause of self-care deficits   - Assess range of motion  - Assess patient's mobility; develop plan if impaired  - Assess patient's need for assistive devices and provide as appropriate  - Encourage maximum independence but intervene and supervise when necessary  - Involve family in performance of ADLs  - Assess for home care needs following discharge   - Consider OT consult to assist with ADL evaluation and planning for discharge  - Provide patient education as appropriate  Outcome: Progressing  Goal: Maintains/Returns to pre admission functional level  Description: INTERVENTIONS:  - Perform BMAT or MOVE assessment daily    - Set and communicate daily mobility goal to care team and patient/family/caregiver     - Collaborate with rehabilitation services on mobility goals if consulted  - Perform Range of Motion 3 times a day  - Reposition patient every 2 hours    - Dangle patient 3 times a day  - Stand patient 3 times a day  - Ambulate patient 4 times a day  - Out of bed to chair 4 times a day   - Out of bed for meals 3 times a day  - Out of bed for toileting  - Record patient progress and toleration of activity level   Outcome: Progressing     Problem: Prexisting or High Potential for Compromised Skin Integrity  Goal: Skin integrity is maintained or improved  Description: INTERVENTIONS:  - Identify patients at risk for skin breakdown  - Assess and monitor skin integrity  - Assess and monitor nutrition and hydration status  - Monitor labs   - Assess for incontinence   - Turn and reposition patient  - Assist with mobility/ambulation  - Relieve pressure over bony prominences  - Avoid friction and shearing  - Provide appropriate hygiene as needed including keeping skin clean and dry  - Evaluate need for skin moisturizer/barrier cream  - Collaborate with interdisciplinary team   - Patient/family teaching  - Consider wound care consult   Outcome: Progressing     Problem: Potential for Falls  Goal: Patient will remain free of falls  Description: INTERVENTIONS:  - Educate patient/family on patient safety including physical limitations  - Instruct patient to call for assistance with activity   - Consult OT/PT to assist with strengthening/mobility   - Keep Call bell within reach  - Keep bed low and locked with side rails adjusted as appropriate  - Keep care items and personal belongings within reach  - Initiate and maintain comfort rounds  - Make Fall Risk Sign visible to staff  - Offer Toileting every 2 Hours, in advance of need  - Initiate/Maintain bed/chair alarm  - Obtain necessary fall risk management equipment: alarms  - Apply yellow socks and bracelet for high fall risk patients  - Consider moving patient to room near nurses station  Outcome: Progressing Problem: METABOLIC, FLUID AND ELECTROLYTES - ADULT  Goal: Electrolytes maintained within normal limits  Description: INTERVENTIONS:  - Monitor labs and assess patient for signs and symptoms of electrolyte imbalances  - Administer electrolyte replacement as ordered  - Monitor response to electrolyte replacements, including repeat lab results as appropriate  - Instruct patient on fluid and nutrition as appropriate  Outcome: Progressing  Goal: Fluid balance maintained  Description: INTERVENTIONS:  - Monitor labs   - Monitor I/O and WT  - Instruct patient on fluid and nutrition as appropriate  - Assess for signs & symptoms of volume excess or deficit  Outcome: Progressing  Goal: Glucose maintained within target range  Description: INTERVENTIONS:  - Monitor Blood Glucose as ordered  - Assess for signs and symptoms of hyperglycemia and hypoglycemia  - Administer ordered medications to maintain glucose within target range  - Assess nutritional intake and initiate nutrition service referral as needed  Outcome: Progressing     Problem: Nutrition/Hydration-ADULT  Goal: Nutrient/Hydration intake appropriate for improving, restoring or maintaining nutritional needs  Description: Monitor and assess patient's nutrition/hydration status for malnutrition  Collaborate with interdisciplinary team and initiate plan and interventions as ordered  Monitor patient's weight and dietary intake as ordered or per policy  Utilize nutrition screening tool and intervene as necessary  Determine patient's food preferences and provide high-protein, high-caloric foods as appropriate       INTERVENTIONS:  - Monitor oral intake, urinary output, labs, and treatment plans  - Assess nutrition and hydration status and recommend course of action  - Evaluate amount of meals eaten  - Assist patient with eating if necessary   - Allow adequate time for meals  - Recommend/ encourage appropriate diets, oral nutritional supplements, and vitamin/mineral supplements  - Order, calculate, and assess calorie counts as needed  - Recommend, monitor, and adjust tube feedings and TPN/PPN based on assessed needs  - Assess need for intravenous fluids  - Provide specific nutrition/hydration education as appropriate  - Include patient/family/caregiver in decisions related to nutrition  Outcome: Progressing     Problem: SAFETY,RESTRAINT: NV/NON-SELF DESTRUCTIVE BEHAVIOR  Goal: Remains free of harm/injury (restraint for non violent/non self-detsructive behavior)  Description: INTERVENTIONS:  - Instruct patient/family regarding restraint use   - Assess and monitor physiologic and psychological status   - Provide interventions and comfort measures to meet assessed patient needs   - Identify and implement measures to help patient regain control  - Assess readiness for release of restraint   Outcome: Progressing  Goal: Returns to optimal restraint-free functioning  Description: INTERVENTIONS:  - Assess the patient's behavior and symptoms that indicate continued need for restraint  - Identify and implement measures to help patient regain control  - Assess readiness for release of restraint   Outcome: Progressing

## 2022-04-06 NOTE — PROGRESS NOTES
[facility] [address] [place]  Progress Note Alia Ansari 1945, 76 y o  male MRN: [id number]  Unit/Bed#: 403-01 Encounter: [id number]  Primary Care Provider: Maikol Salazar MD   Date and time admitted to hospital: 4/2/2022  7:56 PM    * Acute metabolic encephalopathy  Assessment & Plan  · According to the family members patient is very altered compared to his baseline  · Most likely multifactorial secondary to hypercarbia, hyperglycemia and  possible opioid overdose (patient chronic opioid dependent and accidentally overdosed himself few months ago)  · Patient was combative upon EMS arrival so he received few doses of benzos and became more lethargic  · Due to transit episodes of hypoxia and evidence of hypercapnia on VBG he was placed on BiPAP  · CT of the head unremarkable for intracranial pathology  If no AMS resolution within 24 hours will consider MRI of the brain - patient did not tolerate and does not fit in machine, will instead repeat CT head at this time which was negative on 4/5  · CT of the chest showed no pulmonary embolism but evidence of possible pneumonia so will treat empirically for CAP  · Follow-up pneumonia workup follow up culture reports and this completed antibiotics accordingly  · Continue to monitor procalcitonin trend - negative x 3 can discontinue abx  · Wound care consult  · Continue BiPAP - patient only tolerating for about an hour at a time then taking it off himself  · Follow-up lower extremity Doppler - normal    Patient much improved as of 4/6 and back to baseline per family  Likely hospital delirium and sundowning's occurring at night  Appreciate psych input  Patient medically clear for rehab vs inpatient psych       Acute respiratory failure with hypoxia and hypercapnia (HCC)  Assessment & Plan  Secondary to morbid obesity obstructive sleep apnea   Management as above    Late onset Alzheimer's dementia with behavioral disturbance ([facility] [address] )  Assessment & Plan  Likely contributing to patient's aggression and confusion that worsens at night  Psychiatry evaluated the patient due to his homicidal threats made on 4/5  We will trial depakote sprinkles 250 BID  Should behavioral disturbances persist, patient would need inpatient psychiatric placement when medically cleared    Type 2 diabetes mellitus with hyperglycemia, with long-term current use of insulin [facility])  Assessment & Plan  Lab Results   Component Value Date    HGBA1C 9 0 (H) 04/04/2022       Recent Labs     04/05/22  2107 04/05/22  2357 04/06/22  0524 04/06/22  1102   POCGLU 275* 262* 259* 346*       Blood Sugar Average: Last 72 hrs:  (P) 099 [id number]   Poorly-controlled diabetes with significantly elevated hemoglobin A1c few months ago  Continue Accu-Cheks and insulin sliding scale  Due to significant hyperglycemia above 500 present on admission patient received IV fluids and regular insulin  Likely contributed to patient's encephalopathy  Maintain patient on lantus 20 units at night and 10 units mealtime insulin TID, advance as necessary   Increased lantus to 36 daily     Chronic diastolic CHF (congestive heart failure) (HCC)  Assessment & Plan  Wt Readings from Last 3 Encounters:   04/04/22 133 kg (293 lb)   04/03/22 133 kg (293 lb 3 4 oz)   04/12/21 133 kg (292 lb 15 9 oz)     Does not appear to be in acute exacerbation  Echo done in March of 2021 reported preserved ejection fraction grade 2 diastolic dysfunction  Continue carvedilol amlodipine      CKD (chronic kidney disease) stage 3, GFR 30-59 ml/min [facility])  Assessment & Plan  Lab Results   Component Value Date    EGFR 52 04/06/2022    EGFR 47 04/05/2022    EGFR 46 04/04/2022    CREATININE 1 29 04/06/2022    CREATININE 1 40 (H) 04/05/2022    CREATININE 1 43 (H) 04/04/2022     Avoid nephrotoxic drugs and hypotension  Continue to monitor creatinine          VTE Pharmacologic Prophylaxis: VTE Score: 8 High Risk (Score >/= 5) - Pharmacological DVT Prophylaxis Ordered: heparin  Sequential Compression Devices Ordered  Patient Centered Rounds: I performed bedside rounds with nursing staff today  Discussions with Specialists or Other Care Team Provider: case management    Education and Discussions with Family / Patient: Updated  (daughter) at bedside  Time Spent for Care: 30 minutes  More than 50% of total time spent on counseling and coordination of care as described above  Current Length of Stay: 3 day(s)  Current Patient Status: Inpatient   Certification Statement: The patient will continue to require additional inpatient hospital stay due to placement needed  Discharge Plan: Anticipate discharge in 24-48 hrs to rehab facility  Code Status: Level 3 - DNAR and DNI    Subjective:   Patient does not understand his need for rehab although he is agreeable  He is back to baseline per family  Patient denies any complaints at this time    Objective:     Vitals:   Temp (24hrs), Av 9 °F (36 6 °C), Min:97 6 °F (36 4 °C), Max:98 3 °F (36 8 °C)    Temp:  [97 6 °F (36 4 °C)-98 3 °F (36 8 °C)] 98 3 °F (36 8 °C)  HR:  [64-76] 73  Resp:  [20-22] 20  BP: (142-163)/(63-87) 163/85  SpO2:  [92 %-97 %] 97 %  Body mass index is 47 29 kg/m²  Input and Output Summary (last 24 hours): Intake/Output Summary (Last 24 hours) at 2022 1456  Last data filed at 2022 1201  Gross per 24 hour   Intake 480 ml   Output --   Net 480 ml       Physical Exam:   Physical Exam  Vitals and nursing note reviewed  Constitutional:       Appearance: He is obese  He is ill-appearing  Cardiovascular:      Rate and Rhythm: Normal rate and regular rhythm  Pulses: Normal pulses  Heart sounds: No murmur heard  No gallop  Pulmonary:      Effort: Pulmonary effort is normal       Breath sounds: Normal breath sounds  No wheezing, rhonchi or rales  Abdominal:      General: Bowel sounds are normal       Palpations: Abdomen is soft  Tenderness:  There is no abdominal tenderness  There is no guarding or rebound  Musculoskeletal:         General: Normal range of motion  Right lower leg: No edema  Left lower leg: No edema  Skin:     General: Skin is warm and dry  Neurological:      Mental Status: He is alert  Mental status is at baseline  He is disoriented  Psychiatric:         Mood and Affect: Mood normal          Behavior: Behavior normal           Additional Data:     Labs:  Results from last 7 days   Lab Units 04/06/22  0413   WBC Thousand/uL 7 31   HEMOGLOBIN g/dL 11 2*   HEMATOCRIT % 36 1*   PLATELETS Thousands/uL 311   NEUTROS PCT % 63   LYMPHS PCT % 22   MONOS PCT % 6   EOS PCT % 8*     Results from last 7 days   Lab Units 04/06/22  0413   SODIUM mmol/L 145   POTASSIUM mmol/L 3 5   CHLORIDE mmol/L 111*   CO2 mmol/L 24   BUN mg/dL 17   CREATININE mg/dL 1 29   ANION GAP mmol/L 10   CALCIUM mg/dL 9 3   ALBUMIN g/dL 2 6*   TOTAL BILIRUBIN mg/dL 0 31   ALK PHOS U/L 150*   ALT U/L 18   AST U/L 15   GLUCOSE RANDOM mg/dL 235*     Results from last 7 days   Lab Units 04/02/22  2021   INR  1 18     Results from last 7 days   Lab Units 04/06/22  1102 04/06/22  0524 04/05/22  2357 04/05/22  2107 04/05/22  1531 04/05/22  1109 04/05/22  0746 04/05/22  0616 04/05/22  0009 04/04/22  2134 04/04/22  1734 04/04/22  1611   POC GLUCOSE mg/dl 346* 259* 262* 275* 260* 295* 265* 292* 287* 207* 381* 339*     Results from last 7 days   Lab Units 04/04/22  0451 04/03/22  0140   HEMOGLOBIN A1C % 9 0* 9 1*     Results from last 7 days   Lab Units 04/04/22  0451 04/03/22  0759 04/03/22  0140 04/02/22 2021   LACTIC ACID mmol/L  --   --   --  2 0   PROCALCITONIN ng/ml 0 20 0 26* 0 23 0 26*       Lines/Drains:  Invasive Devices  Report    Peripheral Intravenous Line            Peripheral IV 04/03/22 Distal;Dorsal (posterior); Left Forearm 3 days    Peripheral IV 04/03/22 Right;Upper;Ventral (anterior) Arm 3 days                      Imaging: No pertinent imaging reviewed  Recent Cultures (last 7 days):   Results from last 7 days   Lab Units 04/03/22  0138 04/02/22 2021   BLOOD CULTURE   --  No Growth at 72 hrs  No Growth at 72 hrs  LEGIONELLA URINARY ANTIGEN  Negative  --        Last 24 Hours Medication List:   Current Facility-Administered Medications   Medication Dose Route Frequency Provider Last Rate    acetaminophen  650 mg Oral Q6H PRN Mar Grave, PA-STANISLAV      albuterol  2 5 mg Nebulization Q4H PRN Mar Grave, PA-STANISLAV      amLODIPine  10 mg Oral Daily Mar Grave, PA-C      aspirin  81 mg Oral Daily Mar Grave, PA-C      atorvastatin  40 mg Oral QPM Mar Grave, PA-STANISLAV      carvedilol  12 5 mg Oral BID Mar Grave, KELSEY      cholecalciferol  2,000 Units Oral Daily Mar Grave, KELSEY      divalproex sodium  250 mg Oral Q12H [address] Mar Grave, KELSEY      docusate sodium  100 mg Oral BID Mar Grave, KELSEY      finasteride  5 mg Oral Daily Mar Grave, KELSEY      furosemide  40 mg Intravenous Once Gay Grave, KELSEY      heparin (porcine)  5,000 Units Subcutaneous [facility] Mar Grave, KELSEY      insulin glargine  36 Units Subcutaneous HS Mar Grave, KELSEY      insulin lispro  10 Units Subcutaneous TID With Meals Mar Grave, KELSEY      insulin lispro  2-12 Units Subcutaneous TID With Meals Mar Grave, PA-STANISLAV      insulin regular  8 Units Intravenous Once Gay Grave, KELSEY      nystatin   Topical TID Mar Grave, KELSEY      OLANZapine  5 mg Intramuscular Q8H PRN Mar Grave, KELSEY      ondansetron  4 mg Intravenous Q6H PRN Mar Grave, KELSEY      potassium chloride  40 mEq Oral BID Mar Grave, KELSEY      tamsulosin  0 4 mg Oral QPM Mar Grave, KELSEY          Today, Patient Was Seen By: Isabela Ramos PA-C    **Please Note: This note may have been constructed using a voice recognition system  **

## 2022-04-06 NOTE — RESPIRATORY THERAPY NOTE
04/06/22 0233   Respiratory Assessment   Resp Comments Patient has taken himself off the bipap therapy  Nurse attempted to put patient on his oxygen nasal cannula, he would not let her  I went into the room to see if I could place the nasal cannula on  He still will not let us  He is saying he will hurt me, he will kill me  Nurse is having the hospitalist call her to inform him of this   Subjective Data patient wears home oxygen at 3 lpm   Oxygen Therapy/Pulse Ox   O2 Device None (Room air)   O2 Therapy Room air   SpO2 Activity At Rest   $ Pulse Oximetry Spot Check Charge Completed       Patient is now on his 3 lpm nasal oxygen, the PA came up, he would not allow him to place him on the oxygen either, patient has his hands retrained now   Patient's oxygen on room air was dropping low 80's

## 2022-04-06 NOTE — PLAN OF CARE
Problem: PAIN - ADULT  Goal: Verbalizes/displays adequate comfort level or baseline comfort level  Description: Interventions:  - Encourage patient to monitor pain and request assistance  - Assess pain using appropriate pain scale  - Administer analgesics based on type and severity of pain and evaluate response  - Implement non-pharmacological measures as appropriate and evaluate response  - Consider cultural and social influences on pain and pain management  - Notify physician/advanced practitioner if interventions unsuccessful or patient reports new pain  Outcome: Progressing     Problem: INFECTION - ADULT  Goal: Absence or prevention of progression during hospitalization  Description: INTERVENTIONS:  - Assess and monitor for signs and symptoms of infection  - Monitor lab/diagnostic results  - Monitor all insertion sites, i e  indwelling lines, tubes, and drains  - Monitor endotracheal if appropriate and nasal secretions for changes in amount and color  - [place] appropriate cooling/warming therapies per order  - Administer medications as ordered  - Instruct and encourage patient and family to use good hand hygiene technique  - Identify and instruct in appropriate isolation precautions for identified infection/condition  Outcome: Progressing  Goal: Absence of fever/infection during neutropenic period  Description: INTERVENTIONS:  - Monitor WBC    Outcome: Progressing     Problem: SAFETY ADULT  Goal: Patient will remain free of falls  Description: INTERVENTIONS:  - Educate patient/family on patient safety including physical limitations  - Instruct patient to call for assistance with activity   - Consult OT/PT to assist with strengthening/mobility   - Keep Call bell within reach  - Keep bed low and locked with side rails adjusted as appropriate  - Keep care items and personal belongings within reach  - Initiate and maintain comfort rounds  - Make Fall Risk Sign visible to staff  - Offer Toileting every 2 Hours, in advance of need  - Initiate/Maintain bed/chair alarm  - Obtain necessary fall risk management equipment: alarms  - Apply yellow socks and bracelet for high fall risk patients  - Consider moving patient to room near nurses station  Outcome: Progressing  Goal: Maintain or return to baseline ADL function  Description: INTERVENTIONS:  -  Assess patient's ability to carry out ADLs; assess patient's baseline for ADL function and identify physical deficits which impact ability to perform ADLs (bathing, care of mouth/teeth, toileting, grooming, dressing, etc )  - Assess/evaluate cause of self-care deficits   - Assess range of motion  - Assess patient's mobility; develop plan if impaired  - Assess patient's need for assistive devices and provide as appropriate  - Encourage maximum independence but intervene and supervise when necessary  - Involve family in performance of ADLs  - Assess for home care needs following discharge   - Consider OT consult to assist with ADL evaluation and planning for discharge  - Provide patient education as appropriate  Outcome: Progressing  Goal: Maintains/Returns to pre admission functional level  Description: INTERVENTIONS:  - Perform BMAT or MOVE assessment daily    - Set and communicate daily mobility goal to care team and patient/family/caregiver  - Collaborate with rehabilitation services on mobility goals if consulted  - Perform Range of Motion 3 times a day  - Reposition patient every 2 hours    - Dangle patient 3 times a day  - Stand patient 3 times a day  - Ambulate patient 4 times a day  - Out of bed to chair 4 times a day   - Out of bed for meals 3 times a day  - Out of bed for toileting  - Record patient progress and toleration of activity level   Outcome: Progressing     Problem: DISCHARGE PLANNING  Goal: Discharge to home or other facility with appropriate resources  Description: INTERVENTIONS:  - Identify barriers to discharge w/patient and caregiver  - Arrange for needed discharge resources and transportation as appropriate  - Identify discharge learning needs (meds, wound care, etc )  - Arrange for interpretive services to assist at discharge as needed  - Refer to Case Management Department for coordinating discharge planning if the patient needs post-hospital services based on physician/advanced practitioner order or complex needs related to functional status, cognitive ability, or social support system  Outcome: Progressing     Problem: Knowledge Deficit  Goal: Patient/family/caregiver demonstrates understanding of disease process, treatment plan, medications, and discharge instructions  Description: Complete learning assessment and assess knowledge base  Interventions:  - Provide teaching at level of understanding  - Provide teaching via preferred learning methods  Outcome: Progressing     Problem: MOBILITY - ADULT  Goal: Maintain or return to baseline ADL function  Description: INTERVENTIONS:  -  Assess patient's ability to carry out ADLs; assess patient's baseline for ADL function and identify physical deficits which impact ability to perform ADLs (bathing, care of mouth/teeth, toileting, grooming, dressing, etc )  - Assess/evaluate cause of self-care deficits   - Assess range of motion  - Assess patient's mobility; develop plan if impaired  - Assess patient's need for assistive devices and provide as appropriate  - Encourage maximum independence but intervene and supervise when necessary  - Involve family in performance of ADLs  - Assess for home care needs following discharge   - Consider OT consult to assist with ADL evaluation and planning for discharge  - Provide patient education as appropriate  Outcome: Progressing  Goal: Maintains/Returns to pre admission functional level  Description: INTERVENTIONS:  - Perform BMAT or MOVE assessment daily    - Set and communicate daily mobility goal to care team and patient/family/caregiver     - Collaborate with rehabilitation services on mobility goals if consulted  - Perform Range of Motion 3 times a day  - Reposition patient every 2 hours    - Dangle patient 3 times a day  - Stand patient 3 times a day  - Ambulate patient 4 times a day  - Out of bed to chair 4 times a day   - Out of bed for meals 3 times a day  - Out of bed for toileting  - Record patient progress and toleration of activity level   Outcome: Progressing     Problem: Prexisting or High Potential for Compromised Skin Integrity  Goal: Skin integrity is maintained or improved  Description: INTERVENTIONS:  - Identify patients at risk for skin breakdown  - Assess and monitor skin integrity  - Assess and monitor nutrition and hydration status  - Monitor labs   - Assess for incontinence   - Turn and reposition patient  - Assist with mobility/ambulation  - Relieve pressure over bony prominences  - Avoid friction and shearing  - Provide appropriate hygiene as needed including keeping skin clean and dry  - Evaluate need for skin moisturizer/barrier cream  - Collaborate with interdisciplinary team   - Patient/family teaching  - Consider wound care consult   Outcome: Progressing     Problem: Potential for Falls  Goal: Patient will remain free of falls  Description: INTERVENTIONS:  - Educate patient/family on patient safety including physical limitations  - Instruct patient to call for assistance with activity   - Consult OT/PT to assist with strengthening/mobility   - Keep Call bell within reach  - Keep bed low and locked with side rails adjusted as appropriate  - Keep care items and personal belongings within reach  - Initiate and maintain comfort rounds  - Make Fall Risk Sign visible to staff  - Offer Toileting every 2 Hours, in advance of need  - Initiate/Maintain bed/chair alarm  - Obtain necessary fall risk management equipment: alarms  - Apply yellow socks and bracelet for high fall risk patients  - Consider moving patient to room near nurses station  Outcome: Progressing Problem: METABOLIC, FLUID AND ELECTROLYTES - ADULT  Goal: Electrolytes maintained within normal limits  Description: INTERVENTIONS:  - Monitor labs and assess patient for signs and symptoms of electrolyte imbalances  - Administer electrolyte replacement as ordered  - Monitor response to electrolyte replacements, including repeat lab results as appropriate  - Instruct patient on fluid and nutrition as appropriate  Outcome: Progressing  Goal: Fluid balance maintained  Description: INTERVENTIONS:  - Monitor labs   - Monitor I/O and WT  - Instruct patient on fluid and nutrition as appropriate  - Assess for signs & symptoms of volume excess or deficit  Outcome: Progressing  Goal: Glucose maintained within target range  Description: INTERVENTIONS:  - Monitor Blood Glucose as ordered  - Assess for signs and symptoms of hyperglycemia and hypoglycemia  - Administer ordered medications to maintain glucose within target range  - Assess nutritional intake and initiate nutrition service referral as needed  Outcome: Progressing     Problem: Nutrition/Hydration-ADULT  Goal: Nutrient/Hydration intake appropriate for improving, restoring or maintaining nutritional needs  Description: Monitor and assess patient's nutrition/hydration status for malnutrition  Collaborate with interdisciplinary team and initiate plan and interventions as ordered  Monitor patient's weight and dietary intake as ordered or per policy  Utilize nutrition screening tool and intervene as necessary  Determine patient's food preferences and provide high-protein, high-caloric foods as appropriate       INTERVENTIONS:  - Monitor oral intake, urinary output, labs, and treatment plans  - Assess nutrition and hydration status and recommend course of action  - Evaluate amount of meals eaten  - Assist patient with eating if necessary   - Allow adequate time for meals  - Recommend/ encourage appropriate diets, oral nutritional supplements, and vitamin/mineral supplements  - Order, calculate, and assess calorie counts as needed  - Recommend, monitor, and adjust tube feedings and TPN/PPN based on assessed needs  - Assess need for intravenous fluids  - Provide specific nutrition/hydration education as appropriate  - Include patient/family/caregiver in decisions related to nutrition  Outcome: Progressing     Problem: SAFETY,RESTRAINT: NV/NON-SELF DESTRUCTIVE BEHAVIOR  Goal: Remains free of harm/injury (restraint for non violent/non self-detsructive behavior)  Description: INTERVENTIONS:  - Instruct patient/family regarding restraint use   - Assess and monitor physiologic and psychological status   - Provide interventions and comfort measures to meet assessed patient needs   - Identify and implement measures to help patient regain control  - Assess readiness for release of restraint   Outcome: Progressing  Goal: Returns to optimal restraint-free functioning  Description: INTERVENTIONS:  - Assess the patient's behavior and symptoms that indicate continued need for restraint  - Identify and implement measures to help patient regain control  - Assess readiness for release of restraint   Outcome: Progressing

## 2022-04-06 NOTE — SPEECH THERAPY NOTE
Speech Language/Pathology  Speech/Language Pathology  Assessment    Patient Name: Keven Oglesby  Today's Date: 4/6/2022     Problem List  Principal Problem:    Acute metabolic encephalopathy  Active Problems:    CKD (chronic kidney disease) stage 3, GFR 30-59 ml/min ([facility])    Chronic diastolic CHF (congestive heart failure) ([facility] )    Morbid obesity with BMI of 45 0-49 9, adult ([facility] )    Type 2 diabetes mellitus with hyperglycemia, with long-term current use of insulin ([facility])    Opiate dependence, continuous ([facility])    Obstructive sleep apnea on CPAP    Acute respiratory failure with hypoxia and hypercapnia ([facility] )    Late onset Alzheimer's dementia with behavioral disturbance ([facility] )    Past Medical History  Past Medical History:   Diagnosis Date    Cataract     CHF (congestive heart failure) ([facility])     chronic diastolic CHF    Coronary artery disease     Diabetes mellitus [facility]     Glaucoma     Hyperlipidemia     Hypertension     Neuropathy     Obesity     Renal disorder     chronic kidney disease stage 3     Sleep apnea     Stroke [facility]     TIA (transient ischemic attack)     Uncontrolled type 2 diabetes mellitus with hyperglycemia, with long-term current use of insulin ([facility] ) 10/4/2018     Past Surgical History  Past Surgical History:   Procedure Laterality Date    APPENDECTOMY      CARPAL TUNNEL RELEASE      EYE SURGERY      cataracts        Bedside Swallow Evaluation:    Summary:  Pt presents w/ an oropharyngeal swallow function WFL to continue regular solids/thin liquids  Pt's wife at the bedside, both deny any history of dysphagia  Case d/w RN who has not observed any difficulty with current diet or medications taken whole with thin liquids  Pt assessed with regular solids/thin liquids  Oral phase characterized by adequate orientation/reception, slightly prolonged but effective mastication with regular solids due to edentulous state, adequate AP transport and adequate oral clearance   Pharyngeal phase of the swallow characterized by suspected timely initiation of the swallow with adequate hyolaryngeal elevation  No clinical overt s s of aspiration observed across all trials  Pt denied any difficulty across all trials  Recommendations:  Diet: regular solids  Liquid:thin liquids  Meds: as tolerated/desired  Supervision: will need assistance with meal prep and proper upright positioning with meals  Positioning:Upright  Strategies: Pt to take PO/Meds only when fully alert and upright  Oral care:continue    Eval only, No f/u tx indicated  H&P/Admit info/ pertinent provider notes: (PMH noted above)    Previous VBS: none known to this service or reported by patient/family  Pt is not known to this service  Did the pt report pain?no    Reason for consult:  R/o aspiration  Determine safest and least restrictive diet  H/o neurological disease    Current diet: regular/thin    Premorbid diet[de-identified] regular/thin    O2 requirement:[place]    Voice/Speech:WFL    Social:encouragment needed to participate    Follows commands:yes, simple 1 step commands for this assessment                           Cognitive Status: oriented x4; however, patient with confused comments throughout assessment  Referring to me as his granddaughter multiple times throughout session even after redirection       Oral [facility] exam:  Dentition:edentulous  Labial strength and ROM:WFL  Lingual strength and ROM:WFL  Mandibular strength and ROM:WFL  Velum:WFL  Secretion management:WFL  Volitional cough:ADEQUATE  Volitional swallow:ADEQUATE  Oral care     Esophageal stage:  No s/s reported    Results d/w:  Pt, nursing, family,

## 2022-04-06 NOTE — ASSESSMENT & PLAN NOTE
Lab Results   Component Value Date    HGBA1C 9 0 (H) 04/04/2022       Recent Labs     04/05/22  2107 04/05/22  2357 04/06/22  0524 04/06/22  1102   POCGLU 275* 262* 259* 346*       Blood Sugar Average: Last 72 hrs:  (P) 281 [id number]   Poorly-controlled diabetes with significantly elevated hemoglobin A1c few months ago  Continue Accu-Cheks and insulin sliding scale  Due to significant hyperglycemia above 500 present on admission patient received IV fluids and regular insulin  Likely contributed to patient's encephalopathy  Maintain patient on lantus 20 units at night and 10 units mealtime insulin TID, advance as necessary   Increased lantus to 36 daily

## 2022-04-06 NOTE — PLAN OF CARE
Problem: PHYSICAL THERAPY ADULT  Goal: Performs mobility at highest level of function for planned discharge setting  See evaluation for individualized goals  Description: Treatment/Interventions: Functional transfer training,LE strengthening/ROM,Therapeutic exercise,Endurance training,Bed mobility,Gait training          See flowsheet documentation for full assessment, interventions and recommendations  4/6/2022 1035 by Roseann Sharp PTA  Outcome: Progressing  Note: Prognosis: Guarded  Problem List: Decreased strength,Decreased endurance,Impaired balance,Decreased mobility,Impaired judgement,Decreased safety awareness,Obesity,Pain  Assessment: Pt  seen for PT treatment session this date with interventions consisting of  therapeutic exercise and  bed mobility w/ emphasis on improving pt's strength, endurance and functional mobility  In comparison to previous session, Pt  With decrease in activity tolerance  C/o headache and fatigue  Eyes closed for most of session  Pt is in need of continued activity in PT to improve strength balance endurance mobility transfers and ambulation with return to maximize LOF  From PT/mobility standpoint, recommendation at time of d/c would be post acute rehab  in order to promote return to PLOF and independence  The patient's AM-PAC Basic Mobility Inpatient Short Form Raw Score is 6  A Raw score of less than or equal to 16 suggests the patient may benefit from discharge to post-acute rehabilitation services  Please also refer to physical therapy recommendation for safe DC planning  PT Discharge Recommendation: Post acute rehabilitation services          See flowsheet documentation for full assessment       4/6/2022 1034 by Roseann Sharp PTA  Outcome: Progressing  Note: Prognosis: Guarded  Problem List: Decreased strength,Decreased endurance,Impaired balance,Decreased mobility,Impaired judgement,Decreased safety awareness,Obesity,Pain  Assessment: Pt  seen for PT treatment session this date with interventions consisting of  therapeutic exercise and  bed mobility w/ emphasis on improving pt's strength, endurance and functional mobility  In comparison to previous session, Pt  With decrease in activity tolerance  C/o headache and fatigue  Eyes closed for most of session  Pt is in need of continued activity in PT to improve strength balance endurance mobility transfers and ambulation with return to maximize LOF  From PT/mobility standpoint, recommendation at time of d/c would be post acute rehab  in order to promote return to PLOF and independence  The patient's AM-PAC Basic Mobility Inpatient Short Form Raw Score is 6  A Raw score of less than or equal to 16 suggests the patient may benefit from discharge to post-acute rehabilitation services  Please also refer to physical therapy recommendation for safe DC planning  PT Discharge Recommendation: Post acute rehabilitation services          See flowsheet documentation for full assessment

## 2022-04-06 NOTE — PHYSICAL THERAPY NOTE
PHYSICAL THERAPY NOTE          Patient Name: Rigo HUGHES Date: 4/6/2022 04/06/22 1004   PT Last Visit   PT Visit Date 04/06/22   Note Type   Note Type Treatment   Restrictions/Precautions   Weight Bearing Precautions Per Order No   Other Precautions Bed Alarm; Fall Risk;Pain;Telemetry;O2   General   Family/Caregiver Present Yes   Cognition   Overall Cognitive Status Impaired   Arousal/Participation Alert   Following Commands Follows one step commands without difficulty   Subjective   Subjective c/o sore heel and headache  Pt   eyes closed for most of session   Bed Mobility   Rolling R 2  Maximal assistance   Additional items Assist x 2; Increased time required; Bedrails;Verbal cues   Rolling L 2  Maximal assistance   Additional items Assist x 2;Bedrails; Increased time required;Verbal cues   Additional Comments Extensive time for rolling and repostioning in bed  SpO2 88% RA  Reapplied O2 as pt took off  Deferred supine to sit as pt  with minimal participation   Transfers   Sit to Stand Unable to assess   Ambulation/Elevation   Gait pattern Not appropriate   Endurance Deficit   Endurance Deficit Yes   Activity Tolerance   Activity Tolerance Patient limited by fatigue;Patient limited by pain   Exercises   Heelslides Supine;20 reps;PROM;AAROM   Hip Abduction Supine;20 reps;PROM;AAROM   Hip Adduction Supine;20 reps;PROM;AAROM   Ankle Pumps Supine;20 reps;PROM;AAROM   Assessment   Prognosis Guarded   Problem List Decreased strength;Decreased endurance; Impaired balance;Decreased mobility; Impaired judgement;Decreased safety awareness; Obesity;Pain   Assessment Pt  seen for PT treatment session this date with interventions consisting of  therapeutic exercise and  bed mobility w/ emphasis on improving pt's strength, endurance and functional mobility  In comparison to previous session, Pt  With decrease in activity tolerance   C/o headache and fatigue  Eyes closed for most of session  Pt is in need of continued activity in PT to improve strength balance endurance mobility transfers and ambulation with return to maximize LOF  From PT/mobility standpoint, recommendation at time of d/c would be post acute rehab  in order to promote return to PLOF and independence  The patient's AM-PAC Basic Mobility Inpatient Short Form Raw Score is 6  A Raw score of less than or equal to 16 suggests the patient may benefit from discharge to post-acute rehabilitation services  Please also refer to physical therapy recommendation for safe DC planning  Goals   LTG Expiration Date 04/18/22   Plan   Treatment/Interventions Functional transfer training;LE strengthening/ROM; Therapeutic exercise; Endurance training;Bed mobility;Gait training   Progress Progressing toward goals   PT Frequency 3-5x/wk   Recommendation   PT Discharge Recommendation Post acute rehabilitation services   AM-PAC Basic Mobility Inpatient   Turning in Bed Without Bedrails 1   Lying on Back to Sitting on Edge of Flat Bed 1   Moving Bed to Chair 1   Standing Up From Chair 1   Walk in Room 1   Climb 3-5 Stairs 1   Basic Mobility Inpatient Raw Score 6   Turning Head Towards Sound 3   Follow Simple Instructions 3   Low Function Basic Mobility Raw Score 12   Low Function Basic Mobility Standardized Score 18 33   Highest Level Of Mobility   JH-HLM Goal 2: Bed activities/Dependent transfer   JH-HLM Highest Level of Mobility 2: Bed activities/Dependent transfer   JH-HLM Goal Achieved Yes   Education   Education Provided Mobility training   Patient Reinforcement needed   End of Consult   Patient Position at End of Consult Supine;Bed/Chair alarm activated; All needs within reach   End of Consult Comments discussed POC with PT

## 2022-04-06 NOTE — QUICK NOTE
Alerted by RN that patient is combative and ripping NC O2 off  He threatened to hurt and kill RT when she was in the room trying to convince patient to put O2 back on  I asked the patient to please use his oxygen and explained that for his safety he should wear it  He then said he would kill both me and the RT  Decision was made to place patient in soft wrist restraints and place patient on 3L NC  Following placing restraints, patient immediately laid back and went to sleep

## 2022-04-06 NOTE — CASE MANAGEMENT
Case Management Discharge Planning Note    Patient name Rigo Hernandez  Location /007-05 MRN [id number]  : 1945 Date 2022       Current Admission Date: 2022  Current Admission Diagnosis:Acute metabolic encephalopathy   Patient Active Problem List    Diagnosis Date Noted    Late onset Alzheimer's dementia with behavioral disturbance ([facility] ) 2022    Acute metabolic encephalopathy     Hypoxia 2022    Acute respiratory failure with hypoxia and hypercapnia ([facility] ) 2022    Constipation 2021    Obstructive sleep apnea on CPAP 04/10/2021    Inguinal lymphadenopathy 2021    Bilateral pleural effusion 2021    Atelectasis 2021    Acute on chronic diastolic CHF (congestive heart failure) ([facility] ) 2021    Multiple renal cysts 2021    Renal calculus 2021    Venous stasis dermatitis of both lower extremities 2021    Lower extremity weakness 2021    Cigarette nicotine dependence in remission 2021    Pyuria 03/10/2021    Microscopic hematuria 03/10/2021    Multiple pulmonary nodules 03/10/2021    Gait instability 03/10/2021    Abnormal EKG 03/10/2021    Elevated d-dimer 2021    Elevated parathyroid hormone 2021    Accelerated hypertension 2021    Hypercalcemia 2021    Open wound of right lower extremity 2021    Cellulitis of right lower extremity 2021    Elevated alkaline phosphatase level 2021    Elevated TSH 2019    Pressure injury of skin of right buttock 2019    Ambulatory dysfunction 2019    UTI (urinary tract infection) 2019    Neural foraminal stenosis of cervical spine 2019    Acute pain of right lower extremity 2019    Paresthesia of left upper extremity 2019    Hypoglycemia 2019    Acute cystitis 2019    Opiate dependence, continuous ([facility] ) 2019    Uncontrolled type 2 diabetes mellitus with hyperglycemia, with long-term current use of insulin ([place] [address] ) 10/04/2018    Acute kidney injury ([place] [address] ) 10/04/2018    Dehydration with hyponatremia 10/04/2018    Type 2 diabetes mellitus with hyperglycemia, with long-term current use of insulin ([place] [address] ) 57/27/5719    Complicated UTI (urinary tract infection) 10/03/2018    Dyspnea on exertion 12/27/2016    Type 2 diabetes mellitus with hypoglycemia without coma, with long-term current use of insulin ([place] [address] ) 12/27/2016    CKD (chronic kidney disease) stage 3, GFR 30-59 ml/min ([facility]) 12/27/2016    Chronic diastolic CHF (congestive heart failure) ([place] [address] ) 12/27/2016    Morbid obesity with BMI of 45 0-49 9, adult ([place] [address] ) 12/27/2016    Essential hypertension 12/27/2016      LOS (days): 3  Geometric Mean LOS (GMLOS) (days): 3 60  Days to GMLOS:0     OBJECTIVE:  Risk of Unplanned Readmission Score: 29         Current admission status: Inpatient   Preferred Pharmacy:   [facility] [place] - [address] [facility], [address]  [address] [address]  Phone: [phone] Fax: [phone]  [address] [place] [address]  [address]  Phone: [phone] Fax: [phone]    Primary Care Provider: Hayden Quinones MD    Primary Insurance: [facility]  Secondary Insurance:     DISCHARGE DETAILS:  Currently psychiatric eval is  recommending inpatient psychiatric treatment if symptoms do not clear  I will continue to follow for any Case Management needs

## 2022-04-06 NOTE — RESPIRATORY THERAPY NOTE
04/05/22 2255   Respiratory Assessment   Assessment Type During-treatment   General Appearance Drowsy   Resp Comments Went into patient's room, he had his gown off, the oxygen off, the pulse oximeter off  I put his gown and the pulse ox back on  I asked him if I could put the bipap on him, he stated no  I brought the mask over to him  He held it in his hand, kept saying it wasn't his, but he has allowed me to place it on his face at this time  I have notified his nurse   Subjective Data while writing this note, patient has already taken the mask off   Non-Invasive Information   O2 Interface Device Full face mask   Non-Invasive Ventilation Mode BiPAP   SpO2 92 %   $ Pulse Oximetry Spot Check Charge Completed   Non-Invasive Settings   IPAP (cm) 17 cm   EPAP (cm) 6 cm   Rate (Set) 16   FiO2 (%) 32   Pressure Support (cm H2O) 11   Rise Time 3   Inspiratory Time (Set) 1   Non-Invasive Readings   Total Rate 19   Vt (mL) (Mech) 461   MV (Mec) 9 3   Skin Intervention Mask rotated;Skin intact   Non-Invasive Alarms   Insp Pressure High (cm H20) 24   Insp Pressure Low (cm H20) 4   Low Insp Pressure Time (sec) 20 sec   MV Low (L/min) 4   Vt High (mL) 1500   Vt Low (mL) 200   High Resp Rate (BPM) 35 BPM   Low Resp Rate (BPM) 8 BPM   Apnea Interval (sec) 20   Apnea Volume (mL) 10       Patient is not in any respiratory distress, he does not c/o sob   His oxygen saturations when I came into the room and he was on room air was 88-90%

## 2022-04-07 PROBLEM — Z22.322 MRSA COLONIZATION: Status: ACTIVE | Noted: 2022-04-07

## 2022-04-07 PROBLEM — S91.302A OPEN WOUND OF LEFT HEEL: Status: ACTIVE | Noted: 2022-04-07

## 2022-04-07 LAB
ANION GAP SERPL CALCULATED.3IONS-SCNC: 10 MMOL/L (ref 4–13)
BASOPHILS # BLD AUTO: 0.03 THOUSANDS/ΜL (ref 0–0.1)
BASOPHILS NFR BLD AUTO: 0 % (ref 0–1)
BUN SERPL-MCNC: 15 MG/DL (ref 5–25)
CALCIUM SERPL-MCNC: 8.9 MG/DL (ref 8.3–10.1)
CHLORIDE SERPL-SCNC: 107 MMOL/L (ref 100–108)
CO2 SERPL-SCNC: 26 MMOL/L (ref 21–32)
CREAT SERPL-MCNC: 1.34 MG/DL (ref 0.6–1.3)
EOSINOPHIL # BLD AUTO: 0.6 THOUSAND/ΜL (ref 0–0.61)
EOSINOPHIL NFR BLD AUTO: 7 % (ref 0–6)
ERYTHROCYTE [DISTWIDTH] IN BLOOD BY AUTOMATED COUNT: 14.5 % (ref 11.6–15.1)
GFR SERPL CREATININE-BSD FRML MDRD: 50 ML/MIN/1.73SQ M
GLUCOSE SERPL-MCNC: 181 MG/DL (ref 65–140)
GLUCOSE SERPL-MCNC: 247 MG/DL (ref 65–140)
GLUCOSE SERPL-MCNC: 258 MG/DL (ref 65–140)
GLUCOSE SERPL-MCNC: 278 MG/DL (ref 65–140)
GLUCOSE SERPL-MCNC: 328 MG/DL (ref 65–140)
HCT VFR BLD AUTO: 34.9 % (ref 36.5–49.3)
HGB BLD-MCNC: 11 G/DL (ref 12–17)
IMM GRANULOCYTES # BLD AUTO: 0.05 THOUSAND/UL (ref 0–0.2)
IMM GRANULOCYTES NFR BLD AUTO: 1 % (ref 0–2)
LYMPHOCYTES # BLD AUTO: 1.96 THOUSANDS/ΜL (ref 0.6–4.47)
LYMPHOCYTES NFR BLD AUTO: 23 % (ref 14–44)
MCH RBC QN AUTO: 26.8 PG (ref 26.8–34.3)
MCHC RBC AUTO-ENTMCNC: 31.5 G/DL (ref 31.4–37.4)
MCV RBC AUTO: 85 FL (ref 82–98)
MONOCYTES # BLD AUTO: 0.6 THOUSAND/ΜL (ref 0.17–1.22)
MONOCYTES NFR BLD AUTO: 7 % (ref 4–12)
NEUTROPHILS # BLD AUTO: 5.12 THOUSANDS/ΜL (ref 1.85–7.62)
NEUTS SEG NFR BLD AUTO: 62 % (ref 43–75)
NRBC BLD AUTO-RTO: 0 /100 WBCS
PLATELET # BLD AUTO: 315 THOUSANDS/UL (ref 149–390)
PMV BLD AUTO: 10.4 FL (ref 8.9–12.7)
POTASSIUM SERPL-SCNC: 3.6 MMOL/L (ref 3.5–5.3)
RBC # BLD AUTO: 4.11 MILLION/UL (ref 3.88–5.62)
SODIUM SERPL-SCNC: 143 MMOL/L (ref 136–145)
WBC # BLD AUTO: 8.36 THOUSAND/UL (ref 4.31–10.16)

## 2022-04-07 PROCEDURE — 82948 REAGENT STRIP/BLOOD GLUCOSE: CPT

## 2022-04-07 PROCEDURE — 94760 N-INVAS EAR/PLS OXIMETRY 1: CPT

## 2022-04-07 PROCEDURE — 80048 BASIC METABOLIC PNL TOTAL CA: CPT | Performed by: PHYSICIAN ASSISTANT

## 2022-04-07 PROCEDURE — 99232 SBSQ HOSP IP/OBS MODERATE 35: CPT | Performed by: INTERNAL MEDICINE

## 2022-04-07 PROCEDURE — 85025 COMPLETE CBC W/AUTO DIFF WBC: CPT | Performed by: PHYSICIAN ASSISTANT

## 2022-04-07 RX ORDER — HEPARIN SODIUM 5000 [USP'U]/ML
7500 INJECTION, SOLUTION INTRAVENOUS; SUBCUTANEOUS EVERY 8 HOURS SCHEDULED
Status: DISCONTINUED | OUTPATIENT
Start: 2022-04-07 | End: 2022-04-08

## 2022-04-07 RX ORDER — INSULIN GLARGINE 100 [IU]/ML
40 INJECTION, SOLUTION SUBCUTANEOUS
Status: DISCONTINUED | OUTPATIENT
Start: 2022-04-07 | End: 2022-04-08

## 2022-04-07 RX ORDER — POTASSIUM CHLORIDE 20 MEQ/1
40 TABLET, EXTENDED RELEASE ORAL ONCE
Status: COMPLETED | OUTPATIENT
Start: 2022-04-07 | End: 2022-04-07

## 2022-04-07 RX ORDER — INSULIN GLARGINE 100 [IU]/ML
5 INJECTION, SOLUTION SUBCUTANEOUS ONCE
Status: COMPLETED | OUTPATIENT
Start: 2022-04-07 | End: 2022-04-07

## 2022-04-07 RX ADMIN — FINASTERIDE 5 MG: 5 TABLET, FILM COATED ORAL at 08:04

## 2022-04-07 RX ADMIN — CARVEDILOL 12.5 MG: 12.5 TABLET, FILM COATED ORAL at 17:32

## 2022-04-07 RX ADMIN — POTASSIUM CHLORIDE 40 MEQ: 1500 TABLET, EXTENDED RELEASE ORAL at 08:05

## 2022-04-07 RX ADMIN — AMLODIPINE BESYLATE 10 MG: 10 TABLET ORAL at 08:04

## 2022-04-07 RX ADMIN — NYSTATIN: 100000 POWDER TOPICAL at 21:50

## 2022-04-07 RX ADMIN — INSULIN GLARGINE 5 UNITS: 100 INJECTION, SOLUTION SUBCUTANEOUS at 08:04

## 2022-04-07 RX ADMIN — NYSTATIN: 100000 POWDER TOPICAL at 17:33

## 2022-04-07 RX ADMIN — MUPIROCIN 1 APPLICATION: 20 OINTMENT TOPICAL at 08:07

## 2022-04-07 RX ADMIN — INSULIN GLARGINE 40 UNITS: 100 INJECTION, SOLUTION SUBCUTANEOUS at 21:50

## 2022-04-07 RX ADMIN — Medication 2000 UNITS: at 08:04

## 2022-04-07 RX ADMIN — DOCUSATE SODIUM 100 MG: 100 CAPSULE, LIQUID FILLED ORAL at 08:04

## 2022-04-07 RX ADMIN — ASPIRIN 81 MG CHEWABLE TABLET 81 MG: 81 TABLET CHEWABLE at 08:04

## 2022-04-07 RX ADMIN — CARVEDILOL 12.5 MG: 12.5 TABLET, FILM COATED ORAL at 08:04

## 2022-04-07 RX ADMIN — HEPARIN SODIUM 5000 UNITS: 5000 INJECTION INTRAVENOUS; SUBCUTANEOUS at 05:09

## 2022-04-07 RX ADMIN — NYSTATIN: 100000 POWDER TOPICAL at 08:07

## 2022-04-07 RX ADMIN — MUPIROCIN 1 APPLICATION: 20 OINTMENT TOPICAL at 21:50

## 2022-04-07 RX ADMIN — ACETAMINOPHEN 650 MG: 325 TABLET ORAL at 09:23

## 2022-04-07 RX ADMIN — ACETAMINOPHEN 650 MG: 325 TABLET ORAL at 17:35

## 2022-04-07 RX ADMIN — HEPARIN SODIUM 7500 UNITS: 5000 INJECTION INTRAVENOUS; SUBCUTANEOUS at 21:50

## 2022-04-07 RX ADMIN — DIVALPROEX SODIUM 250 MG: 125 CAPSULE, COATED PELLETS ORAL at 21:49

## 2022-04-07 RX ADMIN — TAMSULOSIN HYDROCHLORIDE 0.4 MG: 0.4 CAPSULE ORAL at 17:32

## 2022-04-07 RX ADMIN — ATORVASTATIN CALCIUM 40 MG: 40 TABLET, FILM COATED ORAL at 17:32

## 2022-04-07 RX ADMIN — DIVALPROEX SODIUM 250 MG: 125 CAPSULE, COATED PELLETS ORAL at 08:05

## 2022-04-07 RX ADMIN — DOCUSATE SODIUM 100 MG: 100 CAPSULE, LIQUID FILLED ORAL at 17:32

## 2022-04-07 NOTE — PHYSICAL THERAPY NOTE
PHYSICAL THERAPY      Patient Name: Stephanie Grullon  THTRI'B Date: 4/7/2022 04/07/22 1400   PT Last Visit   PT Visit Date 04/07/22   Note Type   Note type Cancelled Session   Cancel Reasons Other   Additional Comments Pt sleeping at time of attempted PT session; unable to fully arouse with loud verbal or tactile stimulation  Pt briefly opening eyes but remained non-verbal; RN aware  Will continue to follow and provide intervention as appropriate/able         Chante Hernandez PT

## 2022-04-07 NOTE — OCCUPATIONAL THERAPY NOTE
Occupational Therapy         Patient Name: Ondina Benitez  Today's Date: 4/7/2022 04/07/22 1502   OT Last Visit   OT Visit Date 04/07/22   Note Type   Note type Cancelled Session   Cancel Reasons Medical status   Additional Comments pt difficult to arrouse when approached for evaluation; RN present to address as pt not responding to questions and opens eyes to minor stimulus; will hold treatment session at this time      Maria Antonia, OT

## 2022-04-07 NOTE — ASSESSMENT & PLAN NOTE
Wt Readings from Last 3 Encounters:   04/04/22 133 kg (293 lb)   04/03/22 133 kg (293 lb 3 4 oz)   04/12/21 133 kg (292 lb 15 9 oz)     Does not appear to be in acute exacerbation  Echo done in March of 2021 reported preserved ejection fraction grade 2 diastolic dysfunction  Continue carvedilol

## 2022-04-07 NOTE — ASSESSMENT & PLAN NOTE
Lab Results   Component Value Date    HGBA1C 9 0 (H) 04/04/2022       Recent Labs     04/06/22  1516 04/06/22  2105 04/07/22  0709 04/07/22  1107   POCGLU 335* 272* 278* 328*       Blood Sugar Average: Last 72 hrs:  (P) 291     · Poorly-controlled diabetes with significantly elevated hemoglobin A1c level  · Increased lantus to 40 units SQ QHS on 04/07/2022  · Increased humalog to 12 units TID with meals on 04/07/2022

## 2022-04-07 NOTE — ASSESSMENT & PLAN NOTE
· He will be placed on nasal bactroban/mupirocin 2% ointment applied to both nares BID for 5 days  The patient will also need MRSA decolonization including chlorhexidine/hibiclens rinse applied from the neck down to the toes daily with bathing/showering for 2 weeks then weekly for 3 months  He will need a MRSA nasal screen rechecked after decolonization is completed

## 2022-04-07 NOTE — PROGRESS NOTES
[address] [address]  Progress Note Asia Yap 1945, 76 y o  male MRN: [id number]  Unit/Bed#: 403-01 Encounter: [id number]  Primary Care Provider: Sandra Comer MD   Date and time admitted to hospital: 4/2/2022  7:56 PM    * Acute metabolic encephalopathy  Assessment & Plan  · Most likely multifactorial secondary to hypercarbia, hyperglycemia, and possible opioid overdose (patient chronic opioid dependent and accidentally overdosed himself few months ago)  · Patient was combative upon EMS arrival so he received few doses of benzos and became more lethargic  · Due to transit episodes of hypoxia and evidence of hypercapnia on VBG he was placed on BiPAP  · CT of the head unremarkable for intracranial pathology  · Did not tolerate MRI of the brain due to not being able to fit in the [facility]  · CT of the chest showed no pulmonary embolism but evidence of possible pneumonia so treated empirically for CAP  · The antibiotics were discontinued with negative procalcitonin levels x 3   · Wound care consult  · Continue BiPAP - patient only tolerating for about an hour at a time then taking it off himself  · Follow-up lower extremity Doppler - normal    Patient much improved as of 4/6/22 and close to baseline mental status per family  Likely hospital delirium and sundowning's occurring at night  Appreciate Psych input  Patient medically clear for rehab vs inpatient psych  Open wound of left heel  Assessment & Plan  · Consult Podiatry    MRSA colonization  Assessment & Plan  · He will be placed on nasal bactroban/mupirocin 2% ointment applied to both nares BID for 5 days  The patient will also need MRSA decolonization including chlorhexidine/hibiclens rinse applied from the neck down to the toes daily with bathing/showering for 2 weeks then weekly for 3 months  He will need a MRSA nasal screen rechecked after decolonization is completed        Late onset Alzheimer's dementia with behavioral disturbance [facility])  Assessment & Plan  Likely contributing to patient's aggression and confusion that worsens at night  Psychiatry evaluated the patient due to his homicidal threats made on 4/5/2022  We will trial depakote sprinkles 250 BID  Should behavioral disturbances persist, patient would need inpatient psychiatric placement when medically cleared    Acute respiratory failure with hypoxia and hypercapnia ([facility] )  Assessment & Plan  Secondary to morbid obesity/possible obesity-hypoventilation syndrome/obstructive sleep apnea  Currently requiring 3 lpm of continuous supplemental oxygen to maintain adequate oxygen saturation levels   Management as above    Obstructive sleep apnea on CPAP  Assessment & Plan  · Continue BiPAP QHS and PRN    Abnormal EKG  Assessment & Plan  · Outpatient Cardiology evaluation    Opiate dependence, continuous ([facility] )  Assessment & Plan  Hold opioids due to altered mental status  Consider restart prn when acceptable    Type 2 diabetes mellitus with hyperglycemia, with long-term current use of insulin [facility])  Assessment & Plan  Lab Results   Component Value Date    HGBA1C 9 0 (H) 04/04/2022       Recent Labs     04/06/22  1516 04/06/22  2105 04/07/22  0709 04/07/22  1107   POCGLU 335* 272* 278* 328*       Blood Sugar Average: Last 72 hrs:  (P) 291     · Poorly-controlled diabetes with significantly elevated hemoglobin A1c level  · Increased lantus to 40 units SQ QHS on 04/07/2022  · Increased humalog to 12 units TID with meals on 04/07/2022     Morbid obesity with BMI of 45 0-49 9, adult ([facility] )  Assessment & Plan  · Lifestyle modifications are recommended including weight loss, improving his diet, and increasing his amount of activity        Chronic diastolic CHF (congestive heart failure) ([facility])  Assessment & Plan  Wt Readings from Last 3 Encounters:   04/04/22 133 kg (293 lb)   04/03/22 133 kg (293 lb 3 4 oz)   04/12/21 133 kg (292 lb 15 9 oz)     Does not appear to be in acute exacerbation  Echo done in 2021 reported preserved ejection fraction grade 2 diastolic dysfunction  Continue carvedilol      CKD (chronic kidney disease) stage 3, GFR 30-59 ml/min [facility])  Assessment & Plan  Lab Results   Component Value Date    EGFR 50 2022    EGFR 52 2022    EGFR 47 2022    CREATININE 1 34 (H) 2022    CREATININE 1 29 2022    CREATININE 1 40 (H) 2022     Avoid nephrotoxic drugs and hypotension  Serial laboratory testing to monitor the patient's renal function and electrolyte levels        VTE Pharmacologic Prophylaxis: VTE Score: 8 High Risk (Score >/= 5) - Pharmacological DVT Prophylaxis Ordered: heparin 7500 units SQ every 8 hours (increased dose based on BMI)  Sequential Compression Devices Ordered  Patient Centered Rounds: I performed bedside rounds with nursing staff today  Discussions with Specialists or Other Care Team Provider: I discussed the case with Podiatry  Education and Discussions with Family / Patient: Updated  (wife) at bedside  Time Spent for Care: 30 minutes  More than 50% of total time spent on counseling and coordination of care as described above  Current Length of Stay: 4 day(s)  Current Patient Status: Inpatient   Certification Statement: The patient will continue to require additional inpatient hospital stay due to the need for close respiratory status monitoring and awaiting short-term rehabilitation placement  Discharge Plan: Anticipate discharge in 24-48 hrs to rehab facility  Code Status: Level 3 - DNAR and DNI    Subjective: The patient was seen and examined  The patient is doing better  The patient complains of left heel pain  No chest pain  No shortness of breath  No abdominal pain  No nausea or vomiting        Objective:     Vitals:   Temp (24hrs), Av 9 °F (36 6 °C), Min:97 8 °F (36 6 °C), Max:98 1 °F (36 7 °C)    Temp:  [97 8 °F (36 6 °C)-98 1 °F (36 7 °C)] 97 8 °F (36 6 °C)  HR: [66-69] 69  Resp:  [16-20] 16  BP: (152-174)/(67-79) 152/79  SpO2:  [91 %-96 %] 96 %  Body mass index is 47 29 kg/m²  Input and Output Summary (last 24 hours): Intake/Output Summary (Last 24 hours) at 4/7/2022 1421  Last data filed at 4/7/2022 1146  Gross per 24 hour   Intake 720 ml   Output --   Net 720 ml       Physical Exam:   Physical Exam   General:  NAD, follows commands  HEENT:  NC/AT, mucous membranes moist  Neck:  Supple, No JVP elevation  CV:  + S1, + S2, RRR  Pulm:  Lung fields are CTA bilaterally  Abd:  Soft, Non-tender, Non-distended  Ext:  No clubbing/cyanosis/edema  Skin:  No rashes  Neuro:  Awake, alert, oriented  Psych:  Normal mood and affect      Additional Data:    Labs:  Results from last 7 days   Lab Units 04/07/22  0459   WBC Thousand/uL 8 36   HEMOGLOBIN g/dL 11 0*   HEMATOCRIT % 34 9*   PLATELETS Thousands/uL 315   NEUTROS PCT % 62   LYMPHS PCT % 23   MONOS PCT % 7   EOS PCT % 7*     Results from last 7 days   Lab Units 04/07/22  0459 04/06/22  0413 04/06/22  0413   SODIUM mmol/L 143   < > 145   POTASSIUM mmol/L 3 6   < > 3 5   CHLORIDE mmol/L 107   < > 111*   CO2 mmol/L 26   < > 24   BUN mg/dL 15   < > 17   CREATININE mg/dL 1 34*   < > 1 29   ANION GAP mmol/L 10   < > 10   CALCIUM mg/dL 8 9   < > 9 3   ALBUMIN g/dL  --   --  2 6*   TOTAL BILIRUBIN mg/dL  --   --  0 31   ALK PHOS U/L  --   --  150*   ALT U/L  --   --  18   AST U/L  --   --  15   GLUCOSE RANDOM mg/dL 258*   < > 235*    < > = values in this interval not displayed       Results from last 7 days   Lab Units 04/02/22 2021   INR  1 18     Results from last 7 days   Lab Units 04/07/22  1107 04/07/22  0709 04/06/22  2105 04/06/22  1516 04/06/22  1102 04/06/22  0524 04/05/22  2357 04/05/22  2107 04/05/22  1531 04/05/22  1109 04/05/22  0746 04/05/22  0616   POC GLUCOSE mg/dl 328* 278* 272* 335* 346* 259* 262* 275* 260* 295* 265* 292*     Results from last 7 days   Lab Units 04/04/22  0451 04/03/22  0140   HEMOGLOBIN A1C % 9 0* 9 1*     Results from last 7 days   Lab Units 04/04/22  0451 04/03/22  0759 04/03/22  0140 04/02/22 2021   LACTIC ACID mmol/L  --   --   --  2 0   PROCALCITONIN ng/ml 0 20 0 26* 0 23 0 26*       Lines/Drains:  Invasive Devices  Report    Peripheral Intravenous Line            Peripheral IV 04/03/22 Distal;Dorsal (posterior); Left Forearm 4 days                      Imaging: No pertinent imaging reviewed  Recent Cultures (last 7 days):   Results from last 7 days   Lab Units 04/03/22  0138 04/02/22 2021   BLOOD CULTURE   --  No Growth After 4 Days  No Growth After 4 Days     LEGIONELLA URINARY ANTIGEN  Negative  --        Last 24 Hours Medication List:   Current Facility-Administered Medications   Medication Dose Route Frequency Provider Last Rate    acetaminophen  650 mg Oral Q6H PRN Valjean Hammed, PA-C      albuterol  2 5 mg Nebulization Q4H PRN Valjean Hammed, PA-C      amLODIPine  10 mg Oral Daily Valjean Hammed, PA-C      aspirin  81 mg Oral Daily Valjean Hammed, PA-C      atorvastatin  40 mg Oral QPM Valjean Hammed, PA-C      carvedilol  12 5 mg Oral BID Valjean Hammed, PA-C      cholecalciferol  2,000 Units Oral Daily Valjean Hammed, PA-C      divalproex sodium  250 mg Oral Q12H [address] Valjean Hammed, PA-C      docusate sodium  100 mg Oral BID Valjean Hammed, PA-C      finasteride  5 mg Oral Daily Valjean Hammed, PA-C      heparin (porcine)  7,500 Units Subcutaneous [facility] Lashon Montesinos, DO      insulin glargine  40 Units Subcutaneous HS Lashon Montesinos, DO      insulin lispro  12 Units Subcutaneous TID With Meals [facility], DO      insulin lispro  2-12 Units Subcutaneous TID With Meals Valjean Hammed, PA-C      insulin regular  8 Units Intravenous Once Valjean Hammed, PA-C      mupirocin   Nasal Q12H [facility], DO      nystatin   Topical TID Valjean Hammed, PA-C      OLANZapine  5 mg Intramuscular Q8H PRN Valjean Hammed, PA-C      ondansetron  4 mg Intravenous Q6H PRN Valjean Hammed, PA-C      tamsulosin  0 4 mg Oral QPM Maxx Britton PA-C          Today, Patient Was Seen By: Yonis Chen DO    **Please Note: This note may have been constructed using a voice recognition system  **

## 2022-04-07 NOTE — PLAN OF CARE
Problem: PAIN - ADULT  Goal: Verbalizes/displays adequate comfort level or baseline comfort level  Description: Interventions:  - Encourage patient to monitor pain and request assistance  - Assess pain using appropriate pain scale  - Administer analgesics based on type and severity of pain and evaluate response  - Implement non-pharmacological measures as appropriate and evaluate response  - Consider cultural and social influences on pain and pain management  - Notify physician/advanced practitioner if interventions unsuccessful or patient reports new pain  Outcome: Progressing     Problem: INFECTION - ADULT  Goal: Absence or prevention of progression during hospitalization  Description: INTERVENTIONS:  - Assess and monitor for signs and symptoms of infection  - Monitor lab/diagnostic results  - Monitor all insertion sites, i e  indwelling lines, tubes, and drains  - Monitor endotracheal if appropriate and nasal secretions for changes in amount and color  - [place] appropriate cooling/warming therapies per order  - Administer medications as ordered  - Instruct and encourage patient and family to use good hand hygiene technique  - Identify and instruct in appropriate isolation precautions for identified infection/condition  Outcome: Progressing  Goal: Absence of fever/infection during neutropenic period  Description: INTERVENTIONS:  - Monitor WBC    Outcome: Progressing     Problem: SAFETY ADULT  Goal: Patient will remain free of falls  Description: INTERVENTIONS:  - Educate patient/family on patient safety including physical limitations  - Instruct patient to call for assistance with activity   - Consult OT/PT to assist with strengthening/mobility   - Keep Call bell within reach  - Keep bed low and locked with side rails adjusted as appropriate  - Keep care items and personal belongings within reach  - Initiate and maintain comfort rounds  - Make Fall Risk Sign visible to staff  - Offer Toileting every 2 Hours, in advance of need  - Initiate/Maintain bed/chair alarm  - Obtain necessary fall risk management equipment: alarms  - Apply yellow socks and bracelet for high fall risk patients  - Consider moving patient to room near nurses station  Outcome: Progressing  Goal: Maintain or return to baseline ADL function  Description: INTERVENTIONS:  -  Assess patient's ability to carry out ADLs; assess patient's baseline for ADL function and identify physical deficits which impact ability to perform ADLs (bathing, care of mouth/teeth, toileting, grooming, dressing, etc )  - Assess/evaluate cause of self-care deficits   - Assess range of motion  - Assess patient's mobility; develop plan if impaired  - Assess patient's need for assistive devices and provide as appropriate  - Encourage maximum independence but intervene and supervise when necessary  - Involve family in performance of ADLs  - Assess for home care needs following discharge   - Consider OT consult to assist with ADL evaluation and planning for discharge  - Provide patient education as appropriate  Outcome: Progressing  Goal: Maintains/Returns to pre admission functional level  Description: INTERVENTIONS:  - Perform BMAT or MOVE assessment daily    - Set and communicate daily mobility goal to care team and patient/family/caregiver  - Collaborate with rehabilitation services on mobility goals if consulted  - Perform Range of Motion 3 times a day  - Reposition patient every 2 hours    - Dangle patient 3 times a day  - Stand patient 3 times a day  - Ambulate patient 4 times a day  - Out of bed to chair 4 times a day   - Out of bed for meals 3 times a day  - Out of bed for toileting  - Record patient progress and toleration of activity level   Outcome: Progressing     Problem: DISCHARGE PLANNING  Goal: Discharge to home or other facility with appropriate resources  Description: INTERVENTIONS:  - Identify barriers to discharge w/patient and caregiver  - Arrange for needed discharge resources and transportation as appropriate  - Identify discharge learning needs (meds, wound care, etc )  - Arrange for interpretive services to assist at discharge as needed  - Refer to Case Management Department for coordinating discharge planning if the patient needs post-hospital services based on physician/advanced practitioner order or complex needs related to functional status, cognitive ability, or social support system  Outcome: Progressing     Problem: Knowledge Deficit  Goal: Patient/family/caregiver demonstrates understanding of disease process, treatment plan, medications, and discharge instructions  Description: Complete learning assessment and assess knowledge base  Interventions:  - Provide teaching at level of understanding  - Provide teaching via preferred learning methods  Outcome: Progressing     Problem: MOBILITY - ADULT  Goal: Maintain or return to baseline ADL function  Description: INTERVENTIONS:  -  Assess patient's ability to carry out ADLs; assess patient's baseline for ADL function and identify physical deficits which impact ability to perform ADLs (bathing, care of mouth/teeth, toileting, grooming, dressing, etc )  - Assess/evaluate cause of self-care deficits   - Assess range of motion  - Assess patient's mobility; develop plan if impaired  - Assess patient's need for assistive devices and provide as appropriate  - Encourage maximum independence but intervene and supervise when necessary  - Involve family in performance of ADLs  - Assess for home care needs following discharge   - Consider OT consult to assist with ADL evaluation and planning for discharge  - Provide patient education as appropriate  Outcome: Progressing  Goal: Maintains/Returns to pre admission functional level  Description: INTERVENTIONS:  - Perform BMAT or MOVE assessment daily    - Set and communicate daily mobility goal to care team and patient/family/caregiver     - Collaborate with rehabilitation services on mobility goals if consulted  - Perform Range of Motion 3 times a day  - Reposition patient every 2 hours    - Dangle patient 3 times a day  - Stand patient 3 times a day  - Ambulate patient 4 times a day  - Out of bed to chair 4 times a day   - Out of bed for meals 3 times a day  - Out of bed for toileting  - Record patient progress and toleration of activity level   Outcome: Progressing     Problem: Prexisting or High Potential for Compromised Skin Integrity  Goal: Skin integrity is maintained or improved  Description: INTERVENTIONS:  - Identify patients at risk for skin breakdown  - Assess and monitor skin integrity  - Assess and monitor nutrition and hydration status  - Monitor labs   - Assess for incontinence   - Turn and reposition patient  - Assist with mobility/ambulation  - Relieve pressure over bony prominences  - Avoid friction and shearing  - Provide appropriate hygiene as needed including keeping skin clean and dry  - Evaluate need for skin moisturizer/barrier cream  - Collaborate with interdisciplinary team   - Patient/family teaching  - Consider wound care consult   Outcome: Progressing     Problem: Potential for Falls  Goal: Patient will remain free of falls  Description: INTERVENTIONS:  - Educate patient/family on patient safety including physical limitations  - Instruct patient to call for assistance with activity   - Consult OT/PT to assist with strengthening/mobility   - Keep Call bell within reach  - Keep bed low and locked with side rails adjusted as appropriate  - Keep care items and personal belongings within reach  - Initiate and maintain comfort rounds  - Make Fall Risk Sign visible to staff  - Offer Toileting every 2 Hours, in advance of need  - Initiate/Maintain bed/chair alarm  - Obtain necessary fall risk management equipment: alarms  - Apply yellow socks and bracelet for high fall risk patients  - Consider moving patient to room near nurses station  Outcome: Progressing Problem: METABOLIC, FLUID AND ELECTROLYTES - ADULT  Goal: Electrolytes maintained within normal limits  Description: INTERVENTIONS:  - Monitor labs and assess patient for signs and symptoms of electrolyte imbalances  - Administer electrolyte replacement as ordered  - Monitor response to electrolyte replacements, including repeat lab results as appropriate  - Instruct patient on fluid and nutrition as appropriate  Outcome: Progressing  Goal: Fluid balance maintained  Description: INTERVENTIONS:  - Monitor labs   - Monitor I/O and WT  - Instruct patient on fluid and nutrition as appropriate  - Assess for signs & symptoms of volume excess or deficit  Outcome: Progressing  Goal: Glucose maintained within target range  Description: INTERVENTIONS:  - Monitor Blood Glucose as ordered  - Assess for signs and symptoms of hyperglycemia and hypoglycemia  - Administer ordered medications to maintain glucose within target range  - Assess nutritional intake and initiate nutrition service referral as needed  Outcome: Progressing     Problem: Nutrition/Hydration-ADULT  Goal: Nutrient/Hydration intake appropriate for improving, restoring or maintaining nutritional needs  Description: Monitor and assess patient's nutrition/hydration status for malnutrition  Collaborate with interdisciplinary team and initiate plan and interventions as ordered  Monitor patient's weight and dietary intake as ordered or per policy  Utilize nutrition screening tool and intervene as necessary  Determine patient's food preferences and provide high-protein, high-caloric foods as appropriate       INTERVENTIONS:  - Monitor oral intake, urinary output, labs, and treatment plans  - Assess nutrition and hydration status and recommend course of action  - Evaluate amount of meals eaten  - Assist patient with eating if necessary   - Allow adequate time for meals  - Recommend/ encourage appropriate diets, oral nutritional supplements, and vitamin/mineral supplements  - Order, calculate, and assess calorie counts as needed  - Recommend, monitor, and adjust tube feedings and TPN/PPN based on assessed needs  - Assess need for intravenous fluids  - Provide specific nutrition/hydration education as appropriate  - Include patient/family/caregiver in decisions related to nutrition  Outcome: Progressing     Problem: SAFETY,RESTRAINT: NV/NON-SELF DESTRUCTIVE BEHAVIOR  Goal: Remains free of harm/injury (restraint for non violent/non self-detsructive behavior)  Description: INTERVENTIONS:  - Instruct patient/family regarding restraint use   - Assess and monitor physiologic and psychological status   - Provide interventions and comfort measures to meet assessed patient needs   - Identify and implement measures to help patient regain control  - Assess readiness for release of restraint   Outcome: Progressing  Goal: Returns to optimal restraint-free functioning  Description: INTERVENTIONS:  - Assess the patient's behavior and symptoms that indicate continued need for restraint  - Identify and implement measures to help patient regain control  - Assess readiness for release of restraint   Outcome: Progressing

## 2022-04-07 NOTE — CASE MANAGEMENT
Case Management Discharge Planning Note    Patient name Hien Sos  Location /[id number] MRN [id number]  : 1945 Date 2022       Current Admission Date: 2022  Current Admission Diagnosis:Acute metabolic encephalopathy   Patient Active Problem List    Diagnosis Date Noted    MRSA colonization 2022    Open wound of left heel 2022    Late onset Alzheimer's dementia with behavioral disturbance ([facility] ) 2022    Acute metabolic encephalopathy     Hypoxia 2022    Acute respiratory failure with hypoxia and hypercapnia [facility] ) 2022    Constipation 2021    Obstructive sleep apnea on CPAP 04/10/2021    Inguinal lymphadenopathy 2021    Bilateral pleural effusion 2021    Atelectasis 2021    Acute on chronic diastolic CHF (congestive heart failure) ([facility] ) 2021    Multiple renal cysts 2021    Renal calculus 2021    Venous stasis dermatitis of both lower extremities 2021    Lower extremity weakness 2021    Cigarette nicotine dependence in remission 2021    Pyuria 03/10/2021    Microscopic hematuria 03/10/2021    Multiple pulmonary nodules 03/10/2021    Gait instability 03/10/2021    Abnormal EKG 03/10/2021    Elevated d-dimer 2021    Elevated parathyroid hormone 2021    Accelerated hypertension 2021    Hypercalcemia 2021    Open wound of right lower extremity 2021    Cellulitis of right lower extremity 2021    Elevated alkaline phosphatase level 2021    Elevated TSH 2019    Pressure injury of skin of right buttock 2019    Ambulatory dysfunction 2019    UTI (urinary tract infection) 2019    Neural foraminal stenosis of cervical spine 2019    Acute pain of right lower extremity 2019    Paresthesia of left upper extremity 2019    Hypoglycemia 2019    Acute cystitis 2019    Opiate dependence, continuous ([facility] ) 02/28/2019    Uncontrolled type 2 diabetes mellitus with hyperglycemia, with long-term current use of insulin ([facility] ) 10/04/2018    Acute kidney injury ([place] [address] ) 10/04/2018    Dehydration with hyponatremia 10/04/2018    Type 2 diabetes mellitus with hyperglycemia, with long-term current use of insulin ([facility] ) 93/04/5694    Complicated UTI (urinary tract infection) 10/03/2018    Dyspnea on exertion 12/27/2016    Type 2 diabetes mellitus with hypoglycemia without coma, with long-term current use of insulin ([facility] ) 12/27/2016    CKD (chronic kidney disease) stage 3, GFR 30-59 ml/min ([facility]) 12/27/2016    Chronic diastolic CHF (congestive heart failure) ([place] [address] ) 12/27/2016    Morbid obesity with BMI of 45 0-49 9, adult ([place] [address] ) 12/27/2016    Essential hypertension 12/27/2016      LOS (days): 4  Geometric Mean LOS (GMLOS) (days): 3 60  Days to GMLOS:-1     OBJECTIVE:  Risk of Unplanned Readmission Score: 29         Current admission status: Inpatient   Preferred Pharmacy:   [facility] [place] - [address] [facility]  [address] [address]  Phone: [phone] Fax: [address]46 [address] [place] [address]  Phone: [phone] Fax: [phone]    Primary Care Provider: Edy Chavez MD    Primary Insurance: [facility]  Secondary Insurance:     DISCHARGE DETAILS:  [address] and [facility] are not able to take the patient  [facility]hab are going to possibly take him but they want to come out to see him  They are going to come out to hospital to see him tomorrow  Aleyda Ledesma from [facility] area on Aging called office to check on status of patient

## 2022-04-07 NOTE — CASE MANAGEMENT
Case Management Discharge Planning Note    Patient name Rigo Hernandez  Location /207-64 MRN [id number]  : 1945 Date 2022       Current Admission Date: 2022  Current Admission Diagnosis:Acute metabolic encephalopathy   Patient Active Problem List    Diagnosis Date Noted    MRSA colonization 2022    Open wound of left heel 2022    Late onset Alzheimer's dementia with behavioral disturbance ([facility] ) 2022    Acute metabolic encephalopathy     Hypoxia 2022    Acute respiratory failure with hypoxia and hypercapnia [facility] ) 2022    Constipation 2021    Obstructive sleep apnea on CPAP 04/10/2021    Inguinal lymphadenopathy 2021    Bilateral pleural effusion 2021    Atelectasis 2021    Acute on chronic diastolic CHF (congestive heart failure) ([facility] ) 2021    Multiple renal cysts 2021    Renal calculus 2021    Venous stasis dermatitis of both lower extremities 2021    Lower extremity weakness 2021    Cigarette nicotine dependence in remission 2021    Pyuria 03/10/2021    Microscopic hematuria 03/10/2021    Multiple pulmonary nodules 03/10/2021    Gait instability 03/10/2021    Abnormal EKG 03/10/2021    Elevated d-dimer 2021    Elevated parathyroid hormone 2021    Accelerated hypertension 2021    Hypercalcemia 2021    Open wound of right lower extremity 2021    Cellulitis of right lower extremity 2021    Elevated alkaline phosphatase level 2021    Elevated TSH 2019    Pressure injury of skin of right buttock 2019    Ambulatory dysfunction 2019    UTI (urinary tract infection) 2019    Neural foraminal stenosis of cervical spine 2019    Acute pain of right lower extremity 2019    Paresthesia of left upper extremity 2019    Hypoglycemia 2019    Acute cystitis 2019    Opiate dependence, continuous ([facility] ) 02/28/2019    Uncontrolled type 2 diabetes mellitus with hyperglycemia, with long-term current use of insulin ([facility] ) 10/04/2018    Acute kidney injury ([place] [address] ) 10/04/2018    Dehydration with hyponatremia 10/04/2018    Type 2 diabetes mellitus with hyperglycemia, with long-term current use of insulin ([facility] ) 67/94/1556    Complicated UTI (urinary tract infection) 10/03/2018    Dyspnea on exertion 12/27/2016    Type 2 diabetes mellitus with hypoglycemia without coma, with long-term current use of insulin ([facility] ) 12/27/2016    CKD (chronic kidney disease) stage 3, GFR 30-59 ml/min ([facility]) 12/27/2016    Chronic diastolic CHF (congestive heart failure) ([place] [address] ) 12/27/2016    Morbid obesity with BMI of 45 0-49 9, adult ([place] [address] ) 12/27/2016    Essential hypertension 12/27/2016      LOS (days): 4  Geometric Mean LOS (GMLOS) (days): 3 60  Days to GMLOS:-0 9     OBJECTIVE:  Risk of Unplanned Readmission Score: 29         Current admission status: Inpatient   Preferred Pharmacy:   [facility] [place] - [address] [facility], [address]  [address] [place] [address] Road [address]  Phone: [phone] Fax: [address]22 [address]  [address] [place] [address]  [address]  Phone: [phone] Fax: [phone]    Primary Care Provider: Alanis Wiseman MD    Primary Insurance: [facility]  Secondary Insurance:     DISCHARGE DETAILS:         As per physician patient is medically ready for discharge in the AM  I notified patient's wife Elvie Lyle  that [facility] and [place] are unable to accept the patient  Elvie Lyle would like me to send referrals to [facility] and [address] [place] [address]   Referrals sent as requested

## 2022-04-07 NOTE — ASSESSMENT & PLAN NOTE
Secondary to morbid obesity/possible obesity-hypoventilation syndrome/obstructive sleep apnea  Currently requiring 3 lpm of continuous supplemental oxygen to maintain adequate oxygen saturation levels   Management as above

## 2022-04-07 NOTE — ASSESSMENT & PLAN NOTE
· Most likely multifactorial secondary to hypercarbia, hyperglycemia, and possible opioid overdose (patient chronic opioid dependent and accidentally overdosed himself few months ago)  · Patient was combative upon EMS arrival so he received few doses of benzos and became more lethargic  · Due to transit episodes of hypoxia and evidence of hypercapnia on VBG he was placed on BiPAP  · CT of the head unremarkable for intracranial pathology  · Did not tolerate MRI of the brain due to not being able to fit in the [facility]  · CT of the chest showed no pulmonary embolism but evidence of possible pneumonia so treated empirically for CAP  · The antibiotics were discontinued with negative procalcitonin levels x 3   · Wound care consult  · Continue BiPAP - patient only tolerating for about an hour at a time then taking it off himself  · Follow-up lower extremity Doppler - normal    Patient much improved as of 4/6/22 and close to baseline mental status per family  Likely hospital delirium and sundowning's occurring at night  Appreciate Psych input  Patient medically clear for rehab vs inpatient psych

## 2022-04-07 NOTE — ASSESSMENT & PLAN NOTE
Likely contributing to patient's aggression and confusion that worsens at night  Psychiatry evaluated the patient due to his homicidal threats made on 4/5/2022  We will trial depakote sprinkles 250 BID  Should behavioral disturbances persist, patient would need inpatient psychiatric placement when medically cleared

## 2022-04-07 NOTE — ASSESSMENT & PLAN NOTE
Lab Results   Component Value Date    EGFR 50 04/07/2022    EGFR 52 04/06/2022    EGFR 47 04/05/2022    CREATININE 1 34 (H) 04/07/2022    CREATININE 1 29 04/06/2022    CREATININE 1 40 (H) 04/05/2022     Avoid nephrotoxic drugs and hypotension  Serial laboratory testing to monitor the patient's renal function and electrolyte levels

## 2022-04-08 LAB
BACTERIA BLD CULT: NORMAL
BACTERIA BLD CULT: NORMAL
GLUCOSE SERPL-MCNC: 190 MG/DL (ref 65–140)
GLUCOSE SERPL-MCNC: 230 MG/DL (ref 65–140)
GLUCOSE SERPL-MCNC: 301 MG/DL (ref 65–140)
GLUCOSE SERPL-MCNC: 335 MG/DL (ref 65–140)

## 2022-04-08 PROCEDURE — 94760 N-INVAS EAR/PLS OXIMETRY 1: CPT

## 2022-04-08 PROCEDURE — 97530 THERAPEUTIC ACTIVITIES: CPT

## 2022-04-08 PROCEDURE — 99232 SBSQ HOSP IP/OBS MODERATE 35: CPT | Performed by: INTERNAL MEDICINE

## 2022-04-08 PROCEDURE — 94660 CPAP INITIATION&MGMT: CPT

## 2022-04-08 PROCEDURE — 82948 REAGENT STRIP/BLOOD GLUCOSE: CPT

## 2022-04-08 RX ORDER — INSULIN GLARGINE 100 [IU]/ML
50 INJECTION, SOLUTION SUBCUTANEOUS
Status: DISCONTINUED | OUTPATIENT
Start: 2022-04-08 | End: 2022-04-13 | Stop reason: HOSPADM

## 2022-04-08 RX ORDER — INSULIN GLARGINE 100 [IU]/ML
10 INJECTION, SOLUTION SUBCUTANEOUS ONCE
Status: COMPLETED | OUTPATIENT
Start: 2022-04-08 | End: 2022-04-08

## 2022-04-08 RX ADMIN — HEPARIN SODIUM 7500 UNITS: 5000 INJECTION INTRAVENOUS; SUBCUTANEOUS at 13:31

## 2022-04-08 RX ADMIN — MUPIROCIN 1 APPLICATION: 20 OINTMENT TOPICAL at 21:31

## 2022-04-08 RX ADMIN — NYSTATIN: 100000 POWDER TOPICAL at 08:02

## 2022-04-08 RX ADMIN — CARVEDILOL 12.5 MG: 12.5 TABLET, FILM COATED ORAL at 17:01

## 2022-04-08 RX ADMIN — ASPIRIN 81 MG CHEWABLE TABLET 81 MG: 81 TABLET CHEWABLE at 08:01

## 2022-04-08 RX ADMIN — CARVEDILOL 12.5 MG: 12.5 TABLET, FILM COATED ORAL at 08:01

## 2022-04-08 RX ADMIN — DOCUSATE SODIUM 100 MG: 100 CAPSULE, LIQUID FILLED ORAL at 17:01

## 2022-04-08 RX ADMIN — ATORVASTATIN CALCIUM 40 MG: 40 TABLET, FILM COATED ORAL at 17:01

## 2022-04-08 RX ADMIN — Medication 2000 UNITS: at 08:01

## 2022-04-08 RX ADMIN — NYSTATIN: 100000 POWDER TOPICAL at 16:51

## 2022-04-08 RX ADMIN — INSULIN GLARGINE 50 UNITS: 100 INJECTION, SOLUTION SUBCUTANEOUS at 21:31

## 2022-04-08 RX ADMIN — ENOXAPARIN SODIUM 40 MG: 40 INJECTION SUBCUTANEOUS at 21:31

## 2022-04-08 RX ADMIN — NYSTATIN: 100000 POWDER TOPICAL at 21:31

## 2022-04-08 RX ADMIN — DOCUSATE SODIUM 100 MG: 100 CAPSULE, LIQUID FILLED ORAL at 08:01

## 2022-04-08 RX ADMIN — AMLODIPINE BESYLATE 10 MG: 10 TABLET ORAL at 08:01

## 2022-04-08 RX ADMIN — TAMSULOSIN HYDROCHLORIDE 0.4 MG: 0.4 CAPSULE ORAL at 17:01

## 2022-04-08 RX ADMIN — FINASTERIDE 5 MG: 5 TABLET, FILM COATED ORAL at 08:01

## 2022-04-08 RX ADMIN — MUPIROCIN 1 APPLICATION: 20 OINTMENT TOPICAL at 08:07

## 2022-04-08 RX ADMIN — DIVALPROEX SODIUM 250 MG: 125 CAPSULE, COATED PELLETS ORAL at 08:00

## 2022-04-08 RX ADMIN — INSULIN GLARGINE 10 UNITS: 100 INJECTION, SOLUTION SUBCUTANEOUS at 08:01

## 2022-04-08 RX ADMIN — DIVALPROEX SODIUM 250 MG: 125 CAPSULE, COATED PELLETS ORAL at 21:31

## 2022-04-08 NOTE — CASE MANAGEMENT
Case Management Discharge Planning Note    Patient name Kajal Barbosa  Location /276-10 MRN [id number]  : 1945 Date 2022       Current Admission Date: 2022  Current Admission Diagnosis:Acute metabolic encephalopathy   Patient Active Problem List    Diagnosis Date Noted    MRSA colonization 2022    Open wound of left heel 2022    Late onset Alzheimer's dementia with behavioral disturbance ([facility] ) 2022    Acute metabolic encephalopathy     Hypoxia 2022    Acute [facility] failure with hypoxia and [facility]capnia [facility] 20[facility]    Constipation 2021    Obstructive sleep apnea on CPAP 04/10/2021    Inguinal lymphadenopathy 2021    Bilateral pleural effusion 2021    Atelectasis 2021    Acute on chronic diastolic CHF (congestive heart failure) ([facility] ) 2021    Multiple renal cysts 2021    Renal calculus 2021    Venous stasis dermatitis of both lower extremities 2021    Lower extremity weakness 2021    Cigarette nicotine dependence in remission 2021    Pyuria 03/10/2021    Microscopic hematuria 03/10/2021    Multiple pulmonary nodules 03/10/2021    Gait instability 03/10/2021    Abnormal EKG 03/10/2021    Elevated d-dimer 2021    Elevated parathyroid hormone 2021    Accelerated hypertension 2021    Hypercalcemia 2021    Open wound of right lower extremity 2021    Cellulitis of right lower extremity 2021    Elevated alkaline phosphatase level 2021    Elevated TSH 2019    Pressure injury of skin of right buttock 2019    Ambulatory dysfunction 2019    UTI (urinary tract infection) 2019    Neural foraminal stenosis of cervical spine 2019    Acute pain of right lower extremity 2019    Paresthesia of left upper extremity 2019    Hypoglycemia 2019    Acute cystitis 2019    Opiate dependence, continuous ([place] [address] ) 02/28/2019    Uncontrolled type 2 diabetes mellitus with hyperglycemia, with long-term current use of insulin ([place] [address] ) 10/04/2018    Acute kidney injury ([place] [address] ) 10/04/2018    Dehydration with hyponatremia 10/04/2018    Type 2 diabetes mellitus with hyperglycemia, with long-term current use of insulin ([place] [address] ) 06/87/7252    Complicated UTI (urinary tract infection) 10/03/2018    Dyspnea on exertion 12/27/2016    Type 2 diabetes mellitus with hypoglycemia without coma, with long-term current use of insulin ([place] [address] ) 12/27/2016    CKD (chronic kidney disease) stage 3, GFR 30-59 ml/min ([facility]) 12/27/2016    Chronic diastolic CHF (congestive heart failure) ([place] [address] ) 12/27/2016    Morbid obesity with BMI of 45 0-49 9, adult ([place] [address] ) 12/27/2016    Essential hypertension 12/27/2016      LOS (days): 5  Geometric Mean LOS (GMLOS) (days): 3 60  Days to GMLOS:-1 8     OBJECTIVE:  Risk of Unplanned Readmission Score: 29         Current admission status: Inpatient   Preferred Pharmacy:   [facility] [place] - [address] [facility], [address]  [address] [address]  Phone: [phone] Fax: [address] [address]  Phone: [phone] Fax: [phone]    Primary Care Provider: Eri Bhakta MD    Primary Insurance: [facility]  Secondary Insurance:     DISCHARGE DETAILS:  [facility] [address] home and [facility] is unable to accept the patient   [address] [facility] is coming to evaluate the person

## 2022-04-08 NOTE — CASE MANAGEMENT
Case Management Discharge Planning Note    Patient name Blas Goss /459-20 MRN [id number]  : 1945 Date 2022       Current Admission Date: 2022  Current Admission Diagnosis:Acute metabolic encephalopathy   Patient Active Problem List    Diagnosis Date Noted    MRSA colonization 2022    Open wound of left heel 2022    Late onset Alzheimer's dementia with behavioral disturbance ([facility] ) 2022    Acute metabolic encephalopathy     Hypoxia 2022    [facility] and [facility] 20[facility]    Constipation 2021    Obstructive sleep apnea on CPAP 04/10/2021    Inguinal lymphadenopathy 2021    Bilateral pleural effusion 2021    Atelectasis 2021    Acute on chronic diastolic CHF (congestive heart failure) [email or web address] ) 2021    Multiple renal cysts 2021    Renal calculus 2021    Venous stasis dermatitis of both lower extremities 2021    Lower extremity weakness 2021    Cigarette nicotine dependence in remission 2021    Pyuria 03/10/2021    Microscopic hematuria 03/10/2021    Multiple pulmonary nodules 03/10/2021    Gait instability 03/10/2021    Abnormal EKG 03/10/2021    Elevated d-dimer 2021    Elevated parathyroid hormone 2021    Accelerated hypertension 2021    Hypercalcemia 2021    Open wound of right lower extremity 2021    Cellulitis of right lower extremity 2021    Elevated alkaline phosphatase level 2021    Elevated TSH 2019    Pressure injury of skin of right buttock 2019    Ambulatory dysfunction 2019    UTI (urinary tract infection) 2019    Neural foraminal stenosis of cervical spine 2019    Acute pain of right lower extremity 2019    Paresthesia of left upper extremity 2019    Hypoglycemia 2019    Acute cystitis 2019    Opiate dependence, continuous ([facility] ) 02/28/2019    Uncontrolled type 2 diabetes mellitus with hyperglycemia, with long-term current use of insulin ([facility] ) 10/04/2018    Acute kidney injury ([place] [address] ) 10/04/2018    Dehydration with hyponatremia 10/04/2018    Type 2 diabetes mellitus with hyperglycemia, with long-term current use of insulin ([facility] ) 13/85/9937    Complicated UTI (urinary tract infection) 10/03/2018    Dyspnea on exertion 12/27/2016    Type 2 diabetes mellitus with hypoglycemia without coma, with long-term current use of insulin ([facility] ) 12/27/2016    CKD (chronic kidney disease) stage 3, GFR 30-59 ml/min ([facility]) 12/27/2016    Chronic diastolic CHF (congestive heart failure) ([place] [address] ) 12/27/2016    Morbid obesity with BMI of 45 0-49 9, adult ([place] [address] ) 12/27/2016    Essential hypertension 12/27/2016      LOS (days): 5  Geometric Mean LOS (GMLOS) (days): 3 60  Days to GMLOS:-1 9     OBJECTIVE:  Risk of Unplanned Readmission Score: 29         Current admission status: Inpatient   Preferred Pharmacy:   [facility] [place] - [address] [facility], [address]  [address] [place] [address]  Phone: [phone] Fax: [address] [place] [address]  Phone: [phone] Fax: [phone]    Primary Care Provider: Geovanna Summers MD    Primary Insurance: [facility] [employer]  Secondary Insurance:     DISCHARGE DETAILS:  [id number] [facility] is able to accept the patient  They started authorization with insurance company

## 2022-04-08 NOTE — ASSESSMENT & PLAN NOTE
Lab Results   Component Value Date    EGFR 50 04/07/2022    EGFR 52 04/06/2022    EGFR 47 04/05/2022    CREATININE 1 34 (H) 04/07/2022    CREATININE 1 29 04/06/2022    CREATININE 1 40 (H) 04/05/2022     Avoid nephrotoxic drugs and hypotension  Serial laboratory testing to monitor the patient's renal function and electrolyte levels  Outpatient follow-up with Nephrology

## 2022-04-08 NOTE — ASSESSMENT & PLAN NOTE
Secondary to morbid obesity/possible obesity-hypoventilation syndrome/obstructive sleep apnea  · Currently requiring 3 lpm of continuous supplemental oxygen to maintain adequate oxygen saturation levels during the day and BiPAP QHS  · The patient is having difficulty tolerating the BiPAP mask      Management as above

## 2022-04-08 NOTE — PLAN OF CARE
Problem: OCCUPATIONAL THERAPY ADULT  Goal: Performs self-care activities at highest level of function for planned discharge setting  See evaluation for individualized goals  Description: Treatment Interventions: ADL retraining,Functional transfer training,UE strengthening/ROM,Endurance training,Cognitive reorientation,Patient/family training,Equipment evaluation/education,Activityengagement          See flowsheet documentation for full assessment, interventions and recommendations  Outcome: Not Progressing  Note: Limitation: Decreased ADL status,Decreased UE strength,Decreased Safe judgement during ADL,Decreased cognition,Decreased endurance,Decreased self-care trans,Decreased high-level ADLs     Assessment: Patient participated in Skilled OT session this date with interventions consisting of safety awareness and fall prevention techniques,  therapeutic activities to: increase activity tolerance, increase cardiovascular endurance , increase dynamic sit/ stand balance during functional activity , increase postural control, increase trunk control and increase OOB/ sitting tolerance   Co treatment with PTA secondary to complex medical condition of pt, mod-max A of 2 required to achieve and maintain transitional movements, requiring the need of skilled therapeutic intervention of 2 therapists to achieve delivery of services  Patient agreeable to OT treatment session, upon arrival patient was found supine in bed  Patient requiring frequent re direction, verbal cues for safety, verbal cues for correct technique, verbal cues for pacing thru activity steps, cognitive assistance to anticipate next step, one step directives and frequent rest periods  Patient continues to be functioning below baseline level, occupational performance remains limited secondary to factors listed above and increased risk for falls and injury  From OT standpoint, recommendation at time of d/c would be Post acute rehabilitation services  The patient's raw score on the AM-PAC Daily Activity inpatient short form is 9, standardized score is 29 04, less than 39 4  Patients at this level are likely to benefit from discharge to post-acute rehabilitation services  Please refer to the recommendation of the Occupational Therapist for safe discharge planning  Co treatment with PT secondary to complex medical condition of pt, possible A of 2 required to achieve and maintain transitional movements, requiring the need of skilled therapeutic intervention of 2 therapists to achieve delivery of services       OT Discharge Recommendation: Post acute rehabilitation services

## 2022-04-08 NOTE — PROGRESS NOTES
[address] [facility] [address] [place]  Progress Note Angela Emmanuel 1945, 76 y o  male MRN: [id number]  Unit/Bed#: 403-01 Encounter: [id number]  Primary Care Provider: Nolon Duane, MD   Date and time admitted to hospital: 4/2/2022  7:56 PM    * Acute metabolic encephalopathy  Assessment & Plan  · Most likely multifactorial secondary to hypercarbia, hyperglycemia, and possible opioid overdose (patient chronic opioid dependent and accidentally overdosed himself few months ago)  · Patient was combative upon EMS arrival so he received few doses of benzos and became more lethargic  · Due to transit episodes of hypoxia and evidence of hypercapnia on VBG he was placed on BiPAP  · CT of the head unremarkable for intracranial pathology  · Did not tolerate MRI of the brain due to not being able to fit in the [facility]  · CT of the chest showed no pulmonary embolism but evidence of possible pneumonia so treated empirically for CAP  · The antibiotics were discontinued with negative procalcitonin levels x 3   · Wound care consult  · Continue BiPAP - patient only tolerating for about an hour at a time then taking it off himself  · Follow-up lower extremity Doppler - normal    Likely hospital delirium and sundowning's occurring at night  Appreciate Psych input  Patient medically clear for rehab vs inpatient psych  Open wound of left heel  Assessment & Plan  · The patient was seen in consultation by Dr Traci Angelo (Podiatry), who had the following recommendations:  "Plan:  Wound is superficial and stable,  Primarily granular, and without signs of infection noted  Continue wound care as previously ordered, maxorb and allevyn border heel dressing, change every other day  Continue offloading heel with pillow in bed  Likely discharge to rehab today    F/U wound care prn if not available in rehab "    MRSA colonization  Assessment & Plan  · He will be placed on nasal bactroban/mupirocin 2% ointment applied to both nares BID for 5 days  The patient will also need MRSA decolonization including chlorhexidine/hibiclens rinse applied from the neck down to the toes daily with bathing/showering for 2 weeks then weekly for 3 months  He will need a MRSA nasal screen rechecked after decolonization is completed  Late onset Alzheimer's dementia with behavioral disturbance [facility])  Assessment & Plan  Likely contributing to patient's aggression and confusion that worsens at night  Psychiatry evaluated the patient due to his homicidal threats made on 4/5/2022  Trial depakote sprinkles 250 BID  Should behavioral disturbances persist, patient would need inpatient psychiatric placement when medically cleared    I re-consulted Psychiatry on 04/08/2022  Acute respiratory failure with hypoxia and hypercapnia (HCC)  Assessment & Plan  Secondary to morbid obesity/possible obesity-hypoventilation syndrome/obstructive sleep apnea  · Currently requiring 3 lpm of continuous supplemental oxygen to maintain adequate oxygen saturation levels during the day and BiPAP QHS  · The patient is having difficulty tolerating the BiPAP mask      Management as above    Obstructive sleep apnea on CPAP  Assessment & Plan  · Continue BiPAP QHS and PRN    Abnormal EKG  Assessment & Plan  · Outpatient Cardiology evaluation    Opiate dependence, continuous (HCC)  Assessment & Plan  Hold opioids due to altered mental status  Consider restart prn when acceptable    Type 2 diabetes mellitus with hyperglycemia, with long-term current use of insulin [facility])  Assessment & Plan  Lab Results   Component Value Date    HGBA1C 9 0 (H) 04/04/2022       Recent Labs     04/07/22  1626 04/07/22  2124 04/08/22  0714 04/08/22  1032   POCGLU 181* 247* 230* 335*       Blood Sugar Average: Last 72 hrs:  (P) 982 [id number]     · Poorly-controlled diabetes with significantly elevated hemoglobin A1c level  · Increased lantus to 50 units SQ QHS on 04/08/2022  · Continue humalog 12 units TID with meals    Morbid obesity with BMI of 45 0-49 9, adult ([facility])  Assessment & Plan  · Lifestyle modifications are recommended including weight loss, improving his diet, and increasing his amount of activity  Chronic diastolic CHF (congestive heart failure) ([facility])  Assessment & Plan  Wt Readings from Last 3 Encounters:   04/04/22 133 kg (293 lb)   04/03/22 133 kg (293 lb 3 4 oz)   04/12/21 133 kg (292 lb 15 9 oz)     Does not appear to be in acute exacerbation  Echo done in March of 2021 reported preserved ejection fraction grade 2 diastolic dysfunction  Continue carvedilol      CKD (chronic kidney disease) stage 3, GFR 30-59 ml/min [facility])  Assessment & Plan  Lab Results   Component Value Date    EGFR 50 04/07/2022    EGFR 52 04/06/2022    EGFR 47 04/05/2022    CREATININE 1 34 (H) 04/07/2022    CREATININE 1 29 04/06/2022    CREATININE 1 40 (H) 04/05/2022     Avoid nephrotoxic drugs and hypotension  Serial laboratory testing to monitor the patient's renal function and electrolyte levels  Outpatient follow-up with Nephrology      VTE Pharmacologic Prophylaxis: VTE Score: 8 High Risk (Score >/= 5) - Pharmacological DVT Prophylaxis Ordered: enoxaparin (Lovenox) at 40 mg SQ every 12 hours (increased dosing regimen based on BMI)  Sequential Compression Devices Ordered  Patient Centered Rounds: I performed bedside rounds with nursing staff today  Education and Discussions with Family / Patient: Updated  (wife) at bedside  Time Spent for Care: 30 minutes  More than 50% of total time spent on counseling and coordination of care as described above  Current Length of Stay: 5 day(s)  Current Patient Status: Inpatient   Certification Statement: The patient will continue to require additional inpatient hospital stay due to the need for short-term rehabilitation placement  Discharge Plan: Anticipate discharge in 24-48 hrs to rehab facility      Code Status: Level 3 - DNAR and DNI    Subjective: The patient was seen and examined  The patient is doing better  The patient experienced some confusion and agitation overnight, which has now resolved  No chest pain  No shortness of breath  No abdominal pain  No nausea or vomiting  Objective:     Vitals:   Temp (24hrs), Av 7 °F (36 5 °C), Min:97 7 °F (36 5 °C), Max:97 7 °F (36 5 °C)    Temp:  [97 7 °F (36 5 °C)] 97 7 °F (36 5 °C)  HR:  [60-67] 67  Resp:  [18-20] 18  BP: (149-160)/(71-78) 160/78  SpO2:  [88 %-99 %] 92 %  Body mass index is 47 29 kg/m²  Input and Output Summary (last 24 hours): Intake/Output Summary (Last 24 hours) at 2022 1510  Last data filed at 2022 1201  Gross per 24 hour   Intake 590 ml   Output --   Net 590 ml       Physical Exam:   Physical Exam   General:  NAD, follows commands  HEENT:  NC/AT, mucous membranes moist  Neck:  Supple, No JVP elevation  CV:  + S1, + S2, RRR  Pulm:  Lung fields are CTA bilaterally  Abd:  Soft, Non-tender, Non-distended  Ext:  No clubbing/cyanosis/edema  Skin:  No rashes  Neuro:  Awake, alert, oriented  Psych:  Normal mood and affect      Additional Data:    Labs:  Results from last 7 days   Lab Units 22  0459   WBC Thousand/uL 8 36   HEMOGLOBIN g/dL 11 0*   HEMATOCRIT % 34 9*   PLATELETS Thousands/uL 315   NEUTROS PCT % 62   LYMPHS PCT % 23   MONOS PCT % 7   EOS PCT % 7*     Results from last 7 days   Lab Units 22  0459 22  0413 22  0413   SODIUM mmol/L 143   < > 145   POTASSIUM mmol/L 3 6   < > 3 5   CHLORIDE mmol/L 107   < > 111*   CO2 mmol/L 26   < > 24   BUN mg/dL 15   < > 17   CREATININE mg/dL 1 34*   < > 1 29   ANION GAP mmol/L 10   < > 10   CALCIUM mg/dL 8 9   < > 9 3   ALBUMIN g/dL  --   --  2 6*   TOTAL BILIRUBIN mg/dL  --   --  0 31   ALK PHOS U/L  --   --  150*   ALT U/L  --   --  18   AST U/L  --   --  15   GLUCOSE RANDOM mg/dL 258*   < > 235*    < > = values in this interval not displayed       Results from last 7 days   Lab Units 04/02/22 2021   INR  1 18     Results from last 7 days   Lab Units 04/08/22  1032 04/08/22  0714 04/07/22  2124 04/07/22  1626 04/07/22  1107 04/07/22  0709 04/06/22  2105 04/06/22  1516 04/06/22  1102 04/06/22  0524 04/05/22  2357 04/05/22 2107   POC GLUCOSE mg/dl 335* 230* 247* 181* 328* 278* 272* 335* 346* 259* 262* 275*     Results from last 7 days   Lab Units 04/04/22  0451 04/03/22  0140   HEMOGLOBIN A1C % 9 0* 9 1*     Results from last 7 days   Lab Units 04/04/22  0451 04/03/22  0759 04/03/22  0140 04/02/22 2021   LACTIC ACID mmol/L  --   --   --  2 0   PROCALCITONIN ng/ml 0 20 0 26* 0 23 0 26*       Lines/Drains:  Invasive Devices  Report    None                       Imaging: No pertinent imaging reviewed  Recent Cultures (last 7 days):   Results from last 7 days   Lab Units 04/03/22  0138 04/02/22 2021   BLOOD CULTURE   --  No Growth After 5 Days  No Growth After 5 Days     LEGIONELLA URINARY ANTIGEN  Negative  --        Last 24 Hours Medication List:   Current Facility-Administered Medications   Medication Dose Route Frequency Provider Last Rate    acetaminophen  650 mg Oral Q6H PRN Dario Bees, PA-C      albuterol  2 5 mg Nebulization Q4H PRN Dariocristobal Escoto, PA-C      amLODIPine  10 mg Oral Daily Hollister Bees, PA-C      aspirin  81 mg Oral Daily Dario Bees, PA-C      atorvastatin  40 mg Oral QPM Dario Tigist, PA-C      carvedilol  12 5 mg Oral BID Hollistercristobal Escoto, PA-C      cholecalciferol  2,000 Units Oral Daily Dariocristobal Escoto, PA-C      divalproex sodium  250 mg Oral Q12H [facility] Hollistercristobal Escoto, PA-C      docusate sodium  100 mg Oral BID Dariocristobal Escoto, PA-C      enoxaparin  40 mg Subcutaneous Q12H [facility] Eben Arenasetter,       finasteride  5 mg Oral Daily Hollistercristobal Escoto, PA-C      insulin glargine  50 Units Subcutaneous HS [place] Exon [place],       insulin lispro  12 Units Subcutaneous TID With Meals Eben Montesinos,       insulin lispro  2-12 Units Subcutaneous TID With Meals [address] Padmini Carrasco PA-C      insulin regular  8 Units Intravenous Once Lawrence Smith, KELSEY      mupirocin   Nasal Q12H [facility],       nystatin   Topical TID Lawrence Smith, KELSEY      OLANZapine  5 mg Intramuscular Q8H PRN Lawrence Smith, KELSEY      ondansetron  4 mg Intravenous Q6H PRN Lawrence Smith, KELSEY      tamsulosin  0 4 mg Oral QPM Lawrence Smith PA-C          Today, Patient Was Seen By: Eveline Marcano DO    **Please Note: This note may have been constructed using a voice recognition system  **

## 2022-04-08 NOTE — PHYSICAL THERAPY NOTE
Physical Therapy Progress Note    Patient Name: Constance Weiss    Today's Date: 4/8/2022     Problem List  Principal Problem:    Acute metabolic encephalopathy  Active Problems:    CKD (chronic kidney disease) stage 3, GFR 30-59 ml/min ([facility])    Chronic diastolic CHF (congestive heart failure) ([facility] )    Morbid obesity with BMI of 45 0-49 9, adult ([place] [address] )    Type 2 diabetes mellitus with hyperglycemia, with long-term current use of insulin ([facility])    Opiate dependence, continuous ([facility])    Abnormal EKG    Obstructive sleep apnea on CPAP    Acute respiratory failure with hypoxia and hypercapnia ([place] [address] )    Late onset Alzheimer's dementia with behavioral disturbance ([facility])    MRSA colonization    Open wound of left heel       Past Medical History  Past Medical History:   Diagnosis Date    Cataract     CHF (congestive heart failure) ([facility])     chronic diastolic CHF    Coronary artery disease     Diabetes mellitus ([place] [address] )     Glaucoma     Hyperlipidemia     Hypertension     Neuropathy     Obesity     Renal disorder     chronic kidney disease stage 3     Sleep apnea     Stroke ([place] [address] )     TIA (transient ischemic attack)     Uncontrolled type 2 diabetes mellitus with hyperglycemia, with long-term current use of insulin ([place] [address] ) 10/4/2018        Past Surgical History  Past Surgical History:   Procedure Laterality Date    APPENDECTOMY      CARPAL TUNNEL RELEASE      EYE SURGERY      cataracts        04/08/22 0855   PT Last Visit   PT Visit Date 04/08/22   Note Type   Note Type Treatment   Pain Assessment   Pain Assessment Tool 0-10   Pain Score 8   Pain Location/Orientation Orientation: Bilateral;Location: Leg   Restrictions/Precautions   Weight Bearing Precautions Per Order No   Other Precautions Pain; Fall Risk;O2;Multiple lines; Bed Alarm;Cognitive   General   Chart Reviewed Yes   Family/Caregiver Present No   Cognition   Overall Cognitive Status Impaired Arousal/Participation Responsive   Attention Attends with cues to redirect   Orientation Level Oriented to person;Disoriented to time;Disoriented to situation;Disoriented to place   Following Commands Follows one step commands with increased time or repetition   Subjective   Subjective "My legs hurt so bad"   Bed Mobility   Rolling R 2  Maximal assistance   Additional items Assist x 2; Increased time required;Verbal cues;LE management   Rolling L 2  Maximal assistance   Additional items Assist x 2; Increased time required;Verbal cues;LE management   Supine to Sit 2  Maximal assistance   Additional items Assist x 2; Increased time required;Verbal cues;LE management   Sit to Supine 2  Maximal assistance   Additional items Assist x 2; Increased time required;Verbal cues;LE management   Transfers   Sit to Stand Unable to assess   Stand to Sit Unable to assess   Ambulation/Elevation   Gait pattern Not tested; Not appropriate   Balance   Static Sitting Poor   Dynamic Sitting Poor -   Endurance Deficit   Endurance Deficit Yes   Endurance Deficit Description pt fatigued at baseline; exacerbated with minimal activity   Activity Tolerance   Activity Tolerance Patient limited by fatigue;Patient limited by pain   Assessment   Prognosis Guarded   Problem List Decreased strength;Decreased endurance; Impaired balance;Decreased mobility;Pain;Obesity; Decreased cognition; Impaired judgement;Decreased safety awareness   Assessment Patient seen this date for PT treatment session to increase level of mobility and functional activity tolerance  This date, pt able to perform all functional bed mobility with Max A x 2 and increased time  Max verbal cuing provided for safety awareness and sequencing  Transfers attempted at [facility], however pt putting forth no effort to assist in mobility; demonstrating posterior lean and reporting severe B LE pain  Pt returned to supine position d/t this; repositioned for safety and comfort   HR and SpO2 remained [facility] on 3L O2 throughout  No true LOB experienced  The patient's AM-PAC Basic Mobility Inpatient Short Form Raw Score is 6  A Raw score of less than or equal to 16 suggests the patient may benefit from discharge to post-acute rehabilitation services  Please also refer to the recommendation of the Physical Therapist for safe discharge planning  Co treatment with OT secondary to complex medical condition of pt, possible A of 2 required to achieve and maintain transitional movements, requiring the need of skilled therapeutic intervention of 2 therapists to achieve delivery of services  D/c recommendation continues to be post-acute rehabilitation services  Goals   LTG Expiration Date 04/18/22   PT Treatment Day 3   Plan   Treatment/Interventions Functional transfer training;LE strengthening/ROM; Therapeutic exercise; Endurance training;Gait training;Bed mobility   Progress Slow progress, decreased activity tolerance   PT Frequency 3-5x/wk   Recommendation   PT Discharge Recommendation Post acute rehabilitation services   AM-PAC Basic Mobility Inpatient   Turning in Bed Without Bedrails 1   Lying on Back to Sitting on Edge of Flat Bed 1   Moving Bed to Chair 1   Standing Up From Chair 1   Walk in Room 1   Climb 3-5 Stairs 1   Basic Mobility Inpatient Raw Score 6   Turning Head Towards Sound 3   Follow Simple Instructions 2   Low Function Basic Mobility Raw Score 11   Low Function Basic Mobility Standardized Score 16 55   Highest Level Of Mobility   JH-HLM Goal 2: Bed activities/Dependent transfer   JH-HLM Highest Level of Mobility 3: Sit at edge of bed   JH-HLM Goal Achieved Yes   Education   Education Provided Mobility training   Patient Reinforcement needed   End of Consult   Patient Position at End of Consult Supine; All needs within reach

## 2022-04-08 NOTE — PLAN OF CARE
Problem: PHYSICAL THERAPY ADULT  Goal: Performs mobility at highest level of function for planned discharge setting  See evaluation for individualized goals  Description: Treatment/Interventions: Functional transfer training,LE strengthening/ROM,Therapeutic exercise,Endurance training,Bed mobility,Gait training          See flowsheet documentation for full assessment, interventions and recommendations  Outcome: Progressing  Note: Prognosis: Guarded  Problem List: Decreased strength,Decreased endurance,Impaired balance,Decreased mobility,Pain,Obesity,Decreased cognition,Impaired judgement,Decreased safety awareness  Assessment: Patient seen this date for PT treatment session to increase level of mobility and functional activity tolerance  This date, pt able to perform all functional bed mobility with Max A x 2 and increased time  Max verbal cuing provided for safety awareness and sequencing  [place] [facility] however pt putting forth no effort to assist in mobility; demonstrating posterior lean and reporting severe B LE pain  Pt returned to supine position d/t this; repositioned for safety and comfort  HR and SpO2 remained WFL on 3L O2 throughout  No true LOB experienced  The patient's AM-PAC Basic Mobility Inpatient Short Form Raw Score is 6  A Raw score of less than or equal to 16 suggests the patient may benefit from discharge to post-acute rehabilitation services  Please also refer to the recommendation of the Physical Therapist for safe discharge planning  Co treatment with OT secondary to complex medical condition of pt, possible A of 2 required to achieve and maintain transitional movements, requiring the need of skilled therapeutic intervention of 2 therapists to achieve delivery of services  D/c recommendation continues to be post-acute rehabilitation services  PT Discharge Recommendation: Post acute rehabilitation services          See flowsheet documentation for full assessment

## 2022-04-08 NOTE — ASSESSMENT & PLAN NOTE
Likely contributing to patient's aggression and confusion that worsens at night  Psychiatry evaluated the patient due to his homicidal threats made on 4/5/2022  Trial depakote sprinkles 250 BID  Should behavioral disturbances persist, patient would need inpatient psychiatric placement when medically cleared    I re-consulted Psychiatry on 04/08/2022

## 2022-04-08 NOTE — OCCUPATIONAL THERAPY NOTE
Occupational Therapy Progress Note     Patient Name: Aye Childs  Today's Date: 4/8/2022  Problem List  Principal Problem:    Acute metabolic encephalopathy  Active Problems:    CKD (chronic kidney disease) stage 3, GFR 30-59 ml/min ([facility])    Chronic diastolic CHF (congestive heart failure) ([facility])    Morbid obesity with BMI of 45 0-49 9, adult ([facility])    Type 2 diabetes mellitus with hyperglycemia, with long-term current use of insulin ([facility])    Opiate dependence, continuous ([facility])    Abnormal EKG    Obstructive sleep apnea on CPAP    Acute respiratory failure with hypoxia and hypercapnia ([facility] )    Late onset Alzheimer's dementia with behavioral disturbance ([facility])    MRSA colonization    Open wound of left heel    Past Medical History  Past Medical History:   Diagnosis Date    Cataract     CHF (congestive heart failure) ([facility])     chronic diastolic CHF    Coronary artery disease     Diabetes mellitus [facility] )     Glaucoma     Hyperlipidemia     Hypertension     Neuropathy     Obesity     Renal disorder     chronic kidney disease stage 3     Sleep apnea     Stroke [facility] )     TIA (transient ischemic attack)     Uncontrolled type 2 diabetes mellitus with hyperglycemia, with [facility]-term [facility] 10/4/2018     Past Surgical History  Past Surgical History:   Procedure Laterality Date    APPENDECTOMY      CARPAL TUNNEL RELEASE      EYE SURGERY      cataracts             04/08/22 0854   OT Last Visit   OT Visit Date 04/08/22   Note Type   Note Type Treatment   Restrictions/Precautions   Weight Bearing Precautions Per Order No   Pain Assessment   Pain Score No Pain   ADL   Where Assessed Supine, bed   LB Dressing Assistance 2  Maximal Assistance   LB Dressing Comments pt incontinent of bowel during session; requires max (A) for LB dressing and lexus hygiene during session   Toileting Assistance  2  Maximal Assistance   Toileting Deficit Increased time to complete;Perineal hygiene   Toileting Comments pt performs rolling in bed in order to perform LB dressing and lexus hygiene during session; pt with moaning throughout during session   Bed Mobility   Rolling R 2  Maximal assistance   Additional items Assist x 2;Bedrails; Increased time required;Verbal cues;LE management   Rolling L 2  Maximal assistance   Additional items Assist x 2;Bedrails; Increased time required;Verbal cues;LE management   Supine to Sit 2  Maximal assistance   Additional items Assist x 2; Increased time required;Verbal cues;LE management   Sit to Supine 2  Maximal assistance   Additional items Assist x 2;Bedrails; Increased time required;Verbal cues;LE management   Additional Comments pt on 2L O2 throughout session; SpO2 WFL with no specific complaints of SOB, however appears SOB during functional transitions with bed mobility and attempt to stand; pt tolerates ~2 minutes of dynamic sitting; pt with significant posterior lean   Transfers   Sit to Stand 2  Maximal assistance   Additional items Assist x 2; Increased time required;Verbal cues  (RW)   Stand to Sit 2  Maximal assistance   Additional items Assist x 2; Increased time required;Verbal cues  (RW)   Additional Comments pt performs max (A) x2 with inability to clear buttocks with x2 attempts from bed   Functional Mobility   Additional Comments not appropriate at this time to attempt during session due to pt unable to perform adequate sitting EOB   Cognition   Overall Cognitive Status Impaired   Arousal/Participation Lethargic   Attention Difficulty attending to directions   Orientation Level Oriented to person;Disoriented to place; Disoriented to time;Disoriented to situation   Memory Unable to assess   Following Commands Follows one step commands inconsistently   Comments pt with poor attention to tasks and specific commands   Activity Tolerance   Activity Tolerance Patient limited by fatigue;Patient limited by pain   Assessment   Assessment Patient participated in Skilled OT session this date with interventions consisting of safety awareness and fall prevention techniques,  therapeutic activities to: increase activity tolerance, increase cardiovascular endurance , increase dynamic sit/ stand balance during functional activity , increase postural control, increase trunk control and increase OOB/ sitting tolerance  Patient agreeable to OT treatment session, upon arrival patient was found supine in bed  Patient requiring frequent re direction, verbal cues for safety, verbal cues for correct technique, verbal cues for pacing thru activity steps, cognitive assistance to anticipate next step, one step directives and frequent rest periods  Patient continues to be functioning below baseline level, occupational performance remains limited secondary to factors listed above and increased risk for falls and injury  From OT standpoint, recommendation at time of d/c would be Post acute rehabilitation services  The patient's raw score on the AM-PAC Daily Activity inpatient short form is 9, standardized score is 29 04, less than 39 4  Patients at this level are likely to benefit from discharge to post-acute rehabilitation services  Please refer to the recommendation of the Occupational Therapist for safe discharge planning  Co treatment with PT secondary to complex medical condition of pt, possible A of 2 required to achieve and maintain transitional movements, requiring the need of skilled therapeutic intervention of 2 therapists to achieve delivery of services     Plan   Goal Expiration Date 04/18/22   OT Treatment Day 3   OT Frequency 3-5x/wk   Recommendation   OT Discharge Recommendation Post acute rehabilitation services   AM-Located within [facility] Daily Activity Inpatient   Lower Body Dressing 1   Bathing 1   Toileting 1   Upper Body Dressing 2   Grooming 2   Eating 2   Daily Activity Raw Score 9   Turning Head Towards Sound 2   Follow Simple Instructions 2   Low Function Daily Activity Raw Score 13   Low Function Daily Activity Standardized Score 23 16   AM-PAC Applied Cognition Inpatient   Following a Speech/Presentation 2   Understanding Ordinary Conversation 2   Taking Medications 1   Remembering Where Things Are Placed or Put Away 1   Remembering List of 4-5 Errands 1   Taking Care of Complicated Tasks 1   Applied Cognition Raw Score 8   Applied Cognition Standardized Score 19 32     Pt left supine in bed at end of session; all needs within reach; all lines intact  Pt will benefit from continued OT services in order to maximize (I) c ADL performance, FM c RW, and improve overall endurance/strength required to complete functional tasks in preparation for d/c

## 2022-04-08 NOTE — ASSESSMENT & PLAN NOTE
Lab Results   Component Value Date    HGBA1C 9 0 (H) 04/04/2022       Recent Labs     04/07/22  1626 04/07/22  2124 04/08/22  0714 04/08/22  1032   POCGLU 181* 247* 230* 335*       Blood Sugar Average: Last 72 hrs:  (P) 019 [id number]     · Poorly-controlled diabetes with significantly elevated hemoglobin A1c level  · Increased lantus to 50 units SQ QHS on 04/08/2022  · Continue humalog 12 units TID with meals

## 2022-04-08 NOTE — CONSULTS
Consultation - [address] Argueta 76 y o  male MRN: [id number]  Unit/Bed#: 403-01 Encounter: [id number]    Assessment/Plan     Assessment:    1  Stage II ulcer pressure ulcer left heel - stable, superficial and without signs of infection noted    Plan:  Wound is superficial and stable,  Primarily granular, and without signs of infection noted  Continue wound care as previously ordered, maxorb and allevyn border heel dressing, change every other day  Continue offloading heel with pillow in bed  Likely discharge to rehab today  F/U wound care prn if not available in rehab  History of Present Illness   HPI:  Lion Rodriguez is a 76 y o  male who presents with a wound located at left lateral heel  The wound has been present for 6month(s)  The wound is a pressure ulcer from pressure in bed, developed over 6 months ago per patients wife      Inpatient consult to Podiatry  Consult performed by: Vikki Porras DPM  Consult ordered by: Samantha Meza PA-C          Review of Systems  Reviewed, non-contributory    Historical Information   Past Medical History:   Diagnosis Date    Cataract     [facility]congestive [facility]) [facility] )     chronic diastolic CHF    Coronary artery disease     Diabetes mellitus [facility]     Glaucoma     Hyperlipidemia     Hypertension     Neuropathy     Obesity     Renal disorder     chronic kidney disease stage 3     Sleep apnea     [facility]     TIA (transient ischemic attack)     Uncontrolled type 2 diabetes mellitus with hyperglycemia, with long-term current use of insulin [facility] ) 10/4/2018     Past Surgical History:   Procedure Laterality Date    APPENDECTOMY      CARPAL TUNNEL RELEASE      EYE SURGERY      cataracts     Social History   Social History     Substance and Sexual Activity   Alcohol Use Never     Social History     Substance and Sexual Activity   Drug Use No     E-Cigarette/Vaping    E-Cigarette Use Never User      E-Cigarette/Vaping Substances    Nicotine No     THC No     CBD No     Flavoring No     Other No     Unknown No      Social History     Tobacco Use   Smoking Status Former Smoker    Types: Cigars    Quit date:     Years since quittin 2   Smokeless Tobacco Former User     Family History: non-contributory    Meds/Allergies   all current active meds have been reviewed  No Known Allergies    Objective   Vitals: Blood pressure 160/78, pulse 67, temperature 97 7 °F (36 5 °C), temperature source Oral, resp  rate 18, height 5' 6" (1 676 m), weight 133 kg (293 lb), SpO2 92 %  Wounds:     Wound 22 MASD Groin (Active)   Wound Description Intact; Beefy red;Fragile 22 1300   Mary-wound Assessment Erythema; Excoriated 22 1300   Treatments Cleansed; Other (Comment) 22 1300   Dressing Open to air 22       Wound 22 Heel Left (Active)   Wound Image   22 0440   Wound Description LONDON 22 0006   Mary-wound Assessment LONDON 22 0908   Drainage Amount Small 22 0804   Drainage Description Charls Block 22 0804   Treatments Cleansed;Site care 22 1300   Dressing Foam, Silicon (eg  Allevyn, etc) 22   Dressing Status Clean;Dry; Intact 22 0006       Wound 22 Thigh Left;Posterior;Proximal (Active)   Wound Description LONDON 22 0006   Mary-wound Assessment Fragile 22 0908   Treatments Cleansed;Site care 22 1300   Dressing Open to air 22       Wound 22 MASD Scrotum Bilateral (Active)   Wound Description Beefy red;Fragile 22 1300   Mary-wound Assessment Erythema; Excoriated; Maceration 22 1300   Treatments Cleansed;Site care 22 1300   Dressing Protective barrier 22         Physical Exam     Derm:  Wound to left heel as pictured above, 1 cm x 1 cm x 0 1 cm  Superfical, no probe to deeper tissues noted  No signs of infection noted  Vascular:  DP and PT pulses 1/4  Neuro:  Protective sensation absent      Ortho:  MMT is 5/5 bilaterally    Lab Results: I have personally reviewed pertinent reports  Imaging: I have personally reviewed pertinent reports  EKG, Pathology, and Other Studies: I have personally reviewed pertinent reports  Code Status: Level 3 - DNAR and DNI  Advance Directive and Living Will: Yes    Power of :    POLST:      Counseling / Coordination of Care  Total floor / unit time spent today 45 minutes  Greater than 50% of total time was spent with the patient and / or family counseling and / or coordination of care  A description of the counseling / coordination of care: Need for ongoing wound care in rehab center or as outpatient

## 2022-04-08 NOTE — PLAN OF CARE
Problem: PAIN - ADULT  Goal: Verbalizes/displays adequate comfort level or baseline comfort level  Description: Interventions:  - Encourage patient to monitor pain and request assistance  - Assess pain using appropriate pain scale  - Administer analgesics based on type and severity of pain and evaluate response  - Implement non-pharmacological measures as appropriate and evaluate response  - Consider cultural and social influences on pain and pain management  - Notify physician/advanced practitioner if interventions unsuccessful or patient reports new pain  Outcome: Progressing     Problem: INFECTION - ADULT  Goal: Absence or prevention of progression during hospitalization  Description: INTERVENTIONS:  - Assess and monitor for signs and symptoms of infection  - Monitor lab/diagnostic results  - Monitor all insertion sites, i e  indwelling lines, tubes, and drains  - Monitor endotracheal if appropriate and nasal secretions for changes in amount and color  - [place] appropriate cooling/warming therapies per order  - Administer medications as ordered  - Instruct and encourage patient and family to use good hand hygiene technique  - Identify and instruct in appropriate isolation precautions for identified infection/condition  Outcome: Progressing  Goal: Absence of fever/infection during neutropenic period  Description: INTERVENTIONS:  - Monitor WBC    Outcome: Progressing     Problem: SAFETY ADULT  Goal: Patient will remain free of falls  Description: INTERVENTIONS:  - Educate patient/family on patient safety including physical limitations  - Instruct patient to call for assistance with activity   - Consult OT/PT to assist with strengthening/mobility   - Keep Call bell within reach  - Keep bed low and locked with side rails adjusted as appropriate  - Keep care items and personal belongings within reach  - Initiate and maintain comfort rounds  - Make Fall Risk Sign visible to staff  - Offer Toileting every 2 Hours, in advance of need  - Initiate/Maintain bed/chair alarm  - Obtain necessary fall risk management equipment: alarms  - Apply yellow socks and bracelet for high fall risk patients  - Consider moving patient to room near nurses station  Outcome: Progressing  Goal: Maintain or return to baseline ADL function  Description: INTERVENTIONS:  -  Assess patient's ability to carry out ADLs; assess patient's baseline for ADL function and identify physical deficits which impact ability to perform ADLs (bathing, care of mouth/teeth, toileting, grooming, dressing, etc )  - Assess/evaluate cause of self-care deficits   - Assess range of motion  - Assess patient's mobility; develop plan if impaired  - Assess patient's need for assistive devices and provide as appropriate  - Encourage maximum independence but intervene and supervise when necessary  - Involve family in performance of ADLs  - Assess for home care needs following discharge   - Consider OT consult to assist with ADL evaluation and planning for discharge  - Provide patient education as appropriate  Outcome: Progressing  Goal: Maintains/Returns to pre admission functional level  Description: INTERVENTIONS:  - Perform BMAT or MOVE assessment daily    - Set and communicate daily mobility goal to care team and patient/family/caregiver  - Collaborate with rehabilitation services on mobility goals if consulted  - Perform Range of Motion 3 times a day  - Reposition patient every 2 hours    - Dangle patient 3 times a day  - Stand patient 3 times a day  - Ambulate patient 4 times a day  - Out of bed to chair 4 times a day   - Out of bed for meals 3 times a day  - Out of bed for toileting  - Record patient progress and toleration of activity level   Outcome: Progressing     Problem: DISCHARGE PLANNING  Goal: Discharge to home or other facility with appropriate resources  Description: INTERVENTIONS:  - Identify barriers to discharge w/patient and caregiver  - Arrange for needed discharge resources and transportation as appropriate  - Identify discharge learning needs (meds, wound care, etc )  - Arrange for interpretive services to assist at discharge as needed  - Refer to Case Management Department for coordinating discharge planning if the patient needs post-hospital services based on physician/advanced practitioner order or complex needs related to functional status, cognitive ability, or social support system  Outcome: Progressing     Problem: Knowledge Deficit  Goal: Patient/family/caregiver demonstrates understanding of disease process, treatment plan, medications, and discharge instructions  Description: Complete learning assessment and assess knowledge base  Interventions:  - Provide teaching at level of understanding  - Provide teaching via preferred learning methods  Outcome: Progressing     Problem: MOBILITY - ADULT  Goal: Maintain or return to baseline ADL function  Description: INTERVENTIONS:  -  Assess patient's ability to carry out ADLs; assess patient's baseline for ADL function and identify physical deficits which impact ability to perform ADLs (bathing, care of mouth/teeth, toileting, grooming, dressing, etc )  - Assess/evaluate cause of self-care deficits   - Assess range of motion  - Assess patient's mobility; develop plan if impaired  - Assess patient's need for assistive devices and provide as appropriate  - Encourage maximum independence but intervene and supervise when necessary  - Involve family in performance of ADLs  - Assess for home care needs following discharge   - Consider OT consult to assist with ADL evaluation and planning for discharge  - Provide patient education as appropriate  Outcome: Progressing  Goal: Maintains/Returns to pre admission functional level  Description: INTERVENTIONS:  - Perform BMAT or MOVE assessment daily    - Set and communicate daily mobility goal to care team and patient/family/caregiver     - Collaborate with rehabilitation services on mobility goals if consulted  - Perform Range of Motion 3 times a day  - Reposition patient every 2 hours    - Dangle patient 3 times a day  - Stand patient 3 times a day  - Ambulate patient 4 times a day  - Out of bed to chair 4 times a day   - Out of bed for meals 3 times a day  - Out of bed for toileting  - Record patient progress and toleration of activity level   Outcome: Progressing     Problem: Prexisting or High Potential for Compromised Skin Integrity  Goal: Skin integrity is maintained or improved  Description: INTERVENTIONS:  - Identify patients at risk for skin breakdown  - Assess and monitor skin integrity  - Assess and monitor nutrition and hydration status  - Monitor labs   - Assess for incontinence   - Turn and reposition patient  - Assist with mobility/ambulation  - Relieve pressure over bony prominences  - Avoid friction and shearing  - Provide appropriate hygiene as needed including keeping skin clean and dry  - Evaluate need for skin moisturizer/barrier cream  - Collaborate with interdisciplinary team   - Patient/family teaching  - Consider wound care consult   Outcome: Progressing     Problem: Potential for Falls  Goal: Patient will remain free of falls  Description: INTERVENTIONS:  - Educate patient/family on patient safety including physical limitations  - Instruct patient to call for assistance with activity   - Consult OT/PT to assist with strengthening/mobility   - Keep Call bell within reach  - Keep bed low and locked with side rails adjusted as appropriate  - Keep care items and personal belongings within reach  - Initiate and maintain comfort rounds  - Make Fall Risk Sign visible to staff  - Offer Toileting every 2 Hours, in advance of need  - Initiate/Maintain bed/chair alarm  - Obtain necessary fall risk management equipment: alarms  - Apply yellow socks and bracelet for high fall risk patients  - Consider moving patient to room near nurses station  Outcome: Progressing Problem: METABOLIC, FLUID AND ELECTROLYTES - ADULT  Goal: Electrolytes maintained within normal limits  Description: INTERVENTIONS:  - Monitor labs and assess patient for signs and symptoms of electrolyte imbalances  - Administer electrolyte replacement as ordered  - Monitor response to electrolyte replacements, including repeat lab results as appropriate  - Instruct patient on fluid and nutrition as appropriate  Outcome: Progressing  Goal: Fluid balance maintained  Description: INTERVENTIONS:  - Monitor labs   - Monitor I/O and WT  - Instruct patient on fluid and nutrition as appropriate  - Assess for signs & symptoms of volume excess or deficit  Outcome: Progressing  Goal: Glucose maintained within target range  Description: INTERVENTIONS:  - Monitor Blood Glucose as ordered  - Assess for signs and symptoms of hyperglycemia and hypoglycemia  - Administer ordered medications to maintain glucose within target range  - Assess nutritional intake and initiate nutrition service referral as needed  Outcome: Progressing     Problem: Nutrition/Hydration-ADULT  Goal: Nutrient/Hydration intake appropriate for improving, restoring or maintaining nutritional needs  Description: Monitor and assess patient's nutrition/hydration status for malnutrition  Collaborate with interdisciplinary team and initiate plan and interventions as ordered  Monitor patient's weight and dietary intake as ordered or per policy  Utilize nutrition screening tool and intervene as necessary  Determine patient's food preferences and provide high-protein, high-caloric foods as appropriate       INTERVENTIONS:  - Monitor oral intake, urinary output, labs, and treatment plans  - Assess nutrition and hydration status and recommend course of action  - Evaluate amount of meals eaten  - Assist patient with eating if necessary   - Allow adequate time for meals  - Recommend/ encourage appropriate diets, oral nutritional supplements, and vitamin/mineral supplements  - Order, calculate, and assess calorie counts as needed  - Recommend, monitor, and adjust tube feedings and TPN/PPN based on assessed needs  - Assess need for intravenous fluids  - Provide specific nutrition/hydration education as appropriate  - Include patient/family/caregiver in decisions related to nutrition  Outcome: Progressing     Problem: SAFETY,RESTRAINT: NV/NON-SELF DESTRUCTIVE BEHAVIOR  Goal: Remains free of harm/injury (restraint for non violent/non self-detsructive behavior)  Description: INTERVENTIONS:  - Instruct patient/family regarding restraint use   - Assess and monitor physiologic and psychological status   - Provide interventions and comfort measures to meet assessed patient needs   - Identify and implement measures to help patient regain control  - Assess readiness for release of restraint   Outcome: Progressing  Goal: Returns to optimal restraint-free functioning  Description: INTERVENTIONS:  - Assess the patient's behavior and symptoms that indicate continued need for restraint  - Identify and implement measures to help patient regain control  - Assess readiness for release of restraint   Outcome: Progressing

## 2022-04-08 NOTE — ASSESSMENT & PLAN NOTE
· The patient was seen in consultation by Dr Janessa Hayes (Podiatry), who had the following recommendations:  "Plan:  Wound is superficial and stable,  Primarily granular, and without signs of infection noted  Continue wound care as previously ordered, maxorb and allevyn border heel dressing, change every other day  Continue offloading heel with pillow in bed  Likely discharge to rehab today    F/U wound care prn if not available in rehab "

## 2022-04-08 NOTE — PLAN OF CARE
Problem: PAIN - ADULT  Goal: Verbalizes/displays adequate comfort level or baseline comfort level  Description: Interventions:  - Encourage patient to monitor pain and request assistance  - Assess pain using appropriate pain scale  - Administer analgesics based on type and severity of pain and evaluate response  - Implement non-pharmacological measures as appropriate and evaluate response  - Consider cultural and social influences on pain and pain management  - Notify physician/advanced practitioner if interventions unsuccessful or patient reports new pain  Outcome: Progressing     Problem: INFECTION - ADULT  Goal: Absence or prevention of progression during hospitalization  Description: INTERVENTIONS:  - Assess and monitor for signs and symptoms of infection  - Monitor lab/diagnostic results  - Monitor all insertion sites, i e  indwelling lines, tubes, and drains  - Monitor endotracheal if appropriate and nasal secretions for changes in amount and color  - [place] appropriate cooling/warming therapies per order  - Administer medications as ordered  - Instruct and encourage patient and family to use good hand hygiene technique  - Identify and instruct in appropriate isolation precautions for identified infection/condition  Outcome: Progressing  Goal: Absence of fever/infection during neutropenic period  Description: INTERVENTIONS:  - Monitor WBC    Outcome: Progressing     Problem: SAFETY ADULT  Goal: Patient will remain free of falls  Description: INTERVENTIONS:  - Educate patient/family on patient safety including physical limitations  - Instruct patient to call for assistance with activity   - Consult OT/PT to assist with strengthening/mobility   - Keep Call bell within reach  - Keep bed low and locked with side rails adjusted as appropriate  - Keep care items and personal belongings within reach  - Initiate and maintain comfort rounds  - Make Fall Risk Sign visible to staff  - Offer Toileting every 2 Hours, in advance of need  - Initiate/Maintain bed/chair alarm  - Obtain necessary fall risk management equipment: alarms  - Apply yellow socks and bracelet for high fall risk patients  - Consider moving patient to room near nurses station  Outcome: Progressing  Goal: Maintain or return to baseline ADL function  Description: INTERVENTIONS:  -  Assess patient's ability to carry out ADLs; assess patient's baseline for ADL function and identify physical deficits which impact ability to perform ADLs (bathing, care of mouth/teeth, toileting, grooming, dressing, etc )  - Assess/evaluate cause of self-care deficits   - Assess range of motion  - Assess patient's mobility; develop plan if impaired  - Assess patient's need for assistive devices and provide as appropriate  - Encourage maximum independence but intervene and supervise when necessary  - Involve family in performance of ADLs  - Assess for home care needs following discharge   - Consider OT consult to assist with ADL evaluation and planning for discharge  - Provide patient education as appropriate  Outcome: Progressing  Goal: Maintains/Returns to pre admission functional level  Description: INTERVENTIONS:  - Perform BMAT or MOVE assessment daily    - Set and communicate daily mobility goal to care team and patient/family/caregiver  - Collaborate with rehabilitation services on mobility goals if consulted  - Perform Range of Motion 3 times a day  - Reposition patient every 2 hours    - Dangle patient 3 times a day  - Stand patient 3 times a day  - Ambulate patient 4 times a day  - Out of bed to chair 4 times a day   - Out of bed for meals 3 times a day  - Out of bed for toileting  - Record patient progress and toleration of activity level   Outcome: Progressing     Problem: DISCHARGE PLANNING  Goal: Discharge to home or other facility with appropriate resources  Description: INTERVENTIONS:  - Identify barriers to discharge w/patient and caregiver  - Arrange for needed discharge resources and transportation as appropriate  - Identify discharge learning needs (meds, wound care, etc )  - Arrange for interpretive services to assist at discharge as needed  - Refer to Case Management Department for coordinating discharge planning if the patient needs post-hospital services based on physician/advanced practitioner order or complex needs related to functional status, cognitive ability, or social support system  Outcome: Progressing     Problem: Knowledge Deficit  Goal: Patient/family/caregiver demonstrates understanding of disease process, treatment plan, medications, and discharge instructions  Description: Complete learning assessment and assess knowledge base  Interventions:  - Provide teaching at level of understanding  - Provide teaching via preferred learning methods  Outcome: Progressing     Problem: MOBILITY - ADULT  Goal: Maintain or return to baseline ADL function  Description: INTERVENTIONS:  -  Assess patient's ability to carry out ADLs; assess patient's baseline for ADL function and identify physical deficits which impact ability to perform ADLs (bathing, care of mouth/teeth, toileting, grooming, dressing, etc )  - Assess/evaluate cause of self-care deficits   - Assess range of motion  - Assess patient's mobility; develop plan if impaired  - Assess patient's need for assistive devices and provide as appropriate  - Encourage maximum independence but intervene and supervise when necessary  - Involve family in performance of ADLs  - Assess for home care needs following discharge   - Consider OT consult to assist with ADL evaluation and planning for discharge  - Provide patient education as appropriate  Outcome: Progressing  Goal: Maintains/Returns to pre admission functional level  Description: INTERVENTIONS:  - Perform BMAT or MOVE assessment daily    - Set and communicate daily mobility goal to care team and patient/family/caregiver     - Collaborate with rehabilitation services on mobility goals if consulted  - Perform Range of Motion 3 times a day  - Reposition patient every 2 hours    - Dangle patient 3 times a day  - Stand patient 3 times a day  - Ambulate patient 4 times a day  - Out of bed to chair 4 times a day   - Out of bed for meals 3 times a day  - Out of bed for toileting  - Record patient progress and toleration of activity level   Outcome: Progressing     Problem: Prexisting or High Potential for Compromised Skin Integrity  Goal: Skin integrity is maintained or improved  Description: INTERVENTIONS:  - Identify patients at risk for skin breakdown  - Assess and monitor skin integrity  - Assess and monitor nutrition and hydration status  - Monitor labs   - Assess for incontinence   - Turn and reposition patient  - Assist with mobility/ambulation  - Relieve pressure over bony prominences  - Avoid friction and shearing  - Provide appropriate hygiene as needed including keeping skin clean and dry  - Evaluate need for skin moisturizer/barrier cream  - Collaborate with interdisciplinary team   - Patient/family teaching  - Consider wound care consult   Outcome: Progressing     Problem: Potential for Falls  Goal: Patient will remain free of falls  Description: INTERVENTIONS:  - Educate patient/family on patient safety including physical limitations  - Instruct patient to call for assistance with activity   - Consult OT/PT to assist with strengthening/mobility   - Keep Call bell within reach  - Keep bed low and locked with side rails adjusted as appropriate  - Keep care items and personal belongings within reach  - Initiate and maintain comfort rounds  - Make Fall Risk Sign visible to staff  - Offer Toileting every 2 Hours, in advance of need  - Initiate/Maintain bed/chair alarm  - Obtain necessary fall risk management equipment: alarms  - Apply yellow socks and bracelet for high fall risk patients  - Consider moving patient to room near nurses station  Outcome: Progressing Problem: METABOLIC, FLUID AND ELECTROLYTES - ADULT  Goal: Electrolytes maintained within normal limits  Description: INTERVENTIONS:  - Monitor labs and assess patient for signs and symptoms of electrolyte imbalances  - Administer electrolyte replacement as ordered  - Monitor response to electrolyte replacements, including repeat lab results as appropriate  - Instruct patient on fluid and nutrition as appropriate  Outcome: Progressing  Goal: Fluid balance maintained  Description: INTERVENTIONS:  - Monitor labs   - Monitor I/O and WT  - Instruct patient on fluid and nutrition as appropriate  - Assess for signs & symptoms of volume excess or deficit  Outcome: Progressing  Goal: Glucose maintained within target range  Description: INTERVENTIONS:  - Monitor Blood Glucose as ordered  - Assess for signs and symptoms of hyperglycemia and hypoglycemia  - Administer ordered medications to maintain glucose within target range  - Assess nutritional intake and initiate nutrition service referral as needed  Outcome: Progressing     Problem: Nutrition/Hydration-ADULT  Goal: Nutrient/Hydration intake appropriate for improving, restoring or maintaining nutritional needs  Description: Monitor and assess patient's nutrition/hydration status for malnutrition  Collaborate with interdisciplinary team and initiate plan and interventions as ordered  Monitor patient's weight and dietary intake as ordered or per policy  Utilize nutrition screening tool and intervene as necessary  Determine patient's food preferences and provide high-protein, high-caloric foods as appropriate       INTERVENTIONS:  - Monitor oral intake, urinary output, labs, and treatment plans  - Assess nutrition and hydration status and recommend course of action  - Evaluate amount of meals eaten  - Assist patient with eating if necessary   - Allow adequate time for meals  - Recommend/ encourage appropriate diets, oral nutritional supplements, and vitamin/mineral supplements  - Order, calculate, and assess calorie counts as needed  - Recommend, monitor, and adjust tube feedings and TPN/PPN based on assessed needs  - Assess need for intravenous fluids  - Provide specific nutrition/hydration education as appropriate  - Include patient/family/caregiver in decisions related to nutrition  Outcome: Progressing     Problem: SAFETY,RESTRAINT: NV/NON-SELF DESTRUCTIVE BEHAVIOR  Goal: Remains free of harm/injury (restraint for non violent/non self-detsructive behavior)  Description: INTERVENTIONS:  - Instruct patient/family regarding restraint use   - Assess and monitor physiologic and psychological status   - Provide interventions and comfort measures to meet assessed patient needs   - Identify and implement measures to help patient regain control  - Assess readiness for release of restraint   Outcome: Progressing  Goal: Returns to optimal restraint-free functioning  Description: INTERVENTIONS:  - Assess the patient's behavior and symptoms that indicate continued need for restraint  - Identify and implement measures to help patient regain control  - Assess readiness for release of restraint   Outcome: Progressing

## 2022-04-08 NOTE — ASSESSMENT & PLAN NOTE
· Most likely multifactorial secondary to hypercarbia, hyperglycemia, and possible opioid overdose (patient chronic opioid dependent and accidentally overdosed himself few months ago)  · Patient was combative upon EMS arrival so he received few doses of benzos and became more lethargic  · Due to transit episodes of hypoxia and evidence of hypercapnia on VBG he was placed on BiPAP  · CT of the head unremarkable for intracranial pathology  · Did not tolerate MRI of the brain due to not being able to fit in the [facility]  · CT of the chest showed no pulmonary embolism but evidence of possible pneumonia so treated empirically for CAP  · The antibiotics were discontinued with negative procalcitonin levels x 3   · Wound care consult  · Continue BiPAP - patient only tolerating for about an hour at a time then taking it off himself  · Follow-up lower extremity Doppler - normal    Likely hospital delirium and sundowning's occurring at night  Appreciate Psych input  Patient medically clear for rehab vs inpatient psych

## 2022-04-09 LAB
ALBUMIN SERPL BCP-MCNC: 2.7 G/DL (ref 3.5–5)
ALP SERPL-CCNC: 145 U/L (ref 46–116)
ALT SERPL W P-5'-P-CCNC: 26 U/L (ref 12–78)
ANION GAP SERPL CALCULATED.3IONS-SCNC: 10 MMOL/L (ref 4–13)
AST SERPL W P-5'-P-CCNC: 20 U/L (ref 5–45)
BASOPHILS # BLD AUTO: 0.03 THOUSANDS/ΜL (ref 0–0.1)
BASOPHILS NFR BLD AUTO: 0 % (ref 0–1)
BILIRUB SERPL-MCNC: 0.23 MG/DL (ref 0.2–1)
BUN SERPL-MCNC: 20 MG/DL (ref 5–25)
CALCIUM ALBUM COR SERPL-MCNC: 9.7 MG/DL (ref 8.3–10.1)
CALCIUM SERPL-MCNC: 8.7 MG/DL (ref 8.3–10.1)
CHLORIDE SERPL-SCNC: 106 MMOL/L (ref 100–108)
CK SERPL-CCNC: 65 U/L (ref 39–308)
CO2 SERPL-SCNC: 26 MMOL/L (ref 21–32)
CREAT SERPL-MCNC: 1.28 MG/DL (ref 0.6–1.3)
EOSINOPHIL # BLD AUTO: 0.57 THOUSAND/ΜL (ref 0–0.61)
EOSINOPHIL NFR BLD AUTO: 7 % (ref 0–6)
ERYTHROCYTE [DISTWIDTH] IN BLOOD BY AUTOMATED COUNT: 14.6 % (ref 11.6–15.1)
GFR SERPL CREATININE-BSD FRML MDRD: 52 ML/MIN/1.73SQ M
GLUCOSE SERPL-MCNC: 148 MG/DL (ref 65–140)
GLUCOSE SERPL-MCNC: 156 MG/DL (ref 65–140)
GLUCOSE SERPL-MCNC: 211 MG/DL (ref 65–140)
GLUCOSE SERPL-MCNC: 257 MG/DL (ref 65–140)
GLUCOSE SERPL-MCNC: 313 MG/DL (ref 65–140)
HCT VFR BLD AUTO: 35.3 % (ref 36.5–49.3)
HGB BLD-MCNC: 11.1 G/DL (ref 12–17)
IMM GRANULOCYTES # BLD AUTO: 0.08 THOUSAND/UL (ref 0–0.2)
IMM GRANULOCYTES NFR BLD AUTO: 1 % (ref 0–2)
LYMPHOCYTES # BLD AUTO: 2.36 THOUSANDS/ΜL (ref 0.6–4.47)
LYMPHOCYTES NFR BLD AUTO: 29 % (ref 14–44)
MAGNESIUM SERPL-MCNC: 1.6 MG/DL (ref 1.6–2.6)
MCH RBC QN AUTO: 26.8 PG (ref 26.8–34.3)
MCHC RBC AUTO-ENTMCNC: 31.4 G/DL (ref 31.4–37.4)
MCV RBC AUTO: 85 FL (ref 82–98)
MONOCYTES # BLD AUTO: 0.6 THOUSAND/ΜL (ref 0.17–1.22)
MONOCYTES NFR BLD AUTO: 7 % (ref 4–12)
NEUTROPHILS # BLD AUTO: 4.59 THOUSANDS/ΜL (ref 1.85–7.62)
NEUTS SEG NFR BLD AUTO: 56 % (ref 43–75)
NRBC BLD AUTO-RTO: 0 /100 WBCS
PHOSPHATE SERPL-MCNC: 3 MG/DL (ref 2.3–4.1)
PLATELET # BLD AUTO: 329 THOUSANDS/UL (ref 149–390)
PMV BLD AUTO: 10.5 FL (ref 8.9–12.7)
POTASSIUM SERPL-SCNC: 3.6 MMOL/L (ref 3.5–5.3)
PROCALCITONIN SERPL-MCNC: 0.14 NG/ML
PROT SERPL-MCNC: 7.2 G/DL (ref 6.4–8.2)
RBC # BLD AUTO: 4.14 MILLION/UL (ref 3.88–5.62)
SODIUM SERPL-SCNC: 142 MMOL/L (ref 136–145)
WBC # BLD AUTO: 8.23 THOUSAND/UL (ref 4.31–10.16)

## 2022-04-09 PROCEDURE — 94760 N-INVAS EAR/PLS OXIMETRY 1: CPT

## 2022-04-09 PROCEDURE — 84100 ASSAY OF PHOSPHORUS: CPT | Performed by: INTERNAL MEDICINE

## 2022-04-09 PROCEDURE — 85025 COMPLETE CBC W/AUTO DIFF WBC: CPT | Performed by: INTERNAL MEDICINE

## 2022-04-09 PROCEDURE — 82948 REAGENT STRIP/BLOOD GLUCOSE: CPT

## 2022-04-09 PROCEDURE — 84145 PROCALCITONIN (PCT): CPT | Performed by: INTERNAL MEDICINE

## 2022-04-09 PROCEDURE — 83735 ASSAY OF MAGNESIUM: CPT | Performed by: INTERNAL MEDICINE

## 2022-04-09 PROCEDURE — 80053 COMPREHEN METABOLIC PANEL: CPT | Performed by: INTERNAL MEDICINE

## 2022-04-09 PROCEDURE — 99232 SBSQ HOSP IP/OBS MODERATE 35: CPT | Performed by: INTERNAL MEDICINE

## 2022-04-09 PROCEDURE — 94660 CPAP INITIATION&MGMT: CPT

## 2022-04-09 PROCEDURE — 82550 ASSAY OF CK (CPK): CPT | Performed by: INTERNAL MEDICINE

## 2022-04-09 RX ORDER — POTASSIUM CHLORIDE 20 MEQ/1
40 TABLET, EXTENDED RELEASE ORAL ONCE
Status: COMPLETED | OUTPATIENT
Start: 2022-04-09 | End: 2022-04-09

## 2022-04-09 RX ORDER — MAGNESIUM SULFATE HEPTAHYDRATE 40 MG/ML
2 INJECTION, SOLUTION INTRAVENOUS ONCE
Status: DISCONTINUED | OUTPATIENT
Start: 2022-04-09 | End: 2022-04-09

## 2022-04-09 RX ADMIN — MAGNESIUM OXIDE TAB 400 MG (241.3 MG ELEMENTAL MG) 400 MG: 400 (241.3 MG) TAB at 12:58

## 2022-04-09 RX ADMIN — MUPIROCIN 1 APPLICATION: 20 OINTMENT TOPICAL at 09:40

## 2022-04-09 RX ADMIN — TAMSULOSIN HYDROCHLORIDE 0.4 MG: 0.4 CAPSULE ORAL at 17:19

## 2022-04-09 RX ADMIN — ASPIRIN 81 MG CHEWABLE TABLET 81 MG: 81 TABLET CHEWABLE at 09:30

## 2022-04-09 RX ADMIN — ENOXAPARIN SODIUM 40 MG: 40 INJECTION SUBCUTANEOUS at 21:23

## 2022-04-09 RX ADMIN — NYSTATIN: 100000 POWDER TOPICAL at 09:38

## 2022-04-09 RX ADMIN — AMLODIPINE BESYLATE 10 MG: 10 TABLET ORAL at 09:30

## 2022-04-09 RX ADMIN — ENOXAPARIN SODIUM 40 MG: 40 INJECTION SUBCUTANEOUS at 09:30

## 2022-04-09 RX ADMIN — CARVEDILOL 12.5 MG: 12.5 TABLET, FILM COATED ORAL at 09:31

## 2022-04-09 RX ADMIN — DIVALPROEX SODIUM 250 MG: 125 CAPSULE, COATED PELLETS ORAL at 21:23

## 2022-04-09 RX ADMIN — MUPIROCIN 1 APPLICATION: 20 OINTMENT TOPICAL at 21:23

## 2022-04-09 RX ADMIN — NYSTATIN: 100000 POWDER TOPICAL at 17:19

## 2022-04-09 RX ADMIN — INSULIN GLARGINE 50 UNITS: 100 INJECTION, SOLUTION SUBCUTANEOUS at 21:23

## 2022-04-09 RX ADMIN — DIVALPROEX SODIUM 250 MG: 125 CAPSULE, COATED PELLETS ORAL at 09:31

## 2022-04-09 RX ADMIN — ATORVASTATIN CALCIUM 40 MG: 40 TABLET, FILM COATED ORAL at 17:19

## 2022-04-09 RX ADMIN — POTASSIUM CHLORIDE 40 MEQ: 1500 TABLET, EXTENDED RELEASE ORAL at 09:31

## 2022-04-09 RX ADMIN — NYSTATIN: 100000 POWDER TOPICAL at 21:23

## 2022-04-09 RX ADMIN — MAGNESIUM OXIDE TAB 400 MG (241.3 MG ELEMENTAL MG) 400 MG: 400 (241.3 MG) TAB at 17:59

## 2022-04-09 RX ADMIN — Medication 2000 UNITS: at 09:30

## 2022-04-09 RX ADMIN — CARVEDILOL 12.5 MG: 12.5 TABLET, FILM COATED ORAL at 17:19

## 2022-04-09 RX ADMIN — FINASTERIDE 5 MG: 5 TABLET, FILM COATED ORAL at 09:31

## 2022-04-09 NOTE — PROGRESS NOTES
[address] [facility] [address]  Progress Note Angela Emmanuel 1945, 76 y o  male MRN: [id number]  Unit/Bed#: 403-01 Encounter: [id number]  Primary Care Provider: Nolon Duane, MD   Date and time admitted to hospital: 4/2/2022  7:56 PM    * Acute metabolic encephalopathy  Assessment & Plan  · Most likely multifactorial secondary to hypercarbia, hyperglycemia, and possible opioid overdose (patient chronic opioid dependent and accidentally overdosed himself few months ago)  · Patient was combative upon EMS arrival so he received few doses of benzos and became more lethargic  · Due to transit episodes of hypoxia and evidence of hypercapnia on VBG he was placed on BiPAP  · CT of the head unremarkable for intracranial pathology  · Did not tolerate MRI of the brain due to not being able to fit in the [facility]  · CT of the chest showed no pulmonary embolism but evidence of possible pneumonia so treated empirically for CAP  · The antibiotics were discontinued with negative procalcitonin levels x 3   · Wound care consult  · Continue BiPAP - patient only tolerating for about an hour at a time then taking it off himself  · Follow-up lower extremity Doppler - normal    Likely hospital delirium and sundowning's occurring at night  Appreciate Psychiatry's input  The patient is medically cleared for short-term rehabilitation SNF placement at this time  Open wound of left heel  Assessment & Plan  · The patient was seen in consultation by Dr Traci Angelo (Podiatry), who had the following recommendations:  "Plan:  Wound is superficial and stable,  Primarily granular, and without signs of infection noted  Continue wound care as previously ordered, maxorb and allevyn border heel dressing, change every other day  Continue offloading heel with pillow in bed  Likely discharge to rehab today    F/U wound care prn if not available in rehab "    MRSA colonization  Assessment & Plan  · He will be placed on nasal bactroban/mupirocin 2% ointment applied to both nares BID for 5 days  The patient will also need MRSA decolonization including chlorhexidine/hibiclens rinse applied from the neck down to the toes daily with bathing/showering for 2 weeks then weekly for 3 months  He will need a MRSA nasal screen rechecked after decolonization is completed  Late onset Alzheimer's dementia with behavioral disturbance [facility])  Assessment & Plan  Likely contributing to patient's aggression and confusion that worsens at night  Psychiatry evaluated the patient due to his homicidal threats made on 4/5/2022  Trial depakote sprinkles 250 BID  Should behavioral disturbances persist, patient would need inpatient psychiatric placement when medically cleared    · Re-consult Psychiatry at this time  Acute respiratory failure with hypoxia and hypercapnia (HCC)  Assessment & Plan  Secondary to morbid obesity/possible obesity-hypoventilation syndrome/obstructive sleep apnea  · Currently requiring 3 lpm of continuous supplemental oxygen to maintain adequate oxygen saturation levels during the day and BiPAP QHS  · The patient is having difficulty tolerating the BiPAP mask      Management as above    Obstructive sleep apnea on CPAP  Assessment & Plan  · Continue BiPAP QHS and PRN    Abnormal EKG  Assessment & Plan  · Outpatient Cardiology evaluation    Opiate dependence, continuous (HCC)  Assessment & Plan  Hold opioids due to altered mental status  Consider restart PRN when acceptable    Type 2 diabetes mellitus with hyperglycemia, with long-term current use of insulin [facility])  Assessment & Plan  Lab Results   Component Value Date    HGBA1C 9 0 (H) 04/04/2022       Recent Labs     04/08/22  1555 04/08/22  2105 04/09/22  0755 04/09/22  1121   POCGLU 301* 190* 156* 313*       Blood Sugar Average: Last 72 hrs:  (P) 269 [id number]     · Poorly-controlled diabetes with significantly elevated hemoglobin A1c level  · Increased lantus to 50 units SQ QHS on 04/08/2022  · Continue humalog 12 units TID with meals    Morbid obesity with BMI of 45 0-49 9, adult ([facility])  Assessment & Plan  · Lifestyle modifications are recommended including weight loss, improving his diet, and increasing his amount of activity  Chronic diastolic CHF (congestive heart failure) ([facility])  Assessment & Plan  Wt Readings from Last 3 Encounters:   04/04/22 133 kg (293 lb)   04/03/22 133 kg (293 lb 3 4 oz)   04/12/21 133 kg (292 lb 15 9 oz)     Does not appear to be in acute exacerbation  Echo done in March of 2021 reported preserved ejection fraction grade 2 diastolic dysfunction  Continue carvedilol  · The patient is not on any diuretics at this time  CKD (chronic kidney disease) stage 3, GFR 30-59 ml/min [facility])  Assessment & Plan  Lab Results   Component Value Date    EGFR 52 04/09/2022    EGFR 50 04/07/2022    EGFR 52 04/06/2022    CREATININE 1 28 04/09/2022    CREATININE 1 34 (H) 04/07/2022    CREATININE 1 29 04/06/2022     Avoid nephrotoxic drugs and hypotension  Serial laboratory testing to monitor the patient's renal function and electrolyte levels  Outpatient follow-up with Nephrology        VTE Pharmacologic Prophylaxis: VTE Score: 8 High Risk (Score >/= 5) - Pharmacological DVT Prophylaxis Ordered: enoxaparin (Lovenox) at 40 mg SQ every 12 hours (increased dose based on his BMI)  Sequential Compression Devices Ordered  Patient Centered Rounds: I performed bedside rounds with nursing staff today  Education and Discussions with Family / Patient: Updated  (wife) at bedside  Time Spent for Care: 30 minutes  More than 50% of total time spent on counseling and coordination of care as described above  Current Length of Stay: 6 day(s)  Current Patient Status: Inpatient   Certification Statement: The patient will continue to require additional inpatient hospital stay due to awaiting short-term rehabilitation SNF placement    Discharge Plan: Anticipate discharge in 24-48 hrs to rehab facility  Code Status: Level 3 - DNAR and DNI    Subjective: The patient was seen and examined  The patient is doing better  No chest pain  No shortness of breath  No abdominal pain  No nausea or vomiting  Objective:     Vitals:   Temp (24hrs), Av 1 °F (36 7 °C), Min:97 9 °F (36 6 °C), Max:98 2 °F (36 8 °C)    Temp:  [97 9 °F (36 6 °C)-98 2 °F (36 8 °C)] 98 2 °F (36 8 °C)  HR:  [66-72] 72  Resp:  [18-20] 20  BP: (159-172)/(67-79) 172/79  SpO2:  [92 %-96 %] 96 %  Body mass index is 47 29 kg/m²  Input and Output Summary (last 24 hours):      Intake/Output Summary (Last 24 hours) at 2022 1236  Last data filed at 2022 0200  Gross per 24 hour   Intake --   Output 250 ml   Net -250 ml       Physical Exam:   Physical Exam   General:  NAD, follows commands  HEENT:  NC/AT, mucous membranes moist  Neck:  Supple, No JVP elevation  CV:  + S1, + S2, RRR  Pulm:  Lung fields are CTA bilaterally  Abd:  Soft, Non-tender, Non-distended  Ext:  No clubbing/cyanosis/edema  Skin:  No rashes  Neuro:  Awake, alert, oriented  Psych:  Normal mood and affect      Additional Data:    Labs:  Results from last 7 days   Lab Units 22  0434   WBC Thousand/uL 8 23   HEMOGLOBIN g/dL 11 1*   HEMATOCRIT % 35 3*   PLATELETS Thousands/uL 329   NEUTROS PCT % 56   LYMPHS PCT % 29   MONOS PCT % 7   EOS PCT % 7*     Results from last 7 days   Lab Units 22  0434   SODIUM mmol/L 142   POTASSIUM mmol/L 3 6   CHLORIDE mmol/L 106   CO2 mmol/L 26   BUN mg/dL 20   CREATININE mg/dL 1 28   ANION GAP mmol/L 10   CALCIUM mg/dL 8 7   ALBUMIN g/dL 2 7*   TOTAL BILIRUBIN mg/dL 0 23   ALK PHOS U/L 145*   ALT U/L 26   AST U/L 20   GLUCOSE RANDOM mg/dL 148*     Results from last 7 days   Lab Units 22   INR  1 18     Results from last 7 days   Lab Units 22  1121 22  0755 22  2105 22  1555 22  1032 22  0714 22  2124 22  1626 22  1107 04/07/22  0709 04/06/22  2105 04/06/22  1516   POC GLUCOSE mg/dl 313* 156* 190* 301* 335* 230* 247* 181* 328* 278* 272* 335*     Results from last 7 days   Lab Units 04/04/22  0451 04/03/22  0140   HEMOGLOBIN A1C % 9 0* 9 1*     Results from last 7 days   Lab Units 04/09/22  0434 04/04/22  0451 04/03/22  0759 04/03/22  0140 04/02/22 2021   LACTIC ACID mmol/L  --   --   --   --  2 0   PROCALCITONIN ng/ml 0 14 0 20 0 26* 0 23 0 26*       Lines/Drains:  Invasive Devices  Report    None                       Imaging: No pertinent imaging reviewed  Recent Cultures (last 7 days):   Results from last 7 days   Lab Units 04/03/22  0138 04/02/22 2021   BLOOD CULTURE   --  No Growth After 5 Days  No Growth After 5 Days     LEGIONELLA URINARY ANTIGEN  Negative  --        Last 24 Hours Medication List:   Current Facility-Administered Medications   Medication Dose Route Frequency Provider Last Rate    acetaminophen  650 mg Oral Q6H PRN Misa Cowing, PA-C      albuterol  2 5 mg Nebulization Q4H PRN Misa Cowing, PA-C      amLODIPine  10 mg Oral Daily Misa Cowing, PA-C      aspirin  81 mg Oral Daily Misa Cowing, PA-C      atorvastatin  40 mg Oral QPM Misa Cowing, PA-C      carvedilol  12 5 mg Oral BID Misa Cowing, PA-C      cholecalciferol  2,000 Units Oral Daily Misa Cowing, PA-C      divalproex sodium  250 mg Oral Q12H [address] Misa Cowing, PA-C      docusate sodium  100 mg Oral BID Misa Cowing, PA-C      enoxaparin  40 mg Subcutaneous Q12H [address] Argueta Ee Yamel, DO      finasteride  5 mg Oral Daily Misa Cowing, PA-C      insulin glargine  50 Units Subcutaneous HS Argueta Ee Raccoon, DO      insulin lispro  12 Units Subcutaneous TID With Meals Josesito Rehman, DO      insulin lispro  2-12 Units Subcutaneous TID With Meals Misa Langston, KELSEY      magnesium oxide  400 mg Oral BID Argueta Ee Raccoon, DO      mupirocin   Nasal Q12H [address] Argueta Michi Cortezr, DO      nystatin   Topical TID KELSEY Moreno OLANZapine  5 mg Intramuscular Q8H PRN Ruba Metz PA-C      ondansetron  4 mg Intravenous Q6H PRN Ruba Metz PA-C      tamsulosin  0 4 mg Oral QPM Ruba Metz PA-C          Today, Patient Was Seen By: Mic Nino DO    **Please Note: This note may have been constructed using a voice recognition system  **

## 2022-04-09 NOTE — ASSESSMENT & PLAN NOTE
Lab Results   Component Value Date    EGFR 52 04/09/2022    EGFR 50 04/07/2022    EGFR 52 04/06/2022    CREATININE 1 28 04/09/2022    CREATININE 1 34 (H) 04/07/2022    CREATININE 1 29 04/06/2022     Avoid nephrotoxic drugs and hypotension  Serial laboratory testing to monitor the patient's renal function and electrolyte levels  Outpatient follow-up with Nephrology

## 2022-04-09 NOTE — PLAN OF CARE
Problem: PAIN - ADULT  Goal: Verbalizes/displays adequate comfort level or baseline comfort level  Description: Interventions:  - Encourage patient to monitor pain and request assistance  - Assess pain using appropriate pain scale  - Administer analgesics based on type and severity of pain and evaluate response  - Implement non-pharmacological measures as appropriate and evaluate response  - Consider cultural and social influences on pain and pain management  - Notify physician/advanced practitioner if interventions unsuccessful or patient reports new pain  Outcome: Progressing     Problem: INFECTION - ADULT  Goal: Absence or prevention of progression during hospitalization  Description: INTERVENTIONS:  - Assess and monitor for signs and symptoms of infection  - Monitor lab/diagnostic results  - Monitor all insertion sites, i e  indwelling lines, tubes, and drains  - Monitor endotracheal if appropriate and nasal secretions for changes in amount and color  - [place] appropriate cooling/warming therapies per order  - Administer medications as ordered  - Instruct and encourage patient and family to use good hand hygiene technique  - Identify and instruct in appropriate isolation precautions for identified infection/condition  Outcome: Progressing  Goal: Absence of fever/infection during neutropenic period  Description: INTERVENTIONS:  - Monitor WBC    Outcome: Progressing     Problem: SAFETY ADULT  Goal: Patient will remain free of falls  Description: INTERVENTIONS:  - Educate patient/family on patient safety including physical limitations  - Instruct patient to call for assistance with activity   - Consult OT/PT to assist with strengthening/mobility   - Keep Call bell within reach  - Keep bed low and locked with side rails adjusted as appropriate  - Keep care items and personal belongings within reach  - Initiate and maintain comfort rounds  - Make Fall Risk Sign visible to staff  - Offer Toileting every 2 Hours, in advance of need  - Initiate/Maintain bed/chair alarm  - Obtain necessary fall risk management equipment: alarms  - Apply yellow socks and bracelet for high fall risk patients  - Consider moving patient to room near nurses station  Outcome: Progressing  Goal: Maintain or return to baseline ADL function  Description: INTERVENTIONS:  -  Assess patient's ability to carry out ADLs; assess patient's baseline for ADL function and identify physical deficits which impact ability to perform ADLs (bathing, care of mouth/teeth, toileting, grooming, dressing, etc )  - Assess/evaluate cause of self-care deficits   - Assess range of motion  - Assess patient's mobility; develop plan if impaired  - Assess patient's need for assistive devices and provide as appropriate  - Encourage maximum independence but intervene and supervise when necessary  - Involve family in performance of ADLs  - Assess for home care needs following discharge   - Consider OT consult to assist with ADL evaluation and planning for discharge  - Provide patient education as appropriate  Outcome: Progressing  Goal: Maintains/Returns to pre admission functional level  Description: INTERVENTIONS:  - Perform BMAT or MOVE assessment daily    - Set and communicate daily mobility goal to care team and patient/family/caregiver  - Collaborate with rehabilitation services on mobility goals if consulted  - Perform Range of Motion 3 times a day  - Reposition patient every 2 hours    - Dangle patient 3 times a day  - Stand patient 3 times a day  - Ambulate patient 4 times a day  - Out of bed to chair 4 times a day   - Out of bed for meals 3 times a day  - Out of bed for toileting  - Record patient progress and toleration of activity level   Outcome: Progressing     Problem: DISCHARGE PLANNING  Goal: Discharge to home or other facility with appropriate resources  Description: INTERVENTIONS:  - Identify barriers to discharge w/patient and caregiver  - Arrange for needed discharge resources and transportation as appropriate  - Identify discharge learning needs (meds, wound care, etc )  - Arrange for interpretive services to assist at discharge as needed  - Refer to Case Management Department for coordinating discharge planning if the patient needs post-hospital services based on physician/advanced practitioner order or complex needs related to functional status, cognitive ability, or social support system  Outcome: Progressing     Problem: Knowledge Deficit  Goal: Patient/family/caregiver demonstrates understanding of disease process, treatment plan, medications, and discharge instructions  Description: Complete learning assessment and assess knowledge base  Interventions:  - Provide teaching at level of understanding  - Provide teaching via preferred learning methods  Outcome: Progressing     Problem: MOBILITY - ADULT  Goal: Maintain or return to baseline ADL function  Description: INTERVENTIONS:  -  Assess patient's ability to carry out ADLs; assess patient's baseline for ADL function and identify physical deficits which impact ability to perform ADLs (bathing, care of mouth/teeth, toileting, grooming, dressing, etc )  - Assess/evaluate cause of self-care deficits   - Assess range of motion  - Assess patient's mobility; develop plan if impaired  - Assess patient's need for assistive devices and provide as appropriate  - Encourage maximum independence but intervene and supervise when necessary  - Involve family in performance of ADLs  - Assess for home care needs following discharge   - Consider OT consult to assist with ADL evaluation and planning for discharge  - Provide patient education as appropriate  Outcome: Progressing  Goal: Maintains/Returns to pre admission functional level  Description: INTERVENTIONS:  - Perform BMAT or MOVE assessment daily    - Set and communicate daily mobility goal to care team and patient/family/caregiver     - Collaborate with rehabilitation services on mobility goals if consulted  - Perform Range of Motion 3 times a day  - Reposition patient every 2 hours    - Dangle patient 3 times a day  - Stand patient 3 times a day  - Ambulate patient 4 times a day  - Out of bed to chair 4 times a day   - Out of bed for meals 3 times a day  - Out of bed for toileting  - Record patient progress and toleration of activity level   Outcome: Progressing     Problem: Prexisting or High Potential for Compromised Skin Integrity  Goal: Skin integrity is maintained or improved  Description: INTERVENTIONS:  - Identify patients at risk for skin breakdown  - Assess and monitor skin integrity  - Assess and monitor nutrition and hydration status  - Monitor labs   - Assess for incontinence   - Turn and reposition patient  - Assist with mobility/ambulation  - Relieve pressure over bony prominences  - Avoid friction and shearing  - Provide appropriate hygiene as needed including keeping skin clean and dry  - Evaluate need for skin moisturizer/barrier cream  - Collaborate with interdisciplinary team   - Patient/family teaching  - Consider wound care consult   Outcome: Progressing     Problem: Potential for Falls  Goal: Patient will remain free of falls  Description: INTERVENTIONS:  - Educate patient/family on patient safety including physical limitations  - Instruct patient to call for assistance with activity   - Consult OT/PT to assist with strengthening/mobility   - Keep Call bell within reach  - Keep bed low and locked with side rails adjusted as appropriate  - Keep care items and personal belongings within reach  - Initiate and maintain comfort rounds  - Make Fall Risk Sign visible to staff  - Offer Toileting every 2 Hours, in advance of need  - Initiate/Maintain bed/chair alarm  - Obtain necessary fall risk management equipment: alarms  - Apply yellow socks and bracelet for high fall risk patients  - Consider moving patient to room near nurses station  Outcome: Progressing Problem: METABOLIC, FLUID AND ELECTROLYTES - ADULT  Goal: Electrolytes maintained within normal limits  Description: INTERVENTIONS:  - Monitor labs and assess patient for signs and symptoms of electrolyte imbalances  - Administer electrolyte replacement as ordered  - Monitor response to electrolyte replacements, including repeat lab results as appropriate  - Instruct patient on fluid and nutrition as appropriate  Outcome: Progressing  Goal: Fluid balance maintained  Description: INTERVENTIONS:  - Monitor labs   - Monitor I/O and WT  - Instruct patient on fluid and nutrition as appropriate  - Assess for signs & symptoms of volume excess or deficit  Outcome: Progressing  Goal: Glucose maintained within target range  Description: INTERVENTIONS:  - Monitor Blood Glucose as ordered  - Assess for signs and symptoms of hyperglycemia and hypoglycemia  - Administer ordered medications to maintain glucose within target range  - Assess nutritional intake and initiate nutrition service referral as needed  Outcome: Progressing     Problem: Nutrition/Hydration-ADULT  Goal: Nutrient/Hydration intake appropriate for improving, restoring or maintaining nutritional needs  Description: Monitor and assess patient's nutrition/hydration status for malnutrition  Collaborate with interdisciplinary team and initiate plan and interventions as ordered  Monitor patient's weight and dietary intake as ordered or per policy  Utilize nutrition screening tool and intervene as necessary  Determine patient's food preferences and provide high-protein, high-caloric foods as appropriate       INTERVENTIONS:  - Monitor oral intake, urinary output, labs, and treatment plans  - Assess nutrition and hydration status and recommend course of action  - Evaluate amount of meals eaten  - Assist patient with eating if necessary   - Allow adequate time for meals  - Recommend/ encourage appropriate diets, oral nutritional supplements, and vitamin/mineral supplements  - Order, calculate, and assess calorie counts as needed  - Recommend, monitor, and adjust tube feedings and TPN/PPN based on assessed needs  - Assess need for intravenous fluids  - Provide specific nutrition/hydration education as appropriate  - Include patient/family/caregiver in decisions related to nutrition  Outcome: Progressing     Problem: SAFETY,RESTRAINT: NV/NON-SELF DESTRUCTIVE BEHAVIOR  Goal: Remains free of harm/injury (restraint for non violent/non self-detsructive behavior)  Description: INTERVENTIONS:  - Instruct patient/family regarding restraint use   - Assess and monitor physiologic and psychological status   - Provide interventions and comfort measures to meet assessed patient needs   - Identify and implement measures to help patient regain control  - Assess readiness for release of restraint   Outcome: Progressing  Goal: Returns to optimal restraint-free functioning  Description: INTERVENTIONS:  - Assess the patient's behavior and symptoms that indicate continued need for restraint  - Identify and implement measures to help patient regain control  - Assess readiness for release of restraint   Outcome: Progressing

## 2022-04-09 NOTE — RESPIRATORY THERAPY NOTE
04/08/22 2116   Respiratory Assessment   Assessment Type During-treatment   General Appearance Drowsy   Respiratory Pattern Dyspnea with exertion   Resp Comments Patient was on 3 lpm nasal cannula, oxygen saturation 94%, he was resting  I asked him if I could place him on the bipap therapy tonight, he stated he wanted to use his machine, I explained his machine wasn't here, he said okay, I will wear it for 3 hours, that is all I have to wear it at home   He has allowed me to place it on him   Subjective Data patient denies sob   Non-Invasive Information   O2 Interface Device Full face mask   Non-Invasive Ventilation Mode BiPAP   SpO2 96 %   $ Pulse Oximetry Spot Check Charge Completed   Non-Invasive Settings   IPAP (cm) 17 cm   EPAP (cm) 6 cm   Rate (Set) 16   FiO2 (%) 32   Pressure Support (cm H2O) 11   Rise Time 3   Inspiratory Time (Set) 1   Non-Invasive Readings   Total Rate 20   Vt (mL) (Mech) 714   MV (Mech) 13 2   Skin Intervention Mask rotated;Skin intact   Non-Invasive Alarms   Insp Pressure High (cm H20) 24   Insp Pressure Low (cm H20) 4   Low Insp Pressure Time (sec) 20 sec   MV Low (L/min) 4   Vt High (mL) 1500   Vt Low (mL) 200   High Resp Rate (BPM) 35 BPM   Low Resp Rate (BPM) 8 BPM   Apnea Interval (sec) 20   Apnea Volume (mL) 10       10:28 pm Nurse contacted me stated that patient did not want to wear his bipap therapy anymore, he has taken him off and he is back on nasal cannula

## 2022-04-09 NOTE — ASSESSMENT & PLAN NOTE
Likely contributing to patient's aggression and confusion that worsens at night  Psychiatry evaluated the patient due to his homicidal threats made on 4/5/2022  Trial depakote sprinkles 250 BID  Should behavioral disturbances persist, patient would need inpatient psychiatric placement when medically cleared    · Re-consult Psychiatry at this time

## 2022-04-09 NOTE — ASSESSMENT & PLAN NOTE
Lab Results   Component Value Date    HGBA1C 9 0 (H) 04/04/2022       Recent Labs     04/08/22  1555 04/08/22  2105 04/09/22  0755 04/09/22  1121   POCGLU 301* 190* 156* 313*       Blood Sugar Average: Last 72 hrs:  (P) 269 [id number]     · Poorly-controlled diabetes with significantly elevated hemoglobin A1c level  · Increased lantus to 50 units SQ QHS on 04/08/2022  · Continue humalog 12 units TID with meals

## 2022-04-09 NOTE — ASSESSMENT & PLAN NOTE
Wt Readings from Last 3 Encounters:   04/04/22 133 kg (293 lb)   04/03/22 133 kg (293 lb 3 4 oz)   04/12/21 133 kg (292 lb 15 9 oz)     Does not appear to be in acute exacerbation  Echo done in March of 2021 reported preserved ejection fraction grade 2 diastolic dysfunction  Continue carvedilol  · The patient is not on any diuretics at this time

## 2022-04-09 NOTE — ASSESSMENT & PLAN NOTE
· The patient was seen in consultation by Dr Shital Buck (Podiatry), who had the following recommendations:  "Plan:  Wound is superficial and stable,  Primarily granular, and without signs of infection noted  Continue wound care as previously ordered, maxorb and allevyn border heel dressing, change every other day  Continue offloading heel with pillow in bed  Likely discharge to rehab today    F/U wound care prn if not available in rehab "

## 2022-04-09 NOTE — ASSESSMENT & PLAN NOTE
· Most likely multifactorial secondary to hypercarbia, hyperglycemia, and possible opioid overdose (patient chronic opioid dependent and accidentally overdosed himself few months ago)  · Patient was combative upon EMS arrival so he received few doses of benzos and became more lethargic  · Due to transit episodes of hypoxia and evidence of hypercapnia on VBG he was placed on BiPAP  · CT of the head unremarkable for intracranial pathology  · Did not tolerate MRI of the brain due to not being able to fit in the [facility]  · CT of the chest showed no pulmonary embolism but evidence of possible pneumonia so treated empirically for CAP  · The antibiotics were discontinued with negative procalcitonin levels x 3   · Wound care consult  · Continue BiPAP - patient only tolerating for about an hour at a time then taking it off himself  · Follow-up lower extremity Doppler - normal    Likely hospital delirium and sundowning's occurring at night  Appreciate Psychiatry's input  The patient is medically cleared for short-term rehabilitation SNF placement at this time

## 2022-04-10 PROBLEM — L89.622 PRESSURE ULCER OF LEFT HEEL, STAGE 2 (HCC): Status: ACTIVE | Noted: 2022-04-07

## 2022-04-10 LAB
GLUCOSE SERPL-MCNC: 132 MG/DL (ref 65–140)
GLUCOSE SERPL-MCNC: 163 MG/DL (ref 65–140)
GLUCOSE SERPL-MCNC: 195 MG/DL (ref 65–140)
GLUCOSE SERPL-MCNC: 307 MG/DL (ref 65–140)

## 2022-04-10 PROCEDURE — 99232 SBSQ HOSP IP/OBS MODERATE 35: CPT | Performed by: INTERNAL MEDICINE

## 2022-04-10 PROCEDURE — 82948 REAGENT STRIP/BLOOD GLUCOSE: CPT

## 2022-04-10 RX ADMIN — CARVEDILOL 12.5 MG: 12.5 TABLET, FILM COATED ORAL at 08:35

## 2022-04-10 RX ADMIN — MAGNESIUM OXIDE TAB 400 MG (241.3 MG ELEMENTAL MG) 400 MG: 400 (241.3 MG) TAB at 08:35

## 2022-04-10 RX ADMIN — ATORVASTATIN CALCIUM 40 MG: 40 TABLET, FILM COATED ORAL at 17:22

## 2022-04-10 RX ADMIN — MUPIROCIN 1 APPLICATION: 20 OINTMENT TOPICAL at 21:52

## 2022-04-10 RX ADMIN — NYSTATIN: 100000 POWDER TOPICAL at 16:21

## 2022-04-10 RX ADMIN — NYSTATIN: 100000 POWDER TOPICAL at 08:35

## 2022-04-10 RX ADMIN — DOCUSATE SODIUM 100 MG: 100 CAPSULE, LIQUID FILLED ORAL at 17:22

## 2022-04-10 RX ADMIN — MAGNESIUM OXIDE TAB 400 MG (241.3 MG ELEMENTAL MG) 400 MG: 400 (241.3 MG) TAB at 17:22

## 2022-04-10 RX ADMIN — ENOXAPARIN SODIUM 40 MG: 40 INJECTION SUBCUTANEOUS at 08:35

## 2022-04-10 RX ADMIN — TAMSULOSIN HYDROCHLORIDE 0.4 MG: 0.4 CAPSULE ORAL at 17:22

## 2022-04-10 RX ADMIN — ASPIRIN 81 MG CHEWABLE TABLET 81 MG: 81 TABLET CHEWABLE at 08:35

## 2022-04-10 RX ADMIN — AMLODIPINE BESYLATE 10 MG: 10 TABLET ORAL at 08:35

## 2022-04-10 RX ADMIN — DIVALPROEX SODIUM 250 MG: 125 CAPSULE, COATED PELLETS ORAL at 08:35

## 2022-04-10 RX ADMIN — CARVEDILOL 12.5 MG: 12.5 TABLET, FILM COATED ORAL at 17:22

## 2022-04-10 RX ADMIN — MUPIROCIN 1 APPLICATION: 20 OINTMENT TOPICAL at 08:35

## 2022-04-10 RX ADMIN — INSULIN GLARGINE 50 UNITS: 100 INJECTION, SOLUTION SUBCUTANEOUS at 21:53

## 2022-04-10 RX ADMIN — NYSTATIN: 100000 POWDER TOPICAL at 21:53

## 2022-04-10 RX ADMIN — DOCUSATE SODIUM 100 MG: 100 CAPSULE, LIQUID FILLED ORAL at 08:35

## 2022-04-10 RX ADMIN — FINASTERIDE 5 MG: 5 TABLET, FILM COATED ORAL at 08:35

## 2022-04-10 RX ADMIN — Medication 2000 UNITS: at 08:35

## 2022-04-10 RX ADMIN — ENOXAPARIN SODIUM 40 MG: 40 INJECTION SUBCUTANEOUS at 21:53

## 2022-04-10 RX ADMIN — DIVALPROEX SODIUM 250 MG: 125 CAPSULE, COATED PELLETS ORAL at 21:53

## 2022-04-10 NOTE — ASSESSMENT & PLAN NOTE
Wt Readings from Last 3 Encounters:   04/04/22 133 kg (293 lb)   04/03/22 133 kg (293 lb 3 4 oz)   04/12/21 133 kg (292 lb 15 9 oz)     Does not appear to be in acute exacerbation  Echo done in March of 2021 reported preserved ejection fraction grade 2 diastolic dysfunction  Continue carvedilol  · The patient is not on any diuretics at this time    · Outpatient Cardiology evaluation

## 2022-04-10 NOTE — ASSESSMENT & PLAN NOTE
· Most likely multifactorial secondary to hypercarbia, hyperglycemia, and possible opioid overdose (patient chronic opioid dependent and accidentally overdosed himself few months ago)  · Patient was combative upon EMS arrival so he received few doses of benzos and became more lethargic  · Due to transit episodes of hypoxia and evidence of hypercapnia on VBG he was placed on BiPAP  · CT of the head unremarkable for intracranial pathology  · Did not tolerate MRI of the brain due to not being able to fit in the [facility]  · CT of the chest showed no pulmonary embolism but evidence of possible pneumonia so treated empirically for CAP  · The antibiotics were discontinued with negative procalcitonin levels x 3   · Wound care consult  · Continue BiPAP - patient only tolerating for about an hour at a time then taking it off himself  · Follow-up lower extremity Doppler - normal    Likely hospital delirium and sundowning's occurring at night  Appreciate Psychiatry's input  The patient is medically cleared for short-term rehabilitation SNF placement at this time

## 2022-04-10 NOTE — ASSESSMENT & PLAN NOTE
· Present on admission  · The patient was seen in consultation by Dr Barry Vernon (Podiatry), who had the following recommendations:  "Plan:  Wound is superficial and stable,  Primarily granular, and without signs of infection noted  Continue wound care as previously ordered, maxorb and allevyn border heel dressing, change every other day  Continue offloading heel with pillow in bed  Likely discharge to rehab today    F/U wound care prn if not available in rehab "

## 2022-04-10 NOTE — ASSESSMENT & PLAN NOTE
· Continue BiPAP QHS and PRN  · The patient would like to use his home CPAP machine at the SNF upon discharge

## 2022-04-10 NOTE — ASSESSMENT & PLAN NOTE
Secondary to morbid obesity/possible obesity-hypoventilation syndrome/obstructive sleep apnea  · Currently requiring 3 lpm of continuous supplemental oxygen to maintain adequate oxygen saturation levels during the day and BiPAP QHS  · The patient is having difficulty tolerating the BiPAP mask  · The patient wants to use his home CPAP machine at the SNF upon discharge      Management as above

## 2022-04-10 NOTE — ASSESSMENT & PLAN NOTE
Lab Results   Component Value Date    HGBA1C 9 0 (H) 04/04/2022       Recent Labs     04/09/22  1121 04/09/22  1519 04/09/22  2102 04/10/22  0728   POCGLU 313* 257* 211* 132       Blood Sugar Average: Last 72 hrs:  (P) 243     · Poorly-controlled diabetes with significantly elevated hemoglobin A1c level  · Increased lantus to 50 units SQ QHS on 04/08/2022  · Continue humalog 12 units TID with meals  · Improved glycemic control on 04/10/2022  · Would benefit from chronic ACE-Inhibitor or ARB treatment for renal protection if his renal function remains stable  · Outpatient Endocrinology evaluation

## 2022-04-10 NOTE — RESPIRATORY THERAPY NOTE
Patient's wife has brought his pap machine in, it is set at [address]   Patient has himself on it at this time, his oxygen saturations is 92% with the pap unit and no additional oxygen blended in with it
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
## 2022-04-10 NOTE — ASSESSMENT & PLAN NOTE
Likely contributing to patient's aggression and confusion that worsens at night  Psychiatry evaluated the patient due to his homicidal threats made on 4/5/2022  Trial of depakote sprinkles 250 mg PO every 12 hours  Should behavioral disturbances persist, patient would need inpatient psychiatric placement

## 2022-04-10 NOTE — QUICK NOTE
The patient developed increasing confusion and agitation this afternoon refusing to wear his supplemental oxygen and required the placement of soft limb restraints on the bilateral upper extremities  If the patient's mental status does not improve, he will require an MRI of the brain to be re-attempted in a bigger machine at another campus  I will ask Neurology to re-evaluate the patient as well

## 2022-04-10 NOTE — PROGRESS NOTES
Refusing to wear oxygen  Placed back on multiple times and explained the importance of wearing it   He said that "it itches"

## 2022-04-10 NOTE — RESPIRATORY THERAPY NOTE
04/09/22 2108   Respiratory Assessment   Assessment Type During-treatment   General Appearance Awake   Resp Comments Patient has his own pap machine set at 13 cm H20 with no oxygen he has himself on it at this time   Patient is not in respiratory distress   Oxygen Therapy/Pulse Ox   O2 Device CPAP   O2 Therapy Oxygen   SpO2 92 %   SpO2 Activity At Rest   $ Pulse Oximetry Spot Check Charge Completed

## 2022-04-10 NOTE — PLAN OF CARE
Problem: PAIN - ADULT  Goal: Verbalizes/displays adequate comfort level or baseline comfort level  Description: Interventions:  - Encourage patient to monitor pain and request assistance  - Assess pain using appropriate pain scale  - Administer analgesics based on type and severity of pain and evaluate response  - Implement non-pharmacological measures as appropriate and evaluate response  - Consider cultural and social influences on pain and pain management  - Notify physician/advanced practitioner if interventions unsuccessful or patient reports new pain  Outcome: Progressing     Problem: INFECTION - ADULT  Goal: Absence or prevention of progression during hospitalization  Description: INTERVENTIONS:  - Assess and monitor for signs and symptoms of infection  - Monitor lab/diagnostic results  - Monitor all insertion sites, i e  indwelling lines, tubes, and drains  - Administer medications as ordered  - Instruct and encourage patient and family to use good hand hygiene technique  - Identify and instruct in appropriate isolation precautions for identified infection/condition  Outcome: Progressing     Problem: SAFETY ADULT  Goal: Patient will remain free of falls  Description: INTERVENTIONS:  - Educate patient/family on patient safety including physical limitations  - Instruct patient to call for assistance with activity   - Consult OT/PT to assist with strengthening/mobility   - Keep Call bell within reach  - Keep bed low and locked with side rails adjusted as appropriate  - Keep care items and personal belongings within reach  - Initiate and maintain comfort rounds  - Make Fall Risk Sign visible to staff  - Offer Toileting every 2 Hours, in advance of need  - Initiate/Maintain bed/chair alarm  - Obtain necessary fall risk management equipment: alarms  - Apply yellow socks and bracelet for high fall risk patients  - Consider moving patient to room near nurses station  Outcome: Progressing  Goal: Maintain or return to baseline ADL function  Description: INTERVENTIONS:  -  Assess patient's ability to carry out ADLs; assess patient's baseline for ADL function and identify physical deficits which impact ability to perform ADLs (bathing, care of mouth/teeth, toileting, grooming, dressing, etc )  - Assess/evaluate cause of self-care deficits   - Assess range of motion  - Assess patient's mobility; develop plan if impaired  - Assess patient's need for assistive devices and provide as appropriate  - Encourage maximum independence but intervene and supervise when necessary  - Involve family in performance of ADLs  - Assess for home care needs following discharge   - Consider OT consult to assist with ADL evaluation and planning for discharge  - Provide patient education as appropriate  Outcome: Progressing  Goal: Maintains/Returns to pre admission functional level  Description: INTERVENTIONS:  - Perform BMAT or MOVE assessment daily    - Set and communicate daily mobility goal to care team and patient/family/caregiver  - Collaborate with rehabilitation services on mobility goals if consulted  - Perform Range of Motion 3 times a day  - Reposition patient every 2 hours    - Dangle patient 3 times a day  - Stand patient 3 times a day  - Ambulate patient 4 times a day  - Out of bed to chair 4 times a day   - Out of bed for meals 3 times a day  - Out of bed for toileting  - Record patient progress and toleration of activity level   Outcome: Progressing     Problem: DISCHARGE PLANNING  Goal: Discharge to home or other facility with appropriate resources  Description: INTERVENTIONS:  - Identify barriers to discharge w/patient and caregiver  - Arrange for needed discharge resources and transportation as appropriate  - Identify discharge learning needs (meds, wound care, etc )  - Refer to Case Management Department for coordinating discharge planning if the patient needs post-hospital services based on physician/advanced practitioner order or complex needs related to functional status, cognitive ability, or social support system  Outcome: Progressing     Problem: Knowledge Deficit  Goal: Patient/family/caregiver demonstrates understanding of disease process, treatment plan, medications, and discharge instructions  Description: Complete learning assessment and assess knowledge base  Interventions:  - Provide teaching at level of understanding  - Provide teaching via preferred learning methods  Outcome: Progressing     Problem: MOBILITY - ADULT  Goal: Maintain or return to baseline ADL function  Description: INTERVENTIONS:  -  Assess patient's ability to carry out ADLs; assess patient's baseline for ADL function and identify physical deficits which impact ability to perform ADLs (bathing, care of mouth/teeth, toileting, grooming, dressing, etc )  - Assess/evaluate cause of self-care deficits   - Assess range of motion  - Assess patient's mobility; develop plan if impaired  - Assess patient's need for assistive devices and provide as appropriate  - Encourage maximum independence but intervene and supervise when necessary  - Involve family in performance of ADLs  - Assess for home care needs following discharge   - Consider OT consult to assist with ADL evaluation and planning for discharge  - Provide patient education as appropriate  Outcome: Progressing  Goal: Maintains/Returns to pre admission functional level  Description: INTERVENTIONS:  - Perform BMAT or MOVE assessment daily    - Set and communicate daily mobility goal to care team and patient/family/caregiver  - Collaborate with rehabilitation services on mobility goals if consulted  - Perform Range of Motion 3 times a day  - Reposition patient every 2 hours    - Dangle patient 3 times a day  - Stand patient 3 times a day  - Ambulate patient 4 times a day  - Out of bed to chair 4 times a day   - Out of bed for meals 3 times a day  - Out of bed for toileting  - Record patient progress and toleration of activity level   Outcome: Progressing     Problem: Prexisting or High Potential for Compromised Skin Integrity  Goal: Skin integrity is maintained or improved  Description: INTERVENTIONS:  - Identify patients at risk for skin breakdown  - Assess and monitor skin integrity  - Assess and monitor nutrition and hydration status  - Monitor labs   - Assess for incontinence   - Turn and reposition patient  - Assist with mobility/ambulation  - Relieve pressure over bony prominences  - Avoid friction and shearing  - Provide appropriate hygiene as needed including keeping skin clean and dry  - Evaluate need for skin moisturizer/barrier cream  - Collaborate with interdisciplinary team   - Patient/family teaching  - Consider wound care consult   Outcome: Progressing     Problem: Potential for Falls  Goal: Patient will remain free of falls  Description: INTERVENTIONS:  - Educate patient/family on patient safety including physical limitations  - Instruct patient to call for assistance with activity   - Consult OT/PT to assist with strengthening/mobility   - Keep Call bell within reach  - Keep bed low and locked with side rails adjusted as appropriate  - Keep care items and personal belongings within reach  - Initiate and maintain comfort rounds  - Make Fall Risk Sign visible to staff  - Offer Toileting every 2 Hours, in advance of need  - Initiate/Maintain bed/chair alarm  - Obtain necessary fall risk management equipment: alarms  - Apply yellow socks and bracelet for high fall risk patients  - Consider moving patient to room near nurses station  Outcome: Progressing     Problem: METABOLIC, FLUID AND ELECTROLYTES - ADULT  Goal: Electrolytes maintained within normal limits  Description: INTERVENTIONS:  - Monitor labs and assess patient for signs and symptoms of electrolyte imbalances  - Administer electrolyte replacement as ordered  - Monitor response to electrolyte replacements, including repeat lab results as appropriate  - Instruct patient on fluid and nutrition as appropriate  Outcome: Progressing  Goal: Fluid balance maintained  Description: INTERVENTIONS:  - Monitor labs   - Monitor I/O and WT  - Instruct patient on fluid and nutrition as appropriate  - Assess for signs & symptoms of volume excess or deficit  Outcome: Progressing  Goal: Glucose maintained within target range  Description: INTERVENTIONS:  - Monitor Blood Glucose as ordered  - Assess for signs and symptoms of hyperglycemia and hypoglycemia  - Administer ordered medications to maintain glucose within target range  - Assess nutritional intake and initiate nutrition service referral as needed  Outcome: Progressing     Problem: Nutrition/Hydration-ADULT  Goal: Nutrient/Hydration intake appropriate for improving, restoring or maintaining nutritional needs  Description: Monitor and assess patient's nutrition/hydration status for malnutrition  Collaborate with interdisciplinary team and initiate plan and interventions as ordered  Monitor patient's weight and dietary intake as ordered or per policy  Utilize nutrition screening tool and intervene as necessary  Determine patient's food preferences and provide high-protein, high-caloric foods as appropriate       INTERVENTIONS:  - Monitor oral intake, urinary output, labs, and treatment plans  - Assess nutrition and hydration status and recommend course of action  - Evaluate amount of meals eaten  - Assist patient with eating if necessary   - Allow adequate time for meals  - Recommend/ encourage appropriate diets, oral nutritional supplements, and vitamin/mineral supplements  - Provide specific nutrition/hydration education as appropriate  - Include patient/family/caregiver in decisions related to nutrition  Outcome: Progressing     Problem: SAFETY,RESTRAINT: NV/NON-SELF DESTRUCTIVE BEHAVIOR  Goal: Remains free of harm/injury (restraint for non violent/non self-detsructive behavior)  Description: INTERVENTIONS:  - Instruct patient/family regarding restraint use   - Assess and monitor physiologic and psychological status   - Provide interventions and comfort measures to meet assessed patient needs   - Identify and implement measures to help patient regain control  - Assess readiness for release of restraint   Outcome: Progressing  Goal: Returns to optimal restraint-free functioning  Description: INTERVENTIONS:  - Assess the patient's behavior and symptoms that indicate continued need for restraint  - Identify and implement measures to help patient regain control  - Assess readiness for release of restraint   Outcome: Progressing

## 2022-04-11 LAB
ALBUMIN SERPL BCP-MCNC: 2.9 G/DL (ref 3.5–5)
ALP SERPL-CCNC: 152 U/L (ref 46–116)
ALT SERPL W P-5'-P-CCNC: 28 U/L (ref 12–78)
ANION GAP SERPL CALCULATED.3IONS-SCNC: 9 MMOL/L (ref 4–13)
AST SERPL W P-5'-P-CCNC: 22 U/L (ref 5–45)
BASOPHILS # BLD AUTO: 0.04 THOUSANDS/ΜL (ref 0–0.1)
BASOPHILS NFR BLD AUTO: 1 % (ref 0–1)
BILIRUB SERPL-MCNC: 0.25 MG/DL (ref 0.2–1)
BUN SERPL-MCNC: 19 MG/DL (ref 5–25)
CA-I BLD-SCNC: 1.23 MMOL/L (ref 1.12–1.32)
CALCIUM ALBUM COR SERPL-MCNC: 9.8 MG/DL (ref 8.3–10.1)
CALCIUM SERPL-MCNC: 8.9 MG/DL (ref 8.3–10.1)
CHLORIDE SERPL-SCNC: 108 MMOL/L (ref 100–108)
CO2 SERPL-SCNC: 27 MMOL/L (ref 21–32)
CREAT SERPL-MCNC: 1.24 MG/DL (ref 0.6–1.3)
EOSINOPHIL # BLD AUTO: 0.37 THOUSAND/ΜL (ref 0–0.61)
EOSINOPHIL NFR BLD AUTO: 4 % (ref 0–6)
ERYTHROCYTE [DISTWIDTH] IN BLOOD BY AUTOMATED COUNT: 15 % (ref 11.6–15.1)
GFR SERPL CREATININE-BSD FRML MDRD: 54 ML/MIN/1.73SQ M
GLUCOSE SERPL-MCNC: 119 MG/DL (ref 65–140)
GLUCOSE SERPL-MCNC: 161 MG/DL (ref 65–140)
GLUCOSE SERPL-MCNC: 172 MG/DL (ref 65–140)
GLUCOSE SERPL-MCNC: 188 MG/DL (ref 65–140)
GLUCOSE SERPL-MCNC: 231 MG/DL (ref 65–140)
HCT VFR BLD AUTO: 39.3 % (ref 36.5–49.3)
HGB BLD-MCNC: 12.3 G/DL (ref 12–17)
IMM GRANULOCYTES # BLD AUTO: 0.08 THOUSAND/UL (ref 0–0.2)
IMM GRANULOCYTES NFR BLD AUTO: 1 % (ref 0–2)
LYMPHOCYTES # BLD AUTO: 1.91 THOUSANDS/ΜL (ref 0.6–4.47)
LYMPHOCYTES NFR BLD AUTO: 22 % (ref 14–44)
MAGNESIUM SERPL-MCNC: 2 MG/DL (ref 1.6–2.6)
MCH RBC QN AUTO: 27 PG (ref 26.8–34.3)
MCHC RBC AUTO-ENTMCNC: 31.3 G/DL (ref 31.4–37.4)
MCV RBC AUTO: 86 FL (ref 82–98)
MONOCYTES # BLD AUTO: 0.67 THOUSAND/ΜL (ref 0.17–1.22)
MONOCYTES NFR BLD AUTO: 8 % (ref 4–12)
NEUTROPHILS # BLD AUTO: 5.6 THOUSANDS/ΜL (ref 1.85–7.62)
NEUTS SEG NFR BLD AUTO: 64 % (ref 43–75)
NRBC BLD AUTO-RTO: 0 /100 WBCS
PHOSPHATE SERPL-MCNC: 3.6 MG/DL (ref 2.3–4.1)
PLATELET # BLD AUTO: 334 THOUSANDS/UL (ref 149–390)
PMV BLD AUTO: 10.3 FL (ref 8.9–12.7)
POTASSIUM SERPL-SCNC: 4 MMOL/L (ref 3.5–5.3)
PROCALCITONIN SERPL-MCNC: 0.11 NG/ML
PROT SERPL-MCNC: 7.8 G/DL (ref 6.4–8.2)
RBC # BLD AUTO: 4.55 MILLION/UL (ref 3.88–5.62)
SODIUM SERPL-SCNC: 144 MMOL/L (ref 136–145)
WBC # BLD AUTO: 8.67 THOUSAND/UL (ref 4.31–10.16)

## 2022-04-11 PROCEDURE — 82948 REAGENT STRIP/BLOOD GLUCOSE: CPT

## 2022-04-11 PROCEDURE — 97530 THERAPEUTIC ACTIVITIES: CPT

## 2022-04-11 PROCEDURE — 97116 GAIT TRAINING THERAPY: CPT

## 2022-04-11 PROCEDURE — 80053 COMPREHEN METABOLIC PANEL: CPT | Performed by: INTERNAL MEDICINE

## 2022-04-11 PROCEDURE — 83735 ASSAY OF MAGNESIUM: CPT | Performed by: INTERNAL MEDICINE

## 2022-04-11 PROCEDURE — 84100 ASSAY OF PHOSPHORUS: CPT | Performed by: INTERNAL MEDICINE

## 2022-04-11 PROCEDURE — 97535 SELF CARE MNGMENT TRAINING: CPT

## 2022-04-11 PROCEDURE — 99449 NTRPROF PH1/NTRNET/EHR 31/>: CPT | Performed by: PSYCHIATRY & NEUROLOGY

## 2022-04-11 PROCEDURE — 85025 COMPLETE CBC W/AUTO DIFF WBC: CPT | Performed by: INTERNAL MEDICINE

## 2022-04-11 PROCEDURE — 99232 SBSQ HOSP IP/OBS MODERATE 35: CPT | Performed by: INTERNAL MEDICINE

## 2022-04-11 PROCEDURE — 84145 PROCALCITONIN (PCT): CPT | Performed by: INTERNAL MEDICINE

## 2022-04-11 PROCEDURE — 82330 ASSAY OF CALCIUM: CPT | Performed by: INTERNAL MEDICINE

## 2022-04-11 RX ORDER — DOCUSATE SODIUM 100 MG/1
100 CAPSULE, LIQUID FILLED ORAL 2 TIMES DAILY
Refills: 0
Start: 2022-04-11

## 2022-04-11 RX ORDER — INSULIN GLARGINE 100 [IU]/ML
50 INJECTION, SOLUTION SUBCUTANEOUS
Qty: 10 ML | Refills: 0 | Status: SHIPPED | OUTPATIENT
Start: 2022-04-11

## 2022-04-11 RX ORDER — DIVALPROEX SODIUM 125 MG/1
250 CAPSULE, COATED PELLETS ORAL EVERY 12 HOURS SCHEDULED
Refills: 0
Start: 2022-04-11 | End: 2022-04-13

## 2022-04-11 RX ADMIN — MUPIROCIN 1 APPLICATION: 20 OINTMENT TOPICAL at 08:59

## 2022-04-11 RX ADMIN — FINASTERIDE 5 MG: 5 TABLET, FILM COATED ORAL at 08:58

## 2022-04-11 RX ADMIN — INSULIN GLARGINE 50 UNITS: 100 INJECTION, SOLUTION SUBCUTANEOUS at 21:50

## 2022-04-11 RX ADMIN — CARVEDILOL 12.5 MG: 12.5 TABLET, FILM COATED ORAL at 17:10

## 2022-04-11 RX ADMIN — AMLODIPINE BESYLATE 10 MG: 10 TABLET ORAL at 08:58

## 2022-04-11 RX ADMIN — DIVALPROEX SODIUM 250 MG: 125 CAPSULE, COATED PELLETS ORAL at 08:58

## 2022-04-11 RX ADMIN — ASPIRIN 81 MG CHEWABLE TABLET 81 MG: 81 TABLET CHEWABLE at 08:58

## 2022-04-11 RX ADMIN — DIVALPROEX SODIUM 250 MG: 125 CAPSULE, COATED PELLETS ORAL at 21:50

## 2022-04-11 RX ADMIN — TAMSULOSIN HYDROCHLORIDE 0.4 MG: 0.4 CAPSULE ORAL at 17:10

## 2022-04-11 RX ADMIN — ACETAMINOPHEN 650 MG: 325 TABLET ORAL at 07:50

## 2022-04-11 RX ADMIN — NYSTATIN: 100000 POWDER TOPICAL at 21:50

## 2022-04-11 RX ADMIN — ATORVASTATIN CALCIUM 40 MG: 40 TABLET, FILM COATED ORAL at 17:10

## 2022-04-11 RX ADMIN — DOCUSATE SODIUM 100 MG: 100 CAPSULE, LIQUID FILLED ORAL at 17:10

## 2022-04-11 RX ADMIN — DOCUSATE SODIUM 100 MG: 100 CAPSULE, LIQUID FILLED ORAL at 08:58

## 2022-04-11 RX ADMIN — Medication 2000 UNITS: at 08:58

## 2022-04-11 RX ADMIN — NYSTATIN: 100000 POWDER TOPICAL at 16:43

## 2022-04-11 RX ADMIN — MUPIROCIN 1 APPLICATION: 20 OINTMENT TOPICAL at 21:50

## 2022-04-11 RX ADMIN — ENOXAPARIN SODIUM 40 MG: 40 INJECTION SUBCUTANEOUS at 08:58

## 2022-04-11 RX ADMIN — CARVEDILOL 12.5 MG: 12.5 TABLET, FILM COATED ORAL at 08:58

## 2022-04-11 RX ADMIN — ACETAMINOPHEN 650 MG: 325 TABLET ORAL at 16:09

## 2022-04-11 RX ADMIN — NYSTATIN: 100000 POWDER TOPICAL at 08:59

## 2022-04-11 RX ADMIN — ENOXAPARIN SODIUM 40 MG: 40 INJECTION SUBCUTANEOUS at 21:50

## 2022-04-11 NOTE — PLAN OF CARE
Problem: Nutrition/Hydration-ADULT  Goal: Nutrient/Hydration intake appropriate for improving, restoring or maintaining nutritional needs  Description: Monitor and assess patient's nutrition/hydration status for malnutrition  Collaborate with interdisciplinary team and initiate plan and interventions as ordered  Monitor patient's weight and dietary intake as ordered or per policy  Utilize nutrition screening tool and intervene as necessary  Determine patient's food preferences and provide high-protein, high-caloric foods as appropriate       INTERVENTIONS:  - Monitor oral intake, urinary output, labs, and treatment plans  - Assess nutrition and hydration status and recommend course of action  - Evaluate amount of meals eaten  - Assist patient with eating if necessary   - Allow adequate time for meals  - Recommend/ encourage appropriate diets, oral nutritional supplements, and vitamin/mineral supplements  - Provide specific nutrition/hydration education as appropriate  - Include patient/family/caregiver in decisions related to nutrition  Outcome: Progressing

## 2022-04-11 NOTE — ASSESSMENT & PLAN NOTE
· Most likely multifactorial secondary to hypercarbia, hyperglycemia, and possible opioid overdose (patient chronic opioid dependent and accidentally overdosed himself few months ago)  Mental status has returned to baseline, continue supportive care, current medical management  Patient is medically stable for discharge, awaiting insurance authorization

## 2022-04-11 NOTE — PLAN OF CARE
Problem: PHYSICAL THERAPY ADULT  Goal: Performs mobility at highest level of function for planned discharge setting  See evaluation for individualized goals  Description: Treatment/Interventions: Functional transfer training,LE strengthening/ROM,Therapeutic exercise,Endurance training,Bed mobility,Gait training          See flowsheet documentation for full assessment, interventions and recommendations  Outcome: Progressing  Note: Prognosis: Fair  Problem List: Decreased strength,Decreased endurance,Impaired balance,Decreased mobility,Impaired judgement,Decreased safety awareness  Assessment: Pt  seen for PT treatment session this date with interventions consisting of  bed mobility, transfers and  gait training w/ emphasis on improving pt's ability to ambulate  Pt  Requiring frequent  cues for sequence and safety  In comparison to previous session, Pt  With improvements in activity tolerance  Following cues better  Increased time to complete all tasks  Vitals WFL's with 2 L O2  Pt is in need of continued activity in PT to improve strength balance endurance mobility transfers and ambulation with return to maximize LOF  From PT/mobility standpoint, recommendation at time of d/c would be post acute rehab  in order to promote return to PLOF and independence  The patient's AM-PAC Basic Mobility Inpatient Short Form Raw Score is 11  A Raw score of less than or equal to 16 suggests the patient may benefit from discharge to post-acute rehabilitation services  Please also refer to physical therapy recommendation for safe DC planning  PT Discharge Recommendation: Post acute rehabilitation services          See flowsheet documentation for full assessment

## 2022-04-11 NOTE — RESPIRATORY THERAPY NOTE
04/10/22 2030   Respiratory Assessment   Assessment Type Assess only   General Appearance Awake   Resp Comments Patient at this time has his oxygen off and refuses to put it back in, he is aggressive, he does have his home pap unit when he chooses to use it   Subjective Data patient is not in respiratory distress   Oxygen Therapy/Pulse Ox   O2 Device None (Room air)   O2 Therapy Room air   SpO2 (!) 89 %   SpO2 Activity At Rest

## 2022-04-11 NOTE — OCCUPATIONAL THERAPY NOTE
Occupational Therapy Progress Note     Patient Name: Betha Goodell  Today's Date: 4/11/2022  Problem List  Principal Problem:    Acute metabolic encephalopathy  Active Problems:    CKD (chronic kidney disease) stage 3, GFR 30-59 ml/min ([facility])    Chronic diastolic CHF (congestive heart failure) ([place] [address] )    Morbid obesity with BMI of 45 0-49 9, adult ([place] [address] )    Type 2 diabetes mellitus with hyperglycemia, with long-term current use of insulin ([facility])    Opiate dependence, continuous ([facility])    Abnormal EKG    Obstructive sleep apnea on CPAP    Acute respiratory failure with hypoxia and hypercapnia ([place] [address] )    Late onset Alzheimer's dementia with behavioral disturbance ([place] [address] )    MRSA colonization    Pressure ulcer of left heel, stage 2 ([place] [address] )          04/11/22 1014   OT Last Visit   OT Visit Date 04/11/22   Note Type   Note Type Treatment   Restrictions/Precautions   Weight Bearing Precautions Per Order No   Other Precautions Bed Alarm; Chair Alarm;Multiple lines; Fall Risk;O2;Telemetry   Pain Assessment   Pain Assessment Tool 0-10   Pain Score No Pain   ADL   Where Assessed Chair   LB Bathing Assistance 2  Maximal Assistance   LB Bathing Deficit Setup; Left lower leg including foot;Right lower leg including foot; Buttocks   UB Dressing Assistance 4  Minimal Assistance   UB Dressing Deficit Setup; Increased time to complete;Verbal cueing; Fasteners   LB Dressing Assistance 2  Maximal Assistance   LB Dressing Deficit Don/doff R sock; Don/doff L sock   Bed Mobility   Supine to Sit 3  Moderate assistance   Additional items Assist x 2; Increased time required;Verbal cues; Bedrails;HOB elevated   Transfers   Sit to Stand 3  Moderate assistance   Additional items Assist x 2; Increased time required;Verbal cues   Stand to Sit 3  Moderate assistance   Additional items Assist x 2; Increased time required;Verbal cues   Stand pivot 3  Moderate assistance   Additional items Assist x 2; Increased time required;Verbal cues   Additional Comments Use of RW Cognition   Overall Cognitive Status Impaired   Arousal/Participation Alert; Responsive   Attention Attends with cues to redirect   Orientation Level Oriented to person;Oriented to place; Disoriented to time;Disoriented to situation   Memory Unable to assess   Following Commands Follows one step commands with increased time or repetition   Activity Tolerance   Activity Tolerance Patient limited by fatigue   Assessment   Assessment Patient participated in Skilled OT session this date with interventions consisting of ADL re training with the use of correct body mechnaics and  therapeutic activities to: increase activity tolerance   Co treatment with PTA secondary to complex medical condition of pt, mod A of 2 required to achieve and maintain transitional movements, requiring the need of skilled therapeutic intervention of 2 therapists to achieve delivery of services  Patient agreeable to OT treatment session, upon arrival patient was found supine in bed  Supine to sit txfrs with Mod Ax2, sit to stand txfrs from bed with Mod Ax2, stand pivot txfrs from bed to alejandra chair with Mod Ax2 and use of RW  UE dressing Min A, LB dressing Max A  Patient requiring verbal cues for safety and verbal cues for correct technique  Patient continues to be functioning below baseline level, occupational performance remains limited secondary to factors listed above and increased risk for falls and injury  From OT standpoint, recommendation at time of d/c would be Post acute rehabilitation services  The patient's raw score on the AM-PAC Daily Activity inpatient short form is 12, standardized score is 30 6, less than 39 4  Patients at this level are likely to benefit from discharge to post-acute rehabilitation services  Please refer to the recommendation of the Occupational Therapist for safe discharge planning     Plan   Goal Expiration Date 04/18/22   OT Treatment Day 4   OT Frequency 3-5x/wk   Recommendation   OT Discharge Recommendation Post acute rehabilitation services   AM-PAC Daily Activity Inpatient   Lower Body Dressing 2   Bathing 2   Toileting 1   Upper Body Dressing 3   Grooming 2   Eating 2   Daily Activity Raw Score 12   Daily Activity Standardized Score (Calc for Raw Score >=11) 30 6   Turning Head Towards Sound 3   Follow Simple Instructions 3   Low Function Daily Activity Raw Score 18   Low Function Daily Activity Standardized Score 30 17   AM-PAC Applied Cognition Inpatient   Following a Speech/Presentation 2   Understanding Ordinary Conversation 2   Taking Medications 1   Remembering Where Things Are Placed or Put Away 1   Remembering List of 4-5 Errands 1   Taking Care of Complicated Tasks 1   Applied Cognition Raw Score 8   Applied Cognition Standardized Score 19 32     Pt left seated in alejandra chair with all needs in reach and patients wife present throughout

## 2022-04-11 NOTE — CASE MANAGEMENT
Case Management Discharge Planning Note    Patient name Kajal Barbosa  Location /284-80 MRN [id number]  : 1945 Date 2022       Current Admission Date: 2022  Current Admission Diagnosis:Acute metabolic encephalopathy   Patient Active Problem List    Diagnosis Date Noted    MRSA colonization 2022    Pressure ulcer of left heel, stage 2 ([facility] ) 2022    Late [facility]'s [facility] 2022    Acute metabolic encephalopathy     Hypoxia 2022    Acute respiratory failure with hypoxia and hypercapnia [facility] ) 2022    Constipation 2021    Obstructive sleep apnea on CPAP 04/10/2021    Inguinal lymphadenopathy 2021    Bilateral pleural effusion 2021    Atelectasis 2021    Acute on chronic diastolic CHF (congestive heart failure) ([facility] ) 2021    Multiple renal cysts 2021    Renal calculus 2021    Venous stasis dermatitis of both lower extremities 2021    Lower extremity weakness 2021    Cigarette nicotine dependence in remission 2021    Pyuria 03/10/2021    Microscopic hematuria 03/10/2021    Multiple pulmonary nodules 03/10/2021    Gait instability 03/10/2021    Abnormal EKG 03/10/2021    Elevated d-dimer 2021    Elevated parathyroid hormone 2021    Accelerated hypertension 2021    Hypercalcemia 2021    Open wound of right lower extremity 2021    Cellulitis of right lower extremity 2021    Elevated alkaline phosphatase level 2021    Elevated TSH 2019    Pressure injury of skin of right buttock 2019    Ambulatory dysfunction 2019    UTI (urinary tract infection) 2019    Neural foraminal stenosis of cervical spine 2019    Acute pain of right lower extremity 2019    Paresthesia of left upper extremity 2019    Hypoglycemia 2019    Acute cystitis 02/28/2019    Opi[facility] dependence, continuous [facility] ) 02/28/2019    Uncontrolled type 2 diabetes mellitus with hyperglycemia, with long-term current use of insulin ([facility] ) 10/04/2018    Acute kidney injury ([facility] ) 10/04/2018    Dehydration with hyponatremia 10/04/2018    Type 2 diabetes mellitus with hyperglycemia, with long-term current use of insulin ([facility] ) 22/08/5778    Complicated UTI (urinary tract infection) 10/03/2018    Dyspnea on exertion 12/27/2016    Type 2 diabetes mellitus with hypoglycemia without coma, with long-term current use of insulin ([place] [address] ) 12/27/2016    CKD (chronic kidney disease) stage 3, GFR 30-59 ml/min ([facility]) 12/27/2016    Chronic diastolic CHF (congestive heart failure) ([facility] ) 12/27/2016    Morbid obesity with BMI of 45 0-49 9, adult ([place] [address] ) 12/27/2016    Essential hypertension 12/27/2016      LOS (days): 8  Geometric Mean LOS (GMLOS) (days): 3 60  Days to GMLOS:-4 9     OBJECTIVE:  Risk of Unplanned Readmission Score: 28         Current admission status: Inpatient   Preferred Pharmacy:   [facility], [place] - [address], [address] [facility]  [address] [facility] [address]  Phone: [phone] Fax: [address] [place] [address]  Phone: [phone] Fax: [phone]    Primary Care Provider: Austin Gama MD    Primary Insurance: [facility] [employer]  Secondary Insurance:     Lucrecia Dobbs is still pending with [facility] for pt to go to [facility]  [facility] requested updated Pt/Ot notes  Updated notes sent on this date

## 2022-04-11 NOTE — PLAN OF CARE
Problem: OCCUPATIONAL THERAPY ADULT  Goal: Performs self-care activities at highest level of function for planned discharge setting  See evaluation for individualized goals  Description: Treatment Interventions: ADL retraining,Functional transfer training,UE strengthening/ROM,Endurance training,Cognitive reorientation,Patient/family training,Equipment evaluation/education,Activityengagement          See flowsheet documentation for full assessment, interventions and recommendations  Outcome: Progressing  Note: Limitation: Decreased ADL status,Decreased UE strength,Decreased Safe judgement during ADL,Decreased cognition,Decreased endurance,Decreased self-care trans,Decreased high-level ADLs     Assessment: Patient participated in Skilled OT session this date with interventions consisting of ADL re training with the use of correct body mechnaics and  therapeutic activities to: increase activity tolerance   Co treatment with PTA secondary to complex medical condition of pt, mod A of 2 required to achieve and maintain transitional movements, requiring the need of skilled therapeutic intervention of 2 therapists to achieve delivery of services  Patient agreeable to OT treatment session, upon arrival patient was found supine in bed  Supine to sit txfrs with Mod Ax2, sit to stand txfrs from bed with Mod Ax2, stand pivot txfrs from bed to alejandra chair with Mod Ax2 and use of RW  UE dressing Min A, LB dressing Max A  Patient requiring verbal cues for safety and verbal cues for correct technique  Patient continues to be functioning below baseline level, occupational performance remains limited secondary to factors listed above and increased risk for falls and injury  From OT standpoint, recommendation at time of d/c would be Post acute rehabilitation services  The patient's raw score on the AM-PAC Daily Activity inpatient short form is 12, standardized score is 30 6, less than 39 4   Patients at this level are likely to benefit from discharge to post-acute rehabilitation services  Please refer to the recommendation of the Occupational Therapist for safe discharge planning       OT Discharge Recommendation: Post acute rehabilitation services

## 2022-04-11 NOTE — PHYSICAL THERAPY NOTE
PHYSICAL THERAPY NOTE          Patient Name: Nestor RAMIREZDEVA Date: 4/11/2022 04/11/22 1015   Note Type   Note Type Treatment   Pain Assessment   Pain Assessment Tool 0-10   Pain Score No Pain   Restrictions/Precautions   Weight Bearing Precautions Per Order No   Other Precautions Bed Alarm; Chair Alarm;Multiple lines; Fall Risk;O2;Telemetry   Cognition   Overall Cognitive Status Impaired   Arousal/Participation Alert   Following Commands Follows one step commands with increased time or repetition   Subjective   Subjective Agreeable to therapy  Bed Mobility   Supine to Sit 3  Moderate assistance   Additional items Assist x 2; Increased time required;Verbal cues;HOB elevated   Additional Comments Increased time to scoot to EOB  Extensive cues for technique and safety   Transfers   Sit to Stand 3  Moderate assistance   Additional items Assist x 2; Increased time required;Verbal cues   Stand to Sit 3  Moderate assistance   Additional items Assist x 2;Armrests; Increased time required;Verbal cues   Stand pivot 3  Moderate assistance   Additional items Assist x 2; Increased time required;Verbal cues   Ambulation/Elevation   Gait pattern Excessively slow; Short stride; Foward flexed;Decreased foot clearance; Improper Weight shift   Gait Assistance 4  Minimal assist   Additional items Assist x 2;Verbal cues   Assistive Device Rolling walker   Distance 3' bed to chair   Balance   Static Sitting Fair +   Dynamic Sitting Fair   Static Standing Fair -   Dynamic Standing Poor +   Ambulatory Poor +  (RW)   Endurance Deficit   Endurance Deficit Yes   Activity Tolerance   Activity Tolerance Patient limited by fatigue   Assessment   Prognosis Fair   Problem List Decreased strength;Decreased endurance; Impaired balance;Decreased mobility; Impaired judgement;Decreased safety awareness   Assessment Pt  seen for PT treatment session this date with interventions consisting of  bed mobility, transfers and  gait training w/ emphasis on improving pt's ability to ambulate  Pt  Requiring frequent  cues for sequence and safety  In comparison to previous session, Pt  With improvements in activity tolerance  Following cues better  Increased time to complete all tasks  Vitals WFL's with 2 L O2  Pt is in need of continued activity in PT to improve strength balance endurance mobility transfers and ambulation with return to maximize LOF  From PT/mobility standpoint, recommendation at time of d/c would be post acute rehab  in order to promote return to PLOF and independence  The patient's AM-PAC Basic Mobility Inpatient Short Form Raw Score is 11  A Raw score of less than or equal to 16 suggests the patient may benefit from discharge to post-acute rehabilitation services  Please also refer to physical therapy recommendation for safe DC planning  Goals   LTG Expiration Date 04/18/22   PT Treatment Day 4   Plan   Treatment/Interventions Functional transfer training;LE strengthening/ROM; Therapeutic exercise; Endurance training;Bed mobility;Gait training   Progress Slow progress, decreased activity tolerance   Recommendation   PT Discharge Recommendation Post acute rehabilitation services   AM-PAC Basic Mobility Inpatient   Turning in Bed Without Bedrails 2   Lying on Back to Sitting on Edge of Flat Bed 2   Moving Bed to Chair 2   Standing Up From Chair 2   Walk in Room 2   Climb 3-5 Stairs 1   Basic Mobility Inpatient Raw Score 11   Basic Mobility Standardized Score 30 25   Turning Head Towards Sound 3   Follow Simple Instructions 3   Low Function Basic Mobility Raw Score 17   Low Function Basic Mobility Standardized Score 27 46   Highest Level Of Mobility   JH-HLM Goal 4: Move to chair/commode   JH-HLM Highest Level of Mobility 5: Stand (1 or more minutes)   Education   Education Provided Mobility training   Patient Reinforcement needed   End of Consult   Patient Position at End of Consult Bedside chair;Bed/Chair alarm activated; All needs within reach   End of Consult Comments discussed POC with PT

## 2022-04-11 NOTE — PROGRESS NOTES
[facility] [address] [place]  Progress Note Roman Meyer 1945, 76 y o  male MRN: [id number]  Unit/Bed#: 403-01 Encounter: [id number]  Primary Care Provider: Chantel Westbrook MD   Date and time admitted to hospital: 2022  7:56 PM    * Acute metabolic encephalopathy  Assessment & Plan  · Most likely multifactorial secondary to hypercarbia, hyperglycemia, and possible opioid overdose (patient chronic opioid dependent and accidentally overdosed himself few months ago)  Mental status has returned to baseline, continue supportive care, current medical management  Patient is medically stable for discharge, awaiting insurance authorization  Wife and patient updated with plan of care at bedside, plan of care discussed with case management, awaiting insurance authorization for discharge to SNF  Code Status: Level 3 - DNAR and DNI    Subjective:   Patient has no acute complaints  Objective:     Vitals:   Temp (24hrs), Av 7 °F (36 5 °C), Min:97 3 °F (36 3 °C), Max:98 6 °F (37 °C)    Temp:  [97 3 °F (36 3 °C)-98 6 °F (37 °C)] 97 3 °F (36 3 °C)  HR:  [50-74] 74  Resp:  [16-20] 18  BP: (137-154)/(60-76) 154/76  SpO2:  [81 %-95 %] 95 %  Body mass index is 47 29 kg/m²  Input and Output Summary (last 24 hours): Intake/Output Summary (Last 24 hours) at 2022 1912  Last data filed at 2022 0900  Gross per 24 hour   Intake 120 ml   Output --   Net 120 ml       Physical Exam:   Physical Exam  Vitals and nursing note reviewed  HENT:      Head: Normocephalic and atraumatic  Right Ear: External ear normal       Left Ear: External ear normal    Cardiovascular:      Rate and Rhythm: Normal rate  Pulses: Normal pulses  Pulmonary:      Effort: Pulmonary effort is normal  No respiratory distress  Breath sounds: Normal breath sounds  No stridor  No rales  Chest:      Chest wall: No tenderness  Abdominal:      General: Abdomen is flat   Bowel sounds are normal  There is no distension  Palpations: Abdomen is soft  Musculoskeletal:         General: Normal range of motion  Cervical back: Normal range of motion  Skin:     General: Skin is warm and dry  Neurological:      General: No focal deficit present  Mental Status: He is alert and oriented to person, place, and time  Mental status is at baseline  Cranial Nerves: No cranial nerve deficit  Psychiatric:         Mood and Affect: Mood normal          Behavior: Behavior normal          Thought Content: Thought content normal          Judgment: Judgment normal           Additional Data:     Labs:  Results from last 7 days   Lab Units 04/11/22  0643   WBC Thousand/uL 8 67   HEMOGLOBIN g/dL 12 3   HEMATOCRIT % 39 3   PLATELETS Thousands/uL 334   NEUTROS PCT % 64   LYMPHS PCT % 22   MONOS PCT % 8   EOS PCT % 4     Results from last 7 days   Lab Units 04/11/22  0643   SODIUM mmol/L 144   POTASSIUM mmol/L 4 0   CHLORIDE mmol/L 108   CO2 mmol/L 27   BUN mg/dL 19   CREATININE mg/dL 1 24   ANION GAP mmol/L 9   CALCIUM mg/dL 8 9   ALBUMIN g/dL 2 9*   TOTAL BILIRUBIN mg/dL 0 25   ALK PHOS U/L 152*   ALT U/L 28   AST U/L 22   GLUCOSE RANDOM mg/dL 172*         Results from last 7 days   Lab Units 04/11/22  1639 04/11/22  1109 04/11/22  0701 04/10/22  2157 04/10/22  1540 04/10/22  1113 04/10/22  0728 04/09/22  2102 04/09/22  1519 04/09/22  1121 04/09/22  0755 04/08/22  2105   POC GLUCOSE mg/dl 188* 231* 161* 307* 163* 195* 132 211* 257* 313* 156* 190*         Results from last 7 days   Lab Units 04/11/22  0643 04/09/22  0434   PROCALCITONIN ng/ml 0 11 0 14       Lines/Drains:  Invasive Devices  Report    None                       Imaging: No pertinent imaging reviewed      Recent Cultures (last 7 days):         Last 24 Hours Medication List:   Current Facility-Administered Medications   Medication Dose Route Frequency Provider Last Rate    acetaminophen  650 mg Oral Q6H PRN Tracey Hunt PA-C      albuterol  2 5 mg Nebulization Q4H PRN Misasravani Langston, PA-C      amLODIPine  10 mg Oral Daily Misa Kian, PA-C      aspirin  81 mg Oral Daily Misa Kian, PA-C      atorvastatin  40 mg Oral QPM Misa Kian, PA-C      carvedilol  12 5 mg Oral BID Misa Kian, PA-C      cholecalciferol  2,000 Units Oral Daily Misa Kian, PA-C      divalproex sodium  250 mg Oral Q12H [address] Misaa Cowing, PA-C      docusate sodium  100 mg Oral BID Misa Kian, PA-C      enoxaparin  40 mg Subcutaneous Q12H [address] Argueta [facility], DO      finasteride  5 mg Oral Daily Misa Langston, PA-STANISLAV      insulin glargine  50 Units Subcutaneous HS Argueta [facility], DO      insulin lispro  12 Units Subcutaneous TID With Meals [facility], DO      insulin lispro  2-12 Units Subcutaneous TID With Meals Misa Langston, PA-STANISLAV      mupirocin   Nasal Q12H [address] Argueta Ee Yamel, DO      nystatin   Topical TID Misa Langston, PA-STANISLAV      OLANZapine  5 mg Intramuscular Q8H PRN Misa Langston, PA-STANISLAV      ondansetron  4 mg Intravenous Q6H PRN Misa Kian, PA-STANISLAV      tamsulosin  0 4 mg Oral QPM Misa Langston, KELSEY          Today, Patient Was Seen By: Laila Lopez DO    **Please Note: This note may have been constructed using a voice recognition system  **

## 2022-04-11 NOTE — CONSULTS
INTERPROFESSIONAL FOLLOW UP   Nancy Anderson 76 y o  male MRN: [id number]  Encounter Date: 04/11/22      Principal Problem: Metabolic encephalopathy, persistent  Primary team is requesting re-evaluation by neurology team due to persistent/worsening altered mental status  In summary, patient is a 76year old morbidly obese male with DM, CKD, CHF, and chronic pain who was initially admitted on 4/2 due to altered mental status  On arrival, [facility] was negative  CT chest was concerning for atelectasis vs pneumonia  Initial procalcitonin was elevated, so he was started on antibiotics, but following labs were within normal limits  He did have an open wound on his heel  He was found to have an KIRT (superimposed on his chronic kidney disease)  He was on pregabalin which was thought to contributed to his mental status given poor clearance in setting of renal dysfunction  Glucose was >500  He was hypercarbic at times  There was also concern on arrival for possible opioid overdose (as he accidentally overdosed himself a few months ago), but UDS only revealed benzodiazepine, which was administered by EMS due to combativeness  MRI brain could not be completed due to apparent inability to fit in the machine or tolerate the study  Etiology of altered mental status was attributed to metabolic encephalopathy in setting of above issues, along with hospital acquired delirium, superimposed on baseline dementia with possible sundowning as well as underlying psychiatric disease- he was noted to make homicidal ideations on 4/5 and was started on Depakote 250 mg BID  His mental status waxed and waned throughout admission per chart review  Most recently, yesterday evening he was confused and agitated again- refusing to wear his supplemental oxygen and requiring restraints  Therefore, neurology was asked to re-evaluate         ASSESSMENT:  Repeat chart review his still indicative of toxic metabolic encephalopathy/delirium, rather than any focal structural CNS event  RECOMMENDATIONS:  He had CT of his head twice, without evidence of any evolving infarction  I do not really think an MRI is necessary based on data review, and absence of any focal deficits    There is no history of AFib to raise suspicion of embolic shower as might be cause of confusion, but does have multiple potentially contributing factors to delirium, in addition to the apparent underlying dementia  Could consider further increase in Depakote to 250 t i d  and/or low-dose scheduled antipsychotic (eg Risperdal 0 5 b i d )    Total time spent in review of data, discussion with requesting provider and rendering advice was 21-30 Mins

## 2022-04-12 PROBLEM — Z22.322 MRSA COLONIZATION: Status: RESOLVED | Noted: 2022-04-07 | Resolved: 2022-04-12

## 2022-04-12 PROBLEM — R94.31 ABNORMAL EKG: Status: RESOLVED | Noted: 2021-03-10 | Resolved: 2022-04-12

## 2022-04-12 LAB
GLUCOSE SERPL-MCNC: 153 MG/DL (ref 65–140)
GLUCOSE SERPL-MCNC: 167 MG/DL (ref 65–140)
GLUCOSE SERPL-MCNC: 180 MG/DL (ref 65–140)
GLUCOSE SERPL-MCNC: 187 MG/DL (ref 65–140)

## 2022-04-12 PROCEDURE — 82948 REAGENT STRIP/BLOOD GLUCOSE: CPT

## 2022-04-12 PROCEDURE — 99233 SBSQ HOSP IP/OBS HIGH 50: CPT | Performed by: INTERNAL MEDICINE

## 2022-04-12 PROCEDURE — 97530 THERAPEUTIC ACTIVITIES: CPT

## 2022-04-12 RX ORDER — DIVALPROEX SODIUM 125 MG/1
250 CAPSULE, COATED PELLETS ORAL EVERY 8 HOURS SCHEDULED
Status: DISCONTINUED | OUTPATIENT
Start: 2022-04-12 | End: 2022-04-13 | Stop reason: HOSPADM

## 2022-04-12 RX ADMIN — DOCUSATE SODIUM 100 MG: 100 CAPSULE, LIQUID FILLED ORAL at 17:33

## 2022-04-12 RX ADMIN — NYSTATIN: 100000 POWDER TOPICAL at 21:59

## 2022-04-12 RX ADMIN — CARVEDILOL 12.5 MG: 12.5 TABLET, FILM COATED ORAL at 17:33

## 2022-04-12 RX ADMIN — DIVALPROEX SODIUM 250 MG: 125 CAPSULE, COATED PELLETS ORAL at 21:59

## 2022-04-12 RX ADMIN — ATORVASTATIN CALCIUM 40 MG: 40 TABLET, FILM COATED ORAL at 17:33

## 2022-04-12 RX ADMIN — ENOXAPARIN SODIUM 40 MG: 40 INJECTION SUBCUTANEOUS at 21:59

## 2022-04-12 RX ADMIN — INSULIN GLARGINE 50 UNITS: 100 INJECTION, SOLUTION SUBCUTANEOUS at 21:59

## 2022-04-12 RX ADMIN — TAMSULOSIN HYDROCHLORIDE 0.4 MG: 0.4 CAPSULE ORAL at 17:33

## 2022-04-12 RX ADMIN — NYSTATIN: 100000 POWDER TOPICAL at 17:38

## 2022-04-12 NOTE — ASSESSMENT & PLAN NOTE
· Present on admission  · The patient was seen in consultation by Dr Author Houser (Podiatry), who had the following recommendations:  "Plan:  Wound is superficial and stable,  Primarily granular, and without signs of infection noted  Continue wound care as previously ordered, maxorb and allevyn border heel dressing, change every other day  Continue offloading heel with pillow in bed  Likely discharge to rehab today    F/U wound care prn if not available in rehab "

## 2022-04-12 NOTE — CASE MANAGEMENT
Case Management Discharge Planning Note    Patient name Keven Sole  Location /217-08 MRN [id number]  : 1945 Date 2022       Current Admission Date: 2022  Current Admission Diagnosis:Acute metabolic encephalopathy   Patient Active Problem List    Diagnosis Date Noted    Pressure ulcer of left heel, stage 2 ([facility] ) 2022    Late onset Alzheimer's dementia with behavioral disturbance ([facility] ) 2022    Acute metabolic encephalopathy     Hypoxia 2022    Acute respiratory failure with hypoxia and hypercapnia ([facility] ) 2022    Constipation 2021    Obstructive sleep apnea on CPAP 04/10/2021    Inguinal lymphadenopathy 2021    Bilateral pleural effusion 2021    Atelectasis 2021    Acute on chronic diastolic CHF (congestive heart failure) ([facility] ) 2021    Multiple renal cysts 2021    Renal calculus 2021    Venous stasis dermatitis of both lower extremities 2021    Lower extremity weakness 2021    Cigarette nicotine dependence in remission 2021    Pyuria 03/10/2021    Microscopic hematuria 03/10/2021    Multiple pulmonary nodules 03/10/2021    Gait instability 03/10/2021    Elevated d-dimer 2021    Elevated parathyroid hormone 2021    Accelerated hypertension 2021    Hypercalcemia 2021    Open wound of right lower extremity 2021    Cellulitis of right lower extremity 2021    Elevated alkaline phosphatase level 2021    Elevated TSH 2019    Pressure injury of skin of right buttock 2019    Ambulatory dysfunction 2019    UTI (urinary tract infection) 2019    Neural foraminal stenosis of cervical spine 2019    Acute pain of right lower extremity 2019    Paresthesia of left upper extremity 2019    Hypoglycemia 2019    Acute cystitis 2019    Opiate dependence, continuous ([facility] ) 2019    Uncontrolled type 2 diabetes mellitus with hyperglycemia, with long-term current use of insulin ([facility] ) 10/04/2018    Acute kidney injury ([place] [address] ) 10/04/2018    Dehydration with hyponatremia 10/04/2018    Type 2 diabetes mellitus with hyperglycemia, with long-term current use of insulin ([place] [address] ) 57/80/7722    Complicated UTI (urinary tract infection) 10/03/2018    Dyspnea on exertion 12/27/2016    Type 2 diabetes mellitus with hypoglycemia without coma, with long-term current use of insulin ([place] [address] ) 12/27/2016    CKD (chronic kidney disease) stage 3, GFR 30-59 ml/min ([facility]) 12/27/2016    Chronic diastolic CHF (congestive heart failure) ([place] [address] ) 12/27/2016    Morbid obesity with BMI of 45 0-49 9, adult ([place] [address] ) 12/27/2016    Essential hypertension 12/27/2016      LOS (days): 9  Geometric Mean LOS (GMLOS) (days): 3 60  Days to GMLOS:-6     OBJECTIVE:  Risk of Unplanned Readmission Score: 29         Current admission status: Inpatient   Pref[place] Pharmacy:   [facility] [place] - [address], [address]  [address] [address]  Phone: [phone] Fax: [phone]  [address] [place] [address]  [address]  Phone: [phone] Fax: [phone]    Primary Care Provider: Maximo Ross MD    Primary Insurance: [facility] [employer] [employer]  Secondary Insurance:     DISCHARGE DETAILS:Peer to peer not being done by provider as pt not a rehab candidate (more of a long term nursing home patient)  CM met with pt's wife who is agreeable to pt going to [facility] as MA pending and doing the MA paperwork for the SNF  [facility]oples [facility] [address] [facility] the business office will reach out to pt's wife and they are able to take pt tomorrow

## 2022-04-12 NOTE — CASE MANAGEMENT
Case Management Discharge Planning Note    Patient name Magdalene Riley  Location /783-49 MRN [id number]  : 1945 Date 2022       Current Admission Date: 2022  Current Admission Diagnosis:Acute metabolic encephalopathy   Patient Active Problem List    Diagnosis Date Noted    MRSA colonization 2022    Pressure ulcer of left heel, stage 2 ([facility] ) 2022    Late onset Alzheimer's dementia with [facility] disturbance [facility] ) 2022    Acute metabolic encephalopathy     Hypoxia 2022    Acute respiratory failure with hypoxia and hypercapnia [facility] ) 2022    Constipation 2021    Obstructive sleep apnea on CPAP 04/10/2021    Inguinal lymphadenopathy 2021    Bilateral pleural effusion 2021    Atelectasis 2021    Acute on chronic diastolic CHF (congestive heart failure) [facility] ) 2021    Multiple renal cysts 2021    Renal calculus 2021    Venous stasis dermatitis of both lower extremities 2021    Lower extremity weakness 2021    Cigarette nicotine dependence in remission 2021    Pyuria 03/10/2021    Microscopic hematuria 03/10/2021    Multiple pulmonary nodules 03/10/2021    Gait instability 03/10/2021    Abnormal EKG 03/10/2021    Elevated d-dimer 2021    Elevated parathyroid hormone 2021    Accelerated hypertension 2021    Hypercalcemia 2021    Open wound of right lower extremity 2021    Cellulitis of right lower extremity 2021    Elevated alkaline phosphatase level 2021    Elevated TSH 2019    Pressure injury of skin of right buttock 2019    Ambulatory dysfunction 2019    UTI (urinary tract infection) 2019    Neural foraminal stenosis of cervical spine 2019    Acute pain of right lower extremity 2019    Paresthesia of left upper extremity 2019    Hypoglycemia 2019    Acute cystitis 02/28/2019    Opiate dependence, continuous ([facility] ) 02/28/2019    Uncontrolled type 2 diabetes mellitus with hyperglycemia, with long-term current use of insulin ([place] [address] ) 10/04/2018    Acute kidney injury ([place] [address] ) 10/04/2018    Dehydration with hyponatremia 10/04/2018    Type 2 diabetes mellitus with hyperglycemia, with long-term current use of insulin ([place] [address] ) 31/87/8848    Complicated UTI (urinary tract infection) 10/03/2018    Dyspnea on exertion 12/27/2016    Type 2 diabetes mellitus with hypoglycemia without coma, with long-term current use of insulin ([place] [address] ) 12/27/2016    CKD (chronic kidney disease) stage 3, GFR 30-59 ml/min ([facility]) 12/27/2016    Chronic diastolic CHF (congestive heart failure) ([place] [address] ) 12/27/2016    Morbid obesity with BMI of 45 0-49 9, adult ([place] [address] ) 12/27/2016    Essential hypertension 12/27/2016      LOS (days): 9  Geometric Mean LOS (GMLOS) (days): 3 60  Days to GMLOS:-5 9     OBJECTIVE:  Risk of Unplanned Readmission Score: 29         Current admission status: Inpatient   Preferred Pharmacy:   [facility] [place] - [address], [address]  [address] [address]  Phone: [phone] Fax: [address] [place] [address]  Phone: [phone] Fax: [phone]    Primary Care Provider: Celeste Gant MD    Primary Insurance: [facility] [employer]  Secondary Insurance:     [facility]  A peer to peer can be done Ref# [id number] Phone [phone]   Provider aware

## 2022-04-12 NOTE — CASE MANAGEMENT
Case Management Discharge Planning Note    Patient name Rigo Hernandez  Location /260-78 MRN [id number]  : 1945 Date 2022       Current Admission Date: 2022  Current Admission Diagnosis:Acute metabolic encephalopathy   Patient Active Problem List    Diagnosis Date Noted    MRSA colonization 2022    Pressure ulcer of left heel, stage 2 ([facility] ) 2022    Late onset Alzheimer's dementia with behavioral disturbance ([facility] ) 2022    Acute metabolic encephalopathy     Hypoxia 2022    Acute respiratory failure with hypoxia and hypercapnia ([facility] ) 2022    Constipation 2021    Obstructive sleep apnea on CPAP 04/10/2021    Inguinal lymphadenopathy 2021    Bilateral pleural effusion 2021    Atelectasis 2021    Acute on chronic diastolic CHF (congestive heart failure) ([facility] ) 2021    Multiple renal cysts 2021    Renal calculus 2021    Venous stasis dermatitis of both lower extremities 2021    Lower extremity weakness 2021    Cigarette nicotine dependence in remission 2021    Pyuria 03/10/2021    Microscopic hematuria 03/10/2021    Multiple pulmonary nodules 03/10/2021    Gait instability 03/10/2021    Abnormal EKG 03/10/2021    Elevated d-dimer 2021    Elevated parathyroid hormone 2021    Accelerated hypertension 2021    Hypercalcemia 2021    Open wound of right lower extremity 2021    Cellulitis of right lower extremity 2021    Elevated alkaline phosphatase level 2021    Elevated TSH 2019    Pressure injury of skin of right buttock 2019    Ambulatory dysfunction 2019    UTI (urinary tract infection) 2019    Neural foraminal stenosis of cervical spine 2019    Acute pain of right lower extremity 2019    Paresthesia of left upper extremity 2019    Hypoglycemia 2019    Acute cystitis 02/28/2019    Opiate dependence, continuous ([facility] ) 02/28/2019    Uncontrolled type 2 diabetes mellitus with hyperglycemia, with long-term current use of insulin ([facility] ) 10/04/2018    Acute kidney injury ([facility] ) 10/04/2018    Dehydration with hyponatremia 10/04/2018    Type 2 diabetes mellitus with hyperglycemia, with long-term current use of insulin ([facility]  ) 39/63/7593    Complicated UTI (urinary tract infection) 10/03/2018    Dyspnea on exertion 12/27/2016    Type 2 diabetes mellitus with hypoglycemia without coma, with long-term current use of insulin ([facility] ) 12/27/2016    CKD (chronic kidney disease) stage 3, GFR 30-59 ml/min ([facility]) 12/27/2016    Chronic diastolic CHF (congestive heart failure) ([facility] ) 12/27/2016    Morbid obesity with BMI of 45 0-49 9, adult ([place] [address] ) 12/27/2016    Essential hypertension 12/27/2016      LOS (days): 9  Geometric Mean LOS (GMLOS) (days): 3 60  Days to GMLOS:-5 8     OBJECTIVE:  Risk of Unplanned Readmission Score: 29         Current admission status: Inpatient   Preferred Pharmacy:   [facility] [place] - [address], [address]  [address] [facility] [address]  Phone: [phone] Fax: [address] [place] [address]  Phone: [phone] Fax: [phone]    Primary Care Provider: Barrington Russo MD    Primary Insurance: [facility]  Secondary Insurance:     [facility]  Sent to their medical director-awaiting determination

## 2022-04-12 NOTE — ASSESSMENT & PLAN NOTE
Secondary to morbid obesity/possible obesity-hypoventilation syndrome/obstructive sleep apnea  · Patient with nocturnal hypoxia, also with hypoxia while napping during the day continue oxygen support p r n   To maintain sats greater than 80% and BiPAP QHS    · Continue CPAP machine    Management as above

## 2022-04-12 NOTE — ASSESSMENT & PLAN NOTE
Likely contributing to patient's aggression and confusion that worsens at night  Psychiatry evaluated the patient due to his homicidal threats made on 4/5/2022  Increase Depakote to 250 mg 3 times a day

## 2022-04-12 NOTE — PHYSICAL THERAPY NOTE
PHYSICAL THERAPY NOTE          Patient Name: Constance CARBONEY Date: 4/12/2022 04/12/22 1140   Note Type   Note Type Treatment   Pain Assessment   Pain Assessment Tool 0-10   Pain Score No Pain   Restrictions/Precautions   Weight Bearing Precautions Per Order No   Other Precautions Bed Alarm;Multiple lines;Telemetry;O2;Fall Risk;Pain   General   Family/Caregiver Present No   Cognition   Overall Cognitive Status Impaired   Following Commands Follows one step commands without difficulty   Subjective   Subjective Agreeable to therapy  Bed Mobility   Additional Comments seated EOB at start of session   Transfers   Sit to Stand 4  Minimal assistance   Additional items Assist x 2;Bedrails; Increased time required;Verbal cues   Stand to Sit 4  Minimal assistance   Additional items Assist x 2;Armrests; Increased time required;Verbal cues   Additional Comments mod A to repostion in chair   Ambulation/Elevation   Gait pattern Excessively slow; Shuffling   Gait Assistance 4  Minimal assist   Additional items Assist x 2;Verbal cues   Assistive Device Rolling walker   Distance 3' bed to chair   Balance   Static Sitting Fair +   Dynamic Sitting Fair   Static Standing Fair -   Dynamic Standing Fair -   Ambulatory Poor +  (RW)   Endurance Deficit   Endurance Deficit Yes   Activity Tolerance   Activity Tolerance Patient limited by fatigue;Patient limited by pain   Assessment   Prognosis Good   Problem List Decreased strength;Decreased endurance; Impaired balance;Decreased mobility; Decreased safety awareness; Impaired judgement;Obesity   Assessment Pt  seen for PT treatment session this date with interventions consisting of bed mobility, transfers and  gait training w/ emphasis on improving pt's ability to ambulate  Pt  Requiring frequent cues for sequence and safety  In comparison to previous session, Pt   With min improvements in activity tolerance  Limited to short distance bed to chair due to weakness and fatigue  Pt is in need of continued activity in PT to improve strength balance endurance mobility transfers and ambulation with return to maximize LOF  From PT/mobility standpoint, recommendation at time of d/c would be post acute rehab  in order to promote return to PLOF and independence  The patient's AM-PAC Basic Mobility Inpatient Short Form Raw Score is 13  A Raw score of less than or equal to 16 suggests the patient may benefit from discharge to post-acute rehabilitation services  Please also refer to physical therapy recommendation for safe DC planning  Co-treat with OT this session due to complexity of pt and requires A x 2 to provided skilled interventions  Goals   LTG Expiration Date 04/18/22   PT Treatment Day 5   Plan   Treatment/Interventions Functional transfer training;LE strengthening/ROM; Therapeutic exercise; Endurance training;Bed mobility;Gait training   Progress Slow progress, decreased activity tolerance   Recommendation   PT Discharge Recommendation Post acute rehabilitation services   AM-PAC Basic Mobility Inpatient   Turning in Bed Without Bedrails 3   Lying on Back to Sitting on Edge of Flat Bed 3   Moving Bed to Chair 2   Standing Up From Chair 2   Walk in Room 2   Climb 3-5 Stairs 1   Basic Mobility Inpatient Raw Score 13   Basic Mobility Standardized Score 33 99   Turning Head Towards Sound 3   Follow Simple Instructions 3   Low Function Basic Mobility Raw Score 19   Low Function Basic Mobility Standardized Score 31 06   Highest Level Of Mobility   JH-HLM Goal 4: Move to chair/commode   JH-HLM Highest Level of Mobility 4: Move to chair/commode   JH-HLM Goal Achieved Yes   Education   Education Provided Mobility training   Patient Reinforcement needed   End of Consult   Patient Position at End of Consult Bedside chair; All needs within reach   End of Consult Comments discusssed POC with PT

## 2022-04-12 NOTE — ASSESSMENT & PLAN NOTE
Lab Results   Component Value Date    EGFR 54 04/11/2022    EGFR 52 04/09/2022    EGFR 50 04/07/2022    CREATININE 1 24 04/11/2022    CREATININE 1 28 04/09/2022    CREATININE 1 34 (H) 04/07/2022     Avoid nephrotoxic drugs and hypotension  Serial laboratory testing to monitor the patient's renal function and electrolyte levels  Outpatient follow-up with Nephrology

## 2022-04-12 NOTE — ASSESSMENT & PLAN NOTE
Mental status overall improved, still with poor mobility, doubt the patient will have any significant improvement, likely discharge to nursing facility tomorrow for long-term care

## 2022-04-12 NOTE — ASSESSMENT & PLAN NOTE
Lab Results   Component Value Date    HGBA1C 9 0 (H) 04/04/2022       Recent Labs     04/11/22  1639 04/11/22  2051 04/12/22  0720 04/12/22  1108   POCGLU 188* 119 167* 187*       Blood Sugar Average: Last 72 hrs:  (P) 772 [id number]     · Poorly-controlled diabetes with significantly elevated hemoglobin A1c level  · Increased lantus to 50 units SQ QHS on 04/08/2022  · Continue humalog 12 units TID with meals  · Improved glycemic control on 04/10/2022  · Would benefit from chronic ACE-Inhibitor or ARB treatment for renal protection if his renal function remains stable  · Outpatient Endocrinology evaluation

## 2022-04-12 NOTE — PLAN OF CARE
Problem: OCCUPATIONAL THERAPY ADULT  Goal: Performs self-care activities at highest level of function for planned discharge setting  See evaluation for individualized goals  Description: Treatment Interventions: ADL retraining,Functional transfer training,UE strengthening/ROM,Endurance training,Cognitive reorientation,Patient/family training,Equipment evaluation/education,Activityengagement          See flowsheet documentation for full assessment, interventions and recommendations  Outcome: Progressing  Note: Limitation: Decreased ADL status,Decreased UE strength,Decreased Safe judgement during ADL,Decreased cognition,Decreased endurance,Decreased self-care trans,Decreased high-level ADLs     Assessment: Patient participated in Skilled OT session this date with interventions consisting of ADL re training with the use of correct body mechnaics, Energy Conservation techniques and  therapeutic activities to: increase activity tolerance   Co treatment with PTA secondary to complex medical condition of pt, mod-max A of 2 required to achieve and maintain transitional movements, requiring the need of skilled therapeutic intervention of 2 therapists to achieve delivery of services  Patient agreeable to OT treatment session, upon arrival patient was found seated at edge of bed  Patient requiring verbal cues for safety  Patient continues to be functioning below baseline level, occupational performance remains limited secondary to factors listed above and increased risk for falls and injury  From OT standpoint, recommendation at time of d/c would be Post acute rehabilitation services  The patient's raw score on the AM-PAC Daily Activity inpatient short form is 12, standardized score is 30 6, less than 39 4  Patients at this level are likely to benefit from discharge to post-acute rehabilitation services  Please refer to the recommendation of the Occupational Therapist for safe discharge planning       OT Discharge Recommendation: Post acute rehabilitation services

## 2022-04-12 NOTE — PROGRESS NOTES
[facility]  Progress Note Sherri Oliveros 1945, 76 y o  male MRN: [id number]  Unit/Bed#: 403-01 Encounter: [id number]  Primary Care Provider: Alanis Wiseman MD   Date and time admitted to hospital: 4/2/2022  7:56 PM    * Acute metabolic encephalopathy  Assessment & Plan  Mental status overall improved, still with poor mobility, doubt the patient will have any significant improvement, likely discharge to nursing facility tomorrow for long-term care    Pressure ulcer of left heel, stage 2 (HCC)  Assessment & Plan  · Present on admission  · The patient was seen in consultation by Dr Cortez Kulkarni (Podiatry), who had the following recommendations:  "Plan:  Wound is superficial and stable,  Primarily granular, and without signs of infection noted  Continue wound care as previously ordered, maxorb and allevyn border heel dressing, change every other day  Continue offloading heel with pillow in bed  Likely discharge to rehab today  F/U wound care prn if not available in rehab "    Late onset Alzheimer's dementia with behavioral disturbance [facility])  Assessment & Plan  Likely contributing to patient's aggression and confusion that worsens at night  Psychiatry evaluated the patient due to his homicidal threats made on 4/5/2022  Increase Depakote to 250 mg 3 times a day        Acute respiratory failure with hypoxia and hypercapnia ([address] )  Assessment & Plan  Secondary to morbid obesity/possible obesity-hypoventilation syndrome/obstructive sleep apnea  · Patient with nocturnal hypoxia, also with hypoxia while napping during the day continue oxygen support p r n  To maintain sats greater than 80% and BiPAP QHS    · Continue CPAP machine    Management as above    Obstructive sleep apnea on CPAP  Assessment & Plan  · CPAP machine at the SNF upon discharge      Opiate dependence, continuous ([address] )  Assessment & Plan  Hold opioids due to altered mental status  Consider restart PRN when acceptable    Type 2 diabetes mellitus with hyperglycemia, with long-term current use of insulin [facility])  Assessment & Plan  Lab Results   Component Value Date    HGBA1C 9 0 (H) 04/04/2022       Recent Labs     04/11/22  1639 04/11/22  2051 04/12/22  0720 04/12/22  1108   POCGLU 188* 119 167* 187*       Blood Sugar Average: Last 72 hrs:  (P) 464 [id number]     · Poorly-controlled diabetes with significantly elevated hemoglobin A1c level  · Increased lantus to 50 units SQ QHS on 04/08/2022  · Continue humalog 12 units TID with meals  · Improved glycemic control on 04/10/2022  · Would benefit from chronic ACE-Inhibitor or ARB treatment for renal protection if his renal function remains stable  · Outpatient Endocrinology evaluation    Morbid obesity with BMI of 45 0-49 9, adult ([facility] [address] )  Assessment & Plan  · Lifestyle modifications are recommended including weight loss, improving his diet, and increasing his amount of activity  Chronic diastolic CHF (congestive heart failure) ([facility])  Assessment & Plan  Wt Readings from Last 3 Encounters:   04/04/22 133 kg (293 lb)   04/03/22 133 kg (293 lb 3 4 oz)   04/12/21 133 kg (292 lb 15 9 oz)     Does not appear to be in acute exacerbation  Echo done in March of 2021 reported preserved ejection fraction grade 2 diastolic dysfunction  Continue carvedilol  · The patient is not on any diuretics at this time    · Outpatient Cardiology evaluation      CKD (chronic kidney disease) stage 3, GFR 30-59 ml/min [facility])  Assessment & Plan  Lab Results   Component Value Date    EGFR 54 04/11/2022    EGFR 52 04/09/2022    EGFR 50 04/07/2022    CREATININE 1 24 04/11/2022    CREATININE 1 28 04/09/2022    CREATININE 1 34 (H) 04/07/2022     Avoid nephrotoxic drugs and hypotension  Serial laboratory testing to monitor the patient's renal function and electrolyte levels  Outpatient follow-up with Nephrology        Code Status: Level 3 - DNAR and DNI    Subjective:   No pain    Objective: Vitals:   Temp (24hrs), Av 1 °F (36 7 °C), Min:98 1 °F (36 7 °C), Max:98 2 °F (36 8 °C)    Temp:  [98 1 °F (36 7 °C)-98 2 °F (36 8 °C)] 98 2 °F (36 8 °C)  HR:  [65-72] 68  Resp:  [20] 20  BP: (115-151)/(56-68) 122/58  SpO2:  [88 %-95 %] 88 %  Body mass index is 47 29 kg/m²  Input and Output Summary (last 24 hours): Intake/Output Summary (Last 24 hours) at 2022 1549  Last data filed at 2022 1338  Gross per 24 hour   Intake 900 ml   Output --   Net 900 ml       Physical Exam:   Physical Exam  Vitals and nursing note reviewed  Constitutional:       General: He is not in acute distress  Appearance: Normal appearance  He is obese  He is not ill-appearing, toxic-appearing or diaphoretic  HENT:      Nose: Nose normal    Cardiovascular:      Rate and Rhythm: Normal rate  Pulses: Normal pulses  Heart sounds: Normal heart sounds  Pulmonary:      Effort: Pulmonary effort is normal       Breath sounds: Normal breath sounds  Musculoskeletal:      Cervical back: Normal range of motion  Right lower leg: Edema present  Left lower leg: Edema present  Skin:     General: Skin is warm and dry  Neurological:      General: No focal deficit present  Mental Status: He is alert  Psychiatric:         Mood and Affect: Mood normal          Behavior: Behavior normal          Thought Content:  Thought content normal          Judgment: Judgment normal           Additional Data:     Labs:  Results from last 7 days   Lab Units 22  0643   WBC Thousand/uL 8 67   HEMOGLOBIN g/dL 12 3   HEMATOCRIT % 39 3   PLATELETS Thousands/uL 334   NEUTROS PCT % 64   LYMPHS PCT % 22   MONOS PCT % 8   EOS PCT % 4     Results from last 7 days   Lab Units 22  0643   SODIUM mmol/L 144   POTASSIUM mmol/L 4 0   CHLORIDE mmol/L 108   CO2 mmol/L 27   BUN mg/dL 19   CREATININE mg/dL 1 24   ANION GAP mmol/L 9   CALCIUM mg/dL 8 9   ALBUMIN g/dL 2 9*   TOTAL BILIRUBIN mg/dL 0 25   ALK PHOS U/L 152* ALT U/L 28   AST U/L 22   GLUCOSE RANDOM mg/dL 172*         Results from last 7 days   Lab Units 04/12/22  1108 04/12/22  0720 04/11/22  2051 04/11/22  1639 04/11/22  1109 04/11/22  0701 04/10/22  2157 04/10/22  1540 04/10/22  1113 04/10/22  0728 04/09/22  2102 04/09/22  1519   POC GLUCOSE mg/dl 187* 167* 119 188* 231* 161* 307* 163* 195* 132 211* 257*         Results from last 7 days   Lab Units 04/11/22  0643 04/09/22  0434   PROCALCITONIN ng/ml 0 11 0 14       Lines/Drains:  Invasive Devices  Report    None                       Imaging: No pertinent imaging reviewed  Recent Cultures (last 7 days):         Last 24 Hours Medication List:   Current Facility-Administered Medications   Medication Dose Route Frequency Provider Last Rate    acetaminophen  650 mg Oral Q6H PRN Collette Drape, PA-C      albuterol  2 5 mg Nebulization Q4H PRN Collette Drape, PA-C      aspirin  81 mg Oral Daily Collette Drape, PA-C      atorvastatin  40 mg Oral QPM Collette Drape, PA-C      carvedilol  12 5 mg Oral BID Collette Drape, PA-C      cholecalciferol  2,000 Units Oral Daily Collette Drape, PA-C      divalproex sodium  250 mg Oral [place], [place]      docusate sodium  100 mg Oral BID Collette Drape, PA-C      enoxaparin  40 mg Subcutaneous Q12H [address] John Montesinos DO      finasteride  5 mg Oral Daily Collette Drape, PA-C      insulin glargine  50 Units Subcutaneous HS John Montesinos DO      insulin lispro  12 Units Subcutaneous TID With Meals [facility], DO      insulin lispro  2-12 Units Subcutaneous TID With Meals Collette Drape, PA-C      nystatin   Topical TID Collette Drape, PA-C      tamsulosin  0 4 mg Oral QPM Collette Drape, PA-C          Today, Patient Was Seen By: Kyle Tellez DO    **Please Note: This note may have been constructed using a voice recognition system  **

## 2022-04-12 NOTE — OCCUPATIONAL THERAPY NOTE
Occupational Therapy         Patient Name: Gabo Parker  Today's Date: 4/12/2022 04/12/22 1138   OT Last Visit   OT Visit Date 04/12/22   Note Type   Note Type Treatment   Restrictions/Precautions   Weight Bearing Precautions Per Order No   Other Precautions Bed Alarm;Multiple lines;Telemetry;O2;Fall Risk;Pain   Pain Assessment   Pain Assessment Tool 0-10   Pain Score No Pain   ADL   LB Dressing Assistance 2  Maximal Assistance   LB Dressing Deficit Don/doff R sock; Don/doff L sock   LB Dressing Comments while seated on EOB   Bed Mobility   Additional Comments seated on EOB at start of session, appears to be falling asleep; postural sway noted  Pt aroused and able to maintain balance  Transfers   Sit to Stand 4  Minimal assistance   Additional items Assist x 2;Bedrails; Increased time required;Verbal cues   Stand to Sit 4  Minimal assistance   Additional items Assist x 2;Armrests; Increased time required;Verbal cues   Stand pivot 3  Moderate assistance   Additional items Assist x 2;Bedrails;Armrests; Increased time required;Verbal cues   Additional Comments use of RW; Mod A x 2 for repositioning in recliner chair   Cognition   Overall Cognitive Status Impaired   Arousal/Participation Alert   Attention Attends with cues to redirect   Orientation Level Oriented to person;Oriented to place   Memory Decreased long term memory   Following Commands Follows one step commands without difficulty   Activity Tolerance   Activity Tolerance Patient limited by fatigue   Assessment   Assessment Patient participated in Skilled OT session this date with interventions consisting of ADL re training with the use of correct body mechnaics, Energy Conservation techniques and  therapeutic activities to: increase activity tolerance    Co treatment with PTA secondary to complex medical condition of pt, mod-max A of 2 required to achieve and maintain transitional movements, requiring the need of skilled therapeutic intervention of 2 therapists to achieve delivery of services  Patient agreeable to OT treatment session, upon arrival patient was found seated at edge of bed  Patient requiring verbal cues for safety  Patient continues to be functioning below baseline level, occupational performance remains limited secondary to factors listed above and increased risk for falls and injury  From OT standpoint, recommendation at time of d/c would be Post acute rehabilitation services  The patient's raw score on the AM-PAC Daily Activity inpatient short form is 12, standardized score is 30 6, less than 39 4  Patients at this level are likely to benefit from discharge to post-acute rehabilitation services  Please refer to the recommendation of the Occupational Therapist for safe discharge planning  Plan   Goal Expiration Date 04/18/22   OT Treatment Day 5   OT Frequency 3-5x/wk   Recommendation   OT Discharge Recommendation Post acute rehabilitation services   AM-[facility] Daily Activity Inpatient   Lower Body Dressing 2   Bathing 2   Toileting 1   Upper Body Dressing 3   Grooming 2   Eating 2   Daily Activity Raw Score 12   Daily Activity Standardized Score (Calc for Raw Score >=11) 30 6   AM-PAC Applied Cognition Inpatient   Following a Speech/Presentation 2   Understanding Ordinary Conversation 2   Taking Medications 1   Remembering Where Things Are Placed or Put Away 1   Remembering List of 4-5 Errands 1   Taking Care of Complicated Tasks 1   Applied Cognition Raw Score 8   Applied Cognition Standardized Score 19 32     Pt will benefit from continued OT services in order to maximize (I) c ADL performance, FM c RW, and improve overall endurance/strength required to complete functional tasks in preparation for d/c    Co treatment with PT secondary to complex medical condition of pt, possible A of 2 required to achieve and maintain transitional movements, requiring the need of skilled therapeutic intervention of 2 therapists to achieve delivery of services  Pt left seated in chair at end of session; all needs within reach; all lines intact    Chapo Tuttle, OT

## 2022-04-12 NOTE — PLAN OF CARE
Problem: PHYSICAL THERAPY ADULT  Goal: Performs mobility at highest level of function for planned discharge setting  See evaluation for individualized goals  Description: Treatment/Interventions: Functional transfer training,LE strengthening/ROM,Therapeutic exercise,Endurance training,Bed mobility,Gait training          See flowsheet documentation for full assessment, interventions and recommendations  Outcome: Progressing  Note: Prognosis: Good  Problem List: Decreased strength,Decreased endurance,Impaired balance,Decreased mobility,Decreased safety awareness,Impaired judgement,Obesity  Assessment: Pt  seen for PT treatment session this date with interventions consisting of bed mobility, transfers and  gait training w/ emphasis on improving pt's ability to ambulate  Pt  Requiring frequent cues for sequence and safety  In comparison to previous session, Pt  With min improvements in activity tolerance  Limited to short distance bed to chair due to weakness and fatigue  Pt is in need of continued activity in PT to improve strength balance endurance mobility transfers and ambulation with return to maximize LOF  From PT/mobility standpoint, recommendation at time of d/c would be post acute rehab  in order to promote return to PLOF and independence  The patient's AM-PAC Basic Mobility Inpatient Short Form Raw Score is 13  A Raw score of less than or equal to 16 suggests the patient may benefit from discharge to post-acute rehabilitation services  Please also refer to physical therapy recommendation for safe DC planning  Co-treat with OT this session due to complexity of pt and requires A x 2 to provided skilled interventions  PT Discharge Recommendation: Post acute rehabilitation services          See flowsheet documentation for full assessment

## 2022-04-13 VITALS
DIASTOLIC BLOOD PRESSURE: 61 MMHG | BODY MASS INDEX: 47.09 KG/M2 | OXYGEN SATURATION: 89 % | HEIGHT: 66 IN | WEIGHT: 293 LBS | SYSTOLIC BLOOD PRESSURE: 143 MMHG | TEMPERATURE: 98.7 F | RESPIRATION RATE: 18 BRPM | HEART RATE: 71 BPM

## 2022-04-13 LAB
ALBUMIN SERPL BCP-MCNC: 2.7 G/DL (ref 3.5–5)
ALP SERPL-CCNC: 130 U/L (ref 46–116)
ALT SERPL W P-5'-P-CCNC: 25 U/L (ref 12–78)
ANION GAP SERPL CALCULATED.3IONS-SCNC: 8 MMOL/L (ref 4–13)
AST SERPL W P-5'-P-CCNC: 20 U/L (ref 5–45)
BASOPHILS # BLD AUTO: 0.03 THOUSANDS/ΜL (ref 0–0.1)
BASOPHILS NFR BLD AUTO: 0 % (ref 0–1)
BILIRUB SERPL-MCNC: 0.3 MG/DL (ref 0.2–1)
BUN SERPL-MCNC: 21 MG/DL (ref 5–25)
CALCIUM ALBUM COR SERPL-MCNC: 9.8 MG/DL (ref 8.3–10.1)
CALCIUM SERPL-MCNC: 8.8 MG/DL (ref 8.3–10.1)
CHLORIDE SERPL-SCNC: 110 MMOL/L (ref 100–108)
CO2 SERPL-SCNC: 26 MMOL/L (ref 21–32)
CREAT SERPL-MCNC: 1.22 MG/DL (ref 0.6–1.3)
EOSINOPHIL # BLD AUTO: 0.37 THOUSAND/ΜL (ref 0–0.61)
EOSINOPHIL NFR BLD AUTO: 5 % (ref 0–6)
ERYTHROCYTE [DISTWIDTH] IN BLOOD BY AUTOMATED COUNT: 15.1 % (ref 11.6–15.1)
FLUAV RNA RESP QL NAA+PROBE: NEGATIVE
FLUBV RNA RESP QL NAA+PROBE: NEGATIVE
GFR SERPL CREATININE-BSD FRML MDRD: 56 ML/MIN/1.73SQ M
GLUCOSE SERPL-MCNC: 132 MG/DL (ref 65–140)
GLUCOSE SERPL-MCNC: 143 MG/DL (ref 65–140)
GLUCOSE SERPL-MCNC: 276 MG/DL (ref 65–140)
HCT VFR BLD AUTO: 36.4 % (ref 36.5–49.3)
HGB BLD-MCNC: 11.3 G/DL (ref 12–17)
IMM GRANULOCYTES # BLD AUTO: 0.03 THOUSAND/UL (ref 0–0.2)
IMM GRANULOCYTES NFR BLD AUTO: 0 % (ref 0–2)
INR PPP: 1.05 (ref 0.84–1.19)
LYMPHOCYTES # BLD AUTO: 1.99 THOUSANDS/ΜL (ref 0.6–4.47)
LYMPHOCYTES NFR BLD AUTO: 29 % (ref 14–44)
MAGNESIUM SERPL-MCNC: 2.2 MG/DL (ref 1.6–2.6)
MCH RBC QN AUTO: 27 PG (ref 26.8–34.3)
MCHC RBC AUTO-ENTMCNC: 31 G/DL (ref 31.4–37.4)
MCV RBC AUTO: 87 FL (ref 82–98)
MONOCYTES # BLD AUTO: 0.57 THOUSAND/ΜL (ref 0.17–1.22)
MONOCYTES NFR BLD AUTO: 8 % (ref 4–12)
NEUTROPHILS # BLD AUTO: 3.94 THOUSANDS/ΜL (ref 1.85–7.62)
NEUTS SEG NFR BLD AUTO: 58 % (ref 43–75)
NRBC BLD AUTO-RTO: 0 /100 WBCS
PHOSPHATE SERPL-MCNC: 3.8 MG/DL (ref 2.3–4.1)
PLATELET # BLD AUTO: 304 THOUSANDS/UL (ref 149–390)
PMV BLD AUTO: 10.3 FL (ref 8.9–12.7)
POTASSIUM SERPL-SCNC: 4 MMOL/L (ref 3.5–5.3)
PROCALCITONIN SERPL-MCNC: 0.11 NG/ML
PROT SERPL-MCNC: 7.1 G/DL (ref 6.4–8.2)
PROTHROMBIN TIME: 13.2 SECONDS (ref 11.6–14.5)
RBC # BLD AUTO: 4.19 MILLION/UL (ref 3.88–5.62)
RSV RNA RESP QL NAA+PROBE: NEGATIVE
SARS-COV-2 RNA RESP QL NAA+PROBE: NEGATIVE
SODIUM SERPL-SCNC: 144 MMOL/L (ref 136–145)
WBC # BLD AUTO: 6.93 THOUSAND/UL (ref 4.31–10.16)

## 2022-04-13 PROCEDURE — 85610 PROTHROMBIN TIME: CPT | Performed by: INTERNAL MEDICINE

## 2022-04-13 PROCEDURE — 82948 REAGENT STRIP/BLOOD GLUCOSE: CPT

## 2022-04-13 PROCEDURE — 84145 PROCALCITONIN (PCT): CPT | Performed by: INTERNAL MEDICINE

## 2022-04-13 PROCEDURE — 99239 HOSP IP/OBS DSCHRG MGMT >30: CPT | Performed by: INTERNAL MEDICINE

## 2022-04-13 PROCEDURE — 0241U HB NFCT DS VIR RESP RNA 4 TRGT: CPT | Performed by: INTERNAL MEDICINE

## 2022-04-13 PROCEDURE — 85025 COMPLETE CBC W/AUTO DIFF WBC: CPT | Performed by: INTERNAL MEDICINE

## 2022-04-13 PROCEDURE — 84100 ASSAY OF PHOSPHORUS: CPT | Performed by: INTERNAL MEDICINE

## 2022-04-13 PROCEDURE — 83735 ASSAY OF MAGNESIUM: CPT | Performed by: INTERNAL MEDICINE

## 2022-04-13 PROCEDURE — 80053 COMPREHEN METABOLIC PANEL: CPT | Performed by: INTERNAL MEDICINE

## 2022-04-13 RX ORDER — DIVALPROEX SODIUM 125 MG/1
250 CAPSULE, COATED PELLETS ORAL EVERY 12 HOURS SCHEDULED
Refills: 0
Start: 2022-04-13

## 2022-04-13 RX ADMIN — ENOXAPARIN SODIUM 40 MG: 40 INJECTION SUBCUTANEOUS at 09:39

## 2022-04-13 RX ADMIN — ASPIRIN 81 MG CHEWABLE TABLET 81 MG: 81 TABLET CHEWABLE at 09:39

## 2022-04-13 RX ADMIN — NYSTATIN: 100000 POWDER TOPICAL at 09:39

## 2022-04-13 RX ADMIN — Medication 2000 UNITS: at 09:39

## 2022-04-13 RX ADMIN — ACETAMINOPHEN 650 MG: 325 TABLET ORAL at 10:09

## 2022-04-13 RX ADMIN — DOCUSATE SODIUM 100 MG: 100 CAPSULE, LIQUID FILLED ORAL at 09:39

## 2022-04-13 RX ADMIN — DIVALPROEX SODIUM 250 MG: 125 CAPSULE, COATED PELLETS ORAL at 14:24

## 2022-04-13 RX ADMIN — FINASTERIDE 5 MG: 5 TABLET, FILM COATED ORAL at 09:39

## 2022-04-13 RX ADMIN — CARVEDILOL 12.5 MG: 12.5 TABLET, FILM COATED ORAL at 09:39

## 2022-04-13 NOTE — CASE MANAGEMENT
Case Management Discharge Planning Note    Patient name Christina Hirsch  Location /[id number] MRN [id number]  : 1945 Date 2022       Current Admission Date: 2022  Current Admission Diagnosis:Acute metabolic encephalopathy   Patient Active Problem List    Diagnosis Date Noted    Pressure ulcer of left heel, stage 2 ([facility] ) 2022    Late onset Alzheimer's dementia with behavioral disturbance ([facility] ) 2022    Acute metabolic encephalopathy     Hypoxia 2022    Acute respiratory failure with hypoxia and hypercapnia ([facility] ) 2022    Constipation 2021    Obstructive sleep apnea on CPAP 04/10/2021    Inguinal lymphadenopathy 2021    Bilateral pleural effusion 2021    Atelectasis 2021    Acute on chronic diastolic CHF (congestive heart failure) ([facility] ) 2021    Multiple renal cysts 2021    Renal calculus 2021    Venous stasis dermatitis of both lower extremities 2021    Lower extremity weakness 2021    Cigarette nicotine dependence in remission 2021    Pyuria 03/10/2021    Microscopic hematuria 03/10/2021    Multiple pulmonary nodules 03/10/2021    Gait instability 03/10/2021    Elevated d-dimer 2021    Elevated parathyroid hormone 2021    Accelerated hypertension 2021    Hypercalcemia 2021    Open wound of right lower extremity 2021    Cellulitis of right lower extremity 2021    Elevated alkaline phosphatase level 2021    Elevated TSH 2019    Pressure injury of skin of right buttock 2019    Ambulatory dysfunction 2019    UTI (urinary tract infection) 2019    Neural foraminal stenosis of cervical spine 2019    Acute pain of right lower extremity 2019    Paresthesia of left upper extremity 2019    Hypoglycemia 2019    Acute cystitis 2019    Opiate dependence, continuous ([facility] ) 2019    Uncontrolled type 2 diabetes mellitus with hyperglycemia, with long-term current use of insulin ([place] [address] ) 10/04/2018    Acute kidney injury ([place] [address] ) 10/04/2018    Dehydration with hyponatremia 10/04/2018    Type 2 diabetes mellitus with hyperglycemia, with long-term current use of insulin ([place] [address] ) 91/55/0855    Complicated UTI (urinary tract infection) 10/03/2018    Dyspnea on exertion 12/27/2016    Type 2 diabetes mellitus with hypoglycemia without coma, with long-term current use of insulin ([place] [address] ) 12/27/2016    CKD (chronic kidney disease) stage 3, GFR 30-59 ml/min ([facility]) 12/27/2016    Chronic diastolic CHF (congestive heart failure) ([place] [address] ) 12/27/2016    Morbid obesity with BMI of 45 0-49 9, adult ([place] [address] ) 12/27/2016    Essential hypertension 12/27/2016      LOS (days): 10  Geometric Mean LOS (GMLOS) (days): 3 60  Days to GMLOS:-6 8     OBJECTIVE:  Risk of Unplanned Readmission Score: 28         Current admission status: Inpatient   Preferred Pharmacy:   [facility], [place] - [address] [facility], [address] [facility], [address]  [address] [address]  Phone: [phone] Fax: [address] [address]  Phone: [phone] Fax: [phone]    Primary Care Provider: Alansi Wiseman MD    Primary Insurance: [facility]  Secondary Insurance:     DISCHARGE DETAILS:    Discharge Destination Plan[de-identified] SNF ([facility])  Transport at Discharge : BLS Ambulance        [facility]  [place]  ETA of Transport (Date): 04/13/22  ETA of Transport (Time): 1300

## 2022-04-13 NOTE — CASE MANAGEMENT
Case Management Discharge Planning Note    Patient name Stephanie Grullon  Location /[id number] MRN [id number]  : 1945 Date 2022       Current Admission Date: 2022  Current Admission Diagnosis:Acute metabolic encephalopathy   Patient Active Problem List    Diagnosis Date Noted    Pressure ulcer of left heel, stage 2 ([facility] ) 2022    Late onset Alzheimer's dementia with behavioral disturbance ([facility] ) 2022    Acute metabolic encephalopathy     Hypoxia 2022    Acute respiratory failure with hypoxia and hypercapnia ([facility] ) 2022    Constipation 2021    Obstructive sleep apnea on CPAP 04/10/2021    Inguinal lymphadenopathy 2021    Bilateral pleural effusion 2021    Atelectasis 2021    Acute on chronic diastolic CHF (congestive heart failure) ([facility] ) 2021    Multiple renal cysts 2021    Renal calculus 2021    Venous stasis dermatitis of both lower extremities 2021    Lower extremity weakness 2021    Cigarette nicotine dependence in remission 2021    Pyuria 03/10/2021    Microscopic hematuria 03/10/2021    Multiple pulmonary nodules 03/10/2021    Gait instability 03/10/2021    Elevated d-dimer 2021    Elevated parathyroid hormone 2021    Accelerated hypertension 2021    Hypercalcemia 2021    Open wound of right lower extremity 2021    Cellulitis of right lower extremity 2021    Elevated alkaline phosphatase level 2021    Elevated TSH 2019    Pressure injury of skin of right buttock 2019    Ambulatory dysfunction 2019    UTI (urinary tract infection) 2019    Neural foraminal stenosis of cervical spine 2019    Acute pain of right lower extremity 2019    Paresthesia of left upper extremity 2019    Hypoglycemia 2019    Acute cystitis 2019    Opiate dependence, continuous ([facility] ) 2019    Uncontrolled type 2 diabetes mellitus with hyperglycemia, with long-term current use of insulin ([facility] ) 10/04/2018    Acute kidney injury ([facility] ) 10/04/2018    Dehydration with hyponatremia 10/04/2018    Type 2 diabetes mellitus with hyperglycemia, with long-term current use of insulin ([place] [address] ) 74/22/9113    Complicated UTI (urinary tract infection) 10/03/2018    Dyspnea on exertion 12/27/2016    Type 2 diabetes mellitus with hypoglycemia without coma, with long-term current use of insulin ([place] [address] ) 12/27/2016    CKD (chronic kidney disease) stage 3, GFR 30-59 ml/min ([facility]) 12/27/2016    Chronic diastolic CHF (congestive heart failure) ([place] [address] ) 12/27/2016    Morbid obesity with BMI of 45 0-49 9, adult ([place] [address] ) 12/27/2016    Essential hypertension 12/27/2016      LOS (days): 10  Geometric Mean LOS (GMLOS) (days): 3 60  Days to GMLOS:-6 8     OBJECTIVE:  Risk of Unplanned Readmission Score: 28         Current admission status: Inpatient   Preferred Pharmacy:   [facility], [place] - [address] [facility], [address] [facility], [address]  [address] [address]  Phone: [phone] Fax: [address]87 [address] [address]  Phone: [phone] Fax: [phone]    Primary Care Provider: Suyapa Early MD    Primary Insurance: [facility]  Secondary Insurance:     DISCHARGE DETAILS:       Discharge Destination Plan[de-identified] SNF ([facility])  Transport at Discharge : BLS Ambulance        Transported by [employer] and Unit #):  [place]  ETA of Transport (Date): 04/13/22  ETA of Transport (Time): 1430 ( time changed to 1430)

## 2022-04-13 NOTE — TRANSPORTATION MEDICAL NECESSITY
Section I - General Information    Name of Patient: Sienna Francois                 : 1945    Medicare #: [id number]  Transport Date: 22 (PCS is valid for round trips on this date and for all repetitive trips in the 60-day range as noted below )  Origin: [address] [place] [address] Avenue: [address] [place] [address] rehab  Is the pt's stay covered under Medicare Part A (PPS/DRG)   [x]     Closest appropriate facility? If no, why is transport to more distant facility required? Yes  If hospice pt, is this transport related to pt's terminal illness? No       Section II - Medical Necessity Questionnaire  Ambulance transportation is medically necessary only if other means of transport are contraindicated or would be potentially harmful to the patient  To meet this requirement, the patient must either be "bed confined" or suffer from a condition such that transport by means other than ambulance is contraindicated by the patient's condition  The following questions must be answered by the medical professional signing below for this form to be valid:    1)  Describe the MEDICAL CONDITION (physical and/or mental) of this patient AT [address] [place] [address] that requires the patient to be transported in an ambulance and why transport by other means is contraindicated by the patient's condition:Alzheimers dementia; encephalopathy; pressure ulcer left heel stage 2; on 2l 02 unable to self administer    2) Is the patient "bed confined" as defined below? Yes  To be "be confined" the patient must satisfy all three of the following conditions: (1) unable to get up from bed without Assistance; AND (2) unable to ambulate; AND (3) unable to sit in a chair or wheelchair  3) Can this patient safely be transported by car or wheelchair van (i e , seated during transport without a medical attendant or monitoring)?    No    4) In addition to completing questions 1-3 above, please check any of the following conditions that apply*:   *Note: supporting documentation for any boxes checked must be maintained in the patient's medical records  If hosp-hosp transfer, describe services needed at 2nd facility not available at 1st facility? Medical attendant required  ; on 2l 02 unable to self administer    Section III - Signature of Physician or Healthcare Professional  I certify that the above information is true and correct based on my evaluation of this patient, and represent that the patient requires transport by ambulance and that other forms of transport are contraindicated  I understand that this information will be used by the Centers for Medicare and Medicaid Services (CMS) to support the determination of medical necessity for ambulance services, and I represent that I have personal knowledge of the patient's condition at time of transport  [x]  If this box is checked, I also certify that the patient is physically or mentally incapable of signing the ambulance service's claim and that the institution with which I am affiliated has furnished care, services, or assistance to the patient  My signature below is made on behalf of the patient pursuant to 42 CFR §424 36(b)(4)  In accordance with 42 CFR §424 37, the specific reason(s) that the patient is physically or mentally incapable of signing the claim form is as follows: Sacha Blum  Signature of Physician* or Healthcare Professional______________________________________________________________  Signature Date 04/13/22 (For scheduled repetitive transports, this form is not valid for transports performed more than 60 days after this date)    Printed Name & Credentials of Physician or Healthcare Professional (MD, DO, RN, etc )________________________________  *Form must be signed by patient's attending physician for scheduled, repetitive transports   For non-repetitive, unscheduled ambulance transports, if unable to obtain the signature of the attending physician, any of the following may sign (choose appropriate option below)  [] Physician Assistant []  Clinical Nurse Specialist []  Registered Nurse  []  Nurse Practitioner  [x] Discharge Planner

## 2022-04-14 NOTE — UTILIZATION REVIEW
Notification of Discharge   This is a Notification of Discharge from our facility [address]  Please be advised that this patient has been discharge from our facility  Below you will find the admission and discharge date and time including the patients disposition  UTILIZATION REVIEW CONTACT:  Amado Mariee  Utilization   Network Utilization Review Department  Phone: [phone] x carefully listen to the prompts  All voicemails are confidential   Email: [email or web address]     PHYSICIAN ADVISORY SERVICES:  FOR [id number] REVIEW - MEDICAL NECESSITY DENIAL  Phone: [phone]  Fax: [phone]  Email: [email or web address]     PRESENTATION DATE: 4/2/2022  7:56 PM  OBERVATION ADMISSION DATE:   INPATIENT ADMISSION DATE: 4/3/22 12:27 AM   DISCHARGE DATE: 4/13/2022  3:35 PM  DISPOSITION: Non [facility] SNF/TCU/SNU Non [facility] SNF/TCU/SNU      IMPORTANT INFORMATION:  Send all requests for admission clinical reviews, approved or denied determinations and any other requests to dedicated fax number below belonging to the campus where the patient is receiving treatment   List of dedicated fax numbers:  [address] DENIALS (Administrative/Medical Necessity) [phone]   [address]  (Maternity/NICU/Pediatrics) [phone]   Syl[facility] Roland [phone]   [address] [facility] [phone]   [address] [place] [address] [phone]   [address] [place] [address],[address] [phone]   [facility]  [phone]   [address] [facility]  [address] [facility] [address] [facility] [phone]

## 2022-04-15 NOTE — ASSESSMENT & PLAN NOTE
Lab Results   Component Value Date    EGFR 56 04/13/2022    EGFR 54 04/11/2022    EGFR 52 04/09/2022    CREATININE 1 22 04/13/2022    CREATININE 1 24 04/11/2022    CREATININE 1 28 04/09/2022     Avoid nephrotoxic drugs and hypotension  Serial laboratory testing to monitor the patient's renal function and electrolyte levels  Outpatient follow-up with Nephrology

## 2022-04-15 NOTE — ASSESSMENT & PLAN NOTE
Lab Results   Component Value Date    HGBA1C 9 0 (H) 04/04/2022       Recent Labs     04/12/22  2118 04/13/22  0650 04/13/22  1045   POCGLU 153* 143* 276*       Blood Sugar Average: Last 72 hrs:  (P) 388 [id number]     · Poorly-controlled diabetes with significantly elevated hemoglobin A1c level  · Continue basal bolus insulin therapy at discharge per discharge medication reconciliation form
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
## 2022-04-15 NOTE — ASSESSMENT & PLAN NOTE
Secondary to morbid obesity/possible obesity-hypoventilation syndrome/obstructive sleep apnea  · Patient with nocturnal hypoxia, also with hypoxia while napping during the day continue oxygen support p r n   To maintain sats greater than 80% and BiPAP QHS    · Continue CPAP machine

## 2022-04-15 NOTE — ASSESSMENT & PLAN NOTE
· Present on admission  · The patient was seen in consultation by Dr Nandini Key (Podiatry), who had the following recommendations:  "Plan:  Wound is superficial and stable,  Primarily granular, and without signs of infection noted  Continue wound care as previously ordered, maxorb and allevyn border heel dressing, change every other day  Continue offloading heel with pillow in bed  Likely discharge to rehab today    F/U wound care prn if not available in rehab "

## 2022-04-15 NOTE — ASSESSMENT & PLAN NOTE
Mental status overall improved, still with poor mobility, doubt the patient will have any significant improvement, patient discharged to nursing facility likely for long-term care    Likely component of underlying dementia

## 2024-02-15 NOTE — NURSING NOTE
1st attempted call to [address] [place] [address] facility to give report  No answer  Left message to call this RN back at [phone]  2nd attempt to give report made at [phone]  Again no answer  No 2nd message left 
Patient is refusing am bloodwork- stated "they did it yesterday that's good enough"  Informed him of need to repeat them daily to provide adequate care  He stated "maybe later today they can do them"  [facility] [employer] notified 
Patient is still confused but is awake more  He only know his name and could not tell me his birth date  When I told him he is in the hospital he said he wasn't in a hospital he was in a "pig hospital"  He is verbally aggressive  He keeps calling staff "son of francois**washington" and said he wants to punch you  Sometimes he yells "mommy"  or yells "honey" 
Pt  is refusing blood works this morning, started getting agitated when RN tried  Hospitalist on call made aware 
Pt  refuses to wear his oxygen this morning  He also ripped his IV and started to get agitated 
Pt alert and oriented to person and time  Pt exhibits short term memory deficit but great long term recall  Pt incontinent of bowel  He was washed, bedding changed and triad paste applied to clean dried groin, buttock and scrotum for MASD  He was repositioned afterward to his left side and call bell next to him  Bed in low and locked position  3/4 bedrails up 
Soft wrist restraints bilaterally applied to patient per Marlo Noss PA  Will continue to monitor the need for restraints  At this time patient's oxygen is back on [facility] and the patient is resting comfortably with no signs of distress 
Upon cleaning the patient up and tending to his soiled linens he became combative and tore off Bipap mask  Patient tried to hit staff multiple times and keeps repeating "I will kill you", and repeating foul language at nursing staff  Pt  Refuses to wear his oxygen that he requires and his 02 saturation is trending around 85-86% on room air  Eri PRO notified via telephone  Awaiting further orders 
When I went to do 2300 neuro check, patient was more awake but still confused  He was only oriented to self  He was very irritated, combative, and uncooperative  He ripped off his bipap, O2 sensor, BP cuff  He refused to keep the nasal cannula on I tried to put on him  He kept trying to hit staff and was cursing  I gave him his PRN zyprexa at 2317  He was still refusing to leave any oxygen on and was desatting into the low 80s and high 70s  I notified the PA Jesus Osullivan and respiratory therapist Armin Osullivan ordered soft wrist restraints  Around this time his daughter Tani Bridges called for an update  His wife was also in the background  I tried having them talk to him but he still would not cooperate  I told them I was putting him in some soft wrist restraints for now so that he would keep his oxygen levels where they need to be  At first they said "No, you are not putting my dad in restraints"  When I explained that without him wearing the oxygen he could die, they became agreeable  He was placed in soft wrist restraints and on 5L nasal cannula  Shortly after he became more lethargic again 
Detail Level: Detailed
Detail Level: Simple
